# Patient Record
Sex: FEMALE | Race: WHITE | Employment: OTHER | URBAN - METROPOLITAN AREA
[De-identification: names, ages, dates, MRNs, and addresses within clinical notes are randomized per-mention and may not be internally consistent; named-entity substitution may affect disease eponyms.]

---

## 2017-01-09 RX ORDER — RISPERIDONE 1 MG/1
1 TABLET, FILM COATED ORAL 2 TIMES DAILY
Qty: 60 TABLET | Refills: 3 | Status: CANCELLED | OUTPATIENT
Start: 2017-01-09

## 2017-01-10 RX ORDER — ARIPIPRAZOLE 10 MG/1
TABLET ORAL
Qty: 90 TABLET | Refills: 1 | Status: SHIPPED | OUTPATIENT
Start: 2017-01-10 | End: 2017-05-01 | Stop reason: SDUPTHER

## 2017-01-10 RX ORDER — ARIPIPRAZOLE 10 MG/1
15 TABLET ORAL 2 TIMES DAILY
Qty: 90 TABLET | Refills: 1 | Status: SHIPPED | OUTPATIENT
Start: 2017-01-10 | End: 2017-01-31 | Stop reason: SDUPTHER

## 2017-01-10 RX ORDER — RISPERIDONE 1 MG/1
1 TABLET, FILM COATED ORAL 2 TIMES DAILY
Qty: 60 TABLET | Refills: 3 | Status: SHIPPED | OUTPATIENT
Start: 2017-01-10 | End: 2017-07-02 | Stop reason: SDUPTHER

## 2017-01-20 ENCOUNTER — TELEPHONE (OUTPATIENT)
Dept: FAMILY MEDICINE CLINIC | Age: 57
End: 2017-01-20

## 2017-01-20 DIAGNOSIS — L08.9 SKIN INFECTION: ICD-10-CM

## 2017-01-20 RX ORDER — CLINDAMYCIN HYDROCHLORIDE 300 MG/1
300 CAPSULE ORAL 3 TIMES DAILY
Qty: 30 CAPSULE | Refills: 0 | Status: SHIPPED | OUTPATIENT
Start: 2017-01-20 | End: 2017-09-07 | Stop reason: ALTCHOICE

## 2017-01-31 ENCOUNTER — OFFICE VISIT (OUTPATIENT)
Dept: FAMILY MEDICINE CLINIC | Age: 57
End: 2017-01-31

## 2017-01-31 VITALS
SYSTOLIC BLOOD PRESSURE: 122 MMHG | WEIGHT: 217 LBS | RESPIRATION RATE: 16 BRPM | HEART RATE: 78 BPM | TEMPERATURE: 98.3 F | BODY MASS INDEX: 36.15 KG/M2 | OXYGEN SATURATION: 96 % | DIASTOLIC BLOOD PRESSURE: 82 MMHG | HEIGHT: 65 IN

## 2017-01-31 DIAGNOSIS — F31.9 BIPOLAR DEPRESSION (HCC): ICD-10-CM

## 2017-01-31 DIAGNOSIS — Z00.00 ANNUAL PHYSICAL EXAM: Primary | ICD-10-CM

## 2017-01-31 PROCEDURE — 99396 PREV VISIT EST AGE 40-64: CPT | Performed by: FAMILY MEDICINE

## 2017-03-01 ENCOUNTER — TELEPHONE (OUTPATIENT)
Dept: FAMILY MEDICINE CLINIC | Age: 57
End: 2017-03-01

## 2017-03-02 ENCOUNTER — TELEPHONE (OUTPATIENT)
Dept: OBGYN CLINIC | Age: 57
End: 2017-03-02

## 2017-03-02 ENCOUNTER — HOSPITAL ENCOUNTER (OUTPATIENT)
Dept: MAMMOGRAPHY | Age: 57
Discharge: HOME | End: 2017-03-02
Attending: OBSTETRICS & GYNECOLOGY | Admitting: OBSTETRICS & GYNECOLOGY

## 2017-03-02 DIAGNOSIS — Z12.39 BREAST CANCER SCREENING: Primary | ICD-10-CM

## 2017-03-02 DIAGNOSIS — R92.8 ABNORMAL MAMMOGRAM OF LEFT BREAST: ICD-10-CM

## 2017-05-01 RX ORDER — ARIPIPRAZOLE 10 MG/1
TABLET ORAL
Qty: 90 TABLET | Refills: 0 | Status: SHIPPED | OUTPATIENT
Start: 2017-05-01 | End: 2017-06-01 | Stop reason: SDUPTHER

## 2017-06-01 RX ORDER — ARIPIPRAZOLE 10 MG/1
TABLET ORAL
Qty: 90 TABLET | Refills: 0 | Status: SHIPPED | OUTPATIENT
Start: 2017-06-01 | End: 2017-07-02 | Stop reason: SDUPTHER

## 2017-07-03 ENCOUNTER — TELEPHONE (OUTPATIENT)
Dept: FAMILY MEDICINE CLINIC | Age: 57
End: 2017-07-03

## 2017-07-03 DIAGNOSIS — K58.9 IRRITABLE BOWEL SYNDROME, UNSPECIFIED TYPE: Primary | ICD-10-CM

## 2017-07-03 RX ORDER — PROMETHAZINE HYDROCHLORIDE 25 MG/1
25 TABLET ORAL EVERY 6 HOURS PRN
Qty: 30 TABLET | Refills: 1 | Status: SHIPPED | OUTPATIENT
Start: 2017-07-03 | End: 2017-09-07 | Stop reason: ALTCHOICE

## 2017-07-03 RX ORDER — ARIPIPRAZOLE 10 MG/1
TABLET ORAL
Qty: 90 TABLET | Refills: 5 | Status: SHIPPED | OUTPATIENT
Start: 2017-07-03 | End: 2021-08-13 | Stop reason: DRUGHIGH

## 2017-07-03 RX ORDER — RISPERIDONE 1 MG/1
TABLET, FILM COATED ORAL
Qty: 60 TABLET | Refills: 5 | Status: SHIPPED | OUTPATIENT
Start: 2017-07-03 | End: 2018-05-24 | Stop reason: ALTCHOICE

## 2017-07-12 ENCOUNTER — TELEPHONE (OUTPATIENT)
Dept: FAMILY MEDICINE CLINIC | Age: 57
End: 2017-07-12

## 2017-07-12 RX ORDER — CEPHALEXIN 500 MG/1
500 CAPSULE ORAL 4 TIMES DAILY
Qty: 40 CAPSULE | Refills: 0 | Status: SHIPPED | OUTPATIENT
Start: 2017-07-12 | End: 2017-09-07 | Stop reason: ALTCHOICE

## 2017-09-07 ENCOUNTER — OFFICE VISIT (OUTPATIENT)
Dept: OBGYN CLINIC | Age: 57
End: 2017-09-07

## 2017-09-07 VITALS
HEIGHT: 65 IN | DIASTOLIC BLOOD PRESSURE: 78 MMHG | HEART RATE: 92 BPM | TEMPERATURE: 97.6 F | BODY MASS INDEX: 36.12 KG/M2 | WEIGHT: 216.8 LBS | SYSTOLIC BLOOD PRESSURE: 114 MMHG

## 2017-09-07 DIAGNOSIS — Z12.4 PAP SMEAR FOR CERVICAL CANCER SCREENING: ICD-10-CM

## 2017-09-07 DIAGNOSIS — E66.9 OBESITY (BMI 30-39.9): ICD-10-CM

## 2017-09-07 DIAGNOSIS — Z01.419 WOMEN'S ANNUAL ROUTINE GYNECOLOGICAL EXAMINATION: Primary | ICD-10-CM

## 2017-09-07 DIAGNOSIS — N76.0 BV (BACTERIAL VAGINOSIS): ICD-10-CM

## 2017-09-07 DIAGNOSIS — D25.9 UTERINE LEIOMYOMA, UNSPECIFIED LOCATION: ICD-10-CM

## 2017-09-07 DIAGNOSIS — Z78.0 MENOPAUSE: ICD-10-CM

## 2017-09-07 DIAGNOSIS — Z12.11 COLON CANCER SCREENING: ICD-10-CM

## 2017-09-07 DIAGNOSIS — B96.89 BV (BACTERIAL VAGINOSIS): ICD-10-CM

## 2017-09-07 DIAGNOSIS — Z98.51 HISTORY OF TUBAL LIGATION: ICD-10-CM

## 2017-09-07 PROCEDURE — G0101 CA SCREEN;PELVIC/BREAST EXAM: HCPCS | Performed by: OBSTETRICS & GYNECOLOGY

## 2017-09-07 RX ORDER — CLINDAMYCIN HYDROCHLORIDE 300 MG/1
300 CAPSULE ORAL 2 TIMES DAILY
Qty: 14 CAPSULE | Refills: 0 | Status: SHIPPED | OUTPATIENT
Start: 2017-09-07 | End: 2017-09-14

## 2017-09-07 ASSESSMENT — ENCOUNTER SYMPTOMS
VOMITING: 0
ABDOMINAL DISTENTION: 0
SHORTNESS OF BREATH: 0
DIARRHEA: 0
NAUSEA: 0
ABDOMINAL PAIN: 0
CONSTIPATION: 0

## 2017-09-11 LAB
HPV COMMENT: NORMAL
HPV TYPE 16: NOT DETECTED
HPV TYPE 18: NOT DETECTED
HPVOH (OTHER TYPES): NOT DETECTED

## 2017-09-26 ENCOUNTER — TELEPHONE (OUTPATIENT)
Dept: FAMILY MEDICINE CLINIC | Age: 57
End: 2017-09-26

## 2017-09-26 RX ORDER — AZITHROMYCIN 250 MG/1
TABLET, FILM COATED ORAL
Qty: 1 PACKET | Refills: 0 | Status: SHIPPED | OUTPATIENT
Start: 2017-09-26 | End: 2017-10-06

## 2017-10-23 ENCOUNTER — TELEPHONE (OUTPATIENT)
Dept: FAMILY MEDICINE CLINIC | Age: 57
End: 2017-10-23

## 2017-10-23 ENCOUNTER — OFFICE VISIT (OUTPATIENT)
Dept: FAMILY MEDICINE CLINIC | Age: 57
End: 2017-10-23

## 2017-10-23 VITALS
DIASTOLIC BLOOD PRESSURE: 78 MMHG | SYSTOLIC BLOOD PRESSURE: 116 MMHG | HEART RATE: 88 BPM | TEMPERATURE: 98.5 F | HEIGHT: 64 IN | WEIGHT: 216 LBS | BODY MASS INDEX: 36.88 KG/M2

## 2017-10-23 DIAGNOSIS — Z13.220 SCREENING FOR HYPERLIPIDEMIA: ICD-10-CM

## 2017-10-23 DIAGNOSIS — F31.9 BIPOLAR DEPRESSION (HCC): Primary | ICD-10-CM

## 2017-10-23 DIAGNOSIS — Z12.11 COLON CANCER SCREENING: ICD-10-CM

## 2017-10-23 DIAGNOSIS — Z11.59 NEED FOR HEPATITIS C SCREENING TEST: ICD-10-CM

## 2017-10-23 DIAGNOSIS — Z23 NEED FOR IMMUNIZATION AGAINST INFLUENZA: ICD-10-CM

## 2017-10-23 DIAGNOSIS — Z23 NEED FOR DIPHTHERIA-TETANUS-PERTUSSIS (TDAP) VACCINE, ADULT/ADOLESCENT: ICD-10-CM

## 2017-10-23 DIAGNOSIS — Z11.4 SCREENING FOR HIV (HUMAN IMMUNODEFICIENCY VIRUS): ICD-10-CM

## 2017-10-23 DIAGNOSIS — Z13.1 SCREENING FOR DIABETES MELLITUS: ICD-10-CM

## 2017-10-23 PROCEDURE — 99214 OFFICE O/P EST MOD 30 MIN: CPT | Performed by: FAMILY MEDICINE

## 2017-10-23 PROCEDURE — 90471 IMMUNIZATION ADMIN: CPT | Performed by: FAMILY MEDICINE

## 2017-10-23 PROCEDURE — 90688 IIV4 VACCINE SPLT 0.5 ML IM: CPT | Performed by: FAMILY MEDICINE

## 2017-10-23 PROCEDURE — 90715 TDAP VACCINE 7 YRS/> IM: CPT | Performed by: FAMILY MEDICINE

## 2017-10-23 PROCEDURE — G0008 ADMIN INFLUENZA VIRUS VAC: HCPCS | Performed by: FAMILY MEDICINE

## 2017-10-23 ASSESSMENT — ENCOUNTER SYMPTOMS
SHORTNESS OF BREATH: 0
NAUSEA: 0
CONSTIPATION: 0
VOMITING: 0
DIARRHEA: 0
ABDOMINAL PAIN: 0

## 2017-10-23 NOTE — TELEPHONE ENCOUNTER
I think Dr. Gaurang Villanueva may be working at [employer] [facility] now, but not sure. I think Dr. Julian Pastor is still in [facility], but don't know about insurance coverage.

## 2017-10-23 NOTE — TELEPHONE ENCOUNTER
Patient saw Dr. Radames Sevilla recently. Said she wants her to see a psychiatris. Referred her to Dr. Tangela Bridges. He is not on their insurance. Wants to know if there is someone in the [facility] that Dr. Devonte Suggs could recommend. Was seeing Dr. Subhash Arnold(?) at one point but he was closed down.

## 2017-10-23 NOTE — TELEPHONE ENCOUNTER
Pt called stating that she seen Dr. Claudette Ambriz this morning. She states that Dr. Claudette Ambriz referred her to a psychiatrist. She states that she tried to call and schedule an appointment with them but was told they do not take their insurance. Pt wants to know if there is anyone else you could refer her too. I informed pt that she could call her insurance and see who is in her network. Please advise.

## 2017-10-23 NOTE — PROGRESS NOTES
Chief Complaint   Patient presents with    New Patient         HPI      56 y.o. female presents today to establish care. Patient is feeling well and has no acute complaints. History of bipolar disorder stable on Abilify andRisperdal. Tolerating without SE. Denies SI/HI  She is up to date on her mammogram and pap smear. Works out regularly at [employer] with her son. Working on eliminating sodas from diet. Patient Active Problem List   Diagnosis    Hemorrhoids    Bipolar depression ([address]    Urinary, incontinence, stress female    Obesity (BMI 30-39. 9)    Dyspareunia    History of tubal ligation    Uterine fibroid    Menopause     Past Medical History:   Diagnosis Date    Anxiety     Bipolar 1 disorder ([address]     Depression     Irritable bowel syndrome     Obesity     Urinary incontinence        Past Surgical History:   Procedure Laterality Date    COLONOSCOPY  2010    polyp removed    EYE SURGERY Left     Lazy eye    TONSILLECTOMY      TUBAL LIGATION  1994     Most Recent Immunizations   Administered Date(s) Administered    Influenza Virus Vaccine 10/14/2016    Influenza, Suellyn Morning, 3 Years and older, IM 10/23/2017    Tdap (Boostrix, Adacel) 10/23/2017        Current Outpatient Prescriptions   Medication Sig Dispense Refill    ARIPiprazole (ABILIFY) 10 MG tablet TAKE ONE AND ONE-HALF TABLETS BY MOUTH TWICE DAILY 90 tablet 5    risperiDONE (RISPERDAL) 1 MG tablet TAKE ONE TABLET BY MOUTH TWICE DAILY 60 tablet 5    ranitidine (ZANTAC) 150 MG tablet Take 1 tablet by mouth 2 times daily 60 tablet 0     No current facility-administered medications for this visit.       No Known Allergies    Social History     Social History    Marital status: Single     Spouse name: N/A    Number of children: 2    Years of education: 15     Occupational History    disabilty      Social History Main Topics    Smoking status: Never Smoker    Smokeless tobacco: Never Used    Alcohol use No    Drug use: No    Sexual activity: Yes     Partners: Male     Birth control/ protection: Surgical      Comment: tubal ligation     Other Topics Concern    None     Social History Narrative    None     Family History   Problem Relation Age of Onset    Heart Disease Mother     Diabetes Mother     Arthritis Father     Arthritis Brother     Diabetes Maternal Grandfather                               Review Of Systems    Review of Systems   Constitutional: Negative for chills and fever. Eyes: Negative for visual disturbance. Respiratory: Negative for shortness of breath. Cardiovascular: Negative for chest pain. Gastrointestinal: Negative for abdominal pain, constipation, diarrhea, nausea and vomiting. Genitourinary: Negative for dysuria. PHYSICAL EXAMINATION:    /78 (Site: Right Arm, Position: Sitting, Cuff Size: Large Adult)   Pulse 88   Temp 98.5 °F (36.9 °C) (Oral)   Ht 5' 4\" (1.626 m)   Wt 216 lb (98 kg)   LMP 01/10/2015 (Exact Date)   BMI 37.08 kg/m²     Physical Exam   Constitutional: She is oriented to person, place, and time. She appears well-developed and well-nourished. No distress. HENT:   Head: Normocephalic and atraumatic. Right Ear: External ear normal.   Left Ear: External ear normal.   Mouth/Throat: Oropharynx is clear and moist. No oropharyngeal exudate. Eyes: Conjunctivae and EOM are normal. Right eye exhibits no discharge. Left eye exhibits no discharge. Cardiovascular: Normal rate, regular rhythm and normal heart sounds. Pulmonary/Chest: Effort normal and breath sounds normal. No respiratory distress. She has no wheezes. Abdominal: Soft. Bowel sounds are normal. She exhibits no distension. There is no tenderness. Neurological: She is alert and oriented to person, place, and time. Skin: Skin is warm and dry. She is not diaphoretic. Psychiatric: She has a normal mood and affect. Vitals reviewed.         ASSESSMENT:   Well Adult, See encounter diagnoses  Padmini Sanjuanita was seen today for new patient. Diagnoses and all orders for this visit:    Bipolar depression ([facility] [address] [facility]  Will continue current regimen  -     External Referral to Psychiatry    Screening for HIV (human immunodeficiency virus)  -     HIV Screen; Future    Need for hepatitis C screening test  -     HEPATITIS C ANTIBODY; Future    BMI 37.0-37.9, adult  -     [facility]    Screening for diabetes mellitus  -     Comprehensive Metabolic Panel; Future    Screening for hyperlipidemia  -     LIPID PANEL; Future    Colon cancer screening  -     Melvin Metz MD (RD)    Need for immunization against influenza  -     INFLUENZA, QUADV, 3 YRS AND OLDER, IM, MDV, 0.5ML (FLUZONE QUADV)    Need for diphtheria-tetanus-pertussis (Tdap) vaccine, adult/adolescent  -     Tdap (age 10y-63y) IM (Adacel)    Billing based on time. 30 minutes spent with the patient, and greater than 50% was spent in counseling           Plan:   See orders and medications filed with this encounter. Labs checked per orders. Vaccines ordered today per orders. Patient will return for fasting blood work    Return in about 6 months (around 4/23/2018) for or sooner if needed.

## 2017-10-24 ENCOUNTER — TELEPHONE (OUTPATIENT)
Dept: FAMILY MEDICINE CLINIC | Age: 57
End: 2017-10-24

## 2017-10-25 ENCOUNTER — TELEPHONE (OUTPATIENT)
Dept: FAMILY MEDICINE CLINIC | Age: 57
End: 2017-10-25

## 2017-10-25 NOTE — TELEPHONE ENCOUNTER
Pt states [facility] ([facility] ([phone]) needs a Prior Authorization for [employer].

## 2017-10-25 NOTE — TELEPHONE ENCOUNTER
Patient called and stated that she wanted to let Dr. Jorden Payan know that she will be going to see  Dr Donavan Schuler, psychiatrist on Monday 10.30.17. She said that her son was going to be going with her.

## 2017-11-09 ENCOUNTER — TELEPHONE (OUTPATIENT)
Dept: FAMILY MEDICINE CLINIC | Age: 57
End: 2017-11-09

## 2017-11-09 ENCOUNTER — OFFICE VISIT (OUTPATIENT)
Dept: FAMILY MEDICINE CLINIC | Age: 57
End: 2017-11-09

## 2017-11-09 VITALS
HEART RATE: 78 BPM | OXYGEN SATURATION: 96 % | BODY MASS INDEX: 37.25 KG/M2 | WEIGHT: 217 LBS | SYSTOLIC BLOOD PRESSURE: 128 MMHG | DIASTOLIC BLOOD PRESSURE: 86 MMHG

## 2017-11-09 DIAGNOSIS — H92.03 EAR PAIN, BILATERAL: Primary | ICD-10-CM

## 2017-11-09 DIAGNOSIS — Z13.1 SCREENING FOR DIABETES MELLITUS: ICD-10-CM

## 2017-11-09 DIAGNOSIS — Z11.4 SCREENING FOR HIV (HUMAN IMMUNODEFICIENCY VIRUS): ICD-10-CM

## 2017-11-09 DIAGNOSIS — Z13.220 SCREENING FOR HYPERLIPIDEMIA: ICD-10-CM

## 2017-11-09 DIAGNOSIS — Z11.59 NEED FOR HEPATITIS C SCREENING TEST: ICD-10-CM

## 2017-11-09 DIAGNOSIS — Z11.59 NEED FOR HEPATITIS C SCREENING TEST: Primary | ICD-10-CM

## 2017-11-09 LAB
A/G RATIO: 1.2 (ref 1.1–2.2)
ALBUMIN SERPL-MCNC: 4.2 G/DL (ref 3.4–5)
ALP BLD-CCNC: 77 U/L (ref 40–129)
ALT SERPL-CCNC: 52 U/L (ref 10–40)
ANION GAP SERPL CALCULATED.3IONS-SCNC: 18 MMOL/L (ref 3–16)
AST SERPL-CCNC: 43 U/L (ref 15–37)
BILIRUB SERPL-MCNC: 0.4 MG/DL (ref 0–1)
BUN BLDV-MCNC: 14 MG/DL (ref 7–20)
CALCIUM SERPL-MCNC: 9.4 MG/DL (ref 8.3–10.6)
CHLORIDE BLD-SCNC: 102 MMOL/L (ref 99–110)
CHOLESTEROL, TOTAL: 231 MG/DL (ref 0–199)
CO2: 19 MMOL/L (ref 21–32)
CREAT SERPL-MCNC: 0.8 MG/DL (ref 0.6–1.1)
GFR AFRICAN AMERICAN: >60
GFR NON-AFRICAN AMERICAN: >60
GLOBULIN: 3.4 G/DL
GLUCOSE BLD-MCNC: 96 MG/DL (ref 70–99)
HDLC SERPL-MCNC: 42 MG/DL (ref 40–60)
HEPATITIS C ANTIBODY INTERPRETATION: NORMAL
LDL CHOLESTEROL CALCULATED: 143 MG/DL
POTASSIUM SERPL-SCNC: 4.6 MMOL/L (ref 3.5–5.1)
SODIUM BLD-SCNC: 139 MMOL/L (ref 136–145)
TOTAL PROTEIN: 7.6 G/DL (ref 6.4–8.2)
TRIGL SERPL-MCNC: 230 MG/DL (ref 0–150)
VLDLC SERPL CALC-MCNC: 46 MG/DL

## 2017-11-09 PROCEDURE — 99213 OFFICE O/P EST LOW 20 MIN: CPT | Performed by: FAMILY MEDICINE

## 2017-11-09 PROCEDURE — 36415 COLL VENOUS BLD VENIPUNCTURE: CPT | Performed by: FAMILY MEDICINE

## 2017-11-09 RX ORDER — NEOMYCIN SULFATE, POLYMYXIN B SULFATE AND HYDROCORTISONE 10; 3.5; 1 MG/ML; MG/ML; [USP'U]/ML
4 SUSPENSION/ DROPS AURICULAR (OTIC) 3 TIMES DAILY
Qty: 1 BOTTLE | Refills: 0 | Status: SHIPPED | OUTPATIENT
Start: 2017-11-09 | End: 2017-11-19

## 2017-11-09 ASSESSMENT — ENCOUNTER SYMPTOMS
SHORTNESS OF BREATH: 0
SINUS PRESSURE: 0
RHINORRHEA: 1
SORE THROAT: 0
CHEST TIGHTNESS: 0
WHEEZING: 0
SINUS PAIN: 0

## 2017-11-09 NOTE — TELEPHONE ENCOUNTER
Patient called back to check on her phone message. Patient was given Dr. Maria E Hernandez message answer. Patient was scheduled with Dr. Meseret Laura for the ear pain.

## 2017-11-09 NOTE — TELEPHONE ENCOUNTER
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 Patient states that she is in the water a lot. Patient states that she had pain in her ears this morning. Patient states that the pain is intermittent. Patient wants to know if there is any drops that she should be using for her ears. Also patient wants to know if she should be using ear plugs and if so where would she get them. Patient states that she was just seen and does not want to come in.

## 2017-11-09 NOTE — TELEPHONE ENCOUNTER
Patient called and states that she was seen by Dr. Jada Wallace for ear pain. Patient states that she has intermittent ear pain and that she needs a prescription for ear drops. Patient states that she did not agree with Dr. Sandy Dolan opinion. Patient states that she agreed with the medical student's evaluation of her ears. 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 Patient is requesting a prescription for the ear drops.

## 2017-11-09 NOTE — PROGRESS NOTES
motion. Neck supple. No tracheal deviation present. No thyromegaly present. Cardiovascular: Normal rate, regular rhythm and normal heart sounds. Exam reveals no gallop and no friction rub. No murmur heard. Pulmonary/Chest: Effort normal and breath sounds normal. No respiratory distress. She has no wheezes. She has no rales. She exhibits no tenderness. Lymphadenopathy:     She has no cervical adenopathy. Skin: No rash noted. She is not diaphoretic. Nursing note and vitals reviewed. Assessment:      See Below      Plan:      Beth Hobbs was seen today for otalgia. Given that physical exam was clear we recommended getting OTC Advil Sinus. Pt was advised not to chew gum or eat overly chewy foods while symptoms are ongoing. She was advised to call back if symptoms persist despite OTC therapy. Diagnoses and all the patient TMJ is also possible so the Advil should open.  The patient stated that she did have some pain with chewing, but it wasn't that bad     orders for this visit:    Ear pain, bilateral

## 2017-11-09 NOTE — TELEPHONE ENCOUNTER
She would need an evaluation to determine if ear drops are necessary.  She can but ear plugs from [facility] etc

## 2017-11-10 PROBLEM — R74.01 ELEVATED TRANSAMINASE LEVEL: Status: ACTIVE | Noted: 2017-11-10

## 2017-11-10 LAB — HIV-1 AND HIV-2 ANTIBODIES: NORMAL

## 2017-11-14 ENCOUNTER — TELEPHONE (OUTPATIENT)
Dept: FAMILY MEDICINE CLINIC | Age: 57
End: 2017-11-14

## 2017-11-14 NOTE — TELEPHONE ENCOUNTER
Unable to leave message with the person that answered the phone due to HIPAA. He will have pt call back.

## 2017-11-14 NOTE — TELEPHONE ENCOUNTER
Patient called back to let Dr. Adolm Rinne to let her know that she used to take Lithium for a long time. Patient states that she is not longer taking it. She said that long term use of Lithium can affect the liver. She said that this could possibly be the reason for the elevated liver enzymes. Patient states that her twin also took Lithium and she has to have a liver test every year because of the damage that it did to her liver. Please advise.

## 2017-11-20 ENCOUNTER — TELEPHONE (OUTPATIENT)
Dept: FAMILY MEDICINE CLINIC | Age: 57
End: 2017-11-20

## 2017-11-22 ENCOUNTER — TELEPHONE (OUTPATIENT)
Dept: FAMILY MEDICINE CLINIC | Age: 57
End: 2017-11-22

## 2017-11-27 ENCOUNTER — TELEPHONE (OUTPATIENT)
Dept: FAMILY MEDICINE CLINIC | Age: 57
End: 2017-11-27

## 2017-11-27 NOTE — TELEPHONE ENCOUNTER
Patient called and states that Dr. Ade Abrams office would like a copy of the recent lab work that she had done.

## 2017-12-08 ENCOUNTER — TELEPHONE (OUTPATIENT)
Dept: FAMILY MEDICINE CLINIC | Age: 57
End: 2017-12-08

## 2017-12-08 NOTE — TELEPHONE ENCOUNTER
Patient called and states that she has a yeast infection under left arm in the arm pit. Patient states that it started today. Patient states that Dr. Valerie Howell had treated her for it in the past with the Zpak. Patient states that there is no way that she can come into the office today. Patient uses [id number] [facility] [employer] on [address] [employer] Please advise.

## 2017-12-08 NOTE — TELEPHONE ENCOUNTER
She will need to make an appointment to be evaluated. Z pack is not an appropriate medication for yeast infection.

## 2018-05-07 ENCOUNTER — TELEPHONE (OUTPATIENT)
Dept: FAMILY MEDICINE CLINIC | Age: 58
End: 2018-05-07

## 2018-05-07 DIAGNOSIS — K58.9 IRRITABLE BOWEL SYNDROME, UNSPECIFIED TYPE: Primary | ICD-10-CM

## 2018-05-07 RX ORDER — PROMETHAZINE HYDROCHLORIDE 25 MG/1
25 TABLET ORAL EVERY 6 HOURS PRN
Qty: 120 TABLET | Refills: 0 | Status: SHIPPED | OUTPATIENT
Start: 2018-05-07 | End: 2018-06-06

## 2018-05-24 ENCOUNTER — HOSPITAL ENCOUNTER (OUTPATIENT)
Dept: SURGERY | Age: 58
Discharge: HOME | End: 2018-05-24
Attending: INTERNAL MEDICINE | Admitting: INTERNAL MEDICINE

## 2018-05-24 VITALS
RESPIRATION RATE: 16 BRPM | BODY MASS INDEX: 24.99 KG/M2 | OXYGEN SATURATION: 96 % | HEART RATE: 66 BPM | SYSTOLIC BLOOD PRESSURE: 120 MMHG | WEIGHT: 150 LBS | HEIGHT: 65 IN | TEMPERATURE: 97.8 F | DIASTOLIC BLOOD PRESSURE: 68 MMHG

## 2018-05-24 DIAGNOSIS — K62.5 HEMORRHAGE OF ANUS AND RECTUM: ICD-10-CM

## 2018-05-24 RX ORDER — LIDOCAINE HYDROCHLORIDE 10 MG/ML
0.1 INJECTION, SOLUTION EPIDURAL; INFILTRATION; INTRACAUDAL; PERINEURAL
Status: ACTIVE | OUTPATIENT
Start: 2018-05-24 | End: 2018-05-24

## 2018-05-24 RX ORDER — SODIUM CHLORIDE, SODIUM LACTATE, POTASSIUM CHLORIDE, CALCIUM CHLORIDE 600; 310; 30; 20 MG/100ML; MG/100ML; MG/100ML; MG/100ML
INJECTION, SOLUTION INTRAVENOUS ONCE
Status: COMPLETED | OUTPATIENT
Start: 2018-05-24 | End: 2018-05-24

## 2018-05-24 RX ADMIN — SODIUM CHLORIDE, SODIUM LACTATE, POTASSIUM CHLORIDE, CALCIUM CHLORIDE: 600; 310; 30; 20 INJECTION, SOLUTION INTRAVENOUS at 07:00

## 2018-05-24 ASSESSMENT — PAIN SCALES - GENERAL
PAINLEVEL_OUTOF10: 0

## 2018-05-24 ASSESSMENT — PAIN - FUNCTIONAL ASSESSMENT: PAIN_FUNCTIONAL_ASSESSMENT: 0-10

## 2018-09-10 ENCOUNTER — OFFICE VISIT (OUTPATIENT)
Dept: OBGYN CLINIC | Age: 58
End: 2018-09-10

## 2018-09-10 VITALS
WEIGHT: 216.4 LBS | DIASTOLIC BLOOD PRESSURE: 80 MMHG | BODY MASS INDEX: 36.01 KG/M2 | HEART RATE: 88 BPM | TEMPERATURE: 97.8 F | SYSTOLIC BLOOD PRESSURE: 122 MMHG

## 2018-09-10 DIAGNOSIS — Z78.0 MENOPAUSE: ICD-10-CM

## 2018-09-10 DIAGNOSIS — Z12.39 BREAST CANCER SCREENING: ICD-10-CM

## 2018-09-10 DIAGNOSIS — N94.10 DYSPAREUNIA IN FEMALE: ICD-10-CM

## 2018-09-10 DIAGNOSIS — Z01.419 WOMEN'S ANNUAL ROUTINE GYNECOLOGICAL EXAMINATION: Primary | ICD-10-CM

## 2018-09-10 DIAGNOSIS — K64.9 HEMORRHOIDS, UNSPECIFIED HEMORRHOID TYPE: ICD-10-CM

## 2018-09-10 DIAGNOSIS — Z12.4 PAP SMEAR FOR CERVICAL CANCER SCREENING: ICD-10-CM

## 2018-09-10 DIAGNOSIS — E66.9 OBESITY (BMI 30-39.9): ICD-10-CM

## 2018-09-10 DIAGNOSIS — D25.9 UTERINE LEIOMYOMA, UNSPECIFIED LOCATION: ICD-10-CM

## 2018-09-10 PROCEDURE — 99999 PR OFFICE/OUTPT VISIT,PROCEDURE ONLY: CPT | Performed by: OBSTETRICS & GYNECOLOGY

## 2018-09-10 PROCEDURE — G0101 CA SCREEN;PELVIC/BREAST EXAM: HCPCS | Performed by: OBSTETRICS & GYNECOLOGY

## 2018-09-10 ASSESSMENT — ENCOUNTER SYMPTOMS
CONSTIPATION: 0
ABDOMINAL DISTENTION: 0
VOMITING: 0
DIARRHEA: 0
SHORTNESS OF BREATH: 0
NAUSEA: 0
ABDOMINAL PAIN: 0

## 2018-09-10 NOTE — PROGRESS NOTES
Last PAP was normal; September/2017. Abnormal pap history? no  Last HPV screen: 2017 negative  Mammogram was normal, March/2017.   Last Dexa Scan: N/A    Contraceptive method: Post menopausal status  Last colonoscopy was 2018

## 2018-09-10 NOTE — PATIENT INSTRUCTIONS
You may call [phone]) to schedule your screening mammogram at any [address] [place] [address] at your convenience.

## 2018-09-10 NOTE — PROGRESS NOTES
Subjective:      Patient ID: Saadia Lopez is a 66 y.o. female. HPI  67 y/o  presents for annual examination. Last pap smear was 17--normal with negative testing for high risk HPV. Tested negative for high risk HPV in  as well. No history of abnormal pap smear. Patient achieved menopause at age 48. No vaginal bleeding, no vaginal discharge. Sexually active, dyspareunia continues to improved with conservative measures, monogamous >30 years. Moods have significantly improved with changes in medication. Only current medication is Abilify. Previously on Lithium, etc.  Diagnosis has changed from bipolar disorder. Feels new diagnosis is more accurate for the symptoms she experiences. ('depression with psychotic features'). Review of Systems   Constitutional: Negative for activity change, appetite change, chills, fatigue, fever and unexpected weight change. Respiratory: Negative for shortness of breath. Cardiovascular: Negative for chest pain. Gastrointestinal: Negative for abdominal distention, abdominal pain, constipation, diarrhea, nausea and vomiting. Endocrine: Negative for cold intolerance and heat intolerance. Genitourinary: Negative for difficulty urinating, dyspareunia, dysuria, hematuria, menstrual problem, pelvic pain, vaginal bleeding, vaginal discharge and vaginal pain. Skin: Negative for rash. Neurological: Negative for headaches. Hematological: Does not bruise/bleed easily. Objective:   Physical Exam   Constitutional: She is oriented to person, place, and time. Vital signs are normal. She appears well-developed and well-nourished. She is active and cooperative. Non-toxic appearance. She does not have a sickly appearance. She does not appear ill. No distress. HENT:   Head: Normocephalic and atraumatic. Eyes: EOM are normal.   Neck: Trachea normal. Neck supple. No thyromegaly present.    Cardiovascular: Normal rate, regular rhythm, S1 normal,

## 2018-09-21 ENCOUNTER — HOSPITAL ENCOUNTER (OUTPATIENT)
Dept: WOMENS IMAGING | Age: 58
Discharge: HOME | End: 2018-09-21
Payer: COMMERCIAL

## 2018-09-21 ENCOUNTER — OFFICE VISIT (OUTPATIENT)
Dept: FAMILY MEDICINE CLINIC | Age: 58
End: 2018-09-21

## 2018-09-21 VITALS
DIASTOLIC BLOOD PRESSURE: 86 MMHG | SYSTOLIC BLOOD PRESSURE: 138 MMHG | OXYGEN SATURATION: 95 % | HEART RATE: 91 BPM | WEIGHT: 217 LBS | BODY MASS INDEX: 36.11 KG/M2

## 2018-09-21 DIAGNOSIS — Z12.31 ENCOUNTER FOR SCREENING MAMMOGRAM FOR BREAST CANCER: ICD-10-CM

## 2018-09-21 DIAGNOSIS — T14.8XXA WOUND, OPEN: ICD-10-CM

## 2018-09-21 DIAGNOSIS — M54.2 NECK PAIN, ACUTE: Primary | ICD-10-CM

## 2018-09-21 PROCEDURE — 77067 SCR MAMMO BI INCL CAD: CPT

## 2018-09-21 PROCEDURE — 96372 THER/PROPH/DIAG INJ SC/IM: CPT | Performed by: NURSE PRACTITIONER

## 2018-09-21 PROCEDURE — 99214 OFFICE O/P EST MOD 30 MIN: CPT | Performed by: NURSE PRACTITIONER

## 2018-09-21 RX ORDER — METHYLPREDNISOLONE ACETATE 40 MG/ML
40 INJECTION, SUSPENSION INTRA-ARTICULAR; INTRALESIONAL; INTRAMUSCULAR; SOFT TISSUE ONCE
Status: COMPLETED | OUTPATIENT
Start: 2018-09-21 | End: 2018-09-21

## 2018-09-21 RX ADMIN — METHYLPREDNISOLONE ACETATE 40 MG: 40 INJECTION, SUSPENSION INTRA-ARTICULAR; INTRALESIONAL; INTRAMUSCULAR; SOFT TISSUE at 13:35

## 2018-09-21 ASSESSMENT — PATIENT HEALTH QUESTIONNAIRE - PHQ9
2. FEELING DOWN, DEPRESSED OR HOPELESS: 0
1. LITTLE INTEREST OR PLEASURE IN DOING THINGS: 0
SUM OF ALL RESPONSES TO PHQ QUESTIONS 1-9: 0
SUM OF ALL RESPONSES TO PHQ QUESTIONS 1-9: 0
SUM OF ALL RESPONSES TO PHQ9 QUESTIONS 1 & 2: 0

## 2018-09-21 ASSESSMENT — ENCOUNTER SYMPTOMS: TROUBLE SWALLOWING: 0

## 2018-09-21 NOTE — PROGRESS NOTES
Namrata Carranza (:  1960) is a 66 y.o. female, here for evaluation of the following medical concerns: She presents today with neck pain and irritation. She took aspirin at home, which helped. She has not taken ibuprofen. She has 2 bites on her abdomen and buttocks that itch that are red and irritated. Neck Pain    This is a new problem. The current episode started in the past 7 days. The problem occurs constantly. The problem has been waxing and waning. Associated with: choking on rice. The pain is present in the left side. The quality of the pain is described as aching and stabbing. The pain is at a severity of 9/10. The pain is severe. The symptoms are aggravated by stress and coughing. The pain is same all the time. Associated symptoms include pain with swallowing (slight irritation). Pertinent negatives include no chest pain, fever, headaches, syncope, trouble swallowing or weakness. Treatments tried: aspirin. The treatment provided no relief. Wound Check   She was originally treated 3 to 5 days ago. Her temperature was unmeasured prior to arrival. There has been bloody and colored (with pus) discharge from the wound. The redness has worsened. The swelling has worsened. The pain has improved. She has no difficulty moving the affected extremity or digit. Review of Systems   Constitutional: Negative for fever. HENT: Negative for trouble swallowing. Cardiovascular: Negative for chest pain and syncope. Musculoskeletal: Positive for neck pain. Skin: Positive for wound. Neurological: Negative for weakness and headaches. Prior to Visit Medications    Medication Sig Taking? Authorizing Provider   mupirocin (BACTROBAN) 2 % ointment Apply 3 times daily.  Yes Penny Howell APRN - CNP   ARIPiprazole (ABILIFY) 10 MG tablet TAKE ONE AND ONE-HALF TABLETS BY MOUTH TWICE DAILY Yes Tala Mobley MD   cephALEXin (KEFLEX) 500 MG capsule Take 1 capsule by mouth 3 times

## 2018-09-28 ENCOUNTER — TELEPHONE (OUTPATIENT)
Dept: FAMILY MEDICINE CLINIC | Age: 58
End: 2018-09-28

## 2018-09-28 DIAGNOSIS — T14.8XXA OPEN WOUND OF SKIN: Primary | ICD-10-CM

## 2018-09-28 DIAGNOSIS — T14.8XXA WOUND OF SKIN: Primary | ICD-10-CM

## 2018-09-28 RX ORDER — CEPHALEXIN 500 MG/1
500 CAPSULE ORAL 3 TIMES DAILY
Qty: 30 CAPSULE | Refills: 0 | Status: SHIPPED | OUTPATIENT
Start: 2018-09-28 | End: 2018-10-08

## 2018-09-28 RX ORDER — NAPROXEN SODIUM 550 MG/1
550 TABLET ORAL 2 TIMES DAILY WITH MEALS
Qty: 14 TABLET | Refills: 0 | Status: SHIPPED | OUTPATIENT
Start: 2018-09-28 | End: 2019-06-09

## 2018-09-28 NOTE — TELEPHONE ENCOUNTER
I sent over an antibiotic. She should continue to use the ointment and begin the antibiotic today. Is the pain related to her neck or the wounds?

## 2018-09-28 NOTE — TELEPHONE ENCOUNTER
I had sent in an antibiotic. I will send over a non-narcotic pain reliever. She can also try ibuprofen and/or tylenol.

## 2018-09-28 NOTE — TELEPHONE ENCOUNTER
Pt saw Miladis Barkley on 9/21 for bites on her stomach and bottom. Stated the ointment is not really helping and she is in pain. Asking if Miladis Barkley can call in an oral antibiotic and something for the pain.

## 2018-09-28 NOTE — TELEPHONE ENCOUNTER
Do not take extra ibuprofen with the prescription I just ordered. Please follow-up next week if not relief.

## 2018-10-11 ENCOUNTER — OFFICE VISIT (OUTPATIENT)
Dept: FAMILY MEDICINE CLINIC | Age: 58
End: 2018-10-11
Payer: COMMERCIAL

## 2018-10-11 VITALS
WEIGHT: 216 LBS | SYSTOLIC BLOOD PRESSURE: 126 MMHG | DIASTOLIC BLOOD PRESSURE: 86 MMHG | BODY MASS INDEX: 35.94 KG/M2 | OXYGEN SATURATION: 97 % | HEART RATE: 80 BPM

## 2018-10-11 DIAGNOSIS — R74.01 ELEVATED TRANSAMINASE LEVEL: ICD-10-CM

## 2018-10-11 DIAGNOSIS — T14.8XXA WOUND OF SKIN: ICD-10-CM

## 2018-10-11 DIAGNOSIS — F32.3 MAJOR DEPRESSION WITH PSYCHOTIC FEATURES (HCC): ICD-10-CM

## 2018-10-11 DIAGNOSIS — E78.5 DYSLIPIDEMIA: Primary | ICD-10-CM

## 2018-10-11 DIAGNOSIS — E66.9 OBESITY (BMI 30-39.9): ICD-10-CM

## 2018-10-11 LAB
A/G RATIO: 1.2 (ref 1.1–2.2)
ALBUMIN SERPL-MCNC: 4.2 G/DL (ref 3.4–5)
ALP BLD-CCNC: 89 U/L (ref 40–129)
ALT SERPL-CCNC: 54 U/L (ref 10–40)
ANION GAP SERPL CALCULATED.3IONS-SCNC: 17 MMOL/L (ref 3–16)
AST SERPL-CCNC: 33 U/L (ref 15–37)
BILIRUB SERPL-MCNC: 0.3 MG/DL (ref 0–1)
BUN BLDV-MCNC: 17 MG/DL (ref 7–20)
CALCIUM SERPL-MCNC: 9.7 MG/DL (ref 8.3–10.6)
CHLORIDE BLD-SCNC: 105 MMOL/L (ref 99–110)
CHOLESTEROL, TOTAL: 222 MG/DL (ref 0–199)
CO2: 23 MMOL/L (ref 21–32)
CREAT SERPL-MCNC: 1.1 MG/DL (ref 0.6–1.1)
GFR AFRICAN AMERICAN: >60
GFR NON-AFRICAN AMERICAN: 51
GLOBULIN: 3.4 G/DL
GLUCOSE BLD-MCNC: 84 MG/DL (ref 70–99)
HDLC SERPL-MCNC: 51 MG/DL (ref 40–60)
LDL CHOLESTEROL CALCULATED: 133 MG/DL
POTASSIUM SERPL-SCNC: 4.4 MMOL/L (ref 3.5–5.1)
SODIUM BLD-SCNC: 145 MMOL/L (ref 136–145)
TOTAL PROTEIN: 7.6 G/DL (ref 6.4–8.2)
TRIGL SERPL-MCNC: 191 MG/DL (ref 0–150)
VLDLC SERPL CALC-MCNC: 38 MG/DL

## 2018-10-11 PROCEDURE — 36415 COLL VENOUS BLD VENIPUNCTURE: CPT | Performed by: FAMILY MEDICINE

## 2018-10-11 PROCEDURE — 99213 OFFICE O/P EST LOW 20 MIN: CPT | Performed by: FAMILY MEDICINE

## 2018-10-11 ASSESSMENT — ENCOUNTER SYMPTOMS: SHORTNESS OF BREATH: 0

## 2018-10-15 DIAGNOSIS — R74.8 ELEVATED LIVER ENZYMES: Primary | ICD-10-CM

## 2018-10-15 DIAGNOSIS — R79.89 ELEVATED LIVER FUNCTION TESTS: ICD-10-CM

## 2018-10-15 LAB
HAV IGM SER IA-ACNC: NORMAL
HEPATITIS B CORE IGM ANTIBODY: NORMAL
HEPATITIS B SURFACE ANTIGEN INTERPRETATION: NORMAL
HEPATITIS C ANTIBODY INTERPRETATION: NORMAL

## 2018-12-07 ENCOUNTER — TELEPHONE (OUTPATIENT)
Dept: FAMILY MEDICINE CLINIC | Age: 58
End: 2018-12-07

## 2018-12-17 ENCOUNTER — TELEPHONE (OUTPATIENT)
Dept: FAMILY MEDICINE CLINIC | Age: 58
End: 2018-12-17

## 2019-01-14 ENCOUNTER — TELEPHONE (OUTPATIENT)
Dept: FAMILY MEDICINE CLINIC | Age: 59
End: 2019-01-14

## 2019-05-16 ENCOUNTER — TELEPHONE (OUTPATIENT)
Dept: FAMILY MEDICINE CLINIC | Age: 59
End: 2019-05-16

## 2019-05-16 RX ORDER — CEPHALEXIN 500 MG/1
500 CAPSULE ORAL 3 TIMES DAILY
Qty: 30 CAPSULE | Refills: 0 | Status: SHIPPED | OUTPATIENT
Start: 2019-05-16 | End: 2019-05-26

## 2019-05-16 NOTE — TELEPHONE ENCOUNTER
Prescription for antibiotics sent to her pharmacy on file. If symptoms worsen or fail to improve patient is to make an appointment.

## 2019-05-16 NOTE — TELEPHONE ENCOUNTER
Pt states she thinks her insect bites are infected due to bites being red and sore. Pt requesting an antibiotic. Please advise.

## 2019-05-30 ENCOUNTER — OFFICE VISIT (OUTPATIENT)
Dept: FAMILY MEDICINE CLINIC | Age: 59
End: 2019-05-30
Payer: COMMERCIAL

## 2019-05-30 VITALS
OXYGEN SATURATION: 98 % | WEIGHT: 223.2 LBS | DIASTOLIC BLOOD PRESSURE: 80 MMHG | SYSTOLIC BLOOD PRESSURE: 130 MMHG | HEART RATE: 86 BPM | TEMPERATURE: 98.6 F | BODY MASS INDEX: 37.14 KG/M2

## 2019-05-30 DIAGNOSIS — T14.8XXA WOUND, OPEN: ICD-10-CM

## 2019-05-30 DIAGNOSIS — R23.4 FISSURE IN SKIN: Primary | ICD-10-CM

## 2019-05-30 PROCEDURE — G8417 CALC BMI ABV UP PARAM F/U: HCPCS | Performed by: FAMILY MEDICINE

## 2019-05-30 PROCEDURE — 1036F TOBACCO NON-USER: CPT | Performed by: FAMILY MEDICINE

## 2019-05-30 PROCEDURE — 3017F COLORECTAL CA SCREEN DOC REV: CPT | Performed by: FAMILY MEDICINE

## 2019-05-30 PROCEDURE — 99213 OFFICE O/P EST LOW 20 MIN: CPT | Performed by: FAMILY MEDICINE

## 2019-05-30 PROCEDURE — G8427 DOCREV CUR MEDS BY ELIG CLIN: HCPCS | Performed by: FAMILY MEDICINE

## 2019-05-30 RX ORDER — EMOLLIENT COMBINATION NO.97
OINTMENT (GRAM) TOPICAL
Qty: 454 G | Refills: 0 | Status: SHIPPED | OUTPATIENT
Start: 2019-05-30 | End: 2019-10-30 | Stop reason: ALTCHOICE

## 2019-05-30 RX ORDER — CEPHALEXIN 500 MG/1
500 CAPSULE ORAL 3 TIMES DAILY
Qty: 21 CAPSULE | Refills: 0 | Status: SHIPPED | OUTPATIENT
Start: 2019-05-30 | End: 2019-06-09

## 2019-05-30 NOTE — PROGRESS NOTES
Patient: Jacqueline Lanza is a 66 y. o.female who presents today with the following Chief Complaint(s):  Chief Complaint   Patient presents with    Skin Tag     left armpit     Lesion(s)     inside right nostril         HPI: For 2 mo's, has sore inside R nostril, gets bigger and smaller at times, bleeds if pt picks at lesion. No recent colds, sinusitis. No fever. For 1 wk, has pain L lat breast where she has had skin tag, suspects she may have scratched off. No bleeding. Under L breast has lesion that is ttp, feels it is insect bite. Has occasional such bites as does . They live in old home and sleep in basement. Current Outpatient Medications   Medication Sig Dispense Refill    cephALEXin (KEFLEX) 500 MG capsule Take 1 capsule by mouth 3 times daily 21 capsule 0    Emollient (AQUAPHILIC) OINT Use to moisturize the skin daily and use to R nostril sore bid x 7 days. 454 g 0    ARIPiprazole (ABILIFY) 10 MG tablet TAKE ONE AND ONE-HALF TABLETS BY MOUTH TWICE DAILY 90 tablet 5    naproxen sodium (ANAPROX) 550 MG tablet Take 1 tablet by mouth 2 times daily (with meals) 14 tablet 0     No current facility-administered medications for this visit. Patient's past medical history,surgical history, family history, medications,  and allergies  were all reviewed and updated as appropriate today. Review of Systems      Physical Exam   Constitutional: She appears well-developed and well-nourished. HENT:   Head: Normocephalic and atraumatic. Right Ear: External ear normal.   Left Ear: External ear normal.   Mouth/Throat: Oropharynx is clear and moist.   R nostril floor with distal 2 mm fissure   Neck: Normal range of motion. Neck supple. Cardiovascular: Normal rate, regular rhythm and normal heart sounds. Pulmonary/Chest: Effort normal. No respiratory distress. She has no wheezes. Lymphadenopathy:     She has no cervical adenopathy. Skin: Skin is warm and dry.    L lateral breast with

## 2019-06-09 ENCOUNTER — HOSPITAL ENCOUNTER (EMERGENCY)
Age: 59
Discharge: HOME | End: 2019-06-09
Payer: COMMERCIAL

## 2019-06-09 ENCOUNTER — APPOINTMENT (OUTPATIENT)
Dept: GENERAL RADIOLOGY | Age: 59
End: 2019-06-09
Payer: COMMERCIAL

## 2019-06-09 VITALS
HEIGHT: 65 IN | SYSTOLIC BLOOD PRESSURE: 153 MMHG | TEMPERATURE: 97.8 F | RESPIRATION RATE: 18 BRPM | OXYGEN SATURATION: 95 % | HEART RATE: 78 BPM | BODY MASS INDEX: 33.32 KG/M2 | WEIGHT: 200 LBS | DIASTOLIC BLOOD PRESSURE: 98 MMHG

## 2019-06-09 DIAGNOSIS — Z77.098 EXPOSURE TO CHEMICAL INHALATION: Primary | ICD-10-CM

## 2019-06-09 PROCEDURE — 71046 X-RAY EXAM CHEST 2 VIEWS: CPT

## 2019-06-09 PROCEDURE — 94640 AIRWAY INHALATION TREATMENT: CPT

## 2019-06-09 PROCEDURE — 99284 EMERGENCY DEPT VISIT MOD MDM: CPT

## 2019-06-09 PROCEDURE — 6360000002 HC RX W HCPCS: Performed by: PHYSICIAN ASSISTANT

## 2019-06-09 PROCEDURE — 6370000000 HC RX 637 (ALT 250 FOR IP): Performed by: PHYSICIAN ASSISTANT

## 2019-06-09 RX ORDER — ALBUTEROL SULFATE 2.5 MG/3ML
2.5 SOLUTION RESPIRATORY (INHALATION) ONCE
Status: COMPLETED | OUTPATIENT
Start: 2019-06-09 | End: 2019-06-09

## 2019-06-09 RX ORDER — PREDNISONE 10 MG/1
20 TABLET ORAL DAILY
Qty: 10 TABLET | Refills: 0 | Status: SHIPPED | OUTPATIENT
Start: 2019-06-09 | End: 2019-06-14

## 2019-06-09 RX ORDER — ALBUTEROL SULFATE 90 UG/1
1-2 AEROSOL, METERED RESPIRATORY (INHALATION) EVERY 6 HOURS PRN
Qty: 1 INHALER | Refills: 0 | Status: SHIPPED | OUTPATIENT
Start: 2019-06-09 | End: 2019-11-04 | Stop reason: ALTCHOICE

## 2019-06-09 RX ORDER — PREDNISONE 10 MG/1
20 TABLET ORAL ONCE
Status: COMPLETED | OUTPATIENT
Start: 2019-06-09 | End: 2019-06-09

## 2019-06-09 RX ADMIN — PREDNISONE 20 MG: 10 TABLET ORAL at 21:05

## 2019-06-09 RX ADMIN — ALBUTEROL SULFATE 2.5 MG: 2.5 SOLUTION RESPIRATORY (INHALATION) at 20:52

## 2019-06-10 NOTE — ED PROVIDER NOTES
**[facility]  [facility]  EMERGENCY DEPARTMENT ENCOUNTER      Pt Name: Himanshu Serrano  [facility]:[id number]  [place] 1960  Date of evaluation: 6/9/2019  Provider: Marylee Friedlander, PA-C      Chief Complaint:    Chief Complaint   Patient presents with    Shortness of Breath     on thursday she inhaled bug spray and she has been feeling short of breath. Nursing Notes, Past Medical Hx, Past Surgical Hx, Social Hx, Allergies, and Family Hx were all reviewed and agreed with or any disagreements were addressed in the HPI.    HPI:  (Location, Duration, Timing, Severity,Quality, Assoc Sx, Context, Modifying factors)  This is a  66 y.o. female patient was in with concern of inhalation of and/insecticide spray Thursday 9 PM or 72 hours ago. Patient reports initial shortness of breath and cough. This has improved. She continues with a bitter taste in mouth and a sensation of shortness of breath. She indicates no concerning change in voice or throat 7. History of asthma. She does not smoke. She presents for evaluation of mild shortness of breath, not worse with exertion secondary to inhalation of and insecticide spray occurring 72 hours ago. PastMedical/Surgical History:      Diagnosis Date    Anxiety     Bipolar 1 disorder ([address]     Depression     Irritable bowel syndrome     Obesity     Urinary incontinence          Procedure Laterality Date    COLONOSCOPY  2010    polyp removed    COLONOSCOPY  2018    EYE SURGERY Left     Lazy eye    TONSILLECTOMY      TUBAL LIGATION  1994       Medications:  Discharge Medication List as of 6/9/2019  9:06 PM      CONTINUE these medications which have NOT CHANGED    Details   Emollient (AQUAPHILIC) OINT Use to moisturize the skin daily and use to R nostril sore bid x 7 days. , Disp-454 g, R-0Normal      ARIPiprazole (ABILIFY) 10 MG tablet TAKE ONE AND ONE-HALF TABLETS BY MOUTH TWICE DAILY, Disp-90 tablet, R-5Normal               Review of Systems:  Review of Systems  Positives and Pertinent negatives as per HPI. Except as noted above in the ROS, problem specific ROS was completed and is negative. Physical Exam:  Physical Exam   Constitutional: She is oriented to person, place, and time. She appears well-developed and well-nourished. HENT:   Head: Normocephalic and atraumatic. Right Ear: External ear normal.   Left Ear: External ear normal.   Mouth/Throat: Oropharynx is clear and moist.   Eyes: Conjunctivae are normal. Right eye exhibits no discharge. Left eye exhibits no discharge. No scleral icterus. Neck: Normal range of motion. Neck supple. Cardiovascular: Normal rate, regular rhythm and normal heart sounds. Pulmonary/Chest: Effort normal and breath sounds normal.   Musculoskeletal: Normal range of motion. Lymphadenopathy:     She has no cervical adenopathy. Neurological: She is alert and oriented to person, place, and time. Skin: Skin is warm and dry. Psychiatric: She has a normal mood and affect. Her behavior is normal. Judgment and thought content normal.   Nursing note and vitals reviewed. MEDICAL DECISION MAKING    Vitals:    Vitals:    06/09/19 1833 06/09/19 2107   BP: (!) 122/94 (!) 153/98   Pulse: 64 78   Resp: 18 18   Temp: 97.8 °F (36.6 °C)    TempSrc: Oral    SpO2: 97% 95%   Weight: 200 lb (90.7 kg)    Height: 5' 5\" (1.651 m)        LABS:Labs Reviewed - No data to display     Remainder of labs reviewed and werenegative at this time or not returned at the time of this note. RADIOLOGY:   Non-plain film images such as CT, Ultrasound and MRI are read by the radiologist. Author [employer] have directly visualized the radiologic plain film image(s) with the below findings:    Chest x-ray shows no acute cardiopulmonary process. Interpretation per the Radiologist below, if available at the time of thisnote:    XR CHEST STANDARD (2 VW)   Final Result   Clear lungs. doses, Disp-10 tablet, R-0Print      albuterol sulfate  (90 Base) MCG/ACT inhaler Inhale 1-2 puffs into the lungs every 6 hours as needed for Wheezing, Disp-1 Inhaler, R-0Print             DISCONTINUED MEDICATIONS:  Discharge Medication List as of 6/9/2019  9:06 PM      STOP taking these medications       cephALEXin (KEFLEX) 500 MG capsule Comments:   Reason for Stopping:         naproxen sodium (ANAPROX) 550 MG tablet Comments:   Reason for Stopping:                      (Please note the MDM and HPI sections of this note were completed with a voice recognition program.  Efforts weremade to edit the dictations but occasionally words are mis-transcribed.)    Electronically signed, Diego Montgomery PA-C,          Diego Montgomery PA-C  06/11/19 0002

## 2019-06-10 NOTE — ED NOTES
Provider Golden Garcia in room speaking with patient at this time.      Janiya Turner RN  06/09/19 3540

## 2019-06-11 ENCOUNTER — OFFICE VISIT (OUTPATIENT)
Dept: FAMILY MEDICINE CLINIC | Age: 59
End: 2019-06-11
Payer: COMMERCIAL

## 2019-06-11 ENCOUNTER — TELEPHONE (OUTPATIENT)
Dept: FAMILY MEDICINE CLINIC | Age: 59
End: 2019-06-11

## 2019-06-11 VITALS
TEMPERATURE: 97.9 F | DIASTOLIC BLOOD PRESSURE: 64 MMHG | WEIGHT: 225 LBS | OXYGEN SATURATION: 98 % | HEART RATE: 67 BPM | BODY MASS INDEX: 37.44 KG/M2 | SYSTOLIC BLOOD PRESSURE: 118 MMHG

## 2019-06-11 DIAGNOSIS — L29.9 ITCHING: Primary | ICD-10-CM
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
## 2019-06-11 PROCEDURE — G8417 CALC BMI ABV UP PARAM F/U: HCPCS | Performed by: NURSE PRACTITIONER

## 2019-06-11 PROCEDURE — 1036F TOBACCO NON-USER: CPT | Performed by: NURSE PRACTITIONER

## 2019-06-11 PROCEDURE — G8427 DOCREV CUR MEDS BY ELIG CLIN: HCPCS | Performed by: NURSE PRACTITIONER

## 2019-06-11 PROCEDURE — 3017F COLORECTAL CA SCREEN DOC REV: CPT | Performed by: NURSE PRACTITIONER

## 2019-06-11 PROCEDURE — 99214 OFFICE O/P EST MOD 30 MIN: CPT | Performed by: NURSE PRACTITIONER

## 2019-06-11 RX ORDER — SULFAMETHOXAZOLE AND TRIMETHOPRIM 800; 160 MG/1; MG/1
1 TABLET ORAL 2 TIMES DAILY
Qty: 40 TABLET | Refills: 0 | Status: SHIPPED | OUTPATIENT
Start: 2019-06-11 | End: 2019-06-21

## 2019-06-11 RX ORDER — HYDROXYZINE HYDROCHLORIDE 10 MG/1
10 TABLET, FILM COATED ORAL 3 TIMES DAILY PRN
Qty: 30 TABLET | Refills: 0 | Status: SHIPPED | OUTPATIENT
Start: 2019-06-11 | End: 2019-07-08

## 2019-06-11 ASSESSMENT — ENCOUNTER SYMPTOMS
NAUSEA: 0
TROUBLE SWALLOWING: 0
CHEST TIGHTNESS: 0
EYE DISCHARGE: 0
BACK PAIN: 0
COUGH: 0
SORE THROAT: 0
COLOR CHANGE: 0
SINUS PAIN: 0
SHORTNESS OF BREATH: 0
SINUS PRESSURE: 0
ABDOMINAL PAIN: 0

## 2019-06-11 ASSESSMENT — PATIENT HEALTH QUESTIONNAIRE - PHQ9
SUM OF ALL RESPONSES TO PHQ QUESTIONS 1-9: 0
SUM OF ALL RESPONSES TO PHQ QUESTIONS 1-9: 0
2. FEELING DOWN, DEPRESSED OR HOPELESS: 0
1. LITTLE INTEREST OR PLEASURE IN DOING THINGS: 0
SUM OF ALL RESPONSES TO PHQ9 QUESTIONS 1 & 2: 0

## 2019-06-11 NOTE — PROGRESS NOTES
[id number]     Spencer Escobar (:  1960) is a 66 y.o. female, here for evaluation of the following medical concerns: ER follow-up from 19. She is getting bit by something in her basement. Another provider prescribed keflex, not helpful. ER told her she needed bactrim. She also sprayed bug spray and inhaled it. Lungs are feeling much better. Sores under left breast and left leg. She has had multiple wounds for a year of more. Rash   This is a recurrent problem. The current episode started more than 1 year ago. The problem has been gradually worsening since onset. The affected locations include the left upper leg and chest. The rash is characterized by burning, itchiness, pain and redness. Associated with: basement. Pertinent negatives include no congestion, cough, fatigue, fever, shortness of breath or sore throat. Treatments tried: keflex. The treatment provided no relief. Review of Systems   Constitutional: Negative for appetite change, chills, fatigue and fever. HENT: Negative for congestion, ear pain, postnasal drip, sinus pressure, sinus pain, sore throat and trouble swallowing. Eyes: Negative for discharge. Respiratory: Negative for cough, chest tightness and shortness of breath. Cardiovascular: Negative for chest pain and palpitations. Gastrointestinal: Negative for abdominal pain and nausea. Endocrine: Negative for cold intolerance, heat intolerance and polyuria. Musculoskeletal: Negative for back pain and gait problem. Skin: Positive for rash and wound. Negative for color change. Allergic/Immunologic: Negative for environmental allergies, food allergies and immunocompromised state. Neurological: Negative for dizziness, weakness and headaches. Hematological: Does not bruise/bleed easily. Psychiatric/Behavioral: Negative for confusion and sleep disturbance. The patient is not nervous/anxious. Prior to Visit Medications    Medication Sig Taking? Authorizing Provider   sulfamethoxazole-trimethoprim (BACTRIM DS) 800-160 MG per tablet Take 1 tablet by mouth 2 times daily for 10 days Yes MELANIE Kat CNP   mupirocin (BACTROBAN) 2 % ointment Apply 3 times daily. Yes MELANIE Kat CNP   hydrOXYzine (ATARAX) 10 MG tablet Take 1 tablet by mouth 3 times daily as needed for Itching Yes MELANIE Kat CNP   predniSONE (DELTASONE) 10 MG tablet Take 2 tablets by mouth daily for 5 doses Yes Yossi Melendrez PA-C   albuterol sulfate  (90 Base) MCG/ACT inhaler Inhale 1-2 puffs into the lungs every 6 hours as needed for Wheezing Yes Yossi Melendrez PA-C   Emollient (AQUAPHILIC) OINT Use to moisturize the skin daily and use to R nostril sore bid x 7 days. Yes Kamlesh Shukla MD   ARIPiprazole (ABILIFY) 10 MG tablet TAKE ONE AND ONE-HALF TABLETS BY MOUTH TWICE DAILY Yes Gayathri Angel MD        Social History     Tobacco Use    Smoking status: Never Smoker    Smokeless tobacco: Never Used   Substance Use Topics    Alcohol use: No     Alcohol/week: 0.0 oz        Vitals:    06/11/19 1440   BP: 118/64   Pulse: 67   Temp: 97.9 °F (36.6 °C)   TempSrc: Oral   SpO2: 98%   Weight: 225 lb (102.1 kg)     Estimated body mass index is 37.44 kg/m² as calculated from the following:    Height as of 6/9/19: 5' 5\" (1.651 m). Weight as of this encounter: 225 lb (102.1 kg). Physical Exam   Constitutional: She is oriented to person, place, and time. Vital signs are normal. She appears well-developed. HENT:   Head: Normocephalic. Eyes: Pupils are equal, round, and reactive to light. Neck: Normal range of motion. Cardiovascular: Normal rate and regular rhythm. Pulmonary/Chest: Effort normal and breath sounds normal.   Abdominal: Soft. Bowel sounds are normal.   Musculoskeletal: Normal range of motion. Neurological: She is alert and oriented to person, place, and time. Skin: Skin is warm and dry.    Psychiatric: She has a normal mood and affect. Her behavior is normal. Judgment and thought content normal.   Vitals reviewed. ASSESSMENT/PLAN:  Jr Suarez was seen today for other. Diagnoses and all orders for this visit:    Itching  -     sulfamethoxazole-trimethoprim (BACTRIM DS) 800-160 MG per tablet; Take 1 tablet by mouth 2 times daily for 10 days  -     mupirocin (BACTROBAN) 2 % ointment; Apply 3 times daily. -     hydrOXYzine (ATARAX) 10 MG tablet; Take 1 tablet by mouth 3 times daily as needed for Itching    Bug bite, initial encounter  -     sulfamethoxazole-trimethoprim (BACTRIM DS) 800-160 MG per tablet; Take 1 tablet by mouth 2 times daily for 10 days  -     mupirocin (BACTROBAN) 2 % ointment; Apply 3 times daily. -     hydrOXYzine (ATARAX) 10 MG tablet; Take 1 tablet by mouth 3 times daily as needed for Itching      Return if symptoms worsen or fail to improve. An electronic signature was used to authenticate this note. --[facility] KIERAN Shepherd on 6/11/2019 at3:02 PM

## 2019-06-11 NOTE — TELEPHONE ENCOUNTER
[facility] called for clarification on Bactrim. Emelyn Gals does not match directions. Per Dolly Martin advised it should be for Qty of 21.

## 2019-06-24 ENCOUNTER — TELEPHONE (OUTPATIENT)
Dept: FAMILY MEDICINE CLINIC | Age: 59
End: 2019-06-24

## 2019-07-08 ENCOUNTER — TELEPHONE (OUTPATIENT)
Dept: FAMILY MEDICINE CLINIC | Age: 59
End: 2019-07-08

## 2019-07-08 ENCOUNTER — HOSPITAL ENCOUNTER (EMERGENCY)
Age: 59
Discharge: HOME | End: 2019-07-08
Payer: COMMERCIAL

## 2019-07-08 VITALS
DIASTOLIC BLOOD PRESSURE: 86 MMHG | HEART RATE: 87 BPM | WEIGHT: 200 LBS | RESPIRATION RATE: 16 BRPM | OXYGEN SATURATION: 97 % | SYSTOLIC BLOOD PRESSURE: 133 MMHG | TEMPERATURE: 97.7 F | BODY MASS INDEX: 33.28 KG/M2

## 2019-07-08 DIAGNOSIS — Z87.2 HISTORY OF INFECTION OF SKIN OR SUBCUTANEOUS TISSUE: ICD-10-CM

## 2019-07-08 DIAGNOSIS — T14.8XXA OPEN BITE WOUND OF SKIN: Primary | ICD-10-CM

## 2019-07-08 PROCEDURE — 99283 EMERGENCY DEPT VISIT LOW MDM: CPT

## 2019-07-08 RX ORDER — SULFAMETHOXAZOLE AND TRIMETHOPRIM 200; 40 MG/5ML; MG/5ML
160 SUSPENSION ORAL 2 TIMES DAILY
Qty: 400 ML | Refills: 0 | Status: SHIPPED | OUTPATIENT
Start: 2019-07-08 | End: 2019-07-08 | Stop reason: SDUPTHER

## 2019-07-08 RX ORDER — SULFAMETHOXAZOLE AND TRIMETHOPRIM 200; 40 MG/5ML; MG/5ML
160 SUSPENSION ORAL 2 TIMES DAILY
Qty: 400 ML | Refills: 0 | Status: SHIPPED | OUTPATIENT
Start: 2019-07-08 | End: 2019-07-18

## 2019-07-08 ASSESSMENT — PAIN SCALES - GENERAL: PAINLEVEL_OUTOF10: 6

## 2019-07-08 ASSESSMENT — ENCOUNTER SYMPTOMS
NAUSEA: 0
SORE THROAT: 0
SHORTNESS OF BREATH: 0
WHEEZING: 0
DIARRHEA: 0
VOMITING: 0
BACK PAIN: 0
COLOR CHANGE: 1
ABDOMINAL PAIN: 0
COUGH: 0

## 2019-07-08 NOTE — ED NOTES
Pt scripts x2 instructed to follow up with PCP.  Assessed per [facility] NP,     Musa Dsouza LPN  [id number] [phone]

## 2019-07-08 NOTE — ED PROVIDER NOTES
**[facility]  ED  EMERGENCY DEPARTMENT ENCOUNTER      Pt Name: Yefri Varela  CXA:[id number]  [place] 1960  Date of evaluation: 7/8/2019  Provider: MELANIE Powers CNP      Chief Complaint:    Chief Complaint   Patient presents with    Allergic Reaction     started at 0800 , pt states i am having an allergic reaction to spiders that bite me everyday       Nursing Notes, Past Medical Hx, Past Surgical Hx, Social Hx, Allergies, and Family Hx were all reviewed and agreed with or any disagreements were addressed in the HPI.    HPI:  (Location, Duration, Timing, Severity,Quality, Assoc Sx, Context, Modifying factors)  This is a  66 y.o. female who presents today with what she believes is an allergic reaction to spider bites and bug bites. She states that she lives in an old house and gets frequent bites in the summer. She she has lesions on the lateral left thigh, in the middle of her uper back, one on her head. She states that they're very itchy. She denies taking any medicine for symptoms. No cough, congestion, fever or chills. No nausea vomiting or diarrhea. No chest pain pleuritic chest pain or shortness of breath. Denies any pain on exam, only complains of itching. Has not taken any medicine for her symptoms. No additional complaints, no additional aggravating or relieving factors. The patient presents awake, alert and in no acute respiratory distress or toxic appearance.     PastMedical/Surgical History:      Diagnosis Date    Anxiety     Bipolar 1 disorder ([facility] [address]     Depression     Irritable bowel syndrome     Obesity     Urinary incontinence          Procedure Laterality Date    COLONOSCOPY  2010    polyp removed    COLONOSCOPY  2018    EYE SURGERY Left     Lazy eye    TONSILLECTOMY      TUBAL LIGATION  1994       Medications:  Discharge Medication List as of 7/8/2019  3:22 PM      CONTINUE these medications which have states she is having exacerbation on the left lateral thigh and middle of her back, she believes that she is being bit by some type of insects or spiders in her own home however her grandfather that lives with her has no lesions neither does her son. After evaluation and examination the patient I educated her that I would start her on Bactrim as this is what she is taken in the past to help heal the lesions. There is no visible signs of significant cellulitis, abscess, vesicles, pustules. Due to the itching I started her on Benadryl. However, Patient is afebrile and nontoxic in appearance. The rash is currently without the appearance or clinical features to suggest a more emergent diagnosis such as SJS, HSP, TEN, meningococcemia, endocarditis, syphillis, cellulitis, scabies, herpes simplex or erythema multiform, etc.In regards to her blood pressure I do believe this is some underlying anxiety. As her repeat blood pressure and vital signs were stable. Therefore, shared medical decision was made between the patient myself and we agreed she could be discharged home with outpatient follow-up. She was discharged home with liquid prescription for Bactrim and Benadryl as she states she cannot swallow pills. She was given appropriate discharge instructions. Patient advised to follow-up with their family doctor within 24-48 hours and return to the emergency department for worsening symptoms. She was educated about skin hygiene, showering and using soap and water. She was educated to stop scratching. The patient tolerated their visit well. I evaluated the patient. The physician was available for consultation as needed. The patient and / or the family were informed of the results of anytests, a time was given to answer questions, a plan was proposed and they agreed with plan. Patient verbalized understanding of discharge instructions and was discharged from the department in stable condition.     CLINICAL

## 2019-07-25 ENCOUNTER — OFFICE VISIT (OUTPATIENT)
Dept: FAMILY MEDICINE CLINIC | Age: 59
End: 2019-07-25
Payer: COMMERCIAL

## 2019-07-25 VITALS
HEART RATE: 94 BPM | DIASTOLIC BLOOD PRESSURE: 86 MMHG | OXYGEN SATURATION: 97 % | SYSTOLIC BLOOD PRESSURE: 124 MMHG | WEIGHT: 225.6 LBS | TEMPERATURE: 97.9 F | BODY MASS INDEX: 37.54 KG/M2

## 2019-07-25 DIAGNOSIS — B86 SCABIES: Primary | ICD-10-CM

## 2019-07-25 PROCEDURE — 3017F COLORECTAL CA SCREEN DOC REV: CPT | Performed by: FAMILY MEDICINE

## 2019-07-25 PROCEDURE — G8417 CALC BMI ABV UP PARAM F/U: HCPCS | Performed by: FAMILY MEDICINE

## 2019-07-25 PROCEDURE — G8427 DOCREV CUR MEDS BY ELIG CLIN: HCPCS | Performed by: FAMILY MEDICINE

## 2019-07-25 PROCEDURE — 1036F TOBACCO NON-USER: CPT | Performed by: FAMILY MEDICINE

## 2019-07-25 PROCEDURE — 99214 OFFICE O/P EST MOD 30 MIN: CPT | Performed by: FAMILY MEDICINE

## 2019-07-25 RX ORDER — HYDROXYZINE HCL 10 MG/5 ML
10 SOLUTION, ORAL ORAL 4 TIMES DAILY PRN
Qty: 240 ML | Refills: 0 | Status: SHIPPED | OUTPATIENT
Start: 2019-07-25 | End: 2019-09-24

## 2019-07-25 RX ORDER — PERMETHRIN 50 MG/G
CREAM TOPICAL
Qty: 60 G | Refills: 0 | Status: SHIPPED | OUTPATIENT
Start: 2019-07-25 | End: 2019-07-26 | Stop reason: SDUPTHER

## 2019-07-25 ASSESSMENT — ENCOUNTER SYMPTOMS: SHORTNESS OF BREATH: 0

## 2019-07-25 NOTE — PATIENT INSTRUCTIONS
Patient Education        Scabies: Care Instructions  Your Care Instructions  Scabies is a skin problem that can cause intense itching. It is caused by very tiny bugs called mites that dig just under the skin and lay eggs. An allergic reaction to the mites causes the itching. Scabies is usually spread by person-to-person contact. It is also possible, but not common, for scabies to spread through towels, clothes, and bedding. Everyone in your household should be treated. Scabies is treated with medicine. Itching may last for several weeks after treatment. Follow-up care is a key part of your treatment and safety. Be sure to make and go to all appointments, and call your doctor if you are having problems. It's also a good idea to know your test results and keep a list of the medicines you take. How can you care for yourself at home? · Use the lotion or cream your doctor recommends or prescribes. One treatment usually cures scabies. Do not use the cream again unless your doctor tells you to. · Wash all clothes, bedding, and towels that you used in the 3 days before you started treatment. Use hot water, and use the hot cycle in the dryer. Another option is to dry-clean these items. Or seal them in a plastic bag for 3 to 7 days. · Take an oral antihistamine, such as loratadine (Claritin) or diphenhydramine (Benadryl), to help stop itching. You also can use a nonprescription anti-itch cream. Read and follow all instructions on the label. · Do not have physical contact with other people or let anyone use your personal items until you have finished treatment. Do not use other people's personal items until your treatment is done. Tell people with whom you have close contact that they will need treatment if they have symptoms. · Take an oatmeal bath to help relieve itching. Add a handful of oatmeal (ground to a powder) to your bath. Or you can try an oatmeal bath product, such as Aveeno.   When should you call for

## 2019-07-26 DIAGNOSIS — B86 SCABIES: ICD-10-CM

## 2019-07-26 RX ORDER — PERMETHRIN 50 MG/G
CREAM TOPICAL
Qty: 60 G | Refills: 0 | Status: SHIPPED | OUTPATIENT
Start: 2019-07-26 | End: 2019-10-30 | Stop reason: ALTCHOICE

## 2019-07-26 NOTE — TELEPHONE ENCOUNTER
Patient stated she used the Permethrin cream last night and washed it off this morning. Stated it helped but she will need more to repeat in one week.

## 2019-07-30 ENCOUNTER — TELEPHONE (OUTPATIENT)
Dept: FAMILY MEDICINE CLINIC | Age: 59
End: 2019-07-30

## 2019-07-30 NOTE — TELEPHONE ENCOUNTER
Patient called and states that the bug bites are no healing. Patient states that she has a new bug bite under her breast.    Patient states that she is not sleeping in the basement anymore. Patient is requesting a prescription for Bactrim and a steroid. Please send rx to [address] [place] [address]. Please advise.

## 2019-07-31 NOTE — TELEPHONE ENCOUNTER
I sent in the 28 g with one refill. I do not want her using it for an extended period of time. She should repeat the permethrin treatment after 1 week. Inform her that the itching can last 1-2 weeks after treatment. She can take claritin or allegra for the itching.

## 2019-07-31 NOTE — TELEPHONE ENCOUNTER
Patient called to check on her message regarding an antibiotic and steroids. Patient was informed that Dr. Donnie Johnson had tried to call her and that she left a voicemail for her to call the office back. Patient states that she is in a lot of pain from the bug bites and is insisting that Dr. Donnie Johnson send in a prescription for an antibiotic and steroids to help heal the bug bites. Please advise.

## 2019-08-05 ENCOUNTER — TELEPHONE (OUTPATIENT)
Dept: FAMILY MEDICINE CLINIC | Age: 59
End: 2019-08-05

## 2019-08-05 RX ORDER — CEPHALEXIN 500 MG/1
500 CAPSULE ORAL 3 TIMES DAILY
Qty: 30 CAPSULE | Refills: 0 | Status: SHIPPED | OUTPATIENT
Start: 2019-08-05 | End: 2019-08-15

## 2019-08-05 RX ORDER — SULFAMETHOXAZOLE AND TRIMETHOPRIM 800; 160 MG/1; MG/1
1 TABLET ORAL 2 TIMES DAILY
Qty: 20 TABLET | Refills: 0 | Status: SHIPPED | OUTPATIENT
Start: 2019-08-05 | End: 2019-08-12

## 2019-08-06 ENCOUNTER — TELEPHONE (OUTPATIENT)
Dept: FAMILY MEDICINE CLINIC | Age: 59
End: 2019-08-06

## 2019-08-08 DIAGNOSIS — B86 SCABIES: ICD-10-CM

## 2019-08-08 NOTE — TELEPHONE ENCOUNTER
Patient is requesting a refill on hydrocortisone cream. Advised patient she had a refill from 7/31 at the pharmacy and she stated she already filled that one too, she has a lot of sores.

## 2019-08-12 ENCOUNTER — TELEPHONE (OUTPATIENT)
Dept: FAMILY MEDICINE CLINIC | Age: 59
End: 2019-08-12

## 2019-08-12 NOTE — TELEPHONE ENCOUNTER
Patient called and states that she had an allergic reaction to the Bactrim on Saturday. Patient states that she broke out in a rash and had trouble breathing. Patient states that she talked to her pharmacist and was told that she was having an allergic reaction to the Bactrim and to stop taking it. Patient states that her symptoms started going away when she discontinued the antibiotic. Patient wants to know if there is some kind of soap that she could use to help with the pain from the bug bites. She said that they are still painful. Please advise.

## 2019-08-19 ENCOUNTER — TELEPHONE (OUTPATIENT)
Dept: FAMILY MEDICINE CLINIC | Age: 59
End: 2019-08-19

## 2019-08-19 NOTE — TELEPHONE ENCOUNTER
Pt calling and she is requesting her Rx that was called in for the hydrocortisone 2.5%  be called in for a different size? She said the pharmacist told her she can get it in a bigger bottle. She said the tubes are just sot enough for her.

## 2019-08-26 ENCOUNTER — OFFICE VISIT (OUTPATIENT)
Dept: FAMILY MEDICINE CLINIC | Age: 59
End: 2019-08-26
Payer: COMMERCIAL

## 2019-08-26 VITALS
TEMPERATURE: 98.1 F | BODY MASS INDEX: 37.61 KG/M2 | HEART RATE: 76 BPM | WEIGHT: 226 LBS | SYSTOLIC BLOOD PRESSURE: 124 MMHG | DIASTOLIC BLOOD PRESSURE: 82 MMHG | OXYGEN SATURATION: 96 %
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
## 2019-08-26 PROCEDURE — 99213 OFFICE O/P EST LOW 20 MIN: CPT | Performed by: FAMILY MEDICINE

## 2019-08-26 PROCEDURE — 3017F COLORECTAL CA SCREEN DOC REV: CPT | Performed by: FAMILY MEDICINE

## 2019-08-26 PROCEDURE — 1036F TOBACCO NON-USER: CPT | Performed by: FAMILY MEDICINE

## 2019-08-26 PROCEDURE — G8427 DOCREV CUR MEDS BY ELIG CLIN: HCPCS | Performed by: FAMILY MEDICINE

## 2019-08-26 PROCEDURE — G8417 CALC BMI ABV UP PARAM F/U: HCPCS | Performed by: FAMILY MEDICINE

## 2019-08-26 RX ORDER — MUPIROCIN CALCIUM 20 MG/G
CREAM TOPICAL
Qty: 30 G | Refills: 0 | Status: SHIPPED | OUTPATIENT
Start: 2019-08-26 | End: 2019-10-30 | Stop reason: ALTCHOICE

## 2019-08-26 NOTE — PROGRESS NOTES
AFFECTED AREA TWICE DAILY (Patient not taking: Reported on 8/26/2019) 453 g 0    permethrin (ELIMITE) 5 % cream Apply topically as directed. Put cream on at night and wash off in the morning and 1 week out (Patient not taking: Reported on 8/26/2019) 60 g 0    hydrOXYzine (ATARAX) 10 MG/5ML syrup Take 5 mLs by mouth 4 times daily as needed for Itching (Patient not taking: Reported on 8/26/2019) 240 mL 0    Emollient (AQUAPHILIC) OINT Use to moisturize the skin daily and use to R nostril sore bid x 7 days. (Patient not taking: Reported on 8/26/2019) 454 g 0     No current facility-administered medications for this visit.       Allergies   Allergen Reactions    Bactrim [Sulfamethoxazole-Trimethoprim]      Rash and trouble breathing       Social History     Socioeconomic History    Marital status: Single     Spouse name: Not on file    Number of children: 2    Years of education: 15    Highest education level: Not on file   Occupational History    Occupation: disabilty   Social Needs    Financial resource strain: Not on file    Food insecurity:     Worry: Not on file     Inability: Not on file   [employer] needs:     Medical: Not on file     Non-medical: Not on file   Tobacco Use    Smoking status: Never Smoker    Smokeless tobacco: Never Used   Substance and Sexual Activity    Alcohol use: No     Alcohol/week: 0.0 standard drinks    Drug use: No    Sexual activity: Yes     Partners: Male     Birth control/protection: Surgical     Comment: tubal ligation   Lifestyle    Physical activity:     Days per week: Not on file     Minutes per session: Not on file    Stress: Not on file   Relationships    Social connections:     Talks on phone: Not on file     Gets together: Not on file     Attends Gnosticism service: Not on file     Active member of club or organization: Not on file     Attends meetings of clubs or organizations: Not on file     Relationship status: Not on file    Intimate partner violence:     Fear of current or ex partner: Not on file     Emotionally abused: Not on file     Physically abused: Not on file     Forced sexual activity: Not on file   Other Topics Concern    Not on file   Social History Narrative    Not on file     Family History   Problem Relation Age of Onset    Heart Disease Mother     Diabetes Mother     Arthritis Father     Arthritis Brother     Diabetes Maternal Grandfather                               Review Of Systems    Review of Systems    PHYSICAL EXAMINATION:    /82   Pulse 76   Temp 98.1 °F (36.7 °C) (Oral)   Wt 226 lb (102.5 kg)   LMP 01/10/2015 (Exact Date)   SpO2 96%   BMI 37.61 kg/m²      Physical Exam    Media Information      Document Information     Wound      08/26/2019 11:18   Attached To: Office Visit on 8/26/19 with Adonay Noel [id number] Santos Hopkins MD [facility] Information     Wound      08/26/2019 11:19   Attached To: Office Visit on 8/26/19 with Adonay Noel [id number] Santos Hopkins MD [facility]       ASSESSMENT:   Well Adult, See encounter diagnoses  Jasen García was seen today for other. Diagnoses and all orders for this visit:    Bug bite, subsequent encounter  Wounds actually look better than when I first saw her. Long discussion with patient and advised that she must stop itching them because her itching is causing them to not heal and putting her at risk for infection. Continue with the topical Benadryl because this helps. Also advised to wear gloves or mittens to prevent her from scratching and allow the wounds time to heal.  -     mupirocin (BACTROBAN) 2 % cream; Apply topically 3 times daily for 10 days        Billing based on time. 15 minutes spent with the patient, and greater than 50% was spent in counseling       Plan:   See orders and medications filed with this encounter.   Return if symptoms

## 2019-08-27 ENCOUNTER — TELEPHONE (OUTPATIENT)
Dept: FAMILY MEDICINE CLINIC | Age: 59
End: 2019-08-27

## 2019-08-27 NOTE — TELEPHONE ENCOUNTER
Call from Pharmacy stating RX needs to be changed to an ointment for the Bactroban. Cream is too expensive for patient.       RX sent on 8/26/19

## 2019-09-03 NOTE — TELEPHONE ENCOUNTER
Pt states the Bactroban medication is not working at all for her bug bites & would like to request a script for Benadryl or something similar to OTC Benadryl that her insurance would cover. Pt states if there is no such medication then she will just continue to use OTC Benadryl since that is currently working for her.

## 2019-09-04 NOTE — TELEPHONE ENCOUNTER
Patient called back to check on her message from yesterday. Patient was informed that Dr. Mary Gray had sent in a prescription to her pharmacy on file.
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
## 2019-09-09 RX ORDER — DIPHENHYDRAMINE HYDROCHLORIDE, ZINC ACETATE 2; .1 G/100G; G/100G
CREAM TOPICAL
Qty: 56 G | Refills: 1 | Status: SHIPPED | OUTPATIENT
Start: 2019-09-09 | End: 2019-10-30 | Stop reason: ALTCHOICE

## 2019-09-18 ENCOUNTER — TELEPHONE (OUTPATIENT)
Dept: FAMILY MEDICINE CLINIC | Age: 59
End: 2019-09-18

## 2019-09-18 NOTE — TELEPHONE ENCOUNTER
Pt called back saying that she was not able to schedule with the dermatologist that she was referred to because they don't accept her insurance [employer] [place]). She's requesting another dermatology referral.     I told her to continue using the Bactroban (she believes she still has some left) and to keep the area clean and avoid scratching it.

## 2019-09-24 ENCOUNTER — OFFICE VISIT (OUTPATIENT)
Dept: FAMILY MEDICINE CLINIC | Age: 59
End: 2019-09-24
Payer: COMMERCIAL

## 2019-09-24 ENCOUNTER — TELEPHONE (OUTPATIENT)
Dept: FAMILY MEDICINE CLINIC | Age: 59
End: 2019-09-24

## 2019-09-24 VITALS
OXYGEN SATURATION: 95 % | DIASTOLIC BLOOD PRESSURE: 80 MMHG | SYSTOLIC BLOOD PRESSURE: 126 MMHG | BODY MASS INDEX: 37.54 KG/M2 | HEART RATE: 92 BPM | TEMPERATURE: 97.6 F | WEIGHT: 225.6 LBS
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
## 2019-09-24 PROCEDURE — 1036F TOBACCO NON-USER: CPT | Performed by: NURSE PRACTITIONER

## 2019-09-24 PROCEDURE — G8417 CALC BMI ABV UP PARAM F/U: HCPCS | Performed by: NURSE PRACTITIONER

## 2019-09-24 PROCEDURE — G8427 DOCREV CUR MEDS BY ELIG CLIN: HCPCS | Performed by: NURSE PRACTITIONER

## 2019-09-24 PROCEDURE — 3017F COLORECTAL CA SCREEN DOC REV: CPT | Performed by: NURSE PRACTITIONER

## 2019-09-24 PROCEDURE — 99214 OFFICE O/P EST MOD 30 MIN: CPT | Performed by: NURSE PRACTITIONER

## 2019-09-24 RX ORDER — HYDROXYZINE HYDROCHLORIDE 10 MG/1
10 TABLET, FILM COATED ORAL 3 TIMES DAILY PRN
Qty: 90 TABLET | Refills: 0 | Status: SHIPPED | OUTPATIENT
Start: 2019-09-24 | End: 2019-12-31 | Stop reason: SDUPTHER

## 2019-09-24 RX ORDER — CLINDAMYCIN HYDROCHLORIDE 300 MG/1
300 CAPSULE ORAL 3 TIMES DAILY
Qty: 30 CAPSULE | Refills: 0 | Status: SHIPPED | OUTPATIENT
Start: 2019-09-24 | End: 2019-10-04

## 2019-09-24 ASSESSMENT — ENCOUNTER SYMPTOMS
CHEST TIGHTNESS: 0
ABDOMINAL PAIN: 0
COUGH: 0
EYE DISCHARGE: 0

## 2019-09-25 ENCOUNTER — TELEPHONE (OUTPATIENT)
Dept: FAMILY MEDICINE CLINIC | Age: 59
End: 2019-09-25

## 2019-09-26 ENCOUNTER — TELEPHONE (OUTPATIENT)
Dept: FAMILY MEDICINE CLINIC | Age: 59
End: 2019-09-26
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
## 2019-09-30 ENCOUNTER — TELEPHONE (OUTPATIENT)
Dept: FAMILY MEDICINE CLINIC | Age: 59
End: 2019-09-30

## 2019-10-16 ENCOUNTER — TELEPHONE (OUTPATIENT)
Dept: FAMILY MEDICINE CLINIC | Age: 59
End: 2019-10-16

## 2019-10-17 NOTE — TELEPHONE ENCOUNTER
Spoke with patient and she no on the Bactroban and she is going to do OTC antibacterial Dial soap until Monday when she can get the med refilled. She also would like a refill on the  Silvadene so she has more when she needs it. She said to say thank you!

## 2019-10-29 ENCOUNTER — TELEPHONE (OUTPATIENT)
Dept: FAMILY MEDICINE CLINIC | Age: 59
End: 2019-10-29

## 2019-10-30 ENCOUNTER — OFFICE VISIT (OUTPATIENT)
Dept: OBGYN CLINIC | Age: 59
End: 2019-10-30
Payer: COMMERCIAL

## 2019-10-30 VITALS
WEIGHT: 221.8 LBS | DIASTOLIC BLOOD PRESSURE: 64 MMHG | HEART RATE: 88 BPM | BODY MASS INDEX: 39.3 KG/M2 | HEIGHT: 63 IN | SYSTOLIC BLOOD PRESSURE: 128 MMHG | TEMPERATURE: 98 F

## 2019-10-30 DIAGNOSIS — N94.10 DYSPAREUNIA IN FEMALE: ICD-10-CM

## 2019-10-30 DIAGNOSIS — F32.A ANXIETY AND DEPRESSION: ICD-10-CM

## 2019-10-30 DIAGNOSIS — Z12.39 BREAST CANCER SCREENING: ICD-10-CM

## 2019-10-30 DIAGNOSIS — N39.3 URINARY, INCONTINENCE, STRESS FEMALE: ICD-10-CM

## 2019-10-30 DIAGNOSIS — K64.9 HEMORRHOIDS, UNSPECIFIED HEMORRHOID TYPE: ICD-10-CM

## 2019-10-30 DIAGNOSIS — E66.9 OBESITY (BMI 30-39.9): ICD-10-CM

## 2019-10-30 DIAGNOSIS — Z53.8 PAP SMEAR OF CERVIX NOT NEEDED: ICD-10-CM

## 2019-10-30 DIAGNOSIS — Z78.0 MENOPAUSE: ICD-10-CM

## 2019-10-30 DIAGNOSIS — Z01.419 WOMEN'S ANNUAL ROUTINE GYNECOLOGICAL EXAMINATION: Primary | ICD-10-CM

## 2019-10-30 DIAGNOSIS — D25.9 UTERINE LEIOMYOMA, UNSPECIFIED LOCATION: ICD-10-CM

## 2019-10-30 DIAGNOSIS — F41.9 ANXIETY AND DEPRESSION: ICD-10-CM

## 2019-10-30 PROCEDURE — 99999 PR OFFICE/OUTPT VISIT,PROCEDURE ONLY: CPT | Performed by: OBSTETRICS & GYNECOLOGY

## 2019-10-30 PROCEDURE — 99396 PREV VISIT EST AGE 40-64: CPT | Performed by: OBSTETRICS & GYNECOLOGY

## 2019-11-02 ASSESSMENT — ENCOUNTER SYMPTOMS
ABDOMINAL PAIN: 0
NAUSEA: 0
VOMITING: 0
DIARRHEA: 0
ABDOMINAL DISTENTION: 0
CONSTIPATION: 0
SHORTNESS OF BREATH: 0

## 2019-11-04 ENCOUNTER — OFFICE VISIT (OUTPATIENT)
Dept: FAMILY MEDICINE CLINIC | Age: 59
End: 2019-11-04
Payer: COMMERCIAL

## 2019-11-04 VITALS
TEMPERATURE: 97.8 F | WEIGHT: 223 LBS | DIASTOLIC BLOOD PRESSURE: 88 MMHG | BODY MASS INDEX: 39.5 KG/M2 | SYSTOLIC BLOOD PRESSURE: 138 MMHG | OXYGEN SATURATION: 97 % | HEART RATE: 82 BPM
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
## 2019-11-04 PROCEDURE — G8482 FLU IMMUNIZE ORDER/ADMIN: HCPCS | Performed by: FAMILY MEDICINE

## 2019-11-04 PROCEDURE — 99213 OFFICE O/P EST LOW 20 MIN: CPT | Performed by: FAMILY MEDICINE

## 2019-11-04 PROCEDURE — 1036F TOBACCO NON-USER: CPT | Performed by: FAMILY MEDICINE

## 2019-11-04 PROCEDURE — G8427 DOCREV CUR MEDS BY ELIG CLIN: HCPCS | Performed by: FAMILY MEDICINE

## 2019-11-04 PROCEDURE — 3017F COLORECTAL CA SCREEN DOC REV: CPT | Performed by: FAMILY MEDICINE

## 2019-11-04 PROCEDURE — G8417 CALC BMI ABV UP PARAM F/U: HCPCS | Performed by: FAMILY MEDICINE

## 2019-11-04 ASSESSMENT — ENCOUNTER SYMPTOMS: SHORTNESS OF BREATH: 0

## 2019-11-07 ENCOUNTER — TELEPHONE (OUTPATIENT)
Dept: FAMILY MEDICINE CLINIC | Age: 59
End: 2019-11-07

## 2019-12-16 ENCOUNTER — TELEPHONE (OUTPATIENT)
Dept: FAMILY MEDICINE CLINIC | Age: 59
End: 2019-12-16

## 2019-12-30 ENCOUNTER — TELEPHONE (OUTPATIENT)
Dept: FAMILY MEDICINE CLINIC | Age: 59
End: 2019-12-30

## 2019-12-31 ENCOUNTER — OFFICE VISIT (OUTPATIENT)
Dept: FAMILY MEDICINE CLINIC | Age: 59
End: 2019-12-31
Payer: COMMERCIAL

## 2019-12-31 VITALS
DIASTOLIC BLOOD PRESSURE: 84 MMHG | SYSTOLIC BLOOD PRESSURE: 136 MMHG | TEMPERATURE: 97.7 F | OXYGEN SATURATION: 98 % | WEIGHT: 225.4 LBS | HEART RATE: 84 BPM | BODY MASS INDEX: 39.93 KG/M2
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
## 2019-12-31 DIAGNOSIS — R20.0 NUMBNESS AND TINGLING IN BOTH HANDS: ICD-10-CM

## 2019-12-31 DIAGNOSIS — R20.2 NUMBNESS AND TINGLING IN BOTH HANDS: ICD-10-CM

## 2019-12-31 DIAGNOSIS — R20.0 NUMBNESS AND TINGLING OF BOTH FEET: ICD-10-CM

## 2019-12-31 DIAGNOSIS — L98.428: ICD-10-CM

## 2019-12-31 DIAGNOSIS — H00.012 HORDEOLUM EXTERNUM OF RIGHT LOWER EYELID: ICD-10-CM

## 2019-12-31 DIAGNOSIS — R20.2 NUMBNESS AND TINGLING OF BOTH FEET: ICD-10-CM

## 2019-12-31 DIAGNOSIS — N39.0 URINARY TRACT INFECTION WITHOUT HEMATURIA, SITE UNSPECIFIED: Primary | ICD-10-CM

## 2019-12-31 LAB
A/G RATIO: 1.3 (ref 1.1–2.2)
ALBUMIN SERPL-MCNC: 4.3 G/DL (ref 3.4–5)
ALP BLD-CCNC: 81 U/L (ref 40–129)
ALT SERPL-CCNC: 51 U/L (ref 10–40)
ANION GAP SERPL CALCULATED.3IONS-SCNC: 16 MMOL/L (ref 3–16)
AST SERPL-CCNC: 38 U/L (ref 15–37)
BASOPHILS ABSOLUTE: 0.1 K/UL (ref 0–0.2)
BASOPHILS RELATIVE PERCENT: 1 %
BILIRUB SERPL-MCNC: 0.3 MG/DL (ref 0–1)
BILIRUBIN, POC: NEGATIVE
BLOOD URINE, POC: NEGATIVE
BUN BLDV-MCNC: 17 MG/DL (ref 7–20)
CALCIUM SERPL-MCNC: 9.6 MG/DL (ref 8.3–10.6)
CHLORIDE BLD-SCNC: 104 MMOL/L (ref 99–110)
CLARITY, POC: NORMAL
CO2: 22 MMOL/L (ref 21–32)
COLOR, POC: CLEAR
CREAT SERPL-MCNC: 0.9 MG/DL (ref 0.6–1.1)
EOSINOPHILS ABSOLUTE: 0.4 K/UL (ref 0–0.6)
EOSINOPHILS RELATIVE PERCENT: 4.6 %
GFR AFRICAN AMERICAN: >60
GFR NON-AFRICAN AMERICAN: >60
GLOBULIN: 3.3 G/DL
GLUCOSE BLD-MCNC: 91 MG/DL (ref 70–99)
GLUCOSE URINE, POC: NEGATIVE
HCT VFR BLD CALC: 42.7 % (ref 36–48)
HEMOGLOBIN: 14 G/DL (ref 12–16)
KETONES, POC: NORMAL
LEUKOCYTE EST, POC: NORMAL
LYMPHOCYTES ABSOLUTE: 2 K/UL (ref 1–5.1)
LYMPHOCYTES RELATIVE PERCENT: 24.6 %
MCH RBC QN AUTO: 28.5 PG (ref 26–34)
MCHC RBC AUTO-ENTMCNC: 32.8 G/DL (ref 31–36)
MCV RBC AUTO: 87 FL (ref 80–100)
MONOCYTES ABSOLUTE: 0.5 K/UL (ref 0–1.3)
MONOCYTES RELATIVE PERCENT: 6.3 %
NEUTROPHILS ABSOLUTE: 5.1 K/UL (ref 1.7–7.7)
NEUTROPHILS RELATIVE PERCENT: 63.5 %
NITRITE, POC: NEGATIVE
PDW BLD-RTO: 14.1 % (ref 12.4–15.4)
PH, POC: 7.5
PLATELET # BLD: 225 K/UL (ref 135–450)
PLATELET SLIDE REVIEW: ADEQUATE
PMV BLD AUTO: 9.3 FL (ref 5–10.5)
POTASSIUM SERPL-SCNC: 4.8 MMOL/L (ref 3.5–5.1)
PROTEIN, POC: NEGATIVE
RBC # BLD: 4.91 M/UL (ref 4–5.2)
SODIUM BLD-SCNC: 142 MMOL/L (ref 136–145)
SPECIFIC GRAVITY, POC: 1.01
T4 FREE: 1.1 NG/DL (ref 0.9–1.8)
TOTAL PROTEIN: 7.6 G/DL (ref 6.4–8.2)
TSH REFLEX FT4: 5.4 UIU/ML (ref 0.27–4.2)
UROBILINOGEN, POC: NORMAL
WBC # BLD: 8 K/UL (ref 4–11)

## 2019-12-31 PROCEDURE — 1036F TOBACCO NON-USER: CPT | Performed by: NURSE PRACTITIONER

## 2019-12-31 PROCEDURE — 36415 COLL VENOUS BLD VENIPUNCTURE: CPT | Performed by: NURSE PRACTITIONER

## 2019-12-31 PROCEDURE — 81002 URINALYSIS NONAUTO W/O SCOPE: CPT | Performed by: NURSE PRACTITIONER

## 2019-12-31 PROCEDURE — G8482 FLU IMMUNIZE ORDER/ADMIN: HCPCS | Performed by: NURSE PRACTITIONER

## 2019-12-31 PROCEDURE — G8427 DOCREV CUR MEDS BY ELIG CLIN: HCPCS | Performed by: NURSE PRACTITIONER

## 2019-12-31 PROCEDURE — 99215 OFFICE O/P EST HI 40 MIN: CPT | Performed by: NURSE PRACTITIONER

## 2019-12-31 PROCEDURE — 3017F COLORECTAL CA SCREEN DOC REV: CPT | Performed by: NURSE PRACTITIONER

## 2019-12-31 PROCEDURE — G8417 CALC BMI ABV UP PARAM F/U: HCPCS | Performed by: NURSE PRACTITIONER

## 2019-12-31 RX ORDER — PHENAZOPYRIDINE HYDROCHLORIDE 100 MG/1
100 TABLET, FILM COATED ORAL 3 TIMES DAILY PRN
Qty: 6 TABLET | Refills: 0 | Status: SHIPPED | OUTPATIENT
Start: 2019-12-31 | End: 2021-02-05 | Stop reason: ALTCHOICE

## 2019-12-31 RX ORDER — CLINDAMYCIN HYDROCHLORIDE 300 MG/1
300 CAPSULE ORAL 3 TIMES DAILY
Qty: 42 CAPSULE | Refills: 0 | Status: SHIPPED | OUTPATIENT
Start: 2019-12-31 | End: 2020-01-14

## 2019-12-31 RX ORDER — MELOXICAM 15 MG/1
7.5 TABLET ORAL 2 TIMES DAILY PRN
Qty: 60 TABLET | Refills: 0 | Status: SHIPPED | OUTPATIENT
Start: 2019-12-31 | End: 2021-02-05 | Stop reason: ALTCHOICE

## 2019-12-31 RX ORDER — NITROFURANTOIN 25; 75 MG/1; MG/1
100 CAPSULE ORAL 2 TIMES DAILY
Qty: 10 CAPSULE | Refills: 0 | Status: SHIPPED | OUTPATIENT
Start: 2019-12-31 | End: 2020-01-05

## 2019-12-31 RX ORDER — HYDROXYZINE HYDROCHLORIDE 25 MG/1
25 TABLET, FILM COATED ORAL 3 TIMES DAILY PRN
Qty: 30 TABLET | Refills: 0 | Status: SHIPPED | OUTPATIENT
Start: 2019-12-31 | End: 2020-01-06

## 2019-12-31 RX ORDER — ERYTHROMYCIN 5 MG/G
OINTMENT OPHTHALMIC
Qty: 1 G | Refills: 0 | Status: SHIPPED | OUTPATIENT
Start: 2019-12-31 | End: 2021-02-05 | Stop reason: ALTCHOICE

## 2020-01-01 LAB
ESTIMATED AVERAGE GLUCOSE: 105.4 MG/DL
HBA1C MFR BLD: 5.3 %

## 2020-01-02 ENCOUNTER — TELEPHONE (OUTPATIENT)
Dept: FAMILY MEDICINE CLINIC | Age: 60
End: 2020-01-02

## 2020-01-02 LAB — URINE CULTURE, ROUTINE: NORMAL

## 2020-01-02 NOTE — TELEPHONE ENCOUNTER
Pt feels like having side effects from antibiotics urine yellow diarrrhea and saw a little blood . Is this normal please cb pt to discuss.

## 2020-01-02 NOTE — TELEPHONE ENCOUNTER
Patient was given Pyridium which can change the color of her urine. Advise to increase her water intake. Antibiotics can cause diarrhea she can try taking a probiotic and notify me if symptoms worsen or fail to improve. Where did she see a little blood in her urine or her stool?

## 2020-01-03 ENCOUNTER — NURSE TRIAGE (OUTPATIENT)
Dept: OTHER | Facility: CLINIC | Age: 60
End: 2020-01-03

## 2020-01-03 NOTE — TELEPHONE ENCOUNTER
Reason for Disposition   NON-URGENT call redirected to PCP's office because it is open    Protocols used: NO CONTACT OR DUPLICATE CONTACT CALL-ADULT-AH    Patient called [facility]) to schedule appointment, with red flag complaint, transferred to RN access for triage. Patient calling to report having tingling/numbness to bilateral hands. Reports symptoms have been present for one week. Patient stated saw PCP on 12/31 and informed of symptoms. Patient requesting to speak with PCP for additional questions/recommendations. Soft transfer to PCP office to speak with PCP for additional questions/recommendations as request.      Please do not respond to the triage nurse through this encounter. Any subsequent communication should be directly with the patient.

## 2020-01-03 NOTE — TELEPHONE ENCOUNTER
Stated that the blood in her stool was bright red and has since resoled. Offered patient appointment on Monday. She declineded an appointment due to having a dentist appointment that day. Does have an appointment on the 16th, wants to wait to see Dr. Marcella Gomez until then. Explained to patient that if the blood does start up again to let us know and if the symptoms get worse to go to the ER, patient verbalized understanding.

## 2020-01-08 ENCOUNTER — TELEPHONE (OUTPATIENT)
Dept: FAMILY MEDICINE CLINIC | Age: 60
End: 2020-01-08

## 2020-01-16 ENCOUNTER — TELEPHONE (OUTPATIENT)
Dept: FAMILY MEDICINE CLINIC | Age: 60
End: 2020-01-16

## 2020-01-16 ENCOUNTER — OFFICE VISIT (OUTPATIENT)
Dept: FAMILY MEDICINE CLINIC | Age: 60
End: 2020-01-16
Payer: COMMERCIAL

## 2020-01-16 VITALS
SYSTOLIC BLOOD PRESSURE: 130 MMHG | HEART RATE: 88 BPM | WEIGHT: 224 LBS | DIASTOLIC BLOOD PRESSURE: 86 MMHG | BODY MASS INDEX: 39.68 KG/M2 | OXYGEN SATURATION: 95 % | RESPIRATION RATE: 16 BRPM

## 2020-01-16 PROCEDURE — 3017F COLORECTAL CA SCREEN DOC REV: CPT | Performed by: FAMILY MEDICINE

## 2020-01-16 PROCEDURE — 1036F TOBACCO NON-USER: CPT | Performed by: FAMILY MEDICINE

## 2020-01-16 PROCEDURE — G8427 DOCREV CUR MEDS BY ELIG CLIN: HCPCS | Performed by: FAMILY MEDICINE

## 2020-01-16 PROCEDURE — G8417 CALC BMI ABV UP PARAM F/U: HCPCS | Performed by: FAMILY MEDICINE

## 2020-01-16 PROCEDURE — G8482 FLU IMMUNIZE ORDER/ADMIN: HCPCS | Performed by: FAMILY MEDICINE

## 2020-01-16 PROCEDURE — 99213 OFFICE O/P EST LOW 20 MIN: CPT | Performed by: FAMILY MEDICINE

## 2020-01-16 ASSESSMENT — PATIENT HEALTH QUESTIONNAIRE - PHQ9
2. FEELING DOWN, DEPRESSED OR HOPELESS: 0
SUM OF ALL RESPONSES TO PHQ QUESTIONS 1-9: 0
SUM OF ALL RESPONSES TO PHQ QUESTIONS 1-9: 0
SUM OF ALL RESPONSES TO PHQ9 QUESTIONS 1 & 2: 0
1. LITTLE INTEREST OR PLEASURE IN DOING THINGS: 0

## 2020-01-16 ASSESSMENT — ENCOUNTER SYMPTOMS: SHORTNESS OF BREATH: 0

## 2020-01-16 NOTE — PROGRESS NOTES
TIMES DAILY AS NEEDED FOR ITCHING 30 tablet 2    phenazopyridine (PYRIDIUM) 100 MG tablet Take 1 tablet by mouth 3 times daily as needed for Pain Take after meals 6 tablet 0    erythromycin (ROMYCIN) 5 MG/GM ophthalmic ointment Apply one ribbon in eye(s), 4 times a day for 10 days 1 g 0    diphenhydrAMINE-zinc acetate (BENADRYL ITCH STOPPING) 1-0.1 % cream Apply topically 3 times daily as needed. 28 g 5    meloxicam (MOBIC) 15 MG tablet Take 0.5 tablets by mouth 2 times daily as needed for Pain 60 tablet 0    silver sulfADIAZINE (SILVADENE) 1 % cream Apply topically daily. 400 g 0    ARIPiprazole (ABILIFY) 10 MG tablet TAKE ONE AND ONE-HALF TABLETS BY MOUTH TWICE DAILY 90 tablet 5     No current facility-administered medications for this visit.       Allergies   Allergen Reactions    Bactrim [Sulfamethoxazole-Trimethoprim]      Rash and trouble breathing       Social History     Socioeconomic History    Marital status: Single     Spouse name: None    Number of children: 2    Years of education: 15    Highest education level: None   Occupational History    Occupation: disabilty   Social Needs    Financial resource strain: None    Food insecurity:     Worry: None     Inability: None    Transportation needs:     Medical: None     Non-medical: None   Tobacco Use    Smoking status: Never Smoker    Smokeless tobacco: Never Used   Substance and Sexual Activity    Alcohol use: No     Alcohol/week: 0.0 standard drinks    Drug use: No    Sexual activity: Yes     Partners: Male     Birth control/protection: Surgical     Comment: tubal ligation   Lifestyle    Physical activity:     Days per week: None     Minutes per session: None    Stress: None   Relationships    Social connections:     Talks on phone: None     Gets together: None     Attends [employer] service: None     Active member of club or organization: None     Attends meetings of clubs or organizations: None     Relationship status: None    mupirocin (BACTROBAN) 2 % ointment; Apply topically 3 times daily for 14 days          Plan:   See orders and medications filed with this encounter. Return in about 3 months (around 4/16/2020).

## 2020-01-16 NOTE — PATIENT INSTRUCTIONS
Bank  [employer] [place]  [phone]    eliminate soda, white bread, pasta, cakes and pies and continue with regular exercise.     Eat more fruits and vegetables

## 2020-01-17 ENCOUNTER — TELEPHONE (OUTPATIENT)
Dept: ADMINISTRATIVE | Age: 60
End: 2020-01-17

## 2020-01-17 ENCOUNTER — TELEPHONE (OUTPATIENT)
Dept: OTHER | Age: 60
End: 2020-01-17

## 2020-01-17 RX ORDER — BACITRACIN, NEOMYCIN, POLYMYXIN B 400; 3.5; 5 [USP'U]/G; MG/G; [USP'U]/G
OINTMENT TOPICAL
Qty: 28.35 G | Refills: 0 | Status: SHIPPED | OUTPATIENT
Start: 2020-01-17 | End: 2020-01-27

## 2020-01-17 NOTE — TELEPHONE ENCOUNTER
I have sent a prescription for Neosporin to her pharmacy. If her insurance does not cover it she can just get it over-the-counter.

## 2020-01-23 ENCOUNTER — TELEPHONE (OUTPATIENT)
Dept: OBGYN CLINIC | Age: 60
End: 2020-01-23

## 2020-01-23 NOTE — TELEPHONE ENCOUNTER
Pt calling regarding letter received to follow up for mammogram testing. She wanted you to know she was not able to schedule screening because she currently has a rash to her breast. ( She believes to be from bed bug bites) She is currently treating this but will have to wait for this to clear to have imaging done. She is aware of need for care and wanted you to know she will schedule as soon as condition has cleared.

## 2020-01-27 ENCOUNTER — TELEPHONE (OUTPATIENT)
Dept: FAMILY MEDICINE CLINIC | Age: 60
End: 2020-01-27

## 2020-02-06 ENCOUNTER — TELEPHONE (OUTPATIENT)
Dept: FAMILY MEDICINE CLINIC | Age: 60
End: 2020-02-06

## 2020-03-03 ENCOUNTER — TELEPHONE (OUTPATIENT)
Dept: OBGYN CLINIC | Age: 60
End: 2020-03-03

## 2020-03-03 NOTE — TELEPHONE ENCOUNTER
Pt calling because she is unable to complete scheduled Mammogram. She states they will not schedule her because she has sores to her breast and all over her body. She has seen a Dermatologist for this condition and was diagnosed with Pruigo Nodulais. She was given antibiotics and cream to treat but was told the sores may not go away. They are not open or draining. She is asking how to proceed with getting the mammogram completed as the sores may always be present. Please advise.

## 2020-03-05 NOTE — TELEPHONE ENCOUNTER
Spoke with pt. She is scheduled with [facility] on Wednesday March 25th at 11:20 am to arrive at 11:05am for screening [address] is aware of diagnosis of Pruigo Nodulais and that this is not contagious. Pt also has a letter from her Dermatologist she will bring with her to appointment.  DONE

## 2020-05-15 ENCOUNTER — TELEPHONE (OUTPATIENT)
Dept: FAMILY MEDICINE CLINIC | Age: 60
End: 2020-05-15

## 2020-05-18 ENCOUNTER — TELEPHONE (OUTPATIENT)
Dept: FAMILY MEDICINE CLINIC | Age: 60
End: 2020-05-18

## 2020-08-24 ENCOUNTER — TELEPHONE (OUTPATIENT)
Dept: OBGYN CLINIC | Age: 60
End: 2020-08-24

## 2020-08-24 RX ORDER — NYSTATIN 100000 [USP'U]/G
POWDER TOPICAL
Qty: 1 BOTTLE | Refills: 1 | Status: CANCELLED | OUTPATIENT
Start: 2020-08-24

## 2020-08-24 NOTE — TELEPHONE ENCOUNTER
Left message to call back to see how long ago she was given the nystatin prescription. If it has been some time, patient will need to be evaluated in office, if not please get date given and let Dr. Kinjal Leon know.

## 2020-08-24 NOTE — TELEPHONE ENCOUNTER
Pt calling because she went to Urgent care and was diagnosed with yeast under the breast. She was given Nystatin powder. She says she now has irritation to the outter vaginal area and folds between her legs. It has been present for 2-3 days now. She has been using \" Monkey Butt powder\" but it did not help. Pt states she does not want to come in office. She has appointment scheduled for 11/2020 for annual. Please advise.  Pended Nystatin

## 2020-08-26 ENCOUNTER — OFFICE VISIT (OUTPATIENT)
Dept: OBGYN CLINIC | Age: 60
End: 2020-08-26
Payer: COMMERCIAL

## 2020-08-26 VITALS
DIASTOLIC BLOOD PRESSURE: 94 MMHG | WEIGHT: 219 LBS | BODY MASS INDEX: 38.79 KG/M2 | TEMPERATURE: 96 F | HEART RATE: 79 BPM | SYSTOLIC BLOOD PRESSURE: 144 MMHG

## 2020-08-26 PROCEDURE — G8417 CALC BMI ABV UP PARAM F/U: HCPCS | Performed by: OBSTETRICS & GYNECOLOGY

## 2020-08-26 PROCEDURE — G8427 DOCREV CUR MEDS BY ELIG CLIN: HCPCS | Performed by: OBSTETRICS & GYNECOLOGY

## 2020-08-26 PROCEDURE — 1036F TOBACCO NON-USER: CPT | Performed by: OBSTETRICS & GYNECOLOGY

## 2020-08-26 PROCEDURE — 3017F COLORECTAL CA SCREEN DOC REV: CPT | Performed by: OBSTETRICS & GYNECOLOGY

## 2020-08-26 PROCEDURE — 99213 OFFICE O/P EST LOW 20 MIN: CPT | Performed by: OBSTETRICS & GYNECOLOGY

## 2020-08-26 RX ORDER — FLUCONAZOLE 100 MG/1
TABLET ORAL
Qty: 14 TABLET | Refills: 0 | Status: SHIPPED | OUTPATIENT
Start: 2020-08-26 | End: 2021-02-05 | Stop reason: ALTCHOICE

## 2020-08-26 RX ORDER — NYSTATIN 100000 [USP'U]/G
POWDER TOPICAL
Qty: 60 BOTTLE | Refills: 0 | Status: SHIPPED | OUTPATIENT
Start: 2020-08-26 | End: 2021-02-05 | Stop reason: ALTCHOICE

## 2020-08-26 RX ORDER — LORATADINE 5 MG/5 ML
1 SOLUTION, ORAL ORAL 2 TIMES DAILY PRN
Qty: 102 BOTTLE | Refills: 1 | Status: SHIPPED | OUTPATIENT
Start: 2020-08-26 | End: 2021-02-05 | Stop reason: ALTCHOICE

## 2020-08-26 RX ORDER — NITROFURANTOIN 25; 75 MG/1; MG/1
100 CAPSULE ORAL 2 TIMES DAILY
Qty: 14 CAPSULE | Refills: 0 | Status: SHIPPED | OUTPATIENT
Start: 2020-08-26 | End: 2020-09-02

## 2020-08-28 LAB
CANDIDA SPECIES, DNA PROBE: NORMAL
GARDNERELLA VAGINALIS, DNA PROBE: NORMAL
TRICHOMONAS VAGINALIS DNA: NORMAL

## 2020-08-30 PROBLEM — B37.31 YEAST VAGINITIS: Status: ACTIVE | Noted: 2020-08-30

## 2020-08-30 ASSESSMENT — ENCOUNTER SYMPTOMS: SHORTNESS OF BREATH: 0

## 2020-08-30 NOTE — PROGRESS NOTES
Subjective:      Patient ID: Hilda Jackson is a 64 y.o. female. HPI  65 y/o  presents for evaluation secondary to vaginal/vulvar rash. Recently seen at Urgent Care for yeast infection under the breast--treated with Nystatin powder and Monistat. First noted rash last week. Rash under breasts resolved but now having issues with vaginal rash--some improvement over time. Admits to itching and burning in the area. Movement worsens symptoms. Denies palliative stimuli. Denies vaginal bleeding and vaginal discharge. Has noted urinary leakage. Denies recent change in soaps, detergent, and activity. Last pap smear was 9/10/18--normal.  Tested negative for high risk HPV. Tested negative for high risk HPV in  as well. No history of abnormal pap smear. Patient achieved menopause at age 48. No sexual activity--due to 's diabetes. History is positive for dyspareunia--improved with conservative measures, monogamous >30 years. Moods have significantly improved with current medication--Abilify. Previously on Lithium, etc.  Sees Dermatology. Review of Systems   Constitutional: Negative. Negative for activity change, appetite change, chills, fatigue, fever and unexpected weight change. Respiratory: Negative for shortness of breath. Cardiovascular: Negative for chest pain. Genitourinary: Negative for difficulty urinating (intermittent leaking with cough and sneeze), dysuria, frequency, hematuria and pelvic pain. Psychiatric/Behavioral: Negative. Objective:   Physical Exam  Vitals signs and nursing note reviewed. Exam conducted with a chaperone present. Constitutional:       General: She is not in acute distress. Appearance: Normal appearance. She is well-developed. She is not ill-appearing, toxic-appearing or diaphoretic. HENT:      Head: Normocephalic and atraumatic.    Pulmonary:      Effort: Pulmonary effort is normal.   Abdominal:      Palpations:

## 2020-09-01 ENCOUNTER — TELEPHONE (OUTPATIENT)
Dept: OBGYN CLINIC | Age: 60
End: 2020-09-01

## 2020-09-01 NOTE — TELEPHONE ENCOUNTER
Patient called and stated that she picked up Nystatin powder from the pharmacy however she does not currently have a rash. Patient is wondering if she still needs to be using the powder if there is not rash present.      Please call this patient back at [phone]

## 2020-09-03 NOTE — TELEPHONE ENCOUNTER
LM for pt that if rash is not present she does not need to use the powder under breast. Pt to call office with any questions.  DONE

## 2020-10-01 ENCOUNTER — NURSE TRIAGE (OUTPATIENT)
Dept: OTHER | Facility: CLINIC | Age: 60
End: 2020-10-01

## 2020-10-01 NOTE — TELEPHONE ENCOUNTER
Reason for Disposition   Patient wants to be seen    Answer Assessment - Initial Assessment Questions  1. TYPE of INSECT: \"What type of insect was it? \"      Unsure     2. ONSET: \"When did you get bitten? \"       Couple of years     3. LOCATION: \"Where is the insect bite located? \"       All over body     4. REDNESS: \"Is the area red or pink? \" If so, ask \"What size is area of redness? \" (inches or cm). \"When did the redness start? \"      All over the body     5. PAIN: \"Is there any pain? \" If so, ask: \"How bad is it? \"  (Scale 1-10; or mild, moderate, severe)      9/10    6. ITCHING: \"Does it itch? \" If so, ask: \"How bad is the itch? \"     - MILD: doesn't interfere with normal activities    - MODERATE-SEVERE: interferes with work, school, sleep, or other activities       10/10 itching     7. SWELLING: \"How big is the swelling? \" (inches, cm, or compare to coins)      Denies     8. OTHER SYMPTOMS: \"Do you have any other symptoms? \"  (e.g., difficulty breathing, hives)      Denies     9. PREGNANCY: \"Is there any chance you are pregnant? \" \"When was your last menstrual period? \"      NA    Protocols used: INSECT BITE-ADULT-[place]

## 2020-10-02 ENCOUNTER — VIRTUAL VISIT (OUTPATIENT)
Dept: FAMILY MEDICINE CLINIC | Age: 60
End: 2020-10-02
Payer: COMMERCIAL

## 2020-10-02 PROCEDURE — 99442 PR PHYS/QHP TELEPHONE EVALUATION 11-20 MIN: CPT | Performed by: CLINICAL NURSE SPECIALIST

## 2020-10-02 RX ORDER — DOXYCYCLINE HYCLATE 100 MG
100 TABLET ORAL 2 TIMES DAILY
Qty: 14 TABLET | Refills: 0 | Status: CANCELLED | OUTPATIENT
Start: 2020-10-02 | End: 2020-10-09

## 2020-10-02 RX ORDER — CETIRIZINE HYDROCHLORIDE 10 MG/1
10 TABLET ORAL DAILY
Qty: 30 TABLET | Refills: 0 | Status: CANCELLED | OUTPATIENT
Start: 2020-10-02

## 2020-10-02 RX ORDER — HYDROXYZINE HYDROCHLORIDE 25 MG/1
25 TABLET, FILM COATED ORAL NIGHTLY
Qty: 30 TABLET | Refills: 0 | Status: CANCELLED | OUTPATIENT
Start: 2020-10-02 | End: 2020-11-01

## 2020-10-02 RX ORDER — TRIAMCINOLONE ACETONIDE 1 MG/G
CREAM TOPICAL
Qty: 80 G | Refills: 0 | Status: CANCELLED | OUTPATIENT
Start: 2020-10-02

## 2020-10-02 RX ORDER — METHYLPREDNISOLONE 4 MG/1
TABLET ORAL
Qty: 1 KIT | Refills: 0 | Status: CANCELLED | OUTPATIENT
Start: 2020-10-02 | End: 2020-10-08

## 2020-10-02 ASSESSMENT — ENCOUNTER SYMPTOMS
VOMITING: 0
WHEEZING: 0
COUGH: 0
CHEST TIGHTNESS: 0
SHORTNESS OF BREATH: 0
DIARRHEA: 0
RHINORRHEA: 0
NAIL CHANGES: 0
CONSTIPATION: 0
EYE PAIN: 0
NAUSEA: 0
ABDOMINAL PAIN: 0

## 2020-10-02 NOTE — PROGRESS NOTES
SUBJECTIVE:    Patient ID:  Allison Anderson is a 64 y.o. female      This visit was conducted via telephone due to equipment issues for complaints of \"bug bites\" all over. States her house is infected with bedbugs and is unable to exterminate due to 's health conditions. She was recently seen and evaluated by dermatologist on 9/29/2020. States she would like to continue at [facility] office in [place]. Upon further chart review patient has a chronic dermatological condition, prurigo nodularis. She is requesting pain medication. She has tried Tylenol once with some relief. Discussed appropriated doses of Tylenol and Ibuprofen. Advised to follow up with PCP and or dermatologist if symptoms worsen or persist.  Patient verbalizes understanding. Rash   This is a chronic problem. Episode onset: years. The rash is diffuse. She was exposed to an insect bite/sting (bed bugs). Pertinent negatives include no anorexia, congestion, cough, diarrhea, eye pain, facial edema, fever, nail changes, rhinorrhea, shortness of breath or vomiting. Past treatments include oral steroids, antihistamine, antibiotic cream and topical steroids. The treatment provided moderate relief. Current Outpatient Medications on File Prior to Visit   Medication Sig Dispense Refill    fluconazole (DIFLUCAN) 100 MG tablet Take one tablet 1st day,  Repeat after 24-48 hours if symptoms persist 14 tablet 0    nystatin (MYCOSTATIN) 670760 UNIT/GM powder Apply 3 times daily.  60 Bottle 0    Diaper Rash Products (A+D DIAPER RASH) CREA Apply 1 drop topically 2 times daily as needed (rash) 102 Bottle 1    silver sulfADIAZINE (SSD) 1 % cream APPLY  CREAM TOPICALLY ONCE DAILY 400 g 0    mupirocin (BACTROBAN) 2 % ointment Apply topically 3 times daily for 14 days 30 g 0    hydrOXYzine (ATARAX) 25 MG tablet TAKE 1 TABLET BY MOUTH THREE TIMES DAILY AS NEEDED FOR ITCHING 30 tablet 2    phenazopyridine (PYRIDIUM) 100 MG tablet Take 1 tablet by mouth 3 times daily as needed for Pain Take after meals 6 tablet 0    erythromycin (ROMYCIN) 5 MG/GM ophthalmic ointment Apply one ribbon in eye(s), 4 times a day for 10 days 1 g 0    diphenhydrAMINE-zinc acetate (BENADRYL ITCH STOPPING) 1-0.1 % cream Apply topically 3 times daily as needed. 28 g 5    meloxicam (MOBIC) 15 MG tablet Take 0.5 tablets by mouth 2 times daily as needed for Pain 60 tablet 0    ARIPiprazole (ABILIFY) 10 MG tablet TAKE ONE AND ONE-HALF TABLETS BY MOUTH TWICE DAILY 90 tablet 5     No current facility-administered medications on file prior to visit.        Past Medical History:   Diagnosis Date    Anxiety     Bipolar 1 disorder ([facility] [address]     Depression     Irritable bowel syndrome     Obesity     Urinary incontinence      Past Surgical History:   Procedure Laterality Date    COLONOSCOPY  2010    polyp removed    COLONOSCOPY  2018    EYE SURGERY Left     Lazy eye    TONSILLECTOMY      TUBAL LIGATION  1994     Family History   Problem Relation Age of Onset    Heart Disease Mother     Diabetes Mother     Arthritis Father     Arthritis Brother     Diabetes Maternal Grandfather      Social History     Socioeconomic History    Marital status: Single     Spouse name: Not on file    Number of children: 2    Years of education: 15    Highest education level: Not on file   Occupational History    Occupation: disabilty   Social Needs    Financial resource strain: Not on file    Food insecurity     Worry: Not on file     Inability: Not on file   [employer] needs     Medical: Not on file     Non-medical: Not on file   Tobacco Use    Smoking status: Never Smoker    Smokeless tobacco: Never Used   Substance and Sexual Activity    Alcohol use: No     Alcohol/week: 0.0 standard drinks    Drug use: No    Sexual activity: Yes     Partners: Male     Birth control/protection: Surgical     Comment: tubal ligation   Lifestyle    Physical activity     Days per week: Not on file Minutes per session: Not on file    Stress: Not on file   Relationships    Social connections     Talks on phone: Not on file     Gets together: Not on file     Attends Shinto service: Not on file     Active member of club or organization: Not on file     Attends meetings of clubs or organizations: Not on file     Relationship status: Not on file    Intimate partner violence     Fear of current or ex partner: Not on file     Emotionally abused: Not on file     Physically abused: Not on file     Forced sexual activity: Not on file   Other Topics Concern    Not on file   Social History Narrative    Not on file       Review of Systems   Constitutional: Negative for chills and fever. HENT: Negative for congestion and rhinorrhea. Eyes: Negative for pain and visual disturbance. Respiratory: Negative for cough, chest tightness, shortness of breath and wheezing. Cardiovascular: Negative for chest pain and palpitations. Gastrointestinal: Negative for abdominal pain, anorexia, constipation, diarrhea, nausea and vomiting. Musculoskeletal: Negative for arthralgias and myalgias. Skin: Negative for nail changes and rash. Neurological: Negative for headaches. OBJECTIVE:    Physical Exam  Vitals signs and nursing note reviewed. Constitutional:       Appearance: Normal appearance. Pulmonary:      Effort: No respiratory distress. Neurological:      Mental Status: She is alert and oriented to person, place, and time. LMP 01/29/2015    BP Readings from Last 3 Encounters:   08/26/20 (!) 144/94   01/16/20 130/86   12/31/19 136/84      Wt Readings from Last 3 Encounters:   08/26/20 219 lb (99.3 kg)   01/16/20 224 lb (101.6 kg)   12/31/19 225 lb 6.4 oz (102.2 kg)       ASSESSMENT & PLAN:    1.  Prurigo nodularis  - Continue with dermatologist recommendations  - Tylenol 500-1000 every 6-8 hours (not exceed 0346-3090 in a 24 hour period)   - And or Ibuprofen 600 mg every 6-8 hours as needed/for pain (not to exceed great then 3200 mg in a 24 period)   - Follow up with PCP in 2-4 weeks, sooner if symptoms worsen or persist    2. Itching  - Continue topical steroids as directed per dermatologist      Continue current treatment plan. Current Outpatient Medications   Medication Sig Dispense Refill    fluconazole (DIFLUCAN) 100 MG tablet Take one tablet 1st day,  Repeat after 24-48 hours if symptoms persist 14 tablet 0    nystatin (MYCOSTATIN) 677439 UNIT/GM powder Apply 3 times daily. 60 Bottle 0    Diaper Rash Products (A+D DIAPER RASH) CREA Apply 1 drop topically 2 times daily as needed (rash) 102 Bottle 1    silver sulfADIAZINE (SSD) 1 % cream APPLY  CREAM TOPICALLY ONCE DAILY 400 g 0    mupirocin (BACTROBAN) 2 % ointment Apply topically 3 times daily for 14 days 30 g 0    hydrOXYzine (ATARAX) 25 MG tablet TAKE 1 TABLET BY MOUTH THREE TIMES DAILY AS NEEDED FOR ITCHING 30 tablet 2    phenazopyridine (PYRIDIUM) 100 MG tablet Take 1 tablet by mouth 3 times daily as needed for Pain Take after meals 6 tablet 0    erythromycin (ROMYCIN) 5 MG/GM ophthalmic ointment Apply one ribbon in eye(s), 4 times a day for 10 days 1 g 0    diphenhydrAMINE-zinc acetate (BENADRYL ITCH STOPPING) 1-0.1 % cream Apply topically 3 times daily as needed. 28 g 5    meloxicam (MOBIC) 15 MG tablet Take 0.5 tablets by mouth 2 times daily as needed for Pain 60 tablet 0    ARIPiprazole (ABILIFY) 10 MG tablet TAKE ONE AND ONE-HALF TABLETS BY MOUTH TWICE DAILY 90 tablet 5     No current facility-administered medications for this visit. Return in about 4 weeks (around 10/30/2020), or if symptoms worsen or fail to improve, for prurigo nodularis, itching. Heber Carrel received counseling on the following healthy behaviors: nutrition, exercise and medication adherence    Discussed use, benefit, and side effects of prescribed medications. Barriers to medication compliance addressed. All patient questions answered.   Pt voiced understanding. Call office if experience side effects from medications. Please note that some or all of this record was generated using voice recognition software. If there are any questions about the content of this document, please contact the author as some errors in transcription may have occurred. Lou Hair is a 64 y.o. female being evaluated by a Virtual Visit (video visit) encounter to address concerns as mentioned above. A caregiver was present when appropriate. Due to this being a TeleHealth encounter (During [facility] public health emergency), evaluation of the following organ systems was limited: Vitals/Constitutional/EENT/Resp/CV/GI//MS/Neuro/Skin/Heme-Lymph-Imm. Pursuant to the emergency declaration under the [address] [place] [address], [address] [place] [address] authority and the [employer] Act, this Virtual Visit was conducted with patient's (and/or legal guardian's) consent, to reduce the patient's risk of exposure to COVID-19 and provide necessary medical care. The patient (and/or legal guardian) has also been advised to contact this office for worsening conditions or problems, and seek emergency medical treatment and/or call 911 if deemed necessary. Patient identification was verified at the start of the visit: Yes    Total time spent for this encounter: 13 minutes    Services were provided through a video synchronous discussion virtually to substitute for in-person clinic visit. Patient and provider were located at their individual homes. --MELANIE Jay - CNP on 10/3/2020 at 6:17 PM    An electronic signature was used to authenticate this note.

## 2020-10-02 NOTE — PATIENT INSTRUCTIONS
Continue with dermatologist recommendations    Tylenol 500-1000 every 6-8 hours (not exceed 9561-0290 in a 24 hour period)     And or Ibuprofen 600 mg every 6-8 hours as needed/for pain (not to exceed great then 3200 mg in a 24 period)     Follow up with PCP in 2-4 weeks, sooner if symptoms worsen or persist

## 2020-10-05 ENCOUNTER — TELEPHONE (OUTPATIENT)
Dept: OBGYN CLINIC | Age: 60
End: 2020-10-05

## 2020-10-05 RX ORDER — FLUCONAZOLE 100 MG/1
TABLET ORAL
Qty: 14 TABLET | Refills: 0 | Status: CANCELLED | OUTPATIENT
Start: 2020-10-05

## 2020-10-05 RX ORDER — NYSTATIN 100000 [USP'U]/G
POWDER TOPICAL
Qty: 60 BOTTLE | Refills: 0 | Status: CANCELLED | OUTPATIENT
Start: 2020-10-05

## 2020-10-05 NOTE — TELEPHONE ENCOUNTER
Patient has started again with vaginal itching over the weekend. She is requesting a refill from the pharmacy, however it is only vaginal itching. She did use some left over nystatin cream over the weekend. She feels it may be more of a yeast infection than a rash. She is having no thick white discharge. Patient would like Dr. Chacho Rachel to send in what she feels is appropriate. Nystatin and diflucan are pending. Routing to Dr. Chacho Rachel.

## 2020-10-06 NOTE — TELEPHONE ENCOUNTER
Patient's description of symptoms is not consistent with a specific type of vaginal infection or dermatologic entity. Patient will need to come in to be evaluated. Thanks.

## 2020-10-07 NOTE — TELEPHONE ENCOUNTER
Pt was called and is aware of Physician response. She states the itch has stopped and she does not want to set ab appointment at this time. She has annual scheduled for November.  DONE

## 2020-12-16 ENCOUNTER — OFFICE VISIT (OUTPATIENT)
Dept: OBGYN CLINIC | Age: 60
End: 2020-12-16
Payer: COMMERCIAL

## 2020-12-16 VITALS
HEART RATE: 81 BPM | TEMPERATURE: 97.9 F | BODY MASS INDEX: 37.8 KG/M2 | WEIGHT: 221.4 LBS | DIASTOLIC BLOOD PRESSURE: 80 MMHG | HEIGHT: 64 IN | SYSTOLIC BLOOD PRESSURE: 121 MMHG

## 2020-12-16 PROCEDURE — G8427 DOCREV CUR MEDS BY ELIG CLIN: HCPCS | Performed by: OBSTETRICS & GYNECOLOGY

## 2020-12-16 PROCEDURE — G0101 CA SCREEN;PELVIC/BREAST EXAM: HCPCS | Performed by: OBSTETRICS & GYNECOLOGY

## 2020-12-16 PROCEDURE — 99999 PR OFFICE/OUTPT VISIT,PROCEDURE ONLY: CPT | Performed by: OBSTETRICS & GYNECOLOGY

## 2020-12-16 PROCEDURE — G8417 CALC BMI ABV UP PARAM F/U: HCPCS | Performed by: OBSTETRICS & GYNECOLOGY

## 2020-12-16 RX ORDER — HYDROCORTISONE ACETATE 25 MG/1
25 SUPPOSITORY RECTAL 2 TIMES DAILY PRN
Qty: 24 SUPPOSITORY | Refills: 0 | Status: SHIPPED | OUTPATIENT
Start: 2020-12-16 | End: 2021-02-05 | Stop reason: ALTCHOICE

## 2020-12-17 ENCOUNTER — TELEPHONE (OUTPATIENT)
Dept: OBGYN CLINIC | Age: 60
End: 2020-12-17

## 2020-12-17 NOTE — PROGRESS NOTES
Subjective:      Patient ID: Socrates Nguyen is a 64 y.o. female. HPI  65 y/o  presents for well woman examination. Last pap smear was 9/10/18--normal.  Tested negative for high risk HPV. Tested negative for high risk HPV in  as well. No history of abnormal pap smear. History is positive for vaginal/vulvar rash and yeast infections. Denies vaginal bleeding and vaginal discharge. Urinary leakage has improved. Patient achieved menopause at age 48. No sexual activity--due to 's diabetes. History is positive for dyspareunia--improved with conservative measures, monogamous >30 years. Has had issues with anal itching, burning and hemorrhoids. Uses Tucks pads with minimal improvement. History positive for fibrocystic breasts--mammograms have been limited in the past.  Limits caffeine due to breast discomfort. Moods have significantly improved with current medication--Abilify. Previously on Lithium, etc.  Sees Dermatology. Recent biopsy was positive for mites. Had colonoscopy 6 years ago--normal.  Has colonoscopy every 10 years. Review of Systems   Constitutional: Negative for activity change, appetite change, chills, fatigue, fever and unexpected weight change. Respiratory: Negative for shortness of breath. Cardiovascular: Negative for chest pain. Gastrointestinal: Negative for abdominal distention, abdominal pain, constipation, diarrhea, nausea and vomiting. Rectal pain: itching, burning. Endocrine: Negative for cold intolerance and heat intolerance. Genitourinary: Negative for difficulty urinating, dysuria, frequency, genital sores, hematuria, pelvic pain, vaginal bleeding, vaginal discharge and vaginal pain. Skin: Negative for rash. Neurological: Negative for headaches. Hematological: Does not bruise/bleed easily. Objective:   Physical Exam  Vitals signs and nursing note reviewed. Exam conducted with a chaperone present.    Constitutional: General: She is not in acute distress. Appearance: Normal appearance. She is well-developed. She is not ill-appearing or toxic-appearing. HENT:      Head: Normocephalic and atraumatic. Neck:      Musculoskeletal: Neck supple. Thyroid: No thyromegaly. Trachea: Trachea normal.   Cardiovascular:      Rate and Rhythm: Normal rate and regular rhythm. Heart sounds: Normal heart sounds, S1 normal and S2 normal.   Pulmonary:      Effort: Pulmonary effort is normal. No respiratory distress. Breath sounds: Normal breath sounds. Chest:      Breasts: Breasts are symmetrical.         Right: Inverted nipple present. No mass, nipple discharge, skin change or tenderness. Left: Inverted nipple present. No mass, nipple discharge, skin change or tenderness. Abdominal:      General: Bowel sounds are normal. There is no distension. Palpations: Abdomen is soft. Abdomen is not rigid. There is no mass. Tenderness: There is no abdominal tenderness. There is no guarding or rebound. Negative signs include Pope's sign and McBurney's sign. Hernia: No hernia is present. There is no hernia in the left inguinal area. Genitourinary:     General: Normal vulva. Exam position: Lithotomy position. Labia:         Right: No rash, tenderness, lesion or injury. Left: No rash, tenderness, lesion or injury. Vagina: No signs of injury and foreign body. No vaginal discharge, erythema, tenderness, bleeding or lesions. Cervix: No cervical motion tenderness, discharge or friability. Uterus: Not tender. Adnexa:         Right: No mass, tenderness or fullness. Left: No mass, tenderness or fullness. Rectum: Guaiac result negative. External hemorrhoid (small) present. No mass, tenderness or anal fissure. Normal anal tone. Comments: Mild perianal edema and erythema noted. Musculoskeletal: Normal range of motion.    Skin:     General: Skin is warm and

## 2020-12-17 NOTE — TELEPHONE ENCOUNTER
Has patient priced the medication over the counter? Alternatives are limited since patient has used some of the other conservative treatments.

## 2020-12-18 ASSESSMENT — ENCOUNTER SYMPTOMS
ABDOMINAL DISTENTION: 0
VOMITING: 0
SHORTNESS OF BREATH: 0
ABDOMINAL PAIN: 0
DIARRHEA: 0
NAUSEA: 0
CONSTIPATION: 0

## 2020-12-18 NOTE — TELEPHONE ENCOUNTER
Patient called office and is stating she needs to have a PA done for her to have medication. Placed call to insurance company regarding PA. Medicaid will have to review and approve or deny.

## 2020-12-21 RX ORDER — PRAMOXINE HYDROCHLORIDE 10 MG/G
AEROSOL, FOAM TOPICAL
Qty: 1 CAN | Refills: 1 | Status: SHIPPED | OUTPATIENT
Start: 2020-12-21 | End: 2021-02-05 | Stop reason: ALTCHOICE

## 2020-12-21 NOTE — TELEPHONE ENCOUNTER
Patient stated she talked with her insurance company and there is a hydrocortisone foam that is covered that she stated she would be ok with trying. Routing to Dr. Francisco Santana.

## 2021-02-05 ENCOUNTER — OFFICE VISIT (OUTPATIENT)
Dept: FAMILY MEDICINE CLINIC | Age: 61
End: 2021-02-05
Payer: COMMERCIAL

## 2021-02-05 ENCOUNTER — NURSE TRIAGE (OUTPATIENT)
Dept: OTHER | Facility: CLINIC | Age: 61
End: 2021-02-05

## 2021-02-05 VITALS
DIASTOLIC BLOOD PRESSURE: 88 MMHG | SYSTOLIC BLOOD PRESSURE: 138 MMHG | HEART RATE: 57 BPM | TEMPERATURE: 97.5 F | OXYGEN SATURATION: 96 % | BODY MASS INDEX: 38.38 KG/M2 | WEIGHT: 223.6 LBS

## 2021-02-05 DIAGNOSIS — M79.643 INTERMITTENT PAIN AND SWELLING OF HAND: ICD-10-CM

## 2021-02-05 DIAGNOSIS — M79.642 PAIN IN BOTH HANDS: Primary | ICD-10-CM

## 2021-02-05 DIAGNOSIS — M79.89 INTERMITTENT PAIN AND SWELLING OF HAND: ICD-10-CM

## 2021-02-05 DIAGNOSIS — M79.641 PAIN IN BOTH HANDS: Primary | ICD-10-CM

## 2021-02-05 DIAGNOSIS — R21 RASH: ICD-10-CM

## 2021-02-05 PROCEDURE — 99213 OFFICE O/P EST LOW 20 MIN: CPT | Performed by: NURSE PRACTITIONER

## 2021-02-05 PROCEDURE — G8427 DOCREV CUR MEDS BY ELIG CLIN: HCPCS | Performed by: NURSE PRACTITIONER

## 2021-02-05 PROCEDURE — 1036F TOBACCO NON-USER: CPT | Performed by: NURSE PRACTITIONER

## 2021-02-05 PROCEDURE — 3017F COLORECTAL CA SCREEN DOC REV: CPT | Performed by: NURSE PRACTITIONER

## 2021-02-05 PROCEDURE — G8484 FLU IMMUNIZE NO ADMIN: HCPCS | Performed by: NURSE PRACTITIONER

## 2021-02-05 PROCEDURE — G8417 CALC BMI ABV UP PARAM F/U: HCPCS | Performed by: NURSE PRACTITIONER

## 2021-02-05 PROCEDURE — 36415 COLL VENOUS BLD VENIPUNCTURE: CPT | Performed by: NURSE PRACTITIONER

## 2021-02-05 PROCEDURE — 96372 THER/PROPH/DIAG INJ SC/IM: CPT | Performed by: NURSE PRACTITIONER

## 2021-02-05 RX ORDER — METHYLPREDNISOLONE ACETATE 80 MG/ML
80 INJECTION, SUSPENSION INTRA-ARTICULAR; INTRALESIONAL; INTRAMUSCULAR; SOFT TISSUE ONCE
Status: COMPLETED | OUTPATIENT
Start: 2021-02-05 | End: 2021-02-05

## 2021-02-05 RX ORDER — TRIAMCINOLONE ACETONIDE 1 MG/G
CREAM TOPICAL
COMMUNITY
Start: 2020-09-02 | End: 2021-06-23

## 2021-02-05 RX ADMIN — METHYLPREDNISOLONE ACETATE 80 MG: 80 INJECTION, SUSPENSION INTRA-ARTICULAR; INTRALESIONAL; INTRAMUSCULAR; SOFT TISSUE at 15:13

## 2021-02-05 ASSESSMENT — PATIENT HEALTH QUESTIONNAIRE - PHQ9
SUM OF ALL RESPONSES TO PHQ QUESTIONS 1-9: 0
SUM OF ALL RESPONSES TO PHQ QUESTIONS 1-9: 0
2. FEELING DOWN, DEPRESSED OR HOPELESS: 0
SUM OF ALL RESPONSES TO PHQ QUESTIONS 1-9: 0

## 2021-02-05 ASSESSMENT — ENCOUNTER SYMPTOMS
NAUSEA: 1
RESPIRATORY NEGATIVE: 1

## 2021-02-05 NOTE — TELEPHONE ENCOUNTER
Patient called [facility] at [facility] pre-service center [facility])  with red flag complaint. Brief description of triage: bilateral hand swelling for the past few weeks, reports appearing bruised, denies injury, insect bite, drainage, fever. Pt reports painful to touch and bending of finger. Triage indicates for patient to see PCP within 4 hours. Care advice provided, patient verbalizes understanding; denies any other questions or concerns; instructed to call back for any new or worsening symptoms. Writer provided warm transfer to [facility] for appointment scheduling. Attention Provider: Thank you for allowing me to participate in the care of your patient. The patient was connected to triage in response to information provided to the [facility]. Please do not respond through this encounter as the response is not directed to a shared pool. Reason for Disposition   SEVERE hand swelling (e.g., swelling of entire hand and up into forearm)    Answer Assessment - Initial Assessment Questions  1. ONSET: \"When did the swelling start? \" (e.g., minutes, hours, days)      A few weeks     2. LOCATION: \"What part of the hand is swollen? \"  \"Are both hands swollen or just one hand? \"      Bilateral hands     3. SEVERITY: \"How bad is the swelling? \" (e.g., localized; mild, moderate, severe)    - BALL OR LUMP: small ball or lump    - LOCALIZED: puffy or swollen area or patch of skin    - JOINT SWELLING: swelling of a joint    - MILD: puffiness or mild swelling of fingers or hand    - MODERATE: fingers and hand are swollen    - SEVERE: swelling of entire hand and up into forearm      Moderate     4. REDNESS: \"Does the swelling look red or infected? \"      No    5. PAIN: \"Is the swelling painful to touch? \" If so, ask: \"How painful is it? \"   (Scale 1-10; mild, moderate or severe)      10/10    6. FEVER: \"Do you have a fever? \" If so, ask: \"What is it, how was it measured, and when did it start? \"       No    7. CAUSE: \"What do you think is causing the hand swelling? \" (e.g., heat, insect bite, pregnancy, recent injury)      Unsure     8. MEDICAL HISTORY: \"Do you have a history of heart failure, kidney disease, liver failure, or cancer? \"      No    9. RECURRENT SYMPTOM: \"Have you had hand swelling before? \" If so, ask: \"When was the last time? \" \"What happened that time? \"      No    10. OTHER SYMPTOMS: \"Do you have any other symptoms? \" (e.g., blurred vision, difficulty breathing, headache)        No    11. PREGNANCY: \"Is there any chance you are pregnant? \" \"When was your last menstrual period? \"        No, Menopause    Protocols used: HAND [facility]

## 2021-02-05 NOTE — PROGRESS NOTES
Patient: Cheree Dakin is a 64 y.o. female who presents today with the following Chief Complaint(s):  Chief Complaint   Patient presents with    Hand Pain     pressure like feeling in hands primarily on left hand. veins appear pushed out    Skin Discoloration     skin on  under skin and skin itself hurts         HPI  Patient presents today with complaints of bilateral hand pain and swelling. She also reports her upper back feeling tender as well. She does have a skin condition called pruigo nodularus that she sees  dermatology for. She states this is all new and does not feel like that. She did use some new dryer sheets lately and thinks that might be related. She called dermatology and they stated she should be seen here. Patient also feels nauseated. She feels like something is wrong with her circulation. She has not been taking anything right now. She used the kenalog cream on her back only so far. She has not put anything on her hands. Current Outpatient Medications   Medication Sig Dispense Refill    triamcinolone (KENALOG) 0.1 % cream Apply topically to affected areas twice a day for no longer than two weeks at a time.  ARIPiprazole (ABILIFY) 10 MG tablet TAKE ONE AND ONE-HALF TABLETS BY MOUTH TWICE DAILY 90 tablet 5    diphenhydrAMINE-zinc acetate (BENADRYL ITCH STOPPING) 1-0.1 % cream Apply topically 3 times daily as needed. (Patient not taking: Reported on 2/5/2021) 28 g 5     No current facility-administered medications for this visit. Patient's past medical history, surgical history, family history, medications,and allergies  were all reviewed and updated as appropriate today. Review of Systems   Constitutional: Negative. HENT: Negative. Respiratory: Negative. Cardiovascular: Negative. Gastrointestinal: Positive for nausea. Musculoskeletal: Negative. Skin: Positive for rash. Neurological: Negative. Negative for dizziness and headaches. Psychiatric/Behavioral: The patient is nervous/anxious. Physical Exam  Vitals signs reviewed. Skin:     General: Skin is warm and dry. Comments: Bilateral hands, dry cracked in a few places, red, trace swelling in finger tips. Neurological:      Mental Status: She is alert. Vitals:    02/05/21 1444   BP: 138/88   Pulse:    Temp:    SpO2:        Assessment:  Encounter Diagnoses   Name Primary?  Pain in both hands Yes    Intermittent pain and swelling of hand     Rash        Plan:  1. Pain in both hands  Continue with tylenol as needed for pain, will check blood work. - BOY  - Uric Acid  - Sedimentation Rate  - Rheumatoid Factor  - Comprehensive Metabolic Panel    2. Intermittent pain and swelling of hand  Will check blood work. - BOY  - Uric Acid  - Sedimentation Rate  - Rheumatoid Factor  - Comprehensive Metabolic Panel    3. Rash  Given steroid injection today. Will check blood work. Patient is to use the kenalog cream that was given by derm on her hands twice a day and apply Vaseline with gloves at night. Follow up with derm if no improvement. - methylPREDNISolone acetate (DEPO-MEDROL) injection 80 mg  - BOY  - Uric Acid  - Sedimentation Rate  - Rheumatoid Factor  - Comprehensive Metabolic Panel    Patient advised if she feels worse she needs to be seen at the ER.

## 2021-02-06 LAB
A/G RATIO: 1.3 (ref 1.1–2.2)
ALBUMIN SERPL-MCNC: 4.5 G/DL (ref 3.4–5)
ALP BLD-CCNC: 92 U/L (ref 40–129)
ALT SERPL-CCNC: 48 U/L (ref 10–40)
ANION GAP SERPL CALCULATED.3IONS-SCNC: 15 MMOL/L (ref 3–16)
ANTI-NUCLEAR ANTIBODY (ANA): POSITIVE
AST SERPL-CCNC: 33 U/L (ref 15–37)
BILIRUB SERPL-MCNC: 0.3 MG/DL (ref 0–1)
BUN BLDV-MCNC: 17 MG/DL (ref 7–20)
CALCIUM SERPL-MCNC: 9.7 MG/DL (ref 8.3–10.6)
CHLORIDE BLD-SCNC: 102 MMOL/L (ref 99–110)
CO2: 24 MMOL/L (ref 21–32)
CREAT SERPL-MCNC: 0.8 MG/DL (ref 0.6–1.1)
GFR AFRICAN AMERICAN: >60
GFR NON-AFRICAN AMERICAN: >60
GLOBULIN: 3.4 G/DL
GLUCOSE BLD-MCNC: 126 MG/DL (ref 70–99)
POTASSIUM SERPL-SCNC: 3.9 MMOL/L (ref 3.5–5.1)
RHEUMATOID FACTOR: <10 IU/ML
SEDIMENTATION RATE, ERYTHROCYTE: 22 MM/HR (ref 0–30)
SODIUM BLD-SCNC: 141 MMOL/L (ref 136–145)
TOTAL PROTEIN: 7.9 G/DL (ref 6.4–8.2)
URIC ACID, SERUM: 5.6 MG/DL (ref 2.6–6)

## 2021-02-09 LAB
ANTINUCLEAR AB INTERPRETIVE COMMENT: NORMAL
ANTINUCLEAR ANTIBODY, HEP-2, IGG: NORMAL

## 2021-02-10 ENCOUNTER — TELEPHONE (OUTPATIENT)
Dept: FAMILY MEDICINE CLINIC | Age: 61
End: 2021-02-10

## 2021-02-10 DIAGNOSIS — R76.8 POSITIVE ANA (ANTINUCLEAR ANTIBODY): ICD-10-CM

## 2021-02-10 DIAGNOSIS — M79.642 PAIN IN BOTH HANDS: Primary | ICD-10-CM

## 2021-02-10 DIAGNOSIS — M79.641 PAIN IN BOTH HANDS: Primary | ICD-10-CM

## 2021-02-10 NOTE — TELEPHONE ENCOUNTER
The patient would like to know the results of her blood work taken on 2.5.2021.       Please advise     [phone]*

## 2021-02-10 NOTE — TELEPHONE ENCOUNTER
----- Message from Michael Dugan sent at 2/10/2021  7:37 AM EST -----  Subject: Message to Provider    QUESTIONS  Information for Provider? pt is wondering if wellness appt scheduled for   March 19th can be a phone call instead? she canceled sooner appt due to   weather concerns but would like phone call to see if she actually needs to   come in for her weight concerns/wellness check. ---------------------------------------------------------------------------  --------------  Kathia PAYTON  What is the best way for the office to contact you? OK to leave message on   voicemail  Preferred Call Back Phone Number? [phone]  ---------------------------------------------------------------------------  --------------  SCRIPT ANSWERS  Relationship to Patient?  Self

## 2021-02-10 NOTE — TELEPHONE ENCOUNTER
We can do a virtual to discuss weight concerns but I can not bill a wellness without doing a physical exam.

## 2021-02-11 ENCOUNTER — TELEPHONE (OUTPATIENT)
Dept: FAMILY MEDICINE CLINIC | Age: 61
End: 2021-02-11

## 2021-02-11 NOTE — TELEPHONE ENCOUNTER
lmom for pt to call office for results. Pt also needs to be told We can do a virtual to discuss weight concerns but Camille Cohen can not bill a wellness without doing a physical exam she will need to come in to complete her physical exam as scheduled.

## 2021-02-11 NOTE — TELEPHONE ENCOUNTER
Overall her labs are fine. She had an elevated glucose level but she was not fasting for the blood work. Her BOY was positive which indicates inflammation in the body. Since she was having pain in her hands I will place a referral to rheumatology for further work up.

## 2021-02-11 NOTE — TELEPHONE ENCOUNTER
MD Alvina  [address] [facility] [address], [address]  Phone: [phone]    Please see other note

## 2021-02-15 NOTE — TELEPHONE ENCOUNTER
Patient is calling again about this. She said she is very concerned and would like a call back.    Please advise  [phone]

## 2021-02-22 ENCOUNTER — TELEPHONE (OUTPATIENT)
Dept: FAMILY MEDICINE CLINIC | Age: 61
End: 2021-02-22

## 2021-02-22 DIAGNOSIS — M79.641 PAIN IN BOTH HANDS: Primary | ICD-10-CM

## 2021-02-22 DIAGNOSIS — M79.642 PAIN IN BOTH HANDS: Primary | ICD-10-CM

## 2021-02-22 DIAGNOSIS — L98.9 PAINFUL SKIN LESION: ICD-10-CM

## 2021-02-22 RX ORDER — ACETAMINOPHEN AND CODEINE PHOSPHATE 300; 30 MG/1; MG/1
1 TABLET ORAL EVERY 4 HOURS PRN
Qty: 30 TABLET | Refills: 0 | Status: SHIPPED | OUTPATIENT
Start: 2021-02-22 | End: 2021-02-27

## 2021-02-22 NOTE — TELEPHONE ENCOUNTER
I sent her in tylenol with cindy. This will not be a long-term prescription. She should not mix any additional tylenol with this.

## 2021-03-04 ENCOUNTER — OFFICE VISIT (OUTPATIENT)
Dept: RHEUMATOLOGY | Age: 61
End: 2021-03-04
Payer: COMMERCIAL

## 2021-03-04 VITALS — WEIGHT: 223 LBS | TEMPERATURE: 97.1 F | BODY MASS INDEX: 38.07 KG/M2 | HEIGHT: 64 IN

## 2021-03-04 DIAGNOSIS — M79.641 PAIN IN BOTH HANDS: ICD-10-CM

## 2021-03-04 DIAGNOSIS — M79.642 PAIN IN BOTH HANDS: ICD-10-CM

## 2021-03-04 DIAGNOSIS — L28.1 PRURIGO NODULARIS: ICD-10-CM

## 2021-03-04 DIAGNOSIS — R76.8 POSITIVE ANA (ANTINUCLEAR ANTIBODY): Primary | ICD-10-CM

## 2021-03-04 DIAGNOSIS — R20.2 PARESTHESIA OF BOTH HANDS: ICD-10-CM

## 2021-03-04 PROCEDURE — G8417 CALC BMI ABV UP PARAM F/U: HCPCS | Performed by: INTERNAL MEDICINE

## 2021-03-04 PROCEDURE — 99204 OFFICE O/P NEW MOD 45 MIN: CPT | Performed by: INTERNAL MEDICINE

## 2021-03-04 PROCEDURE — G8427 DOCREV CUR MEDS BY ELIG CLIN: HCPCS | Performed by: INTERNAL MEDICINE

## 2021-03-04 PROCEDURE — G8484 FLU IMMUNIZE NO ADMIN: HCPCS | Performed by: INTERNAL MEDICINE

## 2021-03-04 RX ORDER — ARM BRACE
EACH MISCELLANEOUS
Qty: 2 EACH | Refills: 0 | Status: SHIPPED | OUTPATIENT
Start: 2021-03-04 | End: 2021-03-12

## 2021-03-04 NOTE — PATIENT INSTRUCTIONS
Patient Education        Carpal Tunnel Syndrome: Care Instructions  Overview     Carpal tunnel syndrome is numbness, tingling, weakness, and pain in your hand, wrist, and sometimes forearm. It is caused by pressure on the median nerve. This nerve and several tough tissues called tendons run through a space in the wrist. This space is called the carpal tunnel. The repeated hand motions used in work and some hobbies and sports can put pressure on the median nerve. Pregnancy can cause carpal tunnel syndrome. Several conditions, such as diabetes, arthritis, and an underactive thyroid, can also cause it. You may be able to limit an activity or change the way you do it to reduce your symptoms. You also can take other steps to feel better. If your symptoms are mild, 1 to 2 weeks of home treatment are likely to ease your pain. Surgery is needed only if other treatments do not work. Follow-up care is a key part of your treatment and safety. Be sure to make and go to all appointments, and call your doctor if you are having problems. It's also a good idea to know your test results and keep a list of the medicines you take. How can you care for yourself at home? · If possible, stop or reduce the activity that causes your symptoms. If you cannot stop the activity, take frequent breaks to rest and stretch or change hand positions to do a task. Try switching hands, such as when using a computer mouse. · Try to avoid bending or twisting your wrists. · Ask your doctor if you can take an over-the-counter pain medicine, such as acetaminophen (Tylenol), ibuprofen (Advil, Motrin), or naproxen (Aleve). Be safe with medicines. Read and follow all instructions on the label. · If your doctor prescribes corticosteroid medicine to help reduce pain and swelling, take it exactly as prescribed. Call your doctor if you think you are having a problem with your medicine.   · Put ice or a cold pack on your wrist for 10 to 20 minutes at a time to ease pain. Put a thin cloth between the ice and your skin. · If your doctor or your physical or occupational therapist tells you to wear a wrist splint, wear it as directed to keep your wrist in a neutral position. This also eases pressure on your median nerve. · Ask your doctor whether you should have physical or occupational therapy to learn how to do tasks differently. · Try a yoga class to stretch your muscles and build strength in your hands and wrists. Yoga has been shown to ease carpal tunnel symptoms. To prevent carpal tunnel  · When working at a [employer], keep your hands and wrists in line with your forearms. Hold your elbows close to your sides. Take a break every 10 to 15 minutes. · Try these exercises:  ? Warm up: Rotate your wrist up, down, and from side to side. Repeat this 4 times. Stretch your fingers far apart, relax them, then stretch them again. Repeat 4 times. Stretch your thumb by pulling it back gently, holding it, and then releasing it. Repeat 4 times. ? Prayer stretch: Start with your palms together in front of your chest just below your chin. Slowly lower your hands toward your waistline while keeping your hands close to your stomach and your palms together until you feel a mild to moderate stretch under your forearms. Hold for 10 to 20 seconds. Repeat 4 times. ? Wrist flexor stretch: Hold your arm in front of you with your palm up. Bend your wrist, pointing your hand toward the floor. With your other hand, gently bend your wrist further until you feel a mild to moderate stretch in your forearm. Hold for 10 to 20 seconds. Repeat 4 times. ? Wrist extensor stretch: Repeat the steps for the wrist flexor stretch, but begin with your extended hand palm down. · Squeeze a rubber exercise ball several times a day to keep your hands and fingers strong. · Avoid holding objects (such as a book) in one position for a long time. When possible, use your whole hand to grasp an object. Using just the thumb and index finger can put stress on the wrist.  · Do not smoke. It can make this condition worse by reducing blood flow to the median nerve. If you need help quitting, talk to your doctor about stop-smoking programs and medicines. These can increase your chances of quitting for good. When should you call for help? Watch closely for changes in your health, and be sure to contact your doctor if:    · Your pain or other problems do not get better with home care.     · You want more information about physical or occupational therapy.     · You have side effects of your corticosteroid medicine, such as:  ? Weight gain. ? Mood changes. ? Trouble sleeping. ? Bruising easily.     · You have any other problems with your medicine. Where can you learn more? Go to [email or web address] [email or web address] org and sign in to your [employer] account. Enter R432 in the [employer] box to learn more about \"Carpal Tunnel Syndrome: Care Instructions. \"     If you do not have an account, please click on the \"Sign Up Now\" link. Current as of: March 2, 2020               Content Version: 12.6  © 9471-6744 [employer]. Care instructions adapted under license by [facility] ([facility]). If you have questions about a medical condition or this instruction, always ask your healthcare professional. [address] any warranty or liability for your use of this information. Patient Education        Carpal Tunnel Syndrome: Exercises  Introduction  Here are some examples of exercises for you to try. The exercises may be suggested for a condition or for rehabilitation. Start each exercise slowly. Ease off the exercises if you start to have pain. You will be told when to start these exercises and which ones will work best for you. Warm-up stretches  When you no longer have pain or numbness, you can do exercises to help prevent carpal tunnel syndrome from coming back.  Do not do any stretch or movement that is uncomfortable or painful. 1. Rotate your wrist up, down, and from side to side. Repeat 4 times. 2. Stretch your fingers far apart. Relax them, and then stretch them again. Repeat 4 times. 3. Stretch your thumb by pulling it back gently, holding it, and then releasing it. Repeat 4 times. How to do the exercises  Prayer stretch   1. Start with your palms together in front of your chest just below your chin. 2. Slowly lower your hands toward your waistline, keeping your hands close to your stomach and your palms together until you feel a mild to moderate stretch under your forearms. 3. Hold for at least 15 to 30 seconds. Repeat 2 to 4 times. Wrist flexor stretch   1. Extend your arm in front of you with your palm up. 2. Bend your wrist, pointing your hand toward the floor. 3. With your other hand, gently bend your wrist farther until you feel a mild to moderate stretch in your forearm. 4. Hold for at least 15 to 30 seconds. Repeat 2 to 4 times. Wrist extensor stretch   1. Repeat steps 1 through 4 of the stretch above, but begin with your extended hand palm down. Follow-up care is a key part of your treatment and safety. Be sure to make and go to all appointments, and call your doctor if you are having problems. It's also a good idea to know your test results and keep a list of the medicines you take. Where can you learn more? Go to https://[employer]. org and sign in to your [employer] account. Enter W611 in the [facility] box to learn more about \"Carpal Tunnel Syndrome: Exercises. \"     If you do not have an account, please click on the \"Sign Up Now\" link. Current as of: March 2, 2020               Content Version: 12.6  © 8399-9407 [employer]. Care instructions adapted under license by [facility] ([facility]).  If you have questions about a medical condition or this instruction, always ask your healthcare professional. Richar Delacruz

## 2021-03-04 NOTE — PROGRESS NOTES
[address], [place]                                                           [address] [place] [address]                                                             [phone] (F) [phone] (F)      Dear Dr. Al Hernandez, APRN - CNP:  Please find Rheumatology assessment. Thank you for giving me the opportunity to be involved in Faustino Shankar's care and I look forward following Faustino along with you. If you have any questions or concerns please feel free to reach me. Note is transcribed using voice recognition software. Inadvertent computerized transcription errors may be present. Patient identification: Rajendra Dudley: 6/5/2038,41 y.o. Sex: female     Louann Mclaughlin was seen today for establish care. Diagnoses and all orders for this visit:    Positive BOY (antinuclear antibody)    Prurigo nodularis    Pain in both hands    Paresthesia of both hands    Other orders  -     Elastic Bandages & Supports (B & B CARPAL TUNNEL BRACE) MISC; Use in each wrist.        Low titer + BOY <1:80 without any symptoms of autoimmune rheumatic disorders. Reassured patient and implications of low titer BOY was discussed. DIP joint discomfort and bony swelling-from osteoarthritis-able to manage without any medications. Intermittent paresthesias in median nerve distribution-recommend using wrist brace at night. If symptoms are persistent, follow-up with primary care physician for EMG and nerve conduction testing. I also provided her exercises. Skin condition prurigo epmfxguun-uzudno-of with dermatology. Follow-up with me as needed. Patient indicates understanding and agrees with the management plan.   I reviewed patient's history, referral documents and electronic medical records. #######################################################################    REASON FOR CONSULTATION:  Patient is being seen at the request of  MELANIE Bullard - VANESA / MELANIE Pepper *for evaluation and management of hand pain and positive BOY. HISTORY OF PRESENT ILLNESS:  64 y.o. female has been experiencing intermittent pain, burning sensation in her hands as well as noticing swelling in her fingers-onset 3 to 6 months duration. Burning sensation is worse in her left hand-whole hand tingles and burns at times but not every day, symptoms are mild, manageable without any medications. What bothers her most is pruritic skin lesions which are painful at times, for which she is followed by dermatology. Work-up revealed positive BOY therefore she is sent here for further evaluation. No peripheral swollen or inflamed joints. No psoriasis, inflammatory bowel diseases or hidradenitis suppurativa. Pertinent ROS: Denies weight loss, objective fever, photosensitivity Raynauds, focal alopecia, recurrent ocular congestion, dry eyes or mouth, or mucosal ulcers, tinnitus or recent hearing loss. Denies chest pain, palpitations, cough, pleurisy, dysphagia, or features of inflammatory bowel diseases. No h/o blood clots or bleeding disorders. No renal or genitourinary problems. No focal weakness or persistent paresthesia. All other ROS are negative. PMH, PSH,Social history , Meds reviewed. FH- No Autoimmune Rheumatic problems     PHYSICAL EXAM:    Vitals:    Temp 97.1 °F (36.2 °C) (Skin)   Ht 5' 4\" (1.626 m)   Wt 223 lb (101.2 kg)   LMP 01/29/2015   BMI 38.28 kg/m²   AA)x3 well nourished, and well groomed, normal judgement. MKS: Other than bony swelling in the scattered DIP joints, and knees, has normal musculoskeletal examination in upper and lower extremity joints bilaterally, full range of motion all peripheral joints.   Normal gait and muscle strength in upper and lower extremities bilaterally. Tinel+ left hand. Phalens negative at wrist.      Skin: Multiple superficially ulcerated skin lesions in her right forearm. No other rashes, no induration or skin thickening or nodules. HEENT: Normal lids, lacrimal glands and pupils. No oral or nasal ulcers. Salivary glands reveal no evidence of abnormality. External inspection of the ears and nose within normal limits. Neck:  Chest: Normal effort  Heart:    Abdomen:   Lymph nodes:   Neurologic:         DATA:   Lab Results   Component Value Date    WBC 8.0 12/31/2019    HGB 14.0 12/31/2019    HCT 42.7 12/31/2019    MCV 87.0 12/31/2019     12/31/2019         Chemistry        Component Value Date/Time     02/05/2021 1512    K 3.9 02/05/2021 1512     02/05/2021 1512    CO2 24 02/05/2021 1512    BUN 17 02/05/2021 1512    CREATININE 0.8 02/05/2021 1512        Component Value Date/Time    CALCIUM 9.7 02/05/2021 1512    ALKPHOS 92 02/05/2021 1512    AST 33 02/05/2021 1512    ALT 48 (H) 02/05/2021 1512    BILITOT 0.3 02/05/2021 1512          Lab Results   Component Value Date    LABURIC 5.6 02/05/2021     No results found for: CRP  Lab Results   Component Value Date    BOY POSITIVE (A) 02/05/2021    SEDRATE 22 02/05/2021           Radiology Review:     A/P- See above.

## 2021-03-11 ENCOUNTER — TELEPHONE (OUTPATIENT)
Dept: FAMILY MEDICINE CLINIC | Age: 61
End: 2021-03-11

## 2021-03-11 NOTE — TELEPHONE ENCOUNTER
FYI: Pt added concern that bite came from poisonous spider, I instructed pt to go to hospital if she experienced nausea, cramping, sweating or chills along with swelling and pain around the area of the bite.  I told her otherwise to wait for appt tomorrow

## 2021-03-11 NOTE — TELEPHONE ENCOUNTER
Take Benadryl and apply hydrocortisone cream for tonight and have it look at at the appointment tomorrow.

## 2021-03-11 NOTE — TELEPHONE ENCOUNTER
-Pt requesting Advise/Call Back about possible Insect Bite.  -Symptoms are: Nausea, Red Welt, Painful under bra line for 1 day. Future PE appt- 3/12/21 with Nurse Ten Cramer.

## 2021-03-12 ENCOUNTER — TELEPHONE (OUTPATIENT)
Dept: FAMILY MEDICINE CLINIC | Age: 61
End: 2021-03-12

## 2021-03-12 ENCOUNTER — OFFICE VISIT (OUTPATIENT)
Dept: FAMILY MEDICINE CLINIC | Age: 61
End: 2021-03-12
Payer: COMMERCIAL

## 2021-03-12 VITALS
OXYGEN SATURATION: 97 % | DIASTOLIC BLOOD PRESSURE: 74 MMHG | BODY MASS INDEX: 36.65 KG/M2 | WEIGHT: 220 LBS | HEIGHT: 65 IN | HEART RATE: 72 BPM | TEMPERATURE: 97.5 F | SYSTOLIC BLOOD PRESSURE: 132 MMHG

## 2021-03-12 DIAGNOSIS — F41.9 ANXIETY: ICD-10-CM

## 2021-03-12 DIAGNOSIS — R73.9 HYPERGLYCEMIA: ICD-10-CM

## 2021-03-12 DIAGNOSIS — B37.2 SKIN YEAST INFECTION: ICD-10-CM

## 2021-03-12 DIAGNOSIS — Z13.220 SCREENING FOR HYPERLIPIDEMIA: ICD-10-CM

## 2021-03-12 DIAGNOSIS — E66.9 OBESITY (BMI 30-39.9): ICD-10-CM

## 2021-03-12 DIAGNOSIS — L28.1 PRURIGO NODULARIS: Primary | ICD-10-CM

## 2021-03-12 DIAGNOSIS — L23.89 ALLERGIC CONTACT DERMATITIS DUE TO OTHER AGENTS: ICD-10-CM

## 2021-03-12 DIAGNOSIS — F32.A DEPRESSION, UNSPECIFIED DEPRESSION TYPE: ICD-10-CM

## 2021-03-12 DIAGNOSIS — R53.83 FATIGUE, UNSPECIFIED TYPE: ICD-10-CM
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
## 2021-03-12 LAB
BASOPHILS ABSOLUTE: 0.1 K/UL (ref 0–0.2)
BASOPHILS RELATIVE PERCENT: 1 %
CHOLESTEROL, TOTAL: 252 MG/DL (ref 0–199)
EOSINOPHILS ABSOLUTE: 0.1 K/UL (ref 0–0.6)
EOSINOPHILS RELATIVE PERCENT: 2 %
FOLATE: 16.93 NG/ML (ref 4.78–24.2)
HCT VFR BLD CALC: 43.1 % (ref 36–48)
HDLC SERPL-MCNC: 54 MG/DL (ref 40–60)
HEMOGLOBIN: 14.4 G/DL (ref 12–16)
LDL CHOLESTEROL CALCULATED: 175 MG/DL
LYMPHOCYTES ABSOLUTE: 1.6 K/UL (ref 1–5.1)
LYMPHOCYTES RELATIVE PERCENT: 28 %
MCH RBC QN AUTO: 28.8 PG (ref 26–34)
MCHC RBC AUTO-ENTMCNC: 33.4 G/DL (ref 31–36)
MCV RBC AUTO: 86.2 FL (ref 80–100)
MONOCYTES ABSOLUTE: 0.4 K/UL (ref 0–1.3)
MONOCYTES RELATIVE PERCENT: 6.8 %
NEUTROPHILS ABSOLUTE: 3.5 K/UL (ref 1.7–7.7)
NEUTROPHILS RELATIVE PERCENT: 62.2 %
PDW BLD-RTO: 14.7 % (ref 12.4–15.4)
PLATELET # BLD: 222 K/UL (ref 135–450)
PMV BLD AUTO: 8.1 FL (ref 5–10.5)
RBC # BLD: 5 M/UL (ref 4–5.2)
TRIGL SERPL-MCNC: 113 MG/DL (ref 0–150)
VITAMIN B-12: 662 PG/ML (ref 211–911)
VLDLC SERPL CALC-MCNC: 23 MG/DL
WBC # BLD: 5.7 K/UL (ref 4–11)

## 2021-03-12 PROCEDURE — 1036F TOBACCO NON-USER: CPT | Performed by: REGISTERED NURSE

## 2021-03-12 PROCEDURE — 36415 COLL VENOUS BLD VENIPUNCTURE: CPT | Performed by: REGISTERED NURSE

## 2021-03-12 PROCEDURE — 96372 THER/PROPH/DIAG INJ SC/IM: CPT | Performed by: REGISTERED NURSE

## 2021-03-12 PROCEDURE — 3017F COLORECTAL CA SCREEN DOC REV: CPT | Performed by: REGISTERED NURSE

## 2021-03-12 PROCEDURE — G8417 CALC BMI ABV UP PARAM F/U: HCPCS | Performed by: REGISTERED NURSE

## 2021-03-12 PROCEDURE — G8482 FLU IMMUNIZE ORDER/ADMIN: HCPCS | Performed by: REGISTERED NURSE

## 2021-03-12 PROCEDURE — 99214 OFFICE O/P EST MOD 30 MIN: CPT | Performed by: REGISTERED NURSE

## 2021-03-12 PROCEDURE — G8427 DOCREV CUR MEDS BY ELIG CLIN: HCPCS | Performed by: REGISTERED NURSE

## 2021-03-12 RX ORDER — DULOXETIN HYDROCHLORIDE 30 MG/1
30 CAPSULE, DELAYED RELEASE ORAL DAILY
Qty: 30 CAPSULE | Refills: 3 | Status: SHIPPED | OUTPATIENT
Start: 2021-03-12 | End: 2021-06-23

## 2021-03-12 RX ORDER — CYANOCOBALAMIN 1000 UG/ML
1000 INJECTION INTRAMUSCULAR; SUBCUTANEOUS ONCE
Status: COMPLETED | OUTPATIENT
Start: 2021-03-12 | End: 2021-03-12

## 2021-03-12 RX ORDER — DIPHENHYDRAMINE HCL 12.5 MG
12.5 TABLET,CHEWABLE ORAL 4 TIMES DAILY PRN
Qty: 30 TABLET | Refills: 1 | Status: SHIPPED | OUTPATIENT
Start: 2021-03-12 | End: 2021-06-23

## 2021-03-12 RX ORDER — CAMPHOR 0.45 %
GEL (GRAM) TOPICAL
Qty: 28 G | Refills: 5 | Status: SHIPPED | OUTPATIENT
Start: 2021-03-12 | End: 2021-06-23 | Stop reason: SDUPTHER

## 2021-03-12 RX ORDER — CHOLECALCIFEROL (VITAMIN D3) 50 MCG
2000 TABLET ORAL DAILY
Qty: 30 TABLET | Refills: 3 | Status: SHIPPED | OUTPATIENT
Start: 2021-03-12 | End: 2021-06-17 | Stop reason: SDUPTHER

## 2021-03-12 RX ADMIN — CYANOCOBALAMIN 1000 MCG: 1000 INJECTION INTRAMUSCULAR; SUBCUTANEOUS at 11:04

## 2021-03-12 ASSESSMENT — ENCOUNTER SYMPTOMS
CONSTIPATION: 0
ALLERGIC/IMMUNOLOGIC NEGATIVE: 1
TROUBLE SWALLOWING: 0
NAUSEA: 1
COLOR CHANGE: 0
RHINORRHEA: 0
DIARRHEA: 0
COUGH: 0
SHORTNESS OF BREATH: 0
PHOTOPHOBIA: 0
ABDOMINAL PAIN: 0
SORE THROAT: 0

## 2021-03-12 NOTE — TELEPHONE ENCOUNTER
Patient called with two questions regarding her visit today. Can she take the vitamin D in addition to a multivitamin? And she said at one time she was diagnosed bipolar, would it be ok for her to still take DULoxetine. ?     Please advise  [phone]

## 2021-03-12 NOTE — PATIENT INSTRUCTIONS
- an  would need to evaluate your home if you are concerned with insects/spiders in your home. Https://gettheshot. coronavirus. [place].gov/    Patient Education        Learning About Eating More Fruits and Vegetables  What are some quick tips for eating more fruits and vegetables? We're all encouraged to eat more fruits and vegetables. Yet it can seem like one more chore on the daily to-do list. But you can add color and crunch to your meals--and lots of nutrition--with these quick tips. · Brighten up your breakfast.  ? Add sliced fruit or frozen berries to your yogurt, pancakes, or cereal.  ? Blend fresh or frozen fruit, veggies, and yogurt with a little fruit juice, and you've got a tasty smoothie. ? Make your scrambled eggs a gourmet treat by adding onions, celery, and bell peppers. ? Bake up some bran muffins with grated carrots added into the mix. · Make a livelier lunch. ? Jazz up tuna or chicken salad with apple chunks, celery, or grapes--or all of them! ? Add cucumbers, avocado slices, tomatoes, and lettuce to your sandwiches. ? Kick up the flavor of grilled cheese sandwiches with spinach and tomatoes. ? Puree some potatoes or squash to add to tomato soup. · Add delicious veggies to dinner. ? Give more color and taste to salads. Stir in red cabbage, carrots, and bell peppers. Top salads with dried cranberries or raisins. \"Frost\" your salad with orange sections or strawberries. ? Keep a bag or two of frozen vegetables ready to pull out of the freezer for a side dish. ? Spice up spaghetti and meatballs with mushrooms and bell peppers. ? Roast vegetables like cauliflower or squash in the oven with olive oil to bring out their flavor. ? Season your veggie dish with herbs like basil and jeff and a splash of lemon juice and olive oil. ? If you've got a main dish in the oven, stick in a potato to round out your meal.  · Grab some healthy snacks on the go.   ? Scoop up an apple, banana, or plum for a quick snack. ? Cut up raw fruits and veggies to keep in your fridge. Grapes, oranges, carrots, and celery are great choices. They'll be ready for a quick snack or an after-school treat. ? Dip raw vegetables in hummus or peanut butter. ? Keep dried fruit on hand for an easy \"take with you\" snack. · Make something sweet--and healthy. ? Try baked apples or pears topped with cinnamon and honey for a delicious dessert. ? Make chocolate chip cookies even better with grated carrots added to the mix. Where can you learn more? Go to https://[employer] GrouppeLookwider.Mobifusion. org and sign in to your CarJump account. Enter F050 in the [employer] box to learn more about \"Learning About Eating More Fruits and Vegetables. \"     If you do not have an account, please click on the \"Sign Up Now\" link. Current as of: December 17, 2020               Content Version: 12.8  © 2006-2021 [employer]. Care instructions adapted under license by [employer]. If you have questions about a medical condition or this instruction, always ask your healthcare professional. [place] [address] any warranty or liability for your use of this information. Patient Education        Eating Healthy Foods: Care Instructions  Your Care Instructions     Eating healthy foods can help lower your risk for disease. Healthy food gives you energy and keeps your heart strong, your brain active, your muscles working, and your bones strong. A healthy diet includes a variety of foods from the basic food groups: grains, vegetables, fruits, milk and milk products, and meat and beans. Some people may eat more of their favorite foods from only one food group and, as a result, miss getting the nutrients they need. So, it is important to pay attention not only to what you eat but also to what you are missing from your diet.  You can eat a healthy, balanced diet by making a few small changes. Follow-up care is a key part of your treatment and safety. Be sure to make and go to all appointments, and call your doctor if you are having problems. It's also a good idea to know your test results and keep a list of the medicines you take. How can you care for yourself at home? Look at what you eat  · Keep a food diary for a week or two and record everything you eat or drink. Track the number of servings you eat from each food group. · For a balanced diet every day, eat a variety of:  ? 6 or more ounce-equivalents of grains, such as cereals, breads, crackers, rice, or pasta, every day. An ounce-equivalent is 1 slice of bread, 1 cup of ready-to-eat cereal, or ½ cup of cooked rice, cooked pasta, or cooked cereal.  ? 2½ cups of vegetables, especially:  § Dark-green vegetables such as broccoli and spinach. § Orange vegetables such as carrots and sweet potatoes. § Dry beans (such as hurtado and kidney beans) and peas (such as lentils). ? 2 cups of fresh, frozen, or canned fruit. A small apple or 1 banana or orange equals 1 cup. ? 3 cups of nonfat or low-fat milk, yogurt, or other milk products. ? 5½ ounces of meat and beans, such as chicken, fish, lean meat, beans, nuts, and seeds. One egg, 1 tablespoon of peanut butter, ½ ounce nuts or seeds, or ¼ cup of cooked beans equals 1 ounce of meat. · Learn how to read food labels for serving sizes and ingredients. Fast-food and convenience-food meals often contain few or no fruits or vegetables. Make sure you eat some fruits and vegetables to make the meal more nutritious. · Look at your food diary. For each food group, add up what you have eaten and then divide the total by the number of days. This will give you an idea of how much you are eating from each food group. See if you can find some ways to change your diet to make it more healthy. Start small  · Do not try to make dramatic changes to your diet all at once.  You might feel that you are missing out on your favorite foods and then be more likely to fail. · Start slowly, and gradually change your habits. Try some of the following:  ? Use whole wheat bread instead of white bread. ? Use nonfat or low-fat milk instead of whole milk. ? Eat brown rice instead of white rice, and eat whole wheat pasta instead of white-flour pasta. ? Try low-fat cheeses and low-fat yogurt. ? Add more fruits and vegetables to meals and have them for snacks. ? Add lettuce, tomato, cucumber, and onion to sandwiches. ? Add fruit to yogurt and cereal.  Enjoy food  · You can still eat your favorite foods. You just may need to eat less of them. If your favorite foods are high in fat, salt, and sugar, limit how often you eat them, but do not cut them out entirely. · Eat a wide variety of foods. Make healthy choices when eating out  · The type of restaurant you choose can help you make healthy choices. Even fast-food chains are now offering more low-fat or healthier choices on the menu. · Choose smaller portions, or take half of your meal home. · When eating out, try:  ? A veggie pizza with a whole wheat crust or grilled chicken (instead of sausage or pepperoni). ? Pasta with roasted vegetables, grilled chicken, or marinara sauce instead of cream sauce. ? A vegetable wrap or grilled chicken wrap. ? Broiled or poached food instead of fried or breaded items. Make healthy choices easy  · Buy packaged, prewashed, ready-to-eat fresh vegetables and fruits, such as baby carrots, salad mixes, and chopped or shredded broccoli and cauliflower. · Buy packaged, presliced fruits, such as melon or pineapple. · Choose 100% fruit or vegetable juice instead of soda. Limit juice intake to 4 to 6 oz (½ to ¾ cup) a day. · Blend low-fat yogurt, fruit juice, and canned or frozen fruit to make a smoothie for breakfast or a snack. Where can you learn more? Go to https://astrid.[employer]inalytics [employer].. org and sign in to your SMATOOS account.  Enter T756 in the [facility] box to learn more about \"Eating Healthy Foods: Care Instructions. \"     If you do not have an account, please click on the \"Sign Up Now\" link. Current as of: December 17, 2020               Content Version: 12.8  © 2006-2021 Healthwise, Incorporated. Care instructions adapted under license by [facility] ([facility]). If you have questions about a medical condition or this instruction, always ask your healthcare professional. [place] [address] any warranty or liability for your use of this information. Patient Education        7 Tips for Eating Healthy When [facility] All the Time    Make quick meals on busy nights. Try recipes that are simple to make and easy to clean up, like pasta, soups, or casseroles. These dishes can often be made ahead of time and are easy to reheat when you're short on time. Buy foods that last.  Choose fresh foods, such as onions, garlic, and potatoes. You can also reach for frozen, canned, and dried foods. Foods like these last and can help you pull a healthy meal together quickly. [place] something new. Try checking out cookbooks from [employer]. Or search for new recipes online. You can also change the ingredients in an old favorite recipe. Or switch the seasonings to create something new. Try theme nights. Try \"Taco Tuesday. \" Do \"Meatless Monday\" for a vegetarian meal each week. And save \"Leftovers Night\" for days when you don't want to cook. Let your favorite dishes guide you. Then make them again every few weeks. [place] with a friend. Maybe you know someone who's feeling the same way about healthy eating. Pick a recipe and schedule a night to make it \"together. \" You can also video call while you cook or eat. Share food with other people. If you like cooking and baking, you can share what you've made. Split up what you've made and keep only what you want to eat.  You can wrap up the rest to share with a friend, neighbor, or family member. Aim for balance, variety, and moderation. On most days, eat from each food group--grains, protein foods, vegetables, fruits, and dairy. And choose different foods from each group. For example, if you often eat apples, try reaching for a banana instead. All foods, if you eat them in moderation, can be part of healthy eating. Current as of: December 17, 2020               Content Version: 12.8  © 2006-2021 Healthwise, Incorporated. Care instructions adapted under license by [facility] ([facility]). If you have questions about a medical condition or this instruction, always ask your healthcare professional. [place] [address] any warranty or liability for your use of this information.

## 2021-03-12 NOTE — PROGRESS NOTES
Patient: Russ Libman is a 64 y.o. female who presents today with the following Chief Complaint(s):  Chief Complaint   Patient presents with    Annual Exam    Weight Management    Insect Bite     under left breast, red with swelling,pt stated having nasuea and sweating with dizziness       HPI:   She woke up Thursday morning with burning under her left breast which she thought was due to an insect bite. She says she has been nauseated and has been sweating. She has not tried benadryl or hydrocortisone. She is very concerned about spider bites or bed bugs. Patient says she does not know if the lesions and nodules on skin are due to her prurigo nodularis or insect bites. She says that she has been told that she should have an  come out to her home to evaluate. She has has a skin condition that she sees dermatology for. Prurigo nodularis-patient was referred back to dermatology but has not followed up with them. Encourage patient to make this appointment for follow-up and treatment plan. Patient would like to discuss weight loss options. Patient appears anxious and thoughts are tangential throughout visit. Current Outpatient Medications   Medication Sig Dispense Refill    diphenhydrAMINE-zinc acetate (BENADRYL ITCH STOPPING) 1-0.1 % cream Apply topically 3 times daily as needed. 28 g 5    DULoxetine (CYMBALTA) 30 MG extended release capsule Take 1 capsule by mouth daily 30 capsule 3    diphenhydrAMINE (BENADRYL ALLERGY CHILDRENS) 12.5 MG chewable tablet Take 1 tablet by mouth 4 times daily as needed for Allergies 30 tablet 1    vitamin D (CHOLECALCIFEROL) 50 MCG (2000 UT) TABS tablet Take 1 tablet by mouth daily 30 tablet 3    miconazole (MICOTIN) 2 % cream Apply topically 2 times daily. For 2 weeks 1 Tube 0    triamcinolone (KENALOG) 0.1 % cream Apply topically to affected areas twice a day for no longer than two weeks at a time.       ARIPiprazole (ABILIFY) 10 MG tablet TAKE ONE AND ONE-HALF TABLETS BY MOUTH TWICE DAILY 90 tablet 5    Elastic Bandages & Supports (B & B CARPAL TUNNEL BRACE) MISC Use in each wrist. 2 each 0     No current facility-administered medications for this visit. Patient's past medical history, surgical history, family history, medications,  and allergies  were all reviewed and updated as appropriate today. Patient Active Problem List   Diagnosis    Hemorrhoids    Urinary, incontinence, stress female    Obesity (BMI 30-39. 9)    Dyspareunia in female    History of tubal ligation    Uterine fibroid    Menopause    Elevated transaminase level    Yeast vaginitis     Past Medical History:   Diagnosis Date    Anxiety     Bipolar 1 disorder (HCC)     Depression     Irritable bowel syndrome     Obesity     Overactive bladder     Stress incontinence     Urinary incontinence       Past Surgical History:   Procedure Laterality Date    COLONOSCOPY  2010    polyp removed    COLONOSCOPY  2018    EYE SURGERY Left     Lazy eye    TONSILLECTOMY      TUBAL LIGATION  1994       Family History   Problem Relation Age of Onset    Heart Disease Mother     Diabetes Mother     Arthritis Father     Arthritis Brother     Diabetes Maternal Grandfather       Allergies   Allergen Reactions    Bactrim [Sulfamethoxazole-Trimethoprim]      Rash and trouble breathing         Review of Systems   Constitutional: Positive for fatigue. Negative for appetite change and fever. HENT: Negative for congestion, postnasal drip, rhinorrhea, sore throat and trouble swallowing. Eyes: Negative for photophobia and visual disturbance. Respiratory: Negative for cough and shortness of breath. Cardiovascular: Negative for chest pain and palpitations. Gastrointestinal: Positive for nausea. Negative for abdominal pain, constipation and diarrhea. Endocrine: Negative for polydipsia, polyphagia and polyuria. Genitourinary: Negative for difficulty urinating. Skin: Positive for rash ( Red area under left breast). Negative for color change, pallor and wound. Callus on right great toe. Encouraged patient to follow-up with podiatry. Allergic/Immunologic: Negative. Neurological: Negative for dizziness and light-headedness. Hematological: Negative for adenopathy. Does not bruise/bleed easily. Psychiatric/Behavioral: Negative for dysphoric mood, self-injury, sleep disturbance and suicidal ideas. The patient is nervous/anxious. Vitals:    03/12/21 0815   BP: 132/74   Pulse: 72   Temp: 97.5 °F (36.4 °C)   TempSrc: Temporal   SpO2: 97%   Weight: 220 lb (99.8 kg)   Height: 5' 4.5\" (1.638 m)     Physical Exam  Vitals signs and nursing note reviewed. Constitutional:       General: She is not in acute distress. Appearance: Normal appearance. She is obese. HENT:      Head: Normocephalic and atraumatic. Right Ear: Tympanic membrane, ear canal and external ear normal. There is no impacted cerumen. Left Ear: Tympanic membrane, ear canal and external ear normal. There is no impacted cerumen. Nose: Nose normal. No congestion or rhinorrhea. Mouth/Throat:      Mouth: Mucous membranes are moist.      Pharynx: Oropharynx is clear. No oropharyngeal exudate or posterior oropharyngeal erythema. Eyes:      General:         Right eye: No discharge. Left eye: No discharge. Extraocular Movements: Extraocular movements intact. Conjunctiva/sclera: Conjunctivae normal.      Pupils: Pupils are equal, round, and reactive to light. Neck:      Musculoskeletal: Normal range of motion and neck supple. Cardiovascular:      Rate and Rhythm: Normal rate and regular rhythm. Pulses: Normal pulses. Heart sounds: Normal heart sounds. No murmur. No friction rub. No gallop. Pulmonary:      Effort: Pulmonary effort is normal.      Breath sounds: Normal breath sounds. No stridor. No wheezing, rhonchi or rales.    Abdominal: General: There is no distension. Palpations: Abdomen is soft. There is no mass. Tenderness: There is no abdominal tenderness. There is no guarding or rebound. Hernia: No hernia is present. Musculoskeletal: Normal range of motion. General: No swelling. Right lower leg: No edema. Left lower leg: No edema. Lymphadenopathy:      Cervical: No cervical adenopathy. Skin:     General: Skin is warm and dry. Capillary Refill: Capillary refill takes less than 2 seconds. Coloration: Skin is not jaundiced or pale. Findings: Rash ( Erythematous rash under left breast-patient reports this burns) present. No erythema. Neurological:      General: No focal deficit present. Mental Status: She is alert and oriented to person, place, and time. Mental status is at baseline. Motor: No weakness. Psychiatric:         Mood and Affect: Mood normal.         Behavior: Behavior normal.         Thought Content: Thought content normal.         Judgment: Judgment normal.         Assessment/Plan:  1. Bug bite, subsequent encounter  Strongly encourage patient to have her home evaluated for insect/biters due to her concerns. - diphenhydrAMINE-zinc acetate (BENADRYL ITCH STOPPING) 1-0.1 % cream; Apply topically 3 times daily as needed. Dispense: 28 g; Refill: 5    2. Prurigo nodularis  Instructed patient to follow-up with Derm. Discussed use of duloxetine for help with pain due to patient complaining of pain related to her skin condition.  - DULoxetine (CYMBALTA) 30 MG extended release capsule; Take 1 capsule by mouth daily  Dispense: 30 capsule; Refill: 3    3. Allergic contact dermatitis due to other agents    - diphenhydrAMINE-zinc acetate (BENADRYL ITCH STOPPING) 1-0.1 % cream; Apply topically 3 times daily as needed. Dispense: 28 g; Refill: 5  - diphenhydrAMINE (BENADRYL ALLERGY CHILDRENS) 12.5 MG chewable tablet;  Take 1 tablet by mouth 4 times daily as needed for Allergies

## 2021-03-13 LAB
ESTIMATED AVERAGE GLUCOSE: 108.3 MG/DL
HBA1C MFR BLD: 5.4 %

## 2021-03-15 ENCOUNTER — TELEPHONE (OUTPATIENT)
Dept: FAMILY MEDICINE CLINIC | Age: 61
End: 2021-03-15

## 2021-03-15 NOTE — TELEPHONE ENCOUNTER
Not all anti-depressants are the same. I would like for her to try the cymbalta and let me know if she doesn't tolerate it.

## 2021-03-15 NOTE — TELEPHONE ENCOUNTER
I called pt regarding son Avi Shrestha in a separate encounter, Pt asked what Gaetano Westonshorn had said about her medication. I checked her encounters and saw this had been discussed with Martha Aguirre. Pt states she cannot take anti-depressents because they make her sick and she requests an alternative. Pt also asked for her lab results regarding VD and B12, I was able to inform pt they were normal, however her cholesterol and LDL were elevated and it was recommended that she adjust diet and exercise. Eating healthy fats, limiting carbs and avoiding fried food.

## 2021-03-22 ENCOUNTER — TELEPHONE (OUTPATIENT)
Dept: FAMILY MEDICINE CLINIC | Age: 61
End: 2021-03-22

## 2021-03-22 NOTE — TELEPHONE ENCOUNTER
Patient called to inform the office that  diphenhydrAMINE-zinc acetate (BENADRYL ITCH STOPPING) 1-0.1 % cream is working well. She was informed from the pharmacist that it comes in a jar (454 grams). She would like to know if rx for that can be sent in to the pharmacy.       Next office visit   Visit date not found  PE on 3/12/21 with Flori Cornell

## 2021-03-22 NOTE — TELEPHONE ENCOUNTER
3/22/21- Patient Called back today stating Please Disregard Refill. Pt states that pharmacy does not carry large jar of medication so Pt states she will continue with current script.

## 2021-03-25 ENCOUNTER — TELEPHONE (OUTPATIENT)
Dept: FAMILY MEDICINE CLINIC | Age: 61
End: 2021-03-25

## 2021-03-25 NOTE — TELEPHONE ENCOUNTER
-Pt wants VANESA Dulce to know that the OTC Equate Cream topical extra strength is working very well for her skin condition.  -Last Seen with Dulce 3/12/21.

## 2021-03-30 DIAGNOSIS — B37.2 SKIN YEAST INFECTION: ICD-10-CM

## 2021-04-07 ENCOUNTER — TELEPHONE (OUTPATIENT)
Dept: OBGYN CLINIC | Age: 61
End: 2021-04-07

## 2021-04-07 RX ORDER — NYSTATIN 100000 [USP'U]/G
POWDER TOPICAL
Qty: 60 BOTTLE | Refills: 1 | Status: SHIPPED | OUTPATIENT
Start: 2021-04-07 | End: 2021-05-24

## 2021-04-07 NOTE — TELEPHONE ENCOUNTER
Patient is calling because she would like to request a refill of a cream and powder Dr. Zachariah Case has prescribed in the past for a rash underneath her breast.  The patient said that she's had this issue before and believes it was mostly likely caused by sweat from working in the yard over the weekend. She would like the prescriptions sent to [facility] in [place] once approved.

## 2021-04-08 ENCOUNTER — TELEPHONE (OUTPATIENT)
Dept: OBGYN CLINIC | Age: 61
End: 2021-04-08

## 2021-04-08 NOTE — TELEPHONE ENCOUNTER
Patient returned call and stated she is ok with the powder, however she thought she was going to get a cream as well. Wanted me to ask if you would send one in. Stated \" I dont remember the name of it, but I could really use it too with the powder. \"    Please advise.      Routing to Dr. Angelia Garcia

## 2021-04-12 ENCOUNTER — TELEPHONE (OUTPATIENT)
Dept: FAMILY MEDICINE CLINIC | Age: 61
End: 2021-04-12

## 2021-04-12 RX ORDER — NYSTATIN 100000 [USP'U]/G
POWDER TOPICAL
Qty: 60 G | Refills: 1 | Status: SHIPPED | OUTPATIENT
Start: 2021-04-12 | End: 2021-06-23

## 2021-04-12 NOTE — TELEPHONE ENCOUNTER
Patient is req a RX for Nystatin, she usually gets this from her obgyn which she picked up  refill  last week. She is needing more and her obgyn cannot prescribe another refill this soon, pharmacy told her if she had her PCP call it in at 2x a day that Ins would cover it. If she cannot get a refill she is looking for something similar over the counter. The medication is working great for her rash.        Please advise [phone]

## 2021-04-13 NOTE — TELEPHONE ENCOUNTER
Patient returned call to office stated there was no cream ever prescribed by you and she got it from her PCP.      Routing to Dr. Maria Elena Gar as Patience Crocker

## 2021-05-21 ENCOUNTER — TELEPHONE (OUTPATIENT)
Dept: FAMILY MEDICINE CLINIC | Age: 61
End: 2021-05-21

## 2021-05-21 RX ORDER — NYSTATIN 100000 [USP'U]/G
POWDER TOPICAL
Qty: 30 G | Refills: 0 | Status: SHIPPED | OUTPATIENT
Start: 2021-05-21 | End: 2021-05-24

## 2021-05-21 NOTE — TELEPHONE ENCOUNTER
I spoke to Ciro Ward at the provided number, no PA required if ordered as 60g 30 day supply, apply 2 times daily

## 2021-05-24 RX ORDER — NYSTATIN 100000 [USP'U]/G
POWDER TOPICAL
Qty: 60 BOTTLE | Refills: 1 | Status: SHIPPED | OUTPATIENT
Start: 2021-05-24 | End: 2021-06-23 | Stop reason: SDUPTHER

## 2021-05-24 NOTE — TELEPHONE ENCOUNTER
[facility] therapeutic change. Please send a 60 gram size.  30 grams was sent in on 5/21/21    Note from 5/21/21 states no PA needed if applied 2 times daily

## 2021-06-04 ENCOUNTER — TELEPHONE (OUTPATIENT)
Dept: FAMILY MEDICINE CLINIC | Age: 61
End: 2021-06-04

## 2021-06-04 ENCOUNTER — VIRTUAL VISIT (OUTPATIENT)
Dept: FAMILY MEDICINE CLINIC | Age: 61
End: 2021-06-04
Payer: COMMERCIAL

## 2021-06-04 DIAGNOSIS — M19.90 ARTHRITIS: Primary | ICD-10-CM

## 2021-06-04 PROCEDURE — 99213 OFFICE O/P EST LOW 20 MIN: CPT | Performed by: NURSE PRACTITIONER

## 2021-06-04 PROCEDURE — 3017F COLORECTAL CA SCREEN DOC REV: CPT | Performed by: NURSE PRACTITIONER

## 2021-06-04 PROCEDURE — G8427 DOCREV CUR MEDS BY ELIG CLIN: HCPCS | Performed by: NURSE PRACTITIONER

## 2021-06-04 PROCEDURE — 1036F TOBACCO NON-USER: CPT | Performed by: NURSE PRACTITIONER

## 2021-06-04 PROCEDURE — G8417 CALC BMI ABV UP PARAM F/U: HCPCS | Performed by: NURSE PRACTITIONER

## 2021-06-04 RX ORDER — IBUPROFEN 600 MG/1
600 TABLET ORAL 3 TIMES DAILY PRN
Qty: 90 TABLET | Refills: 0 | Status: SHIPPED | OUTPATIENT
Start: 2021-06-04 | End: 2021-11-08

## 2021-06-04 NOTE — PROGRESS NOTES
Patient: Guerline Katz is a 64 y.o. female who presents today with the following Chief Complaint(s):  Chief Complaint   Patient presents with    Swelling     hands, fingers, painful, tightness, blue colored   Consented to a virtual visit today      HPI-this is a 57-year-old female patient of Jessica Gonzalez with complaints of bilateral hand swelling and pain. She feels a tightness in her hands. She did have an appointment with rheumatology Dr. Jennifer Langley and she thought it was carpal tunnel. She does have a family history of arthritis and I am thinking it is more arthritic in nature. I am prescribing prescription strength ibuprofen for her to take and to follow-up with Dr. Brett Chandra. The only thing she has been using is Tylenol and this is not helping much she states  Current Outpatient Medications   Medication Sig Dispense Refill    ibuprofen (ADVIL;MOTRIN) 600 MG tablet Take 1 tablet by mouth 3 times daily as needed for Pain 90 tablet 0    nystatin (MYCOSTATIN) 491314 UNIT/GM powder Apply 2 times daily. 60 Bottle 1    nystatin (MYCOSTATIN) 240252 UNIT/GM powder Apply 3 times daily. 60 g 1    miconazole (MICATIN) 2 % cream APPLY  CREAM TWICE DAILY FOR 2 WEEKS 28 g 0    diphenhydrAMINE-zinc acetate (BENADRYL ITCH STOPPING) 1-0.1 % cream Apply topically 3 times daily as needed. 28 g 5    DULoxetine (CYMBALTA) 30 MG extended release capsule Take 1 capsule by mouth daily 30 capsule 3    diphenhydrAMINE (BENADRYL ALLERGY CHILDRENS) 12.5 MG chewable tablet Take 1 tablet by mouth 4 times daily as needed for Allergies 30 tablet 1    vitamin D (CHOLECALCIFEROL) 50 MCG (2000 UT) TABS tablet Take 1 tablet by mouth daily 30 tablet 3    triamcinolone (KENALOG) 0.1 % cream Apply topically to affected areas twice a day for no longer than two weeks at a time.       ARIPiprazole (ABILIFY) 10 MG tablet TAKE ONE AND ONE-HALF TABLETS BY MOUTH TWICE DAILY 90 tablet 5     No current facility-administered medications for this visit. Patient's past medical history, surgical history, family history, medications,  and allergies  were all reviewed and updated as appropriate today. Review of Systems   Musculoskeletal: Positive for arthralgias (Bilateral hands). All other systems reviewed and are negative. Physical Exam  Nursing note reviewed. HENT:      Head: Normocephalic. Nose: Nose normal.      Mouth/Throat:      Mouth: Mucous membranes are moist.   Eyes:      Conjunctiva/sclera: Conjunctivae normal.   Pulmonary:      Effort: Pulmonary effort is normal.   Musculoskeletal:         General: Normal range of motion. Skin:     General: Skin is dry. Neurological:      Mental Status: She is alert and oriented to person, place, and time. Psychiatric:         Mood and Affect: Mood normal.         Behavior: Behavior normal.         Thought Content: Thought content normal.         Judgment: Judgment normal.       There were no vitals filed for this visit. Assessment:  Encounter Diagnosis   Name Primary?  Arthritis Yes       Controlled SubstancesMonitoring:  N/A    Plan:  1. Arthritis  Problem  - ibuprofen (ADVIL;MOTRIN) 600 MG tablet; Take 1 tablet by mouth 3 times daily as needed for Pain  Dispense: 90 tablet; Refill: 0  Call Dr. Magda Helm for follow-up   Use ice to the hands as needed elevate for the swelling      Onur Wolf, was evaluated through a synchronous (real-time) audio-video encounter. The patient (or guardian if applicable) is aware that this is a billable service. Verbal consent to proceed has been obtained within the past 12 months. The visit was conducted pursuant to the emergency declaration under the [facility] [address] authority and the [employer] and [employer] [place] [employer] General Act. Patient identification was verified, and a caregiver was present when appropriate.  The patient was located in a state where the provider was credentialed to provide care. Total time spent for this encounter: 20 min    --MELANIE Agrawal CNP on 6/4/2021 at 4:13 PM    An electronic signature was used to authenticate this note. MELANIE Agrawal CNP, MIROSLAVA    Reviewed treatment plan with patient. Patient verbalized understanding to treatment plan and questions were answered. [address]  [place], [address]

## 2021-06-11 ENCOUNTER — TELEPHONE (OUTPATIENT)
Dept: FAMILY MEDICINE CLINIC | Age: 61
End: 2021-06-11

## 2021-06-11 NOTE — TELEPHONE ENCOUNTER
Call from patient stating that her current psychiatrist is moving their office further away. Patient stated it is to far to travel. Patient is requesting a referral to Dr. Sara Sanchez or another Physiatrist in this area of town for her and he son, Mago Baron. Please advise.

## 2021-06-17 ENCOUNTER — TELEPHONE (OUTPATIENT)
Dept: FAMILY MEDICINE CLINIC | Age: 61
End: 2021-06-17

## 2021-06-17 DIAGNOSIS — R53.83 FATIGUE, UNSPECIFIED TYPE: ICD-10-CM

## 2021-06-17 RX ORDER — CHOLECALCIFEROL (VITAMIN D3) 50 MCG
2000 TABLET ORAL DAILY
Qty: 30 TABLET | Refills: 3 | Status: SHIPPED | OUTPATIENT
Start: 2021-06-17 | End: 2021-06-23

## 2021-06-17 NOTE — TELEPHONE ENCOUNTER
Patient is calling for a refill request on her vitamin D (CHOLECALCIFEROL) 50 MCG (2000 UT) TABS tablet.        LAST OV:6/4/21  NEXT OV:6/23/21    Pharmacy is confirmed in chart     Please call Michael Parra back if there are any questions [phone]

## 2021-06-21 ENCOUNTER — HOSPITAL ENCOUNTER (EMERGENCY)
Age: 61
Discharge: LEFT BEFORE DISCHARGE | End: 2021-06-21
Payer: MEDICARE

## 2021-06-21 VITALS
OXYGEN SATURATION: 97 % | DIASTOLIC BLOOD PRESSURE: 93 MMHG | HEART RATE: 77 BPM | TEMPERATURE: 98.2 F | WEIGHT: 230 LBS | RESPIRATION RATE: 18 BRPM | BODY MASS INDEX: 38.87 KG/M2 | SYSTOLIC BLOOD PRESSURE: 167 MMHG

## 2021-06-21 ASSESSMENT — PAIN SCALES - GENERAL: PAINLEVEL_OUTOF10: 9

## 2021-06-21 ASSESSMENT — PAIN DESCRIPTION - PAIN TYPE: TYPE: ACUTE PAIN

## 2021-06-21 ASSESSMENT — PAIN DESCRIPTION - LOCATION: LOCATION: LEG

## 2021-06-21 ASSESSMENT — PAIN DESCRIPTION - ORIENTATION: ORIENTATION: LEFT

## 2021-06-22 RX ORDER — HYDROXYZINE HYDROCHLORIDE 25 MG/1
TABLET, FILM COATED ORAL
COMMUNITY
Start: 2021-06-21 | End: 2021-06-23

## 2021-06-22 RX ORDER — ACETAMINOPHEN 160 MG
TABLET,DISINTEGRATING ORAL
COMMUNITY
Start: 2021-06-12 | End: 2021-06-23 | Stop reason: SDUPTHER

## 2021-06-22 NOTE — ED NOTES
Pt states she does not need to be seen. Pt states rash is getting better with ice pack and that she wants to f/u with her own MD since she's in a research study. Pt LWBS after RN triage.      Blas Ryan RN  06/21/21 [id number]

## 2021-06-23 ENCOUNTER — TELEPHONE (OUTPATIENT)
Dept: FAMILY MEDICINE CLINIC | Age: 61
End: 2021-06-23

## 2021-06-23 ENCOUNTER — OFFICE VISIT (OUTPATIENT)
Dept: FAMILY MEDICINE CLINIC | Age: 61
End: 2021-06-23
Payer: MEDICARE

## 2021-06-23 VITALS
SYSTOLIC BLOOD PRESSURE: 120 MMHG | HEIGHT: 65 IN | OXYGEN SATURATION: 98 % | TEMPERATURE: 98.3 F | WEIGHT: 227 LBS | DIASTOLIC BLOOD PRESSURE: 78 MMHG | HEART RATE: 67 BPM | BODY MASS INDEX: 37.82 KG/M2

## 2021-06-23 DIAGNOSIS — Z00.00 ROUTINE GENERAL MEDICAL EXAMINATION AT A HEALTH CARE FACILITY: Primary | ICD-10-CM

## 2021-06-23 DIAGNOSIS — H60.501 ACUTE OTITIS EXTERNA OF RIGHT EAR, UNSPECIFIED TYPE: Primary | ICD-10-CM

## 2021-06-23 DIAGNOSIS — L23.89 ALLERGIC CONTACT DERMATITIS DUE TO OTHER AGENTS: ICD-10-CM

## 2021-06-23 DIAGNOSIS — B37.2 SKIN YEAST INFECTION: ICD-10-CM

## 2021-06-23 DIAGNOSIS — E78.00 HYPERCHOLESTEREMIA: ICD-10-CM

## 2021-06-23 DIAGNOSIS — Z12.31 ENCOUNTER FOR SCREENING MAMMOGRAM FOR MALIGNANT NEOPLASM OF BREAST: ICD-10-CM

## 2021-06-23 PROBLEM — B37.31 YEAST VAGINITIS: Status: RESOLVED | Noted: 2020-08-30 | Resolved: 2021-06-23

## 2021-06-23 PROBLEM — R74.01 ELEVATED TRANSAMINASE LEVEL: Status: RESOLVED | Noted: 2017-11-10 | Resolved: 2021-06-23

## 2021-06-23 PROCEDURE — 99396 PREV VISIT EST AGE 40-64: CPT | Performed by: REGISTERED NURSE

## 2021-06-23 RX ORDER — CAMPHOR 0.45 %
GEL (GRAM) TOPICAL
Qty: 28 G | Refills: 5 | Status: SHIPPED | OUTPATIENT
Start: 2021-06-23 | End: 2021-12-30 | Stop reason: SDUPTHER

## 2021-06-23 RX ORDER — ATORVASTATIN CALCIUM 10 MG/1
10 TABLET, FILM COATED ORAL DAILY
Qty: 30 TABLET | Refills: 5 | Status: SHIPPED | OUTPATIENT
Start: 2021-06-23 | End: 2021-12-27

## 2021-06-23 RX ORDER — CIPROFLOXACIN AND DEXAMETHASONE 3; 1 MG/ML; MG/ML
4 SUSPENSION/ DROPS AURICULAR (OTIC) 2 TIMES DAILY
Qty: 1 BOTTLE | Refills: 0 | Status: SHIPPED | OUTPATIENT
Start: 2021-06-23 | End: 2021-08-11 | Stop reason: SDUPTHER

## 2021-06-23 RX ORDER — NYSTATIN 100000 [USP'U]/G
POWDER TOPICAL
Qty: 60 BOTTLE | Refills: 1 | Status: SHIPPED | OUTPATIENT
Start: 2021-06-23 | End: 2021-12-30

## 2021-06-23 RX ORDER — ACETAMINOPHEN 160 MG
TABLET,DISINTEGRATING ORAL
Qty: 30 CAPSULE | Refills: 5 | Status: SHIPPED | OUTPATIENT
Start: 2021-06-23 | End: 2022-11-03 | Stop reason: ALTCHOICE

## 2021-06-23 SDOH — ECONOMIC STABILITY: FOOD INSECURITY: WITHIN THE PAST 12 MONTHS, YOU WORRIED THAT YOUR FOOD WOULD RUN OUT BEFORE YOU GOT MONEY TO BUY MORE.: NEVER TRUE

## 2021-06-23 SDOH — ECONOMIC STABILITY: FOOD INSECURITY: WITHIN THE PAST 12 MONTHS, THE FOOD YOU BOUGHT JUST DIDN'T LAST AND YOU DIDN'T HAVE MONEY TO GET MORE.: NEVER TRUE

## 2021-06-23 ASSESSMENT — PATIENT HEALTH QUESTIONNAIRE - PHQ9
SUM OF ALL RESPONSES TO PHQ9 QUESTIONS 1 & 2: 0
SUM OF ALL RESPONSES TO PHQ QUESTIONS 1-9: 0
1. LITTLE INTEREST OR PLEASURE IN DOING THINGS: 0
2. FEELING DOWN, DEPRESSED OR HOPELESS: 0
SUM OF ALL RESPONSES TO PHQ QUESTIONS 1-9: 0
SUM OF ALL RESPONSES TO PHQ QUESTIONS 1-9: 0

## 2021-06-23 ASSESSMENT — LIFESTYLE VARIABLES: HOW OFTEN DO YOU HAVE A DRINK CONTAINING ALCOHOL: 0

## 2021-06-23 ASSESSMENT — ENCOUNTER SYMPTOMS: SHORTNESS OF BREATH: 0

## 2021-06-23 ASSESSMENT — SOCIAL DETERMINANTS OF HEALTH (SDOH): HOW HARD IS IT FOR YOU TO PAY FOR THE VERY BASICS LIKE FOOD, HOUSING, MEDICAL CARE, AND HEATING?: NOT HARD AT ALL

## 2021-06-23 NOTE — PROGRESS NOTES
Arthritis Father     Arthritis Brother     Diabetes Maternal Grandfather        CareTeam (Including outside providers/suppliers regularly involved in providing care):   Patient Care Team:  MELANIE Laughlin - CNP as PCP - General (Family Medicine)  Vega Hewitt DO as PCP - MELANIE Valverde CNP as PCP - [facility] Provider    Wt Readings from Last 3 Encounters:   06/23/21 227 lb (103 kg)   06/21/21 230 lb (104.3 kg)   03/12/21 220 lb (99.8 kg)     Vitals:    06/23/21 0750   BP: 120/78   Site: Left Upper Arm   Position: Sitting   Cuff Size: Large Adult   Pulse: 67   Temp: 98.3 °F (36.8 °C)   TempSrc: Infrared   SpO2: 98%   Weight: 227 lb (103 kg)   Height: 5' 4.5\" (1.638 m)     Body mass index is 38.36 kg/m². Based upon direct observation of the patient, evaluation of cognition reveals recent and remote memory intact. General Appearance: alert and oriented to person, place and time, well developed and well- nourished, in no acute distress  Skin: warm and dry.  Maculopapular rash on bilateral arms and chest.   Head: normocephalic and atraumatic  Eyes: pupils equal, round, and reactive to light, extraocular eye movements intact, conjunctivae normal  ENT: tympanic membrane, external ear and ear canal normal bilaterally, nose without deformity, nasal mucosa and turbinates normal without polyps  Neck: supple and non-tender without mass, no thyromegaly or thyroid nodules, no cervical lymphadenopathy  Pulmonary/Chest: clear to auscultation bilaterally- no wheezes, rales or rhonchi, normal air movement, no respiratory distress  Cardiovascular: normal rate, regular rhythm, normal S1 and S2, no murmurs, rubs, clicks, or gallops, distal pulses intact  Abdomen: soft, non-tender, non-distended, normal bowel sounds, no masses or organomegaly  Extremities: no cyanosis, clubbing or edema  Musculoskeletal: normal range of motion, no joint swelling, deformity or tenderness  Neurologic: reflexes normal and symmetric, no cranial nerve deficit, gait, coordination and speech normal    Patient's complete Health Risk Assessment and screening values have been reviewed and are found in Flowsheets. The following problems were reviewed today and where indicated follow up appointments were made and/or referrals ordered. Positive Risk Factor Screenings with Interventions:            General Health and ACP:  General  In general, how would you say your health is?: Good  In the past 7 days, have you experienced any of the following?  New or Increased Pain, New or Increased Fatigue, Loneliness, Social Isolation, Stress or Anger?: (!) New or Increased Pain  Do you get the social and emotional support that you need?: Yes  Do you have a Living Will?: Yes  Advance Directives     Power of  Living Will ACP-Advance Directive ACP-Power of     Not on File Filed on 08/10/13 Filed Not on File      General Health Risk Interventions:  · Nutrition and exercise    Health Habits/Nutrition:  Health Habits/Nutrition  Do you exercise for at least 20 minutes 2-3 times per week?: Yes  Have you lost any weight without trying in the past 3 months?: No  Do you eat only one meal per day?: No  Have you seen the dentist within the past year?: Yes  Body mass index: (!) 38.36  Health Habits/Nutrition Interventions:  · Inadequate physical activity:  patient agrees to exercise for at least 150 minutes/week  · Nutritional issues:  educational materials for healthy, well-balanced diet provided       Personalized Preventive Plan   Current Health Maintenance Status  Immunization History   Administered Date(s) Administered    COVID-19, Moderna, PF, 100mcg/0.5mL 03/23/2021, 04/20/2021    Influenza Virus Vaccine 10/17/2014, 09/24/2015, 10/14/2016, 09/22/2018, 10/18/2019    Influenza, Quadv, IM, (6 mo and older Fluzone, Flulaval, Fluarix and 3 yrs and older Afluria) 10/23/2017    Influenza, Quadv, IM, PF (6 mo and older Fluzone, Flulaval, Fluarix, and 3 yrs and older Afluria) 10/18/2019    Influenza, [place], Recombinant, IM PF (Flublok 18 yrs and older) 10/31/2020    Tdap (Boostrix, Adacel) 10/23/2017        Health Maintenance   Topic Date Due    Shingles Vaccine (1 of 2) Never done    Breast cancer screen  09/21/2019    Annual Wellness Visit (AWV)  Never done    Cervical cancer screen  12/18/2023    Diabetes screen  03/12/2024    Lipid screen  03/12/2026    DTaP/Tdap/Td vaccine (2 - Td or Tdap) 10/23/2027    Colon cancer screen colonoscopy  05/24/2028    Flu vaccine  Completed    COVID-19 Vaccine  Completed    Hepatitis C screen  Completed    HIV screen  Completed    Hepatitis A vaccine  Aged Out    Hepatitis B vaccine  Aged Out    Hib vaccine  Aged Out    Meningococcal (ACWY) vaccine  Aged Out    Pneumococcal 0-64 years Vaccine  Aged Out     Recommendations for [employer] Due: see orders and patient instructions/AVS.  . Recommended screening schedule for the next 5-10 years is provided to the patient in written form: see Patient David Adam was seen today for medicare awv. Diagnoses and all orders for this visit:    Routine general medical examination at a health care facility    Bug bite, subsequent encounter    Allergic contact dermatitis due to other agents  -     diphenhydrAMINE-zinc acetate (BENADRYL ITCH STOPPING) 1-0.1 % cream; Apply topically 3 times daily as needed. Skin yeast infection  -     nystatin (MYCOSTATIN) 290001 UNIT/GM powder; Apply 2 times daily. Hypercholesteremia  -     atorvastatin (LIPITOR) 10 MG tablet;  Take 1 tablet by mouth daily    Other orders  -     Cholecalciferol (VITAMIN D3) 50 MCG (2000 UT) CAPS; TAKE 1 CAPSULE BY MOUTH ONCE DAILY

## 2021-06-23 NOTE — PROGRESS NOTES
(ADVIL;MOTRIN) 600 MG tablet Take 1 tablet by mouth 3 times daily as needed for Pain 90 tablet 0    ARIPiprazole (ABILIFY) 10 MG tablet TAKE ONE AND ONE-HALF TABLETS BY MOUTH TWICE DAILY 90 tablet 5     No current facility-administered medications for this visit. Review of Systems   Respiratory: Negative for shortness of breath. Cardiovascular: Negative for chest pain. Skin: Positive for rash ( itchy rash on bilateral arms and chest). Vitals:    06/23/21 0750   BP: 120/78   Site: Left Upper Arm   Position: Sitting   Cuff Size: Large Adult   Pulse: 67   Temp: 98.3 °F (36.8 °C)   TempSrc: Infrared   SpO2: 98%   Weight: 227 lb (103 kg)   Height: 5' 4.5\" (1.638 m)     Physical Exam  Constitutional:       Appearance: Normal appearance. Cardiovascular:      Rate and Rhythm: Normal rate. Pulmonary:      Effort: Pulmonary effort is normal.   Skin:     General: Skin is warm and dry. Coloration: Skin is not jaundiced or pale. Findings: Rash (erythematous, maculopapular rash on bilateral arms and chest ) present. No erythema. Neurological:      Mental Status: She is alert and oriented to person, place, and time. Psychiatric:         Mood and Affect: Mood normal.         Behavior: Behavior normal.         Thought Content: Thought content normal.         Judgment: Judgment normal.          Patient's past medical history, surgical history, family history, medications,  and allergies  were all reviewed and updated as appropriate today. Patient Active Problem List   Diagnosis    Urinary, incontinence, stress female    Obesity (BMI 30-39. 9)     Past Medical History:   Diagnosis Date    Anxiety     Bipolar 1 disorder ([facility] [address]     Depression     Dyspareunia in female 5/16/2015    Elevated transaminase level 11/10/2017    Hemorrhoids 1/15/2015    History of tubal ligation 5/16/2015    Irritable bowel syndrome     Menopause 9/5/2016    Obesity     Obesity (BMI 30-39.9) 3/11/2015    Overactive bladder     Prurigo nodularis     Stress incontinence     Urinary incontinence     Uterine fibroid 5/16/2015    Yeast vaginitis 8/30/2020      Past Surgical History:   Procedure Laterality Date    COLONOSCOPY  2010    polyp removed    COLONOSCOPY  2018    EYE SURGERY Left     Lazy eye    TONSILLECTOMY      TUBAL LIGATION  1994       Family History   Problem Relation Age of Onset    Heart Disease Mother     Diabetes Mother     Arthritis Father     Arthritis Brother     Diabetes Maternal Grandfather       Allergies   Allergen Reactions    Bactrim [Sulfamethoxazole-Trimethoprim]      Rash and trouble breathing       This chart was generated using the [address] [place] [address] [employer] system. I created this record but it may contain dictation errors due to the limitation of the software.

## 2021-06-23 NOTE — PATIENT INSTRUCTIONS
Use benadryl cream for rash on arms and chest. Use a free and clear laundry detergent and fabric softener. Personalized Preventive Plan for Guerline Katz - 8/[birth year]/9935  Medicare offers a range of preventive health benefits. Some of the tests and screenings are paid in full while other may be subject to a deductible, co-insurance, and/or copay. Some of these benefits include a comprehensive review of your medical history including lifestyle, illnesses that may run in your family, and various assessments and screenings as appropriate. After reviewing your medical record and screening and assessments performed today your provider may have ordered immunizations, labs, imaging, and/or referrals for you. A list of these orders (if applicable) as well as your Preventive Care list are included within your After Visit Summary for your review. Other Preventive Recommendations:    · A preventive eye exam performed by an eye specialist is recommended every 1-2 years to screen for glaucoma; cataracts, macular degeneration, and other eye disorders. · A preventive dental visit is recommended every 6 months. · Try to get at least 150 minutes of exercise per week or 10,000 steps per day on a pedometer . · Order or download the FREE \"Exercise & Physical Activity: Your Everyday Guide\" from [employer]. Call 7-753.447.9061 or search [employer] online. · You need 4822-4236 mg of calcium and 2328-5912 IU of vitamin D per day. It is possible to meet your calcium requirement with diet alone, but a vitamin D supplement is usually necessary to meet this goal.  · When exposed to the sun, use a sunscreen that protects against both UVA and UVB radiation with an SPF of 30 or greater. Reapply every 2 to 3 hours or after sweating, drying off with a towel, or swimming. · Always wear a seat belt when traveling in a car. Always wear a helmet when riding a bicycle or motorcycle.

## 2021-06-28 ENCOUNTER — TELEPHONE (OUTPATIENT)
Dept: FAMILY MEDICINE CLINIC | Age: 61
End: 2021-06-28

## 2021-06-28 NOTE — TELEPHONE ENCOUNTER
Spoke with patient from her sons appointment. Will send in silvadine cream. Patient is unable to take any steroids or steroid cream due to a research study she is in.

## 2021-06-28 NOTE — TELEPHONE ENCOUNTER
Last Office Visit  -  6/23/21  Next Office Visit  -  12/14/21    Last Filled  -  Showing as dc'd in med list.   Last UDS -    Contract -

## 2021-06-28 NOTE — TELEPHONE ENCOUNTER
----- Message from Jimmy Su sent at 6/28/2021  7:22 AM EDT -----  Subject: Refill Request    QUESTIONS  Name of Medication? Other - Silverline cream (for burns etc.)  Patient-reported dosage and instructions? apply to skin as needed   How many days do you have left? 0  [facility]  Pharmacy phone number (if available)? [phone]  ---------------------------------------------------------------------------  --------------  Jg PAYTON  What is the best way for the office to contact you? OK to leave message on   voicemail  Preferred Call Back Phone Number?  [phone]

## 2021-06-28 NOTE — TELEPHONE ENCOUNTER
Patient stated she would like refills of cream on file. Silver sulfADIAZINE 1%. Pharmacy did not have jar, they gave her 7 small tubes.      Please advise

## 2021-06-30 DIAGNOSIS — H60.501 ACUTE OTITIS EXTERNA OF RIGHT EAR, UNSPECIFIED TYPE: ICD-10-CM

## 2021-06-30 RX ORDER — CIPROFLOXACIN AND DEXAMETHASONE 3; 1 MG/ML; MG/ML
4 SUSPENSION/ DROPS AURICULAR (OTIC) 2 TIMES DAILY
Qty: 1 BOTTLE | Refills: 2 | OUTPATIENT
Start: 2021-06-30 | End: 2021-07-07

## 2021-06-30 NOTE — TELEPHONE ENCOUNTER
----- Message from Manuel Lozano sent at 6/30/2021  7:19 AM EDT -----  Subject: Refill Request    QUESTIONS  Name of Medication? ciprofloxacin-dexamethasone (CIPRODEX) 0.3-0.1 % otic   suspension  Patient-reported dosage and instructions? Place 4 drops into the right ear   2 times daily for 7 days  How many days do you have left? 1  Preferred [facility]? [facility]  Pharmacy phone number (if available)? [phone]  Additional Information for Provider? Pt is requesting at least 2 or 3   refills  ---------------------------------------------------------------------------  --------------  CALL BACK INFO  What is the best way for the office to contact you? OK to leave message on   voicemail  Preferred Call Back Phone Number?  [phone]

## 2021-06-30 NOTE — TELEPHONE ENCOUNTER
The medication I gave her is for inflammation/infection, it is not for prevention. She should use a preventative method if she swims regularly. Specific measures for those who engage in water sports include use of ear plugs, swim caps, shaking the ear dry after swimming, and blow drying the ear after water exposure (placing the blow dryer on a low setting 12 inches away from the ears). Hearing aids should be removed nightly and regularly cleaned. She can purchase swimmer's ear drops at any pharmacy and use those after she swims to help dry up any water that remains in the ear.

## 2021-06-30 NOTE — TELEPHONE ENCOUNTER
Last Office Visit  -  06/23/21  Next Office Visit  -  12/13/21    Last Filled  -  06/23/21  Last UDS -  n/a  Contract -  n/a

## 2021-07-13 ENCOUNTER — HOSPITAL ENCOUNTER (OUTPATIENT)
Dept: WOMENS IMAGING | Age: 61
Discharge: HOME | End: 2021-07-13
Payer: MEDICARE

## 2021-07-13 VITALS — BODY MASS INDEX: 36.88 KG/M2 | WEIGHT: 216 LBS | HEIGHT: 64 IN

## 2021-07-13 DIAGNOSIS — Z12.31 ENCOUNTER FOR SCREENING MAMMOGRAM FOR MALIGNANT NEOPLASM OF BREAST: ICD-10-CM

## 2021-07-13 PROCEDURE — 77063 BREAST TOMOSYNTHESIS BI: CPT

## 2021-07-26 ENCOUNTER — TELEPHONE (OUTPATIENT)
Dept: FAMILY MEDICINE CLINIC | Age: 61
End: 2021-07-26

## 2021-07-26 RX ORDER — SCOLOPAMINE TRANSDERMAL SYSTEM 1 MG/1
PATCH, EXTENDED RELEASE TRANSDERMAL
COMMUNITY
Start: 2021-07-15

## 2021-07-26 RX ORDER — ARIPIPRAZOLE 20 MG/1
TABLET ORAL
COMMUNITY
Start: 2021-07-12 | End: 2021-08-13 | Stop reason: SDUPTHER

## 2021-07-26 NOTE — TELEPHONE ENCOUNTER
Pt states she has been taking this continuously but sometimes she had it prescribed by another NP in office so there might have been confusion.

## 2021-07-26 NOTE — TELEPHONE ENCOUNTER
Our records show she has not been on the 10mg Abilify since 2017.  She needs an appointment, it can be virtual.

## 2021-07-26 NOTE — TELEPHONE ENCOUNTER
TJI pt is wanting to restart abilify, calling pt to schedule an appt after appt with [facility]

## 2021-07-26 NOTE — TELEPHONE ENCOUNTER
Confirmed with pharmacy, she is still taking Abilify, however she did just receive a refill 07/12/21

## 2021-07-26 NOTE — TELEPHONE ENCOUNTER
Refill request     ARIPiprazole (ABILIFY) 10 MG tablet    She said she used to get this from her old phychiatric physician. She is scheduled to see Dr. Wilner Bañuelos next month. She was dismissed from the old office. She is not sure why. She said something to do with her son.     Last seen 6/23/21 TL  Future 8/13/21 LO

## 2021-07-29 ENCOUNTER — TELEPHONE (OUTPATIENT)
Dept: OBGYN CLINIC | Age: 61
End: 2021-07-29

## 2021-07-29 NOTE — TELEPHONE ENCOUNTER
Pt calling to request a VIRTUAL visit. She says she does not wish to come in office. Last in office 12/16/20. She reports she was treated for a rash under the breast and vaginal area. She says you prescribed Nystatin. She has been using this but it is not helping. She says it is painful, burning and itching. She says she has been in a research study with [facility] for a skin rash but this area looks different and she does not think it is the same. She would like to know if you are willing to do either a virtual visit or phone call. Please advise .

## 2021-07-30 ENCOUNTER — HOSPITAL ENCOUNTER (EMERGENCY)
Age: 61
Discharge: HOME | End: 2021-07-30
Attending: EMERGENCY MEDICINE
Payer: MEDICARE

## 2021-07-30 VITALS
BODY MASS INDEX: 37.08 KG/M2 | WEIGHT: 216 LBS | DIASTOLIC BLOOD PRESSURE: 85 MMHG | SYSTOLIC BLOOD PRESSURE: 150 MMHG | HEART RATE: 88 BPM | RESPIRATION RATE: 16 BRPM | TEMPERATURE: 97.7 F | OXYGEN SATURATION: 98 %

## 2021-07-30 DIAGNOSIS — N39.0 URINARY TRACT INFECTION WITHOUT HEMATURIA, SITE UNSPECIFIED: Primary | ICD-10-CM

## 2021-07-30 DIAGNOSIS — B37.31 CANDIDAL VULVOVAGINITIS: ICD-10-CM

## 2021-07-30 LAB
BACTERIA: ABNORMAL /HPF
BILIRUBIN URINE: NEGATIVE
BLOOD, URINE: ABNORMAL
CLARITY: ABNORMAL
COLOR: YELLOW
EPITHELIAL CELLS, UA: ABNORMAL /HPF (ref 0–5)
GLUCOSE URINE: NEGATIVE MG/DL
KETONES, URINE: NEGATIVE MG/DL
LEUKOCYTE ESTERASE, URINE: ABNORMAL
MICROSCOPIC EXAMINATION: YES
NITRITE, URINE: NEGATIVE
PH UA: 6 (ref 5–8)
PROTEIN UA: NEGATIVE MG/DL
RBC UA: ABNORMAL /HPF (ref 0–4)
SPECIFIC GRAVITY UA: 1.02 (ref 1–1.03)
URINE TYPE: ABNORMAL
UROBILINOGEN, URINE: 0.2 E.U./DL
WBC UA: ABNORMAL /HPF (ref 0–5)

## 2021-07-30 PROCEDURE — 99283 EMERGENCY DEPT VISIT LOW MDM: CPT

## 2021-07-30 PROCEDURE — 6370000000 HC RX 637 (ALT 250 FOR IP): Performed by: EMERGENCY MEDICINE

## 2021-07-30 PROCEDURE — 81001 URINALYSIS AUTO W/SCOPE: CPT

## 2021-07-30 RX ORDER — FLUCONAZOLE 150 MG/1
150 TABLET ORAL ONCE
Qty: 1 TABLET | Refills: 0 | Status: SHIPPED | OUTPATIENT
Start: 2021-07-30 | End: 2021-07-30

## 2021-07-30 RX ORDER — CEPHALEXIN 500 MG/1
500 CAPSULE ORAL 2 TIMES DAILY
Qty: 20 CAPSULE | Refills: 0 | Status: SHIPPED | OUTPATIENT
Start: 2021-07-30 | End: 2021-08-09

## 2021-07-30 RX ORDER — FLUCONAZOLE 100 MG/1
150 TABLET ORAL ONCE
Status: COMPLETED | OUTPATIENT
Start: 2021-07-30 | End: 2021-07-30

## 2021-07-30 RX ADMIN — FLUCONAZOLE 150 MG: 100 TABLET ORAL at 02:39

## 2021-07-30 ASSESSMENT — PAIN DESCRIPTION - LOCATION: LOCATION: VAGINA

## 2021-07-30 ASSESSMENT — PAIN SCALES - GENERAL: PAINLEVEL_OUTOF10: 10

## 2021-07-30 ASSESSMENT — PAIN DESCRIPTION - PAIN TYPE: TYPE: ACUTE PAIN

## 2021-07-30 NOTE — ED PROVIDER NOTES
[address] [facility] Emergency Department      CHIEF COMPLAINT  Vaginal Itching (states reddness itching, and painful urination. )      HISTORY OF PRESENT ILLNESS  Mila Solis is a 64 y.o. female with a history of bipolar disorder and obesity who presents with vaginal itching. She is not having discharge but states that she is just very itchy in her vagina burns. She has been dealing with rashes all over her body recently and states she was prescribed nystatin powder to put in her skin folds and creases where she is having some itching. She denies any fever. She is having burning with urination. No abdominal pain, fever, nausea or vomiting. No other complaints, modifying factors or associated symptoms. I have reviewed the following from the nursing documentation.     Past Medical History:   Diagnosis Date    Anxiety     Bipolar 1 disorder ([address]     Depression     Dyspareunia in female 5/16/2015    Elevated transaminase level 11/10/2017    Hemorrhoids 1/15/2015    History of tubal ligation 5/16/2015    Irritable bowel syndrome     Menopause 9/5/2016    Obesity     Obesity (BMI 30-39.9) 3/11/2015    Overactive bladder     Prurigo nodularis     Stress incontinence     Urinary incontinence     Uterine fibroid 5/16/2015    Yeast vaginitis 8/30/2020     Past Surgical History:   Procedure Laterality Date    COLONOSCOPY  2010    polyp removed    COLONOSCOPY  2018    EYE SURGERY Left     Lazy eye    TONSILLECTOMY      TUBAL LIGATION  1994     Family History   Problem Relation Age of Onset    Heart Disease Mother     Diabetes Mother     Arthritis Father     Arthritis Brother     Diabetes Maternal Grandfather     Breast Cancer Paternal Grandmother      Social History     Socioeconomic History    Marital status: Single     Spouse name: Not on file    Number of children: 2    Years of education: 15    Highest education level: Not on file   Occupational History    Occupation: disabilty   Tobacco Use    Smoking status: Never Smoker    Smokeless tobacco: Never Used   Vaping Use    Vaping Use: Never used   Substance and Sexual Activity    Alcohol use: No     Alcohol/week: 0.0 standard drinks    Drug use: No    Sexual activity: Yes     Partners: Male     Birth control/protection: Surgical     Comment: tubal ligation   Other Topics Concern    Not on file   Social History Narrative    Not on file     Social Determinants of Health     Financial Resource Strain: Low Risk     Difficulty of Paying Living Expenses: Not hard at all   Food Insecurity: No Food Insecurity    Worried About Running Out of Food in the Last Year: Never true    Yani of Food in the Last Year: Never true   Transportation Needs:     Lack of Transportation (Medical):  Lack of Transportation (Non-Medical):    Physical Activity:     Days of Exercise per Week:     Minutes of Exercise per Session:    Stress:     Feeling of Stress :    Social Connections:     Frequency of Communication with Friends and Family:     Frequency of Social Gatherings with Friends and Family:     Attends Anabaptism Services:     Active Member of Clubs or Organizations:     Attends Club or Organization Meetings:     Marital Status:    Intimate Partner Violence:     Fear of Current or Ex-Partner:     Emotionally Abused:     Physically Abused:     Sexually Abused:      No current facility-administered medications for this encounter.      Current Outpatient Medications   Medication Sig Dispense Refill    fluconazole (DIFLUCAN) 150 MG tablet Take 1 tablet by mouth once for 1 dose take in 3 days if your symptoms persist 1 tablet 0    cephALEXin (KEFLEX) 500 MG capsule Take 1 capsule by mouth 2 times daily for 10 days 20 capsule 0    miconazole (MONISTAT 1 COMBINATION PACK) 200 & 2 MG-% (9GM) KIT kit Place vaginally nightly for 7 days 1 kit 0    ARIPiprazole (ABILIFY) 20 MG tablet TAKE 1 TABLET BY MOUTH ONCE DAILY IN THE EVENING FOR 30 DAYS      scopolamine (TRANSDERM-SCOP) transdermal patch APPLY 1 PATCH TOPICALLY EVERY 72 HOURS      silver sulfADIAZINE (SSD) 1 % cream APPLY  CREAM TOPICALLY ONCE DAILY 400 g 2    diphenhydrAMINE-zinc acetate (BENADRYL ITCH STOPPING) 1-0.1 % cream Apply topically 3 times daily as needed. 28 g 5    nystatin (MYCOSTATIN) 946992 UNIT/GM powder Apply 2 times daily. 60 Bottle 1    Cholecalciferol (VITAMIN D3) 50 MCG (2000 UT) CAPS TAKE 1 CAPSULE BY MOUTH ONCE DAILY 30 capsule 5    atorvastatin (LIPITOR) 10 MG tablet Take 1 tablet by mouth daily 30 tablet 5    ibuprofen (ADVIL;MOTRIN) 600 MG tablet Take 1 tablet by mouth 3 times daily as needed for Pain 90 tablet 0    ARIPiprazole (ABILIFY) 10 MG tablet TAKE ONE AND ONE-HALF TABLETS BY MOUTH TWICE DAILY 90 tablet 5     Allergies   Allergen Reactions    Bactrim [Sulfamethoxazole-Trimethoprim]      Rash and trouble breathing       REVIEW OF SYSTEMS  10 systems reviewed, pertinent positives per HPI otherwise noted to be negative. PHYSICAL EXAM  BP (!) 159/85   Pulse 96   Temp 97.7 °F (36.5 °C) (Oral)   Resp 16   Wt 216 lb (98 kg)   LMP 01/29/2015   SpO2 96%   BMI 37.08 kg/m²   GENERAL APPEARANCE: Awake and alert. Cooperative. No acute distress. Morbidly obese. HEAD: Normocephalic. Atraumatic. EYES: PERRL. EOM's grossly intact. ENT: Mucous membranes are moist.   NECK: Supple, trachea midline. HEART: RRR. LUNGS: Respirations unlabored. ABDOMEN: Soft. Obese abdomen, nontender.  exam done with chaperone in the room. Vulva is erythematous. Skin folds in bilateral groin are erythematous. There is erythema and dryness noted of the labia majora. There is a thin white discharge noted from the vagina. EXTREMITIES: No peripheral edema. MAEE. No acute deformities. SKIN: Excoriations on the extremities with scattered scabs noted.   Her skin folds, in the antecubital fossa bilaterally and in the groin and abdominal pannus are erythematous consistent with a candidal infection. NEUROLOGICAL: Alert and oriented X 3. CN II-XII grossly intact. Strength 5/5, sensation intact. PSYCHIATRIC: Normal mood and affect. LABS  I have reviewed all labs for this visit. Results for orders placed or performed during the hospital encounter of 07/30/21   Urinalysis, reflex to microscopic   Result Value Ref Range    Color, UA Yellow Straw/Yellow    Clarity, UA SL CLOUDY (A) Clear    Glucose, Ur Negative Negative mg/dL    Bilirubin Urine Negative Negative    Ketones, Urine Negative Negative mg/dL    Specific Gravity, UA 1.020 1.005 - 1.030    Blood, Urine TRACE-INTACT (A) Negative    pH, UA 6.0 5.0 - 8.0    Protein, UA Negative Negative mg/dL    Urobilinogen, Urine 0.2 <2.0 E.U./dL    Nitrite, Urine Negative Negative    Leukocyte Esterase, Urine MODERATE (A) Negative    Microscopic Examination YES     Urine Type NotGiven    Microscopic Urinalysis   Result Value Ref Range    WBC, UA 10-20 (A) 0 - 5 /HPF    RBC, UA 5-10 (A) 0 - 4 /HPF    Epithelial Cells, UA 2-5 0 - 5 /HPF    Bacteria, UA 2+ (A) None Seen /HPF             RADIOLOGY  X-RAYS:  I have reviewed radiologic plain film image(s). ALL OTHER NON-PLAIN FILM IMAGES SUCH AS CT, ULTRASOUND AND MRI HAVE BEEN READ BY THE RADIOLOGIST. No orders to display              Rechecks: Physical assessment performed. Patient was updated on the plan and given Diflucan here. She will be discharged with prescriptions. ED COURSE/MDM  Patient seen and evaluated. Here the patient is afebrile with normal vitals signs. Old records reviewed. She is nontoxic-appearing here. Labs and imaging reviewed and results discussed with patient. Urinalysis does show a very mild urinary tract infection. On vaginal exam she does appear to have a vulvovaginitis, likely candidal.  I will prescribe Monistat cream for her to use at home.   She is already using nystatin powder in her pannus and groin area where she has some skin candidal infection. I have given her Diflucan here and will give her a prescription for a repeat dose to take in 72 hours if she is still symptomatic. I will place her on Keflex at home as well. I will recommend follow-up with her OB/GYN. Patient was reassessed as noted above . Plan of care discussed with patient. Patient in agreement with plan. Strict return precautions have been given. Patient was given scripts for the following medications. I counseled patient how to take these medications. New Prescriptions    CEPHALEXIN (KEFLEX) 500 MG CAPSULE    Take 1 capsule by mouth 2 times daily for 10 days    FLUCONAZOLE (DIFLUCAN) 150 MG TABLET    Take 1 tablet by mouth once for 1 dose take in 3 days if your symptoms persist    MICONAZOLE (MONISTAT 1 COMBINATION PACK) 200 & 2 MG-% (9GM) KIT KIT    Place vaginally nightly for 7 days       CLINICAL IMPRESSION  1. Urinary tract infection without hematuria, site unspecified    2. Candidal vulvovaginitis        Blood pressure (!) 159/85, pulse 96, temperature 97.7 °F (36.5 °C), temperature source Oral, resp. rate 16, weight 216 lb (98 kg), last menstrual period 01/29/2015, SpO2 96 %, not currently breastfeeding. Nelly Gaytan was discharged to home in stable condition.     (Please note this note was completed with a voice recognition program.  Efforts were made to edit the dictations but occasionally words are mis-transcribed.)       Sarah Bess MD  07/31/21 3650

## 2021-07-30 NOTE — ED NOTES
Vaginal exam completed by Dr. Barbara Busby and witnessed per EVGENY Pal.        Lucia Boyce RN  07/30/21 [id number]

## 2021-07-30 NOTE — ED NOTES
Pt ok to d/c to home. Pt given d/c instructions. Pt verbalized understating including Rx and follow up care. Pt ambulated to lobby for ride home.  0 s/s of distress at time of d/c.          Mimi Sommers RN  07/30/21 [id number]

## 2021-07-31 ENCOUNTER — TELEPHONE (OUTPATIENT)
Dept: OBGYN CLINIC | Age: 61
End: 2021-07-31

## 2021-07-31 RX ORDER — FLUCONAZOLE 150 MG/1
150 TABLET ORAL ONCE
Qty: 1 TABLET | Refills: 0 | Status: SHIPPED | OUTPATIENT
Start: 2021-07-31 | End: 2021-07-31

## 2021-07-31 NOTE — TELEPHONE ENCOUNTER
Patient called and states that she was in the ED for a urinary tract infection and a yeast infection. Was given a prescription for Diflucan, however has lost it. Reviewed will send one dose to pharmacy. Continue taking Keflex and Monistat as prescribed by the pharmacy. Requesting Nystatin powder for under her breasts, patient has an active script with a refill from Carie Buck CNP, recommended to call pharmacy and have refilled. Advised if insurance does not pay for Diflucan because she just picked it up today to wait 3 days and try to pick it up at that time as they may cover it then.

## 2021-08-03 NOTE — TELEPHONE ENCOUNTER
Please follow up with patient. Patient called on the evening of 7/29 with severe symptoms and was seen emergently in the ER. See ER notes. Please check on patient's symptoms. Will need to come in for follow up in the next 1-2 weeks especially if symptoms do not clear up.    Thanks

## 2021-08-11 DIAGNOSIS — H60.501 ACUTE OTITIS EXTERNA OF RIGHT EAR, UNSPECIFIED TYPE: ICD-10-CM

## 2021-08-11 RX ORDER — CIPROFLOXACIN AND DEXAMETHASONE 3; 1 MG/ML; MG/ML
4 SUSPENSION/ DROPS AURICULAR (OTIC) 2 TIMES DAILY
Qty: 1 BOTTLE | Refills: 0 | Status: SHIPPED | OUTPATIENT
Start: 2021-08-11 | End: 2021-11-01

## 2021-08-11 NOTE — TELEPHONE ENCOUNTER
Last Office Visit  -  06/23/21  Next Office Visit  -  12/14/21    Last Filled  -  06/23/21  Last UDS -  n/a  Contract -  n/a

## 2021-08-11 NOTE — TELEPHONE ENCOUNTER
Patient is requesting a refill on her   ciprofloxacin-dexamethasone (CIPRODEX) 0.3-0.1 % otic suspension  She states she swims a lot and her ear is hurting.       Last seen 6-23-21 AW  Future 12/14/21

## 2021-08-13 ENCOUNTER — OFFICE VISIT (OUTPATIENT)
Dept: PSYCHOLOGY | Age: 61
End: 2021-08-13

## 2021-08-13 ENCOUNTER — OFFICE VISIT (OUTPATIENT)
Dept: FAMILY MEDICINE CLINIC | Age: 61
End: 2021-08-13
Payer: MEDICARE

## 2021-08-13 VITALS
SYSTOLIC BLOOD PRESSURE: 118 MMHG | OXYGEN SATURATION: 93 % | HEART RATE: 94 BPM | TEMPERATURE: 97.2 F | DIASTOLIC BLOOD PRESSURE: 80 MMHG

## 2021-08-13 DIAGNOSIS — F32.A DEPRESSION, UNSPECIFIED DEPRESSION TYPE: ICD-10-CM

## 2021-08-13 DIAGNOSIS — L98.9 SKIN LESIONS: Primary | ICD-10-CM

## 2021-08-13 DIAGNOSIS — F41.9 ANXIETY: Primary | ICD-10-CM

## 2021-08-13 PROCEDURE — 3017F COLORECTAL CA SCREEN DOC REV: CPT | Performed by: NURSE PRACTITIONER

## 2021-08-13 PROCEDURE — G8428 CUR MEDS NOT DOCUMENT: HCPCS | Performed by: NURSE PRACTITIONER

## 2021-08-13 PROCEDURE — G8417 CALC BMI ABV UP PARAM F/U: HCPCS | Performed by: NURSE PRACTITIONER

## 2021-08-13 PROCEDURE — 99999 PR OFFICE/OUTPT VISIT,PROCEDURE ONLY: CPT | Performed by: PSYCHOLOGIST

## 2021-08-13 PROCEDURE — 1036F TOBACCO NON-USER: CPT | Performed by: NURSE PRACTITIONER

## 2021-08-13 PROCEDURE — 99213 OFFICE O/P EST LOW 20 MIN: CPT | Performed by: NURSE PRACTITIONER

## 2021-08-13 RX ORDER — ARIPIPRAZOLE 20 MG/1
TABLET ORAL
Qty: 30 TABLET | Refills: 3 | Status: SHIPPED | OUTPATIENT
Start: 2021-08-13 | End: 2022-01-27

## 2021-08-13 ASSESSMENT — PATIENT HEALTH QUESTIONNAIRE - PHQ9
2. FEELING DOWN, DEPRESSED OR HOPELESS: 0
SUM OF ALL RESPONSES TO PHQ QUESTIONS 1-9: 1
10. IF YOU CHECKED OFF ANY PROBLEMS, HOW DIFFICULT HAVE THESE PROBLEMS MADE IT FOR YOU TO DO YOUR WORK, TAKE CARE OF THINGS AT HOME, OR GET ALONG WITH OTHER PEOPLE: 1
SUM OF ALL RESPONSES TO PHQ9 QUESTIONS 1 & 2: 1
1. LITTLE INTEREST OR PLEASURE IN DOING THINGS: 1
SUM OF ALL RESPONSES TO PHQ QUESTIONS 1-9: 1
SUM OF ALL RESPONSES TO PHQ QUESTIONS 1-9: 1

## 2021-08-13 ASSESSMENT — ANXIETY QUESTIONNAIRES
6. BECOMING EASILY ANNOYED OR IRRITABLE: 1
3. WORRYING TOO MUCH ABOUT DIFFERENT THINGS: 0
5. BEING SO RESTLESS THAT IT IS HARD TO SIT STILL: 1
GAD7 TOTAL SCORE: 6
2. NOT BEING ABLE TO STOP OR CONTROL WORRYING: 0
1. FEELING NERVOUS, ANXIOUS, OR ON EDGE: 3
4. TROUBLE RELAXING: 1
7. FEELING AFRAID AS IF SOMETHING AWFUL MIGHT HAPPEN: 0

## 2021-08-13 ASSESSMENT — ENCOUNTER SYMPTOMS
COUGH: 0
RESPIRATORY NEGATIVE: 1
WHEEZING: 0
SHORTNESS OF BREATH: 0
GASTROINTESTINAL NEGATIVE: 1

## 2021-08-13 NOTE — PROGRESS NOTES
Patient: Justa Mccarty is a 64 y.o. female who presents today with the following Chief Complaint(s):  Chief Complaint   Patient presents with    Skin Lesion       Assessment:  Encounter Diagnoses   Name Primary?  Skin lesions Yes    Depression, unspecified depression type        Plan:  1. Skin lesions  We will try treating with Bactroban ointment and follow-up with dermatology if no improvement.  - mupirocin (BACTROBAN) 2 % ointment; Apply 3 times daily. Dispense: 15 g; Refill: 1    2. Depression, unspecified depression type  Refill sent in of Abilify patient will contact the [facility] for psychiatry. - ARIPiprazole (ABILIFY) 20 MG tablet; TAKE 1 TABLET BY MOUTH ONCE DAILY IN THE EVENING FOR 30 DAYS  Dispense: 30 tablet; Refill: 3      HPI  Patient presents today with concerns of some skin lesions. She does have a known skin condition that she sees dermatology for but she states the spots are different they are open and itchy and seem to be healing. She states her son has something similar as well she also has a history of depression bipolar and she has been on Abilify 20 mg daily. She is currently without a psychiatrist right now and is needing a refill of her Abilify today. Current Outpatient Medications   Medication Sig Dispense Refill    ARIPiprazole (ABILIFY) 20 MG tablet TAKE 1 TABLET BY MOUTH ONCE DAILY IN THE EVENING FOR 30 DAYS 30 tablet 3    mupirocin (BACTROBAN) 2 % ointment Apply 3 times daily. 15 g 1    ciprofloxacin-dexamethasone (CIPRODEX) 0.3-0.1 % otic suspension Place 4 drops into the right ear 2 times daily for 7 days 1 Bottle 0    scopolamine (TRANSDERM-SCOP) transdermal patch APPLY 1 PATCH TOPICALLY EVERY 72 HOURS      silver sulfADIAZINE (SSD) 1 % cream APPLY  CREAM TOPICALLY ONCE DAILY 400 g 2    diphenhydrAMINE-zinc acetate (BENADRYL ITCH STOPPING) 1-0.1 % cream Apply topically 3 times daily as needed.  28 g 5    nystatin (MYCOSTATIN) 040598 UNIT/GM powder Apply 2 times daily. 60 Bottle 1    Cholecalciferol (VITAMIN D3) 50 MCG (2000 UT) CAPS TAKE 1 CAPSULE BY MOUTH ONCE DAILY 30 capsule 5    atorvastatin (LIPITOR) 10 MG tablet Take 1 tablet by mouth daily 30 tablet 5    ibuprofen (ADVIL;MOTRIN) 600 MG tablet Take 1 tablet by mouth 3 times daily as needed for Pain 90 tablet 0     No current facility-administered medications for this visit. Patient's past medical history, surgical history, family history, medications,and allergies  were all reviewed and updated as appropriate today. Review of Systems   Constitutional: Negative. Negative for fatigue. HENT: Negative. Respiratory: Negative. Negative for cough, shortness of breath and wheezing. Cardiovascular: Negative. Gastrointestinal: Negative. Musculoskeletal: Negative. Skin: Positive for wound. Neurological: Negative. Psychiatric/Behavioral: Negative. Physical Exam  Vitals reviewed. Skin:     General: Skin is warm and dry. Comments: Multiple open skin lesions on her upper back on her right ankle and on her right buttock. The skin surrounding does appear a little red no swelling noted no drainage noted sores are open pink-red color in the center. Neurological:      Mental Status: She is alert. Vitals:    08/13/21 1116   BP: 118/80   Pulse: 94   Temp: 97.2 °F (36.2 °C)   SpO2: 93%       This chart was generated using the Dragon dictation system. I created this record but it may contain dictation errors due to the limitation of the software.

## 2021-08-13 NOTE — PROGRESS NOTES
Behavioral Health Consultation  DARÍO Mao. Psychology Assistant  Blanca Johnson, Ph.D. Supervising Psychologist  8/13/2021  10:23 AM EDT      Time spent with Patient: 10 minutes  This is patient's first [facility] appointment. Reason for Consult:    Chief Complaint   Patient presents with    Anxiety     Referring Provider: No referring provider defined for this encounter. Pt provided informed consent for the behavioral health program. Discussed with patient model of service to include the limits of confidentiality (i.e. abuse reporting, suicide intervention, etc.) and short-term intervention focused approach. Reviewed provision of services under supervision of licensed psychologist and obtained written consent. Pt indicated understanding. Feedback given to PCP. S:  Pt reported with primary concerns about skin condition. Pt was previously diagnosed with bipolar disorder, and re-diagnosed with MDD two years ago. Pts denies current issues with depression and reports some issues with anxiety. Patient denied current need for behavioral health care but may need help with managing son's behavioral health concerns. [facility] and PCP provided referrals for external mental health providers.     O:  MSE:    Appearance: good hygiene   Attitude: cooperative and friendly  Consciousness: alert  Orientation: oriented to person, place, time, general circumstance  Memory: recent and remote memory intact  Attention/Concentration: intact during session  Psychomotor Activity:normal  Eye Contact: normal  Speech: normal rate and volume, well-articulated  Mood: normal  Affect: euthymic  Perception: within normal limits  Thought Content: intrusive thoughts  Thought Process: tangential  Insight: good  Judgment: intact  Ability to understand instructions: Yes  Ability to respond meaningfully: Yes  Morbid Ideation: no   Suicide Assessment: no suicidal ideation, plan, or intent  Homicidal Ideation: no    History:    Medications: Current Outpatient Medications   Medication Sig Dispense Refill    ARIPiprazole (ABILIFY) 20 MG tablet TAKE 1 TABLET BY MOUTH ONCE DAILY IN THE EVENING FOR 30 DAYS 30 tablet 3    mupirocin (BACTROBAN) 2 % ointment Apply 3 times daily. 15 g 1    ciprofloxacin-dexamethasone (CIPRODEX) 0.3-0.1 % otic suspension Place 4 drops into the right ear 2 times daily for 7 days 1 Bottle 0    scopolamine (TRANSDERM-SCOP) transdermal patch APPLY 1 PATCH TOPICALLY EVERY 72 HOURS      silver sulfADIAZINE (SSD) 1 % cream APPLY  CREAM TOPICALLY ONCE DAILY 400 g 2    diphenhydrAMINE-zinc acetate (BENADRYL ITCH STOPPING) 1-0.1 % cream Apply topically 3 times daily as needed. 28 g 5    nystatin (MYCOSTATIN) 609895 UNIT/GM powder Apply 2 times daily. 60 Bottle 1    Cholecalciferol (VITAMIN D3) 50 MCG (2000 UT) CAPS TAKE 1 CAPSULE BY MOUTH ONCE DAILY 30 capsule 5    atorvastatin (LIPITOR) 10 MG tablet Take 1 tablet by mouth daily 30 tablet 5    ibuprofen (ADVIL;MOTRIN) 600 MG tablet Take 1 tablet by mouth 3 times daily as needed for Pain 90 tablet 0     No current facility-administered medications for this visit.      Social History:   Social History     Socioeconomic History    Marital status: Single     Spouse name: Not on file    Number of children: 2    Years of education: 15    Highest education level: Not on file   Occupational History    Occupation: disabilty   Tobacco Use    Smoking status: Never Smoker    Smokeless tobacco: Never Used   Vaping Use    Vaping Use: Never used   Substance and Sexual Activity    Alcohol use: No     Alcohol/week: 0.0 standard drinks    Drug use: No    Sexual activity: Yes     Partners: Male     Birth control/protection: Surgical     Comment: tubal ligation   Other Topics Concern    Not on file   Social History Narrative    Not on file     Social Determinants of Health     Financial Resource Strain: Low Risk     Difficulty of Paying Living Expenses: Not hard at all   Food Insecurity: No Food Insecurity    Worried About Running Out of Food in the Last Year: Never true    Ran Out of Food in the Last Year: Never true   Transportation Needs:     Lack of Transportation (Medical):  Lack of Transportation (Non-Medical):    Physical Activity:     Days of Exercise per Week:     Minutes of Exercise per Session:    Stress:     Feeling of Stress :    Social Connections:     Frequency of Communication with Friends and Family:     Frequency of Social Gatherings with Friends and Family:     Attends Pentecostal Services:     Active Member of Clubs or Organizations:     Attends Club or Organization Meetings:     Marital Status:    Intimate Partner Violence:     Fear of Current or Ex-Partner:     Emotionally Abused:     Physically Abused:     Sexually Abused:      TOBACCO:   reports that she has never smoked. She has never used smokeless tobacco.  ETOH:   reports no history of alcohol use. Family History:   Family History   Problem Relation Age of Onset    Heart Disease Mother     Diabetes Mother     Arthritis Father     Arthritis Brother     Diabetes Maternal Grandfather     Breast Cancer Paternal Grandmother        A:  Administered PHQ-9 and SHAKILA-7 (see below). Patient endorses minimal symptoms of depression and mild symptoms of anxiety. Denies SI.  SHAKILA 7 SCORE 8/13/2021   SHAKILA-7 Total Score 6     Interpretation of SHAKILA-7 score: 5-9 = mild anxiety, 10-14 = moderate anxiety, 15+ = severe anxiety. Recommend referral to behavioral health for scores 10 or greater. PHQ Scores 8/13/2021 6/23/2021 2/5/2021 1/16/2020 6/11/2019 9/21/2018   PHQ2 Score 1 0 0 0 0 0   PHQ9 Score 1 0 0 0 0 0     Interpretation of Total Score Depression Severity: 1-4 = Minimal depression, 5-9 = Mild depression, 10-14 = Moderate depression, 15-19 = Moderately severe depression, 20-27 = Severe depression         Diagnosis:    1.  Anxiety          Diagnosis Date    Anxiety     [facility]     Depression     Dyspareunia in female 5/16/2015    Elevated transaminase level 11/10/2017    Hemorrhoids 1/15/2015    History of tubal ligation 5/16/2015    Irritable bowel syndrome     Menopause 9/5/2016    Obesity     Obesity (BMI 30-39.9) 3/11/2015    Overactive bladder     Prurigo nodularis     Stress incontinence     Urinary incontinence     Uterine fibroid 5/16/2015    Yeast vaginitis 8/30/2020       Plan:  Pt interventions:  Aldrich-setting to identify pt's primary goals for [facility] visit / overall health    Pt Behavioral Change Plan:   See Pt Instructions

## 2021-08-20 ENCOUNTER — TELEPHONE (OUTPATIENT)
Dept: OBGYN CLINIC | Age: 61
End: 2021-08-20

## 2021-08-20 NOTE — TELEPHONE ENCOUNTER
[phone] ([place])     Placed call to patient, verbalized understanding. No questions or concerns voiced. Called in macrobid 100mg to verified pharmacy.

## 2021-08-20 NOTE — TELEPHONE ENCOUNTER
Patient called [facility] stated she is having issues with symotoms of a UTI, some pressure, and odor to urine with dark color. Stated she has a rash on each side of her vagina. Stated it is itchy and she is not wearing clothes for this as well. Please advise, first office visit is 9/22/21.     Routing to Dr. Maggie Gill

## 2021-08-20 NOTE — TELEPHONE ENCOUNTER
Ok to offer testing for UTI. Patient will need to bring urine to lab for testing. Being the weekend, ok to offer macrobid to start treatment.   Patient will need an appointment if symptoms persist.  Thanks

## 2021-09-09 ENCOUNTER — TELEPHONE (OUTPATIENT)
Dept: OBGYN CLINIC | Age: 61
End: 2021-09-09

## 2021-09-09 RX ORDER — NYSTATIN 100000 [USP'U]/G
POWDER TOPICAL
Qty: 1 EACH | Refills: 0 | Status: SHIPPED | OUTPATIENT
Start: 2021-09-09 | End: 2021-09-17 | Stop reason: SDUPTHER

## 2021-09-09 NOTE — TELEPHONE ENCOUNTER
Patient is calling with complaints of irritation along the sides of her inner thighs near her vagina. The patient said that shes had the nystation powder in the past to help with the irritation and it helped. I advised patient to try to keep the area clean and dry if possible to help. Patient prefers the [facility] on [address] if physician is agreeable. Forwarding to physician and on call doc. fed

## 2021-09-17 RX ORDER — NYSTATIN 100000 [USP'U]/G
POWDER TOPICAL
Qty: 1 EACH | Refills: 2 | Status: SHIPPED | OUTPATIENT
Start: 2021-09-17 | End: 2022-02-11 | Stop reason: ALTCHOICE

## 2021-10-19 ENCOUNTER — TELEMEDICINE (OUTPATIENT)
Dept: FAMILY MEDICINE CLINIC | Age: 61
End: 2021-10-19
Payer: COMMERCIAL

## 2021-10-19 DIAGNOSIS — R22.9 LUMP OF SKIN: Primary | ICD-10-CM

## 2021-10-19 PROCEDURE — 99213 OFFICE O/P EST LOW 20 MIN: CPT | Performed by: NURSE PRACTITIONER

## 2021-10-19 PROCEDURE — G8427 DOCREV CUR MEDS BY ELIG CLIN: HCPCS | Performed by: NURSE PRACTITIONER

## 2021-10-19 PROCEDURE — G8417 CALC BMI ABV UP PARAM F/U: HCPCS | Performed by: NURSE PRACTITIONER

## 2021-10-19 PROCEDURE — 1036F TOBACCO NON-USER: CPT | Performed by: NURSE PRACTITIONER

## 2021-10-19 PROCEDURE — 3017F COLORECTAL CA SCREEN DOC REV: CPT | Performed by: NURSE PRACTITIONER

## 2021-10-19 PROCEDURE — G8484 FLU IMMUNIZE NO ADMIN: HCPCS | Performed by: NURSE PRACTITIONER

## 2021-10-19 ASSESSMENT — ENCOUNTER SYMPTOMS
GASTROINTESTINAL NEGATIVE: 1
RESPIRATORY NEGATIVE: 1

## 2021-10-19 NOTE — PROGRESS NOTES
10/19/2021    TELEHEALTH EVALUATION -- Audio/Visual (During MKNGO-00 public health emergency)    HPI:    Kimberly Jiang (:  1960) has requested an audio/video evaluation for the following concern(s):    Patient presents today with concerns of a lump on the back of her neck. Patient states that it feels hard to touch. She noticed it couple days ago and it was larger and more painful, she states there was some sticky drainage that drained out of it. She has been trying some Tylenol for the pain but she has not put anything topical on it. Review of Systems   Constitutional: Negative. HENT: Negative. Respiratory: Negative. Cardiovascular: Negative. Gastrointestinal: Negative. Musculoskeletal: Negative. Skin:        Small lump noted on back of neck just at the base of the hairline. Neurological: Negative. Psychiatric/Behavioral: Negative. Prior to Visit Medications    Medication Sig Taking? Authorizing Provider   mupirocin (BACTROBAN) 2 % ointment Apply topically 3 times daily for 7 days Yes MELANIE Hidalgo CNP   nystatin (MYCOSTATIN) 807331 UNIT/GM powder Apply 2 times daily as needed. Yes Bette Ross DO   ARIPiprazole (ABILIFY) 20 MG tablet TAKE 1 TABLET BY MOUTH ONCE DAILY IN THE EVENING FOR 30 DAYS Yes MELANIE Parish CNP   scopolamine (TRANSDERM-SCOP) transdermal patch APPLY 1 PATCH TOPICALLY EVERY 72 HOURS Yes Historical Provider, MD   silver sulfADIAZINE (SSD) 1 % cream APPLY  CREAM TOPICALLY ONCE DAILY Yes MELANIE Parish CNP   diphenhydrAMINE-zinc acetate (BENADRYL ITCH STOPPING) 1-0.1 % cream Apply topically 3 times daily as needed. Yes MELANIE Cochran CNP   nystatin (MYCOSTATIN) 499779 UNIT/GM powder Apply 2 times daily.  Yes XiomaraMELANIE Ortiz CNP   Cholecalciferol (VITAMIN D3) 50 MCG ( UT) CAPS TAKE 1 CAPSULE BY MOUTH ONCE DAILY Yes MELANIE Leong CNP   atorvastatin (LIPITOR) 10 MG tablet Take 1 tablet by mouth daily Yes DulceMELANIE Fitzgerald CNP   ibuprofen (ADVIL;MOTRIN) 600 MG tablet Take 1 tablet by mouth 3 times daily as needed for Pain Yes Marco Antonio MELANIE Rodriguez CNP       Social History     Tobacco Use    Smoking status: Never Smoker    Smokeless tobacco: Never Used   Vaping Use    Vaping Use: Never used   Substance Use Topics    Alcohol use: No     Alcohol/week: 0.0 standard drinks    Drug use: No            PHYSICAL EXAMINATION:  [ INSTRUCTIONS:  \"[x]\" Indicates a positive item  \"[]\" Indicates a negative item  -- DELETE ALL ITEMS NOT EXAMINED]  Vital Signs: (As obtained by patient/caregiver or practitioner observation)    Blood pressure-  Heart rate-    Respiratory rate-    Temperature-  Pulse oximetry-     Constitutional: [x] Appears well-developed and well-nourished [x] No apparent distress      [] Abnormal-   Mental status  [x] Alert and awake  [] Oriented to person/place/time [x]Able to follow commands      Eyes:  EOM    []  Normal  [] Abnormal-  Sclera  []  Normal  [] Abnormal -         Discharge []  None visible  [] Abnormal -    HENT:   [x] Normocephalic, atraumatic. [] Abnormal   [x] Mouth/Throat: Mucous membranes are moist.     External Ears [] Normal  [] Abnormal-     Neck: [] No visualized mass     Pulmonary/Chest: [] Respiratory effort normal.  [] No visualized signs of difficulty breathing or respiratory distress        [] Abnormal-      Musculoskeletal:   [] Normal gait with no signs of ataxia         [] Normal range of motion of neck        [] Abnormal-       Neurological:        [] No Facial Asymmetry (Cranial nerve 7 motor function) (limited exam to video visit)          [] No gaze palsy        [] Abnormal-         Skin:        [] No significant exanthematous lesions or discoloration noted on facial skin         [x] Abnormal-  Has a lump on the back of her neck. Difficult to fully view through video.             Psychiatric:       [] Normal Affect [] No Hallucinations [] Abnormal-     Other pertinent observable physical exam findings-     ASSESSMENT/PLAN:  1. Lump of skin  Since it has been draining and gone down in size, will treat with topical Bactroban ointment and follow up if no improvement. Jeramy Camargo is a 63 y.o. female being evaluated by a Virtual Visit (video visit) encounter to address concerns as mentioned above. A caregiver was present when appropriate. Due to this being a TeleHealth encounter ([facility]), evaluation of the following organ systems was limited: Vitals/Constitutional/EENT/Resp/CV/GI//MS/Neuro/Skin/Heme-Lymph-Imm. Pursuant to the emergency declaration under the [address] [place] [address] authority and the [employer] Act, this Virtual Visit was conducted with patient's (and/or legal guardian's) consent, to reduce the patient's risk of exposure to COVID-19 and provide necessary medical care. The patient (and/or legal guardian) has also been advised to contact this office for worsening conditions or problems, and seek emergency medical treatment and/or call 911 if deemed necessary. Services were provided through a video synchronous discussion virtually to substitute for in-person clinic visit. Patient and provider were located at their individual homes. --MELANIE Weems - CNP on 10/19/2021 at 5:07 PM    An electronic signature was used to authenticate this note.

## 2021-10-31 DIAGNOSIS — H60.501 ACUTE OTITIS EXTERNA OF RIGHT EAR, UNSPECIFIED TYPE: ICD-10-CM

## 2021-11-01 DIAGNOSIS — H60.501 ACUTE OTITIS EXTERNA OF RIGHT EAR, UNSPECIFIED TYPE: ICD-10-CM

## 2021-11-01 RX ORDER — CIPROFLOXACIN AND DEXAMETHASONE 3; 1 MG/ML; MG/ML
SUSPENSION/ DROPS AURICULAR (OTIC)
Qty: 8 ML | Refills: 0 | Status: SHIPPED | OUTPATIENT
Start: 2021-11-01 | End: 2021-12-30

## 2021-11-01 RX ORDER — CIPROFLOXACIN AND DEXAMETHASONE 3; 1 MG/ML; MG/ML
SUSPENSION/ DROPS AURICULAR (OTIC)
Qty: 8 ML | Refills: 0 | Status: SHIPPED | OUTPATIENT
Start: 2021-11-01 | End: 2021-11-01 | Stop reason: SDUPTHER

## 2021-11-01 NOTE — TELEPHONE ENCOUNTER
----- Message from Dominique Zoe sent at 11/1/2021  1:45 PM EDT -----  Subject: Refill Request    QUESTIONS  Name of Medication? silver sulfADIAZINE (SSD) 1 % cream  Patient-reported dosage and instructions? PT APPLIES AS NEEDED  How many days do you have left? 0  [facility]  Pharmacy phone number (if available)? [phone]  Additional Information for Provider? PT NEEDS REFILL ,IS ALL OUT ,REQUEST   A BIGGER JAR  ---------------------------------------------------------------------------  --------------,  Name of Medication? ciprofloxacin-dexamethasone (CIPRODEX) 0.3-0.1 % otic   suspension  Patient-reported dosage and instructions? 4 DROPS IN EAR 4 X ADAY FOR 7   DAYS  How many days do you have left? 0  [facility]? [facility]   Pharmacy phone number (if available)? [phone]  Additional Information for Provider? PT NEEDS EAR DROPS  ---------------------------------------------------------------------------  --------------  CALL BACK INFO  What is the best way for the office to contact you? OK to leave message on   voicemail  Preferred Call Back Phone Number?  [phone]

## 2021-11-02 ENCOUNTER — TELEPHONE (OUTPATIENT)
Dept: FAMILY MEDICINE CLINIC | Age: 61
End: 2021-11-02

## 2021-11-02 DIAGNOSIS — Z20.822 SUSPECTED COVID-19 VIRUS INFECTION: Primary | ICD-10-CM

## 2021-11-02 NOTE — TELEPHONE ENCOUNTER
----- Message from [address] [place] [address] 1330 sent at 11/2/2021  9:00 AM EDT -----  Subject: Message to Provider    QUESTIONS  Information for Provider? Pt. Bettyann Bon calling is asking if Swimmers   Ear could cause Sore Throat, fever, what can she do for it? Provider   Jaci AMARO 11/1;  ---------------------------------------------------------------------------  --------------  CALL BACK INFO  What is the best way for the office to contact you? OK to leave message on   voicemail  Preferred Call Back Phone Number? [phone]  ---------------------------------------------------------------------------  --------------  SCRIPT ANSWERS  Relationship to Patient?  Self

## 2021-11-02 NOTE — TELEPHONE ENCOUNTER
No, it does not cause sore throat and fever. Please schedule her for a covid test tonight at the covid clinic.

## 2021-11-08 ENCOUNTER — HOSPITAL ENCOUNTER (EMERGENCY)
Age: 61
Discharge: HOME | End: 2021-11-08
Payer: MEDICARE

## 2021-11-08 VITALS
OXYGEN SATURATION: 99 % | TEMPERATURE: 97.7 F | RESPIRATION RATE: 18 BRPM | HEART RATE: 85 BPM | WEIGHT: 216 LBS | DIASTOLIC BLOOD PRESSURE: 83 MMHG | SYSTOLIC BLOOD PRESSURE: 147 MMHG | BODY MASS INDEX: 37.08 KG/M2

## 2021-11-08 DIAGNOSIS — R21 RASH AND OTHER NONSPECIFIC SKIN ERUPTION: Primary | ICD-10-CM

## 2021-11-08 PROCEDURE — 99283 EMERGENCY DEPT VISIT LOW MDM: CPT

## 2021-11-08 PROCEDURE — 6370000000 HC RX 637 (ALT 250 FOR IP): Performed by: PHYSICIAN ASSISTANT

## 2021-11-08 RX ORDER — KETOROLAC TROMETHAMINE 30 MG/ML
15 INJECTION, SOLUTION INTRAMUSCULAR; INTRAVENOUS ONCE
Status: DISCONTINUED | OUTPATIENT
Start: 2021-11-08 | End: 2021-11-08

## 2021-11-08 RX ORDER — CEPHALEXIN 500 MG/1
500 CAPSULE ORAL 4 TIMES DAILY
Qty: 28 CAPSULE | Refills: 0 | Status: SHIPPED | OUTPATIENT
Start: 2021-11-08 | End: 2021-11-15

## 2021-11-08 RX ORDER — ONDANSETRON 4 MG/1
4 TABLET, ORALLY DISINTEGRATING ORAL EVERY 8 HOURS PRN
Qty: 20 TABLET | Refills: 0 | Status: SHIPPED | OUTPATIENT
Start: 2021-11-08 | End: 2021-11-22 | Stop reason: SDUPTHER

## 2021-11-08 RX ORDER — ONDANSETRON 4 MG/1
4 TABLET, ORALLY DISINTEGRATING ORAL ONCE
Status: COMPLETED | OUTPATIENT
Start: 2021-11-08 | End: 2021-11-08

## 2021-11-08 RX ORDER — KETOROLAC TROMETHAMINE 10 MG/1
10 TABLET, FILM COATED ORAL EVERY 6 HOURS PRN
Qty: 20 TABLET | Refills: 0 | Status: SHIPPED | OUTPATIENT
Start: 2021-11-08 | End: 2021-12-30

## 2021-11-08 RX ORDER — KETOROLAC TROMETHAMINE 30 MG/ML
15 INJECTION, SOLUTION INTRAMUSCULAR; INTRAVENOUS ONCE
Status: DISCONTINUED | OUTPATIENT
Start: 2021-11-08 | End: 2021-11-08 | Stop reason: HOSPADM

## 2021-11-08 RX ADMIN — ONDANSETRON 4 MG: 4 TABLET, ORALLY DISINTEGRATING ORAL at 19:43

## 2021-11-08 ASSESSMENT — PAIN DESCRIPTION - ORIENTATION: ORIENTATION: RIGHT

## 2021-11-08 ASSESSMENT — PAIN DESCRIPTION - PAIN TYPE: TYPE: ACUTE PAIN

## 2021-11-08 ASSESSMENT — PAIN DESCRIPTION - LOCATION: LOCATION: ARM

## 2021-11-08 ASSESSMENT — PAIN SCALES - GENERAL: PAINLEVEL_OUTOF10: 10

## 2021-11-08 NOTE — ED PROVIDER NOTES
[facility] Emergency Department    CHIEF COMPLAINT  Rash (CONCERN FOR ALLERGIC REACTION ON R ARM, DENIES SOB/TROUBLE SWALLOWING )      SHARED SERVICE VISIT:  Evaluated by JESSICA. My supervising physician was available for consultation. HISTORY OF PRESENT ILLNESS  Brennen Roman is a 63 y.o. female who presents to the ED complaining of rash on right arm. The patient states she has a history of multiple skin lesions and is in a clinical trial at [facility]. She states that over the past several months she has had increasing difficulty controlling the skin eruptions and thinks it is something in the house. She states [place] old farm house approximately 2 years ago and feels like the air is filled with mold or irritants that are causing her issues. She states recently she has had increasing sores on her right upper extremity that feel like a burning sensation worse when exposed to air. She is putting burn cream on it and wrapping it in paper tissues and states that does help with the pain. She has an appointment with her dermatologist at [facility] on Friday for further evaluation. She states she is unable to take oral steroids due to the clinical trial.  No other complaints, modifying factors or associated symptoms. Nursing notes reviewed.    Past Medical History:   Diagnosis Date    Anxiety     Bipolar 1 disorder ([address]     Depression     Dyspareunia in female 5/16/2015    Elevated transaminase level 11/10/2017    Hemorrhoids 1/15/2015    History of tubal ligation 5/16/2015    Irritable bowel syndrome     Menopause 9/5/2016    Obesity     Obesity (BMI 30-39.9) 3/11/2015    Overactive bladder     Prurigo nodularis     Stress incontinence     Urinary incontinence     Uterine fibroid 5/16/2015    Yeast vaginitis 8/30/2020     Past Surgical History:   Procedure Laterality Date    COLONOSCOPY  2010    polyp removed    COLONOSCOPY  2018    EYE SURGERY Left     Lazy eye  TONSILLECTOMY      TUBAL LIGATION  1994     Family History   Problem Relation Age of Onset    Heart Disease Mother     Diabetes Mother     Arthritis Father     Arthritis Brother     Diabetes Maternal Grandfather     Breast Cancer Paternal Grandmother      Social History     Socioeconomic History    Marital status: Single     Spouse name: Not on file    Number of children: 2    Years of education: 15    Highest education level: Not on file   Occupational History    Occupation: disabilty   Tobacco Use    Smoking status: Never Smoker    Smokeless tobacco: Never Used   Vaping Use    Vaping Use: Never used   Substance and Sexual Activity    Alcohol use: No     Alcohol/week: 0.0 standard drinks    Drug use: No    Sexual activity: Yes     Partners: Male     Birth control/protection: Surgical     Comment: tubal ligation   Other Topics Concern    Not on file   Social History Narrative    Not on file     Social Determinants of Health     Financial Resource Strain: Low Risk     Difficulty of Paying Living Expenses: Not hard at all   Food Insecurity: No Food Insecurity    Worried About Running Out of Food in the Last Year: Never true    Yani of Food in the Last Year: Never true   Transportation Needs:     Lack of Transportation (Medical): Not on file    Lack of Transportation (Non-Medical):  Not on file   Physical Activity:     Days of Exercise per Week: Not on file    Minutes of Exercise per Session: Not on file   Stress:     Feeling of Stress : Not on file   Social Connections:     Frequency of Communication with Friends and Family: Not on file    Frequency of Social Gatherings with Friends and Family: Not on file    Attends Orthodoxy Services: Not on file    Active Member of Clubs or Organizations: Not on file    Attends Club or Organization Meetings: Not on file    Marital Status: Not on file   Intimate Partner Violence:     Fear of Current or Ex-Partner: Not on file   [employer] Abused: Not on file    Physically Abused: Not on file    Sexually Abused: Not on file   Housing Stability:     Unable to Pay for Housing in the Last Year: Not on file    Number of Places Lived in the Last Year: Not on file    Unstable Housing in the Last Year: Not on file     No current facility-administered medications for this encounter. Current Outpatient Medications   Medication Sig Dispense Refill    ciprofloxacin-dexamethasone (CIPRODEX) 0.3-0.1 % otic suspension INSTILL 4  INTO RIGHT EAR TWICE DAILY FOR 7 DAYS 8 mL 0    silver sulfADIAZINE (SSD) 1 % cream APPLY  CREAM TOPICALLY ONCE DAILY 400 g 0    Misc. Devices (SILICONE EAR PLUGS) MISC Use daily as needed for swimming 2 each 0    mupirocin (BACTROBAN) 2 % ointment Apply topically 3 times daily for 7 days 3 g 0    nystatin (MYCOSTATIN) 015966 UNIT/GM powder Apply 2 times daily as needed. 1 each 2    ARIPiprazole (ABILIFY) 20 MG tablet TAKE 1 TABLET BY MOUTH ONCE DAILY IN THE EVENING FOR 30 DAYS 30 tablet 3    scopolamine (TRANSDERM-SCOP) transdermal patch APPLY 1 PATCH TOPICALLY EVERY 72 HOURS      diphenhydrAMINE-zinc acetate (BENADRYL ITCH STOPPING) 1-0.1 % cream Apply topically 3 times daily as needed. 28 g 5    nystatin (MYCOSTATIN) 720738 UNIT/GM powder Apply 2 times daily.  60 Bottle 1    Cholecalciferol (VITAMIN D3) 50 MCG (2000 UT) CAPS TAKE 1 CAPSULE BY MOUTH ONCE DAILY 30 capsule 5    atorvastatin (LIPITOR) 10 MG tablet Take 1 tablet by mouth daily 30 tablet 5    ibuprofen (ADVIL;MOTRIN) 600 MG tablet Take 1 tablet by mouth 3 times daily as needed for Pain 90 tablet 0     Allergies   Allergen Reactions    Bactrim [Sulfamethoxazole-Trimethoprim]      Rash and trouble breathing       REVIEW OF SYSTEMS  6 systems reviewed, pertinent positives per HPI otherwise noted to be negative    PHYSICAL EXAM  BP (!) 147/83   Pulse 85   Temp 97.7 °F (36.5 °C) (Oral)   Resp 18   Wt 216 lb (98 kg)   LMP 01/29/2015   SpO2 99%   BMI 37.08 kg/m²   GENERAL APPEARANCE: Awake and alert. Cooperative. No acute distress. HEAD: Normocephalic. Atraumatic. EYES: PERRL. EOM's grossly intact. ENT: Mucous membranes are moist.   NECK: Supple. Normal ROM. CHEST: Equal symmetric chest rise. LUNGS: Breathing is unlabored. Speaking comfortably in full sentences. Abdomen: Nondistended  EXTREMITIES: MAEE. No acute deformities. SKIN: Warm and dry. Multiple nonspecific open skin eruptions on right forearm, upper arm, back, left upper arm and left forearm. Radial pulses are intact and equal. Sensation is intact. The lesions are in various stages of healing with surrounding swelling or drainage. Palms are spared. NEUROLOGICAL: Alert and oriented. Strength is 5/5 in all extremities and sensation is intact. RADIOLOGY  No results found. ED COURSE  Patient was seen and evaluated in the emergency department for a rash. A discussion was held with the patient regarding possible treatment with oral steroids however she declines at this time as she is unable to participate in the clinical trial if she takes oral steroids or antibiotics. Rash is nonspecific and no obvious signs of surrounding cellulitis although I did agree to trial a short course of Keflex. I discussed using topical hydrocortisone cream and close follow up with her dermatologist.  She did receive Toradol IM while in the emergency department for pain and requested an oral prescription at discharge. Risk management discussed and shared decision making had with patient and/or surrogate. All questions were answered. Patient will follow up with  dermatology for further evaluation/treatment. Patient will return to ED for new/worsening symptoms.     Patient was sent home with a prescription for Keflex and hydrocortisone cream.    MDM    I estimate there is LOW risk for CELLULITIS, COMPARTMENT SYNDROME, NECROTIZING FASCIITIS, TENDON OR NEUROVASCULAR INJURY, or FOREIGN BODY, thus I consider the discharge disposition reasonable. Also, there is no evidence or peritonitis, sepsis, or toxicity. Mahesh Funez and I have discussed the diagnosis and risks, and we agree with discharging home to follow-up with their primary doctor. We also discussed returning to the Emergency Department immediately if new or worsening symptoms occur. We have discussed the symptoms which are most concerning (e.g., changing or worsening pain, fever, numbness, weakness, cool or painful digits) that necessitate immediate return. Final Impression    1. Rash and other nonspecific skin eruption        Discharge Vital Signs:  Blood pressure (!) 147/83, pulse 85, temperature 97.7 °F (36.5 °C), temperature source Oral, resp. rate 18, weight 216 lb (98 kg), last menstrual period 01/29/2015, SpO2 99 %, not currently breastfeeding. DISPOSITION  Patient was discharged to home in good condition.            Femi Coffey  11/11/21 [id number]

## 2021-11-09 ENCOUNTER — CARE COORDINATION (OUTPATIENT)
Dept: CARE COORDINATION | Age: 61
End: 2021-11-09

## 2021-11-10 ENCOUNTER — CARE COORDINATION (OUTPATIENT)
Dept: CARE COORDINATION | Age: 61
End: 2021-11-10

## 2021-11-16 ENCOUNTER — TELEPHONE (OUTPATIENT)
Dept: FAMILY MEDICINE CLINIC | Age: 61
End: 2021-11-16

## 2021-11-16 RX ORDER — POLYETHYLENE GLYCOL 3350 17 G/17G
17 POWDER, FOR SOLUTION ORAL DAILY
Qty: 1530 G | Refills: 1 | Status: SHIPPED | OUTPATIENT
Start: 2021-11-16 | End: 2021-12-16

## 2021-11-16 RX ORDER — BISACODYL 10 MG
10 SUPPOSITORY, RECTAL RECTAL PRN
Qty: 4 SUPPOSITORY | Refills: 0 | Status: SHIPPED | OUTPATIENT
Start: 2021-11-16 | End: 2021-11-22 | Stop reason: SDUPTHER

## 2021-11-16 NOTE — TELEPHONE ENCOUNTER
I sent in miralax- this is a stool softener that she should take daily. I also sent in a dulcolax supp to use if she is unable to have a bowel movement at all. This is not to be used every day.

## 2021-11-16 NOTE — TELEPHONE ENCOUNTER
----- Message from Analy Gerard sent at 11/16/2021  8:12 AM EST -----  Subject: Message to Provider    QUESTIONS  Information for Provider? PT is having having constipation issues and is   wondering what she should take or if something can be prescribed. She said   it is painful and she having to strain a lot. Her  is also   suffering the same discomfort and it actually in the hospital today.  ---------------------------------------------------------------------------  --------------  [employer]  What is the best way for the office to contact you? OK to leave message on   voicemail  Preferred Call Back Phone Number? [phone]  ---------------------------------------------------------------------------  --------------  SCRIPT ANSWERS  Relationship to Patient?  Self

## 2021-11-22 RX ORDER — BISACODYL 10 MG
10 SUPPOSITORY, RECTAL RECTAL PRN
Qty: 4 SUPPOSITORY | Refills: 0 | Status: SHIPPED | OUTPATIENT
Start: 2021-11-22 | End: 2021-12-22

## 2021-11-22 RX ORDER — ONDANSETRON 4 MG/1
4 TABLET, ORALLY DISINTEGRATING ORAL EVERY 8 HOURS PRN
Qty: 20 TABLET | Refills: 0 | Status: SHIPPED | OUTPATIENT
Start: 2021-11-22 | End: 2021-11-29

## 2021-11-22 NOTE — TELEPHONE ENCOUNTER
----- Message from [facility] [place] sent at 11/22/2021  7:56 AM EST -----  Subject: Refill Request    QUESTIONS  Name of Medication? bisacodyl (BISAC-EVAC) 10 MG suppository  Patient-reported dosage and instructions? as needed   How many days do you have left? 2  [facility]  Pharmacy phone number (if available)? [phone]  ---------------------------------------------------------------------------  --------------,  Name of Medication? ondansetron (ZOFRAN ODT) 4 MG disintegrating tablet  Patient-reported dosage and instructions? every 8 hours as needed   How many days do you have left? 8  [facility]erred [facility]? [facility]  Pharmacy phone number (if available)? [phone]  ---------------------------------------------------------------------------  --------------  Jaci PAYTON  What is the best way for the office to contact you? OK to leave message on   voicemail  Preferred Call Back Phone Number?  [phone]

## 2021-11-22 NOTE — TELEPHONE ENCOUNTER
Last Office Visit  -  10/19/21  Next Office Visit  -  12/14/21    Last Filled  -    Last UDS -    Contract -

## 2021-11-30 ENCOUNTER — HOSPITAL ENCOUNTER (EMERGENCY)
Age: 61
Discharge: HOME | End: 2021-11-30
Attending: EMERGENCY MEDICINE
Payer: MEDICARE

## 2021-11-30 VITALS
HEIGHT: 64 IN | TEMPERATURE: 98.6 F | BODY MASS INDEX: 36.88 KG/M2 | DIASTOLIC BLOOD PRESSURE: 92 MMHG | RESPIRATION RATE: 16 BRPM | SYSTOLIC BLOOD PRESSURE: 151 MMHG | HEART RATE: 79 BPM | OXYGEN SATURATION: 93 % | WEIGHT: 216 LBS

## 2021-11-30 DIAGNOSIS — L30.9 DERMATITIS: Primary | ICD-10-CM

## 2021-11-30 PROCEDURE — 99284 EMERGENCY DEPT VISIT MOD MDM: CPT

## 2021-11-30 RX ORDER — PREDNISONE 10 MG/1
TABLET ORAL
Qty: 30 TABLET | Refills: 0 | Status: SHIPPED | OUTPATIENT
Start: 2021-11-30 | End: 2021-11-30 | Stop reason: SDUPTHER

## 2021-11-30 RX ORDER — PREDNISONE 10 MG/1
TABLET ORAL
Qty: 30 TABLET | Refills: 0 | Status: SHIPPED | OUTPATIENT
Start: 2021-11-30 | End: 2021-12-30

## 2021-11-30 RX ORDER — DIPHENHYDRAMINE HCL 25 MG
25 CAPSULE ORAL EVERY 4 HOURS PRN
Qty: 1 CAPSULE | Refills: 0 | Status: SHIPPED | OUTPATIENT
Start: 2021-11-30 | End: 2021-11-30 | Stop reason: SDUPTHER

## 2021-11-30 RX ORDER — DIPHENHYDRAMINE HCL 25 MG
25 TABLET ORAL ONCE
Status: DISCONTINUED | OUTPATIENT
Start: 2021-11-30 | End: 2021-11-30

## 2021-11-30 RX ORDER — DIPHENHYDRAMINE HCL 25 MG
25 CAPSULE ORAL EVERY 6 HOURS PRN
Qty: 40 CAPSULE | Refills: 0 | Status: SHIPPED | OUTPATIENT
Start: 2021-11-30 | End: 2021-12-10

## 2021-11-30 ASSESSMENT — ENCOUNTER SYMPTOMS
COUGH: 0
WHEEZING: 0
DIARRHEA: 0
SHORTNESS OF BREATH: 0
ABDOMINAL PAIN: 0
NAUSEA: 0
EYE PAIN: 0
VOMITING: 0

## 2021-11-30 ASSESSMENT — PAIN SCALES - GENERAL: PAINLEVEL_OUTOF10: 10

## 2021-11-30 ASSESSMENT — PAIN DESCRIPTION - LOCATION: LOCATION: ARM;SHOULDER

## 2021-11-30 ASSESSMENT — PAIN DESCRIPTION - ORIENTATION: ORIENTATION: RIGHT

## 2021-11-30 NOTE — ED PROVIDER NOTES
[address]          ATTENDING PHYSICIAN NOTE       Date of evaluation: 11/30/2021    Chief Complaint     Rash (Mold Exposure.)      History of Present Illness     Malgorzata Michelle is a 63 y.o. female who presents with rash and mold exposure. Patient states that she had had an itchy rash on her body for months to years. Had a mold specialist come to her home in the past couple of days and they told her that she had a large amount of mold in the bathroom. Have encouraged her to be evaluated by a physician for her rash. She has an itching and burning rash that is on her back and arms mostly. No cough or guilty breathing. No fevers. Has been treated with steroids for this previously with some improvement. 10 days ago was treated with an IM Decadron which she states did not really cause any improvement at that time. Is in a research study with the dermatology department at the [facility] for her rash. Is not currently taking anything for itching. Review of Systems     Review of Systems   Constitutional: Negative for chills and fever. HENT: Negative for congestion. Eyes: Negative for pain. Respiratory: Negative for cough, shortness of breath and wheezing. Cardiovascular: Negative for chest pain and leg swelling. Gastrointestinal: Negative for abdominal pain, diarrhea, nausea and vomiting. Genitourinary: Negative for dysuria. Musculoskeletal: Negative for arthralgias. Skin: Positive for rash. Neurological: Negative for weakness and headaches. All other systems reviewed and are negative.       Past Medical, Surgical, Family, and Social History         Diagnosis Date    Anxiety     Bipolar 1 disorder ([facility] [address]     Depression     Dyspareunia in female 5/16/2015    Elevated transaminase level 11/10/2017    Hemorrhoids 1/15/2015    History of tubal ligation 5/16/2015    Irritable bowel syndrome     Menopause 9/5/2016    Obesity     Obesity (BMI 30-39.9) 3/11/2015    Overactive bladder     Prurigo nodularis     Stress incontinence     Urinary incontinence     Uterine fibroid 5/16/2015    Yeast vaginitis 8/30/2020         Procedure Laterality Date    COLONOSCOPY  2010    polyp removed    COLONOSCOPY  2018    EYE SURGERY Left     Lazy eye    TONSILLECTOMY      TUBAL LIGATION  1994     Her family history includes Arthritis in her brother and father; Breast Cancer in her paternal grandmother; Diabetes in her maternal grandfather and mother; Heart Disease in her mother. She reports that she has never smoked. She has never used smokeless tobacco. She reports that she does not drink alcohol and does not use drugs. Medications     Previous Medications    ARIPIPRAZOLE (ABILIFY) 20 MG TABLET    TAKE 1 TABLET BY MOUTH ONCE DAILY IN THE EVENING FOR 30 DAYS    ATORVASTATIN (LIPITOR) 10 MG TABLET    Take 1 tablet by mouth daily    BISACODYL (BISAC-EVAC) 10 MG SUPPOSITORY    Place 1 suppository rectally as needed for Constipation    CHOLECALCIFEROL (VITAMIN D3) 50 MCG (2000 UT) CAPS    TAKE 1 CAPSULE BY MOUTH ONCE DAILY    CIPROFLOXACIN-DEXAMETHASONE (CIPRODEX) 0.3-0.1 % OTIC SUSPENSION    INSTILL 4  INTO RIGHT EAR TWICE DAILY FOR 7 DAYS    DIPHENHYDRAMINE-ZINC ACETATE (BENADRYL ITCH STOPPING) 1-0.1 % CREAM    Apply topically 3 times daily as needed. HYDROCORTISONE 2.5 % CREAM    Apply topically 2 times daily. KETOROLAC (TORADOL) 10 MG TABLET    Take 1 tablet by mouth every 6 hours as needed for Pain    MISC. DEVICES (SILICONE EAR PLUGS) MISC    Use daily as needed for swimming    MUPIROCIN (BACTROBAN) 2 % OINTMENT    Apply topically 3 times daily for 7 days    NYSTATIN (MYCOSTATIN) 695172 UNIT/GM POWDER    Apply 2 times daily. NYSTATIN (MYCOSTATIN) 590943 UNIT/GM POWDER    Apply 2 times daily as needed.     POLYETHYLENE GLYCOL (GLYCOLAX) 17 GM/SCOOP POWDER    Take 17 g by mouth daily    SCOPOLAMINE (TRANSDERM-SCOP) TRANSDERMAL PATCH APPLY 1 PATCH TOPICALLY EVERY 72 HOURS    SILVER SULFADIAZINE (SSD) 1 % CREAM    APPLY  CREAM TOPICALLY ONCE DAILY       Allergies     She is allergic to bactrim [sulfamethoxazole-trimethoprim]. Physical Exam     ED Triage Vitals [21 0250]   Enc Vitals Group      BP (!) 151/92      Pulse 79      Resp 16      Temp 98.6 °F (37 °C)      Temp Source Oral      SpO2 93 %      Weight 216 lb (98 kg)      Height 5' 4\" (1.626 m)      Head Circumference       Peak Flow       Pain Score       Pain Loc       Pain Edu? Excl. in [address]? General:  Non-toxic, no acute distress, fully cooperative with my exam    HEENT:  NCAT    Neck:  Supple    Pulmonary:   No increased work of breathing    Abdomen:  Soft, nontender, nondistended; no focal rebound or guarding     Musculoskeletal:  Grossly intact without obvious injury or deformity    Skin: Diffuse areas of erythema with some punched out lesions of the arms and back as pictures below, no warmth or induration              Diagnostic Results     RADIOLOGY:  No orders to display       LABS:   No results found for this visit on 21. RECENT VITALS:  BP: (!) 151/92, Temp: 98.6 °F (37 °C), Pulse: 79, Resp: 16, SpO2: 93 %     Procedures         ED Course     Nursing Notes, Past Medical Hx, Past Surgical Hx, Social Hx, Allergies, and Family Hx were reviewed. The patient was given the following medications:  Orders Placed This Encounter   Medications    DISCONTD: diphenhydrAMINE (BENADRYL) tablet 25 mg    predniSONE (DELTASONE) 10 MG tablet     Si tablets once daily for 3 days then 3 tablets once daily for 3 days then 2 tablets once daily for 3 days then 1 tablet once daily for 3 days.      Dispense:  30 tablet     Refill:  0    diphenhydrAMINE (BENADRYL) 25 MG capsule     Sig: Take 1 capsule by mouth every 4 hours as needed for Itching     Dispense:  1 capsule     Refill:  0       CONSULTS:  None    MEDICAL DECISION Carmen Bailey / Daryl Moralez / Zan Arciniega Angel Rothman is a 63 y.o. female who presents with dermatitis potentially due to mold exposure. Encouraged to remove herself from the exposure until it can be eradicated from her home. We will do a steroid taper. Add Benadryl for itching. I do not see any particular areas that appear to have bacterial superinfection at this point. Afebrile and appears well. No pulmonary symptoms. Patient encouraged to follow-up with her PCP as well as her dermatologist.    Clinical Impression     1. Dermatitis        Disposition     PATIENT REFERRED TO:  MELANIE Aaron - [place] [address]  [phone]            DISCHARGE MEDICATIONS:  New Prescriptions    DIPHENHYDRAMINE (BENADRYL) 25 MG CAPSULE    Take 1 capsule by mouth every 4 hours as needed for Itching    PREDNISONE (DELTASONE) 10 MG TABLET    4 tablets once daily for 3 days then 3 tablets once daily for 3 days then 2 tablets once daily for 3 days then 1 tablet once daily for 3 days.        DISPOSITION Decision To Discharge 11/30/2021 03:00:50 AM       Unique Natarajan MD  11/30/21 9533

## 2021-11-30 NOTE — ED NOTES
Pt was explained discharge instructions and questions where answered.       Balaji Jerry RN  11/30/21 8101

## 2021-12-01 NOTE — ED NOTES
Date 11/30/2021 at 8:38 PM    I was asked by charge nurse to rewrite her prescription for Benadryl since only 1 tablet have been prescribed. She was represcribed 25 mg Benadryl every 6 hours as needed number 40 tablets.   Was E scribed to Marlen Pantoja MD  11/30/21 2039

## 2021-12-11 ENCOUNTER — NURSE TRIAGE (OUTPATIENT)
Dept: OTHER | Facility: CLINIC | Age: 61
End: 2021-12-11

## 2021-12-11 NOTE — TELEPHONE ENCOUNTER
Received call from MEDICAL [facility] with [employer] Complaint. Brief description of triage: Dizziness for 2 days, no spinning, but feels faint. Triage indicates for patient to see PCP today or tomorrow    Care advice provided, patient verbalizes understanding; denies any other questions or concerns; instructed to call back for any new or worsening symptoms. Writer provided warm transfer to [facility] for appointment scheduling. Attention Provider: Thank you for allowing me to participate in the care of your patient. The patient was connected to triage in response to information provided to the ECC/PSC. Please do not respond through this encounter as the response is not directed to a shared pool. Reason for Disposition   [1] MODERATE dizziness (e.g., interferes with normal activities) AND [2] has NOT been evaluated by physician for this  (Exception: dizziness caused by heat exposure, sudden standing, or poor fluid intake)    Answer Assessment - Initial Assessment Questions  1. DESCRIPTION: \"Describe your dizziness. \"      Feeling faint like she will fall backwards    2. LIGHTHEADED: \"Do you feel lightheaded? \" (e.g., somewhat faint, woozy, weak upon standing)      Somewhat faint, and nasea    3. VERTIGO: \"Do you feel like either you or the room is spinning or tilting? \" (i.e. vertigo)      No    4. SEVERITY: \"How bad is it? \"  \"Do you feel like you are going to faint? \" \"Can you stand and walk? \"    - MILD - walking normally    - MODERATE - interferes with normal activities (e.g., work, school)     - SEVERE - unable to stand, requires support to walk, feels like passing out now. Moderate dizziness    5. ONSET:  \"When did the dizziness begin? \"      2 days ago    6. AGGRAVATING FACTORS: \"Does anything make it worse? \" (e.g., standing, change in head position)      Standing, and turning her head    7. HEART RATE: \"Can you tell me your heart rate? \" \"How many beats in 15 seconds? \"  (Note: not all patients can do this)        N/a    8. CAUSE: \"What do you think is causing the dizziness? \"      Mold in home    9. RECURRENT SYMPTOM: \"Have you had dizziness before? \" If so, ask: \"When was the last time? \" \"What happened that time? \"      No    10. OTHER SYMPTOMS: \"Do you have any other symptoms? \" (e.g., fever, chest pain, vomiting, diarrhea, bleeding)        Constipation and difficulty speaking    11. PREGNANCY: \"Is there any chance you are pregnant? \" \"When was your last menstrual period? \"        N/a    Protocols used: DIZZINESS - LIGHTHEADEDNESS-ADULT-AH

## 2021-12-13 ENCOUNTER — TELEPHONE (OUTPATIENT)
Dept: FAMILY MEDICINE CLINIC | Age: 61
End: 2021-12-13

## 2021-12-13 NOTE — TELEPHONE ENCOUNTER
----- Message from [address] sent at 12/11/2021  9:05 AM EST -----  Subject: Appointment Request    Reason for Call: Semi-Urgent Return from RN Triage    QUESTIONS  Type of Appointment? Established Patient  Reason for appointment request? No appointments available during search  Additional Information for Provider? Namrata Llamas called in and was   triaged by nurse Chiki Baltazar for some dizziness when she stand up feels like   that she is going to faint, she was recommended to be seen in the office   on Monday there was no appt at the time of search please f/u with pt for   appt i did recommend urgent care if symptoms get worse. [place] would like a   VV   ---------------------------------------------------------------------------  --------------  CALL BACK INFO  What is the best way for the office to contact you? OK to leave message on   voicemail  Preferred Call Back Phone Number? [phone]  ---------------------------------------------------------------------------  --------------  SCRIPT ANSWERS  Patient needs to be seen within 3 days? Yes   Nurse Name? Milka  Have you been diagnosed with, awaiting test results for, or told that you   are suspected of having COVID-19 (Coronavirus)? (If patient has tested   negative or was tested as a requirement for work, school, or travel and   not based on symptoms, answer no)? No  Within the past two weeks have you developed any of the following symptoms   (answer no if symptoms have been present longer than 2 weeks or began   more than 2 weeks ago)? Fever or Chills, Cough, Shortness of breath or   difficulty breathing, Loss of taste or smell, Sore throat, Nasal   congestion, Sneezing or runny nose, Fatigue or generalized body aches   (answer no if pain is specific to a body part e.g. back pain), Diarrhea,   Headache? No  Have you had close contact with someone with COVID-19 in the last 14 days?    No  (Service Expert  click yes below to proceed with [employer] As Usual Scheduling)?  Yes

## 2021-12-15 ENCOUNTER — APPOINTMENT (OUTPATIENT)
Dept: GENERAL RADIOLOGY | Age: 61
End: 2021-12-15
Payer: MEDICARE

## 2021-12-15 ENCOUNTER — HOSPITAL ENCOUNTER (EMERGENCY)
Age: 61
Discharge: HOME | End: 2021-12-15
Attending: EMERGENCY MEDICINE
Payer: MEDICARE

## 2021-12-15 VITALS
DIASTOLIC BLOOD PRESSURE: 86 MMHG | WEIGHT: 218 LBS | HEIGHT: 64 IN | BODY MASS INDEX: 37.22 KG/M2 | OXYGEN SATURATION: 97 % | HEART RATE: 79 BPM | SYSTOLIC BLOOD PRESSURE: 150 MMHG | RESPIRATION RATE: 16 BRPM | TEMPERATURE: 98.9 F

## 2021-12-15 DIAGNOSIS — R07.9 CHEST PAIN, UNSPECIFIED TYPE: Primary | ICD-10-CM

## 2021-12-15 LAB
ANION GAP SERPL CALCULATED.3IONS-SCNC: 10 MMOL/L (ref 3–16)
BASOPHILS ABSOLUTE: 0.1 K/UL (ref 0–0.2)
BASOPHILS RELATIVE PERCENT: 0.9 %
BUN BLDV-MCNC: 19 MG/DL (ref 7–20)
CALCIUM SERPL-MCNC: 8.9 MG/DL (ref 8.3–10.6)
CHLORIDE BLD-SCNC: 108 MMOL/L (ref 99–110)
CO2: 23 MMOL/L (ref 21–32)
CREAT SERPL-MCNC: 0.8 MG/DL (ref 0.6–1.1)
EKG ATRIAL RATE: 75 BPM
EKG DIAGNOSIS: NORMAL
EKG P AXIS: 18 DEGREES
EKG P-R INTERVAL: 158 MS
EKG Q-T INTERVAL: 398 MS
EKG QRS DURATION: 86 MS
EKG QTC CALCULATION (BAZETT): 444 MS
EKG R AXIS: 32 DEGREES
EKG T AXIS: 31 DEGREES
EKG VENTRICULAR RATE: 75 BPM
EOSINOPHILS ABSOLUTE: 0.2 K/UL (ref 0–0.6)
EOSINOPHILS RELATIVE PERCENT: 4 %
GFR AFRICAN AMERICAN: >60
GFR NON-AFRICAN AMERICAN: >60
GLUCOSE BLD-MCNC: 103 MG/DL (ref 70–99)
HCT VFR BLD CALC: 40.5 % (ref 36–48)
HEMOGLOBIN: 13.5 G/DL (ref 12–16)
LYMPHOCYTES ABSOLUTE: 1.9 K/UL (ref 1–5.1)
LYMPHOCYTES RELATIVE PERCENT: 30.6 %
MAGNESIUM: 2.2 MG/DL (ref 1.8–2.4)
MCH RBC QN AUTO: 28.1 PG (ref 26–34)
MCHC RBC AUTO-ENTMCNC: 33.3 G/DL (ref 31–36)
MCV RBC AUTO: 84.4 FL (ref 80–100)
MONOCYTES ABSOLUTE: 0.5 K/UL (ref 0–1.3)
MONOCYTES RELATIVE PERCENT: 8 %
NEUTROPHILS ABSOLUTE: 3.5 K/UL (ref 1.7–7.7)
NEUTROPHILS RELATIVE PERCENT: 56.5 %
PDW BLD-RTO: 14.3 % (ref 12.4–15.4)
PHOSPHORUS: 3.6 MG/DL (ref 2.5–4.9)
PLATELET # BLD: 255 K/UL (ref 135–450)
PMV BLD AUTO: 7.4 FL (ref 5–10.5)
POTASSIUM SERPL-SCNC: 4.3 MMOL/L (ref 3.5–5.1)
RBC # BLD: 4.79 M/UL (ref 4–5.2)
SODIUM BLD-SCNC: 141 MMOL/L (ref 136–145)
TROPONIN: <0.01 NG/ML
WBC # BLD: 6.2 K/UL (ref 4–11)

## 2021-12-15 PROCEDURE — 93005 ELECTROCARDIOGRAM TRACING: CPT | Performed by: EMERGENCY MEDICINE

## 2021-12-15 PROCEDURE — 80048 BASIC METABOLIC PNL TOTAL CA: CPT

## 2021-12-15 PROCEDURE — 99283 EMERGENCY DEPT VISIT LOW MDM: CPT

## 2021-12-15 PROCEDURE — 85025 COMPLETE CBC W/AUTO DIFF WBC: CPT

## 2021-12-15 PROCEDURE — 6360000002 HC RX W HCPCS: Performed by: EMERGENCY MEDICINE

## 2021-12-15 PROCEDURE — 2580000003 HC RX 258: Performed by: EMERGENCY MEDICINE

## 2021-12-15 PROCEDURE — 96374 THER/PROPH/DIAG INJ IV PUSH: CPT

## 2021-12-15 PROCEDURE — 84484 ASSAY OF TROPONIN QUANT: CPT

## 2021-12-15 PROCEDURE — 84100 ASSAY OF PHOSPHORUS: CPT

## 2021-12-15 PROCEDURE — 83735 ASSAY OF MAGNESIUM: CPT

## 2021-12-15 PROCEDURE — 71045 X-RAY EXAM CHEST 1 VIEW: CPT

## 2021-12-15 RX ORDER — KETOROLAC TROMETHAMINE 30 MG/ML
15 INJECTION, SOLUTION INTRAMUSCULAR; INTRAVENOUS ONCE
Status: COMPLETED | OUTPATIENT
Start: 2021-12-15 | End: 2021-12-15

## 2021-12-15 RX ORDER — 0.9 % SODIUM CHLORIDE 0.9 %
1000 INTRAVENOUS SOLUTION INTRAVENOUS ONCE
Status: COMPLETED | OUTPATIENT
Start: 2021-12-15 | End: 2021-12-15

## 2021-12-15 RX ORDER — NAPROXEN 500 MG/1
500 TABLET ORAL 2 TIMES DAILY PRN
Qty: 20 TABLET | Refills: 0 | Status: SHIPPED | OUTPATIENT
Start: 2021-12-15 | End: 2021-12-30

## 2021-12-15 RX ORDER — ACETAMINOPHEN 325 MG/1
650 TABLET ORAL EVERY 8 HOURS PRN
Qty: 18 TABLET | Refills: 0 | Status: SHIPPED | OUTPATIENT
Start: 2021-12-15 | End: 2022-01-27 | Stop reason: SDUPTHER

## 2021-12-15 RX ADMIN — SODIUM CHLORIDE 1000 ML: 9 INJECTION, SOLUTION INTRAVENOUS at 05:37

## 2021-12-15 RX ADMIN — KETOROLAC TROMETHAMINE 15 MG: 30 INJECTION, SOLUTION INTRAMUSCULAR at 05:38

## 2021-12-15 ASSESSMENT — ENCOUNTER SYMPTOMS
GASTROINTESTINAL NEGATIVE: 1
RESPIRATORY NEGATIVE: 1
EYES NEGATIVE: 1

## 2021-12-15 ASSESSMENT — PAIN DESCRIPTION - LOCATION: LOCATION: CHEST

## 2021-12-15 ASSESSMENT — PAIN SCALES - GENERAL
PAINLEVEL_OUTOF10: 10
PAINLEVEL_OUTOF10: 10

## 2021-12-15 ASSESSMENT — PAIN DESCRIPTION - PAIN TYPE: TYPE: ACUTE PAIN

## 2021-12-15 NOTE — ED PROVIDER NOTES
[address] [facility] ENCOUNTER          ATTENDING PHYSICIAN NOTE       Date of evaluation: 12/15/2021    Chief Complaint     Chest Pain      History of Present Illness     Judi Keita is a 63 y.o. female who presents to the emergency department complaining of chest pain. Patient states that she has had having pain in her chest over the last several hours. She describes the pain is a sharp pain that will last for 1 to 2 minutes at a time. She denies any radiation of the pain. She denies any fevers or chills. She denies any cough or shortness of breath. She denies any nausea, vomiting, or diarrhea. She denies ever having pain like this in the past.  She has not tried taking anything for her pain. Review of Systems     Review of Systems   Constitutional: Negative. HENT: Negative. Eyes: Negative. Respiratory: Negative. Cardiovascular: Positive for chest pain. Gastrointestinal: Negative. Genitourinary: Negative. Musculoskeletal: Negative. Neurological: Negative. All other systems reviewed and are negative. Past Medical, Surgical, Family, and Social History     She has a past medical history of Anxiety, Bipolar 1 disorder ([address] Depression, Dyspareunia in female, Elevated transaminase level, Hemorrhoids, History of tubal ligation, Hypertension, Irritable bowel syndrome, Menopause, Obesity, Obesity (BMI 30-39.9), Overactive bladder, Prurigo nodularis, Stress incontinence, Urinary incontinence, Uterine fibroid, and Yeast vaginitis. She has a past surgical history that includes Eye surgery (Left); Tonsillectomy; Tubal ligation (1994); Colonoscopy (2010); and Colonoscopy (2018). Her family history includes Arthritis in her brother and father; Breast Cancer in her paternal grandmother; Diabetes in her maternal grandfather and mother; Heart Disease in her mother. She reports that she has never smoked.  She has never used smokeless tobacco. She reports that she does not drink alcohol and does not use drugs. Medications     Previous Medications    ARIPIPRAZOLE (ABILIFY) 20 MG TABLET    TAKE 1 TABLET BY MOUTH ONCE DAILY IN THE EVENING FOR 30 DAYS    ATORVASTATIN (LIPITOR) 10 MG TABLET    Take 1 tablet by mouth daily    BISACODYL (BISAC-EVAC) 10 MG SUPPOSITORY    Place 1 suppository rectally as needed for Constipation    CHOLECALCIFEROL (VITAMIN D3) 50 MCG (2000 UT) CAPS    TAKE 1 CAPSULE BY MOUTH ONCE DAILY    CIPROFLOXACIN-DEXAMETHASONE (CIPRODEX) 0.3-0.1 % OTIC SUSPENSION    INSTILL 4  INTO RIGHT EAR TWICE DAILY FOR 7 DAYS    DIPHENHYDRAMINE-ZINC ACETATE (BENADRYL ITCH STOPPING) 1-0.1 % CREAM    Apply topically 3 times daily as needed. HYDROCORTISONE 2.5 % CREAM    Apply topically 2 times daily. KETOROLAC (TORADOL) 10 MG TABLET    Take 1 tablet by mouth every 6 hours as needed for Pain    MISC. DEVICES (SILICONE EAR PLUGS) MISC    Use daily as needed for swimming    MUPIROCIN (BACTROBAN) 2 % OINTMENT    Apply topically 3 times daily for 7 days    NYSTATIN (MYCOSTATIN) 808275 UNIT/GM POWDER    Apply 2 times daily. NYSTATIN (MYCOSTATIN) 434171 UNIT/GM POWDER    Apply 2 times daily as needed. POLYETHYLENE GLYCOL (GLYCOLAX) 17 GM/SCOOP POWDER    Take 17 g by mouth daily    PREDNISONE (DELTASONE) 10 MG TABLET    4 tablets once daily for 3 days then 3 tablets once daily for 3 days then 2 tablets once daily for 3 days then 1 tablet once daily for 3 days. SCOPOLAMINE (TRANSDERM-SCOP) TRANSDERMAL PATCH    APPLY 1 PATCH TOPICALLY EVERY 72 HOURS    SILVER SULFADIAZINE (SSD) 1 % CREAM    APPLY  CREAM TOPICALLY ONCE DAILY       Allergies     She is allergic to bactrim [sulfamethoxazole-trimethoprim] and antihistamines, chlorpheniramine-type. Physical Exam     INITIAL VITALS: BP: (!) 153/91, Temp: 98.9 °F (37.2 °C), Pulse: 70, Resp: 20, SpO2: 93 %   Physical Exam  Vitals and nursing note reviewed. Constitutional:       General: She is not in acute distress. Appearance: She is obese. HENT:      Head: Normocephalic and atraumatic. Mouth/Throat:      Mouth: Mucous membranes are moist.      Pharynx: No oropharyngeal exudate. Eyes:      General: No scleral icterus. Extraocular Movements: Extraocular movements intact. Conjunctiva/sclera: Conjunctivae normal.      Pupils: Pupils are equal, round, and reactive to light. Cardiovascular:      Rate and Rhythm: Normal rate and regular rhythm. Heart sounds: Normal heart sounds. Pulmonary:      Effort: Pulmonary effort is normal.      Breath sounds: Normal breath sounds. No wheezing, rhonchi or rales. Comments: Reproducible tenderness along the left costal margin with no crepitus or step-offs noted. Chest:      Chest wall: Tenderness present. Abdominal:      General: Bowel sounds are normal.      Palpations: Abdomen is soft. Tenderness: There is no abdominal tenderness. There is no guarding or rebound. Musculoskeletal:         General: No swelling. Normal range of motion. Cervical back: Normal range of motion and neck supple. Skin:     General: Skin is warm and dry. Neurological:      General: No focal deficit present. Mental Status: She is alert and oriented to person, place, and time. Cranial Nerves: No cranial nerve deficit. Motor: No weakness. Coordination: Coordination normal.         Diagnostic Results     EKG   EKG as interpreted by me shows patient to be in a normal sinus rhythm with a normal axis, normal PA and QT intervals, normal QRS duration, nonspecific ST changes in lead III only. No EKGs available for comparison. RADIOLOGY:  XR CHEST PORTABLE   Final Result   1. No active airspace disease   2.  Cardiac silhouette is stable, accentuated in the portable projection          LABS:   Results for orders placed or performed during the hospital encounter of 12/15/21   CBC auto differential   Result Value Ref Range    WBC 6.2 4.0 - 11.0 K/uL    RBC 4.79 4.00 - 5.20 M/uL    Hemoglobin 13.5 12.0 - 16.0 g/dL    Hematocrit 40.5 36.0 - 48.0 %    MCV 84.4 80.0 - 100.0 fL    MCH 28.1 26.0 - 34.0 pg    MCHC 33.3 31.0 - 36.0 g/dL    RDW 14.3 12.4 - 15.4 %    Platelets 427 166 - 062 K/uL    MPV 7.4 5.0 - 10.5 fL    Neutrophils % 56.5 %    Lymphocytes % 30.6 %    Monocytes % 8.0 %    Eosinophils % 4.0 %    Basophils % 0.9 %    Neutrophils Absolute 3.5 1.7 - 7.7 K/uL    Lymphocytes Absolute 1.9 1.0 - 5.1 K/uL    Monocytes Absolute 0.5 0.0 - 1.3 K/uL    Eosinophils Absolute 0.2 0.0 - 0.6 K/uL    Basophils Absolute 0.1 0.0 - 0.2 K/uL   Basic Metabolic Panel (EP - 1)   Result Value Ref Range    Sodium 141 136 - 145 mmol/L    Potassium 4.3 3.5 - 5.1 mmol/L    Chloride 108 99 - 110 mmol/L    CO2 23 21 - 32 mmol/L    Anion Gap 10 3 - 16    Glucose 103 (H) 70 - 99 mg/dL    BUN 19 7 - 20 mg/dL    CREATININE 0.8 0.6 - 1.1 mg/dL    GFR Non-African American >60 >60    GFR African American >60 >60    Calcium 8.9 8.3 - 10.6 mg/dL   Magnesium   Result Value Ref Range    Magnesium 2.20 1.80 - 2.40 mg/dL   Phosphorus   Result Value Ref Range    Phosphorus 3.6 2.5 - 4.9 mg/dL   Troponin   Result Value Ref Range    Troponin <0.01 <0.01 ng/mL   EKG 12 Lead   Result Value Ref Range    Ventricular Rate 75 BPM    Atrial Rate 75 BPM    P-R Interval 158 ms    QRS Duration 86 ms    Q-T Interval 398 ms    QTc Calculation (Bazett) 444 ms    P Axis 18 degrees    R Axis 32 degrees    T Axis 31 degrees    Diagnosis       EKG performed in ER and to be interpreted by ER physician. Confirmed by MD, ER ([id number]),  Samra Cotto ([id number]) on 12/15/2021 5:48:58 AM       RECENT VITALS:  BP: (!) 150/86,Temp: 98.9 °F (37.2 °C), Pulse: 79, Resp: 16, SpO2: 97 %     Procedures     N/A    ED Course     Nursing Notes, Past Medical Hx, Past Surgical Hx, Social Hx,Allergies, and Family Hx were reviewed.     patient was given the following medications:  Orders Placed This Encounter   Medications    ketorolac (TORADOL) injection 15 mg    0.9 % sodium chloride bolus    naproxen (NAPROSYN) 500 MG tablet     Sig: Take 1 tablet by mouth 2 times daily as needed for Pain     Dispense:  20 tablet     Refill:  0       CONSULTS:  None    MEDICAL DECISIONMAKING / ASSESSMENT / Petra Clayton is a 63 y.o. female who presents complaining of chest pain. Patient describes atypical chest pain with it being sharp and not related to exertion. She does have reproducible tenderness present on exam.  She has clear breath sounds and normal heart sounds. EKG shows no acute ischemic abnormalities. Chest x-ray is unremarkable. Laboratory studies are unremarkable including negative troponin. I feel most likely patient symptoms are secondary to costochondritis with her reproducibility of the pain. She did recently receive her Covid booster so it could be inflammation secondary to this. Patient will be placed on anti-inflammatories and will be instructed to follow-up with her primary care provider. Clinical Impression     1.  Chest pain, unspecified type        Disposition     PATIENT REFERRED TO:  Jin Turner, APRN - [facility]  [address] [place] [address]  [phone]            DISCHARGE MEDICATIONS:  New Prescriptions    NAPROXEN (NAPROSYN) 500 MG TABLET    Take 1 tablet by mouth 2 times daily as needed for Pain       DISPOSITION Decision To Discharge 12/15/2021 07:10:24 AM        Janelle Caba MD  12/15/21 3986

## 2021-12-15 NOTE — ED NOTES
Bed: A06-06  Expected date:   Expected time:   Means of arrival:   Comments:  Shawnee Jose RN  12/15/21 8957

## 2021-12-23 ENCOUNTER — TELEPHONE (OUTPATIENT)
Dept: FAMILY MEDICINE CLINIC | Age: 61
End: 2021-12-23

## 2021-12-23 RX ORDER — BUSPIRONE HYDROCHLORIDE 10 MG/1
10 TABLET ORAL 3 TIMES DAILY
Qty: 90 TABLET | Refills: 0 | Status: SHIPPED | OUTPATIENT
Start: 2021-12-23 | End: 2022-01-22
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
## 2021-12-23 NOTE — TELEPHONE ENCOUNTER
Appointment on 12/30/21 is with Keith Nvees. Patient's son has an appointment for a PE with Marcia on 12/30/21. Can something be called in or advise given before 12/30/21 appointment?

## 2021-12-23 NOTE — TELEPHONE ENCOUNTER
I sent in Buspar to take as needed three times a day for anxiety.  It can cause drowsniess so do not take while driving

## 2021-12-26 DIAGNOSIS — E78.00 HYPERCHOLESTEREMIA: ICD-10-CM

## 2021-12-27 RX ORDER — ATORVASTATIN CALCIUM 10 MG/1
TABLET, FILM COATED ORAL
Qty: 90 TABLET | Refills: 0 | Status: SHIPPED | OUTPATIENT
Start: 2021-12-27 | End: 2021-12-30

## 2021-12-30 ENCOUNTER — OFFICE VISIT (OUTPATIENT)
Dept: FAMILY MEDICINE CLINIC | Age: 61
End: 2021-12-30
Payer: COMMERCIAL

## 2021-12-30 VITALS
SYSTOLIC BLOOD PRESSURE: 118 MMHG | OXYGEN SATURATION: 97 % | BODY MASS INDEX: 36.54 KG/M2 | WEIGHT: 214 LBS | DIASTOLIC BLOOD PRESSURE: 80 MMHG | HEIGHT: 64 IN | HEART RATE: 88 BPM

## 2021-12-30 DIAGNOSIS — E66.01 SEVERE OBESITY (BMI 35.0-35.9 WITH COMORBIDITY) (HCC): ICD-10-CM

## 2021-12-30 DIAGNOSIS — L23.89 ALLERGIC CONTACT DERMATITIS DUE TO OTHER AGENTS: ICD-10-CM

## 2021-12-30 DIAGNOSIS — R06.02 SHORTNESS OF BREATH: ICD-10-CM

## 2021-12-30 DIAGNOSIS — E78.00 HYPERCHOLESTEREMIA: ICD-10-CM

## 2021-12-30 DIAGNOSIS — Z00.00 ENCOUNTER FOR WELL ADULT EXAM WITHOUT ABNORMAL FINDINGS: Primary | ICD-10-CM

## 2021-12-30 DIAGNOSIS — Z00.01 ENCOUNTER FOR WELL ADULT EXAM WITH ABNORMAL FINDINGS: ICD-10-CM

## 2021-12-30 PROCEDURE — 3017F COLORECTAL CA SCREEN DOC REV: CPT | Performed by: REGISTERED NURSE

## 2021-12-30 PROCEDURE — 99212 OFFICE O/P EST SF 10 MIN: CPT | Performed by: REGISTERED NURSE

## 2021-12-30 PROCEDURE — G8484 FLU IMMUNIZE NO ADMIN: HCPCS | Performed by: REGISTERED NURSE

## 2021-12-30 PROCEDURE — G8427 DOCREV CUR MEDS BY ELIG CLIN: HCPCS | Performed by: REGISTERED NURSE

## 2021-12-30 PROCEDURE — 99396 PREV VISIT EST AGE 40-64: CPT | Performed by: REGISTERED NURSE

## 2021-12-30 PROCEDURE — 1036F TOBACCO NON-USER: CPT | Performed by: REGISTERED NURSE

## 2021-12-30 PROCEDURE — G8417 CALC BMI ABV UP PARAM F/U: HCPCS | Performed by: REGISTERED NURSE

## 2021-12-30 RX ORDER — ATORVASTATIN CALCIUM 10 MG/1
10 TABLET, FILM COATED ORAL DAILY
Qty: 90 TABLET | Refills: 1 | Status: SHIPPED | OUTPATIENT
Start: 2021-12-30 | End: 2022-05-06

## 2021-12-30 RX ORDER — ALBUTEROL SULFATE 90 UG/1
2 AEROSOL, METERED RESPIRATORY (INHALATION) 4 TIMES DAILY PRN
Qty: 18 G | Refills: 1 | Status: CANCELLED | OUTPATIENT
Start: 2021-12-30

## 2021-12-30 RX ORDER — CAMPHOR 0.45 %
GEL (GRAM) TOPICAL
Qty: 28 G | Refills: 5 | Status: SHIPPED | OUTPATIENT
Start: 2021-12-30

## 2021-12-30 RX ORDER — TRIAMCINOLONE ACETONIDE 1 MG/G
CREAM TOPICAL
COMMUNITY
Start: 2021-12-23 | End: 2021-12-30

## 2021-12-30 RX ORDER — ALBUTEROL SULFATE 90 UG/1
2 AEROSOL, METERED RESPIRATORY (INHALATION) 4 TIMES DAILY PRN
Qty: 18 G | Refills: 1 | Status: SHIPPED | OUTPATIENT
Start: 2021-12-30 | End: 2022-11-03 | Stop reason: SDUPTHER

## 2021-12-30 ASSESSMENT — ENCOUNTER SYMPTOMS
BACK PAIN: 0
SHORTNESS OF BREATH: 1
GASTROINTESTINAL NEGATIVE: 1
COUGH: 0
WHEEZING: 0
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
## 2021-12-30 NOTE — PATIENT INSTRUCTIONS
Well Visit, Women 48 to 72: Care Instructions  Overview     Well visits can help you stay healthy. Your doctor has checked your overall health and may have suggested ways to take good care of yourself. Your doctor also may have recommended tests. At home, you can help prevent illness with healthy eating, regular exercise, and other steps. Follow-up care is a key part of your treatment and safety. Be sure to make and go to all appointments, and call your doctor if you are having problems. It's also a good idea to know your test results and keep a list of the medicines you take. How can you care for yourself at home? · Get screening tests that you and your doctor decide on. Screening helps find diseases before any symptoms appear. · Eat healthy foods. Choose fruits, vegetables, whole grains, protein, and low-fat dairy foods. Limit fat, especially saturated fat. Reduce salt in your diet. · Limit alcohol. Have no more than 1 drink a day or 7 drinks a week. · Get at least 30 minutes of exercise on most days of the week. Walking is a good choice. You also may want to do other activities, such as running, swimming, cycling, or playing tennis or team sports. · Reach and stay at a healthy weight. This will lower your risk for many problems, such as obesity, diabetes, heart disease, and high blood pressure. · Do not smoke. Smoking can make health problems worse. If you need help quitting, talk to your doctor about stop-smoking programs and medicines. These can increase your chances of quitting for good. · Care for your mental health. It is easy to get weighed down by worry and stress. Learn strategies to manage stress, like deep breathing and mindfulness, and stay connected with your family and community. If you find you often feel sad or hopeless, talk with your doctor. Treatment can help. · Talk to your doctor about whether you have any risk factors for sexually transmitted infections (STIs).  You can help prevent STIs if you wait to have sex with a new partner (or partners) until you've each been tested for STIs. It also helps if you use condoms (male or female condoms) and if you limit your sex partners to one person who only has sex with you. Vaccines are available for some STIs. · If you think you may have a problem with alcohol or drug use, talk to your doctor. This includes prescription medicines (such as amphetamines and opioids) and illegal drugs (such as cocaine and methamphetamine). Your doctor can help you figure out what type of treatment is best for you. · Protect your skin from too much sun. When you're outdoors from 10 a.m. to 4 p.m., stay in the shade or cover up with clothing and a hat with a wide brim. Wear sunglasses that block UV rays. Even when it's cloudy, put broad-spectrum sunscreen (SPF 30 or higher) on any exposed skin. · See a dentist one or two times a year for checkups and to have your teeth cleaned. · Wear a seat belt in the car. When should you call for help? Watch closely for changes in your health, and be sure to contact your doctor if you have any problems or symptoms that concern you. Where can you learn more? Go to [email or web address].[email or web address] org and sign in to your [employer] account. Enter F086 in the [facility] box to learn more about \"Well Visit, Women 50 to 72: Care Instructions. \"     If you do not have an account, please click on the \"Sign Up Now\" link. Current as of: October 6, 2021               Content Version: 13.1  © 2463-3056 Healthwise, Incorporated. Care instructions adapted under license by [facility] ([facility]). If you have questions about a medical condition or this instruction, always ask your healthcare professional. [place] [address] any warranty or liability for your use of this information.     Patient Education         Sleep Problems: Make a Plan to Power Down (02:34)  Your health professional recommends that you watch this short online health video. Take steps to reduce your technology use before bed. Purpose:  Provides suggestions on how to reduce technology use before bed. Goal:  The user will make a plan for reducing technology use before bed. How to watch the video    Scan the QR code   OR Visit the website    https://hwi. se/r/S3wiypzmgr823   Current as of: June 21, 2021               Content Version: 13.1  © 2006-2021 Healthwise, Incorporated. Care instructions adapted under license by [facility] ([facility]). If you have questions about a medical condition or this instruction, always ask your healthcare professional. [place] [address] any warranty or liability for your use of this information. Make sure your room is dark, no screens in your bedroom, turn off phone 1 hour prior to bedtime. Try melatonin 30-60 minutes before bedtime. Avoid mold. Use benadryl itch cream for itchy skin.

## 2022-01-27 RX ORDER — ACETAMINOPHEN 325 MG/1
650 TABLET ORAL EVERY 8 HOURS PRN
Qty: 60 TABLET | Refills: 0 | Status: SHIPPED | OUTPATIENT
Start: 2022-01-27

## 2022-01-27 NOTE — TELEPHONE ENCOUNTER
----- Message from Shay Gallo sent at 1/27/2022 10:03 AM EST -----  Subject: Refill Request    QUESTIONS  Name of Medication? acetaminophen (AMINOFEN) 325 MG tablet  Patient-reported dosage and instructions? every 8 hours  How many days do you have left? 0  Preferred Pharmacy? [facility]  Pharmacy phone number (if available)? [phone]  Additional Information for Provider? please reach out to pt when it is   sent over. ---------------------------------------------------------------------------  --------------  Alondra PAYTON  What is the best way for the office to contact you? OK to leave message on   voicemail  Preferred Call Back Phone Number?  [phone]

## 2022-01-31 ENCOUNTER — NURSE TRIAGE (OUTPATIENT)
Dept: OTHER | Facility: CLINIC | Age: 62
End: 2022-01-31

## 2022-01-31 NOTE — TELEPHONE ENCOUNTER
Alternate tylenol and Ibuprofen for pain and keep icing it off and on. Let me know if pain worsens. She likely needs to give it some time since she fell. If the pain worsens the I would recommend an Xray.

## 2022-01-31 NOTE — TELEPHONE ENCOUNTER
Received call from [place] at [facility], caller not on line. Complaint:  tripped up steps pain in right hip,  buttocks and stubbed toe    Market:  [place] Name: [facility] telephone number verified as [phone]    Connected with caller via phone, please see below triage       Subjective: Caller states \"steps are steep to basement. Coming up the steps luna was hanging down tripped. Fell on R  hip, stubbed toe. Was throbbing - now stopped hurting. \"     Current Symptoms: right hip pain pt reports redness at site no bruising or swelling. Tender to touch. Pt is able to ambulate. Pt reports stubbing right greater toe was throbbing has stopped. Pt is not on blood thinners. Pt reports does not have osteoporosis. Pt reports light red spot site , tender to touch. Pain now radiating down right leg    Onset: this morning    Associated Symptoms: NA    Pain Severity: 6/10; tender; intermittent pt reports standing makes pain worse    Temperature:  Denies     What has been tried: applied ice after fall     LMP: NA Pregnant: No    Recommended disposition: SEE IN OFFICE TODAY OR TOMORROW:   * IF NO AVAILABLE OFFICE APPOINTMENTS: You need to be seen in an Urgent [facility]. . Go there today. A nearby Urgent Care Center is often a good source of care. Care advice provided, patient verbalizes understanding; denies any other questions or concerns; instructed to call back for any new or worsening symptoms. Writer provided warm transfer to [facility] for appointment scheduling     Attention Provider: Thank you for allowing me to participate in the care of your patient. The patient was connected to triage in response to information provided to the ECC/PSC. Please do not respond through this encounter as the response is not directed to a shared pool.       Reason for Disposition   Large swelling or bruise and size > palm of person's hand     Patient reports prior use of steroids . Pt does reports redness and tenderness at site like a lump ? Hematoma?     Protocols used: HIP INJURY-ADULT-[place]

## 2022-01-31 NOTE — TELEPHONE ENCOUNTER
Patient is scheduled for 2/11/22 with Marcia to come in with son. She has pain traveling from her left hip down her leg. She said that walking is only slightly difficult. She did ice her hip after the fall and took some Tylenol. Please advise.

## 2022-02-07 ENCOUNTER — TELEPHONE (OUTPATIENT)
Dept: FAMILY MEDICINE CLINIC | Age: 62
End: 2022-02-07

## 2022-02-07 NOTE — TELEPHONE ENCOUNTER
Patient would like to let Marcia know that she is no longer seeing [facility]. They said they are no longer taking her insurance.  Mukesh Colinelizabethjennifer also wanted to know if her ad her son were allowed to see the same psychiatrist?  Please advise  [phone]

## 2022-02-08 NOTE — TELEPHONE ENCOUNTER
Ok both patients were scheduled for next month. At the moment, Mom states that she is clearing up issues with her previous Psychiatrist regarding billing.

## 2022-02-11 ENCOUNTER — NURSE TRIAGE (OUTPATIENT)
Dept: OTHER | Facility: CLINIC | Age: 62
End: 2022-02-11

## 2022-02-11 ENCOUNTER — OFFICE VISIT (OUTPATIENT)
Dept: FAMILY MEDICINE CLINIC | Age: 62
End: 2022-02-11
Payer: MEDICARE

## 2022-02-11 VITALS
SYSTOLIC BLOOD PRESSURE: 134 MMHG | WEIGHT: 210 LBS | OXYGEN SATURATION: 97 % | HEIGHT: 64 IN | BODY MASS INDEX: 35.85 KG/M2 | HEART RATE: 85 BPM | DIASTOLIC BLOOD PRESSURE: 70 MMHG

## 2022-02-11 DIAGNOSIS — B37.9 YEAST INFECTION: ICD-10-CM

## 2022-02-11 DIAGNOSIS — M25.551 RIGHT HIP PAIN: ICD-10-CM

## 2022-02-11 DIAGNOSIS — R30.0 DYSURIA: Primary | ICD-10-CM

## 2022-02-11 LAB
BILIRUBIN, POC: NORMAL
BLOOD URINE, POC: NORMAL
CLARITY, POC: NORMAL
COLOR, POC: YELLOW
GLUCOSE URINE, POC: NORMAL
KETONES, POC: NORMAL
LEUKOCYTE EST, POC: NORMAL
NITRITE, POC: NORMAL
PH, POC: 5.5
PROTEIN, POC: NORMAL
SPECIFIC GRAVITY, POC: >1.03
UROBILINOGEN, POC: 0.2

## 2022-02-11 PROCEDURE — 99214 OFFICE O/P EST MOD 30 MIN: CPT | Performed by: NURSE PRACTITIONER

## 2022-02-11 PROCEDURE — 81002 URINALYSIS NONAUTO W/O SCOPE: CPT | Performed by: NURSE PRACTITIONER

## 2022-02-11 RX ORDER — NITROFURANTOIN 25; 75 MG/1; MG/1
100 CAPSULE ORAL 2 TIMES DAILY
Qty: 14 CAPSULE | Refills: 0 | Status: SHIPPED | OUTPATIENT
Start: 2022-02-11 | End: 2022-02-18

## 2022-02-11 RX ORDER — NYSTATIN 100000 [USP'U]/G
POWDER TOPICAL
Qty: 60 G | Refills: 1 | Status: SHIPPED | OUTPATIENT
Start: 2022-02-11 | End: 2022-11-03 | Stop reason: SDUPTHER

## 2022-02-11 ASSESSMENT — ENCOUNTER SYMPTOMS
SHORTNESS OF BREATH: 0
COUGH: 0
WHEEZING: 0
GASTROINTESTINAL NEGATIVE: 1
RESPIRATORY NEGATIVE: 1

## 2022-02-11 NOTE — PROGRESS NOTES
Patient: Margarita Block is a 63 y.o. female who presents today with the following Chief Complaint(s):  Chief Complaint   Patient presents with    Hip Pain     Had a fall. Right Hip     Urinary Tract Infection     Rash        Assessment:  Encounter Diagnoses   Name Primary?  Dysuria Yes    Yeast infection     Right hip pain        Plan:  1. Dysuria  Urine dip positive for blood and leuks, will treat with macrobid for UTI and send a culture. - POCT Urinalysis no Micro  - nitrofurantoin, macrocrystal-monohydrate, (MACROBID) 100 MG capsule; Take 1 capsule by mouth 2 times daily for 7 days  Dispense: 14 capsule; Refill: 0  - Culture, Urine    2. Yeast infection  Will treat with nystatin TID.   - nystatin (MYCOSTATIN) 729686 UNIT/GM powder; Apply 3 times daily. Dispense: 60 g; Refill: 1    3. Right hip pain  Stable, should continue to heal on its own. Monitor for now. HPI  Patient has concerns today of a possible UTI and a rash in the left groin. She states it is red and very painful to walk. She states the rash has been there for a couple of days. She has pressure and burning with urination as well. She also fell recently on the steps and has a bruise to the right hip. She has not taken anything OTC. Current Outpatient Medications   Medication Sig Dispense Refill    nystatin (MYCOSTATIN) 319754 UNIT/GM powder Apply 3 times daily. 60 g 1    nitrofurantoin, macrocrystal-monohydrate, (MACROBID) 100 MG capsule Take 1 capsule by mouth 2 times daily for 7 days 14 capsule 0    ARIPiprazole (ABILIFY) 20 MG tablet TAKE 1 TABLET BY MOUTH ONCE DAILY IN THE EVENING FOR 30 DAYS 30 tablet 5    acetaminophen (AMINOFEN) 325 MG tablet Take 2 tablets by mouth every 8 hours as needed for Pain 60 tablet 0    diphenhydrAMINE-zinc acetate (BENADRYL ITCH STOPPING) 1-0.1 % cream Apply topically 3 times daily as needed.  28 g 5    atorvastatin (LIPITOR) 10 MG tablet Take 1 tablet by mouth daily 90 tablet 1    albuterol sulfate HFA (VENTOLIN HFA) 108 (90 Base) MCG/ACT inhaler Inhale 2 puffs into the lungs 4 times daily as needed for Wheezing or Shortness of Breath 18 g 1    Misc. Devices (SILICONE EAR PLUGS) MISC Use daily as needed for swimming 2 each 0    scopolamine (TRANSDERM-SCOP) transdermal patch APPLY 1 PATCH TOPICALLY EVERY 72 HOURS      Cholecalciferol (VITAMIN D3) 50 MCG (2000 UT) CAPS TAKE 1 CAPSULE BY MOUTH ONCE DAILY 30 capsule 5     No current facility-administered medications for this visit. Patient's past medical history, surgical history, family history, medications,and allergies  were all reviewed and updated as appropriate today. Review of Systems   Constitutional: Negative. HENT: Negative. Respiratory: Negative. Negative for cough, shortness of breath and wheezing. Cardiovascular: Negative. Negative for chest pain and palpitations. Gastrointestinal: Negative. Physical Exam  Vitals reviewed. Abdominal:      Tenderness: There is no right CVA tenderness or left CVA tenderness. Genitourinary:     Labia:         Left: Rash and tenderness present. Comments: Red, moist, tender in groin. Skin:     General: Skin is warm and dry. Comments: Healing bruise to left hip. Neurological:      Mental Status: She is alert. Vitals:    02/11/22 1430   BP: 134/70   Pulse: 85   SpO2: 97%       This chart was generated using the Dragon dictation system. I created this record but it may contain dictation errors due to the limitation of the software.

## 2022-02-11 NOTE — TELEPHONE ENCOUNTER
Received call from [place] at [facility] with [employer]. Subjective: Caller states \"I can't take the Macrobid. They usually give me Amoxicillin. \"     Patient seen by PCP today for UTI but reports in the past she has had a bad reaction to the antibiotic Macrobid that she was prescribed. She reports it makes her very nauseated and \"really sick\". Advised patient that since the office is now closed to do an E-visit via [address] or go to [facility] in order to obtain new antibiotic RX; v/u.      Denies any other questions or concerns; instructed to call back for any new or worsening symptoms. Attention Provider: Thank you for allowing me to participate in the care of your patient. The patient was connected to triage in response to information provided to the ECC/PSC. Please do not respond through this encounter as the response is not directed to a shared pool.         Reason for Disposition   Health Information question, no triage required and triager able to answer question    Protocols used: INFORMATION ONLY CALL - NO [facility]

## 2022-02-13 LAB — URINE CULTURE, ROUTINE: NORMAL

## 2022-02-14 ENCOUNTER — TELEPHONE (OUTPATIENT)
Dept: FAMILY MEDICINE CLINIC | Age: 62
End: 2022-02-14

## 2022-02-14 NOTE — TELEPHONE ENCOUNTER
This was already taken care of previously.  Patient was sent to urgent care and seen there and will speak with research study from now on per patient

## 2022-02-28 ENCOUNTER — TELEPHONE (OUTPATIENT)
Dept: FAMILY MEDICINE CLINIC | Age: 62
End: 2022-02-28

## 2022-02-28 NOTE — TELEPHONE ENCOUNTER
----- Message from Janis Garcia sent at 2/28/2022  8:59 AM EST -----  Subject: Message to Provider    QUESTIONS  Information for Provider? Having episode of constipation. Took otc miralax   and wants to know if that is ok or is there something else she should   take. Also noted a little blood on the paper when she wiped.   ---------------------------------------------------------------------------  --------------  CALL BACK INFO  What is the best way for the office to contact you? OK to leave message on   voicemail  Preferred Call Back Phone Number? [phone]  ---------------------------------------------------------------------------  --------------  SCRIPT ANSWERS  Relationship to Patient?  Self

## 2022-03-11 ENCOUNTER — OFFICE VISIT (OUTPATIENT)
Dept: PSYCHIATRY | Age: 62
End: 2022-03-11
Payer: COMMERCIAL

## 2022-03-11 VITALS
WEIGHT: 212.2 LBS | HEART RATE: 77 BPM | DIASTOLIC BLOOD PRESSURE: 76 MMHG | SYSTOLIC BLOOD PRESSURE: 120 MMHG | BODY MASS INDEX: 36.42 KG/M2

## 2022-03-11 DIAGNOSIS — F41.1 GAD (GENERALIZED ANXIETY DISORDER): ICD-10-CM

## 2022-03-11 DIAGNOSIS — F33.41 MDD (MAJOR DEPRESSIVE DISORDER), RECURRENT, IN PARTIAL REMISSION (HCC): Primary | ICD-10-CM

## 2022-03-11 PROCEDURE — 99205 OFFICE O/P NEW HI 60 MIN: CPT | Performed by: NURSE PRACTITIONER

## 2022-03-26 ENCOUNTER — HOSPITAL ENCOUNTER (EMERGENCY)
Age: 62
Discharge: HOME | End: 2022-03-26
Attending: EMERGENCY MEDICINE
Payer: COMMERCIAL

## 2022-03-26 ENCOUNTER — NURSE TRIAGE (OUTPATIENT)
Dept: OTHER | Facility: CLINIC | Age: 62
End: 2022-03-26

## 2022-03-26 ENCOUNTER — APPOINTMENT (OUTPATIENT)
Dept: GENERAL RADIOLOGY | Age: 62
End: 2022-03-26
Payer: COMMERCIAL

## 2022-03-26 VITALS
RESPIRATION RATE: 22 BRPM | BODY MASS INDEX: 36.05 KG/M2 | OXYGEN SATURATION: 98 % | DIASTOLIC BLOOD PRESSURE: 93 MMHG | HEART RATE: 57 BPM | TEMPERATURE: 97.4 F | SYSTOLIC BLOOD PRESSURE: 154 MMHG | WEIGHT: 210 LBS

## 2022-03-26 DIAGNOSIS — R07.9 CHEST PAIN, UNSPECIFIED TYPE: ICD-10-CM

## 2022-03-26 DIAGNOSIS — R10.84 GENERALIZED ABDOMINAL PAIN: Primary | ICD-10-CM

## 2022-03-26 DIAGNOSIS — R11.0 NAUSEA: ICD-10-CM

## 2022-03-26 LAB
A/G RATIO: 1 (ref 1.1–2.2)
ALBUMIN SERPL-MCNC: 4.1 G/DL (ref 3.4–5)
ALP BLD-CCNC: 91 U/L (ref 40–129)
ALT SERPL-CCNC: 35 U/L (ref 10–40)
ANION GAP SERPL CALCULATED.3IONS-SCNC: 12 MMOL/L (ref 3–16)
AST SERPL-CCNC: 34 U/L (ref 15–37)
BACTERIA: ABNORMAL /HPF
BASOPHILS ABSOLUTE: 0 K/UL (ref 0–0.2)
BASOPHILS RELATIVE PERCENT: 0.4 %
BILIRUB SERPL-MCNC: 0.4 MG/DL (ref 0–1)
BILIRUBIN URINE: NEGATIVE
BLOOD, URINE: NEGATIVE
BUN BLDV-MCNC: 16 MG/DL (ref 7–20)
CALCIUM SERPL-MCNC: 9.6 MG/DL (ref 8.3–10.6)
CHLORIDE BLD-SCNC: 105 MMOL/L (ref 99–110)
CLARITY: CLEAR
CO2: 19 MMOL/L (ref 21–32)
COLOR: YELLOW
CREAT SERPL-MCNC: 0.8 MG/DL (ref 0.6–1.1)
EOSINOPHILS ABSOLUTE: 0.1 K/UL (ref 0–0.6)
EOSINOPHILS RELATIVE PERCENT: 1.5 %
EPITHELIAL CELLS, UA: ABNORMAL /HPF (ref 0–5)
GFR AFRICAN AMERICAN: >60
GFR NON-AFRICAN AMERICAN: >60
GLUCOSE BLD-MCNC: 85 MG/DL (ref 70–99)
GLUCOSE URINE: NEGATIVE MG/DL
HCT VFR BLD CALC: 44 % (ref 36–48)
HEMOGLOBIN: 14.9 G/DL (ref 12–16)
KETONES, URINE: NEGATIVE MG/DL
LACTIC ACID: 1.5 MMOL/L (ref 0.4–2)
LEUKOCYTE ESTERASE, URINE: ABNORMAL
LIPASE: 20 U/L (ref 13–60)
LYMPHOCYTES ABSOLUTE: 1.7 K/UL (ref 1–5.1)
LYMPHOCYTES RELATIVE PERCENT: 25.2 %
MCH RBC QN AUTO: 28.4 PG (ref 26–34)
MCHC RBC AUTO-ENTMCNC: 33.8 G/DL (ref 31–36)
MCV RBC AUTO: 84.1 FL (ref 80–100)
MICROSCOPIC EXAMINATION: YES
MONOCYTES ABSOLUTE: 0.4 K/UL (ref 0–1.3)
MONOCYTES RELATIVE PERCENT: 6.1 %
NEUTROPHILS ABSOLUTE: 4.5 K/UL (ref 1.7–7.7)
NEUTROPHILS RELATIVE PERCENT: 66.8 %
NITRITE, URINE: NEGATIVE
PDW BLD-RTO: 14.4 % (ref 12.4–15.4)
PH UA: 7.5 (ref 5–8)
PLATELET # BLD: 201 K/UL (ref 135–450)
PMV BLD AUTO: 8 FL (ref 5–10.5)
POTASSIUM REFLEX MAGNESIUM: 4.3 MMOL/L (ref 3.5–5.1)
PROTEIN UA: NEGATIVE MG/DL
RBC # BLD: 5.23 M/UL (ref 4–5.2)
RBC UA: ABNORMAL /HPF (ref 0–4)
SODIUM BLD-SCNC: 136 MMOL/L (ref 136–145)
SPECIFIC GRAVITY UA: 1.01 (ref 1–1.03)
TOTAL PROTEIN: 8.2 G/DL (ref 6.4–8.2)
TROPONIN: <0.01 NG/ML
URINE TYPE: ABNORMAL
UROBILINOGEN, URINE: 0.2 E.U./DL
WBC # BLD: 6.8 K/UL (ref 4–11)
WBC UA: ABNORMAL /HPF (ref 0–5)

## 2022-03-26 PROCEDURE — 6360000002 HC RX W HCPCS: Performed by: STUDENT IN AN ORGANIZED HEALTH CARE EDUCATION/TRAINING PROGRAM

## 2022-03-26 PROCEDURE — 81001 URINALYSIS AUTO W/SCOPE: CPT

## 2022-03-26 PROCEDURE — 84484 ASSAY OF TROPONIN QUANT: CPT

## 2022-03-26 PROCEDURE — 83605 ASSAY OF LACTIC ACID: CPT

## 2022-03-26 PROCEDURE — 99283 EMERGENCY DEPT VISIT LOW MDM: CPT

## 2022-03-26 PROCEDURE — 2580000003 HC RX 258: Performed by: STUDENT IN AN ORGANIZED HEALTH CARE EDUCATION/TRAINING PROGRAM

## 2022-03-26 PROCEDURE — 96375 TX/PRO/DX INJ NEW DRUG ADDON: CPT

## 2022-03-26 PROCEDURE — 80053 COMPREHEN METABOLIC PANEL: CPT

## 2022-03-26 PROCEDURE — 85025 COMPLETE CBC W/AUTO DIFF WBC: CPT

## 2022-03-26 PROCEDURE — 83690 ASSAY OF LIPASE: CPT

## 2022-03-26 PROCEDURE — 93005 ELECTROCARDIOGRAM TRACING: CPT | Performed by: EMERGENCY MEDICINE

## 2022-03-26 PROCEDURE — 96374 THER/PROPH/DIAG INJ IV PUSH: CPT

## 2022-03-26 PROCEDURE — 71046 X-RAY EXAM CHEST 2 VIEWS: CPT

## 2022-03-26 PROCEDURE — 96361 HYDRATE IV INFUSION ADD-ON: CPT

## 2022-03-26 RX ORDER — SODIUM CHLORIDE, SODIUM LACTATE, POTASSIUM CHLORIDE, AND CALCIUM CHLORIDE .6; .31; .03; .02 G/100ML; G/100ML; G/100ML; G/100ML
1000 INJECTION, SOLUTION INTRAVENOUS ONCE
Status: COMPLETED | OUTPATIENT
Start: 2022-03-26 | End: 2022-03-26

## 2022-03-26 RX ORDER — ONDANSETRON 2 MG/ML
4 INJECTION INTRAMUSCULAR; INTRAVENOUS ONCE
Status: COMPLETED | OUTPATIENT
Start: 2022-03-26 | End: 2022-03-26

## 2022-03-26 RX ORDER — SENNA AND DOCUSATE SODIUM 50; 8.6 MG/1; MG/1
1 TABLET, FILM COATED ORAL DAILY
Qty: 7 TABLET | Refills: 0 | Status: SHIPPED | OUTPATIENT
Start: 2022-03-26 | End: 2022-04-02

## 2022-03-26 RX ORDER — KETOROLAC TROMETHAMINE 30 MG/ML
15 INJECTION, SOLUTION INTRAMUSCULAR; INTRAVENOUS ONCE
Status: COMPLETED | OUTPATIENT
Start: 2022-03-26 | End: 2022-03-26

## 2022-03-26 RX ADMIN — KETOROLAC TROMETHAMINE 15 MG: 30 INJECTION, SOLUTION INTRAMUSCULAR; INTRAVENOUS at 16:04

## 2022-03-26 RX ADMIN — SODIUM CHLORIDE, POTASSIUM CHLORIDE, SODIUM LACTATE AND CALCIUM CHLORIDE 1000 ML: 600; 310; 30; 20 INJECTION, SOLUTION INTRAVENOUS at 15:32

## 2022-03-26 RX ADMIN — ONDANSETRON 4 MG: 2 INJECTION INTRAMUSCULAR; INTRAVENOUS at 15:32

## 2022-03-26 ASSESSMENT — ENCOUNTER SYMPTOMS
DIARRHEA: 0
EYES NEGATIVE: 1
CONSTIPATION: 1
SHORTNESS OF BREATH: 0
NAUSEA: 1
WHEEZING: 0
VOMITING: 0
RECTAL PAIN: 0
COUGH: 0
ABDOMINAL PAIN: 1
BLOOD IN STOOL: 0
ABDOMINAL DISTENTION: 0

## 2022-03-26 ASSESSMENT — PAIN DESCRIPTION - PAIN TYPE: TYPE: ACUTE PAIN

## 2022-03-26 ASSESSMENT — PAIN - FUNCTIONAL ASSESSMENT
PAIN_FUNCTIONAL_ASSESSMENT: 0-10
PAIN_FUNCTIONAL_ASSESSMENT: PREVENTS OR INTERFERES SOME ACTIVE ACTIVITIES AND ADLS

## 2022-03-26 ASSESSMENT — PAIN DESCRIPTION - FREQUENCY: FREQUENCY: CONTINUOUS

## 2022-03-26 ASSESSMENT — PAIN SCALES - WONG BAKER: WONGBAKER_NUMERICALRESPONSE: 10

## 2022-03-26 ASSESSMENT — PAIN SCALES - GENERAL
PAINLEVEL_OUTOF10: 8
PAINLEVEL_OUTOF10: 10

## 2022-03-26 ASSESSMENT — PAIN DESCRIPTION - PROGRESSION: CLINICAL_PROGRESSION: GRADUALLY WORSENING

## 2022-03-26 ASSESSMENT — PAIN DESCRIPTION - LOCATION: LOCATION: CHEST

## 2022-03-26 ASSESSMENT — PAIN DESCRIPTION - DESCRIPTORS: DESCRIPTORS: CONSTANT

## 2022-03-26 ASSESSMENT — PAIN DESCRIPTION - ONSET: ONSET: PROGRESSIVE

## 2022-03-26 NOTE — ED PROVIDER NOTES
[address]          EM RESIDENT NOTE       Date of evaluation: 3/26/2022    Chief Complaint     Chest Pain, Nausea (thinks she has a stomach bug), and Constipation (last BM two days  ago; \"I eat a lot of cheese\")      History of Present Illness     Corrinne Gab is a 63 y.o. female with history of hypertension, hyperlipidemia, IBS who presents to the emergency department with complaints of chest pain as well as constipation with associated nausea. Patient predominantly comes to the emergency department due to constipation. She states that she has IBS and struggles with intermittent constipation. She states that she takes MiraLAX daily for this but now has not had a bowel movement in 3 days. She is still passing small amounts of gas. She has never had abdominal surgery before. She describes generalized abdominal pain that does not radiate, cramping in nature, with associated nausea but no emesis. She has not taken anything other than MiraLAX for this. She does not take any opioid medications. No fevers, shortness of breath, back pain, hematuria, melena, hematochezia but does endorse mild dysuria and states she has had UTIs in the past.    Patient also states that earlier this morning when she was laying in bed, she developed a sensation of chest pressure that then went away. She states that since coming to the hospital, her pain has come back and has since been constant. She describes it as substernal sensation of pressure and is making her anxious as one of her parents had a history of CAD. She states that she has not had pain like this in a number of years and does not have any known cardiac history that she is aware of. Review of Systems     Review of Systems   Constitutional: Negative. Negative for fatigue and fever. HENT: Negative. Eyes: Negative. Respiratory: Negative for cough, shortness of breath and wheezing.     Cardiovascular: Positive for chest pain. Negative for palpitations and leg swelling. Gastrointestinal: Positive for abdominal pain, constipation and nausea. Negative for abdominal distention, blood in stool, diarrhea, rectal pain and vomiting. Genitourinary: Positive for dysuria. Negative for decreased urine volume, difficulty urinating, flank pain, frequency, hematuria and urgency. Musculoskeletal: Negative. Skin: Negative. Neurological: Negative. Negative for syncope and light-headedness. Hematological: Negative. Psychiatric/Behavioral: Negative. Past Medical, Surgical, Family, and Social History     She has a past medical history of Anxiety, Bipolar 1 disorder ([address] Depression, Dyspareunia in female, Elevated transaminase level, Hemorrhoids, History of tubal ligation, Hypertension, Irritable bowel syndrome, Menopause, Obesity, Obesity (BMI 30-39.9), Overactive bladder, Prurigo nodularis, Stress incontinence, Urinary incontinence, Uterine fibroid, and Yeast vaginitis. She has a past surgical history that includes Eye surgery (Left); Tonsillectomy; Tubal ligation (1994); Colonoscopy (2010); and Colonoscopy (2018). Her family history includes Arthritis in her brother and father; Breast Cancer in her paternal grandmother; Diabetes in her maternal grandfather and mother; Heart Disease in her mother. She reports that she has never smoked. She has never used smokeless tobacco. She reports that she does not drink alcohol and does not use drugs.     Medications     Previous Medications    ACETAMINOPHEN (AMINOFEN) 325 MG TABLET    Take 2 tablets by mouth every 8 hours as needed for Pain    ALBUTEROL SULFATE HFA (VENTOLIN HFA) 108 (90 BASE) MCG/ACT INHALER    Inhale 2 puffs into the lungs 4 times daily as needed for Wheezing or Shortness of Breath    ARIPIPRAZOLE (ABILIFY) 20 MG TABLET    TAKE 1 TABLET BY MOUTH ONCE DAILY IN THE EVENING FOR 30 DAYS    ATORVASTATIN (LIPITOR) 10 MG TABLET    Take 1 tablet by mouth daily CHOLECALCIFEROL (VITAMIN D3) 50 MCG (2000 UT) CAPS    TAKE 1 CAPSULE BY MOUTH ONCE DAILY    DIPHENHYDRAMINE-ZINC ACETATE (BENADRYL ITCH STOPPING) 1-0.1 % CREAM    Apply topically 3 times daily as needed. MISC. DEVICES (SILICONE EAR PLUGS) MISC    Use daily as needed for swimming    NYSTATIN (MYCOSTATIN) 265849 UNIT/GM POWDER    Apply 3 times daily. SCOPOLAMINE (TRANSDERM-SCOP) TRANSDERMAL PATCH    APPLY 1 PATCH TOPICALLY EVERY 72 HOURS       Allergies     She is allergic to bactrim [sulfamethoxazole-trimethoprim]; antihistamines, chlorpheniramine-type; and macrobid [nitrofurantoin]. Physical Exam     INITIAL VITALS: BP: (!) 160/98, Temp: 97.4 °F (36.3 °C), Pulse: 76, Resp: 16, SpO2: 98 %   Physical Exam  Constitutional:       General: She is not in acute distress. Appearance: She is not ill-appearing or diaphoretic. HENT:      Head: Normocephalic and atraumatic. Nose: Congestion present. Mouth/Throat:      Mouth: Mucous membranes are moist.      Pharynx: Oropharynx is clear. Eyes:      Extraocular Movements: Extraocular movements intact. Cardiovascular:      Rate and Rhythm: Normal rate and regular rhythm. Pulses: Normal pulses. Heart sounds: Normal heart sounds. Pulmonary:      Effort: Pulmonary effort is normal.      Breath sounds: Normal breath sounds. Abdominal:      General: Abdomen is flat. There is no distension. Tenderness: There is abdominal tenderness (generalized to deep palpation, worst periumbilical). There is no left CVA tenderness, guarding or rebound. Musculoskeletal:         General: No swelling or deformity. Normal range of motion. Cervical back: Normal range of motion and neck supple. Skin:     General: Skin is warm and dry. Capillary Refill: Capillary refill takes less than 2 seconds. Findings: No rash. Neurological:      General: No focal deficit present. Mental Status: She is alert and oriented to person, place, and time. Psychiatric:      Comments: Pleasant. Mild psychomotor agitation. DiagnosticResults     EKG   Interpreted in conjunction with emergencydepartment physician No att. providers found  Rhythm: normal sinus   Rate: normal  Axis: normal  Ectopy: none  Conduction: normal  ST Segments: normal  T Waves:no acute change  Q Waves: none  Clinical Impression: no acute changes  Comparison: No significant change from prior on 12/15/2021    RADIOLOGY:  XR CHEST (2 VW)   Final Result      No acute disease.              LABS:   Results for orders placed or performed during the hospital encounter of 03/26/22   CBC with Auto Differential   Result Value Ref Range    WBC 6.8 4.0 - 11.0 K/uL    RBC 5.23 (H) 4.00 - 5.20 M/uL    Hemoglobin 14.9 12.0 - 16.0 g/dL    Hematocrit 44.0 36.0 - 48.0 %    MCV 84.1 80.0 - 100.0 fL    MCH 28.4 26.0 - 34.0 pg    MCHC 33.8 31.0 - 36.0 g/dL    RDW 14.4 12.4 - 15.4 %    Platelets 878 910 - 234 K/uL    MPV 8.0 5.0 - 10.5 fL    Neutrophils % 66.8 %    Lymphocytes % 25.2 %    Monocytes % 6.1 %    Eosinophils % 1.5 %    Basophils % 0.4 %    Neutrophils Absolute 4.5 1.7 - 7.7 K/uL    Lymphocytes Absolute 1.7 1.0 - 5.1 K/uL    Monocytes Absolute 0.4 0.0 - 1.3 K/uL    Eosinophils Absolute 0.1 0.0 - 0.6 K/uL    Basophils Absolute 0.0 0.0 - 0.2 K/uL   Comprehensive Metabolic Panel w/ Reflex to MG   Result Value Ref Range    Sodium 136 136 - 145 mmol/L    Potassium reflex Magnesium 4.3 3.5 - 5.1 mmol/L    Chloride 105 99 - 110 mmol/L    CO2 19 (L) 21 - 32 mmol/L    Anion Gap 12 3 - 16    Glucose 85 70 - 99 mg/dL    BUN 16 7 - 20 mg/dL    CREATININE 0.8 0.6 - 1.1 mg/dL    GFR Non-African American >60 >60    GFR African American >60 >60    Calcium 9.6 8.3 - 10.6 mg/dL    Total Protein 8.2 6.4 - 8.2 g/dL    Albumin 4.1 3.4 - 5.0 g/dL    Albumin/Globulin Ratio 1.0 (L) 1.1 - 2.2    Total Bilirubin 0.4 0.0 - 1.0 mg/dL    Alkaline Phosphatase 91 40 - 129 U/L    ALT 35 10 - 40 U/L    AST 34 15 - 37 U/L   Lipase Result Value Ref Range    Lipase 20.0 13.0 - 60.0 U/L   Troponin   Result Value Ref Range    Troponin <0.01 <0.01 ng/mL   Urinalysis with Microscopic   Result Value Ref Range    Color, UA Yellow Straw/Yellow    Clarity, UA Clear Clear    Glucose, Ur Negative Negative mg/dL    Bilirubin Urine Negative Negative    Ketones, Urine Negative Negative mg/dL    Specific Gravity, UA 1.015 1.005 - 1.030    Blood, Urine Negative Negative    pH, UA 7.5 5.0 - 8.0    Protein, UA Negative Negative mg/dL    Urobilinogen, Urine 0.2 <2.0 E.U./dL    Nitrite, Urine Negative Negative    Leukocyte Esterase, Urine TRACE (A) Negative    Microscopic Examination YES     Urine Type Voided     WBC, UA 0-2 0 - 5 /HPF    RBC, UA None seen 0 - 4 /HPF    Epithelial Cells, UA 0-1 0 - 5 /HPF    Bacteria, UA Rare (A) None Seen /HPF   Lactic Acid   Result Value Ref Range    Lactic Acid 1.5 0.4 - 2.0 mmol/L       ED BEDSIDE ULTRASOUND:    RECENT VITALS:  BP: (!) 160/98, Temp: 97.4 °F (36.3 °C), Pulse: 76,Resp: 16, SpO2: 98 %     Procedures         ED Course     Nursing Notes, Past Medical Hx, Past Surgical Hx, Social Hx, Allergies, and Family Hx were reviewed. The patient was given the followingmedications:  Orders Placed This Encounter   Medications    lactated ringers bolus    ondansetron (ZOFRAN) injection 4 mg    ketorolac (TORADOL) injection 15 mg    sennosides-docusate sodium (SENOKOT-S) 8.6-50 MG tablet     Sig: Take 1 tablet by mouth daily for 7 days     Dispense:  7 tablet     Refill:  0       CONSULTS:  None    MEDICAL DECISION MAKING / ASSESSMENT / Erica Nirav is a 63 y.o. female history of hypertension, hyperlipidemia, IBS who presents to the emergency department with complaints of chest pain as well as constipation with associated nausea. Her chest pain is atypical, and she is in no acute distress in the emergency department. Her EKG is normal sinus rhythm without any T wave or ST segment changes.   Her BMP is without significant electrolyte abnormality or ADY. Her CBC is without leukocytosis or anemia. Her troponin is less than 0.01, and her UA is without signs of infection. Patient was treated with LR, Zofran, and Toradol with improvement and desired discharge, and I feel this is appropriate. Patient will be discharged with a short course of senna docusate and instructions to follow-up with her primary care physician as soon as possible. Patient expresses understanding. This patient was also evaluated by the attending physician. All care plans were discussed and agreed upon. Clinical Impression     1. Generalized abdominal pain    2. Nausea    3.  Chest pain, unspecified type        Disposition     PATIENT REFERRED TO:  Miguel Romero, MELANIE - CNP  [address] [facility]  [phone]    Schedule an appointment as soon as possible for a visit         DISCHARGE MEDICATIONS:  New Prescriptions    SENNOSIDES-DOCUSATE SODIUM (SENOKOT-S) 8.6-50 MG TABLET    Take 1 tablet by mouth daily for 7 days       DISPOSITION  - Discharge       Shilpa Maravilla MD  Resident  03/26/22 4968

## 2022-03-26 NOTE — ED NOTES
Patient discharged to home via family. Written discharge instructions reviewed with understanding. Copy of AVS sent home with patient. Patient able to walk from ED without assistance.        Moody Smith RN  03/26/22 4917

## 2022-03-26 NOTE — ED PROVIDER NOTES
ED Attending Attestation Note     Date of evaluation: 3/26/2022    This patient was seen by the resident. I have seen and examined the patient, agree with the workup, evaluation, management and diagnosis. The care plan has been discussed. I have reviewed the ECG and concur with the resident's interpretation. My assessment reveals a woman who is awake, alert, no acute distress. Tacky mucous membranes.      Edie Graf MD  03/26/22 [id number]

## 2022-03-26 NOTE — TELEPHONE ENCOUNTER
Received call from Yetta Schilder at [facility] with [employer] Complaint. Subjective: Caller states \"Abd pain\"     Current Symptoms: Abd pain since yesterday. Body aches, chills. Nausea. Last BM yesterday. Onset: 1 day ago; gradual    Associated Symptoms: reduced appetite, constipation    Pain Severity: 10/10; sharp, aching; intermittent    Temperature: denies fever    What has been tried: Zofran    LMP: NA Pregnant: NA    Recommended disposition: See HCP within 4 Hours (or PCP triage). Advised ED given that office is closed. Care advice provided, patient verbalizes understanding; denies any other questions or concerns; instructed to call back for any new or worsening symptoms. Patient/caller agrees to proceed to nearest Emergency Department     Attention Provider: Thank you for allowing me to participate in the care of your patient. The patient was connected to triage in response to information provided to the ECC/PSC. Please do not respond through this encounter as the response is not directed to a shared pool.     Reason for Disposition   [1] MILD-MODERATE pain AND [2] constant AND [3] present > 2 hours    Protocols used: ABDOMINAL PAIN - [facility]

## 2022-03-27 DIAGNOSIS — K59.00 CONSTIPATION, UNSPECIFIED CONSTIPATION TYPE: Primary | ICD-10-CM

## 2022-03-27 LAB
EKG ATRIAL RATE: 78 BPM
EKG DIAGNOSIS: NORMAL
EKG P AXIS: 37 DEGREES
EKG P-R INTERVAL: 160 MS
EKG Q-T INTERVAL: 404 MS
EKG QRS DURATION: 84 MS
EKG QTC CALCULATION (BAZETT): 460 MS
EKG R AXIS: 17 DEGREES
EKG T AXIS: 16 DEGREES
EKG VENTRICULAR RATE: 78 BPM

## 2022-03-27 RX ORDER — SODIUM PHOSPHATE, DIBASIC AND SODIUM PHOSPHATE, MONOBASIC 7; 19 G/133ML; G/133ML
1 ENEMA RECTAL
Refills: 0 | COMMUNITY
Start: 2022-03-27 | End: 2022-03-27

## 2022-03-27 NOTE — PROGRESS NOTES
Patient called at 6:00 a.m on Zack 3/27/2022 requesting enema. States Toyin Armenta would pay for it\" and \"will get it when [facility]\". She has been constipated for 4 days. She was evaluated and treated at the ED already. Diagnoses and all orders for this visit:    Constipation, unspecified constipation type  -     Sodium Phosphates (FLEET) 7-19 GM/118ML;  Place 1 enema rectally once as needed (constipation)

## 2022-03-28 ENCOUNTER — NURSE TRIAGE (OUTPATIENT)
Dept: OTHER | Facility: CLINIC | Age: 62
End: 2022-03-28

## 2022-03-28 NOTE — TELEPHONE ENCOUNTER
Tried calling home phone had a busy tone, tried calling cell phone it just kept ringing.  Will try again later

## 2022-03-28 NOTE — TELEPHONE ENCOUNTER
Received call from [facility] at [facility] with Red [employer] Complaint. Subjective: Caller states \"Seen at [facility] Saturday having abd pain, nausea, constipation. States no new or worsening sx and is feeling better. Is wanting to know what to eat. \"     Current Symptoms: Reports nausea, constipation last week - last BM 3/27, abd pain improving  Denies any new or worsening sx. Pt is wanting to know what foods to eat with recent abd pain, constipation, nausea. Educated to increase water intake, drink Gatorade to help with electrolyte imbalances, and stick to the [employer] (Bananas, Rice, Apples, Toast) or bland foods and to avoid fatty, greasy, spicy foods. Onset: 1 week but improving     Associated Symptoms: NA    Pain Severity: denies    Temperature: denies    What has been tried: Enema, Miralax,     LMP: NA Pregnant: NA    Recommended disposition: [facility] 8691 advice provided, patient verbalizes understanding; denies any other questions or concerns; instructed to call back for any new or worsening symptoms. Pt will call back if she has new or worsening sx     Attention Provider: Thank you for allowing me to participate in the care of your patient. The patient was connected to triage in response to information provided to the [facility]/PSC. Please do not respond through this encounter as the response is not directed to a shared pool.       Reason for Disposition   Caller has already spoken with another triager and has no further questions    Protocols used: NO CONTACT OR DUPLICATE CONTACT CALL-ADULT-[place]

## 2022-03-28 NOTE — TELEPHONE ENCOUNTER
If she is feeling better she just needs to follow a healthy diet with plenty of water, fruits and vegetables and I recommend taking miralax daily to help keep the stools soft.

## 2022-04-04 ENCOUNTER — TELEPHONE (OUTPATIENT)
Dept: FAMILY MEDICINE CLINIC | Age: 62
End: 2022-04-04

## 2022-04-04 RX ORDER — COCOA BUTTER, PHENYLEPHRINE HYDROCHLORIDE 2211; 6.25 MG/1; MG/1
1 SUPPOSITORY RECTAL PRN
Qty: 12 SUPPOSITORY | Refills: 1 | Status: SHIPPED | OUTPATIENT
Start: 2022-04-04 | End: 2022-04-14 | Stop reason: SDUPTHER

## 2022-04-04 NOTE — TELEPHONE ENCOUNTER
Message was left for patient with information from [facility] on medication sent in.

## 2022-04-11 ENCOUNTER — TELEPHONE (OUTPATIENT)
Dept: FAMILY MEDICINE CLINIC | Age: 62
End: 2022-04-11

## 2022-04-11 RX ORDER — TRIAMCINOLONE ACETONIDE 1 MG/G
CREAM TOPICAL
Qty: 80 G | Refills: 2 | Status: SHIPPED | OUTPATIENT
Start: 2022-04-11 | End: 2022-06-02 | Stop reason: SDUPTHER

## 2022-04-11 NOTE — TELEPHONE ENCOUNTER
----- Message from Zeeshan Cooper sent at 4/11/2022 11:06 AM EDT -----  Subject: Message to Provider    QUESTIONS  Information for Provider? Patient is requesting Triamcinolone Cream USP -   0.01% - 454 gram which is not on medication profile. Patient did state   that research project said she can continue this medication. . Medication   is taken twice a day. She would like the pharmacy to be [facility] on [address]  ---------------------------------------------------------------------------  --------------  [address]  What is the best way for the office to contact you? OK to leave message on   voicemail  Preferred Call Back Phone Number? [phone]  ---------------------------------------------------------------------------  --------------  SCRIPT ANSWERS  Relationship to Patient?  Self

## 2022-04-13 NOTE — TELEPHONE ENCOUNTER
----- Message from Dae Bonds sent at 4/13/2022  7:43 AM EDT -----  Subject: Refill Request    QUESTIONS  Name of Medication? bisacodyl (BISAC-EVAC) 10 MG suppository  Patient-reported dosage and instructions? 10 mg suppository  How many days do you have left? 0  Preferred Pharmacy? [facility]  Pharmacy phone number (if available)? [phone]  Additional Information for Provider? Patient states on the box it say   preparation h suppository with cocoa butter and she needs a script for   this so her insurance can pay for it because she is on a tight budget. Please advise. Thanks   ---------------------------------------------------------------------------  --------------  Grover Guallpa INFO  What is the best way for the office to contact you? OK to leave message on   voicemail  Preferred Call Back Phone Number? [phone]  ---------------------------------------------------------------------------  --------------  SCRIPT ANSWERS  Relationship to Patient?  Self

## 2022-04-14 RX ORDER — COCOA BUTTER, PHENYLEPHRINE HYDROCHLORIDE 2211; 6.25 MG/1; MG/1
1 SUPPOSITORY RECTAL PRN
Qty: 12 SUPPOSITORY | Refills: 1 | Status: SHIPPED | OUTPATIENT
Start: 2022-04-14

## 2022-04-14 NOTE — TELEPHONE ENCOUNTER
Received call from Pt and she states that constipation comes and goes. Pt would like to have suppository on hand. Pt states that she does have a little constipation now.      Pt is also requesting refill of triamcinolone (KENALOG) 0.1 % cream    Last Office Visit  -  2/11/22  Next Office Visit  -  None

## 2022-04-14 NOTE — TELEPHONE ENCOUNTER
I already sent in the cream. I did refill the suppository but this is only to be used for severe constipation. It is not a daily or even weekly use.

## 2022-04-19 ENCOUNTER — TELEPHONE (OUTPATIENT)
Dept: FAMILY MEDICINE CLINIC | Age: 62
End: 2022-04-19

## 2022-04-21 NOTE — TELEPHONE ENCOUNTER
Pt notified she said she has been using the suppository daily she will stop that today. She asked what would be recommend for every day use to keep her bowels regular. I told her she could use Miralax daily. Is there anything else you recommend or do you agree with the Mirilax?

## 2022-05-02 ENCOUNTER — TELEPHONE (OUTPATIENT)
Dept: FAMILY MEDICINE CLINIC | Age: 62
End: 2022-05-02

## 2022-05-02 NOTE — TELEPHONE ENCOUNTER
----- Message from [facility] sent at 5/2/2022  9:27 AM EDT -----  Subject: Appointment Request    Reason for Call: Routine Medicare AWV    QUESTIONS  Type of Appointment? Established Patient  Reason for appointment request? No appointments available during search  Additional Information for Provider? Patient needs a call to reschedule   both her & son's 5/23 appt to another date together preferable an AM appt   in May  ---------------------------------------------------------------------------  --------------  [address]  What is the best way for the office to contact you? OK to leave message on   voicemail  Preferred Call Back Phone Number? [phone]  ---------------------------------------------------------------------------  --------------  SCRIPT ANSWERS  Relationship to Patient? Self  Have your symptoms changed? No  Have you been diagnosed with, awaiting test results for, or told that you   are suspected of having COVID-19 (Coronavirus)? (If patient has tested   negative or was tested as a requirement for work, school, or travel and   not based on symptoms, answer no)? No  Within the past 10 days have you developed any of the following symptoms   (answer no if symptoms have been present longer than 10 days or began   more than 10 days ago)? Fever or Chills, Cough, Shortness of breath or   difficulty breathing, Loss of taste or smell, Sore throat, Nasal   congestion, Sneezing or runny nose, Fatigue or generalized body aches   (answer no if pain is specific to a body part e.g. back pain), Diarrhea,   Headache? No  Have you had close contact with someone with COVID-19 in the last 7 days? No  (Service Expert  click yes below to proceed with [employer] As Usual   Scheduling)?  Yes

## 2022-05-23 ENCOUNTER — TELEPHONE (OUTPATIENT)
Dept: FAMILY MEDICINE CLINIC | Age: 62
End: 2022-05-23

## 2022-05-23 NOTE — TELEPHONE ENCOUNTER
Received call from Pt stating that she has a sore inside her nose on the right side. Pt states that it itches. Pt has been experiencing this for a couple of weeks. Pt has appt June 2nd but would like something called in or a suggestion to help with this.      Please advise

## 2022-05-27 ENCOUNTER — TELEPHONE (OUTPATIENT)
Dept: FAMILY MEDICINE CLINIC | Age: 62
End: 2022-05-27

## 2022-05-27 NOTE — TELEPHONE ENCOUNTER
If she would like seen sooner I could see her on Tuesday but that is the soonest opening that I have. She could also be seen in urgent care if she wants to be seen sooner.

## 2022-05-27 NOTE — TELEPHONE ENCOUNTER
GIOVANNI with information, told to call us back if she would like to be put on the schedule for next week.
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
## 2022-05-27 NOTE — TELEPHONE ENCOUNTER
----- Message from Trumanru Cammysherripatrick sent at 5/27/2022  8:08 AM EDT -----  Subject: Message to Provider    QUESTIONS  Information for Provider? Would like to know if she should be seen before   her well appt on 6/2 because everytime she goes to swim (at the [facility]) she   gets ill, throwing up what she assumes to be water) and she also has   aching pain on her right side of her body. ---------------------------------------------------------------------------  --------------  Kristal PAYTON  What is the best way for the office to contact you? OK to leave message on   voicemail, OK to respond with electronic message via [employer] portal (only   for patients who have registered [employer] account)  Preferred Call Back Phone Number? [phone]  ---------------------------------------------------------------------------  --------------  SCRIPT ANSWERS  Relationship to Patient?  Self

## 2022-06-02 ENCOUNTER — OFFICE VISIT (OUTPATIENT)
Dept: FAMILY MEDICINE CLINIC | Age: 62
End: 2022-06-02
Payer: MEDICARE

## 2022-06-02 VITALS
OXYGEN SATURATION: 94 % | DIASTOLIC BLOOD PRESSURE: 68 MMHG | BODY MASS INDEX: 36.02 KG/M2 | WEIGHT: 211 LBS | HEART RATE: 81 BPM | HEIGHT: 64 IN | SYSTOLIC BLOOD PRESSURE: 112 MMHG

## 2022-06-02 DIAGNOSIS — E66.09 CLASS 2 OBESITY DUE TO EXCESS CALORIES WITH BODY MASS INDEX (BMI) OF 36.0 TO 36.9 IN ADULT, UNSPECIFIED WHETHER SERIOUS COMORBIDITY PRESENT: ICD-10-CM

## 2022-06-02 DIAGNOSIS — Z00.00 INITIAL MEDICARE ANNUAL WELLNESS VISIT: Primary | ICD-10-CM

## 2022-06-02 DIAGNOSIS — S10.96XD INSECT BITE OF NECK, SUBSEQUENT ENCOUNTER: ICD-10-CM

## 2022-06-02 DIAGNOSIS — E78.5 HYPERLIPIDEMIA, UNSPECIFIED HYPERLIPIDEMIA TYPE: ICD-10-CM

## 2022-06-02 DIAGNOSIS — F32.A DEPRESSION, UNSPECIFIED DEPRESSION TYPE: ICD-10-CM

## 2022-06-02 DIAGNOSIS — Z12.31 ENCOUNTER FOR SCREENING MAMMOGRAM FOR MALIGNANT NEOPLASM OF BREAST: ICD-10-CM
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
## 2022-06-02 LAB
CHOLESTEROL, TOTAL: 184 MG/DL (ref 0–199)
HDLC SERPL-MCNC: 39 MG/DL (ref 40–60)
LDL CHOLESTEROL CALCULATED: ABNORMAL MG/DL
LDL CHOLESTEROL DIRECT: 93 MG/DL
TRIGL SERPL-MCNC: 311 MG/DL (ref 0–150)
VLDLC SERPL CALC-MCNC: ABNORMAL MG/DL

## 2022-06-02 PROCEDURE — 99213 OFFICE O/P EST LOW 20 MIN: CPT | Performed by: NURSE PRACTITIONER

## 2022-06-02 PROCEDURE — G0438 PPPS, INITIAL VISIT: HCPCS | Performed by: NURSE PRACTITIONER

## 2022-06-02 RX ORDER — LIDOCAINE 50 MG/G
1 PATCH TOPICAL DAILY
Qty: 10 PATCH | Refills: 0 | Status: SHIPPED | OUTPATIENT
Start: 2022-06-02 | End: 2022-06-12

## 2022-06-02 RX ORDER — ARIPIPRAZOLE 20 MG/1
TABLET ORAL
Qty: 30 TABLET | Refills: 5 | Status: SHIPPED | OUTPATIENT
Start: 2022-06-02 | End: 2022-07-08 | Stop reason: SDDI

## 2022-06-02 RX ORDER — TRIAMCINOLONE ACETONIDE 1 MG/G
CREAM TOPICAL
Qty: 80 G | Refills: 2 | Status: SHIPPED | OUTPATIENT
Start: 2022-06-02 | End: 2022-09-26 | Stop reason: SDUPTHER

## 2022-06-02 ASSESSMENT — PATIENT HEALTH QUESTIONNAIRE - PHQ9
6. FEELING BAD ABOUT YOURSELF - OR THAT YOU ARE A FAILURE OR HAVE LET YOURSELF OR YOUR FAMILY DOWN: 0
SUM OF ALL RESPONSES TO PHQ QUESTIONS 1-9: 4
7. TROUBLE CONCENTRATING ON THINGS, SUCH AS READING THE NEWSPAPER OR WATCHING TELEVISION: 1
SUM OF ALL RESPONSES TO PHQ9 QUESTIONS 1 & 2: 0
10. IF YOU CHECKED OFF ANY PROBLEMS, HOW DIFFICULT HAVE THESE PROBLEMS MADE IT FOR YOU TO DO YOUR WORK, TAKE CARE OF THINGS AT HOME, OR GET ALONG WITH OTHER PEOPLE: 0
9. THOUGHTS THAT YOU WOULD BE BETTER OFF DEAD, OR OF HURTING YOURSELF: 0
SUM OF ALL RESPONSES TO PHQ QUESTIONS 1-9: 4
8. MOVING OR SPEAKING SO SLOWLY THAT OTHER PEOPLE COULD HAVE NOTICED. OR THE OPPOSITE, BEING SO FIGETY OR RESTLESS THAT YOU HAVE BEEN MOVING AROUND A LOT MORE THAN USUAL: 0
SUM OF ALL RESPONSES TO PHQ QUESTIONS 1-9: 4
3. TROUBLE FALLING OR STAYING ASLEEP: 1
SUM OF ALL RESPONSES TO PHQ QUESTIONS 1-9: 4
2. FEELING DOWN, DEPRESSED OR HOPELESS: 0
5. POOR APPETITE OR OVEREATING: 2
4. FEELING TIRED OR HAVING LITTLE ENERGY: 0
1. LITTLE INTEREST OR PLEASURE IN DOING THINGS: 0

## 2022-06-02 ASSESSMENT — LIFESTYLE VARIABLES: HOW OFTEN DO YOU HAVE A DRINK CONTAINING ALCOHOL: NEVER

## 2022-06-02 NOTE — PROGRESS NOTES
Medicare Annual Wellness Visit    Damian He is here for Medicare AWV    Assessment & Plan   Initial Medicare annual wellness visit  See below  Class 2 obesity due to excess calories with body mass index (BMI) of 36.0 to 36.9 in adult, unspecified whether serious comorbidity present  Referral given to UC per patient request  -     External Referral To Bariatrics  Insect bite of neck, subsequent encounter  Will try lidocaine patch to help with pain. Patient can continue with steroid cream and follow up with dermatology. -     lidocaine (LIDODERM) 5 %; Place 1 patch onto the skin daily for 10 days 12 hours on, 12 hours off., Disp-10 patch, R-0Normal  Depression, unspecified depression type  Stable, will continue to follow up with psych.   -     ARIPiprazole (ABILIFY) 20 MG tablet; TAKE 1 TABLET BY MOUTH ONCE DAILY IN THE EVENING FOR 30 DAYS, Disp-30 tablet, R-5Normal  Encounter for screening mammogram for malignant neoplasm of breast  -     [facility] AVI DIGITAL SCREEN BILATERAL; Future  Hyperlipidemia, unspecified hyperlipidemia type  -     Lipid Panel; Future      Recommendations for Preventive Services Due: see orders and patient instructions/AVS.  Recommended screening schedule for the next 5-10 years is provided to the patient in written form: see Patient Instructions/AVS.     Return for Medicare Annual Wellness Visit in 1 year. Subjective   The following acute and/or chronic problems were also addressed today:  Dust mite rash on back of neck. It is painful and itchy. She states she has tried a steroid cream on it but it does not help with the pain. She states she has seen dermatology research department and they think it is from dust mites. She is wanting a referral to weight loss at [facility]. Patient's complete Health Risk Assessment and screening values have been reviewed and are found in Flowsheets.  The following problems were reviewed today and where indicated follow up appointments were made and/or referrals ordered. Positive Risk Factor Screenings with Interventions:              Health Habits/Nutrition:     Physical Activity: Sufficiently Active    Days of Exercise per Week: 5 days    Minutes of Exercise per Session: 60 min     Have you lost any weight without trying in the past 3 months?: No  Body mass index: (!) 36.21  Have you seen the dentist within the past year?: (!) No    Health Habits/Nutrition Interventions:  · Nutritional issues:  wants a referral to weight management. Hearing/Vision:  Do you or your family notice any trouble with your hearing that hasn't been managed with hearing aids?: No  Do you have difficulty driving, watching TV, or doing any of your daily activities because of your eyesight?: No  Have you had an eye exam within the past year?: (!) No  No exam data present    Hearing/Vision Interventions:  · Vision concerns:  patient encouraged to make appointment with his/her eye specialist            Objective   Vitals:    06/02/22 0918   BP: 112/68   Site: Left Upper Arm   Position: Sitting   Pulse: 81   SpO2: 94%   Weight: 211 lb (95.7 kg)   Height: 5' 4\" (1.626 m)      Body mass index is 36.22 kg/m². General Appearance: alert and oriented to person, place and time, well developed and well- nourished, in no acute distress  Skin: warm and dry, red bite marks scattered on arm and back of neck.    Head: normocephalic and atraumatic  Eyes: pupils equal, round, and reactive to light, extraocular eye movements intact, conjunctivae normal  ENT: tympanic membrane, external ear and ear canal normal bilaterally, nose without deformity, nasal mucosa and turbinates normal without polyps  Neck: supple and non-tender without mass, no thyromegaly or thyroid nodules, no cervical lymphadenopathy  Pulmonary/Chest: clear to auscultation bilaterally- no wheezes, rales or rhonchi, normal air movement, no respiratory distress  Cardiovascular: normal rate, regular rhythm, normal S1 and S2, no murmurs, rubs, clicks, or gallops, distal pulses intact, no carotid bruits  Abdomen: soft, non-tender, non-distended, normal bowel sounds, no masses or organomegaly        Allergies   Allergen Reactions    Bactrim [Sulfamethoxazole-Trimethoprim]      Rash and trouble breathing    Antihistamines, Chlorpheniramine-Type     Macrobid [Nitrofurantoin]      PER PATIENT       Prior to Visit Medications    Medication Sig Taking? Authorizing Provider   lidocaine (LIDODERM) 5 % Place 1 patch onto the skin daily for 10 days 12 hours on, 12 hours off. Yes MELANIE Le CNP   triamcinolone (KENALOG) 0.1 % cream APPLY CREAM EXTERNALLY TO AFFECTED AREA ONCE DAILY Yes MELANIE Parish CNP   ARIPiprazole (ABILIFY) 20 MG tablet TAKE 1 TABLET BY MOUTH ONCE DAILY IN THE EVENING FOR 30 DAYS Yes MELANIE Parish CNP   phenylephrine-cocoa butter (PREPARATION H) 0.25-88.44 % Place 1 suppository rectally as needed for Hemorrhoids Yes MELANIE Le CNP   nystatin (MYCOSTATIN) 429905 UNIT/GM powder Apply 3 times daily. Yes MELANIE Le CNP   acetaminophen (AMINOFEN) 325 MG tablet Take 2 tablets by mouth every 8 hours as needed for Pain Yes MELANIE Le CNP   diphenhydrAMINE-zinc acetate (BENADRYL ITCH STOPPING) 1-0.1 % cream Apply topically 3 times daily as needed. Yes MELANIE Dixon CNP   albuterol sulfate HFA (VENTOLIN HFA) 108 (90 Base) MCG/ACT inhaler Inhale 2 puffs into the lungs 4 times daily as needed for Wheezing or Shortness of Breath Yes MELANIE Cullen CNP   Misc.  Devices (SILICONE EAR PLUGS) MISC Use daily as needed for swimming Yes MELANIE Le CNP   scopolamine (TRANSDERM-SCOP) transdermal patch APPLY 1 PATCH TOPICALLY EVERY 72 HOURS Yes Historical Provider, MD   Cholecalciferol (VITAMIN D3) 50 MCG (2000 UT) CAPS TAKE 1 CAPSULE BY MOUTH ONCE DAILY Yes MELANIE Dixon CNP       CareTeam (Including outside providers/suppliers

## 2022-06-03 ENCOUNTER — TELEPHONE (OUTPATIENT)
Dept: ADMINISTRATIVE | Age: 62
End: 2022-06-03

## 2022-06-03 NOTE — TELEPHONE ENCOUNTER
Patient called and states that the sore in her nose is still there. She forgot to mention it to you at her appointment yesterday. She has been using the neosporin on it and she states it has gotten a little smaller but is still there. Should she keep using the cream or try something else?

## 2022-06-03 NOTE — TELEPHONE ENCOUNTER
Submitted PA for Lidocaine 5% patches, Key: [id number]. Medication has been DENIED. Denial letter attached. Please notify patient. Thank you.

## 2022-06-09 ENCOUNTER — TELEPHONE (OUTPATIENT)
Dept: FAMILY MEDICINE CLINIC | Age: 62
End: 2022-06-09

## 2022-06-09 NOTE — TELEPHONE ENCOUNTER
Received call from [facility] stating that they don't have the mupirocin (BACTROBAN NASAL) 2 % nasal ointment but do have the generic ointment. Would like to know if it is ok to fill this.

## 2022-06-27 ENCOUNTER — SCHEDULED TELEPHONE ENCOUNTER (OUTPATIENT)
Dept: FAMILY MEDICINE CLINIC | Age: 62
End: 2022-06-27
Payer: MEDICARE

## 2022-06-27 DIAGNOSIS — K58.2 IRRITABLE BOWEL SYNDROME WITH BOTH CONSTIPATION AND DIARRHEA: Primary | ICD-10-CM

## 2022-06-27 PROCEDURE — 99213 OFFICE O/P EST LOW 20 MIN: CPT | Performed by: NURSE PRACTITIONER

## 2022-06-27 NOTE — PROGRESS NOTES
Erlin Trujillo is a 63 y.o. female evaluated via telephone on 6/27/2022 for Diarrhea (x2 days, exposed to c-diff )  . Documentation:  I communicated with the patient and/or health care decision maker about diarrhea. Details of this discussion including any medical advice provided:     Diarrhea-  She had some diarrhea over the weekend. She states she reports she had an eipisode of stool incontinence over the weekend. She reports a history of IBS. She now states she was unable to have a BM and feels constipated. She denies any abdominal pain. She states her  has been sick and she has been under more stress taking care of him and her son. She states she was diagnosed with IBS in the past and was told to take extra fiber but she admits she has not been doing that lately.  currently has C-diff. She has a few episodes of diarrhea this weekend but is not feeling constipated. Cierra Gonzales was seen today for diarrhea. Diagnoses and all orders for this visit:    Irritable bowel syndrome with both constipation and diarrhea  I advised patient to try increasing fiber and water intake and to follow up with GI if no improvement. She is less likely to have C-diff if she is now constipated with formed stools. Patient verbalized her understanding.   -     Tg Chairez MD, Gastroenterology, Tali Coffey was evaluated through a synchronous (real-time) audio encounter. Patient identification was verified at the start of the visit. She (or guardian if applicable) is aware that this is a billable service, which includes applicable co-pays. This visit was conducted with the patient's (and/or legal guardian's) verbal consent. She has not had a related appointment within my department in the past 7 days or scheduled within the next 24 hours. The patient was located at Home: [address] [place] [address].   The provider was located at [facility] ([address] [place] [address]): [address] [place] [address] [address] [facility] [address].     Note: not billable if this call serves to triage the patient into an appointment for the relevant concern    MELANIE Lino - CNP

## 2022-07-06 ENCOUNTER — TELEPHONE (OUTPATIENT)
Dept: FAMILY MEDICINE CLINIC | Age: 62
End: 2022-07-06

## 2022-07-06 NOTE — TELEPHONE ENCOUNTER
Received call from Pt stating that her anxiety is heightened and is having difficulty eating and sleeping. Pt wanted to let Vicenta know.      Please advise

## 2022-07-06 NOTE — TELEPHONE ENCOUNTER
Pt called and reported that she was having more anxiety, difficulty eating and sleeping. Will have MA contact her to see what is going on and if needed to make a follow up appointment with me to further discuss. Have only seen her once in March 2022.

## 2022-07-07 ENCOUNTER — TELEMEDICINE (OUTPATIENT)
Dept: PSYCHIATRY | Age: 62
End: 2022-07-07
Payer: COMMERCIAL

## 2022-07-07 DIAGNOSIS — F51.02 INSOMNIA DUE TO PSYCHOLOGICAL STRESS: ICD-10-CM

## 2022-07-07 DIAGNOSIS — F33.1 MDD (MAJOR DEPRESSIVE DISORDER), RECURRENT EPISODE, MODERATE (HCC): Primary | ICD-10-CM

## 2022-07-07 DIAGNOSIS — F41.1 GAD (GENERALIZED ANXIETY DISORDER): ICD-10-CM

## 2022-07-07 PROCEDURE — 99215 OFFICE O/P EST HI 40 MIN: CPT | Performed by: NURSE PRACTITIONER

## 2022-07-07 ASSESSMENT — ANXIETY QUESTIONNAIRES
6. BECOMING EASILY ANNOYED OR IRRITABLE: 3
GAD7 TOTAL SCORE: 9
2. NOT BEING ABLE TO STOP OR CONTROL WORRYING: 1
3. WORRYING TOO MUCH ABOUT DIFFERENT THINGS: 1
5. BEING SO RESTLESS THAT IT IS HARD TO SIT STILL: 1
7. FEELING AFRAID AS IF SOMETHING AWFUL MIGHT HAPPEN: 1
4. TROUBLE RELAXING: 1
1. FEELING NERVOUS, ANXIOUS, OR ON EDGE: 1

## 2022-07-07 ASSESSMENT — PATIENT HEALTH QUESTIONNAIRE - PHQ9
SUM OF ALL RESPONSES TO PHQ QUESTIONS 1-9: 9
SUM OF ALL RESPONSES TO PHQ QUESTIONS 1-9: 9
3. TROUBLE FALLING OR STAYING ASLEEP: 3
9. THOUGHTS THAT YOU WOULD BE BETTER OFF DEAD, OR OF HURTING YOURSELF: 0
6. FEELING BAD ABOUT YOURSELF - OR THAT YOU ARE A FAILURE OR HAVE LET YOURSELF OR YOUR FAMILY DOWN: 0
SUM OF ALL RESPONSES TO PHQ9 QUESTIONS 1 & 2: 1
4. FEELING TIRED OR HAVING LITTLE ENERGY: 1
7. TROUBLE CONCENTRATING ON THINGS, SUCH AS READING THE NEWSPAPER OR WATCHING TELEVISION: 1
SUM OF ALL RESPONSES TO PHQ QUESTIONS 1-9: 9
5. POOR APPETITE OR OVEREATING: 3
1. LITTLE INTEREST OR PLEASURE IN DOING THINGS: 0
8. MOVING OR SPEAKING SO SLOWLY THAT OTHER PEOPLE COULD HAVE NOTICED. OR THE OPPOSITE, BEING SO FIGETY OR RESTLESS THAT YOU HAVE BEEN MOVING AROUND A LOT MORE THAN USUAL: 0
2. FEELING DOWN, DEPRESSED OR HOPELESS: 1
SUM OF ALL RESPONSES TO PHQ QUESTIONS 1-9: 9
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
## 2022-07-07 NOTE — TELEPHONE ENCOUNTER
I spoke to patient yesterday for approx 25 minutes as she called the office hyperventilating. She was seen virtually by Beck Samson today.

## 2022-07-07 NOTE — LETTER
[facility]  Primary Care - Psychiatry  [address] [facility] [address]  Phone:   Fax:     July 8, [phone]     Patient: Orlando Posada       YOB: 1960   Date of Visit: 7/7/2022     To Whom It May Concern:    I saw and evaluated Kajal Hannah in my office today. She is currently experiencing an exacerbation of her mental health issues. For that reason, it is medically necessary for her to take time off from providing total physical healthcare to her current  in order for her to take care of herself and her own healthcare. It is imperative that her  be moved into a rehabilitative care facility as soon as possible so that he will be able to receive the care that he needs. If you have questions, please do not hesitate to call me.      Sincerely,        YOSVANY Patton Heading, APRN - CNP  Advanced Practice Registered Nurse  Certified Psychiatric Mental Health Nurse Practitioner

## 2022-07-08 ENCOUNTER — OFFICE VISIT (OUTPATIENT)
Dept: PSYCHIATRY | Age: 62
End: 2022-07-08
Payer: COMMERCIAL

## 2022-07-08 ENCOUNTER — HOSPITAL ENCOUNTER (EMERGENCY)
Age: 62
Discharge: HOME | End: 2022-07-08
Payer: MEDICARE

## 2022-07-08 ENCOUNTER — APPOINTMENT (OUTPATIENT)
Dept: GENERAL RADIOLOGY | Age: 62
End: 2022-07-08
Payer: MEDICARE

## 2022-07-08 VITALS
SYSTOLIC BLOOD PRESSURE: 150 MMHG | BODY MASS INDEX: 35.36 KG/M2 | TEMPERATURE: 97.9 F | RESPIRATION RATE: 19 BRPM | HEART RATE: 69 BPM | WEIGHT: 206 LBS | OXYGEN SATURATION: 99 % | DIASTOLIC BLOOD PRESSURE: 105 MMHG

## 2022-07-08 VITALS
SYSTOLIC BLOOD PRESSURE: 146 MMHG | BODY MASS INDEX: 35.17 KG/M2 | WEIGHT: 206 LBS | HEIGHT: 64 IN | HEART RATE: 84 BPM | OXYGEN SATURATION: 93 % | DIASTOLIC BLOOD PRESSURE: 100 MMHG | RESPIRATION RATE: 16 BRPM

## 2022-07-08 DIAGNOSIS — R53.83 OTHER FATIGUE: Primary | ICD-10-CM

## 2022-07-08 DIAGNOSIS — F33.1 MDD (MAJOR DEPRESSIVE DISORDER), RECURRENT EPISODE, MODERATE (HCC): Primary | ICD-10-CM

## 2022-07-08 DIAGNOSIS — R11.0 NAUSEA: ICD-10-CM

## 2022-07-08 DIAGNOSIS — Z87.820 HX OF TRAUMATIC BRAIN INJURY: ICD-10-CM

## 2022-07-08 DIAGNOSIS — F43.10 COMPLEX POSTTRAUMATIC STRESS DISORDER: ICD-10-CM

## 2022-07-08 LAB
A/G RATIO: 1.1 (ref 1.1–2.2)
ALBUMIN SERPL-MCNC: 4.3 G/DL (ref 3.4–5)
ALP BLD-CCNC: 77 U/L (ref 40–129)
ALT SERPL-CCNC: 22 U/L (ref 10–40)
ANION GAP SERPL CALCULATED.3IONS-SCNC: 9 MMOL/L (ref 3–16)
AST SERPL-CCNC: 47 U/L (ref 15–37)
BASOPHILS ABSOLUTE: 0.1 K/UL (ref 0–0.2)
BASOPHILS RELATIVE PERCENT: 1 %
BILIRUB SERPL-MCNC: 0.3 MG/DL (ref 0–1)
BILIRUBIN URINE: NEGATIVE
BLOOD, URINE: NEGATIVE
BUN BLDV-MCNC: 15 MG/DL (ref 7–20)
CALCIUM SERPL-MCNC: 9.7 MG/DL (ref 8.3–10.6)
CHLORIDE BLD-SCNC: 102 MMOL/L (ref 99–110)
CLARITY: CLEAR
CO2: 25 MMOL/L (ref 21–32)
COLOR: YELLOW
CREAT SERPL-MCNC: 0.7 MG/DL (ref 0.6–1.1)
EOSINOPHILS ABSOLUTE: 0.2 K/UL (ref 0–0.6)
EOSINOPHILS RELATIVE PERCENT: 2.6 %
EPITHELIAL CELLS, UA: NORMAL /HPF (ref 0–5)
GFR AFRICAN AMERICAN: >60
GFR NON-AFRICAN AMERICAN: >60
GLUCOSE BLD-MCNC: 96 MG/DL (ref 70–99)
GLUCOSE URINE: NEGATIVE MG/DL
HCT VFR BLD CALC: 44.2 % (ref 36–48)
HEMOGLOBIN: 14.5 G/DL (ref 12–16)
KETONES, URINE: NEGATIVE MG/DL
LEUKOCYTE ESTERASE, URINE: ABNORMAL
LIPASE: 39 U/L (ref 13–60)
LYMPHOCYTES ABSOLUTE: 1.9 K/UL (ref 1–5.1)
LYMPHOCYTES RELATIVE PERCENT: 33.1 %
MCH RBC QN AUTO: 27.6 PG (ref 26–34)
MCHC RBC AUTO-ENTMCNC: 32.7 G/DL (ref 31–36)
MCV RBC AUTO: 84.3 FL (ref 80–100)
MICROSCOPIC EXAMINATION: YES
MONOCYTES ABSOLUTE: 0.4 K/UL (ref 0–1.3)
MONOCYTES RELATIVE PERCENT: 7.3 %
NEUTROPHILS ABSOLUTE: 3.2 K/UL (ref 1.7–7.7)
NEUTROPHILS RELATIVE PERCENT: 56 %
NITRITE, URINE: NEGATIVE
PDW BLD-RTO: 14.5 % (ref 12.4–15.4)
PH UA: 7 (ref 5–8)
PLATELET # BLD: 216 K/UL (ref 135–450)
PMV BLD AUTO: 7.9 FL (ref 5–10.5)
POTASSIUM SERPL-SCNC: 5.5 MMOL/L (ref 3.5–5.1)
PROTEIN UA: NEGATIVE MG/DL
RBC # BLD: 5.24 M/UL (ref 4–5.2)
RBC UA: NORMAL /HPF (ref 0–4)
SODIUM BLD-SCNC: 136 MMOL/L (ref 136–145)
SPECIFIC GRAVITY UA: <=1.005 (ref 1–1.03)
TOTAL PROTEIN: 8.3 G/DL (ref 6.4–8.2)
TROPONIN: <0.01 NG/ML
URINE TYPE: ABNORMAL
UROBILINOGEN, URINE: 0.2 E.U./DL
WBC # BLD: 5.8 K/UL (ref 4–11)
WBC UA: NORMAL /HPF (ref 0–5)

## 2022-07-08 PROCEDURE — 2580000003 HC RX 258: Performed by: PHYSICIAN ASSISTANT

## 2022-07-08 PROCEDURE — 99215 OFFICE O/P EST HI 40 MIN: CPT | Performed by: NURSE PRACTITIONER

## 2022-07-08 PROCEDURE — 6370000000 HC RX 637 (ALT 250 FOR IP): Performed by: PHYSICIAN ASSISTANT

## 2022-07-08 PROCEDURE — 80053 COMPREHEN METABOLIC PANEL: CPT

## 2022-07-08 PROCEDURE — 85025 COMPLETE CBC W/AUTO DIFF WBC: CPT

## 2022-07-08 PROCEDURE — 81001 URINALYSIS AUTO W/SCOPE: CPT

## 2022-07-08 PROCEDURE — 83690 ASSAY OF LIPASE: CPT

## 2022-07-08 PROCEDURE — 84484 ASSAY OF TROPONIN QUANT: CPT

## 2022-07-08 PROCEDURE — 93005 ELECTROCARDIOGRAM TRACING: CPT | Performed by: PHYSICIAN ASSISTANT

## 2022-07-08 PROCEDURE — 71045 X-RAY EXAM CHEST 1 VIEW: CPT

## 2022-07-08 PROCEDURE — 99285 EMERGENCY DEPT VISIT HI MDM: CPT

## 2022-07-08 RX ORDER — 0.9 % SODIUM CHLORIDE 0.9 %
500 INTRAVENOUS SOLUTION INTRAVENOUS ONCE
Status: COMPLETED | OUTPATIENT
Start: 2022-07-08 | End: 2022-07-08

## 2022-07-08 RX ORDER — ONDANSETRON 4 MG/1
4 TABLET, ORALLY DISINTEGRATING ORAL EVERY 8 HOURS PRN
Qty: 20 TABLET | Refills: 0 | Status: SHIPPED | OUTPATIENT
Start: 2022-07-08

## 2022-07-08 RX ORDER — HYDROXYZINE PAMOATE 25 MG/1
100 CAPSULE ORAL ONCE
Status: COMPLETED | OUTPATIENT
Start: 2022-07-08 | End: 2022-07-08

## 2022-07-08 RX ADMIN — HYDROXYZINE PAMOATE 100 MG: 25 CAPSULE ORAL at 15:08

## 2022-07-08 RX ADMIN — SODIUM CHLORIDE 500 ML: 9 INJECTION, SOLUTION INTRAVENOUS at 12:00

## 2022-07-08 ASSESSMENT — ENCOUNTER SYMPTOMS: CHEST TIGHTNESS: 1

## 2022-07-08 NOTE — ED PROVIDER NOTES
Reviewed ECG completed for Orlando Posada. I did not see this patient and was not involved in their care. ECG  The Ekg interpreted by me shows  normal sinus rhythm with a rate of 67  Axis is   Normal  QTc is  within an acceptable range  Intervals and Durations are unremarkable.       ST Segments: no acute change  No significant change from prior EKG dated 3/26/22        Faye Barth MD  07/08/22 5267
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
## 2022-07-08 NOTE — ED PROVIDER NOTES
[facility] Emergency Department    CHIEF COMPLAINT  Other (pt is seeing outpatient psyc. pt state that she is having a lot more stress at home nephew found dead possible over dose. pt state that this has increase her depressing crying. pt follow up with her psyc outpatient who feels that pt increase depression is d/t a TBI that was in 2003. pt is also in a research study and taking PO research medication for skin disorder however pt does not have a name for medication )      SHARED SERVICE VISIT  Evaluated by JESSICA. My supervising physician was available for consultation. EKG interpretation provided by attending physician. HISTORY OF PRESENT ILLNESS  Cherri Jimenez is a 63 y.o. female who presents to the ED complaining of several day history of increased fatigue, and somnolence with occasional nausea. Patient drove in today for evaluation. Had left psychiatrist office who may have sent her for evaluations of questionable absence seizure's. States that she has history of TBI and was having those with initial injury. Not currently taking any antiepileptic medications. Patient denies any headaches. No lightheadedness, dizziness or confusion. She denies chest pain or shortness of breath. No cough or congestion. No fevers or chills. Has experienced some nausea. No vomiting. She denies any diarrhea or constipation. Denies black or bloody stools. She denies leg pain or swelling. No recent travel, trauma or surgery. Patient reports that she has been under increased stress as she recently learned of nephew's death. No other complaints, modifying factors or associated symptoms. Nursing notes reviewed.    Past Medical History:   Diagnosis Date    Anxiety     Bipolar 1 disorder ([facility] [address]     Depression     Dyspareunia in female 5/16/2015    Elevated transaminase level 11/10/2017    Hemorrhoids 1/15/2015    History of tubal ligation 5/16/2015    Hypertension     Irritable bowel syndrome     Menopause 9/5/2016    Obesity     Obesity (BMI 30-39.9) 3/11/2015    Overactive bladder     Prurigo nodularis     Stress incontinence     Urinary incontinence     Uterine fibroid 5/16/2015    Yeast vaginitis 8/30/2020     Past Surgical History:   Procedure Laterality Date    COLONOSCOPY  2010    polyp removed    COLONOSCOPY  2018    EYE SURGERY Left     Lazy eye    TONSILLECTOMY      TUBAL LIGATION  1994     Family History   Problem Relation Age of Onset    Heart Disease Mother     Diabetes Mother     Arthritis Father     Arthritis Brother     Diabetes Maternal Grandfather     Breast Cancer Paternal Grandmother      Social History     Socioeconomic History    Marital status: Single     Spouse name: Not on file    Number of children: 2    Years of education: 15    Highest education level: Not on file   Occupational History    Occupation: disabilty   Tobacco Use    Smoking status: Never Smoker    Smokeless tobacco: Never Used   Vaping Use    Vaping Use: Never used   Substance and Sexual Activity    Alcohol use: Yes     Alcohol/week: 0.0 standard drinks     Comment: occ    Drug use: No    Sexual activity: Yes     Partners: Male     Birth control/protection: Surgical     Comment: tubal ligation   Other Topics Concern    Not on file   Social History Narrative    Not on file     Social Determinants of Health     Financial Resource Strain:     Difficulty of Paying Living Expenses: Not on file   Food Insecurity:     Worried About Running Out of Food in the Last Year: Not on file    Yani of Food in the Last Year: Not on file   Transportation Needs:     Lack of Transportation (Medical): Not on file    Lack of Transportation (Non-Medical):  Not on file   Physical Activity: Sufficiently Active    Days of Exercise per Week: 5 days    Minutes of Exercise per Session: 60 min   Stress:     Feeling of Stress : Not on file   Social Connections:     Frequency of Communication with Friends and Family: Not on file    Frequency of Social Gatherings with Friends and Family: Not on file    Attends Cheondoism Services: Not on file    Active Member of Clubs or Organizations: Not on file    Attends Club or Organization Meetings: Not on file    Marital Status: Not on file   Intimate Partner Violence:     Fear of Current or Ex-Partner: Not on file    Emotionally Abused: Not on file    Physically Abused: Not on file    Sexually Abused: Not on file   Housing Stability:     Unable to Pay for Housing in the Last Year: Not on file    Number of [place] in the Last Year: Not on file    Unstable Housing in the Last Year: Not on file     Current Facility-Administered Medications   Medication Dose Route Frequency Provider Last Rate Last Admin    0.9 % sodium chloride bolus  500 mL IntraVENous Once [place], [place]         Current Outpatient Medications   Medication Sig Dispense Refill    brexpiprazole (REXULTI) 1 MG TABS tablet Take 1 tablet by mouth every evening 30 tablet 0    mupirocin (BACTROBAN) 2 % ointment APPLY OINTMENT NASALLY TWICE DAILY AS DIRECTED      mupirocin (BACTROBAN NASAL) 2 % nasal ointment Take by Nasal route 2 times daily. 1 g 3    triamcinolone (KENALOG) 0.1 % cream APPLY CREAM EXTERNALLY TO AFFECTED AREA ONCE DAILY 80 g 2    ARIPiprazole (ABILIFY) 20 MG tablet TAKE 1 TABLET BY MOUTH ONCE DAILY IN THE EVENING FOR 30 DAYS (Patient not taking: Reported on 7/8/2022) 30 tablet 5    phenylephrine-cocoa butter (PREPARATION H) 0.25-88.44 % Place 1 suppository rectally as needed for Hemorrhoids 12 suppository 1    nystatin (MYCOSTATIN) 097477 UNIT/GM powder Apply 3 times daily. 60 g 1    acetaminophen (AMINOFEN) 325 MG tablet Take 2 tablets by mouth every 8 hours as needed for Pain 60 tablet 0    diphenhydrAMINE-zinc acetate (BENADRYL ITCH STOPPING) 1-0.1 % cream Apply topically 3 times daily as needed.  28 g 5    albuterol sulfate HFA (VENTOLIN HFA) 108 (90 Base) MCG/ACT inhaler Inhale 2 puffs into the lungs 4 times daily as needed for Wheezing or Shortness of Breath 18 g 1    Misc. Devices (SILICONE EAR PLUGS) MISC Use daily as needed for swimming 2 each 0    scopolamine (TRANSDERM-SCOP) transdermal patch APPLY 1 PATCH TOPICALLY EVERY 72 HOURS      Cholecalciferol (VITAMIN D3) 50 MCG (2000 UT) CAPS TAKE 1 CAPSULE BY MOUTH ONCE DAILY 30 capsule 5     Allergies   Allergen Reactions    Bactrim [Sulfamethoxazole-Trimethoprim]      Rash and trouble breathing    Antihistamines, Chlorpheniramine-Type     Macrobid [Nitrofurantoin]      PER PATIENT         REVIEW OF SYSTEMS  10 systems reviewed, pertinent positives per HPI otherwise noted to be negative    PHYSICAL EXAM  BP (!) 140/80   Pulse 82   Temp 98 °F (36.7 °C) (Oral)   Resp 16   Wt 206 lb (93.4 kg)   LMP 01/29/2015   SpO2 99%   BMI 35.36 kg/m²   GENERAL APPEARANCE: Awake and alert. Cooperative. No acute distress. HEAD: Normocephalic. Atraumatic. EYES: PERRL. EOM's grossly intact. ENT: Mucous membranes are moist.   NECK: Supple. No JVD. No tracheal tenderness or deviation. No crepitus. HEART: RRR. No murmurs. LUNGS: Respirations unlabored. CTAB. Good air exchange. Speaking comfortably in full sentences. ABDOMEN: Soft. Non-distended. Non-tender. No guarding or rebound. Negative Pope's, McBurney's and Rovsing's. No fluids or ascites. No hernias or masses. Bowel sounds are mental quadrants. No CVA tenderness. EXTREMITIES: No peripheral edema. No unilateral calf pain, redness or swelling. Moves all extremities equally. All extremities neurovascularly intact. SKIN: Warm and dry. No acute rashes. NEUROLOGICAL: Alert and oriented. CN's 2-12 intact. No gross facial drooping. Strength 5/5, sensation intact. PSYCHIATRIC: Normal mood and affect.     RADIOLOGY  XR CHEST PORTABLE    Result Date: 7/8/2022  EXAMINATION: ONE XRAY VIEW OF THE CHEST 7/8/2022 11:58 am COMPARISON: 03/26/2022 HISTORY: ORDERING SYSTEM PROVIDED HISTORY: nausea TECHNOLOGIST PROVIDED HISTORY: Reason for exam:->nausea Reason for Exam: nausea FINDINGS: The lungs are without acute focal process. There is no effusion or pneumothorax. The cardiomediastinal silhouette is stable. The osseous structures are stable. No acute process. ED COURSE  Pain control was not required while here in the emergency department. Triage vitals stable. CBC without leukocytosis or anemia. CMP with potassium of 5.5 although hemolysis had occurred. Lipase was normal.  Troponin less than 0.01. Urinalysis without evidence for infectious process. Stable portable chest x-ray. EKG was a normal sinus rhythm without evidence for acute ischemic change. Patient feeling better on reevaluation. Tolerated p.o. challenge. Requesting neurology referral as she does not currently have 1. We will at this time be discharged home with antiemetics and recommendations for close and continued outpatient follow-up. We have also discussed return precautions and patient is in agreement and comfortable discharge. A discussion was had with Ms. Jennifer Shone regarding nausea, fatigue and increased sleepiness, ED findings and recommendations for follow-up. Risk management discussed and shared decision making had with patient and/or surrogate. All questions were answered. Patient will follow up with PCP and neurology within 2 to 3 days for further evaluation/treatment. All questions answered. Patient will return to ED for new/worsening symptoms. Patient was sent home with a prescription for Zofran.     [facility]  Results for orders placed or performed during the hospital encounter of 07/08/22   CBC with Auto Differential   Result Value Ref Range    WBC 5.8 4.0 - 11.0 K/uL    RBC 5.24 (H) 4.00 - 5.20 M/uL    Hemoglobin 14.5 12.0 - 16.0 g/dL    Hematocrit 44.2 36.0 - 48.0 %    MCV 84.3 80.0 - 100.0 fL    MCH 27.6 26.0 - 34.0 pg    MCHC 32.7 31.0 - 36.0 g/dL    RDW 14.5 12.4 - 15.4 %    Platelets 143 362 - 765 K/uL    MPV 7.9 5.0 - 10.5 fL    Neutrophils % 56.0 %    Lymphocytes % 33.1 %    Monocytes % 7.3 %    Eosinophils % 2.6 %    Basophils % 1.0 %    Neutrophils Absolute 3.2 1.7 - 7.7 K/uL    Lymphocytes Absolute 1.9 1.0 - 5.1 K/uL    Monocytes Absolute 0.4 0.0 - 1.3 K/uL    Eosinophils Absolute 0.2 0.0 - 0.6 K/uL    Basophils Absolute 0.1 0.0 - 0.2 K/uL   Comprehensive Metabolic Panel   Result Value Ref Range    Sodium 136 136 - 145 mmol/L    Potassium 5.5 (H) 3.5 - 5.1 mmol/L    Chloride 102 99 - 110 mmol/L    CO2 25 21 - 32 mmol/L    Anion Gap 9 3 - 16    Glucose 96 70 - 99 mg/dL    BUN 15 7 - 20 mg/dL    CREATININE 0.7 0.6 - 1.1 mg/dL    GFR Non-African American >60 >60    GFR African American >60 >60    Calcium 9.7 8.3 - 10.6 mg/dL    Total Protein 8.3 (H) 6.4 - 8.2 g/dL    Albumin 4.3 3.4 - 5.0 g/dL    Albumin/Globulin Ratio 1.1 1.1 - 2.2    Total Bilirubin 0.3 0.0 - 1.0 mg/dL    Alkaline Phosphatase 77 40 - 129 U/L    ALT 22 10 - 40 U/L    AST 47 (H) 15 - 37 U/L   Lipase   Result Value Ref Range    Lipase 39.0 13.0 - 60.0 U/L   Troponin   Result Value Ref Range    Troponin <0.01 <0.01 ng/mL   Urinalysis   Result Value Ref Range    Color, UA Yellow Straw/Yellow    Clarity, UA Clear Clear    Glucose, Ur Negative Negative mg/dL    Bilirubin Urine Negative Negative    Ketones, Urine Negative Negative mg/dL    Specific Gravity, UA <=1.005 1.005 - 1.030    Blood, Urine Negative Negative    pH, UA 7.0 5.0 - 8.0    Protein, UA Negative Negative mg/dL    Urobilinogen, Urine 0.2 <2.0 E.U./dL    Nitrite, Urine Negative Negative    Leukocyte Esterase, Urine TRACE (A) Negative    Microscopic Examination YES     Urine Type NotGiven    Microscopic Urinalysis   Result Value Ref Range    WBC, UA 0-2 0 - 5 /HPF    RBC, UA None seen 0 - 4 /HPF    Epithelial Cells, UA 0-1 0 - 5 /HPF   EKG 12 Lead   Result Value Ref Range    Ventricular Rate 67 BPM    Atrial Rate 67 BPM    P-R Interval 172 ms QRS Duration 86 ms    Q-T Interval 416 ms    QTc Calculation (Bazett) 439 ms    P Axis 12 degrees    R Axis 8 degrees    T Axis 8 degrees    Diagnosis       Normal sinus rhythmNormal ECGWhen compared with ECG of 26-MAR-2022 14:00,Previous ECG has undetermined rhythm, needs review     I estimate there is LOW risk for ACUTE CORONARY SYNDROME, INTRACRANIAL HEMORRHAGE, MALIGNANT DYSRHYTHMIA, MENINGITIS, PNEUMONIA, PULMONARY EMBOLISM, SEPSIS, SUBARACHNOID HEMORRHAGE, SUBDURAL HEMATOMA, STROKE, or URINARY TRACT INFECTION, thus I consider the discharge disposition reasonable. Colin Marks and I have discussed the diagnosis and risks, and we agree with discharging home to follow-up with their primary doctor. We also discussed returning to the Emergency Department immediately if new or worsening symptoms occur. We have discussed the symptoms which are most concerning (e.g., changing or worsening pain, weakness, vomiting, fever) that necessitate immediate return. Final Impression  1. Other fatigue    2. Nausea      Blood pressure (!) 150/105, pulse 69, temperature 97.9 °F (36.6 °C), temperature source Oral, resp. rate 19, weight 206 lb (93.4 kg), last menstrual period 01/29/2015, SpO2 99 %, not currently breastfeeding. DISPOSITION  Patient was discharged to home in good condition.             Morris Begum [place]  07/08/22 [id number]

## 2022-07-08 NOTE — PROGRESS NOTES
PSYCHIATRY PROGRESS NOTE    Judi Keita   4/3[birth year]1022  DATE OF VISIT 22  Total time spent via telephone call only due to patient not having the capability to participate in a virtual video encounter (does not have smartphone and/or tablet with internet access), reviewing records, today is 40 minutes. PCP: MELANIE Young CNP    CC:   Chief Complaint   Patient presents with    Follow-up       Judi Keita was evaluated through telephone only encounter. The patient (or guardian if applicable) is aware that this is a billable service. Verbal consent to proceed has been obtained within the past 12 months. The visit was conducted pursuant to the emergency declaration under the [facility], [address] [place] [address] authority and the [employer] Act. Patient identification and address where patient was located during this telephone call was verified, and a caregiver was present when appropriate. The patient was located in a state where the provider was credentialed to provide care. Assessment/Plan  1. MDD (major depressive disorder), recurrent episode, moderate (HCC)  -Safety: Risk factors include hx of relational trauma, chronic medical issues. Pt is low risk for future dangerousness to self or others. Pt is not currently an imminent safety risk, denies SI/HI, is future oriented and expresses a desire to get better and healthier.   -officially d/c abilify as has not taken anyway  -Begin trial of the following  rexulti 0.5 mg tablets one po q evening. Instructed her to come to my office at [facility] tomorrow- to  the medication and she verbalized understanding.   -Will see next week to re-evaluate and possibly increase medication dose. 2. SHAKILA (generalized anxiety disorder)  -same as above    3.  Insomnia due to psychological stress  -same as above    HPI: Judi Keita is being evaluated by phone today for her anxiety and depression. Was last seen in March as a new pt and reported at that time she was doing well on her abilify and wanted to cont to be followed by her PCP for medication management. Out of the blue, pt then called the office yesterday and asked to speak with me directly. Spoke to my assistant for over 30 minutes and pt was put on my schedule today for a virtual visit but requested a phone call instead. Today, pt is noted to be hyperverbal and mood is labile. Is crying at times during this call. Reports that she was contacted by a family member last weekend and told that her nephew (only brother's only son) was found dead in his apartment in a fetal position covered in blood. States that they do not know the actual cause of death and are waiting on the 's report. States that she had not spoken to this nephew in many years (8) and had found him within the last month on facebook. Reports that she was messaging him through Facebook but he had not directly responded to her. States (while crying) that she hopes that he got her messages. Then asks me if the message has a check rebeca beside it-did that mean he had read her message? States that he had a drug problem and had been in correction in the past for drugs but had done his time and gone to rehab and was doing better she thought. States that this nephew lived with her for a while when he was younger and she feels guilty for not doing more for him but also comments that her father who is 80 recently told her that her older brother had not spoken to his son for 30 years. Pt states that this is very upsetting to her. Reports that she has not been able to sleep much for the past week since hearing this news. States that she thought she \"saw something\" that evening but then looked away and did not see it again. Could not describe what \"it\" was. Has been sleeping on the couch since her  came home from the hospital in February. States he told her this morning that he heard her snoring from around 10:30 pm until 1 am and that is the longest she had slept this week. States she is just so very anxious that her mind is going so very fast that she cannot sleep. Denies any thoughts of wanting to hurt herself at present. States that she and her daughter were talking about her depression and want to know if the current medication Abilify is the best for her depression? Reports that she often forgets to take it and has not remembered to take it all week. States she is not sure it is helping and wants to know if there is something better? Reports that she has really been a mess this week and has decided that she is really struggling to care for her . States he is not wanting to go to rehab facility for physical therapy and nursing care and that he was in a diabetic coma earlier this year and almost . Lost two twos and a part of his leg. Pt then starts to talk about the research study that she is in and that she gets $80 every time she goes. States she is supposed to go tomorrow am but they have called her to come this afternoon. I inform her of switching to rexulti and that I am at a different office today but will be at [facility] tomorrow and she can get samples then. Reports that she will come tomorrow afternoon to [facility] for samples. associated symptoms:  Appetite abnormal: decreased  Sleep disturbance Yes  Fatigue Yes  Loss of pleasure No  Impulsive behavior Yes    modifying factors: include recent death of her nephew, caring for her   Timing: worse depending on situation  duration: ongoing   severity: moderate    Review of Systems   Constitutional: Positive for appetite change and fatigue. Psychiatric/Behavioral: Positive for decreased concentration, dysphoric mood and sleep disturbance. The patient is nervous/anxious.       Detailed Psych ROS:  Depression: Completed the PHQ-9 today with a score of 9 indicating mildly moderate depression. Today patient reports depressive symptoms, including depressed mood, feelings of hopelessness, change in appetite, sleep disturbance, fatigue, psychomotor agitation/retardation and poor concentration/focus. Anxiety: Completed the SHAKILA-7 again today scoring a 9 indicating mildly moderate anxiety. Today, patient reports symptoms of anxiety including feeling nervous, anxious, on edge, racing thoughts, irritability, poor concentration/focus, restlessness, insomnia and fatigue. Pt also reports muscle tension, tachycardia and decreased appetite. Carrie: Symptoms reported include: insomnia BUT without ever having increased energy, rapid speech, easily distracted or decreased attention, irritability, racing thoughts, mood swings but all can be s/s of unipolar depression    Past Medical History:   Diagnosis Date    Anxiety     [facility]ipolar [facility] disorder ([facility] [address]     Depression     Dyspareunia in female 5/16/2015    Elevated transaminase level 11/10/2017    Hemorrhoids 1/15/2015    History of tubal ligation 5/16/2015    Hypertension     Irritable bowel syndrome     Menopause 9/5/2016    Obesity     Obesity (BMI 30-39.9) 3/11/2015    Overactive bladder     Prurigo nodularis     Stress incontinence     Urinary incontinence     Uterine fibroid 5/16/2015    Yeast vaginitis 8/30/2020     Brief Psych Hx: Has a long history of depression and anxiety. Was misdiagnosed once as bipolar but fraternal twin sister is bipolar. Pt recently diagnosed as MDD with anxiety several years ago and had been doing well on abilify until recently. Does not remember when she last took it for sure. Current Outpatient Medications   Medication Sig Dispense Refill    mupirocin (BACTROBAN) 2 % ointment APPLY OINTMENT NASALLY TWICE DAILY AS DIRECTED      mupirocin (BACTROBAN NASAL) 2 % nasal ointment Take by Nasal route 2 times daily.  1 g 3    triamcinolone (KENALOG) 0.1 % cream APPLY CREAM EXTERNALLY TO AFFECTED AREA ONCE DAILY 80 g 2    phenylephrine-cocoa butter (PREPARATION H) 0.25-88.44 % Place 1 suppository rectally as needed for Hemorrhoids 12 suppository 1    nystatin (MYCOSTATIN) 313237 UNIT/GM powder Apply 3 times daily. 60 g 1    acetaminophen (AMINOFEN) 325 MG tablet Take 2 tablets by mouth every 8 hours as needed for Pain 60 tablet 0    diphenhydrAMINE-zinc acetate (BENADRYL ITCH STOPPING) 1-0.1 % cream Apply topically 3 times daily as needed. 28 g 5    albuterol sulfate HFA (VENTOLIN HFA) 108 (90 Base) MCG/ACT inhaler Inhale 2 puffs into the lungs 4 times daily as needed for Wheezing or Shortness of Breath 18 g 1    Misc. Devices (SILICONE EAR PLUGS) MISC Use daily as needed for swimming 2 each 0    scopolamine (TRANSDERM-SCOP) transdermal patch APPLY 1 PATCH TOPICALLY EVERY 72 HOURS      Cholecalciferol (VITAMIN D3) 50 MCG (2000 UT) CAPS TAKE 1 CAPSULE BY MOUTH ONCE DAILY 30 capsule 5    brexpiprazole (REXULTI) 1 MG TABS tablet Take 1 tablet by mouth every evening 30 tablet 0     No current facility-administered medications for this visit.      Wt Reading           06/02/22 211 lb (95.7 kg)     BP Reading           06/02/22 112/68     Pulse Reading           06/02/22 81     Mental Status Exam:   Appearance    alert, cooperative, crying, severe distress  Motor: unable to fully evaluate as is a phone call only  Speech    spontaneous, rapid, hyperverbal, interrupting and high productivity  Mood/Affect    Anxious  Depressed / unable to fully evaluate as is a phone call only  Thought Process    rapid, tangential and perservative  Thought Content    excessive preoccupations, paranoid thoughts and cognitive distortions , no suicidal ideation  Associations    tangential connections  Attention/Concentration    impaired  Memory    recent and remote memory intact  Insight/Judgement    [facility] [employer] [facility], [address] [place] [address] Psychiatry

## 2022-07-09 LAB
EKG ATRIAL RATE: 67 BPM
EKG DIAGNOSIS: NORMAL
EKG P AXIS: 12 DEGREES
EKG P-R INTERVAL: 172 MS
EKG Q-T INTERVAL: 416 MS
EKG QRS DURATION: 86 MS
EKG QTC CALCULATION (BAZETT): 439 MS
EKG R AXIS: 8 DEGREES
EKG T AXIS: 8 DEGREES
EKG VENTRICULAR RATE: 67 BPM

## 2022-07-09 PROCEDURE — 93010 ELECTROCARDIOGRAM REPORT: CPT | Performed by: INTERNAL MEDICINE

## 2022-07-11 ENCOUNTER — TELEPHONE (OUTPATIENT)
Dept: FAMILY MEDICINE CLINIC | Age: 62
End: 2022-07-11

## 2022-07-11 RX ORDER — HYDROXYZINE 50 MG/1
50-100 TABLET, FILM COATED ORAL NIGHTLY
Qty: 30 TABLET | Refills: 0 | Status: SHIPPED | OUTPATIENT
Start: 2022-07-11 | End: 2022-07-21

## 2022-07-11 NOTE — TELEPHONE ENCOUNTER
Patient is having terrible time and needs advice because she is having severe anxiety and panic attacks. She is very dizzy. We called the life squad on Friday for the patient because she was having chest pains and was super dizzy and the patient said the hospital gave her 6 anxiety tablets. She is wondering if she should go to the hospital again?   Please advise  [phone]

## 2022-07-11 NOTE — TELEPHONE ENCOUNTER
Pt states she needs an MRI due to old TBI. Thinks she has a possible brain bleed or sleeping seizures. I told pt to go back to ER and demand an MRI. Pt would like Vicenta's advise on then first thing in the morning. Pt also needs phone number for neurology again.

## 2022-07-11 NOTE — TELEPHONE ENCOUNTER
Looks like they gave her hydroxyzine at the ER- I can send that in but if her anxiety is that bad she may need to go to [facility] ER
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
## 2022-07-14 ENCOUNTER — TELEPHONE (OUTPATIENT)
Dept: FAMILY MEDICINE CLINIC | Age: 62
End: 2022-07-14

## 2022-07-14 NOTE — TELEPHONE ENCOUNTER
Patient returned call. She states she is doing a little better, she is doing her breathing exercises and taking her medications. 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

## 2022-07-14 NOTE — TELEPHONE ENCOUNTER
----- Message from Caroletommiealysia Domingoalthea sent at 7/14/2022  8:51 AM EDT -----  Subject: Message to Provider    QUESTIONS  Information for Provider? Patient called for [facility], please have her give   patient a call back [phone] . Thank you  ---------------------------------------------------------------------------  --------------  Erika Walsh INFO  [phone]; OK to leave message on voicemail  ---------------------------------------------------------------------------  --------------  SCRIPT ANSWERS  Relationship to Patient?  Self
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
## 2022-07-15 DIAGNOSIS — Z87.820 HX OF TRAUMATIC BRAIN INJURY: ICD-10-CM
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
## 2022-07-15 NOTE — PROGRESS NOTES
(RUBIA) I had a lengthy conversation with patient and relayed, that I went ahead and scheduled her appointment for an MRI (STAT) next available which is on 7.29.22 at 10:30 AM. In the meantime, I advised her to call the on call doc this weekend if she had any inquiries or concerning symptoms. I will keep this message in my basket to follow up on her.

## 2022-07-15 NOTE — PROGRESS NOTES
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 ER diagnosed with nausea and fatigue and sent her home. Called office Tuesday and reported that she fell over the weekend when trying to mow the grass and hit her head, leaving a large bruise. Had c/o headaches, confusion, light headedness and nausea on Friday. Message sent to me and PCP. Called again this morning and symptoms persist. Will order MRI of brain at present. MA to call hospital to get scheduled or see if may have done today. Then will relay to patient this information. Also will notify PCP.

## 2022-07-18 NOTE — TELEPHONE ENCOUNTER
Farzaneh from [address] [place] [address] Drive called and said they have her scheduled for 2/77 but Terri needs to re-schedule. She wants to know if the ordering physician needs this sooner? I told her \"Yes I'm  almost positive they do but will check with the clinical staff \"  Please call Farzaneh after 11:30  [phone].

## 2022-07-18 NOTE — TELEPHONE ENCOUNTER
Happy to announce that they are part of the [employer] therefore, this Friday they get to go to the [employer]. Also, the MRI was cancelled by Radiology however, they moved her up for this coming Thursday. It worked out even better. She called for medical transportation as you requested she should not be driving. She is thankful that she was able to get this assistance that she never uses, and will keep us posted.

## 2022-07-21 ENCOUNTER — HOSPITAL ENCOUNTER (OUTPATIENT)
Dept: MRI IMAGING | Age: 62
Discharge: HOME | End: 2022-07-21

## 2022-07-21 ENCOUNTER — TELEPHONE (OUTPATIENT)
Dept: FAMILY MEDICINE CLINIC | Age: 62
End: 2022-07-21

## 2022-07-21 DIAGNOSIS — Z87.820 HX OF TRAUMATIC BRAIN INJURY: ICD-10-CM

## 2022-07-21 DIAGNOSIS — F41.1 GAD (GENERALIZED ANXIETY DISORDER): Primary | ICD-10-CM
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
## 2022-07-21 RX ORDER — LORAZEPAM 1 MG/1
TABLET ORAL
Qty: 2 TABLET | Refills: 0 | Status: SHIPPED | OUTPATIENT
Start: 2022-07-21 | End: 2022-08-04

## 2022-07-21 RX ORDER — HYDROXYZINE 50 MG/1
TABLET, FILM COATED ORAL
Qty: 60 TABLET | Refills: 0 | Status: SHIPPED | OUTPATIENT
Start: 2022-07-21 | End: 2022-09-20

## 2022-07-21 NOTE — TELEPHONE ENCOUNTER
----- Message from Mike Self sent at 7/21/2022 12:47 PM EDT -----  Subject: Referral Request    Reason for referral request? Patient is needing something for sedation   before she can get her MRI, she was unable to do it today, and they told   her to put this request in to her doctor. Provider patient wants to be referred to(if known):     Provider Phone Number(if known):     Additional Information for Provider?   ---------------------------------------------------------------------------  --------------  [address]    [phone]; OK to leave message on voicemail  ---------------------------------------------------------------------------  --------------

## 2022-07-25 NOTE — TELEPHONE ENCOUNTER
(FYI) I spoke to [place] about the sedation process and needing an anesthesiologist. She does not want any additional bills therefore, will try to make the open MRI. If she is not able to proceed with the MRI she will let us know. I did encourage her to keep and call to schedule the Neurology appointment. Patient voiced understanding and was thankful for the call.

## 2022-07-26 DIAGNOSIS — F33.1 MDD (MAJOR DEPRESSIVE DISORDER), RECURRENT EPISODE, MODERATE (HCC): ICD-10-CM

## 2022-07-26 RX ORDER — BREXPIPRAZOLE 1 MG/1
TABLET ORAL
Qty: 30 TABLET | Refills: 0 | Status: SHIPPED | OUTPATIENT
Start: 2022-07-26 | End: 2022-10-07

## 2022-07-28 ENCOUNTER — HOSPITAL ENCOUNTER (OUTPATIENT)
Dept: WOMENS IMAGING | Age: 62
Discharge: HOME | End: 2022-07-28
Payer: MEDICARE

## 2022-07-28 VITALS — BODY MASS INDEX: 35.34 KG/M2 | WEIGHT: 207 LBS | HEIGHT: 64 IN

## 2022-07-28 DIAGNOSIS — Z12.31 ENCOUNTER FOR SCREENING MAMMOGRAM FOR MALIGNANT NEOPLASM OF BREAST: ICD-10-CM

## 2022-07-28 PROCEDURE — 77067 SCR MAMMO BI INCL CAD: CPT

## 2022-08-01 ENCOUNTER — TELEPHONE (OUTPATIENT)
Dept: FAMILY MEDICINE CLINIC | Age: 62
End: 2022-08-01

## 2022-08-01 NOTE — TELEPHONE ENCOUNTER
----- Message from Lambertjames Grider sent at 8/1/2022  7:07 AM EDT -----  Subject: Message to Provider    QUESTIONS  Information for Provider? Pt phnd was placed on an anti-depressant - pt   cannot take this medication - wants to know if any other medication can be   taken for major depression other than an anti-depressant-she did not pick   up last RX  ---------------------------------------------------------------------------  --------------  Samara WALKER  [phone]; OK to leave message on voicemail  ---------------------------------------------------------------------------  --------------  SCRIPT ANSWERS  Relationship to Patient?  Self

## 2022-08-01 NOTE — TELEPHONE ENCOUNTER
Pt requesting Advise for Different or Change Medication per E[facility] message.      -Pt states she cannot take this medication wants to know if any other medication can be taken for major depression other than an anti-depressant?   -Pt states she did not  last RX .

## 2022-08-10 ENCOUNTER — TELEMEDICINE (OUTPATIENT)
Dept: FAMILY MEDICINE CLINIC | Age: 62
End: 2022-08-10
Payer: MEDICARE

## 2022-08-10 DIAGNOSIS — R21 RASH: Primary | ICD-10-CM

## 2022-08-10 PROCEDURE — 99213 OFFICE O/P EST LOW 20 MIN: CPT | Performed by: REGISTERED NURSE

## 2022-08-10 RX ORDER — METHYLPREDNISOLONE 4 MG/1
TABLET ORAL
Qty: 21 TABLET | Refills: 0 | Status: SHIPPED | OUTPATIENT
Start: 2022-08-10 | End: 2022-09-15

## 2022-08-10 RX ORDER — CLOTRIMAZOLE 1 %
CREAM (GRAM) TOPICAL
Qty: 85 G | Refills: 0 | Status: SHIPPED | OUTPATIENT
Start: 2022-08-10

## 2022-08-10 ASSESSMENT — ENCOUNTER SYMPTOMS: RESPIRATORY NEGATIVE: 1

## 2022-08-10 NOTE — PROGRESS NOTES
8/10/2022    TELEHEALTH EVALUATION -- Audio/Visual (During HIW-36 public health emergency)    HPI:    Niko Gilliam (:  1960) has requested an audio/video evaluation for the following concern(s):    She is here a rash under her breast and neck and in her groin for three days. It burns and itches. She has not taken anything for this. Review of Systems   Constitutional: Negative. Respiratory: Negative. Skin:  Positive for rash (rash under breasts, between breasts, on neck and in groin). Neurological: Negative. Prior to Visit Medications    Medication Sig Taking? Authorizing Provider   methylPREDNISolone (MEDROL, HUSSEIN,) 4 MG tablet Take by mouth. Take 6 tabs on day 1, 5 tabs on day 2, 4 tabs on day 3, 3 tabs on day 4, 2 tabs on day 5, 1 tab on day 6 Yes MELANIE Duarte CNP   clotrimazole (LOTRIMIN) 1 % cream Apply topically 2 times daily for 2 weeks Yes Dulce Covington, MELANIE - CNP   REXULTI 1 MG TABS tablet TAKE 1 TABLET BY MOUTH ONCE DAILY IN THE EVENING Yes Vicenta Cuadra, APRN - CNP   hydrOXYzine HCl (ATARAX) 50 MG tablet TAKE 1 TO 2 TABLETS BY MOUTH NIGHTLY Yes MELANIE Parish CNP   ondansetron (ZOFRAN ODT) 4 MG disintegrating tablet Take 1 tablet by mouth every 8 hours as needed for Nausea or Vomiting Yes ELLIOT Ortega   mupirocin (BACTROBAN) 2 % ointment APPLY OINTMENT NASALLY TWICE DAILY AS DIRECTED Yes Historical Provider, MD   mupirocin (BACTROBAN NASAL) 2 % nasal ointment Take by Nasal route 2 times daily. Yes Colan Butts, APRN - CNP   triamcinolone (KENALOG) 0.1 % cream APPLY CREAM EXTERNALLY TO AFFECTED AREA ONCE DAILY Yes Colan Butts, APRN - CNP   phenylephrine-cocoa butter (PREPARATION H) 0.25-88.44 % Place 1 suppository rectally as needed for Hemorrhoids Yes Colan Butts, APRN - CNP   nystatin (MYCOSTATIN) 942114 UNIT/GM powder Apply 3 times daily.  Yes Colan Butts, APRN - CNP   acetaminophen (AMINOFEN) 325 MG tablet Take 2 tablets by mouth every 8 hours as needed for Pain Yes MELANIE Hidalgo CNP   diphenhydrAMINE-zinc acetate (BENADRYL ITCH STOPPING) 1-0.1 % cream Apply topically 3 times daily as needed. Yes MELANIE Cochran CNP   albuterol sulfate HFA (VENTOLIN HFA) 108 (90 Base) MCG/ACT inhaler Inhale 2 puffs into the lungs 4 times daily as needed for Wheezing or Shortness of Breath Yes MELANIE Leong CNP   Misc. Devices (SILICONE EAR PLUGS) MISC Use daily as needed for swimming Yes MELANIE Hidalgo CNP   scopolamine (TRANSDERM-SCOP) transdermal patch APPLY 1 PATCH TOPICALLY EVERY 72 HOURS Yes Historical Provider, MD   Cholecalciferol (VITAMIN D3) 50 MCG (2000 UT) CAPS TAKE 1 CAPSULE BY MOUTH ONCE DAILY Yes MELANIE Leong CNP       Social History     Tobacco Use    Smoking status: Never    Smokeless tobacco: Never   Vaping Use    Vaping Use: Never used   Substance Use Topics    Alcohol use:  Yes     Alcohol/week: 0.0 standard drinks     Comment: occ    Drug use: No        Allergies   Allergen Reactions    Bactrim [Sulfamethoxazole-Trimethoprim]      Rash and trouble breathing    Antihistamines, Chlorpheniramine-Type     Macrobid [Nitrofurantoin]      PER PATIENT     ,   Past Medical History:   Diagnosis Date    Anxiety     B[facility] 1 disorder ([facility]     Depression     Dyspareunia in female 5/16/2015    Elevated transaminase level 11/10/2017    Hemorrhoids 1/15/2015    History of tubal ligation 5/16/2015    Hypertension     Irritable bowel syndrome     Menopause 9/5/2016    Obesity     Obesity (BMI 30-39.9) 3/11/2015    Overactive bladder     Prurigo nodularis     Stress incontinence     Urinary incontinence     Uterine fibroid 5/16/2015    Yeast vaginitis 8/30/2020       PHYSICAL EXAMINATION:  [ INSTRUCTIONS:  \"[x]\" Indicates a positive item  \"[]\" Indicates a negative item  -- DELETE ALL ITEMS NOT EXAMINED]    Constitutional: [x] Appears well-developed and well-nourished [x] No apparent distress [] Abnormal-   Mental status  [x] Alert and awake  [x] Oriented to person/place/time [x]Able to follow commands      Eyes:  EOM    [x]  Normal  [] Abnormal-  Sclera  [x]  Normal  [] Abnormal -         Discharge [x]  None visible  [] Abnormal -    HENT:   [x] Normocephalic, atraumatic. [] Abnormal   [x] Mouth/Throat: Mucous membranes are moist.     External Ears [x] Normal  [] Abnormal-     Neck: [x] No visualized mass     Pulmonary/Chest: [x] Respiratory effort normal.  [x] No visualized signs of difficulty breathing or respiratory distress        [] Abnormal-      Musculoskeletal:   [] Normal gait with no signs of ataxia         [x] Normal range of motion of neck        [] Abnormal-       Neurological:        [x] No Facial Asymmetry (Cranial nerve 7 motor function) (limited exam to video visit)          [x] No gaze palsy        [] Abnormal-         Skin:        [x] No significant exanthematous lesions or discoloration noted on facial skin         [x] Abnormal- erythematous areas noted under breast and on neck, groin not examine. Unable to accurately assess rash due to poor video quality           Psychiatric:       [x] Normal Affect [x] No Hallucinations        [] Abnormal-     Other pertinent observable physical exam findings-     ASSESSMENT/PLAN:  1. Rash  Suspect this is a candidiasis infection in groin and under breast. Possible dermatitis between breast and on neck. Will treat with medrol dose pa and instructed to her to take her hydroxyzine as directed for pruritis. Clotrimazole cream- apply between breasts and in groin. Return for in office appointment if no improvement. - methylPREDNISolone (MEDROL, HUSSEIN,) 4 MG tablet; Take by mouth. Take 6 tabs on day 1, 5 tabs on day 2, 4 tabs on day 3, 3 tabs on day 4, 2 tabs on day 5, 1 tab on day 6  Dispense: 21 tablet;  Refill: 0  - clotrimazole (LOTRIMIN) 1 % cream; Apply topically 2 times daily for 2 weeks  Dispense: 85 g; Refill: 0    Return if symptoms the Coronavirus Preparedness and Response Supplemental Appropriations Act, this Virtual Visit was conducted, with patient's consent, to reduce the patient's risk of exposure to COVID-19 and provide continuity of care for an established/new patient. Services were provided through a video synchronous discussion virtually to substitute for in-person clinic visit. I discussed with the patient the nature of our telehealth visits via interactive/real-time audio/video that:   - I would evaluate the patient and recommend diagnostics and treatments based on my assessment   - Our sessions are not being recorded and that personal health information is protected   - Our team would provide follow up care in person if/when the patient needs it.

## 2022-08-23 NOTE — TELEPHONE ENCOUNTER
Student's Name:   Nemo De Los Santos Birth Date  2017  Sex  female  Race/Ethnicity   School/Grade Level/ID#     Address:   [address] [place] [id number]  Parent/Guardian             Telephone#                                            Home:                               Work:   IMMUNIZATIONS: To be completed by health care provider. The mo/da/yr for every dose administered is required. If a specific vaccine is medically contraindicated, a separate written statement must be attached by the health care responsible for completing the health examination explaining the medical reason for the contraindication.      Immunization History   Administered Date(s) Administered   • COVID-19 6M-4Y Pfizer-BioNtech 08/23/2022   • DTaP(INFANRIX) 11/18/2020   • DTaP/HIB/IPV 2019, 2019, 02/17/2020   • Hep A, ped/adol, 2 dose 08/19/2020, 02/23/2021   • Hep B, Unspecified Formulation 2019   • Hep B, adolescent or pediatric 2019, 02/17/2020   • Hib (PRP-OMP) 11/18/2020   • Influenza, injectable, quadrivalent, preservative-free 11/18/2020, 12/22/2020, 10/06/2021   • MMR 08/19/2020   • Pneumococcal Conjugate 13 Valent Vacc (Prevnar 13) 2019, 2019, 02/17/2020, 12/22/2020   • Rotavirus - monovalent 2019, 2019   • Varicella 08/19/2020      Immunizations given 8/23/2022: Pfizer COVID-19 Vaccine dose 1      Health care provider (MD, , APN, PA, school health professional, health official) verifying above immunization history must sign below. If adding dates to the above immunization history section, put your initials by date(s) and sign here.)  Signature                                                                 Title                                                Date  __________.     MD 8/23/2022_________________________________________________________________________  Signature                                                                 Title                          Will order ativan 1 mg tablets to take 30 minutes prior to having MRI. Will have MA contact pt to let her know to  medication.                        Date  _____________________________________________________________________________________   ALTERNATIVE PROOF OF IMMUNITY   1. Clinical diagnosis (measles, mumps, hepatitis B) is allowed when verified by physician and supported with lab confirmation.  Attach copy of lab result.    * MEASLES ([place])  MO   DA  YR          **MUMPS  MO   DA  YR             HEPATITIS B  MO   DA   YR           VARICELLA  MO   DA  YR      2. History of varicella (chickenpox) disease is acceptable if verified by health care provider, school health professional or health official.  Person signing below verifies that the parent/guardian’s description of varicella disease history is indicative of past infection and is accepting such history as documentation of disease.  Date of Disease                                  Signature                                                           Title   3. Laboratory Evidence of Immunity (check one)      __ Measles*         __ Mumps**        __ Rubella        __Varicella    (Attach copy of lab result)         *All measles cases diagnosed on or after July 1, 2002, must be confirmed by laboratory evidence.      **All mumps cases diagnosed on or after July 1, 2013, must be confirmed by laboratory evidence.     Completion of Alternative 1 or 3 MUST be accompanied by Labs & Physician Signature: ___________________________  Physician Statements of Immunity MUST be submitted to [facility] for review.     Certificates of [employer] Exemption to Immunizations or Physician Medical Statements of Medical Contraindication Are Reviewed   and Maintained by the School Authority     (11/2015)                                         (COMPLETE BOTH SIDES)                                  Approved [facility] 3/2016    HEALTH HISTORY      TO BE COMPLETED AND SIGNED BY PARENT/GUARDIAN AND VERIFIED BY HEALTH CARE PROVIDER  ALLERGIES: (Food, drug, insect, other) has No Known Allergies.   MEDICATION: (List all  prescribed or taken on a regular basis.) has a current medication list which includes the following prescription(s): amoxicillin (AMOXIL) 400 MG/5ML suspension, hydroCORTisone (CORTIZONE) 2.5 % ointment, and hydroCORTisone (CORTIZONE) 1 % ointment.   Diagnosis of asthma?  Child wakes during night coughing? No  No  Loss of function of one of paired  organs?(eye/ear/kidney/testicle) No    Birth defects? No  Hospitalizations? No    Developmental delay? No   When? What for? No    Blood disorders? Hemophilia,  Sickle Cell, Other? Explain. No  Surgery? (List all.)  When? What for? No    Diabetes? No  Serious injury or illness? No    Head injury/Concussion/Passed out? No  TB skin test positive (past/present)? * No *If yes, refer to local [facility] dept   Seizures? What are they like?  No  TB disease (past or present)? * No *If yes, [facility]   Heart problem/Shortness of breath?  No  Tobacco use (type, frequency)?  No    Heart murmur/High blood pressure? No  Alcohol/Drug use?  No    Dizziness or chest pain with exercise? No  Family history of sudden death  before age 50? (Cause?) No    Eye/Vision problems? ______           __Glasses          __Contacts  Last exam by eye doctor ______  Other concerns? (crossed eye, drooping lids, squinting, difficulty reading) Dental?   __ Braces     __ Bridge     __ Plate   __Other   Ear/Hearing problems? No  Information may be shared with appropriate personnel for health and educational purposes.   Bone/Joint problem/injury/scoliosis? No  Parent/Guardian  Signature                                               Date   PHYSICAL EXAMINATION REQUIREMENTS   Entire section below to be completed by MD/DO/APN/PA Head Circumference if <2-3 years old - BP 98/63   Pulse 115   Temp 97.3 °F (36.3 °C) (Temporal)   Ht 3' 1\" (0.94 m)   Wt 12.8 kg (28 lb 3.5 oz)   BMI 14.49 kg/m²   BSA 0.57 m²    13 %ile (Z= -1.12) based on CDC (Girls, 2-20 Years) BMI-for-age based on BMI available as  of 8/23/2022.   DIABETES SCREENING (NOT REQUIRED FOR ) BMI>85% age/sex: No     And any two of the following: Family History: Yes  Ethnic Minority: Yes  Signs of Insulin Resistance (hypertension, dyslipidemia, polycystic ovarian syndrome, acanthosis nigricans): No  At Risk: No   LEAD RISK QUESTIONNAIRE Required for children age 6 months through 6 years enrolled in licensed or public school operated day care, , nursery school and/or . (Blood test required if resides in [place] or high risk zip code.)  Questionnaire Administered? No  Blood Test Indicated? Yes   Blood Test Date/Result: 3.3 (8/23/2022).   TB SKIN OR BLOOD TEST Recommended only for children in high-risk groups including children immunosuppressed due to HIV infection or other conditions, frequent travel to or born in high prevalence countries or those exposed to adults in high-risk categories. See CDC guidelines. http://www.cdc.gov/tb/publications/factsheets/testing/TB_testing.htm.  Test Needed: No     Test performed: No                          Skin Test:    Date Read              /      /             Result:   Positive__      Negative__        mm________                          Blood Test: Date Reported       /      /               Result:  Positive__      Negative__      Value________  LAB TESTS (recommended) Date/Result  Date/Results   Hemoglobin or Hematocrit 11.8 (8/23/2022) Sickle Cell (when indicated)    Urinalysis NA Developmental Screening Tool Normal - ASQ        SYSTEM REVIEW Normal  Comments/Follow-up/Needs  Normal Comments/Follow-up/Needs   Skin  Yes  Endocrine Yes    Ears Yes                  Screening Result Gastrointestinal Yes    Eyes Yes                  Screening Result: Genito-Urinary Yes                                      LMP:    Nose Yes  Neurologic Yes    Throat Yes  Musculoskeletal Yes    Mouth/Dental Yes  Spinal Exam Yes    Cardiovascular/HTN Yes  Nutritional status Yes    Respiratory Yes Diagnosis  of Asthma: No   Mental Health Yes    Currently Prescribed Asthma Medication:        No  Quick-relief medication (e.g. Short Acting Beta Antagonist)        No  Controller medication (e.g. inhaled corticosteroid) Other     NEEDS/MODIFICATIONS required in the school setting: No restrictions DIETARY Needs/Restrictions: No restrictions   SPECIAL INSTRUCTIONS/DEVICES e.g. safety glasses, glass eye, chest protector for arrhythmia, pacemaker, prosthetic device, dental bridge, false teeth, athletic support/cup: No restrictions   MENTAL HEALTH/OTHER Is there anything else the school should know about this student?  If you would like to discuss this student’s health with school or school health personnel:  Not needed   EMERGENCY ACTION needed while at school due to child’s health condition (e.g. ,seizures, asthma, insect sting, food, peanut allergy, bleeding problem, diabetes, heart problem)?   No   On the basis of the examination on this day, I approve this child’s participation in                                (If No or Modified please attach explanation.)  PHYSICAL EDUCATION:  Yes                               INTERSCHOLASTIC SPORTS   Yes   Print Name  Claudia Chopra MD         Signature                                                                            Date: 8/23/2022   Address:  [facility] [address]  [facility] [place] [id number] [address]  [phone]         (Complete Both Sides)     Approved [facility] 3/2016

## 2022-09-12 ENCOUNTER — TELEPHONE (OUTPATIENT)
Dept: FAMILY MEDICINE CLINIC | Age: 62
End: 2022-09-12

## 2022-09-12 DIAGNOSIS — J30.9 ALLERGIC RHINITIS, UNSPECIFIED SEASONALITY, UNSPECIFIED TRIGGER: Primary | ICD-10-CM

## 2022-09-12 NOTE — TELEPHONE ENCOUNTER
----- Message from Carlos Rodriguez sent at 9/12/2022  8:48 AM EDT -----  Subject: Referral Request    Reason for referral request? Pt would like referral to allergy specialist.   She has been struggling with allergies for a while and states the office   is well aware of the situation. Pt brother recommended to go see a   specialist. Pt prefers [facility]   Provider patient wants to be referred to(if known):     Provider Phone Number(if known):     Additional Information for Provider?   ---------------------------------------------------------------------------  --------------  [address]    [phone]; OK to leave message on voicemail  ---------------------------------------------------------------------------  --------------

## 2022-09-12 NOTE — TELEPHONE ENCOUNTER
Referral sent  See Bartlett MD  [address], [address], [address] [place] [address] [address]  Phone: [phone]  Fax: [phone]

## 2022-09-13 ENCOUNTER — TELEPHONE (OUTPATIENT)
Dept: FAMILY MEDICINE CLINIC | Age: 62
End: 2022-09-13

## 2022-09-13 NOTE — TELEPHONE ENCOUNTER
----- Message from [facility] sent at 9/13/2022  4:09 PM EDT -----  Subject: Referral Request    Reason for referral request? patient needs a new referral for Allergy &   Immunology Dr. Devin Low does not take her insurance and she would like a   new referral  Provider patient wants to be referred to(if known):     Provider Phone Number(if known):[phone]    Additional Information for Provider? talked to the office 9/13/22 please   assist her in finding a new doctor  ---------------------------------------------------------------------------  --------------  [address]    [phone]; OK to leave message on voicemail  ---------------------------------------------------------------------------  --------------

## 2022-09-14 ENCOUNTER — TELEPHONE (OUTPATIENT)
Dept: FAMILY MEDICINE CLINIC | Age: 62
End: 2022-09-14

## 2022-09-14 NOTE — TELEPHONE ENCOUNTER
----- Message from Carisa Cuadra sent at 9/14/2022  9:18 AM EDT -----  Subject: Referral Request    Reason for referral request? Pt requesting a referral to an allergists   that is covered by her insurance. Provider patient wants to be referred to(if known):     Provider Phone Number(if known): Additional Information for Provider? Needs someone who accepts her   insurance.    ---------------------------------------------------------------------------  --------------  Lala KRISHNAMURTHY    [phone]; OK to leave message on voicemail  ---------------------------------------------------------------------------  --------------

## 2022-09-14 NOTE — TELEPHONE ENCOUNTER
----- Message from Iban Daviesyd sent at 9/13/2022  4:49 PM EDT -----  Subject: Appointment Request    Reason for Call: Established Patient Appointment needed: Routine Existing   Condition Follow Up    QUESTIONS    Reason for appointment request? No appointments available during search     Additional Information for Provider?  PT IS HAVING A FLAIR UP AND NEED AN   APPT ASAP ,HAS RASH ITCHY AND BURNING , NO AVAILABLE APPT PLEASE CALL IF   PT CAN GET IN ,PT HAD JUST SHINGLE SHOT AND DOSENT FEEL GOOD ,PT STATES   SHE THINKS THIS A FLAIR UP ,IF PT CAN GET CAN SOMETHING BE CALLED IN FOR   HER   ---------------------------------------------------------------------------  --------------  Wendy RICHARD  [phone]; OK to leave message on voicemail  ---------------------------------------------------------------------------  --------------  SCRIPT ANSWERS  COVID Screen: [employer]

## 2022-09-15 ENCOUNTER — OFFICE VISIT (OUTPATIENT)
Dept: FAMILY MEDICINE CLINIC | Age: 62
End: 2022-09-15
Payer: MEDICARE

## 2022-09-15 ENCOUNTER — TELEPHONE (OUTPATIENT)
Dept: FAMILY MEDICINE CLINIC | Age: 62
End: 2022-09-15

## 2022-09-15 VITALS
TEMPERATURE: 97.1 F | DIASTOLIC BLOOD PRESSURE: 98 MMHG | OXYGEN SATURATION: 98 % | BODY MASS INDEX: 37.56 KG/M2 | HEIGHT: 64 IN | HEART RATE: 82 BPM | WEIGHT: 220 LBS | SYSTOLIC BLOOD PRESSURE: 148 MMHG

## 2022-09-15 DIAGNOSIS — L25.9 CONTACT DERMATITIS, UNSPECIFIED CONTACT DERMATITIS TYPE, UNSPECIFIED TRIGGER: Primary | ICD-10-CM

## 2022-09-15 PROCEDURE — 99213 OFFICE O/P EST LOW 20 MIN: CPT | Performed by: REGISTERED NURSE

## 2022-09-15 RX ORDER — PREDNISONE 20 MG/1
TABLET ORAL
Qty: 18 TABLET | Refills: 0 | Status: SHIPPED | OUTPATIENT
Start: 2022-09-15 | End: 2022-09-25

## 2022-09-15 RX ORDER — PRAMOXINE HYDROCHLORIDE AND ZINC ACETATE LOTION 10; 1 MG/ML; MG/ML
1 LOTION TOPICAL 2 TIMES DAILY
Qty: 177 ML | Refills: 0 | Status: SHIPPED | OUTPATIENT
Start: 2022-09-15

## 2022-09-15 RX ORDER — METHYLPREDNISOLONE ACETATE 80 MG/ML
80 INJECTION, SUSPENSION INTRA-ARTICULAR; INTRALESIONAL; INTRAMUSCULAR; SOFT TISSUE ONCE
Status: DISCONTINUED | OUTPATIENT
Start: 2022-09-15 | End: 2022-09-15

## 2022-09-15 RX ORDER — PREDNISONE 20 MG/1
40 TABLET ORAL DAILY
Qty: 10 TABLET | Refills: 0 | Status: SHIPPED | OUTPATIENT
Start: 2022-09-15 | End: 2022-09-15

## 2022-09-15 SDOH — ECONOMIC STABILITY: FOOD INSECURITY: WITHIN THE PAST 12 MONTHS, YOU WORRIED THAT YOUR FOOD WOULD RUN OUT BEFORE YOU GOT MONEY TO BUY MORE.: NEVER TRUE

## 2022-09-15 SDOH — ECONOMIC STABILITY: FOOD INSECURITY: WITHIN THE PAST 12 MONTHS, THE FOOD YOU BOUGHT JUST DIDN'T LAST AND YOU DIDN'T HAVE MONEY TO GET MORE.: NEVER TRUE

## 2022-09-15 ASSESSMENT — ENCOUNTER SYMPTOMS: SHORTNESS OF BREATH: 0

## 2022-09-15 ASSESSMENT — SOCIAL DETERMINANTS OF HEALTH (SDOH): HOW HARD IS IT FOR YOU TO PAY FOR THE VERY BASICS LIKE FOOD, HOUSING, MEDICAL CARE, AND HEATING?: NOT HARD AT ALL

## 2022-09-15 NOTE — TELEPHONE ENCOUNTER
Yes it is the correct dosage, Dulce increased the oral dosage because patient did not want the injection in the office. She can take it with food to help decrease upset stomach.

## 2022-09-15 NOTE — PROGRESS NOTES
% cream Apply topically 2 times daily for 2 weeks 85 g 0    REXULTI 1 MG TABS tablet TAKE 1 TABLET BY MOUTH ONCE DAILY IN THE EVENING 30 tablet 0    hydrOXYzine HCl (ATARAX) 50 MG tablet TAKE 1 TO 2 TABLETS BY MOUTH NIGHTLY 60 tablet 0    ondansetron (ZOFRAN ODT) 4 MG disintegrating tablet Take 1 tablet by mouth every 8 hours as needed for Nausea or Vomiting 20 tablet 0    mupirocin (BACTROBAN) 2 % ointment APPLY OINTMENT NASALLY TWICE DAILY AS DIRECTED      mupirocin (BACTROBAN NASAL) 2 % nasal ointment Take by Nasal route 2 times daily. 1 g 3    triamcinolone (KENALOG) 0.1 % cream APPLY CREAM EXTERNALLY TO AFFECTED AREA ONCE DAILY 80 g 2    phenylephrine-cocoa butter (PREPARATION H) 0.25-88.44 % Place 1 suppository rectally as needed for Hemorrhoids 12 suppository 1    nystatin (MYCOSTATIN) 444179 UNIT/GM powder Apply 3 times daily. 60 g 1    acetaminophen (AMINOFEN) 325 MG tablet Take 2 tablets by mouth every 8 hours as needed for Pain 60 tablet 0    diphenhydrAMINE-zinc acetate (BENADRYL ITCH STOPPING) 1-0.1 % cream Apply topically 3 times daily as needed. 28 g 5    albuterol sulfate HFA (VENTOLIN HFA) 108 (90 Base) MCG/ACT inhaler Inhale 2 puffs into the lungs 4 times daily as needed for Wheezing or Shortness of Breath 18 g 1    Misc. Devices (SILICONE EAR PLUGS) MISC Use daily as needed for swimming 2 each 0    scopolamine (TRANSDERM-SCOP) transdermal patch APPLY 1 PATCH TOPICALLY EVERY 72 HOURS      Cholecalciferol (VITAMIN D3) 50 MCG (2000 UT) CAPS TAKE 1 CAPSULE BY MOUTH ONCE DAILY 30 capsule 5     No current facility-administered medications for this visit. Review of Systems   Constitutional:  Negative for fatigue and fever. Respiratory:  Negative for shortness of breath. Musculoskeletal:  Negative for joint pain. Skin:  Positive for rash.      Vitals:    09/15/22 1036   BP: (!) 148/98   Pulse: 82   Temp: 97.1 °F (36.2 °C)   TempSrc: Temporal   SpO2: 98%   Weight: 220 lb (99.8 kg)   Height: 5' 4\" (1.626 m)     Physical Exam  Constitutional:       General: She is not in acute distress. Appearance: Normal appearance. She is not ill-appearing, toxic-appearing or diaphoretic. HENT:      Head: Normocephalic and atraumatic. Eyes:      Extraocular Movements: Extraocular movements intact. Conjunctiva/sclera: Conjunctivae normal.      Pupils: Pupils are equal, round, and reactive to light. Cardiovascular:      Rate and Rhythm: Normal rate. Heart sounds: No friction rub. Pulmonary:      Effort: Pulmonary effort is normal. No respiratory distress. Musculoskeletal:         General: Normal range of motion. Cervical back: Normal range of motion. Right lower leg: No edema. Left lower leg: No edema. Skin:     General: Skin is warm and dry. Coloration: Skin is not jaundiced or pale. Findings: Rash (maculorpapular rash on abdomen- no underlying erythema or edema. Areas of open skin from scratching- possibly underlying is her prurigo nodularis- difficult to discern due to her scartching and creating abrasions) present. No erythema. Neurological:      General: No focal deficit present. Mental Status: She is alert and oriented to person, place, and time. Psychiatric:         Mood and Affect: Mood normal.         Behavior: Behavior normal.         Thought Content: Thought content normal.         Judgment: Judgment normal.          Patient's past medical history, surgical history, family history, medications,  and allergies  were all reviewed and updated as appropriate today. Patient Active Problem List   Diagnosis    Urinary, incontinence, stress female    Obesity (BMI 30-39. 9)     Past Medical History:   Diagnosis Date    Anxiety     Bipolar 1 disorder ([facility] [address]     Depression     Dyspareunia in female 5/16/2015    Elevated transaminase level 11/10/2017    Hemorrhoids 1/15/2015    History of tubal ligation 5/16/2015    Hypertension     Irritable bowel syndrome Menopause 9/5/2016    Obesity     Obesity (BMI 30-39.9) 3/11/2015    Overactive bladder     Prurigo nodularis     Stress incontinence     Urinary incontinence     Uterine fibroid 5/16/2015    Yeast vaginitis 8/30/2020      Past Surgical History:   Procedure Laterality Date    COLONOSCOPY  2010    polyp removed    COLONOSCOPY  2018    EYE SURGERY Left     Lazy eye    TONSILLECTOMY      TUBAL LIGATION  1994       Family History   Problem Relation Age of Onset    Heart Disease Mother     Diabetes Mother     Arthritis Father     Arthritis Brother     Breast Cancer Maternal Grandfather     Diabetes Maternal Grandfather       Allergies   Allergen Reactions    Bactrim [Sulfamethoxazole-Trimethoprim]      Rash and trouble breathing    Antihistamines, Chlorpheniramine-Type     Macrobid [Nitrofurantoin]      PER PATIENT         This chart was generated using the Zeer dictation system. I created this record but it may contain dictation errors due to the limitation of the software.

## 2022-09-15 NOTE — TELEPHONE ENCOUNTER
Patient says she thought she was getting prednisone 4 mg and when she picked it up it is 20 mg.     Please clarify   [phone]

## 2022-09-15 NOTE — TELEPHONE ENCOUNTER
9/15/22 @ 1:55pm-- Pt calling again asking if pharmacy gave her the correct dosage & if she has to eat when taking this medication?  predniSONE (DELTASONE) 20 MG tablet

## 2022-09-19 ENCOUNTER — TELEPHONE (OUTPATIENT)
Dept: FAMILY MEDICINE CLINIC | Age: 62
End: 2022-09-19

## 2022-09-19 DIAGNOSIS — R21 RASH AND NONSPECIFIC SKIN ERUPTION: Primary | ICD-10-CM

## 2022-09-19 NOTE — TELEPHONE ENCOUNTER
Pt called in requesting referral for an allergist within [address] [facility], called her insurance and they were unable to give her any that were accepting new pts.

## 2022-09-19 NOTE — TELEPHONE ENCOUNTER
We do not have an allergist in [facility], the first one I gave her is who if affiliated with us. Below is the information for [facility], she can try there.    Address: [facility] [place] [address]  [place], [address]  Phone: [phone] (option 2)

## 2022-09-20 RX ORDER — HYDROXYZINE 50 MG/1
TABLET, FILM COATED ORAL
Qty: 60 TABLET | Refills: 0 | Status: SHIPPED | OUTPATIENT
Start: 2022-09-20

## 2022-09-20 NOTE — TELEPHONE ENCOUNTER
Last Office Visit  -  9/15/22  Next Office Visit  -  none    Last Filled  -  7/21/22  Last UDS -    Contract -

## 2022-09-20 NOTE — TELEPHONE ENCOUNTER
Recived call form [facility] 9/20/22 3:32 pm      Rocio CARABALLO [facility] Support  Subject: Referral Request     Reason for referral request? The pt has found the following provider and   she would like a referral placed for her asap, [address] MD [address] [facility], [address] [place] [address] (p) [phone] (I) [phone] Tuesday 8 AM - 5 PM Thursday 8 AM   - 5 PM   Provider patient wants to be referred to(if known):     Provider Phone Number(if known): Additional Information for Provider?

## 2022-09-26 RX ORDER — TRIAMCINOLONE ACETONIDE 1 MG/G
CREAM TOPICAL
Qty: 80 G | Refills: 2 | Status: SHIPPED | OUTPATIENT
Start: 2022-09-26

## 2022-09-26 NOTE — TELEPHONE ENCOUNTER
Call from [facility] 9/26/22 at 9:07 am        [facility] Support  Subject: Message to Provider     QUESTIONS   Information for Provider? Most of the rash has cleared up with the pills,   but still has some painful sports, should she take more pills, or should   she get the cream, or should she get a shot. She's not sure what she   should do and should she keep using the calamine lotion as well.   ---------------------------------------------------------------------------   --------------   Kyle Marie INFO   [phone];  Do not leave any message, patient will call back for answer

## 2022-09-26 NOTE — TELEPHONE ENCOUNTER
Patient phoned back and said she does need a refill on the   triamcinolone (KENALOG) 0.1 % cream  Please send to [facility]

## 2022-09-27 ENCOUNTER — TELEPHONE (OUTPATIENT)
Dept: FAMILY MEDICINE CLINIC | Age: 62
End: 2022-09-27

## 2022-09-27 NOTE — TELEPHONE ENCOUNTER
----- Message from Stormy Fortune sent at 9/27/2022  4:18 PM EDT -----  Subject: Message to Provider    QUESTIONS  Information for Provider? Patient stated she has a skin condition and she   would like to know if she should take hot baths or cold baths with the   condition. Patient would like a call back as soon as possible.  ---------------------------------------------------------------------------  --------------  [address]  [phone]; OK to leave message on voicemail  ---------------------------------------------------------------------------  --------------  SCRIPT ANSWERS  Relationship to Patient?  Self

## 2022-09-28 DIAGNOSIS — E78.00 HYPERCHOLESTEREMIA: ICD-10-CM

## 2022-09-29 RX ORDER — ATORVASTATIN CALCIUM 10 MG/1
TABLET, FILM COATED ORAL
Qty: 90 TABLET | Refills: 0 | Status: SHIPPED | OUTPATIENT
Start: 2022-09-29

## 2022-10-04 ENCOUNTER — TELEPHONE (OUTPATIENT)
Dept: FAMILY MEDICINE CLINIC | Age: 62
End: 2022-10-04

## 2022-10-04 NOTE — TELEPHONE ENCOUNTER
Call from patient stating she is on  Abilify. It is going well. Patient also wanted provider that she is taking hydrozyzine.     Patient also made an appointment for 10/7

## 2022-10-07 ENCOUNTER — SCHEDULED TELEPHONE ENCOUNTER (OUTPATIENT)
Dept: PSYCHIATRY | Age: 62
End: 2022-10-07
Payer: MEDICARE

## 2022-10-07 DIAGNOSIS — F33.1 MDD (MAJOR DEPRESSIVE DISORDER), RECURRENT EPISODE, MODERATE (HCC): Primary | ICD-10-CM

## 2022-10-07 DIAGNOSIS — F41.1 GAD (GENERALIZED ANXIETY DISORDER): ICD-10-CM

## 2022-10-07 PROCEDURE — 99214 OFFICE O/P EST MOD 30 MIN: CPT | Performed by: NURSE PRACTITIONER

## 2022-10-07 ASSESSMENT — PATIENT HEALTH QUESTIONNAIRE - PHQ9
SUM OF ALL RESPONSES TO PHQ QUESTIONS 1-9: 2
SUM OF ALL RESPONSES TO PHQ QUESTIONS 1-9: 2
2. FEELING DOWN, DEPRESSED OR HOPELESS: 0
5. POOR APPETITE OR OVEREATING: 1
3. TROUBLE FALLING OR STAYING ASLEEP: 1
9. THOUGHTS THAT YOU WOULD BE BETTER OFF DEAD, OR OF HURTING YOURSELF: 0
8. MOVING OR SPEAKING SO SLOWLY THAT OTHER PEOPLE COULD HAVE NOTICED. OR THE OPPOSITE, BEING SO FIGETY OR RESTLESS THAT YOU HAVE BEEN MOVING AROUND A LOT MORE THAN USUAL: 0
7. TROUBLE CONCENTRATING ON THINGS, SUCH AS READING THE NEWSPAPER OR WATCHING TELEVISION: 0
1. LITTLE INTEREST OR PLEASURE IN DOING THINGS: 0
SUM OF ALL RESPONSES TO PHQ9 QUESTIONS 1 & 2: 0
4. FEELING TIRED OR HAVING LITTLE ENERGY: 0
SUM OF ALL RESPONSES TO PHQ QUESTIONS 1-9: 2
SUM OF ALL RESPONSES TO PHQ QUESTIONS 1-9: 2
6. FEELING BAD ABOUT YOURSELF - OR THAT YOU ARE A FAILURE OR HAVE LET YOURSELF OR YOUR FAMILY DOWN: 0

## 2022-10-07 ASSESSMENT — ANXIETY QUESTIONNAIRES
7. FEELING AFRAID AS IF SOMETHING AWFUL MIGHT HAPPEN: 0
4. TROUBLE RELAXING: 0
1. FEELING NERVOUS, ANXIOUS, OR ON EDGE: 1
3. WORRYING TOO MUCH ABOUT DIFFERENT THINGS: 0
6. BECOMING EASILY ANNOYED OR IRRITABLE: 0
5. BEING SO RESTLESS THAT IT IS HARD TO SIT STILL: 0
2. NOT BEING ABLE TO STOP OR CONTROL WORRYING: 0
GAD7 TOTAL SCORE: 1

## 2022-10-07 NOTE — PROGRESS NOTES
Patient is following up virtually today, and would like to address medication refills.      PHQ:  SHAKILA:

## 2022-10-07 NOTE — PROGRESS NOTES
PSYCHIATRY PROGRESS NOTE    Kalyani Quesada   [birth year]  DATE OF VISIT 10/07/22  Total time spent via telephone call only with patient (due to patient requesting it because was easier for her due to all that she had going on), reviewing records, and documenting today is 30 minutes. PCP: MELANIE Valdovinos CNP    CC:   Chief Complaint   Patient presents with    Follow-up     Kalyani Quesada was evaluated through telephone only encounter. The patient (or guardian if applicable) is aware that this is a billable service. Verbal consent to proceed has been obtained within the past 12 months. The visit was conducted pursuant to the emergency declaration under the [facility] authority and the [employer] General Act. Patient identification and address where patient was located during this telephone call was verified, and a caregiver was present when appropriate. The patient was located in a state where the provider was credentialed to provide care. Assessment/Plan  1. MDD (major depressive disorder), recurrent episode, moderate (HCC)  -Cont the abilify 20 mg daily. Has tried numerous antidepressants in the past that were not helpful but abilify has worked the best.   -Highly recommend she meet with Dr. Kwaku Argueta, [facility] [place].  -PCP will recheck lipid panel in Dec last checked in 2022, can repeat other metabolic labs then  -Follow up with me within 2 months or sooner if needed. 2. SHAKILA (generalized anxiety disorder)  -same plan as noted above    HPI: Kalyani Quesada is being evaluated via telephone today for follow up of her depression and anxiety. Reports that she is doing better than she was the last time I saw her. Has been able to get nursing help to care for her , is also going to be getting someone to help with light housekeeping.  States  is getting better and is taking all of his medications as prescribed. Reports he is also being nicer to her. Son has been getting involved with special Olympics again and he is also doing better. Will be working with the [employer] and they also have more supports that can be utilized to help with son. Pt states that all of this will be helpful in cont to lower the stress level or caregiving for pt. Reports that she has also been taking care of herself and is swimming at least 2-3 times a week. Reports that tries to get all of the house work completed so can go swimming several times on the weekend. Is hoping hat son will be able to get a job at the facility that pt attends to go swimming. Reports that if are able to car pool would really help. States that the Abilify continues to work well but she would like a list sent to her of the updated psychiatric medications. Reports that she likes to stay on top of things so that she is always educated. Ask if my assistant Joseph Beltran is to her through the mail? Also was wondering what the shot was that treats her skin condition Prurigo nodularis? Reports that she learned from the MD running the study that she was a part of at [facility] informed participants that there was a new medication coming out for the condition that was some form of a shot she thinks. Overall reports that she is feeling much better now that she has been back on the Abilify for a while. Does not have negative side effects from it at present. Would like to cont it for now.      associated symptoms:  Appetite abnormal: increased  Sleep disturbance Yes  Fatigue No  Loss of pleasure No  Impulsive behavior No    modifying factors: include taking her medication regularly as prescribed  Timing: worse sometimes in the morning  duration: ongoing  severity: mild to moderate    ROS:   GEN: no fevers or chills HEENT: no vision or hearing prob CV: no cp, no palpitations RESP: no dyspnea : no dysuria MSK: no muscle or joint pain GI: no n/v, no abd pain Skin: no rashes  NEURO: no tremors, no numbness/weakness ENDO: no weight changes    Past Medical History:   Diagnosis Date    Anxiety     Bipolar 1 disorder ([address]     Depression     Dyspareunia in female 5/16/2015    Elevated transaminase level 11/10/2017    Hemorrhoids 1/15/2015    History of tubal ligation 5/16/2015    Hypertension     Irritable bowel syndrome     Menopause 9/5/2016    Obesity     Obesity (BMI 30-39.9) 3/11/2015    Overactive bladder     Prurigo nodularis     Stress incontinence     Urinary incontinence     Uterine fibroid 5/16/2015    Yeast vaginitis 8/30/2020     Brief Psych Hx:  Long hx of mental health concerns, came to me on abilify and was not happy with her weight gain so tried rexulti for several days-could not tolerate. Current Outpatient Medications   Medication Sig Dispense Refill    atorvastatin (LIPITOR) 10 MG tablet Take 1 tablet by mouth once daily 90 tablet 0    triamcinolone (KENALOG) 0.1 % cream APPLY CREAM EXTERNALLY TO AFFECTED AREA ONCE DAILY 80 g 2    hydrOXYzine HCl (ATARAX) 50 MG tablet TAKE 1 TO 2 TABLETS BY MOUTH NIGHTLY 60 tablet 0    pramoxine-zinc acetate (CALAMINE CLEAR) 1-0.1 % LOTN Apply 1 Dose topically in the morning and at bedtime 177 mL 0    clotrimazole (LOTRIMIN) 1 % cream Apply topically 2 times daily for 2 weeks 85 g 0    ondansetron (ZOFRAN ODT) 4 MG disintegrating tablet Take 1 tablet by mouth every 8 hours as needed for Nausea or Vomiting 20 tablet 0    mupirocin (BACTROBAN) 2 % ointment APPLY OINTMENT NASALLY TWICE DAILY AS DIRECTED      mupirocin (BACTROBAN NASAL) 2 % nasal ointment Take by Nasal route 2 times daily. 1 g 3    phenylephrine-cocoa butter (PREPARATION H) 0.25-88.44 % Place 1 suppository rectally as needed for Hemorrhoids 12 suppository 1    nystatin (MYCOSTATIN) [id number] UNIT/GM powder Apply 3 times daily.  60 g 1    acetaminophen (AMINOFEN) 325 MG tablet Take 2 tablets by mouth every 8 hours as needed for Pain 60 tablet 0 diphenhydrAMINE-zinc acetate (BENADRYL ITCH STOPPING) 1-0.1 % cream Apply topically 3 times daily as needed. 28 g 5    albuterol sulfate HFA (VENTOLIN HFA) 108 (90 Base) MCG/ACT inhaler Inhale 2 puffs into the lungs 4 times daily as needed for Wheezing or Shortness of Breath 18 g 1    Misc. Devices (SILICONE EAR PLUGS) MISC Use daily as needed for swimming 2 each 0    scopolamine (TRANSDERM-SCOP) transdermal patch APPLY 1 PATCH TOPICALLY EVERY 72 HOURS      Cholecalciferol (VITAMIN D3) 50 MCG (2000 UT) CAPS TAKE 1 CAPSULE BY MOUTH ONCE DAILY 30 capsule 5    REXULTI 1 MG TABS tablet TAKE 1 TABLET BY MOUTH ONCE DAILY IN THE EVENING (Patient not taking: Reported on 10/7/2022) 30 tablet 0     No current facility-administered medications for this visit.      Wt Readings from Last 3 Encounters:   09/15/22 220 lb (99.8 kg)   07/28/22 207 lb (93.9 kg)   07/08/22 206 lb (93.4 kg)     BP Readings from Last 3 Encounters:   09/15/22 (!) 148/98   07/08/22 (!) 150/105   07/08/22 (!) 146/100     Pulse Readings from Last 3 Encounters:   09/15/22 82   07/08/22 69   07/08/22 84     Mental Status Exam:   Appearance    difficult to fully assess as was a telephone call only  Motor: difficult to fully assess at present due to actual smoking of cigarettes  Speech    spontaneous, normal volume, and rapid  Mood/Affect    reported to be \"good\" / difficult to fully assess as was a telephone call only  Thought Process    goal directed, coherent, rapid, and circumstantial  Thought Content    intact, excessive preoccupations, and cognitive distortions , no suicidal ideation  Associations    logical connections  Attention/Concentration    intact  Memory    recent and remote memory intact  Insight/Judgement    Good / Intact    YOSVANY Garcia, [facility]

## 2022-11-02 ENCOUNTER — TELEPHONE (OUTPATIENT)
Dept: OBGYN CLINIC | Age: 62
End: 2022-11-02

## 2022-11-02 DIAGNOSIS — B37.9 YEAST INFECTION: ICD-10-CM

## 2022-11-02 NOTE — TELEPHONE ENCOUNTER
Patient calling to see if she can get more of the powder for her breast.   nystatin (MYCOSTATIN) 237114 UNIT/GM powder [[id number]]    Patient would like this sent to the [address] [place] [address] on [facility].        Routing to physician

## 2022-11-03 ENCOUNTER — TELEMEDICINE (OUTPATIENT)
Dept: FAMILY MEDICINE CLINIC | Age: 62
End: 2022-11-03
Payer: MEDICARE

## 2022-11-03 DIAGNOSIS — L28.1 PRURIGO NODULARIS: ICD-10-CM

## 2022-11-03 DIAGNOSIS — J02.9 SORE THROAT: ICD-10-CM

## 2022-11-03 DIAGNOSIS — B37.9 YEAST INFECTION: Primary | ICD-10-CM

## 2022-11-03 PROCEDURE — 99213 OFFICE O/P EST LOW 20 MIN: CPT | Performed by: NURSE PRACTITIONER

## 2022-11-03 RX ORDER — ALBUTEROL SULFATE 90 UG/1
2 AEROSOL, METERED RESPIRATORY (INHALATION) 4 TIMES DAILY PRN
Qty: 18 G | Refills: 1 | Status: SHIPPED | OUTPATIENT
Start: 2022-11-03

## 2022-11-03 RX ORDER — NYSTATIN 100000 [USP'U]/G
POWDER TOPICAL
Qty: 60 G | Refills: 4 | Status: SHIPPED | OUTPATIENT
Start: 2022-11-03 | End: 2022-11-03 | Stop reason: SDUPTHER

## 2022-11-03 RX ORDER — NYSTATIN 100000 [USP'U]/G
POWDER TOPICAL
Qty: 60 G | Refills: 1 | Status: SHIPPED | OUTPATIENT
Start: 2022-11-03

## 2022-11-03 NOTE — PROGRESS NOTES
Gonzalez James is a 63 y.o. female evaluated via telephone on 11/3/2022 for Rash (Under left breast - x 2 days, very painful and itchy ) and Pharyngitis (X 2 days )  . Documentation:  I communicated with the patient and/or health care decision maker about rash. Details of this discussion including any medical advice provided:     Patient presents today with a rash under her left breast  She has a history of a yeast infection. She states it is very red and a little irritated. She is out of her nystatin powder and states she would like a refill unless something else would work better. She has another rash that she is seeing dermatology for. She states it is a little painful. She is starting injections for it and states it will take some time to see results. She also has a little sore throat. She denies any congestion or sinus pressure or cough. Jaydon Cunningham was seen today for rash and pharyngitis. Diagnoses and all orders for this visit:    Yeast infection  Treat with nystatin powder. -     nystatin (MYCOSTATIN) 617694 UNIT/GM powder; Apply 3 times daily. Sore throat  Likely viral, treat symptoms with OTC medications and cough drops. Warm water salt water gargles are helpful as well. Prurigo nodularis  Follow up with dermatology. Can try tylenol as needed for pain. Gonzalez James was evaluated through a synchronous (real-time) audio encounter. Patient identification was verified at the start of the visit. She (or guardian if applicable) is aware that this is a billable service, which includes applicable co-pays. This visit was conducted with the patient's (and/or legal guardian's) verbal consent. She has not had a related appointment within my department in the past 7 days or scheduled within the next 24 hours. The patient was located at Home: [address] [place] [address].   The provider was located at [facility] ([address] [place] [address]): [address] [place] [id number].    Note: not billable if this call serves to triage the patient into an appointment for the relevant concern    Kenneth Valle, APRN - CNP

## 2022-11-05 ENCOUNTER — HOSPITAL ENCOUNTER (EMERGENCY)
Age: 62
Discharge: HOME | End: 2022-11-05
Attending: STUDENT IN AN ORGANIZED HEALTH CARE EDUCATION/TRAINING PROGRAM
Payer: MEDICARE

## 2022-11-05 VITALS
HEART RATE: 79 BPM | BODY MASS INDEX: 38.86 KG/M2 | TEMPERATURE: 97.6 F | OXYGEN SATURATION: 98 % | HEIGHT: 64 IN | DIASTOLIC BLOOD PRESSURE: 80 MMHG | WEIGHT: 227.6 LBS | SYSTOLIC BLOOD PRESSURE: 157 MMHG | RESPIRATION RATE: 16 BRPM

## 2022-11-05 DIAGNOSIS — L30.4 INTERTRIGO: Primary | ICD-10-CM

## 2022-11-05 PROCEDURE — 99283 EMERGENCY DEPT VISIT LOW MDM: CPT

## 2022-11-05 ASSESSMENT — PAIN DESCRIPTION - ONSET: ONSET: ON-GOING

## 2022-11-05 ASSESSMENT — ENCOUNTER SYMPTOMS
NAUSEA: 0
COUGH: 0
RHINORRHEA: 0
VOMITING: 0
SHORTNESS OF BREATH: 0
ABDOMINAL PAIN: 0

## 2022-11-05 ASSESSMENT — PAIN DESCRIPTION - FREQUENCY: FREQUENCY: CONTINUOUS

## 2022-11-05 ASSESSMENT — PAIN SCALES - GENERAL: PAINLEVEL_OUTOF10: 10

## 2022-11-05 ASSESSMENT — PAIN DESCRIPTION - DESCRIPTORS: DESCRIPTORS: BURNING;ITCHING

## 2022-11-05 ASSESSMENT — PAIN DESCRIPTION - PAIN TYPE: TYPE: ACUTE PAIN

## 2022-11-05 ASSESSMENT — PAIN DESCRIPTION - LOCATION: LOCATION: OTHER (COMMENT)

## 2022-11-05 NOTE — ED PROVIDER NOTES
[address]          ATTENDING PHYSICIAN NOTE       Date of evaluation: 11/5/2022    Chief Complaint     Rash (Started approx 1 week ago. Pt is on trial medication at [facility].)    History of Present Illness     Bryce Chung is a 63 y.o. female who presents with a painful rash under her breasts and in her groin. This has been present for several days. She started using nystatin powder that was prescribed by her PCP, however has not had any improvement. She has a history of prurigo nodularis for which she follows with  dermatology. She was recently started on an injectable immune modulator for management of her symptoms. She has recently gained weight, which she attributes to drinking a large amount of soda. Review of Systems     Review of Systems   Constitutional:  Positive for chills and unexpected weight change. Negative for fatigue and fever. HENT:  Negative for congestion and rhinorrhea. Eyes:  Negative for visual disturbance. Respiratory:  Negative for cough and shortness of breath. Cardiovascular:  Negative for chest pain and palpitations. Gastrointestinal:  Negative for abdominal pain, nausea and vomiting. Genitourinary:  Positive for decreased urine volume. Negative for dysuria. Musculoskeletal:  Negative for arthralgias and myalgias. Skin:  Positive for rash. Neurological:  Negative for dizziness, syncope, numbness and headaches. Psychiatric/Behavioral:  Negative for confusion. Past Medical, Surgical, Family, and Social History     She has a past medical history of Anxiety, Bipolar 1 disorder ([address] Depression, Dyspareunia in female, Elevated transaminase level, Hemorrhoids, History of tubal ligation, Hypertension, Irritable bowel syndrome, Menopause, Obesity, Obesity (BMI 30-39.9), Overactive bladder, Prurigo nodularis, Stress incontinence, Urinary incontinence, Uterine fibroid, and Yeast vaginitis.   She has a past surgical history that includes Eye surgery (Left); Tonsillectomy; Tubal ligation (1994); Colonoscopy (2010); and Colonoscopy (2018). Her family history includes Arthritis in her brother and father; Breast Cancer in her maternal grandfather; Diabetes in her maternal grandfather and mother; Heart Disease in her mother. She reports that she has never smoked. She has never used smokeless tobacco. She reports current alcohol use. She reports that she does not use drugs. Medications     Previous Medications    ACETAMINOPHEN (AMINOFEN) 325 MG TABLET    Take 2 tablets by mouth every 8 hours as needed for Pain    ALBUTEROL SULFATE HFA (VENTOLIN HFA) 108 (90 BASE) MCG/ACT INHALER    Inhale 2 puffs into the lungs 4 times daily as needed for Wheezing or Shortness of Breath    ATORVASTATIN (LIPITOR) 10 MG TABLET    Take 1 tablet by mouth once daily    CLOTRIMAZOLE (LOTRIMIN) 1 % CREAM    Apply topically 2 times daily for 2 weeks    DIPHENHYDRAMINE-ZINC ACETATE (BENADRYL ITCH STOPPING) 1-0.1 % CREAM    Apply topically 3 times daily as needed. HYDROXYZINE HCL (ATARAX) 50 MG TABLET    TAKE 1 TO 2 TABLETS BY MOUTH NIGHTLY    MISC. DEVICES (SILICONE EAR PLUGS) MISC    Use daily as needed for swimming    MUPIROCIN (BACTROBAN NASAL) 2 % NASAL OINTMENT    Take by Nasal route 2 times daily. MUPIROCIN (BACTROBAN) 2 % OINTMENT    APPLY OINTMENT NASALLY TWICE DAILY AS DIRECTED    NYSTATIN (MYCOSTATIN) 674318 UNIT/GM POWDER    Apply 3 times daily.     ONDANSETRON (ZOFRAN ODT) 4 MG DISINTEGRATING TABLET    Take 1 tablet by mouth every 8 hours as needed for Nausea or Vomiting    PHENYLEPHRINE-COCOA BUTTER (PREPARATION H) 0.25-88.44 %    Place 1 suppository rectally as needed for Hemorrhoids    PRAMOXINE-ZINC ACETATE (CALAMINE CLEAR) 1-0.1 % LOTN    Apply 1 Dose topically in the morning and at bedtime    SCOPOLAMINE (TRANSDERM-SCOP) TRANSDERMAL PATCH    APPLY 1 PATCH TOPICALLY EVERY 72 HOURS    TRIAMCINOLONE (KENALOG) 0.1 % CREAM    APPLY CREAM EXTERNALLY TO AFFECTED AREA ONCE DAILY       Allergies     She is allergic to bactrim [sulfamethoxazole-trimethoprim]; antihistamines, chlorpheniramine-type; and macrobid [nitrofurantoin]. Physical Exam     INITIAL VITALS: BP: (!) 157/80, Temp: 97.6 °F (36.4 °C), Heart Rate: 79, Resp: 16, SpO2: 98 %   Physical Exam  Constitutional:       General: She is not in acute distress. Appearance: She is not toxic-appearing. HENT:      Head: Normocephalic and atraumatic. Nose: No congestion or rhinorrhea. Mouth/Throat:      Mouth: Mucous membranes are moist.   Eyes:      Extraocular Movements: Extraocular movements intact. Conjunctiva/sclera: Conjunctivae normal.   Cardiovascular:      Rate and Rhythm: Normal rate and regular rhythm. Pulses: Normal pulses. Pulmonary:      Effort: Pulmonary effort is normal.      Breath sounds: Normal breath sounds. Abdominal:      Palpations: Abdomen is soft. Tenderness: There is no abdominal tenderness. There is no guarding or rebound. Musculoskeletal:      Cervical back: Neck supple. Right lower leg: No edema. Left lower leg: No edema. Skin:     Findings: Rash present. Comments: Nodular lesions on bilateral lower legs, consistent with known history of PN. Erythematous regions of skin maceration under bilateral breasts and in right groin with apparent satellite lesions. Neurological:      Mental Status: She is alert and oriented to person, place, and time. Mental status is at baseline. Psychiatric:         Mood and Affect: Mood normal.       Diagnostic Results     EKG   None    RADIOLOGY:  No orders to display       LABS:   No results found for this visit on 11/05/22. ED BEDSIDE ULTRASOUND:  No results found.     RECENT VITALS:  BP: (!) 157/80,Temp: 97.6 °F (36.4 °C), Heart Rate: 79, Resp: 16, SpO2: 98 %     Procedures     None    ED Course     Nursing Notes, Past Medical Hx, Past Surgical Hx, Social Hx,Allergies, and Family Hx were reviewed. Patient was given the following medications:  Orders Placed This Encounter   Medications    miconazole (MICONAZOLE ANTIFUNGAL) 2 % cream     Sig: Apply topically 2 times daily to affected areas in groin and under breasts. Dispense:  118 mL     Refill:  0       CONSULTS:  None    MEDICAL DECISIONMAKING / ASSESSMENT / Kamidesiree Webber is a 63 y.o. female who presents with a painful rash in her groin and under her breasts. On exam, she was well-appearing and in no acute distress. She had macerated, erythematous skin in her right groin and under her bilateral breasts with satellite lesions, consistent with a candidal intertrigo. The precipitant for this may be beginning a new immune modulating medication. She has been using nystatin powder with some improvement on the rash under her left breast, but no change in her groin rash. We discussed avoiding tight clothing and keeping the affected areas clean and dry. I will also prescribe her a topical antifungal cream.  She follows closely with  dermatology for her PN, and has a follow-up appoint with with them next week. I encouraged her to discuss her new symptoms with them at that appointment and she was amenable to that plan. At this time, patient has been deemed safe for discharge. Discharge instructions, including strict return precautions for new or worsening symptoms, have been communicated. Clinical Impression     1.  Intertrigo        Disposition     PATIENT REFERRED TO:  MELANIE Arriaga  VANESA  [address]  [phone]    Schedule an appointment as soon as possible for a visit   For follow up    [facility]  [address]  Go to   If symptoms worsen    DISCHARGE MEDICATIONS:  New Prescriptions    MICONAZOLE (MICONAZOLE ANTIFUNGAL) 2 % CREAM    Apply topically 2 times daily to affected areas in groin and under breasts.        DISPOSITION Decision To Discharge 11/05/2022 08:45:28 AM         Jarocho Ott MD  11/05/22 2261

## 2022-11-05 NOTE — DISCHARGE INSTRUCTIONS
You were seen in the emergency department for a rash in your groin and under your breasts. This is likely a fungal infection, which you may be more susceptible to while on an immune modulator. You should keep the affected areas clean and dry, avoid tight fitting clothing, and you are being prescribed an antifungal cream, miconazole, that you can apply it twice a day for the next 2 weeks. You should attend your follow-up dermatology appointment on Wednesday and should discuss your new symptoms with them at that time.     You should return to the emergency department if:  -You have new or worsening pain  -You have a fever >100.4 F  -You develop a rash that blisters or causes her skin to slough  -You have nausea or vomiting that prevents you from eating or drinking  -You have any other symptoms you find concerning

## 2022-11-05 NOTE — ED TRIAGE NOTES
Pt presents to ED with chronic, painful rash in groin, under breasts and on right leg. Pt is in a clinical trail at .

## 2022-11-07 ENCOUNTER — TELEPHONE (OUTPATIENT)
Dept: FAMILY MEDICINE CLINIC | Age: 62
End: 2022-11-07

## 2022-11-07 ENCOUNTER — NURSE TRIAGE (OUTPATIENT)
Dept: OTHER | Facility: CLINIC | Age: 62
End: 2022-11-07

## 2022-11-07 ENCOUNTER — CARE COORDINATION (OUTPATIENT)
Dept: CARE COORDINATION | Age: 62
End: 2022-11-07

## 2022-11-07 ENCOUNTER — APPOINTMENT (OUTPATIENT)
Dept: GENERAL RADIOLOGY | Age: 62
End: 2022-11-07
Payer: MEDICARE

## 2022-11-07 ENCOUNTER — HOSPITAL ENCOUNTER (EMERGENCY)
Age: 62
Discharge: HOME | End: 2022-11-07
Attending: STUDENT IN AN ORGANIZED HEALTH CARE EDUCATION/TRAINING PROGRAM
Payer: MEDICARE

## 2022-11-07 VITALS
TEMPERATURE: 98.2 F | DIASTOLIC BLOOD PRESSURE: 133 MMHG | SYSTOLIC BLOOD PRESSURE: 166 MMHG | HEART RATE: 83 BPM | OXYGEN SATURATION: 95 % | RESPIRATION RATE: 18 BRPM

## 2022-11-07 DIAGNOSIS — T78.40XA ALLERGIC REACTION, INITIAL ENCOUNTER: Primary | ICD-10-CM

## 2022-11-07 LAB
EKG ATRIAL RATE: 69 BPM
EKG DIAGNOSIS: NORMAL
EKG P AXIS: 21 DEGREES
EKG P-R INTERVAL: 182 MS
EKG Q-T INTERVAL: 396 MS
EKG QRS DURATION: 90 MS
EKG QTC CALCULATION (BAZETT): 424 MS
EKG R AXIS: 8 DEGREES
EKG T AXIS: 12 DEGREES
EKG VENTRICULAR RATE: 69 BPM

## 2022-11-07 PROCEDURE — 6370000000 HC RX 637 (ALT 250 FOR IP): Performed by: STUDENT IN AN ORGANIZED HEALTH CARE EDUCATION/TRAINING PROGRAM

## 2022-11-07 PROCEDURE — 99284 EMERGENCY DEPT VISIT MOD MDM: CPT

## 2022-11-07 PROCEDURE — 71046 X-RAY EXAM CHEST 2 VIEWS: CPT

## 2022-11-07 PROCEDURE — 93005 ELECTROCARDIOGRAM TRACING: CPT | Performed by: STUDENT IN AN ORGANIZED HEALTH CARE EDUCATION/TRAINING PROGRAM

## 2022-11-07 RX ORDER — PREDNISONE 20 MG/1
20 TABLET ORAL ONCE
Status: COMPLETED | OUTPATIENT
Start: 2022-11-07 | End: 2022-11-07

## 2022-11-07 RX ORDER — DIPHENHYDRAMINE HCL 25 MG
50 TABLET ORAL EVERY 6 HOURS PRN
Status: DISCONTINUED | OUTPATIENT
Start: 2022-11-07 | End: 2022-11-07 | Stop reason: HOSPADM

## 2022-11-07 RX ORDER — DIPHENHYDRAMINE HCL 25 MG
25 CAPSULE ORAL EVERY 6 HOURS PRN
Qty: 5 CAPSULE | Refills: 0 | Status: SHIPPED | OUTPATIENT
Start: 2022-11-07 | End: 2022-11-09

## 2022-11-07 RX ORDER — PREDNISONE 20 MG/1
20 TABLET ORAL DAILY
Qty: 3 TABLET | Refills: 0 | Status: SHIPPED | OUTPATIENT
Start: 2022-11-07 | End: 2022-11-10

## 2022-11-07 RX ADMIN — PREDNISONE 20 MG: 20 TABLET ORAL at 18:37

## 2022-11-07 RX ADMIN — DIPHENHYDRAMINE HYDROCHLORIDE 50 MG: 25 TABLET, FILM COATED ORAL at 18:05

## 2022-11-07 ASSESSMENT — PAIN SCALES - GENERAL: PAINLEVEL_OUTOF10: 9

## 2022-11-07 ASSESSMENT — PAIN - FUNCTIONAL ASSESSMENT: PAIN_FUNCTIONAL_ASSESSMENT: 0-10

## 2022-11-07 ASSESSMENT — PAIN DESCRIPTION - PAIN TYPE: TYPE: CHRONIC PAIN

## 2022-11-07 ASSESSMENT — PAIN DESCRIPTION - LOCATION: LOCATION: ARM

## 2022-11-07 ASSESSMENT — PAIN DESCRIPTION - ORIENTATION: ORIENTATION: LEFT

## 2022-11-07 NOTE — TELEPHONE ENCOUNTER
Spoke with patient, was unclear about message. She said she just talked to [facility] again because her lips were all swollen and she could feel that her throat was starting to swell. Lianet Son told her she should go to ED.   I informed her that if she had swollen lips and was feeling like her throat was swelling she should go squad to take her to hospital

## 2022-11-07 NOTE — TELEPHONE ENCOUNTER
----- Message from George Loera sent at 11/7/2022  3:11 PM EST -----  Subject: Message to Provider    QUESTIONS  Information for Provider? PT requesting to speak to [facility]. PT is having an   allergic reaction. Wants to f/u on the phone call she had with [facility]. ---------------------------------------------------------------------------  --------------  Pierre Moreno [facility]  [phone]; OK to leave message on voicemail  ---------------------------------------------------------------------------  --------------  SCRIPT ANSWERS  Relationship to Patient?  Self

## 2022-11-07 NOTE — ED PROVIDER NOTES
ED Attending Attestation Note     Date of evaluation: 11/7/2022    This patient was seen by the resident. I have seen and examined the patient, agree with the workup, evaluation, management and diagnosis. The care plan has been discussed. I have reviewed the ECG and concur with the resident's interpretation. My assessment reveals 82-year-old female who is followed by dermatology for prurigo nodularis and had undergone a trial and lastly got her first shot which caused her to have some reaction to it and her dermatologist was no longer prescribing it. She came in today because she is having significant itching affecting her sleep. She denies any fevers or chills. She had some concern for swelling to her lips but feels like that has resolved per my assessment. She denies any trouble breathing right now. She got a dose of Benadryl here and states that she was supposed to be on prednisone given that she cannot get that shot anymore. She is seeing her dermatologist in a day and a half. She was given a dose of prednisone here and will be given 2 days worth to cover her until her dermatologist appointment. On exam she has areas of erythema underneath her breasts and in the inguinal region consistent with a fungal infection. The nystatin she was prescribed 2 days ago seem to help with the rash underneath her breast but not in her groin. She does not have any desquamation and denies any oral lesions in the mouth.      Sun Meng MD  11/07/22 [id number]

## 2022-11-07 NOTE — ED NOTES
1 attempt done to get IV access for blood work, pt screamed out for RN to take the catheter out. IV catheter removed, pt states she does not want to have blood work done because her \"skin is sensitive. \" MD aware      Nickie Lowe RN  11/07/22 [id number]

## 2022-11-07 NOTE — TELEPHONE ENCOUNTER
Location of patient: [place]    Received call from Catrachita Nice at [employer] with [employer]. Subjective: Caller states \"Allergic rxn\"     Current Symptoms: Seen in the ED 11/5, allergic rxn due to a clinic trial med. States feel like flu-like sx. Now has joint pain. Has a dermatologist appt on Wednesday. States she spoke to provider and recommended to wait till she was seen by dermatologist    Onset: few days ago; gradual    Associated Symptoms: NA    Pain Severity: 8/10; aching; constant    Temperature: feels hot    What has been tried: Tylenol    LMP: NA Pregnant: NA    Recommended disposition: See PCP within 3 Days    Care advice provided, patient verbalizes understanding; denies any other questions or concerns; instructed to call back for any new or worsening symptoms. Pt will call after dermatologist appt to make an appt if needed. Attention Provider: Thank you for allowing me to participate in the care of your patient. The patient was connected to triage in response to information provided to the ECC/PSC. Please do not respond through this encounter as the response is not directed to a shared pool.       Reason for Disposition   Age > 50 and bilateral shoulder pains present > 2 weeks    Protocols used: Muscle Aches and Body Pain-ADULT-[place]

## 2022-11-07 NOTE — ED PROVIDER NOTES
[address] [facility] RESIDENT NOTE       Date of evaluation: 11/7/2022    Chief Complaint     Allergic Reaction (Pt came in for possible allergic reaction. Throat is swelling up,SOB, lightheaded / dizzy. /)      of Present Illness     Bertha Murillo is a 63 y.o. female who presents with concern for allergic reaction. She states that her symptoms began on Saturday and progressively worsened. She was started on Dupixent for her prurigo nodularis and states that she took it 3 weeks ago. She states she was doing well on it until this Saturday. She is due for next dose on Wednesday but they told her she should not take it due to her symptoms. She endorses difficulty breathing, lip swelling and feeling like her throat is closing. She also notices a rash on her neck and her chest that started around the same time. Of note, she was seen in the [facility] ED on Saturday for rash under her breasts and in her groin and was diagnosed with intertrigo for which she was prescribed miconazole cream.  She denies any fevers, abdominal pain or vomiting, but does states she feels mildly nauseous. Review of Systems     A comprehensive review of systems was performed and was negative except as stated in the HPI. Past Medical, Surgical, Family, and Social History     She has a past medical history of Anxiety, Bipolar 1 disorder ([address] Depression, Dyspareunia in female, Elevated transaminase level, Hemorrhoids, History of tubal ligation, Hypertension, Irritable bowel syndrome, Menopause, Obesity, Obesity (BMI 30-39.9), Overactive bladder, Prurigo nodularis, Stress incontinence, Urinary incontinence, Uterine fibroid, and Yeast vaginitis. She has a past surgical history that includes Eye surgery (Left); Tonsillectomy; Tubal ligation (1994); Colonoscopy (2010); and Colonoscopy (2018).   Her family history includes Arthritis in her brother and father; Breast Cancer in her maternal grandfather; Diabetes in her maternal grandfather and mother; Heart Disease in her mother. She reports that she has never smoked. She has never used smokeless tobacco. She reports current alcohol use. She reports that she does not use drugs. Medications     Previous Medications    ACETAMINOPHEN (AMINOFEN) 325 MG TABLET    Take 2 tablets by mouth every 8 hours as needed for Pain    ALBUTEROL SULFATE HFA (VENTOLIN HFA) 108 (90 BASE) MCG/ACT INHALER    Inhale 2 puffs into the lungs 4 times daily as needed for Wheezing or Shortness of Breath    ATORVASTATIN (LIPITOR) 10 MG TABLET    Take 1 tablet by mouth once daily    CLOTRIMAZOLE (LOTRIMIN) 1 % CREAM    Apply topically 2 times daily for 2 weeks    DIPHENHYDRAMINE-ZINC ACETATE (BENADRYL ITCH STOPPING) 1-0.1 % CREAM    Apply topically 3 times daily as needed. HYDROXYZINE HCL (ATARAX) 50 MG TABLET    TAKE 1 TO 2 TABLETS BY MOUTH NIGHTLY    MICONAZOLE (MICONAZOLE ANTIFUNGAL) 2 % CREAM    Apply topically 2 times daily to affected areas in groin and under breasts. MISC. DEVICES (SILICONE EAR PLUGS) MISC    Use daily as needed for swimming    MUPIROCIN (BACTROBAN NASAL) 2 % NASAL OINTMENT    Take by Nasal route 2 times daily. MUPIROCIN (BACTROBAN) 2 % OINTMENT    APPLY OINTMENT NASALLY TWICE DAILY AS DIRECTED    NYSTATIN (MYCOSTATIN) 100053 UNIT/GM POWDER    Apply 3 times daily.     ONDANSETRON (ZOFRAN ODT) 4 MG DISINTEGRATING TABLET    Take 1 tablet by mouth every 8 hours as needed for Nausea or Vomiting    PHENYLEPHRINE-COCOA BUTTER (PREPARATION H) 0.25-88.44 %    Place 1 suppository rectally as needed for Hemorrhoids    PRAMOXINE-ZINC ACETATE (CALAMINE CLEAR) 1-0.1 % LOTN    Apply 1 Dose topically in the morning and at bedtime    SCOPOLAMINE (TRANSDERM-SCOP) TRANSDERMAL PATCH    APPLY 1 PATCH TOPICALLY EVERY 72 HOURS    TRIAMCINOLONE (KENALOG) 0.1 % CREAM    APPLY CREAM EXTERNALLY TO AFFECTED AREA ONCE DAILY       Allergies     She is allergic to bactrim [sulfamethoxazole-trimethoprim]; antihistamines, chlorpheniramine-type; and macrobid [nitrofurantoin]. Physical Exam     INITIAL VITALS: BP: (!) 166/133, Temp: 98.2 °F (36.8 °C), Heart Rate: 83, Resp: 18, SpO2: 95 %   Physical Exam  Vitals and nursing note reviewed. Constitutional:       General: She is not in acute distress. Appearance: Normal appearance. She is normal weight. HENT:      Head: Normocephalic and atraumatic. Mouth/Throat:      Mouth: Mucous membranes are dry. Pharynx: Oropharynx is clear. Uvula midline. No pharyngeal swelling, posterior oropharyngeal erythema or uvula swelling. Comments: No posterior oropharyngeal swelling with no lip swelling or erythema. Cardiovascular:      Rate and Rhythm: Normal rate and regular rhythm. Pulmonary:      Effort: Pulmonary effort is normal.      Breath sounds: Normal breath sounds. No stridor. Comments: Clear breath sounds bilaterally without stridor. No hoarseness  Abdominal:      General: Abdomen is flat. There is no distension. Palpations: Abdomen is soft. Tenderness: There is no abdominal tenderness. Musculoskeletal:      Cervical back: Normal range of motion. Right lower leg: No edema. Left lower leg: No edema. Skin:     General: Skin is warm and dry. Capillary Refill: Capillary refill takes less than 2 seconds. Findings: Rash (maculopapular rash over neck and chest as well as R forearm) present. Neurological:      General: No focal deficit present. Mental Status: She is alert and oriented to person, place, and time.    Psychiatric:         Mood and Affect: Mood normal.         Behavior: Behavior normal.       DiagnosticResults     EKG   Interpreted in conjunction with emergencydepartment physician Rere Zuniga MD  Rhythm: normal sinus   Rate: normal  Axis: normal  Ectopy: none  Conduction: normal  ST Segments: normal  T Waves:normal  Q Waves: none  Clinical Impression: Normal sinus rhythm  Comparison:  Unchanged compared to prior on 7/8/2022    RADIOLOGY:  XR CHEST (2 VW)   Final Result      No acute process. Ирина Wagner LABS:   Results for orders placed or performed during the hospital encounter of 11/07/22   EKG 12 Lead   Result Value Ref Range    Ventricular Rate 69 BPM    Atrial Rate 69 BPM    P-R Interval 182 ms    QRS Duration 90 ms    Q-T Interval 396 ms    QTc Calculation (Bazett) 424 ms    P Axis 21 degrees    R Axis 8 degrees    T Axis 12 degrees    Diagnosis       EKG performed in ER and to be interpreted by ER physician. Confirmed by MD, ER ([id number]),  Apolonia Phillips ([phone]) on 11/7/2022 5:54:38 PM       ED BEDSIDE ULTRASOUND:  No results found. RECENT VITALS:  BP: (!) 166/133, Temp: 98.2 °F (36.8 °C), Heart Rate: 83,Resp: 18, SpO2: 95 %     Procedures     N/a    ED Course     Nursing Notes, Past Medical Hx, Past Surgical Hx, Social Hx, Allergies, and Family Hx were reviewed. The patient was given the followingmedications:  Orders Placed This Encounter   Medications    diphenhydrAMINE (BENADRYL) tablet 50 mg    predniSONE (DELTASONE) tablet 20 mg    diphenhydrAMINE (BENADRYL) 25 MG capsule     Sig: Take 1 capsule by mouth every 6 hours as needed for Itching     Dispense:  5 capsule     Refill:  0    predniSONE (DELTASONE) 20 MG tablet     Sig: Take 1 tablet by mouth daily for 3 days     Dispense:  3 tablet     Refill:  0       CONSULTS:  None    MEDICAL DECISION MAKING / ASSESSMENT / Daily Gooden is a 63 y.o. female who presents concerning for an allergic reaction. On initial evaluation, she is hypertensive but afebrile and otherwise hemodynamically stable, in no acute distress. Patient's airway is intact with no oropharyngeal or lip swelling, reassuring against anaphylaxis. She does have a rash over her chest and extremities that appears more like contact dermatitis and is less consistent with urticarial rash. EKG showed normal sinus rhythm. Was going to obtain lab work but patient declined. We will give her oral Benadryl as well as oral prednisone and I will discharge her w 3 days of these. She has follow-up with dermatology on Wednesday and I encouraged her to keep her appointment. Patient otherwise remained hemodynamically stable and is appropriate for discharge and outpatient follow-up. This patient was also evaluated by the attending physician. All care plans werediscussed and agreed upon. Clinical Impression     1.  Allergic reaction, initial encounter        Disposition     PATIENT REFERRED TO:  [facility] Emergency Department  [address] [place] [address] [address]  Go to   If symptoms worsen    DISCHARGE MEDICATIONS:  New Prescriptions    DIPHENHYDRAMINE (BENADRYL) 25 MG CAPSULE    Take 1 capsule by mouth every 6 hours as needed for Itching    PREDNISONE (DELTASONE) 20 MG TABLET    Take 1 tablet by mouth daily for 3 days       DISPOSITION Discharge - Pending Orders Complete 11/07/2022 06:20:33 PM       Nancy Whitten MD  Resident  11/07/22 [id number]

## 2022-11-07 NOTE — CARE COORDINATION
Ambulatory Care Coordination  ED Follow up Call    Reason for ED visit:  Rash  Status:     improved    Did you call your PCP prior to going to the ED? No      Did you receive a discharge instructions from the Emergency Room? Yes  Review of Instructions:     Understands what to report/when to return?:  Yes   Understands discharge instructions?:  Yes   Following discharge instructions?:  Yes   If not why? N/A    Are there any new complaints of pain? No  New Pain Meds? No    Constipation prophylaxis needed? No    If you have a wound is the dressing clean, dry, and intact? Yes  Understands wound care regimen? Yes    Are there any other complaints/concerns that you wish to tell your provider? ACM outreach to patient to [facility] from ED follow up. Patient notes she had a rash between her breast and groin area. She went to the ED due to no improvement with the power she was given from PCP. She was given ointment in the ED and this has helped. Patient is in a research study for Dupix[employer] through [facility]. Patient notes she was told this was an allergic reaction to the medication. ACM asked if patient was still taking this and she noted they had her stop. Patient was educated on allergic reactions and to keep benadryl on hand at home. Patient notes she will get some. ACM explained that now she is off the medication it could take a day or so for it to clear her system and to watch for any signes of it worsening. Patient verbalized understanding. Patient has a follow up with  dermatology on Wednesday. ACM noted she will follow up with the patient after her appointment. Patient notes steroids have helped in the past with her Prurigo nodularis. She notes PCP and Dermatology have both prescribed this in the past.   AC will follow up with patient in three days to touch base and assess for further needs and get an update from appointment with . Patient thanked AC for the call and follow up.      Plan  Follow up three days  Rash update? UC update? Complete CC enrollment  Assess for needs     FU appts/Provider:    No future appointments. New Medications?:   Yes      Medication Reconciliation by phone - Yes  Understands Medications? Yes  Taking Medications? Yes  Can you swallow your pills? Yes    Any further needs in the home i.e. Equipment?   Not Applicable    Link to services in community?:  N/A   Which services:  N/A

## 2022-11-07 NOTE — CARE COORDINATION
ACM received note from office MA non urgent in [facility]. Note asked for ACM to call patient about an allergic reaction. ACM called as soon as message was seen. Patient answered the phone winded, noting swelling of her lips and SOB. ACM directed patient to go to the nearest emergency room. Patient noted that she has a disabled son at home and . She was worried about leaving them alone. ACM explained the urgent need need for patient to be seen and suggested squad, patient noted she can get to [facility] herself. ACM noted she wanted patient to leave as soon as the call ended. Patient in agreement to go to the emergency room for treatment ASAP. ACM will route this note to PCP at the office so she is aware of the situation. ACM will follow up with patient once back home from emergency room.

## 2022-11-08 ENCOUNTER — CARE COORDINATION (OUTPATIENT)
Dept: CARE COORDINATION | Age: 62
End: 2022-11-08

## 2022-11-08 SDOH — ECONOMIC STABILITY: HOUSING INSECURITY
IN THE LAST 12 MONTHS, WAS THERE A TIME WHEN YOU DID NOT HAVE A STEADY PLACE TO SLEEP OR SLEPT IN A SHELTER (INCLUDING NOW)?: NO

## 2022-11-08 SDOH — ECONOMIC STABILITY: TRANSPORTATION INSECURITY
IN THE PAST 12 MONTHS, HAS LACK OF TRANSPORTATION KEPT YOU FROM MEETINGS, WORK, OR FROM GETTING THINGS NEEDED FOR DAILY LIVING?: NO

## 2022-11-08 SDOH — ECONOMIC STABILITY: TRANSPORTATION INSECURITY
IN THE PAST 12 MONTHS, HAS THE LACK OF TRANSPORTATION KEPT YOU FROM MEDICAL APPOINTMENTS OR FROM GETTING MEDICATIONS?: NO

## 2022-11-08 SDOH — ECONOMIC STABILITY: HOUSING INSECURITY: IN THE LAST 12 MONTHS, HOW MANY PLACES HAVE YOU LIVED?: 1

## 2022-11-08 SDOH — ECONOMIC STABILITY: INCOME INSECURITY: IN THE LAST 12 MONTHS, WAS THERE A TIME WHEN YOU WERE NOT ABLE TO PAY THE MORTGAGE OR RENT ON TIME?: NO

## 2022-11-08 NOTE — CARE COORDINATION
Ambulatory Care Coordination Note  11/8/2022    ACC: Marcus Hampton RN    ACM follow up on patient ED visit. Patient notes she is doing better and taking steroid and benadryl as directed. She has a call into the physician at [facility] to discuss further treatemtn and allergic reaction. Patient denies any SOB, CP, lip swelling at this time. ACM reviewed medications and patient is taking as directed. Patient denies any need for ED follow up at this time awaiting call from [address] cris the trial she is in. ACM will set follow up for one week to touch base and assess for further needs. Plan   Follow up on week  Update from [facility] dermatology  Assess for needs  Update AD      Offered patient enrollment in the Remote Patient Monitoring (RPM) program for in-home monitoring: NA. Lab Results       None            Care Coordination Interventions    Referral from Primary Care Provider: No  Suggested Interventions and Community Resources          Goals Addressed                   This Visit's Progress     Medication Management        I will take my medication as directed. I will notify my provider of any problems with medications, like adverse effects or side effects. I will notify my provider/Care Coordinator if I am unable to afford my medications. I will notify my provider for advice before I stop taking any of my medication. Barriers: medication side effects  Plan for overcoming my barriers: ACM  Confidence: 9/10  Anticipated Goal Completion Date: 1/23              Prior to Admission medications    Medication Sig Start Date End Date Taking?  Authorizing Provider   diphenhydrAMINE (BENADRYL) 25 MG capsule Take 1 capsule by mouth every 6 hours as needed for Itching 11/7/22 11/9/22  Maria Antonia Zee MD   predniSONE (DELTASONE) 20 MG tablet Take 1 tablet by mouth daily for 3 days 11/7/22 11/10/22  Maria Antonia Zee MD   miconazole (MICONAZOLE ANTIFUNGAL) 2 % cream Apply topically 2 times daily to affected areas in groin and under breasts. 11/5/22 11/18/22  Madeleine Santoyo MD   albuterol sulfate HFA (VENTOLIN HFA) 108 (90 Base) MCG/ACT inhaler Inhale 2 puffs into the lungs 4 times daily as needed for Wheezing or Shortness of Breath 11/3/22   MELANIE Yang CNP   nystatin (MYCOSTATIN) 268805 UNIT/GM powder Apply 3 times daily. 11/3/22   Osmel Ross DO   atorvastatin (LIPITOR) 10 MG tablet Take 1 tablet by mouth once daily 9/29/22   MELANIE Warren CNP   triamcinolone (KENALOG) 0.1 % cream APPLY CREAM EXTERNALLY TO AFFECTED AREA ONCE DAILY 9/26/22   MELANIE Yang CNP   hydrOXYzine HCl (ATARAX) 50 MG tablet TAKE 1 TO 2 TABLETS BY MOUTH NIGHTLY 9/20/22   MELANIE Yagn CNP   pramoxine-zinc acetate (CALAMINE CLEAR) 1-0.1 % LOTN Apply 1 Dose topically in the morning and at bedtime 9/15/22   MELANIE Warren CNP   clotrimazole (LOTRIMIN) 1 % cream Apply topically 2 times daily for 2 weeks 8/10/22   MELANIE Warren CNP   ondansetron (ZOFRAN ODT) 4 MG disintegrating tablet Take 1 tablet by mouth every 8 hours as needed for Nausea or Vomiting 7/8/22   ELLIOT Liu   mupirocin (BACTROBAN) 2 % ointment APPLY OINTMENT NASALLY TWICE DAILY AS DIRECTED  Patient not taking: Reported on 11/3/2022 6/9/22   Historical Provider, MD   mupirocin (BACTROBAN NASAL) 2 % nasal ointment Take by Nasal route 2 times daily. Patient not taking: Reported on 11/3/2022 6/3/22   Elizabeth MELANIE Boyce CNP   phenylephrine-cocoa butter (PREPARATION H) 0.25-88.44 % Place 1 suppository rectally as needed for Hemorrhoids 4/14/22   MELANIE Yang CNP   acetaminophen (AMINOFEN) 325 MG tablet Take 2 tablets by mouth every 8 hours as needed for Pain 1/27/22   MELANIE Yang CNP   diphenhydrAMINE-zinc acetate (BENADRYL ITCH STOPPING) 1-0.1 % cream Apply topically 3 times daily as needed. 12/30/21   MELANIE Ruffin - CNP   Misc.  Devices (SILICONE EAR PLUGS) MISC Use daily as needed for swimming 11/1/21   MELANIE Benoit - CNP   scopolamine (TRANSDERM-SCOP) transdermal patch APPLY 1 PATCH TOPICALLY EVERY 72 HOURS  Patient not taking: Reported on 11/3/2022 7/15/21   Historical Provider, MD       No future appointments.

## 2022-11-15 ENCOUNTER — SCHEDULED TELEPHONE ENCOUNTER (OUTPATIENT)
Dept: FAMILY MEDICINE CLINIC | Age: 62
End: 2022-11-15
Payer: MEDICARE

## 2022-11-15 ENCOUNTER — CARE COORDINATION (OUTPATIENT)
Dept: CARE COORDINATION | Age: 62
End: 2022-11-15

## 2022-11-15 DIAGNOSIS — R50.9 FEVER, UNSPECIFIED FEVER CAUSE: Primary | ICD-10-CM

## 2022-11-15 PROCEDURE — 87804 INFLUENZA ASSAY W/OPTIC: CPT | Performed by: NURSE PRACTITIONER

## 2022-11-15 PROCEDURE — 99213 OFFICE O/P EST LOW 20 MIN: CPT | Performed by: NURSE PRACTITIONER

## 2022-11-15 NOTE — PROGRESS NOTES
Darrian Hsu is a 63 y.o. female evaluated via telephone on 11/15/2022 for No chief complaint on file. .        Documentation:  I communicated with the patient and/or health care decision maker about Cough and fever. Details of this discussion including any medical advice provided:     Patient presented with concerns of cough, sore throat and congestion. Symptoms started few days ago. She has been taking over-the-counter Tylenol as needed with no improvement. She does have a low-grade fever. She states her son is also sick with the same symptoms. Diagnoses and all orders for this visit:    Fever, unspecified fever cause  Continue with Tylenol as needed. Patient will come by the office tomorrow morning to test for flu a and B and COVID. Continue with symptomatic treatment for now. -     POCT Influenza A/B; Future  -     COVID-19; Future             Darrian Hsu was evaluated through a synchronous (real-time) audio encounter. Patient identification was verified at the start of the visit. She (or guardian if applicable) is aware that this is a billable service, which includes applicable co-pays. This visit was conducted with the patient's (and/or legal guardian's) verbal consent. She has not had a related appointment within my department in the past 7 days or scheduled within the next 24 hours. The patient was located at Home: [address] [place] [address]. The provider was located at [facility] (Appt Dept): [address].     Note: not billable if this call serves to triage the patient into an appointment for the relevant concern    Kenneth Valle, MELANIE - CNP

## 2022-11-16 ENCOUNTER — HOSPITAL ENCOUNTER (EMERGENCY)
Age: 62
Discharge: HOME | End: 2022-11-16
Attending: EMERGENCY MEDICINE
Payer: MEDICARE

## 2022-11-16 ENCOUNTER — NURSE TRIAGE (OUTPATIENT)
Dept: OTHER | Facility: CLINIC | Age: 62
End: 2022-11-16

## 2022-11-16 ENCOUNTER — APPOINTMENT (OUTPATIENT)
Dept: GENERAL RADIOLOGY | Age: 62
End: 2022-11-16
Payer: MEDICARE

## 2022-11-16 VITALS
HEART RATE: 77 BPM | RESPIRATION RATE: 18 BRPM | OXYGEN SATURATION: 97 % | DIASTOLIC BLOOD PRESSURE: 80 MMHG | BODY MASS INDEX: 36.88 KG/M2 | TEMPERATURE: 97.6 F | HEIGHT: 64 IN | WEIGHT: 216 LBS | SYSTOLIC BLOOD PRESSURE: 146 MMHG

## 2022-11-16 DIAGNOSIS — B34.9 VIRAL ILLNESS: Primary | ICD-10-CM

## 2022-11-16 LAB
INFLUENZA A ANTIBODY: NORMAL
INFLUENZA A: NOT DETECTED
INFLUENZA B ANTIBODY: NORMAL
INFLUENZA B: NOT DETECTED
SARS-COV-2 RNA, RT PCR: NOT DETECTED

## 2022-11-16 PROCEDURE — 87636 SARSCOV2 & INF A&B AMP PRB: CPT

## 2022-11-16 PROCEDURE — 94761 N-INVAS EAR/PLS OXIMETRY MLT: CPT

## 2022-11-16 PROCEDURE — 94640 AIRWAY INHALATION TREATMENT: CPT

## 2022-11-16 PROCEDURE — 71046 X-RAY EXAM CHEST 2 VIEWS: CPT

## 2022-11-16 PROCEDURE — 99284 EMERGENCY DEPT VISIT MOD MDM: CPT

## 2022-11-16 PROCEDURE — 6370000000 HC RX 637 (ALT 250 FOR IP): Performed by: PHYSICIAN ASSISTANT

## 2022-11-16 PROCEDURE — 94664 DEMO&/EVAL PT USE INHALER: CPT

## 2022-11-16 RX ORDER — IPRATROPIUM BROMIDE AND ALBUTEROL SULFATE 2.5; .5 MG/3ML; MG/3ML
1 SOLUTION RESPIRATORY (INHALATION) ONCE
Status: COMPLETED | OUTPATIENT
Start: 2022-11-16 | End: 2022-11-16

## 2022-11-16 RX ADMIN — IPRATROPIUM BROMIDE AND ALBUTEROL SULFATE 1 AMPULE: .5; 3 SOLUTION RESPIRATORY (INHALATION) at 18:52

## 2022-11-16 ASSESSMENT — PAIN - FUNCTIONAL ASSESSMENT: PAIN_FUNCTIONAL_ASSESSMENT: NONE - DENIES PAIN

## 2022-11-16 NOTE — ED PROVIDER NOTES
[address] [facility] RESIDENT NOTE     Date of evaluation: 11/16/2022    ADDENDUM:      Care of this patient was assumed from Oscar, [address] Baker. The patient was seen for Rash (Patient has a rash under her breasts that she is concerned about) and Illness (Patient went a saw her doctor today to be tested for the flu and covid. She reports she felt dizzy, SOB with a cough. )  . The patient's initial evaluation and plan have been discussed with the prior provider who initially evaluated the patient. Nursing Notes, Past Medical Hx, Past Surgical Hx, Social Hx, Allergies, and Family Hx were all reviewed. Diagnostic Results     EKG   None performed    RADIOLOGY:  XR CHEST (2 VW)   Final Result      No acute cardiopulmonary disease        LABS:   Results for orders placed or performed during the hospital encounter of 11/16/22   COVID-19 & Influenza Combo    Specimen: Nasopharyngeal Swab   Result Value Ref Range    SARS-CoV-2 RNA, RT PCR NOT DETECTED NOT DETECTED    INFLUENZA A NOT DETECTED NOT DETECTED    INFLUENZA B NOT DETECTED NOT DETECTED       RECENT VITALS:  BP: (!) 146/80, Temp: 97.6 °F (36.4 °C), Heart Rate: 77, Resp: 18, SpO2: 97 %     Procedures     None performed    ED Course     The patient was given the following medications:  Orders Placed This Encounter   Medications    ipratropium-albuterol (DUONEB) nebulizer solution 1 ampule     Order Specific Question:   Initiate RT Bronchodilator Protocol     Answer:   No     CONSULTS:  None    MEDICAL DECISION MAKING / ASSESSMENT / Andryangela Dent is a 63 y.o. female w/ PMHx significant for bipolar 1 d/o and anxiety, currently being evaluated for prurigo nodularis, who presented to the emergency department w/ c/o dyspnea and productive cough. She has been hypertensive but HDS, afebrile, normoxic on RA and NAD throughout her ED course thus far.   Workup to this point has included CXR w/o acute abnormality and COVID/flu swab pending. The patient received a single DuoNeb w/ subsequent improvement in her respiratory exam.  At this time the following tasks remain in this patient's care:     --Follow up COVID/flu swab  --Reassessment and disposition, anticipate discharge home     COVID-19 and influenza A/B rapid PCR negative. Discussed results w/ patient and recommended discharge home w/ PCP follow-up and acetaminophen/ibuprofen for discomfort, patient is in agreement w/ this plan. Reviewed strict return precautions for worsening dyspnea, chest pain, persistent fever, PO intolerance, or any other new or concerning symptoms prior to discharge. This patient was also evaluated by the attending physician. All care plans were discussed and agreed upon. Clinical Impression     1.  Viral illness      Disposition     PATIENT REFERRED TO:  MELANIE Kohler [place] [address]  [phone]    Call in 2 days    DISCHARGE MEDICATIONS:  Discharge Medication List as of 11/16/2022  8:12 PM        DISPOSITION Decision To Discharge 11/16/2022 07:37:39 PM      Leann Ruiz MD, MPH   Emergency Medicine, PGY-2  11/17/22 3:18 Kay Godwin MD  Resident  11/17/22 [id number]

## 2022-11-16 NOTE — ED PROVIDER NOTES
[address] [facility] [address] ENCOUNTER          PHYSICIAN ASSISTANT NOTE       Date of evaluation: 11/16/2022    Chief Complaint     Rash (Patient has a rash under her breasts that she is concerned about) and Illness (Patient went a saw her doctor today to be tested for the flu and covid. She reports she felt dizzy, SOB with a cough. )      History of Present Illness     Chris Gonsalez is a 63 y.o. female who presents with rash. Patient reports she follows with dermatology/research at [facility] for a diagnosis of prurigo nodularis. Patient reports she was given a medication for it a couple weeks ago and it made her symptoms worse. The patient reports she has been seen in ER couple times. Patient reports she was given an antifungal cream and then a round of steroids. Patient reports that steroids helped a little but her symptoms are returned. Patient was supposed to see her dermatologist today but had developed a cough over the weekend and was told to come here to assess for COVID and flu. Patient reports a nonproductive cough and chills. No known fever. No chest pain or difficulty breathing. Patient reports she was prescribed an inhaler but has not used it yet. No vomiting or diarrhea. No abdominal pain      Review of Systems     Review of Systems   Constitutional: Negative. Negative for fever. HENT: Negative. Negative for ear pain and sore throat. Respiratory:  Positive for cough. Negative for shortness of breath. Cardiovascular: Negative. Negative for chest pain. Gastrointestinal: Negative. Negative for abdominal pain, diarrhea and vomiting. Musculoskeletal: Negative. Skin:  Positive for rash. Neurological: Negative. Psychiatric/Behavioral: Negative. All other systems reviewed and are negative.     Past Medical, Surgical, Family, and Social History     She has a past medical history of Anxiety, Bipolar 1 disorder ([address] Depression, Dyspareunia in female, Elevated transaminase level, Hemorrhoids, History of tubal ligation, Hypertension, Irritable bowel syndrome, Menopause, Obesity, Obesity (BMI 30-39.9), Overactive bladder, Prurigo nodularis, Stress incontinence, Urinary incontinence, Uterine fibroid, and Yeast vaginitis. She has a past surgical history that includes Eye surgery (Left); Tonsillectomy; Tubal ligation (1994); Colonoscopy (2010); and Colonoscopy (2018). Her family history includes Arthritis in her brother and father; Breast Cancer in her maternal grandfather; Diabetes in her maternal grandfather and mother; Heart Disease in her mother. She reports that she has never smoked. She has never used smokeless tobacco. She reports current alcohol use. She reports that she does not use drugs. Medications     Discharge Medication List as of 11/16/2022  8:12 PM        CONTINUE these medications which have NOT CHANGED    Details   miconazole (MICONAZOLE ANTIFUNGAL) 2 % cream Apply topically 2 times daily to affected areas in groin and under breasts. , Disp-118 mL, R-0, Print      albuterol sulfate HFA (VENTOLIN HFA) 108 (90 Base) MCG/ACT inhaler Inhale 2 puffs into the lungs 4 times daily as needed for Wheezing or Shortness of Breath, Disp-18 g, R-1Normal      nystatin (MYCOSTATIN) 718422 UNIT/GM powder Apply 3 times daily. , Disp-60 g, R-1, Normal      triamcinolone (KENALOG) 0.1 % cream APPLY CREAM EXTERNALLY TO AFFECTED AREA ONCE DAILY, Disp-80 g, R-2, Normal      hydrOXYzine HCl (ATARAX) 50 MG tablet TAKE 1 TO 2 TABLETS BY MOUTH NIGHTLY, Disp-60 tablet, R-0Normal      pramoxine-zinc acetate (CALAMINE CLEAR) 1-0.1 % LOTN Apply 1 Dose topically in the morning and at bedtime, Disp-177 mL, R-0Normal      mupirocin (BACTROBAN) 2 % ointment APPLY OINTMENT NASALLY TWICE DAILY AS DIRECTED, Historical Med      phenylephrine-cocoa butter (PREPARATION H) 0.25-88.44 % Place 1 suppository rectally as needed for Hemorrhoids, Disp-12 suppository, R-1Normal      acetaminophen (AMINOFEN) 325 MG tablet Take 2 tablets by mouth every 8 hours as needed for Pain, Disp-60 tablet, R-0Normal      Misc. Devices (SILICONE EAR PLUGS) MISC Disp-2 each, R-0, NormalUse daily as needed for swimming             Allergies     She is allergic to bactrim [sulfamethoxazole-trimethoprim]; antihistamines, chlorpheniramine-type; and macrobid [nitrofurantoin]. Physical Exam     INITIAL VITALS: BP: (!) 146/80, Temp: 97.6 °F (36.4 °C), Heart Rate: 83, Resp: 16, SpO2: 98 %  Physical Exam  Vitals and nursing note reviewed. Constitutional:       General: She is not in acute distress. Appearance: She is not ill-appearing. HENT:      Head: Normocephalic and atraumatic. Cardiovascular:      Rate and Rhythm: Normal rate and regular rhythm. Pulmonary:      Effort: Pulmonary effort is normal.      Breath sounds: Normal breath sounds. Abdominal:      General: There is no distension. Palpations: Abdomen is soft. Tenderness: There is no abdominal tenderness. Musculoskeletal:         General: Normal range of motion. Cervical back: Neck supple. Skin:     General: Skin is warm and dry. Comments: Erythematous rash beneath each breast, skin somewhat macerated. No purulent drainage or crusting. No satellite lesions. Neurological:      General: No focal deficit present. Mental Status: She is alert and oriented to person, place, and time. Mental status is at baseline.    Psychiatric:         Mood and Affect: Mood normal.       Diagnostic Results       RADIOLOGY:  XR CHEST (2 VW)   Final Result      No acute cardiopulmonary disease          LABS:   Results for orders placed or performed during the hospital encounter of 11/16/22   COVID-19 & Influenza Combo    Specimen: Nasopharyngeal Swab   Result Value Ref Range    SARS-CoV-2 RNA, RT PCR NOT DETECTED NOT DETECTED    INFLUENZA A NOT DETECTED NOT DETECTED    INFLUENZA B NOT DETECTED NOT DETECTED       ED BEDSIDE ULTRASOUND:  No results found.    RECENT VITALS:  BP: (!) 146/80, Temp: 97.6 °F (36.4 °C), Heart Rate: 77, Resp: 18, SpO2: 97 %     Procedures         ED Course     Nursing Notes, Past Medical Hx,Past Surgical Hx, Social Hx, Allergies, and Family Hx were reviewed. The patient was given the following medications:  Orders Placed This Encounter   Medications    ipratropium-albuterol (DUONEB) nebulizer solution 1 ampule     Order Specific Question:   Initiate RT Bronchodilator Protocol     Answer:   No       CONSULTS:  None    MEDICAL DECISION MAKING / ASSESSMENT / Jm Gould is a 63 y.o. female with rash and cough. Patient is well-appearing in no acute distress. Vitals are with exception of hypertension. Lungs clear, oxygenating well on room air. Chest x-ray clear. COVID and flu pending. Patient given a nebulizer with improvement. Oncoming provider will follow up on COVID and flu swab and disposition. Patient to follow-up with her dermatologist for ongoing rash. No signs of secondary infection on today's exam.  Return precautions given. This patient was also evaluated by the attending physician. All care plans were discussed and agreed upon. Clinical Impression     1.  Viral illness        Disposition     PATIENT REFERRED TO:  MELANIE Valdovinos - VANESA  [place] [address]  [phone]    Call in 2 days      DISCHARGE MEDICATIONS:  Discharge Medication List as of 11/16/2022  8:12 PM          DISPOSITION Decision To Discharge 11/16/2022 07:37:39 PM        Waldo Rios PA-C  11/18/22 1031

## 2022-11-16 NOTE — TELEPHONE ENCOUNTER
Location of patient: [address] call from Rose Montalvo  at [facility] with [employer]. Subjective: Caller states \"  I was in the hospital last week. I had an allergic reaction and rash to a medication. I was in a drug study. They gave me prednisone and benadryl. Today the rash is back, there is clear liquid in the blisters, it is seeping in between my legs and breasts. \"       Current Symptoms:   +\"can't wear clothes due to the rash   +sore throat   +new onset urine incontinence   +hoarse voice   -\"hard for me to get a little bit of air\" -\" I have an inhaler\"   -denies facial swelling   +tongue thickness +lips are tingling   +chest pain this morning \"felt like an elephant sitting on my chest \" -has since subsided   +feel dizzy- feel flu like symptoms   +able to swallow   +rash is really burning  \"really bad\"       Onset: 2 days ago; worsening    Associated Symptoms: NA    Pain Severity: denies     Temperature: Denies      What has been tried: prescribed     LMP: NA Pregnant: NA    Recommended disposition: Go to ED Now    Care advice provided, patient verbalizes understanding; denies any other questions or concerns; instructed to call back for any new or worsening symptoms. Patient/caller agrees to proceed to local  Emergency Department    Attention Provider: Thank you for allowing me to participate in the care of your patient. The patient was connected to triage in response to information provided to the ECC/PSC. Please do not respond through this encounter as the response is not directed to a shared pool.       Reason for Disposition   Patient sounds very sick or weak to the triager    Protocols used: Rash or Redness - Widespread-ADULT-

## 2022-11-16 NOTE — ED PROVIDER NOTES
ED Attending Attestation Note     Date of evaluation: 11/16/2022    This patient was seen by the advance practice provider. I have seen and examined the patient, agree with the workup, evaluation, management and diagnosis. The care plan has been discussed. The patient's clinical history is as follows:    Chief Complaint     Rash (Patient has a rash under her breasts that she is concerned about) and Illness (Patient went a saw her doctor today to be tested for the flu and covid. She reports she felt dizzy, SOB with a cough. )      History of Present Illness     John Paul Corbin is a 63 y.o. female who presents to the Emergency Department with complaints of shortness of breath as well as cough productive cough. The patient has a prior history of anxiety, bipolar 1 disorder, is being evaluated for prurigo nodularis by her dermatologist.  She reports that she felt as if she was having a worsening of this and called her dermatologist for follow-up but because she had been having a cough over the weekend they were concerned she could have [place] or another viral illness so referred her to the emergency department for evaluation. The patient reports 3 to 4-day history of cough that is productive of green-yellow sputum. Has shortness of breath which is largely constant denies any orthopnea paroxysmal nocturnal dyspnea. Has some mild chills headache sore throat nasal congestion. Otherwise is without complaints at this time. Past Medical, Surgical, Family, and Social History     She has a past medical history of Anxiety, Bipolar 1 disorder ([address] Depression, Dyspareunia in female, Elevated transaminase level, Hemorrhoids, History of tubal ligation, Hypertension, Irritable bowel syndrome, Menopause, Obesity, Obesity (BMI 30-39.9), Overactive bladder, Prurigo nodularis, Stress incontinence, Urinary incontinence, Uterine fibroid, and Yeast vaginitis.   She has a past surgical history that includes Eye surgery (Left); Tonsillectomy; Tubal ligation (1994); Colonoscopy (2010); and Colonoscopy (2018). Her family history includes Arthritis in her brother and father; Breast Cancer in her maternal grandfather; Diabetes in her maternal grandfather and mother; Heart Disease in her mother. She reports that she has never smoked. She has never used smokeless tobacco. She reports current alcohol use. She reports that she does not use drugs.      Dominga Kayser, MD  11/16/22 1624

## 2022-11-17 ENCOUNTER — CARE COORDINATION (OUTPATIENT)
Dept: FAMILY MEDICINE CLINIC | Age: 62
End: 2022-11-17

## 2022-11-17 ENCOUNTER — TELEPHONE (OUTPATIENT)
Dept: FAMILY MEDICINE CLINIC | Age: 62
End: 2022-11-17

## 2022-11-17 LAB — SARS-COV-2: NOT DETECTED

## 2022-11-17 RX ORDER — GUAIFENESIN 100 MG/5ML
200 SYRUP ORAL 3 TIMES DAILY PRN
Qty: 180 ML | Refills: 0 | Status: SHIPPED | OUTPATIENT
Start: 2022-11-17

## 2022-11-17 RX ORDER — BENZONATATE 100 MG/1
100-200 CAPSULE ORAL 3 TIMES DAILY PRN
Qty: 60 CAPSULE | Refills: 3 | Status: SHIPPED | OUTPATIENT
Start: 2022-11-17 | End: 2022-11-24

## 2022-11-17 NOTE — CARE COORDINATION
Ambulatory Care Coordination  ED Follow up Call    Reason for ED visit:  SOB  Status:     improved    Did you call your PCP prior to going to the ED? Yes      Did you receive a discharge instructions from the Emergency Room? Yes  Review of Instructions:     Understands what to report/when to return?:  Yes   Understands discharge instructions?:  Yes   Following discharge instructions?:  Yes   If not why? N/A    Are there any new complaints of pain? No  New Pain Meds? No    Constipation prophylaxis needed? N/A    If you have a wound is the dressing clean, dry, and intact? N/A  Understands wound care regimen? N/A    Are there any other complaints/concerns that you wish to tell your provider? Patient was seen VV with PCP and had flu/covid swabs done due to symptoms of cough, fever, headache, sob. Patient swabs came back negative. Patient symptoms worsened and triage nurse sent patient to ED. Patient was evaluated and given a breathing treatment and D/C home. Patient notes feeling somewhat better with SOB, however cough has not improved. Patient was activaly coughing on the phone with ACM. Patient notes coughing so much that she urinates on herself. ACM notes cough is very active, does not sound very productive. Patient does not have any cough medicine. ACM will request tessalon pearls be sent to pharmacy. Patient scans are clear for any pneumonia. Covid/Flu negative. Patient to treat symptoms with tylenol for fever, fluids, rest and using her inhaler as needed. Patient in agreement to this plan. Patient is to reach out to the office should symptoms not begin to resolve. Patient was educated on wearing a mask if she must go out in public due to viral infections being contagious. Patient verbalized understanding.      Plan   Esequiel horn called to pharmacy for patient cough  Follow up one week on symptoms and update Cc notes  Patient to follow up with Dr. Aleksandr Giordano dermatology for rash due to research medicaton    FU appts/Provider:    Future Appointments   Date Time Provider Cecilia Celeste   3/17/2023 11:00 AM [facility], [facility]           New Medications?:   No      Medication Reconciliation by phone - Yes  Understands Medications? Yes  Taking Medications? Yes  Can you swallow your pills? Yes    Any further needs in the home i.e. Equipment?   No    Link to services in community?:  No   Which services:  N/A

## 2022-11-17 NOTE — TELEPHONE ENCOUNTER
Notified pt, stated that she went to the ER last night and they said that she had an upper respiratory infection.

## 2022-11-17 NOTE — TELEPHONE ENCOUNTER
----- Message from Lucio Sifuentes sent at 11/17/2022 11:05 AM EST -----  Subject: Message to Provider    QUESTIONS  Information for Provider? pt called in concern of asking if she may get   medication in liquid form instead . Pt was recently called in medication   that would cost her to much but the pharmacy say they have it in liquid   form of the medication as well that's cheaper . Please reach out to pt in   this concern of benzonatate (TESSALON) 100 MG capsule  ---------------------------------------------------------------------------  --------------  Cecilia Lopez [facility]  [phone]; OK to leave message on voicemail  ---------------------------------------------------------------------------  --------------  SCRIPT ANSWERS  Relationship to Patient?  Self

## 2022-11-17 NOTE — DISCHARGE INSTRUCTIONS
You were seen in the emergency department for a cough and body aches. --Your symptoms are likely due to a viral infection. --You tested negative for COVID and influenza. Your chest x-ray did not show any evidence of pneumonia. --You received a breathing treatment for your shortness of breath. --You may continue to manage your discomfort at home with acetaminophen 650 mg every 4-6 hours and/or ibuprofen 600 mg every 6-8 hours as needed. --Follow up with your primary care provider in 2-3 days if your symptoms have not improved. Return to the hospital immediately if you develop new or worsening shortness of breath, chest pain, fever that lasts longer than 3 days, or any other new or concerning symptoms.

## 2022-11-18 ASSESSMENT — ENCOUNTER SYMPTOMS
ABDOMINAL PAIN: 0
SORE THROAT: 0
VOMITING: 0
GASTROINTESTINAL NEGATIVE: 1
DIARRHEA: 0
SHORTNESS OF BREATH: 0
COUGH: 1

## 2022-11-21 RX ORDER — HYDROXYZINE 50 MG/1
TABLET, FILM COATED ORAL
Qty: 60 TABLET | Refills: 0 | Status: SHIPPED | OUTPATIENT
Start: 2022-11-21

## 2022-11-28 ENCOUNTER — NURSE TRIAGE (OUTPATIENT)
Dept: OTHER | Facility: CLINIC | Age: 62
End: 2022-11-28

## 2022-11-28 ENCOUNTER — CARE COORDINATION (OUTPATIENT)
Dept: CARE COORDINATION | Age: 62
End: 2022-11-28

## 2022-11-28 ENCOUNTER — TELEPHONE (OUTPATIENT)
Dept: FAMILY MEDICINE CLINIC | Age: 62
End: 2022-11-28

## 2022-11-28 NOTE — CARE COORDINATION
AC received call from  asking AC to attempt another outreach to patient. Patient had called in after ACM attempt earlier and was triaged and told to go to the ED. Patient has since been unable to reach and as of time of this note, no ED records are being shown. Due to patient not answering numerous attempts to contact this ACM called 911. Spoke to [employer] at [facility] who notes they did send EMS at 2:44 and left. ACM requested a second well check to be done. Lianne notes she will send a  to the residence and outreach ACM with a follow up. [facility] will update . Triage notes state:     Location of patient: [address] call from [employer] at [place] with [employer]. Subjective: Caller states \"I having sleeping seizures\"      Current Symptoms: When driving today she almost fell asleep at the wheel. She was dizzy at that time. Severe headache at time of call. Has not been able to take her bipolar/anxiety/depression recently, but has started it back up again the last 2-3 days. Drooling. Some slurred speech. Onset: several hours ago; worsening     Associated Symptoms: NA     Pain Severity: 9-10/10; sharp; constant, severe     LMP: NA Pregnant: NA     Recommended disposition: Call  Now     Care advice provided, patient verbalizes understanding; denies any other questions or concerns; instructed to call back for any new or worsening symptoms. Patient/caller agrees to calling 911     Tried to call patient back to make sure she got a hold of EMS. Left . Tried to call patient back a second time and went to  again. Attention Provider: Thank you for allowing me to participate in the care of your patient. The patient was connected to triage in response to information provided to the ECC/PSC. Please do not respond through this encounter as the response is not directed to a shared pool.      Reason for Disposition   Sounds like a life-threatening emergency to the triager    Protocols used: Headache-ADULT-[place]

## 2022-11-28 NOTE — CARE COORDINATION
[facility] received call from [employer] with [employer]. States patient is okay, sitting on her couch at home. Notes that she is having a lot of trouble with significant other and child. Just having a \"tough time\". He stated she is not in danger. [facility] will update , providers. [facility] will attempt an outreach to patient at a later date to touch base.

## 2022-11-28 NOTE — TELEPHONE ENCOUNTER
-Pt requesting Call Back from [facility] or MA Oran Shone.    -Pt states its confidential & would not explain.

## 2022-11-28 NOTE — TELEPHONE ENCOUNTER
Location of patient: [address] call from [employer] at [employer] with [employer]. Subjective: Caller states \"I having sleeping seizures\"     Current Symptoms: When driving today she almost fell asleep at the wheel. She was dizzy at that time. Severe headache at time of call. Has not been able to take her bipolar/anxiety/depression recently, but has started it back up again the last 2-3 days. Drooling. Some slurred speech. Onset: several hours ago; worsening    Associated Symptoms: NA    Pain Severity: 9-10/10; sharp; constant, severe    LMP: NA Pregnant: NA    Recommended disposition: Call  Now    Care advice provided, patient verbalizes understanding; denies any other questions or concerns; instructed to call back for any new or worsening symptoms. Patient/caller agrees to calling 911    Tried to call patient back to make sure she got a hold of EMS. Left . Tried to call patient back a second time and went to  again. Attention Provider: Thank you for allowing me to participate in the care of your patient. The patient was connected to triage in response to information provided to the ECC/PSC. Please do not respond through this encounter as the response is not directed to a shared pool.     Reason for Disposition   Sounds like a life-threatening emergency to the triager    Protocols used: Headache-ADULT-OH

## 2022-11-28 NOTE — TELEPHONE ENCOUNTER
Flora tried to call - no answer - Flora called 911 to conduct a well check - apparently EMS has already been out to see this pt earlier today - around 230pm. This time they are sending the police to check on pt.

## 2022-11-28 NOTE — TELEPHONE ENCOUNTER
Pt does not show an ER visit at least from [facility] today- Edu cMkay is going to try to reach pt again

## 2022-11-30 ENCOUNTER — CARE COORDINATION (OUTPATIENT)
Dept: CARE COORDINATION | Age: 62
End: 2022-11-30

## 2022-11-30 NOTE — CARE COORDINATION
Ambulatory Care Coordination Note  11/30/2022    ACC: Deepa Tao RN    ACM received note from office that patient was attempting to call ACM back. ACM returned patient call. Patient notes \"not doing well\" today. ACM asked patient to explain what is going on. Patient notes anxious, dizziness, light headed, drooling. Patient explained that her previous head injury combined with her depression and bipolar are the reason for the onset of symptoms. She explained to ACM that the same symptoms she has now, are what she had when she initially had the injury. Patient denies any LOC, notes sleeping well. Speech is pressured, patient sounds anxious. ACM asked if the patient had any supports friend or family that can help. Patient notes  has visits from [facility] 2x weekly to help with house cleaning, and visiting nurse for wound checks. This is all though the [facility]. Patient notes son who is disabled wants [employer] and mcdonalds and she is afraid to drive due to her dizziness and lightheadedness, but wants to provide those things to him. ACM encouraged the patient not to drive while feeling this way, and to reach out to friends or family. Patient noted she can reach out her daughter for some help with her son. ACM encouraged patient to reach out to psychologist as they wanted to her make an appointment. Patient states she will reach out to schedule an appointment. She notes she may want to make a medication adjustment. ACM encouraged patient to make appointment today if possible. Patient thanked AC for the return call. ACM asked if there were any other needs that the ACM can assist with and patient denied. ACM encouraged the patient to answer calls from the [facility] and office so we can better assist her in times of need. Patient in agreement.    Patient stated that she just not doing well and asked if she should go to the hospital. ACM advised patient that outpatient treatment would be of benefit for her and she will also reach out to get her scheduled. [facility] explained that the hospital is overwhelmed with patients who are sick with viral illness and I would not want to have her exposed to that and take it home to her family. Patient in agreement to work outpatient on her symptoms with psychologist.    [facility] asked if patient felt she was in danger or thinking of harming herself and patient denied. ACM will route note to psychologist for appointment to be scheduled to discuss her symptoms, anxiety state, and medication adjustments. Patient thanked [facility] again for returning the call. Plan   Follow up one week  Assess for ongoing symptoms  Assess for additional needs       Offered patient enrollment in the Remote Patient Monitoring (RPM) program for in-home monitoring: Patient is not eligible for RPM program.    Lab Results       None            Care Coordination Interventions    Referral from Primary Care Provider: No  Suggested Interventions and Community Resources          Goals Addressed    None         Prior to Admission medications    Medication Sig Start Date End Date Taking? Authorizing Provider   hydrOXYzine HCl (ATARAX) 50 MG tablet TAKE 1 TO 2 TABLETS BY MOUTH NIGHTLY 11/21/22   MELANIE Lam CNP   guaiFENesin (ROBITUSSIN) 100 MG/5ML liquid Take 10 mLs by mouth 3 times daily as needed for Cough 11/17/22   MELANIE Lam CNP   albuterol sulfate HFA (VENTOLIN HFA) 108 (90 Base) MCG/ACT inhaler Inhale 2 puffs into the lungs 4 times daily as needed for Wheezing or Shortness of Breath 11/3/22   MELANIE Lam CNP   nystatin (MYCOSTATIN) 048611 UNIT/GM powder Apply 3 times daily.  11/3/22   Dhiraj Ross,    triamcinolone (KENALOG) 0.1 % cream APPLY CREAM EXTERNALLY TO AFFECTED AREA ONCE DAILY 9/26/22   MELANIE Lam CNP   pramoxine-zinc acetate (CALAMINE CLEAR) 1-0.1 % LOTN Apply 1 Dose topically in the morning and at bedtime 9/15/22   MELANIE Sharma CNP mupirocin (BACTROBAN) 2 % ointment APPLY OINTMENT NASALLY TWICE DAILY AS DIRECTED  Patient not taking: Reported on 11/3/2022 6/9/22   Historical Provider, MD   phenylephrine-cocoa butter (PREPARATION H) 0.25-88.44 % Place 1 suppository rectally as needed for Hemorrhoids 4/14/22   MELANIE Kathleen CNP   acetaminophen (AMINOFEN) 325 MG tablet Take 2 tablets by mouth every 8 hours as needed for Pain 1/27/22   MELANIE Kathleen CNP   Misc.  Devices (SILICONE EAR PLUGS) MISC Use daily as needed for swimming 11/1/21   MELANIE Kathleen CNP       Future Appointments   Date Time Provider Cecilia Celeste   3/17/2023 11:00 AM DO [facility]

## 2022-11-30 NOTE — TELEPHONE ENCOUNTER
Pacheco Allen I spoke to Faustino and scheduled her on 12.7.22 for a follow up. She wanted to let you know that the medicine is finally kicking in therefore, she is feeling better.

## 2022-12-01 ENCOUNTER — TELEPHONE (OUTPATIENT)
Dept: PSYCHIATRY | Age: 62
End: 2022-12-01

## 2022-12-01 ENCOUNTER — TELEMEDICINE (OUTPATIENT)
Dept: FAMILY MEDICINE CLINIC | Age: 62
End: 2022-12-01
Payer: MEDICARE

## 2022-12-01 DIAGNOSIS — J01.90 ACUTE BACTERIAL SINUSITIS: Primary | ICD-10-CM

## 2022-12-01 DIAGNOSIS — B96.89 ACUTE BACTERIAL SINUSITIS: Primary | ICD-10-CM

## 2022-12-01 PROCEDURE — 99213 OFFICE O/P EST LOW 20 MIN: CPT | Performed by: NURSE PRACTITIONER

## 2022-12-01 RX ORDER — CEPHALEXIN 500 MG/1
500 CAPSULE ORAL 3 TIMES DAILY
Qty: 21 CAPSULE | Refills: 0 | Status: SHIPPED | OUTPATIENT
Start: 2022-12-01 | End: 2022-12-08

## 2022-12-01 RX ORDER — ARIPIPRAZOLE 20 MG/1
TABLET ORAL
COMMUNITY
Start: 2022-10-12

## 2022-12-01 ASSESSMENT — ENCOUNTER SYMPTOMS
SORE THROAT: 1
SINUS PAIN: 1
WHEEZING: 0
SHORTNESS OF BREATH: 0
GASTROINTESTINAL NEGATIVE: 1
COUGH: 1
SINUS PRESSURE: 1

## 2022-12-01 NOTE — PROGRESS NOTES
2022    TELEHEALTH EVALUATION -- Audio/Visual (During IOQHV-03 public health emergency)    HPI:    Teressa Ibarra (:  1960) has requested an audio/video evaluation for the following concern(s):    Patient presents today with concerns of cough, congestion and sore throat. She feels some dizziness and ear discomfort. She states she has had ongoing symptoms for the past couple of weeks with no improvement. Review of Systems   Constitutional:  Positive for fatigue. Negative for fever. HENT:  Positive for congestion, ear pain, sinus pressure, sinus pain and sore throat. Respiratory:  Positive for cough. Negative for shortness of breath and wheezing. Cardiovascular: Negative. Gastrointestinal: Negative. Musculoskeletal: Negative. Skin: Negative. Neurological:  Positive for dizziness. Psychiatric/Behavioral: Negative. Prior to Visit Medications    Medication Sig Taking? Authorizing Provider   ARIPiprazole (ABILIFY) 20 MG tablet TAKE 1 TABLET BY MOUTH ONCE DAILY IN THE EVENING Yes Historical Provider, MD   cephALEXin (KEFLEX) 500 MG capsule Take 1 capsule by mouth 3 times daily for 7 days Yes Abby Bernheim, APRN - CNP   hydrOXYzine HCl (ATARAX) 50 MG tablet TAKE 1 TO 2 TABLETS BY MOUTH NIGHTLY Yes MELANIE Parish CNP   guaiFENesin (ROBITUSSIN) 100 MG/5ML liquid Take 10 mLs by mouth 3 times daily as needed for Cough Yes Abby Bernheim, APRN - CNP   albuterol sulfate HFA (VENTOLIN HFA) 108 (90 Base) MCG/ACT inhaler Inhale 2 puffs into the lungs 4 times daily as needed for Wheezing or Shortness of Breath Yes MELANIE Parish CNP   nystatin (MYCOSTATIN) 606829 UNIT/GM powder Apply 3 times daily.  Yes Juan Ross DO   triamcinolone (KENALOG) 0.1 % cream APPLY CREAM EXTERNALLY TO AFFECTED AREA ONCE DAILY Yes Abby Bernheim, APRN - CNP   pramoxine-zinc acetate (CALAMINE CLEAR) 1-0.1 % LOTN Apply 1 Dose topically in the morning and at bedtime Yes Dulce MELANIE Escalante CNP   mupirocin (BACTROBAN) 2 % ointment  Yes Historical Provider, MD   phenylephrine-cocoa butter (PREPARATION H) 0.25-88.44 % Place 1 suppository rectally as needed for Hemorrhoids Yes MELANIE Arriaga CNP   acetaminophen (AMINOFEN) 325 MG tablet Take 2 tablets by mouth every 8 hours as needed for Pain Yes MELANIE Arriaga CNP   Misc. Devices (SILICONE EAR PLUGS) MISC Use daily as needed for swimming Yes MELANIE Arriaga CNP       Social History     Tobacco Use    Smoking status: Never    Smokeless tobacco: Never   Vaping Use    Vaping Use: Never used   Substance Use Topics    Alcohol use: Yes     Alcohol/week: 0.0 standard drinks     Comment: occ    Drug use: No            PHYSICAL EXAMINATION:  [ INSTRUCTIONS:  \"[x]\" Indicates a positive item  \"[]\" Indicates a negative item  -- DELETE ALL ITEMS NOT EXAMINED]  Vital Signs: (As obtained by patient/caregiver or practitioner observation)    Blood pressure-  Heart rate-    Respiratory rate-    Temperature-  Pulse oximetry-     Constitutional: [x] Appears well-developed and well-nourished [x] No apparent distress      [] Abnormal-   Mental status  [x] Alert and awake  [x] Oriented to person/place/time []Able to follow commands      Eyes:  EOM    []  Normal  [] Abnormal-  Sclera  []  Normal  [] Abnormal -         Discharge []  None visible  [] Abnormal -    HENT:   [x] Normocephalic, atraumatic.   [] Abnormal   [x] Mouth/Throat: Mucous membranes are moist.     External Ears [] Normal  [] Abnormal-     Neck: [] No visualized mass     Pulmonary/Chest: [x] Respiratory effort normal.  [] No visualized signs of difficulty breathing or respiratory distress        [] Abnormal-      Musculoskeletal:   [x] Normal gait with no signs of ataxia         [x] Normal range of motion of neck        [] Abnormal-       Neurological:        [] No Facial Asymmetry (Cranial nerve 7 motor function) (limited exam to video visit)          [] No gaze palsy        [] Abnormal-         Skin:        [x] No significant exanthematous lesions or discoloration noted on facial skin         [] Abnormal-            Psychiatric:       [] Normal Affect [] No Hallucinations        [] Abnormal-     Other pertinent observable physical exam findings-     ASSESSMENT/PLAN:  1. Acute bacterial sinusitis  Will treat with antibiotic and follow up if no improvement  - cephALEXin (KEFLEX) 500 MG capsule; Take 1 capsule by mouth 3 times daily for 7 days  Dispense: 21 capsule; Refill: 0        Darrian Hsu is a 63 y.o. female being evaluated by a Virtual Visit (video visit) encounter to address concerns as mentioned above. A caregiver was present when appropriate. Due to this being a TeleHealth encounter ([facility]), evaluation of the following organ systems was limited: Vitals/Constitutional/EENT/Resp/CV/GI//MS/Neuro/Skin/Heme-Lymph-Imm. Pursuant to the emergency declaration under the [address] [place] [address] and the [employer] Act, this Virtual Visit was conducted with patient's (and/or legal guardian's) consent, to reduce the patient's risk of exposure to COVID-19 and provide necessary medical care. The patient (and/or legal guardian) has also been advised to contact this office for worsening conditions or problems, and seek emergency medical treatment and/or call 911 if deemed necessary. Services were provided through a video synchronous discussion virtually to substitute for in-person clinic visit. Patient and provider were located at their individual homes. --MELANIE Hall - CNP on 12/1/2022 at 10:59 AM    This chart was generated using the [address] [place] [address] [address] [employer] system. I created this record but it may contain dictation errors due to the limitation of the software. An electronic signature was used to authenticate this note.

## 2022-12-01 NOTE — TELEPHONE ENCOUNTER
Have patient schedule at least a virtual visit if she feels she needs antiotibitcs.  I have an opening now or she can be put on tomorrow afternoon

## 2022-12-01 NOTE — TELEPHONE ENCOUNTER
I spoke to Faustino and she mentioned that she actually wanted to talk about herself. She is feeling overwhelmed, and feels like she needs antibiotics for a viral infection as well as something for her skin/rash. Faustino recently went to the hospital and was told to go back if not feeling better. [facility]. Our conversation was not to clear, then she had to hang up because both her  as well as Soham Ozuna were calling for her.

## 2022-12-07 ENCOUNTER — CARE COORDINATION (OUTPATIENT)
Dept: CARE COORDINATION | Age: 62
End: 2022-12-07

## 2022-12-07 ENCOUNTER — TELEPHONE (OUTPATIENT)
Dept: FAMILY MEDICINE CLINIC | Age: 62
End: 2022-12-07

## 2022-12-07 NOTE — TELEPHONE ENCOUNTER
Pt canceled her 0830 am appointment today. Will need to see pt prior to recommending any further changes. Will have MA reach out to reschedule pt.

## 2022-12-07 NOTE — CARE COORDINATION
ACM outreach to patient to touch [facility] and discussed cancelled appointment. Patient notes she was out walking with her son at the time of the call. She stated that she cancelled the appointment today due to an \"emergency with her son's father\". ACM inquired about the emergency and patient noted she didn't have a car because it was in the shop, also stated that the medicine is working but may be too strong and wanted to discuss this with [address] Also wanted to discuss Abilify and what it is treating. ACM explained that she would need to ask those questions to the provider and patient noted \"if she has time today\". ACM notes she will have MA from the office reach out to reschedule the patient to address any questions she has and discuss her current state. Patient thanked ACM. Patient was asked if she had any needs at home at this time the ACM could assist her with and she denied. ACM will follow up in one month to assess for needs and likely graduate from [address] [place] [address] if no need are present.

## 2022-12-12 NOTE — TELEPHONE ENCOUNTER
I called Devin Hayes, and she asked that I call her back later because, she was in the middle of something.

## 2022-12-14 ENCOUNTER — APPOINTMENT (OUTPATIENT)
Dept: CT IMAGING | Age: 62
DRG: 093 | End: 2022-12-14
Payer: MEDICARE

## 2022-12-14 ENCOUNTER — TELEPHONE (OUTPATIENT)
Dept: FAMILY MEDICINE CLINIC | Age: 62
End: 2022-12-14

## 2022-12-14 ENCOUNTER — APPOINTMENT (OUTPATIENT)
Dept: GENERAL RADIOLOGY | Age: 62
DRG: 093 | End: 2022-12-14
Payer: MEDICARE

## 2022-12-14 ENCOUNTER — HOSPITAL ENCOUNTER (INPATIENT)
Age: 62
LOS: 1 days | Discharge: LEFT BEFORE DISCHARGE | DRG: 093 | End: 2022-12-14
Attending: STUDENT IN AN ORGANIZED HEALTH CARE EDUCATION/TRAINING PROGRAM | Admitting: INTERNAL MEDICINE
Payer: MEDICARE

## 2022-12-14 VITALS
TEMPERATURE: 98.2 F | WEIGHT: 227.96 LBS | SYSTOLIC BLOOD PRESSURE: 175 MMHG | OXYGEN SATURATION: 98 % | HEART RATE: 75 BPM | DIASTOLIC BLOOD PRESSURE: 102 MMHG | HEIGHT: 64 IN | RESPIRATION RATE: 18 BRPM | BODY MASS INDEX: 38.92 KG/M2

## 2022-12-14 DIAGNOSIS — R42 DIZZINESS: ICD-10-CM

## 2022-12-14 DIAGNOSIS — R47.81 SLURRED SPEECH: Primary | ICD-10-CM

## 2022-12-14 PROBLEM — R03.0 ELEVATED BLOOD PRESSURE READING WITHOUT DIAGNOSIS OF HYPERTENSION: Status: ACTIVE | Noted: 2022-12-14

## 2022-12-14 PROBLEM — E78.5 HLD (HYPERLIPIDEMIA): Status: ACTIVE | Noted: 2022-12-14

## 2022-12-14 PROBLEM — I63.9 ACUTE CVA (CEREBROVASCULAR ACCIDENT) (HCC): Status: ACTIVE | Noted: 2022-12-14

## 2022-12-14 PROBLEM — I63.81 STROKE, LACUNAR (HCC): Status: ACTIVE | Noted: 2022-12-14

## 2022-12-14 LAB
ANION GAP SERPL CALCULATED.3IONS-SCNC: 10 MMOL/L (ref 3–16)
BASOPHILS ABSOLUTE: 0.1 K/UL (ref 0–0.2)
BASOPHILS RELATIVE PERCENT: 0.9 %
BUN BLDV-MCNC: 21 MG/DL (ref 7–20)
CALCIUM SERPL-MCNC: 9.8 MG/DL (ref 8.3–10.6)
CHLORIDE BLD-SCNC: 103 MMOL/L (ref 99–110)
CO2: 26 MMOL/L (ref 21–32)
CREAT SERPL-MCNC: 0.9 MG/DL (ref 0.6–1.2)
EKG ATRIAL RATE: 79 BPM
EKG DIAGNOSIS: NORMAL
EKG P AXIS: 58 DEGREES
EKG P-R INTERVAL: 168 MS
EKG Q-T INTERVAL: 406 MS
EKG QRS DURATION: 88 MS
EKG QTC CALCULATION (BAZETT): 465 MS
EKG R AXIS: 4 DEGREES
EKG T AXIS: 31 DEGREES
EKG VENTRICULAR RATE: 79 BPM
EOSINOPHILS ABSOLUTE: 0.2 K/UL (ref 0–0.6)
EOSINOPHILS RELATIVE PERCENT: 3.1 %
GFR SERPL CREATININE-BSD FRML MDRD: >60 ML/MIN/{1.73_M2}
GLUCOSE BLD-MCNC: 85 MG/DL (ref 70–99)
HCT VFR BLD CALC: 43.3 % (ref 36–48)
HEMOGLOBIN: 14.2 G/DL (ref 12–16)
LYMPHOCYTES ABSOLUTE: 2.2 K/UL (ref 1–5.1)
LYMPHOCYTES RELATIVE PERCENT: 28.7 %
MAGNESIUM: 2.3 MG/DL (ref 1.8–2.4)
MCH RBC QN AUTO: 28.3 PG (ref 26–34)
MCHC RBC AUTO-ENTMCNC: 32.8 G/DL (ref 31–36)
MCV RBC AUTO: 86.1 FL (ref 80–100)
MONOCYTES ABSOLUTE: 0.4 K/UL (ref 0–1.3)
MONOCYTES RELATIVE PERCENT: 5.6 %
NEUTROPHILS ABSOLUTE: 4.8 K/UL (ref 1.7–7.7)
NEUTROPHILS RELATIVE PERCENT: 61.7 %
PDW BLD-RTO: 14.5 % (ref 12.4–15.4)
PLATELET # BLD: 249 K/UL (ref 135–450)
PMV BLD AUTO: 8.4 FL (ref 5–10.5)
POTASSIUM REFLEX MAGNESIUM: 5.9 MMOL/L (ref 3.5–5.1)
POTASSIUM SERPL-SCNC: 5 MMOL/L (ref 3.5–5.1)
RBC # BLD: 5.03 M/UL (ref 4–5.2)
SODIUM BLD-SCNC: 139 MMOL/L (ref 136–145)
TROPONIN: <0.01 NG/ML
WBC # BLD: 7.8 K/UL (ref 4–11)

## 2022-12-14 PROCEDURE — 71046 X-RAY EXAM CHEST 2 VIEWS: CPT

## 2022-12-14 PROCEDURE — 6360000004 HC RX CONTRAST MEDICATION: Performed by: STUDENT IN AN ORGANIZED HEALTH CARE EDUCATION/TRAINING PROGRAM

## 2022-12-14 PROCEDURE — 93005 ELECTROCARDIOGRAM TRACING: CPT | Performed by: EMERGENCY MEDICINE

## 2022-12-14 PROCEDURE — 85025 COMPLETE CBC W/AUTO DIFF WBC: CPT

## 2022-12-14 PROCEDURE — 84132 ASSAY OF SERUM POTASSIUM: CPT

## 2022-12-14 PROCEDURE — 73590 X-RAY EXAM OF LOWER LEG: CPT

## 2022-12-14 PROCEDURE — 83735 ASSAY OF MAGNESIUM: CPT

## 2022-12-14 PROCEDURE — 73552 X-RAY EXAM OF FEMUR 2/>: CPT

## 2022-12-14 PROCEDURE — 70450 CT HEAD/BRAIN W/O DYE: CPT

## 2022-12-14 PROCEDURE — 70496 CT ANGIOGRAPHY HEAD: CPT

## 2022-12-14 PROCEDURE — 99285 EMERGENCY DEPT VISIT HI MDM: CPT

## 2022-12-14 PROCEDURE — 36415 COLL VENOUS BLD VENIPUNCTURE: CPT

## 2022-12-14 PROCEDURE — G0378 HOSPITAL OBSERVATION PER HR: HCPCS

## 2022-12-14 PROCEDURE — 84484 ASSAY OF TROPONIN QUANT: CPT

## 2022-12-14 PROCEDURE — 80048 BASIC METABOLIC PNL TOTAL CA: CPT

## 2022-12-14 PROCEDURE — 1200000000 HC SEMI PRIVATE

## 2022-12-14 RX ORDER — LABETALOL HYDROCHLORIDE 5 MG/ML
10 INJECTION, SOLUTION INTRAVENOUS EVERY 10 MIN PRN
Status: DISCONTINUED | OUTPATIENT
Start: 2022-12-14 | End: 2022-12-15 | Stop reason: HOSPADM

## 2022-12-14 RX ORDER — ENOXAPARIN SODIUM 100 MG/ML
30 INJECTION SUBCUTANEOUS 2 TIMES DAILY
Status: DISCONTINUED | OUTPATIENT
Start: 2022-12-15 | End: 2022-12-15 | Stop reason: HOSPADM

## 2022-12-14 RX ORDER — ASPIRIN 81 MG/1
81 TABLET ORAL DAILY
Status: DISCONTINUED | OUTPATIENT
Start: 2022-12-15 | End: 2022-12-15 | Stop reason: HOSPADM

## 2022-12-14 RX ORDER — ATORVASTATIN CALCIUM 40 MG/1
80 TABLET, FILM COATED ORAL NIGHTLY
Status: DISCONTINUED | OUTPATIENT
Start: 2022-12-14 | End: 2022-12-15 | Stop reason: HOSPADM

## 2022-12-14 RX ORDER — LORAZEPAM 2 MG/ML
1 CONCENTRATE ORAL EVERY 8 HOURS PRN
Status: DISCONTINUED | OUTPATIENT
Start: 2022-12-14 | End: 2022-12-14

## 2022-12-14 RX ORDER — POLYETHYLENE GLYCOL 3350 17 G/17G
17 POWDER, FOR SOLUTION ORAL DAILY PRN
Status: DISCONTINUED | OUTPATIENT
Start: 2022-12-14 | End: 2022-12-15 | Stop reason: HOSPADM

## 2022-12-14 RX ORDER — ONDANSETRON 2 MG/ML
4 INJECTION INTRAMUSCULAR; INTRAVENOUS EVERY 6 HOURS PRN
Status: DISCONTINUED | OUTPATIENT
Start: 2022-12-14 | End: 2022-12-15 | Stop reason: HOSPADM

## 2022-12-14 RX ORDER — ONDANSETRON 4 MG/1
4 TABLET, ORALLY DISINTEGRATING ORAL EVERY 8 HOURS PRN
Status: DISCONTINUED | OUTPATIENT
Start: 2022-12-14 | End: 2022-12-15 | Stop reason: HOSPADM

## 2022-12-14 RX ORDER — ASPIRIN 300 MG/1
300 SUPPOSITORY RECTAL DAILY
Status: DISCONTINUED | OUTPATIENT
Start: 2022-12-15 | End: 2022-12-15 | Stop reason: HOSPADM

## 2022-12-14 RX ADMIN — IOPAMIDOL 75 ML: 755 INJECTION, SOLUTION INTRAVENOUS at 19:04

## 2022-12-14 ASSESSMENT — PAIN SCALES - GENERAL: PAINLEVEL_OUTOF10: 10

## 2022-12-14 ASSESSMENT — PAIN DESCRIPTION - LOCATION: LOCATION: OTHER (COMMENT)

## 2022-12-14 ASSESSMENT — PAIN DESCRIPTION - DESCRIPTORS: DESCRIPTORS: SHARP;STABBING

## 2022-12-14 ASSESSMENT — PAIN - FUNCTIONAL ASSESSMENT: PAIN_FUNCTIONAL_ASSESSMENT: 0-10

## 2022-12-14 ASSESSMENT — PAIN DESCRIPTION - ORIENTATION: ORIENTATION: LEFT

## 2022-12-14 NOTE — TELEPHONE ENCOUNTER
-Pt Advised to go to ED Today 12/14/22 per ANA CRISTINA Greene.    -Pt was Nurse Triaged today with: Slurred Speech, Dizziness, Lightheadedness, Headache.

## 2022-12-14 NOTE — TELEPHONE ENCOUNTER
Patient called back about this and said she has Medicare and Medicaid. So she doesn't know what Neurologist accepts this.

## 2022-12-14 NOTE — TELEPHONE ENCOUNTER
RUBIA  Spoke with pt, she states she no longer is having these symptoms so she does not want to go to the hospital. Told her it would still be best for her to go but if she won't go now she needs to go if the symptoms start back. Explained to her what some of the possibilities of her symptoms could be and that's why we also need her to go to rule things out so she is going to talk to her  about possibly going tonight or tomorrow.

## 2022-12-14 NOTE — TELEPHONE ENCOUNTER
12/14 - 150pm - [facility] called with pt on the line- she is on her way to [facility] - she wanted to make sure Martha Paulino was aware. Also wanted us to call ahead and let them know she was coming. We don't normally call ahead. No call was made.

## 2022-12-14 NOTE — TELEPHONE ENCOUNTER
----- Message from Lindy Stack sent at 12/14/2022  8:25 AM EST -----  Subject: Referral Request    Reason for referral request? neurologist  Provider patient wants to be referred to(if known):     Provider Phone Number(if known): Additional Information for Provider? Patient needs a neurologist that   takes her insurance plan. States that when she called the list that her   insurance gave her they don't except her insurance.   ---------------------------------------------------------------------------  --------------  Lala De Jesus YPZG    [phone]; OK to leave message on voicemail  ---------------------------------------------------------------------------  --------------
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
## 2022-12-15 ENCOUNTER — TELEPHONE (OUTPATIENT)
Dept: FAMILY MEDICINE CLINIC | Age: 62
End: 2022-12-15

## 2022-12-15 ENCOUNTER — HOSPITAL ENCOUNTER (OUTPATIENT)
Age: 62
Setting detail: OBSERVATION
Discharge: HOME | End: 2022-12-16
Attending: STUDENT IN AN ORGANIZED HEALTH CARE EDUCATION/TRAINING PROGRAM | Admitting: INTERNAL MEDICINE
Payer: MEDICARE

## 2022-12-15 DIAGNOSIS — I63.9 CEREBROVASCULAR ACCIDENT (CVA), UNSPECIFIED MECHANISM (HCC): Primary | ICD-10-CM

## 2022-12-15 LAB
ANION GAP SERPL CALCULATED.3IONS-SCNC: 9 MMOL/L (ref 3–16)
BASOPHILS ABSOLUTE: 0.1 K/UL (ref 0–0.2)
BASOPHILS RELATIVE PERCENT: 1.1 %
BUN BLDV-MCNC: 22 MG/DL (ref 7–20)
CALCIUM SERPL-MCNC: 9.4 MG/DL (ref 8.3–10.6)
CHLORIDE BLD-SCNC: 105 MMOL/L (ref 99–110)
CO2: 27 MMOL/L (ref 21–32)
CREAT SERPL-MCNC: 1.1 MG/DL (ref 0.6–1.2)
EOSINOPHILS ABSOLUTE: 0.2 K/UL (ref 0–0.6)
EOSINOPHILS RELATIVE PERCENT: 3.3 %
GFR SERPL CREATININE-BSD FRML MDRD: 57 ML/MIN/{1.73_M2}
GLUCOSE BLD-MCNC: 92 MG/DL (ref 70–99)
HCT VFR BLD CALC: 40.2 % (ref 36–48)
HEMOGLOBIN: 13.5 G/DL (ref 12–16)
INR BLD: 1 (ref 0.87–1.14)
LYMPHOCYTES ABSOLUTE: 2.5 K/UL (ref 1–5.1)
LYMPHOCYTES RELATIVE PERCENT: 34.2 %
MCH RBC QN AUTO: 28.5 PG (ref 26–34)
MCHC RBC AUTO-ENTMCNC: 33.6 G/DL (ref 31–36)
MCV RBC AUTO: 84.8 FL (ref 80–100)
MONOCYTES ABSOLUTE: 0.5 K/UL (ref 0–1.3)
MONOCYTES RELATIVE PERCENT: 7.3 %
NEUTROPHILS ABSOLUTE: 3.9 K/UL (ref 1.7–7.7)
NEUTROPHILS RELATIVE PERCENT: 54.1 %
PDW BLD-RTO: 14.3 % (ref 12.4–15.4)
PLATELET # BLD: 241 K/UL (ref 135–450)
PMV BLD AUTO: 7.7 FL (ref 5–10.5)
POTASSIUM REFLEX MAGNESIUM: 3.7 MMOL/L (ref 3.5–5.1)
PROTHROMBIN TIME: 13.2 SEC (ref 11.7–14.5)
RBC # BLD: 4.74 M/UL (ref 4–5.2)
SODIUM BLD-SCNC: 141 MMOL/L (ref 136–145)
WBC # BLD: 7.3 K/UL (ref 4–11)

## 2022-12-15 PROCEDURE — 99285 EMERGENCY DEPT VISIT HI MDM: CPT

## 2022-12-15 PROCEDURE — 6370000000 HC RX 637 (ALT 250 FOR IP): Performed by: STUDENT IN AN ORGANIZED HEALTH CARE EDUCATION/TRAINING PROGRAM

## 2022-12-15 PROCEDURE — 36415 COLL VENOUS BLD VENIPUNCTURE: CPT

## 2022-12-15 PROCEDURE — 85025 COMPLETE CBC W/AUTO DIFF WBC: CPT

## 2022-12-15 PROCEDURE — APPNB45 APP NON BILLABLE 31-45 MINUTES

## 2022-12-15 PROCEDURE — G0378 HOSPITAL OBSERVATION PER HR: HCPCS

## 2022-12-15 PROCEDURE — 93005 ELECTROCARDIOGRAM TRACING: CPT | Performed by: STUDENT IN AN ORGANIZED HEALTH CARE EDUCATION/TRAINING PROGRAM

## 2022-12-15 PROCEDURE — 85610 PROTHROMBIN TIME: CPT

## 2022-12-15 PROCEDURE — 80048 BASIC METABOLIC PNL TOTAL CA: CPT

## 2022-12-15 RX ORDER — METHOCARBAMOL 500 MG/1
750 TABLET, FILM COATED ORAL ONCE
Status: COMPLETED | OUTPATIENT
Start: 2022-12-15 | End: 2022-12-15

## 2022-12-15 RX ORDER — ATORVASTATIN CALCIUM 80 MG/1
80 TABLET, FILM COATED ORAL NIGHTLY
Status: DISCONTINUED | OUTPATIENT
Start: 2022-12-15 | End: 2022-12-16 | Stop reason: HOSPADM

## 2022-12-15 RX ORDER — ALBUTEROL SULFATE 2.5 MG/3ML
2.5 SOLUTION RESPIRATORY (INHALATION) EVERY 6 HOURS PRN
Status: DISCONTINUED | OUTPATIENT
Start: 2022-12-15 | End: 2022-12-16 | Stop reason: HOSPADM

## 2022-12-15 RX ORDER — ENOXAPARIN SODIUM 100 MG/ML
30 INJECTION SUBCUTANEOUS 2 TIMES DAILY
Status: DISCONTINUED | OUTPATIENT
Start: 2022-12-16 | End: 2022-12-16 | Stop reason: HOSPADM

## 2022-12-15 RX ORDER — POLYETHYLENE GLYCOL 3350 17 G/17G
17 POWDER, FOR SOLUTION ORAL DAILY PRN
Status: DISCONTINUED | OUTPATIENT
Start: 2022-12-15 | End: 2022-12-16 | Stop reason: HOSPADM

## 2022-12-15 RX ORDER — ACETAMINOPHEN 325 MG/1
650 TABLET ORAL ONCE
Status: COMPLETED | OUTPATIENT
Start: 2022-12-15 | End: 2022-12-15

## 2022-12-15 RX ORDER — LIDOCAINE 4 G/G
1 PATCH TOPICAL DAILY
Status: DISCONTINUED | OUTPATIENT
Start: 2022-12-15 | End: 2022-12-16 | Stop reason: HOSPADM

## 2022-12-15 RX ORDER — HYDROXYZINE HYDROCHLORIDE 25 MG/1
50 TABLET, FILM COATED ORAL NIGHTLY PRN
Status: DISCONTINUED | OUTPATIENT
Start: 2022-12-16 | End: 2022-12-16 | Stop reason: HOSPADM

## 2022-12-15 RX ORDER — ASPIRIN 81 MG/1
81 TABLET ORAL DAILY
Status: DISCONTINUED | OUTPATIENT
Start: 2022-12-16 | End: 2022-12-16 | Stop reason: HOSPADM

## 2022-12-15 RX ORDER — ONDANSETRON 4 MG/1
4 TABLET, ORALLY DISINTEGRATING ORAL EVERY 8 HOURS PRN
Status: DISCONTINUED | OUTPATIENT
Start: 2022-12-15 | End: 2022-12-16 | Stop reason: HOSPADM

## 2022-12-15 RX ORDER — LABETALOL HYDROCHLORIDE 5 MG/ML
10 INJECTION, SOLUTION INTRAVENOUS EVERY 10 MIN PRN
Status: DISCONTINUED | OUTPATIENT
Start: 2022-12-15 | End: 2022-12-16 | Stop reason: HOSPADM

## 2022-12-15 RX ORDER — ASPIRIN 300 MG/1
300 SUPPOSITORY RECTAL DAILY
Status: DISCONTINUED | OUTPATIENT
Start: 2022-12-16 | End: 2022-12-16 | Stop reason: HOSPADM

## 2022-12-15 RX ORDER — ONDANSETRON 2 MG/ML
4 INJECTION INTRAMUSCULAR; INTRAVENOUS EVERY 6 HOURS PRN
Status: DISCONTINUED | OUTPATIENT
Start: 2022-12-15 | End: 2022-12-16 | Stop reason: HOSPADM

## 2022-12-15 RX ORDER — ARIPIPRAZOLE 5 MG/1
20 TABLET ORAL DAILY
Status: DISCONTINUED | OUTPATIENT
Start: 2022-12-16 | End: 2022-12-16 | Stop reason: HOSPADM

## 2022-12-15 RX ADMIN — ACETAMINOPHEN 650 MG: 325 TABLET ORAL at 21:45

## 2022-12-15 RX ADMIN — METHOCARBAMOL 750 MG: 500 TABLET ORAL at 21:46

## 2022-12-15 ASSESSMENT — ENCOUNTER SYMPTOMS
BLOOD IN STOOL: 0
SORE THROAT: 0
DIARRHEA: 0
RHINORRHEA: 0
WHEEZING: 0
COUGH: 0
CONSTIPATION: 0
NAUSEA: 0
ABDOMINAL PAIN: 0
SHORTNESS OF BREATH: 0
ABDOMINAL DISTENTION: 0
COLOR CHANGE: 0
BACK PAIN: 0
SINUS PRESSURE: 0
EYES NEGATIVE: 1
SINUS PAIN: 0
VOMITING: 0

## 2022-12-15 ASSESSMENT — PAIN - FUNCTIONAL ASSESSMENT: PAIN_FUNCTIONAL_ASSESSMENT: NONE - DENIES PAIN

## 2022-12-15 NOTE — ED NOTES
Followed up with Adrien Lyle on 56/10/3271 at 11:30 PM. Patient left the ED with a disposition of AMA on . Patient cited personal obligations as reason. Advised patient to follow up with a primary care physician or return to the Emergency Department if symptoms worsen.    EVGENY Sanchez RN  12/14/22 0602

## 2022-12-15 NOTE — TELEPHONE ENCOUNTER
Pt called in wanting to get a referral for a out patient open mri due to not being able to tolerate the closed in MRI. Pt was at the ED yesterday. Pt stated that the ED stated that they found a 9 day old stroke. Ed wanted Pt to stay through the weekend but Pt. Left and refused to stay the weekend. Pt is also stating the ED found arthritis in her legs and it is difficult to walk.  Pt contact number is [phone]

## 2022-12-15 NOTE — ED NOTES
Patient is ask to go home. Patient states that her  is demanding that she come home.       James Gerardo RN  12/14/22 1690

## 2022-12-15 NOTE — H&P
Hospital Medicine History & Physical      PCP: MELANIE Canchola CNP    Date of Admission: 12/14/2022    Date of Service: Pt seen/examined on 12/14/2022 and  Placed in Observation. Chief Complaint: Slurred speech, dizziness      History Of Present Illness:    63 y.o. obese female non-smoker poor historian with significant past medical history of hyperlipidemia, bipolar disorder who presented to [facility] ED with slurred speech, drooling, dizziness and left-sided burning pain which started the day prior to presentation. Patient does report history of low back pain with left-sided sciatica as well. Her symptoms improved with rest.  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    Emergency room her symptoms were resolved. Vitals were significant for a blood pressure of 187/117 rest of the vitals were normal.  Patient refused MRI and chose to leave A. Past Medical History:          Diagnosis Date    Anxiety     Bipolar 1 disorder ([facility] [address]     Depression     Dyspareunia in female 05/16/2015    Elevated transaminase level 11/10/2017    Hemorrhoids 01/15/2015    History of tubal ligation 05/16/2015    HLD (hyperlipidemia) 12/14/2022    Hypertension     Irritable bowel syndrome     Lacunar stroke ([facility] [address]     Menopause 09/05/2016    Obesity     Obesity (BMI 30-39.9) 03/11/2015    Overactive bladder     Prurigo nodularis     Stress incontinence     Urinary incontinence     Uterine fibroid 05/16/2015    Yeast vaginitis 08/30/2020       Past Surgical History:          Procedure Laterality Date    COLONOSCOPY  2010    polyp removed    COLONOSCOPY  2018    EYE SURGERY Left     Lazy eye    TONSILLECTOMY      TUBAL LIGATION  1994       Medications Prior to Admission:      Prior to Admission medications    Medication Sig Start Date End Date Taking?  Authorizing Provider   ARIPiprazole (ABILIFY) 20 MG tablet TAKE 1 TABLET BY MOUTH ONCE DAILY IN THE EVENING 10/12/22   Historical Provider, MD   hydrOXYzine HCl (ATARAX) 50 MG tablet TAKE 1 TO 2 TABLETS BY MOUTH NIGHTLY 11/21/22   MELANIE Barraza CNP   guaiFENesin (ROBITUSSIN) 100 MG/5ML liquid Take 10 mLs by mouth 3 times daily as needed for Cough 11/17/22   MELANIE Barraza CNP   albuterol sulfate HFA (VENTOLIN HFA) 108 (90 Base) MCG/ACT inhaler Inhale 2 puffs into the lungs 4 times daily as needed for Wheezing or Shortness of Breath 11/3/22   Tomy Purpura, APRN - CNP   nystatin (MYCOSTATIN) 302072 UNIT/GM powder Apply 3 times daily. 11/3/22   Joy Ross,    triamcinolone (KENALOG) 0.1 % cream APPLY CREAM EXTERNALLY TO AFFECTED AREA ONCE DAILY 9/26/22   Tomy PurpMELANIE esteban CNP   pramoxine-zinc acetate (CALAMINE CLEAR) 1-0.1 % LOTN Apply 1 Dose topically in the morning and at bedtime 9/15/22   [place], MELANIE - CNP   mupirocin Jayjay Christina) 2 % ointment  6/9/22   Historical Provider, MD   phenylephrine-cocoa butter (PREPARATION H) 0.25-88.44 % Place 1 suppository rectally as needed for Hemorrhoids 4/14/22   MELANIE Barraza CNP   acetaminophen (AMINOFEN) 325 MG tablet Take 2 tablets by mouth every 8 hours as needed for Pain 1/27/22   MELANIE Barraza - CNP   Misc. Devices (SILICONE EAR PLUGS) MISC Use daily as needed for swimming 11/1/21   MELANIE Barraza CNP       Allergies:  Bactrim [sulfamethoxazole-trimethoprim]; Antihistamines, chlorpheniramine-type; Augmentin [amoxicillin-pot clavulanate]; and Macrobid [nitrofurantoin]    Social History:      The patient currently lives at home with her family. TOBACCO:   reports that she has never smoked. She has never used smokeless tobacco.  ETOH:   reports current alcohol use.   E-cigarette/Vaping       Questions Responses    E-cigarette/Vaping Use Never User    Start Date     Passive Exposure     Quit Date     Counseling Given     Comments               Family History:          Problem Relation Age of Onset    Heart Disease Mother     Diabetes Mother     Arthritis Father     Arthritis Brother     Breast Cancer Maternal Grandfather     Diabetes Maternal Grandfather        REVIEW OF SYSTEMS COMPLETED:   Pertinent positives as noted in the HPI. All other systems reviewed and negative. PHYSICAL EXAM PERFORMED:    BP (!) 153/97   Pulse 79   Temp 98.2 °F (36.8 °C) (Oral)   Resp 18   Ht 5' 4\" (1.626 m)   Wt 227 lb 15.3 oz (103.4 kg)   LMP 01/29/2015   SpO2 97%   BMI 39.13 kg/m²     General appearance: Obese in no apparent distress, appears stated age and cooperative. HEENT:  Normal cephalic, atraumatic without obvious deformity. Pupils equal, round, and reactive to light. Extra ocular muscles intact. Conjunctivae/corneas clear. Neck: Short/thick, with full range of motion. No bruits. Trachea midline. Respiratory:  Normal respiratory effort. Clear to auscultation, bilaterally without Rales/Wheezes/Rhonchi. Cardiovascular:  Regular rate and rhythm with normal S1/S2 without murmurs, rubs or gallops. Abdomen: Soft, obese, non-tender, non-distended with normal bowel sounds. Musculoskeletal:  No clubbing, cyanosis or edema bilaterally. Full range of motion without deformity. Skin: Skin color, texture, turgor normal.  No rashes. Excoriation over anterior shins and circular superficial ulcers. Neurologic:  Neurovascularly intact without any focal sensory/motor deficits.  Cranial nerves: II-XII intact, grossly non-focal.  Psychiatric:  Alert and oriented, thought content appropriate, normal insight  Capillary Refill: Brisk,3 seconds, normal  Peripheral Pulses: +2 palpable, equal bilaterally       Labs:     Recent Labs     12/14/22 1821   WBC 7.8   HGB 14.2   HCT 43.3        Recent Labs     12/14/22  1821 12/14/22  1937     --    K 5.9* 5.0     --    CO2 26  --    BUN 21*  --    CREATININE 0.9  --    CALCIUM 9.8  --      No results for input(s): AST, ALT, BILIDIR, BILITOT, ALKPHOS in the last 72 hours. No results for input(s): INR in the last 72 hours. Recent Labs     12/14/22  1821   TROPONINI <0.01       Urinalysis:      Lab Results   Component Value Date/Time    NITRU Negative 07/08/2022 12:39 PM    WBCUA 0-2 07/08/2022 12:39 PM    BACTERIA Rare 03/26/2022 03:31 PM    RBCUA None seen 07/08/2022 12:39 PM    BLOODU Negative 07/08/2022 12:39 PM    SPECGRAV <=1.005 07/08/2022 12:39 PM    GLUCOSEU Negative 07/08/2022 12:39 PM       Radiology:     CXR: I have reviewed the CXR with the following interpretation: No acute cardiopulmonary process. EKG:  I have reviewed the EKG with the following interpretation: Normal sinus rhythm with no acute ischemic changes. CTA HEAD NECK W CONTRAST   Final Result      1. No evidence of flow-limiting stenosis. 2. Right ICA mildly diminutive in caliber in comparison to the left without focal flow-limiting stenosis. PROCEDURE: CT ANGIOGRAPHY HEAD WITH/WITHOUT CONTRAST      INDICATION: intermittent L sided drooling, slurred speech      COMPARISON: none      TECHNIQUE: Axial CT imaging obtained through the head prior to and following administration of IV contrast. Axial images, multiplanar reformatted images, and maximum intensity projection images were reviewed for CT angiographic technique. IV contrast: mL Omnipaque 350. FINDINGS:      ANTERIOR CIRCULATION: The intracranial internal carotid arteries, anterior cerebral arteries, and middle cerebral arteries are patent. Right ICA diminutive in caliber. Right A1 segment is hypoplastic. Vidhya Lesage No evidence for aneurysm or arteriovenous    malformation. POSTERIOR CIRCULATION: The bilateral vertebral arteries, basilar artery and posterior cerebral arteries demonstrate no occlusion or stenosis. Left vertebral artery dominant. No evidence for aneurysm or arteriovenous malformation. INTRACRANIAL VENOUS SYSTEM: No evidence for intracranial venous thrombosis. IMPRESSION:      1. No evidence of intracranial large vessel occlusion. 2. Right ICA mildly diminutive in caliber. CT Head W/O Contrast   Final Result      No acute intracranial hemorrhage. Age indeterminate lacunar infarct left basal ganglia. XR FEMUR LEFT (MIN 2 VIEWS)   Final Result      No acute radiographic abnormality. Left hip and left knee mild to moderate osteoarthritis. XR TIBIA FIBULA LEFT (2 VIEWS)   Final Result      No acute radiographic abnormality. Left hip and left knee mild to moderate osteoarthritis. XR CHEST (2 VW)   Final Result    Minimal basilar airspace opacities, atelectasis favored. Correlate with clinical symptoms. MRI brain without contrast    (Results Pending)       Consults:    IP CONSULT TO HOSPITALIST    ASSESSMENT:    C/Omari Holden [address] Problems    Diagnosis Date Noted    Slurred speech [R47.81] 12/14/2022     Priority: Medium    HLD (hyperlipidemia) [E78.5] 12/14/2022     Priority: Medium    Elevated blood pressure reading without diagnosis of hypertension [R03.0] 12/14/2022     Priority: Medium    Stroke, lacunar ([facility] [address] [I63.81] 12/14/2022     Priority: Low    Obesity (BMI 30-39. 9) [E66.9] 03/11/2015     Priority: Low    Bipolar disorder ([address] [F31.9] 01/15/2015     Priority: Low         PLAN:  -MRI of the brain, 2D echo  -Check fasting lipids and glycosylated hemoglobin  -Aspirin, statin      DVT Prophylaxis: Lovenox  Diet: Diet NPO  Code Status: Full Code    PT/OT Eval Status: Pending    Dispo -left AMA from the emergency room before any of the above studies could be completed. Bernard Inman MD    Thank you MELANIE James - VANESA for the opportunity to be involved in this patient's care. If you have any questions or concerns please feel free to contact me at [phone].

## 2022-12-16 ENCOUNTER — CARE COORDINATION (OUTPATIENT)
Dept: CARE COORDINATION | Age: 62
End: 2022-12-16

## 2022-12-16 ENCOUNTER — APPOINTMENT (OUTPATIENT)
Dept: MRI IMAGING | Age: 62
End: 2022-12-16
Payer: MEDICARE

## 2022-12-16 VITALS
HEART RATE: 85 BPM | SYSTOLIC BLOOD PRESSURE: 164 MMHG | RESPIRATION RATE: 18 BRPM | HEIGHT: 64 IN | OXYGEN SATURATION: 95 % | BODY MASS INDEX: 38.24 KG/M2 | TEMPERATURE: 97.5 F | WEIGHT: 223.99 LBS | DIASTOLIC BLOOD PRESSURE: 101 MMHG

## 2022-12-16 PROBLEM — R29.810 WEAKNESS ON LEFT SIDE OF FACE: Status: ACTIVE | Noted: 2022-12-16

## 2022-12-16 LAB
EKG ATRIAL RATE: 70 BPM
EKG DIAGNOSIS: NORMAL
EKG P AXIS: 37 DEGREES
EKG P-R INTERVAL: 186 MS
EKG Q-T INTERVAL: 394 MS
EKG QRS DURATION: 76 MS
EKG QTC CALCULATION (BAZETT): 425 MS
EKG R AXIS: 6 DEGREES
EKG T AXIS: -7 DEGREES
EKG VENTRICULAR RATE: 70 BPM
HCT VFR BLD CALC: 40 % (ref 36–48)
HEMOGLOBIN: 13.3 G/DL (ref 12–16)
MCH RBC QN AUTO: 28 PG (ref 26–34)
MCHC RBC AUTO-ENTMCNC: 33.3 G/DL (ref 31–36)
MCV RBC AUTO: 83.9 FL (ref 80–100)
PDW BLD-RTO: 14 % (ref 12.4–15.4)
PLATELET # BLD: 222 K/UL (ref 135–450)
PMV BLD AUTO: 8 FL (ref 5–10.5)
RBC # BLD: 4.76 M/UL (ref 4–5.2)
WBC # BLD: 6 K/UL (ref 4–11)

## 2022-12-16 PROCEDURE — 36415 COLL VENOUS BLD VENIPUNCTURE: CPT

## 2022-12-16 PROCEDURE — 85027 COMPLETE CBC AUTOMATED: CPT

## 2022-12-16 PROCEDURE — 6370000000 HC RX 637 (ALT 250 FOR IP): Performed by: INTERNAL MEDICINE

## 2022-12-16 PROCEDURE — 97162 PT EVAL MOD COMPLEX 30 MIN: CPT

## 2022-12-16 PROCEDURE — 92610 EVALUATE SWALLOWING FUNCTION: CPT

## 2022-12-16 PROCEDURE — 97530 THERAPEUTIC ACTIVITIES: CPT

## 2022-12-16 PROCEDURE — 92523 SPEECH SOUND LANG COMPREHEN: CPT

## 2022-12-16 PROCEDURE — 97166 OT EVAL MOD COMPLEX 45 MIN: CPT

## 2022-12-16 PROCEDURE — 80061 LIPID PANEL: CPT

## 2022-12-16 PROCEDURE — 97535 SELF CARE MNGMENT TRAINING: CPT

## 2022-12-16 PROCEDURE — 94664 DEMO&/EVAL PT USE INHALER: CPT

## 2022-12-16 PROCEDURE — 99205 OFFICE O/P NEW HI 60 MIN: CPT | Performed by: PSYCHIATRY & NEUROLOGY

## 2022-12-16 PROCEDURE — G0378 HOSPITAL OBSERVATION PER HR: HCPCS

## 2022-12-16 PROCEDURE — 97116 GAIT TRAINING THERAPY: CPT

## 2022-12-16 PROCEDURE — 70551 MRI BRAIN STEM W/O DYE: CPT

## 2022-12-16 PROCEDURE — 83036 HEMOGLOBIN GLYCOSYLATED A1C: CPT

## 2022-12-16 PROCEDURE — 96372 THER/PROPH/DIAG INJ SC/IM: CPT

## 2022-12-16 PROCEDURE — 6360000002 HC RX W HCPCS: Performed by: INTERNAL MEDICINE

## 2022-12-16 PROCEDURE — 6370000000 HC RX 637 (ALT 250 FOR IP): Performed by: STUDENT IN AN ORGANIZED HEALTH CARE EDUCATION/TRAINING PROGRAM

## 2022-12-16 RX ORDER — LORAZEPAM 1 MG/1
1 TABLET ORAL ONCE
Status: COMPLETED | OUTPATIENT
Start: 2022-12-16 | End: 2022-12-16

## 2022-12-16 RX ORDER — MECLIZINE HYDROCHLORIDE 25 MG/1
12.5 TABLET ORAL 3 TIMES DAILY PRN
Status: DISCONTINUED | OUTPATIENT
Start: 2022-12-16 | End: 2022-12-16 | Stop reason: HOSPADM

## 2022-12-16 RX ORDER — MECLIZINE HCL 12.5 MG/1
12.5 TABLET ORAL 3 TIMES DAILY PRN
Qty: 20 TABLET | Refills: 0 | Status: SHIPPED | OUTPATIENT
Start: 2022-12-16 | End: 2022-12-26

## 2022-12-16 RX ORDER — ASPIRIN 81 MG/1
81 TABLET ORAL DAILY
Qty: 30 TABLET | Refills: 3 | Status: SHIPPED | OUTPATIENT
Start: 2022-12-17 | End: 2022-12-21

## 2022-12-16 RX ORDER — CALCIUM POLYCARBOPHIL 625 MG 625 MG/1
625 TABLET ORAL DAILY
Status: DISCONTINUED | OUTPATIENT
Start: 2022-12-16 | End: 2022-12-16 | Stop reason: HOSPADM

## 2022-12-16 RX ADMIN — ASPIRIN 81 MG: 81 TABLET, COATED ORAL at 09:36

## 2022-12-16 RX ADMIN — ARIPIPRAZOLE 20 MG: 5 TABLET ORAL at 09:36

## 2022-12-16 RX ADMIN — CALCIUM POLYCARBOPHIL 625 MG: 625 TABLET, FILM COATED ORAL at 12:58

## 2022-12-16 RX ADMIN — LORAZEPAM 1 MG: 1 TABLET ORAL at 00:42

## 2022-12-16 RX ADMIN — ATORVASTATIN CALCIUM 80 MG: 40 TABLET, FILM COATED ORAL at 00:42

## 2022-12-16 RX ADMIN — MECLIZINE HYDROCHLORIDE 12.5 MG: 25 TABLET ORAL at 11:59

## 2022-12-16 RX ADMIN — ENOXAPARIN SODIUM 30 MG: 100 INJECTION SUBCUTANEOUS at 09:36

## 2022-12-16 ASSESSMENT — PAIN SCALES - GENERAL
PAINLEVEL_OUTOF10: 0

## 2022-12-16 NOTE — PLAN OF CARE
Problem: Neurosensory - Adult  Goal: Achieves stable or improved neurological status  Outcome: Progressing  Flowsheets (Taken 12/16/2022 0641)  Achieves stable or improved neurological status: Assess for and report changes in neurological status     Problem: Musculoskeletal - Adult  Goal: Return mobility to safest level of function  Outcome: Progressing  Flowsheets (Taken 12/16/2022 0641)  Return Mobility to Safest Level of Function:   Assess patient stability and activity tolerance for standing, transferring and ambulating with or without assistive devices   Assist with transfers and ambulation using safe patient handling equipment as needed   Obtain physical therapy/occupational therapy consults as needed   Instruct patient/family in ordered activity level     Problem: Pain  Goal: Verbalizes/displays adequate comfort level or baseline comfort level  Outcome: Progressing  Flowsheets (Taken 12/16/2022 0642)  Verbalizes/displays adequate comfort level or baseline comfort level:   Assess pain using appropriate pain scale   Encourage patient to monitor pain and request assistance   Administer analgesics based on type and severity of pain and evaluate response   Implement non-pharmacological measures as appropriate and evaluate response   Notify Licensed Independent Practitioner if interventions unsuccessful or patient reports new pain     Problem: Safety - Adult  Goal: Free from fall injury  Outcome: Progressing  Flowsheets (Taken 12/16/2022 0640)  Free From Fall Injury: Instruct family/caregiver on patient safety     Problem: Discharge Planning  Goal: Discharge to home or other facility with appropriate resources  Outcome: Progressing  Flowsheets (Taken 12/16/2022 0640)  Discharge to home or other facility with appropriate resources:   Identify barriers to discharge with patient and caregiver   Arrange for needed discharge resources and transportation as appropriate   Identify discharge learning needs (meds, wound care, etc)   Refer to discharge planning if patient needs post-hospital services based on physician order or complex needs related to functional status, cognitive ability or social support system
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
## 2022-12-16 NOTE — ED PROVIDER NOTES
[address] [facility] RESIDENT NOTE       Date of evaluation: 12/15/2022    Chief Complaint     Numbness (Left sided, started yesterday for stroke symptoms, left yesterday AMA to take care of  and son. She now has care arranged for them, and is here to seek treatment for the stroke she was diagnosed with yesterday)      of Present Illness     Darrian Hsu is a 63 y.o. female with a past medical history of bipolar 1 disorder who presents with continued stroke symptoms after being diagnosed with a stroke yesterday. Patient reports she was seen in the ED yesterday for 2 days of left-sided corner of her mouth droopiness, slurred speech, drooling from the left side of the mouth, fatigue, excessive sleepiness. She had a CT head and a CTA of the head/neck done at that time which showed age indeterminate lacunar infarct left basal ganglia. Patient was recommended to stay for admission for neurology evaluation and MRI however she left AMA as she has been caring for a parent at home. She is representing today as she is amenable to admission at this time. She reports ongoing symptoms as described above with no new neurologic deficits or new neuro symptoms. She denies any weakness or numbness in her arms or legs, vision changes, difficulty walking, difficulty with coordination, word finding difficulties. She further denies any recent illness, chest pain, shortness of breath, abdominal pain. She does report she has had some ongoing left-sided leg pain for which she had x-ray imaging yesterday that showed evidence of osteoarthritis. She denies any recent falls, injury. Review of Systems     Review of Systems   Constitutional:  Negative for activity change, appetite change, chills, diaphoresis, fatigue and fever. HENT:  Negative for congestion, ear pain, rhinorrhea, sinus pressure, sinus pain, sneezing and sore throat. Eyes: Negative.     Respiratory:  Negative for cough, shortness of breath and wheezing. Cardiovascular:  Negative for chest pain, palpitations and leg swelling. Gastrointestinal:  Negative for abdominal distention, abdominal pain, blood in stool, constipation, diarrhea, nausea and vomiting. Genitourinary:  Negative for decreased urine volume, difficulty urinating, dysuria, flank pain, frequency, hematuria and urgency. Musculoskeletal:  Positive for arthralgias (right hip, leg pain). Negative for back pain, gait problem, joint swelling, myalgias, neck pain and neck stiffness. Skin:  Negative for color change, pallor, rash and wound. Neurological:  Positive for facial asymmetry (left lip drooping), speech difficulty (slurring) and numbness (left forehead). Negative for dizziness, tremors, seizures, syncope, weakness, light-headedness and headaches. Past Medical, Surgical, Family, and Social History     She has a past medical history of Anxiety, Bipolar 1 disorder ([facility] [place] [address] Depression, Dyspareunia in female, Elevated transaminase level, Hemorrhoids, History of tubal ligation, HLD (hyperlipidemia), Hypertension, Irritable bowel syndrome, Lacunar stroke ([facility], [place] [address] Menopause, Obesity, Obesity (BMI 30-39.9), Overactive bladder, Prurigo nodularis, Stress incontinence, Urinary incontinence, Uterine fibroid, and Yeast vaginitis. She has a past surgical history that includes Eye surgery (Left); Tonsillectomy; Tubal ligation (1994); Colonoscopy (2010); and Colonoscopy (2018). Her family history includes Arthritis in her brother and father; Breast Cancer in her maternal grandfather; Diabetes in her maternal grandfather and mother; Heart Disease in her mother. She reports that she has never smoked. She has never used smokeless tobacco. She reports current alcohol use. She reports that she does not use drugs.     Medications     Previous Medications    ACETAMINOPHEN (AMINOFEN) 325 MG TABLET    Take 2 tablets by mouth every 8 hours as needed for Pain ALBUTEROL SULFATE HFA (VENTOLIN HFA) 108 (90 BASE) MCG/ACT INHALER    Inhale 2 puffs into the lungs 4 times daily as needed for Wheezing or Shortness of Breath    ARIPIPRAZOLE (ABILIFY) 20 MG TABLET    TAKE 1 TABLET BY MOUTH ONCE DAILY IN THE EVENING    GUAIFENESIN (ROBITUSSIN) 100 MG/5ML LIQUID    Take 10 mLs by mouth 3 times daily as needed for Cough    HYDROXYZINE HCL (ATARAX) 50 MG TABLET    TAKE 1 TO 2 TABLETS BY MOUTH NIGHTLY    MISC. DEVICES (SILICONE EAR PLUGS) MISC    Use daily as needed for swimming    MUPIROCIN (BACTROBAN) 2 % OINTMENT        NYSTATIN (MYCOSTATIN) 448841 UNIT/GM POWDER    Apply 3 times daily. PHENYLEPHRINE-COCOA BUTTER (PREPARATION H) 0.25-88.44 %    Place 1 suppository rectally as needed for Hemorrhoids    PRAMOXINE-ZINC ACETATE (CALAMINE CLEAR) 1-0.1 % LOTN    Apply 1 Dose topically in the morning and at bedtime    TRIAMCINOLONE (KENALOG) 0.1 % CREAM    APPLY CREAM EXTERNALLY TO AFFECTED AREA ONCE DAILY       Allergies     She is allergic to bactrim [sulfamethoxazole-trimethoprim]; antihistamines, chlorpheniramine-type; augmentin [amoxicillin-pot clavulanate]; and macrobid [nitrofurantoin]. Physical Exam     INITIAL VITALS: BP: (!) 142/89, Temp: 98 °F (36.7 °C), Heart Rate: 91, Resp: 16, SpO2: 94 %   Physical Exam  Vitals and nursing note reviewed. Constitutional:       General: She is not in acute distress. Appearance: Normal appearance. She is not ill-appearing. HENT:      Head: Normocephalic. Right Ear: External ear normal.      Left Ear: External ear normal.      Nose: Nose normal.      Mouth/Throat:      Mouth: Mucous membranes are moist.      Pharynx: No oropharyngeal exudate or posterior oropharyngeal erythema. Eyes:      General: No visual field deficit. Extraocular Movements: Extraocular movements intact. Conjunctiva/sclera: Conjunctivae normal.      Pupils: Pupils are equal, round, and reactive to light.    Cardiovascular:      Rate and Rhythm: Normal rate and regular rhythm. Pulses: Normal pulses. Heart sounds: No murmur heard. No friction rub. No gallop. Pulmonary:      Effort: Pulmonary effort is normal. No respiratory distress. Breath sounds: Normal breath sounds. No wheezing, rhonchi or rales. Abdominal:      General: There is no distension. Palpations: Abdomen is soft. Tenderness: There is no abdominal tenderness. Musculoskeletal:      Cervical back: Normal range of motion. Skin:     General: Skin is warm and dry. Capillary Refill: Capillary refill takes less than 2 seconds. Neurological:      General: No focal deficit present. Mental Status: She is alert and oriented to person, place, and time. Mental status is at baseline. GCS: GCS eye subscore is 4. GCS verbal subscore is 5. GCS motor subscore is 6. Cranial Nerves: Cranial nerve deficit (decreased sensation to left forehead, intact sensation bilaterally in maxillary and mandibular regions) and dysarthria (only noticed with \"no ifs, ands, or buts\") present. No facial asymmetry. Sensory: No sensory deficit (no extremity sensory deficit). Motor: Motor function is intact. Coordination: Coordination is intact. Romberg sign negative. Finger-Nose-Finger Test and Heel to [facility] serafin Test normal.      Gait: Gait is intact.        DiagnosticResults     RADIOLOGY:  No orders to display       LABS:   Results for orders placed or performed during the hospital encounter of 12/15/22   BMP w/ Reflex to MG   Result Value Ref Range    Sodium 141 136 - 145 mmol/L    Potassium reflex Magnesium 3.7 3.5 - 5.1 mmol/L    Chloride 105 99 - 110 mmol/L    CO2 27 21 - 32 mmol/L    Anion Gap 9 3 - 16    Glucose 92 70 - 99 mg/dL    BUN 22 (H) 7 - 20 mg/dL    Creatinine 1.1 0.6 - 1.2 mg/dL    Est, Glom Filt Rate 57 (A) >60    Calcium 9.4 8.3 - 10.6 mg/dL   CBC with Auto Differential   Result Value Ref Range    WBC 7.3 4.0 - 11.0 K/uL    RBC 4.74 4.00 - 5.20 M/uL Hemoglobin 13.5 12.0 - 16.0 g/dL    Hematocrit 40.2 36.0 - 48.0 %    MCV 84.8 80.0 - 100.0 fL    MCH 28.5 26.0 - 34.0 pg    MCHC 33.6 31.0 - 36.0 g/dL    RDW 14.3 12.4 - 15.4 %    Platelets 867 322 - 205 K/uL    MPV 7.7 5.0 - 10.5 fL    Neutrophils % 54.1 %    Lymphocytes % 34.2 %    Monocytes % 7.3 %    Eosinophils % 3.3 %    Basophils % 1.1 %    Neutrophils Absolute 3.9 1.7 - 7.7 K/uL    Lymphocytes Absolute 2.5 1.0 - 5.1 K/uL    Monocytes Absolute 0.5 0.0 - 1.3 K/uL    Eosinophils Absolute 0.2 0.0 - 0.6 K/uL    Basophils Absolute 0.1 0.0 - 0.2 K/uL   Protime-INR   Result Value Ref Range    Protime 13.2 11.7 - 14.5 sec    INR 1.00 0.87 - 1.14       ED BEDSIDE ULTRASOUND:  No results found. RECENT VITALS:  BP: 90/70, Temp: 98 °F (36.7 °C), Heart Rate: 84,Resp: 20, SpO2: 94 %       ED Course     Nursing Notes, Past Medical Hx, Past Surgical Hx, Social Hx, Allergies, and Family Hx were reviewed. The patient was given the followingmedications:  Orders Placed This Encounter   Medications    acetaminophen (TYLENOL) tablet 650 mg    lidocaine 4 % external patch 1 patch    methocarbamol (ROBAXIN) tablet 750 mg         CONSULTS:  Daniel Bazan / EVERARDO / Raquel Janel is a 63 y.o. female with a past medical history of bipolar 1 disorder who presents with persistent strokelike symptoms, now amenable to admission for neurology evaluation and MRI after having CT imaging done yesterday. Patient without any new neurologic deficits, no need for repeat imaging at this time. Given evidence of stroke on imaging yesterday he had persistent deficits today, patient would benefit from inpatient evaluation by neurology as well as MRI. This patient was also evaluated by the attending physician. All care plans werediscussed and agreed upon. Clinical Impression     1.  Cerebrovascular accident (CVA), unspecified mechanism ([address]        Disposition     PATIENT REFERRED TO:  No follow-up provider specified.     DISCHARGE MEDICATIONS:  New Prescriptions    No medications on file       DISPOSITION Decision To Admit 12/15/2022 09:31:50 PM       Niurka Velasquez MD  Resident  12/15/22 9160

## 2022-12-16 NOTE — CARE COORDINATION
[phone]. They have accepted this patient and will pull referral from [facility].  They will contact patient and make arrangements for [facility] by 12/18  Electronically signed by Ely Posey LPN on 50/80/1300 at 3:36 PM

## 2022-12-16 NOTE — PLAN OF CARE
AOX4 patient stable and restting comfortably  Problem: Discharge Planning  Goal: Discharge to home or other facility with appropriate resources  12/16/2022 1510 by Chelsea Mendieta RN  Outcome: Progressing  12/16/2022 0640 by Marisabel Galarza RN  Outcome: Progressing  Flowsheets (Taken 12/16/2022 1837)  Discharge to home or other facility with appropriate resources:   Identify barriers to discharge with patient and caregiver   Arrange for needed discharge resources and transportation as appropriate   Identify discharge learning needs (meds, wound care, etc)   Refer to discharge planning if patient needs post-hospital services based on physician order or complex needs related to functional status, cognitive ability or social support system     Problem: Safety - Adult  Goal: Free from fall injury  12/16/2022 1510 by Chelsea Mendieta RN  Outcome: Progressing  12/16/2022 0640 by Marisabel Galarza RN  Outcome: Progressing  Flowsheets (Taken 12/16/2022 0640)  Free From Fall Injury: Instruct family/caregiver on patient safety     Problem: Neurosensory - Adult  Goal: Achieves stable or improved neurological status  12/16/2022 1510 by Chelsea Mendieta RN  Outcome: Progressing  Flowsheets (Taken 12/16/2022 0800)  Achieves stable or improved neurological status: Assess for and report changes in neurological status  12/16/2022 0641 by Marisabel Galarza RN  Outcome: Progressing  Flowsheets (Taken 12/16/2022 0641)  Achieves stable or improved neurological status: Assess for and report changes in neurological status     Problem: Musculoskeletal - Adult  Goal: Return mobility to safest level of function  12/16/2022 1510 by Chelsea Mendieta RN  Outcome: Progressing  12/16/2022 0641 by Marisabel Galarza RN  Outcome: Progressing  Flowsheets (Taken 12/16/2022 0641)  Return Mobility to Safest Level of Function:   Assess patient stability and activity tolerance for standing, transferring and ambulating with or without assistive devices   Assist with transfers and ambulation using safe patient handling equipment as needed   Obtain physical therapy/occupational therapy consults as needed   Instruct patient/family in ordered activity level     Problem: Pain  Goal: Verbalizes/displays adequate comfort level or baseline comfort level  12/16/2022 1510 by Gely Gastelum RN  Outcome: Progressing  12/16/2022 0642 by Nicolas Shaw RN  Outcome: Progressing  Flowsheets (Taken 12/16/2022 9109)  Verbalizes/displays adequate comfort level or baseline comfort level:   Assess pain using appropriate pain scale   Encourage patient to monitor pain and request assistance   Administer analgesics based on type and severity of pain and evaluate response   Implement non-pharmacological measures as appropriate and evaluate response   Notify Licensed Independent Practitioner if interventions unsuccessful or patient reports new pain     Problem: SLP Adult - Impaired Swallowing  Goal: By Discharge: Advance to least restrictive diet without signs or symptoms of aspiration for planned discharge setting. See evaluation for individualized goals. 12/16/2022 1059 by LANI Damian  Outcome: Progressing     Problem: SLP Adult - Impaired Communication  Goal: By Discharge: Demonstrates communication skills at highest level of function for planned discharge setting. See evaluation for individualized goals. 12/16/2022 1059 by LANI Damian  Outcome: Progressing     Problem: SLP Adult - Disturbed Thought Process  Goal: By Discharge: Demonstrates cognitive skills at highest level of function for planned discharge setting. See evaluation for individualized goals.   12/16/2022 1059 by LANI Damian  Outcome: Progressing

## 2022-12-16 NOTE — PROGRESS NOTES
Speech Language Pathology  Facility/Department: [place] [address] [facility]  Initial Speech/Language/Cognitive Assessment    NAME: Bryce Chung  : [birth year]   MRN: [id number]  ADMISSION DATE: 12/15/2022  ADMITTING DIAGNOSIS: has Bipolar disorder ([facility] [address] Urinary, incontinence, stress female; Obesity (BMI 30-39.9); Slurred speech; HLD (hyperlipidemia); Elevated blood pressure reading without diagnosis of hypertension; and Stroke, lacunar ([facility] [address] on their problem list.  DATE ONSET: 12/15/22    Date of Eval: 2022   Evaluating Therapist: LANI Willis  RECENT RESULTS  CT OF HEAD/MRI:   MRI BRAIN WO CONTRAST 22   Impression       1. No acute intracranial abnormality. 2. Mild burden patchy T2 hyperintense white matter disease, nonspecific, may be attributed to chronic microvascular ischemia. CT HEAD WO CONTRAST 22   Impression       No acute intracranial hemorrhage. Age indeterminate lacunar infarct left basal ganglia. Primary Complaint: Thinking and remembering     Pain:  Pain Assessment  Pain Assessment: None - Denies Pain  Pain Level: 0  Patient's Stated Pain Goal: 0 - No pain    Vision/ Hearing  Vision  Vision Exceptions: Wears glasses at all times  Hearing  Hearing: Within functional limits    Assessment:  Cognitive Diagnosis: Pt with moderate cognitive deficits. Speech Diagnosis: Pt with mild dysarthria. Diagnosis: Pt with mild - moderate cognitive linguistic deficits impacting safety and independence in home environement. Pt with moderate cognitive linguistic deficits in areas of executive function, problem solving, safety awareness, reasoning, insight, including basic calculations for money and time management, medication management, visuospatial skills, short term and working memory. Pt currently completes all ADLs and IADLs independently for self and cares for spouse and special-needs son.  Pt with mildly dysarthric speech, however is 90% intelligible during conversations. Pt's deficits will impede pt safe return to home and ability to provide care for self, spouse, and son. Pt was administered SLUMS as part of initial assessment, scores are as follows:    Orientation 3/3    Working Memory 3/5 x2    Basic Calculations 1/3 Required repetition of the word problem x3    Divergent Naming 2/3 Pt named 13 animals in 1 minute   Short term memory 3/5 Improved to 5 with category/choice cues    Mental Manipulation 1/2    Executive function task (clock drawing) 0/4 Hour markers ok, incorrect placement of hands   Auditory comprehension of 1 step directions 2/2    Auditory comprehension of short detailed paragraph 0/8      The [address] [place] [address] (SLUMS) Examination administered to pt with pt scoring 12/30 falling within the Dementia rage (1-20). Recommendations:  Recommendations  Requires SLP Intervention: Yes  Recommendations: Dysphagia treatment  Patient Education: Pt edu to role of SLP, rationale for eval, diet recommendations and diet levels (to upgrade or downgrade as tolerated), s/s and risks of aspiration, safe swallowing precautions  Patient Education Response: Verbalizes understanding     D/C Recommendations: To be determined       Plan:   Speech Therapy Prognosis  Prognosis: Good  Prognosis Considerations: Age; Severity of Impairments; Motivation  Individuals consulted  Consulted and agree with results and recommendations: Patient;RN  RN Name: [address] [place] [address] in place: Yes  Type of devices: All fall risk precautions in place  Restraints Initially in Place: No    Goals:  Short Term Goals  Goal 1: Pt will utilize intelligibility strategies to enhance intelligibility during conversational speech to 100% acc. Goal 2: Pt will compelte delayed recall tasks with increasing length of delay with 90% acc.   Goal 3: Pt will complete graded problem solving tasks (simple calc for time/money management, functional problem solving, abstract reasoning, etc) with 90% acc. Goal 4: Pt will participate in ongoing assessment of cognitive-linguistic skills with additional goals to be established as appropriate. Patient/family involved in developing goals and treatment plan: yes    Subjective:   Previous level of function and limitations: independent      Social/Functional History  Lives With: Spouse; Son  Type of Home: House  Home Layout: One level; Laundry in basement  ADL Assistance: Independent  Homemaking Assistance: Needs assistance  Active : Yes  Additional Comments: pt is the caregiver for her dependent bedbound , she bathes and changes him, has home health services however stating she still assists. Son is special needs, autistic/schizophrenia/speech disorder, needs assist with bathing//dressing etc.  Vision  Vision Exceptions: Wears glasses at all times  Hearing  Hearing: Within functional limits    Oral Motor   Labial: No impairment  Dentition: Limited;Natural;Partials (comment)  Oral Hygiene: Moist  Lingual: No impairment  Velum: No Impairment  Mandible: No impairment  Gag: No Impairment    Motor Speech  Dysarthric Characteristics: Blended word boundaries; Imprecise; Increased rate  Intelligibility: Impaired  Word Intelligibility (%): 100 %  Phrase Intelligibility (%): 100 %  Sentence Intelligibility (%): 90 %  Conversation Intelligibility (%): 90 %  Overall Impairment Severity: Minimal  Compensatory Strategies for Motor Speech: overarticulate consonants, definitive word boundaries, slow rate of speech    Auditory Comprehension  Comprehension: Within Functional Limits    Expression  Primary Mode of Expression: Verbal    Verbal Expression  Verbal Expression: Within functional limits    Pragmatics/Social Functioning  Pragmatics: Within functional limits    Cognition:      Orientation  Overall Orientation Status: Within Normal Limits  Attention  Attention: Within Functional Limits  Memory  Memory: Exceptions to [facility]  Daily Routines: WFL  Long-term Memory: WFL  People Encountered: Mild  Prospective Memory: Mild  Short-term Memory: Mild  Working Memory: Moderate  Problem Solving  Problem Solving: Exceptions to [facility]  Simple Functional Tasks: [facility]  Verbal Reasoning Skills: Mild  Initiation: WFL  Sequencing: [facility]  Executive Function Skills: Moderate  Managing Finances: Moderate  Managing Medications: Moderate  Performing Discharge Planning: Moderate  Numeric Reasoning  Numeric Reasoning: Exceptions to [facility]   Calculations: Mild  Money Management: Mild  Time: Mild  Abstract Reasoning  Abstract Reasoning: Exceptions to [facility]  Convergent Thinking: Mild  Divergent Thinking: Mild  Safety/Judgment  Safety/Judgment: Exceptions to [facility]  Complex Functional Tasks: Mild  Novel Situations: Mild  Routine Tasks: WFL  Unable to Self-monitor and Self-correct Consistently: WFL  Insight: Mild    Impulsive: WFL  Flexibility of Thought: Mild    Prognosis:  Speech Therapy Prognosis  Prognosis: Good  Prognosis Considerations: Age; Severity of Impairments; Motivation  Individuals consulted  Consulted and agree with results and recommendations: Patient;RN  RN Name: Araceli    Education:  Patient Education: Pt edu to role of SLP, rationale for eval, diet recommendations and diet levels (to upgrade or downgrade as tolerated), s/s and risks of aspiration, safe swallowing precautions  Patient Education Response: Verbalizes understanding  Safety Devices in place: Yes  Type of devices:  All fall risk precautions in place    Therapy Time:   Individual Concurrent Group Co-treatment   Time In  1000         Time Out  1030         Minutes  30                 Electronically signed by:   Lazaro Romano.Rc. [id number]  Speech Language Pathologist

## 2022-12-16 NOTE — H&P
Brief readmit note. Please refer to my H&P from 12/14/2022 for details as patient presents with the same complaints. CC: Slurred speech    HPI:  After leaving AMA from the emergency room the night before due to family constraints, patient was able to secure a caregiver for her disabled son and  and came back to the emergency room with the same complaints which include slurred speech, headache, drooling, dizziness and left lateral thigh burning pain. PMH, PSH, FH, SH, medications, allergies and ROS same as in the 12/14/2022 H&P    PE:  General: Well developed/obese, nontoxic appearing, in no acute distress  HEENT: Normocephalic, atraumatic, anicteric sclerae, clear conjunctivae, moist mucous membranes, adequate dentition  Neck: Supple, no rigidity, no cervical supraclavicular lymphadenopathy, no thyromegaly or bruits  Chest: No gross deformity, no CVAT, symmetric diaphragmatic excursion  Lungs: Clear to auscultation bilaterally, normal respiratory effort  Heart: RRR, normal S1-S2, no murmurs  Abdomen: Soft, nontender, nondistended, normal bowel sounds, no organomegaly  : No suprapubic tenderness, no bladder distention, no Perales  Musculoskeletal: No gross joint deformities, normal muscle bulk and tone  Extremities: No clubbing, cyanosis, edema, palpable distal pulses bilaterally  Skin: normal color, temperature, turgor, no petechiae, rashes, ecchymoses  Neuro: AAO x 4, CN II-XII grossly intact, soft touch and muscle strength intact, no tremors or ataxia, speech fluent  Psych: normal mood, affect and thought content    No new labs or studies done.     A/P:  #Constellation of neurologic complaints which do not seem to focalize clinically  -Check MRI of the brain  -Neurology consult  -2D echo with bubble study  -Monitor on telemetry for arrhythmias  -Aspirin, statin  -As needed labetalol with permissive hypertension no less than 081 systolic  -Check fasting lipids, glycosylated hemoglobin, vitamin D level    #Elevated blood pressure without diagnosis of hypertension  -See above    #History of hyperlipidemia on no medications  -See above    #Bipolar disorder  -Continue Abilify    #Obesity  -Needs outpatient sleep study    DVT prophylaxis: Lovenox  Diet: Cardiac  CODE STATUS: Full  Disposition: Home

## 2022-12-16 NOTE — PROGRESS NOTES
4 Eyes Admission Assessment     I agree as the admission nurse that 2 RN's have performed a thorough Head to Toe Skin Assessment on the patient. ALL assessment sites listed below have been assessed on admission. Areas assessed by both nurses:   [x]   Head, Face, and Ears   [x]   Shoulders, Back, and Chest blanchable redness all over back  [x]   Arms, Elbows, and Hands   [x]   Coccyx, Sacrum, and Ischium blanchable redness around coccyx  [x]   Legs, Feet, and Heels blanchable redness on both heels, and scabs on right leg        Does the Patient have Skin Breakdown?   No         Damian Prevention initiated:  Yes   Wound Care Orders initiated:  NA      [facility] nurse consulted for Pressure Injury (Stage 3,4, Unstageable, DTI, NWPT, and Complex wounds) or Damian score 18 or lower:  NA      Nurse 1 eSignature: Electronically signed by Alea Zepeda RN on 12/16/22 at 6:39 AM EST    **SHARE this note so that the co-signing nurse is able to place an eSignature**    Nurse 2 eSignature: Electronically signed by Arnaud Hernandez RN on 12/16/22 at 6:42 AM EST

## 2022-12-16 NOTE — ED TRIAGE NOTES
Patient was seen here yesterday for left side facial droop, drooling, left side numbness, and slurred speech. Patient states that she was diagnosed with stroke, left AMA to care for  and son. Now back to take care of her stroke at this time.

## 2022-12-16 NOTE — PROGRESS NOTES
Pt d/c to Home via car. IV and telemetry removed. Discharge education and instructions reviewed. All questions asked and answered. Walker delivered to unit and sent home with patient at discharge.

## 2022-12-16 NOTE — DISCHARGE INSTR - COC
Continuity of Care Form    Patient Name: Teressa Ibarra   :  1960  MRN:  [id number]    Admit date:  12/15/2022  Discharge date:  ***    Code Status Order: Full Code   Advance Directives:     Admitting Physician:  Katy Hardy MD  PCP: Abby Bernheim, APRN - CNP    Discharging Nurse: [facility] Unit/Room#: 9740/3753-81  Discharging Unit Phone Number: ***    Emergency Contact:   Extended Emergency Contact Information  Primary Emergency Contact: Usama Chauhan  Home Phone: [phone]  Relation: Child  Secondary Emergency Contact: rebeca manzanares  Home Phone: [phone]  Relation: Spouse    Past Surgical History:  Past Surgical History:   Procedure Laterality Date    COLONOSCOPY      polyp removed    COLONOSCOPY      EYE SURGERY Left     Lazy eye    TONSILLECTOMY      TUBAL LIGATION         Immunization History:   Immunization History   Administered Date(s) Administered    COVID-19, MODERNA BLUE border, Primary or Immunocompromised, (age 12y+), IM, 100 mcg/0.5mL 2021, 2021    COVID-19, PFIZER PURPLE top, DILUTE for use, (age 15 y+), 30mcg/0.3mL 2021    Influenza Virus Vaccine 10/17/2014, 2015, 10/14/2016, 2018, 10/18/2019, 2021    Influenza, AFLURIA (age 1 yrs+), FLUZONE, (age 10 mo+), MDV, 0.5mL 10/23/2017    Influenza, FLUARIX, FLULAVAL, FLUZONE (age 10 mo+) AND AFLURIA, (age 1 y+), PF, 0.5mL 10/04/2016, 2018, 10/18/2019, 10/18/2019    Influenza, FLUBLOK, (age 25 y+), PF, 0.5mL 10/31/2020    Tdap (Boostrix, Adacel) 10/23/2017    Zoster Recombinant (Shingrix) 2022       Active Problems:  Patient Active Problem List   Diagnosis Code    Bipolar disorder ([facility] [address] F31.9    Urinary, incontinence, stress female N39.3    Obesity (BMI 30-39. 9) E66.9    Slurred speech R47.81    HLD (hyperlipidemia) E78.5    Elevated blood pressure reading without diagnosis of hypertension R03.0    Stroke, lacunar (HCC) I63.81    Weakness on left side of face R29.810       Isolation/Infection:   Isolation            No Isolation          Patient Infection Status       Infection Onset Added Last Indicated Last Indicated By Review Planned Expiration Resolved Resolved By    None active    Resolved    COVID-19 (Rule Out) 11/16/22 11/16/22 11/16/22 COVID-19 & Influenza Combo (Ordered)   11/16/22 Rule-Out Test Resulted    COVID-19 (Rule Out) 11/16/22 11/16/22 11/16/22 COVID-19 (Ordered)   11/16/22 Rule-Out Test Canceled            Nurse Assessment:  Last Vital Signs: BP (!) 164/101   Pulse 85   Temp 97.5 °F (36.4 °C) (Oral)   Resp 18   Ht 5' 4\" (1.626 m)   Wt 223 lb 15.8 oz (101.6 kg)   LMP 01/29/2015   SpO2 95%   BMI 38.45 kg/m²     Last documented pain score (0-10 scale): Pain Level: 0  Last Weight:   Wt Readings from Last 1 Encounters:   12/16/22 223 lb 15.8 oz (101.6 kg)     Mental Status:  {IP PT MENTAL STATUS:20030}    IV Access:  { ADELAIDA IV ACCESS:808220483}    Nursing Mobility/ADLs:  Walking   {P DME ISLY:711654628}  Transfer  {P DME TVVR:480231536}  Bathing  {P DME JMVL:603074460}  Dressing  {P DME SRDS:036554357}  Toileting  {P DME GQLW:237456682}  Feeding  {P DME IAWJ:914407600}  Med Admin  {P DME NMRJ:412932247}  Med Delivery   { ADELAIDA MED Delivery:639682262}    Wound Care Documentation and Therapy:        Elimination:  Continence: Bowel: {YES / SZ:41630}  Bladder: {YES / CH:94936}  Urinary Catheter: {Urinary Catheter:234453150}   Colostomy/Ileostomy/Ileal Conduit: {YES / :16697}       Date of Last BM: ***    Intake/Output Summary (Last 24 hours) at 12/16/2022 1559  Last data filed at 12/16/2022 1219  Gross per 24 hour   Intake 440 ml   Output 250 ml   Net 190 ml     No intake/output data recorded.     Safety Concerns:     [address] [employer] Safety Concerns:942055550}    Impairments/Disabilities:      [employer] Impairments/Disabilities:604470514}    Nutrition Therapy:  Current Nutrition Therapy:   [employer] Diet List:689700264}    Routes of Feeding: {P DME Other Feedings:153940038}  Liquids: {Slp liquid thickness:03470}  Daily Fluid Restriction: {CHP DME Yes amt example:789105697}  Last Modified Barium Swallow with Video (Video Swallowing Test): {Done Not Done KNAO:731146153}    Treatments at the Time of Hospital Discharge:   Respiratory Treatments: ***  Oxygen Therapy:  {Therapy; copd oxygen:96803}  Ventilator:    { CC Vent MJHO:340987742}    Rehab Therapies: {THERAPEUTIC INTERVENTION:5447762882}  Weight Bearing Status/Restrictions: { CC Weight Bearin}  Other Medical Equipment (for information only, NOT a DME order):  {EQUIPMENT:186083613}  Other Treatments: ***    Patient's personal belongings (please select all that are sent with patient):  {CHP DME Belongings:013410752}    RN SIGNATURE:  {Esignature:713870211}    CASE MANAGEMENT/SOCIAL WORK SECTION    Inpatient Status Date: ***    Readmission Risk Assessment Score:  Readmission Risk              Risk of Unplanned Readmission:  0           Discharging to Facility/ Agency   Name:   Address:  Phone:  Fax:    Dialysis Facility (if applicable)   Name:  Address:  Dialysis Schedule:  Phone:  Fax:    / signature: {Esignature:560590928}    PHYSICIAN SECTION    Prognosis: {Prognosis:1102971501}    Condition at Discharge: [facility] Patient Condition:967239859}    Rehab Potential (if transferring to Rehab): {Prognosis:4236854761}    Recommended Labs or Other Treatments After Discharge: ***    Physician Certification: I certify the above information and transfer of Jackie Anand  is necessary for the continuing treatment of the diagnosis listed and that she requires {Admit to Appropriate Level of Care:72984} for {GREATER/LESS:400881459} 30 days.      Update Admission H&P: {CHP DME Changes in AYYGD:741179760}    PHYSICIAN SIGNATURE:  {Esignature:768522943}

## 2022-12-16 NOTE — DISCHARGE INSTR - OTHER ORDERS
Home Health Care:  [facility] [phone]. [facility] will contact by 12/18.

## 2022-12-16 NOTE — ED PROVIDER NOTES
ED Attending Attestation Note     Date of evaluation: 12/15/2022    This patient was seen by the resident. I have seen and examined the patient, agree with the workup, evaluation, management and diagnosis. The care plan has been discussed. I have reviewed the ECG and concur with the resident's interpretation. My assessment reveals a woman with left-sided paresthesias and speech changes. She was seen yesterday in the emergency department. At that time she was hypertensive. She refused an a.m. MRI and left AMA. Imaging at the time was notable for an age-indeterminate lacunar infarct in the left basal ganglia. CTA head and neck did not show large vessel occlusion. On exam she is awake alert conversant. Her lungs with auscultation bilaterally. There is no murmur on cardiac auscultation. Her rhythm is regular and the rate is normal.  She is moving all extremity spontaneously. There is no aphasia. There is no dysarthria. She denies any new neurologic symptoms. Given the duration of symptoms she is not a candidate for endovascular therapy or thrombolytics. Plan for admission for restratification and definitive MRI.        Dianne Bay MD  12/15/22 2035
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
## 2022-12-16 NOTE — RT PROTOCOL NOTE
RT Inhaler-Nebulizer Bronchodilator Protocol Note    There is a bronchodilator order in the chart from a provider indicating to follow the RT Bronchodilator Protocol and there is an Initiate RT Inhaler-Nebulizer Bronchodilator Protocol order as well (see protocol at bottom of note). CXR Findings:  No results found. The findings from the last RT Protocol Assessment were as follows:   History Pulmonary Disease: None or smoker <15 pack years  Respiratory Pattern: Regular pattern and RR 12-20 bpm  Breath Sounds: Clear breath sounds  Cough: Strong, spontaneous, non-productive  Indication for Bronchodilator Therapy:    Bronchodilator Assessment Score: 0    Aerosolized bronchodilator medication orders have been revised according to the RT Inhaler-Nebulizer Bronchodilator Protocol below. Respiratory Therapist to perform RT Therapy Protocol Assessment initially then follow the protocol. Repeat RT Therapy Protocol Assessment PRN for score 0-3 or on second treatment, BID, and PRN for scores above 3. No Indications - adjust the frequency to every 6 hours PRN wheezing or bronchospasm, if no treatments needed after 48 hours then discontinue using Per Protocol order mode. If indication present, adjust the RT bronchodilator orders based on the Bronchodilator Assessment Score as indicated below. Use Inhaler orders unless patient has one or more of the following: on home nebulizer, not able to hold breath for 10 seconds, is not alert and oriented, cannot activate and use MDI correctly, or respiratory rate 25 breaths per minute or more, then use the equivalent nebulizer order(s) with same Frequency and PRN reasons based on the score. If a patient is on this medication at home then do not decrease Frequency below that used at home.     0-3 - enter or revise RT bronchodilator order(s) to equivalent RT Bronchodilator order with Frequency of every 4 hours PRN for wheezing or increased work of breathing using Per Protocol order mode. 4-6 - enter or revise RT Bronchodilator order(s) to two equivalent RT bronchodilator orders with one order with BID Frequency and one order with Frequency of every 4 hours PRN wheezing or increased work of breathing using Per Protocol order mode. 7-10 - enter or revise RT Bronchodilator order(s) to two equivalent RT bronchodilator orders with one order with TID Frequency and one order with Frequency of every 4 hours PRN wheezing or increased work of breathing using Per Protocol order mode. 11-13 - enter or revise RT Bronchodilator order(s) to one equivalent RT bronchodilator order with QID Frequency and an Albuterol order with Frequency of every 4 hours PRN wheezing or increased work of breathing using Per Protocol order mode. Greater than 13 - enter or revise RT Bronchodilator order(s) to one equivalent RT bronchodilator order with every 4 hours Frequency and an Albuterol order with Frequency of every 2 hours PRN wheezing or increased work of breathing using Per Protocol order mode. RT to enter RT Home Evaluation for COPD & MDI Assessment order using Per Protocol order mode.     Electronically signed by Blanka Salinas RCP on 12/16/2022 at 12:20 AM

## 2022-12-16 NOTE — CARE COORDINATION
Case Management Assessment            Discharge Note                    Date / Time of Note: 12/16/2022 3:22 PM                  Discharge Note Completed by: Lauren Garcia    Patient Name: Gonzalez James   YOB: 1960  Diagnosis: Slurred speech [R47.81]  Cerebrovascular accident (CVA), unspecified mechanism ([facility] [address] [I63.9]   Date / Time: 12/15/2022  8:15 PM    Current PCP: MELANIE Yang CNP  Clinic patient: No    Hospitalization in the last 30 days: Yes    Advance Directives:  Code Status: Full Code  [place] DNR form completed and on chart: Not Indicated    Financial:  Payor: Sunil [employer] / Plan: Kane Gonzalez / Product Type: *No Product type* /      Pharmacy:    [address], [address] [place]  Phone: [phone] Fax: [phone]      Assistance purchasing medications?:    Assistance provided by Case Management: None at this time    Does patient want to participate in local refill/ meds to beds program?:      Meds To Beds General Rules:  1. Can ONLY be done Monday- Friday between 8:30am-5pm  2. Prescription(s) must be in pharmacy by 3pm to be filled same day  3. Copy of patient's insurance/ prescription drug card and patient face sheet must be sent along with the prescription(s)  4. Cost of Rx cannot be added to hospital bill. If financial assistance is needed, please contact unit  or ;  or  CANNOT provide pharmacy voucher for patients co-pays  5.  Patients can then  the prescription on their way out of the hospital at discharge, or pharmacy can deliver to the bedside if staff is available. (payment due at time of pick-up or delivery - cash, check, or card accepted)     Able to afford home medications/ co-pay costs: Yes    ADLS:  Current PT AM-PAC Score: 18 /24  Current OT AM-PAC Score: 16 /24      DISCHARGE Disposition: Home with Home Health Care: [place]     LOC at discharge: Not Applicable  ADELAIDA Completed: Not Indicated    Notification completed in HENS/PAS?:  Not Applicable    IMM Completed:   No    Transportation:  Transportation PLAN for discharge: self   Mode of Transport:   Reason for medical transport:   Name of Transport Company:   Time of Transport:     Transport form completed: Not Indicated    Home Care:  [address] ordered at discharge: Yes  [address]: [place] [facility] [address]  Phone: [phone] Fax: [phone]  Orders faxed: Yes    Durable Medical Equipment:  DME Provider: [address] obtained during hospitalization: rolling walker    Home Oxygen and Respiratory Equipment:  Oxygen needed at discharge?: Not [address]: Not Applicable  Portable tank available for discharge?: Not Indicated    Dialysis:  Dialysis patient: No    Dialysis Center:  Not Applicable    Hospice Services:  Location: Not Applicable  Agency: Not Applicable    Consents signed: Not Indicated    Referrals made at [facility] for outpatient continued care: Additional CM Notes: Rolling walker delivered to bedside via [address] [place] [address], [facility] set up with [facility]. Pt to drive home via personal car. The Plan for Transition of Care is related to the following treatment goals of Slurred speech [R47.81]  Cerebrovascular accident (CVA), unspecified mechanism ([address] [I63.9]    The Patient and/or patient representative Dharmesh Rizvi and her family were provided with a choice of provider and agrees with the discharge plan Yes    Freedom of choice list was provided with basic dialogue that supports the patient's individualized plan of care/goals and shares the quality data associated with the providers.  Yes    Care Transitions patient: No    Madiha Pineda  [facility]  Case Management Department  Ph: [phone]  Fax:

## 2022-12-16 NOTE — PROGRESS NOTES
Physical Therapy  Facility/Department: [place] [address] [facility]  Physical Therapy Initial Assessment/Treatment    Name: Chris Gonsalez  : 1132  MRN: [id number]  Date of Service: 2022    Discharge Recommendations:Terri Godinez Bolds scored a 18/24 on the AM-PAC short mobility form. Current research shows that an AM-PAC score of 17 or less is typically not associated with a discharge to the patient's home setting. Based on the patient's AM-PAC score and their current functional mobility deficits, it is recommended that the patient have 5-7 sessions per week of Physical Therapy at d/c to increase the patient's independence. At this time, this patient demonstrates complex nursing, medical, and rehabilitative needs, and would benefit from intensive rehabilitation services upon discharge from the Inpatient setting. This patient demonstrates the ability to participate in and benefit from an intensive therapy program with a coordinated interdisciplinary team approach to foster frequent, structured, and documented communication among disciplines, who will work together to establish, prioritize, and achieve treatment goals. Please see assessment section for further patient specific details. If patient discharges prior to next session this note will serve as a discharge summary. Please see below for the latest assessment towards goals. PT Equipment Recommendations  Equipment Needed: Yes  Mobility Devices: Alanna Kim: Rolling       Patient Diagnosis(es): The encounter diagnosis was Cerebrovascular accident (CVA), unspecified mechanism ([address]   Past Medical History:  has a past medical history of Anxiety, Bipolar 1 disorder ([address] Depression, Dyspareunia in female, Elevated transaminase level, Hemorrhoids, History of tubal ligation, HLD (hyperlipidemia), Hypertension, Irritable bowel syndrome, Lacunar stroke ([address] Menopause, Obesity, Obesity (BMI 30-39.9), Overactive bladder, Prurigo nodularis, Stress incontinence, Urinary incontinence, Uterine fibroid, and Yeast vaginitis. Past Surgical History:  has a past surgical history that includes Eye surgery (Left); Tonsillectomy; Tubal ligation (1994); Colonoscopy (2010); and Colonoscopy (2018). Assessment   Body Structures, Functions, Activity Limitations Requiring Skilled Therapeutic Intervention: Decreased functional mobility ; Decreased strength  Assessment: 64 yo adm for stroke like symptoms & Head CT showed an age indeterminate lacunar infarct left basal ganglia. Pt presents with RLE weakness in quad/hip flexors. LEs gave out 2-3x while ambulating without a device. Pt is normally quite independent, drives, and cares for bedbound spouse & special needs son. Demo improved gait with use of rolling walker but would be very difficult for pt to care for bedbound spouse while using a walker herself. Despite higher AMPAC score, pt is at risk for falls and would benefit [facility] to [facility] independence prior to return home. Discussed with RN & SW.  Treatment Diagnosis: impaired mobility  Therapy Prognosis: Good  Decision Making: Medium Complexity  Requires PT Follow-Up: Yes  Activity Tolerance  Activity Tolerance: Patient tolerated evaluation without incident     Plan   Physcial Therapy Plan  General Plan: 5-7 times per week  Current Treatment Recommendations: Strengthening, Balance training, Functional mobility training, Transfer training, Gait training, Stair training, Patient/Caregiver education & training  Safety Devices  Type of Devices: Call light within reach, Chair alarm in place, Left in chair, Nurse notified  Restraints  Restraints Initially in Place: No     Restrictions  Position Activity Restriction  Other position/activity restrictions: up as tolerated     Subjective   General  Chart Reviewed:  Yes  Additional Pertinent Hx: Adm 12/15 with stroke like symptoms:2 days of left-sided corner of her mouth droopiness, slurred speech, drooling from the left side of the mouth, fatigue, excessive sleepiness. Head CT:Age indeterminate lacunar infarct left basal ganglia. MRI:1. No acute intracranial abnormality. 2. Mild burden patchy T2 hyperintense white matter disease, nonspecific, may be attributed to chronic microvascular ischemia. Family / Caregiver Present: No  Referring Practitioner: Deepthi Conrad MD  Diagnosis: CVA  Follows Commands: Within Functional Limits  Subjective  Subjective: Found in bed, agreeable to PT. Very talkative & occasionally needs re-directing. Reports arthritis pain in LLE that just started same time as the stroke. Social/Functional History  Social/Functional History  Lives With: Spouse, Son  Type of Home: House  Home Layout: One level, Laundry in basement  Home Access: Stairs to enter with rails, Ramped entrance  [address] [facility] - Number of Steps: 15 front, back house has a ramp but has to walk around the house to get to it through the grass  Bathroom Shower/Tub: Tub only  Bathroom Toilet: Handicap height  Bathroom Equipment: Grab bars around toilet, Shower chair, 3-in-1 commode  Home Equipment:  (none)  Has the patient had two or more falls in the past year or any fall with injury in the past year?: No  Receives Help From: Home health  ADL Assistance: [address]: Needs assistance  Ambulation Assistance: Independent  Transfer Assistance: Independent  Active : Yes  Additional Comments: pt is the caregiver for her dependent bedbound , she bathes and changes him, has home health services however stating she still assists.  Son is special needs, autistic/schizophrenia/speech disorder, needs assist with bathing//dressing etc.  Vision/Hearing       Cognition   Orientation  Overall Orientation Status: Within Normal Limits     Objective           AROM RLE (degrees)  RLE AROM: WFL  AROM LLE (degrees)  LLE AROM : WFL  Strength RLE  Comment: R quad 4-, R hip flex 4-  Strength LLE  Comment: quads/hams/df 5/5; L hip too painful (reports of arthritis) to assess           Bed mobility  Supine to Sit: Contact guard assistance  Transfers  Sit to Stand: Minimal Assistance;Contact guard assistance (min A initially from bed; CGA from chair)  Stand to Sit: Contact guard assistance (cues for reaching back with UEs for chair)  Ambulation  Device: Hand-Held Assist  Assistance: Minimal assistance  Quality of Gait: unsteady at times, very cautious & unsure of herself, LEs giving out 2-3x causing pt to lose balance  Gait Deviations: Decreased step length;Decreased step height  Distance: 60'  More Ambulation?: Yes  Ambulation 2  Device 2: Rolling Walker  Assistance 2: Contact guard assistance  Quality of Gait 2: no overt loss of balance noted; cues for maneuvering walker within room/turning  Distance: 90' x 2  Stairs/Curb  Stairs?: Yes (up/down 6 steps with B hands on 1 rail, movement very slow & effortful requiring CGA. (Reports normally she just flies up/down steps without thinking about them). )     Balance  Sitting - Static: Good (SBA sitting EOB)  Standing - Static:  (initial requiring min A due to post lean, improved to CGA)   Treatment included gait/transfer training, pt education. AM-PAC Score  AM-PAC Inpatient Mobility Raw Score : 18 (12/16/22 1159)  AM-PAC Inpatient T-Scale Score : 43.63 (12/16/22 1159)  Mobility Inpatient CMS 0-100% Score: 46.58 (12/16/22 1159)  Mobility Inpatient CMS G-Code Modifier : CK (12/16/22 1159)               Goals  Short Term Goals  Time Frame for Short Term Goals: dc  Short Term Goal 1: Bed Mobility indep  Short Term Goal 2: Sit<>stand indep  Short Term Goal 3: Ambulate >100' without device supervision without LOB  Short Term Goal 4: up/down 10 steps with 1 rail SBA  Short Term Goal 5: Demo indep with standing LE HEP x 10 reps.   Patient Goals   Patient Goals : wants to get fully better before going home to
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
## 2022-12-16 NOTE — DISCHARGE SUMMARY
HOSPITALISTS DISCHARGE SUMMARY    Patient Demographics    Patient. Lender Essex  Date of Birth. 1960  MRN. [id number]     Primary care provider. MELANIE Gonzalez - CNP  (Tel: [phone])    Admit date: 12/15/2022    Discharge date (blank if same as Note Date): Note Date: 12/16/2022     Reason for Hospitalization. Chief Complaint   Patient presents with    Numbness     Left sided, started yesterday for stroke symptoms, left yesterday AMA to take care of  and son. She now has care arranged for them, and is here to seek treatment for the stroke she was diagnosed with yesterday         Significant Findings. Principal Problem:    Slurred speech  Active Problems:    HLD (hyperlipidemia)    Elevated blood pressure reading without diagnosis of hypertension    Weakness on left side of face    Bipolar disorder (HCC)    Obesity (BMI 30-39. 9)  Resolved Problems:    * No resolved hospital problems. *       Problems and results from this hospitalization that need follow up. Significant test results and incidental findings. MRI brain without contrast   Final Result      1. No acute intracranial abnormality. 2. Mild burden patchy T2 hyperintense white matter disease, nonspecific, may be attributed to chronic microvascular ischemia. Invasive procedures and treatments. [facility] Course. Patient was admitted with slurred speech dizziness headache left-sided burning pain. Patient was in the ER couple of days ago. AMA to take care of family. Patient was admitted and neurology was consulted and ruled out for any stroke MRI was done which was negative. Neurology did evaluate the patient. Recommended aspirin. To me patient did mention symptoms of positional vertigo. Patient was given meclizine with good response. Consults. IP CONSULT TO HOSPITALIST  IP CONSULT TO NEUROLOGY    Physical examination on discharge day.    BP (!) 164/101   Pulse 85   Temp 97.5 °F (36.4 °C) (Oral)   Resp 18   Ht 5' 4\" (1.626 m)   Wt 223 lb 15.8 oz (101.6 kg)   LMP 01/29/2015   SpO2 95%   BMI 38.45 kg/m²   General appearance. Alert. Looks comfortable. HEENT. Sclera clear. Moist mucus membranes. Cardiovascular. Regular rate and rhythm, normal S1, S2. No murmur. Respiratory. Not using accessory muscles. Clear to auscultation bilaterally, no wheeze. Gastrointestinal. Abdomen soft, non-tender, not distended, normal bowel sounds  Neurology. Facial symmetry. No speech deficits. Moving all extremities equally. Extremities. No edema in lower extremities. Skin. Warm, dry, normal turgor        Condition at time of discharge stable     Medication instructions provided to patient at discharge. Medication List        START taking these medications      aspirin 81 MG EC tablet  Take 1 tablet by mouth daily  Start taking on: December 17, 2022     meclizine 12.5 MG tablet  Commonly known as: ANTIVERT  Take 1 tablet by mouth 3 times daily as needed for Dizziness            CONTINUE taking these medications      acetaminophen 325 MG tablet  Commonly known as: Aminofen  Take 2 tablets by mouth every 8 hours as needed for Pain     albuterol sulfate  (90 Base) MCG/ACT inhaler  Commonly known as: Ventolin HFA  Inhale 2 puffs into the lungs 4 times daily as needed for Wheezing or Shortness of Breath     ARIPiprazole 20 MG tablet  Commonly known as: ABILIFY     Calamine Clear 1-0.1 % Lotn  Generic drug: pramoxine-zinc acetate  Apply 1 Dose topically in the morning and at bedtime     guaiFENesin 100 MG/5ML liquid  Commonly known as: ROBITUSSIN  Take 10 mLs by mouth 3 times daily as needed for Cough     hydrOXYzine HCl 50 MG tablet  Commonly known as: ATARAX  TAKE 1 TO 2 TABLETS BY MOUTH NIGHTLY     mupirocin 2 % ointment  Commonly known as: BACTROBAN     nystatin 045059 UNIT/GM powder  Commonly known as: MYCOSTATIN  Apply 3 times daily.      Preparation H 0.25-88.44 %  Generic drug: phenylephrine-cocoa butter  Place 1 suppository rectally as needed for Hemorrhoids     Silicone Ear Plugs Misc  Use daily as needed for swimming     triamcinolone 0.1 % cream  Commonly known as: KENALOG  APPLY CREAM EXTERNALLY TO AFFECTED AREA ONCE DAILY               Where to Get Your Medications        These medications were sent to [address] [place] [address] Drive, [address] [place] [address] [address] [facility] [address] [place] [phone]  [address] [place] [address]      Phone: [phone]   aspirin 81 MG EC tablet  meclizine 12.5 MG tablet         Discharge recommendations given to patient. Follow Up. Primary care provider  Disposition. home  Activity. activity as tolerated  Diet: ADULT DIET; Easy to Chew; 3 carb choices (45 gm/meal); Low Fat/Low Chol/High Fiber/2 gm Na      Spent 25 minutes in discharge process.     Signed:  Usha Carter MD     12/16/2022 3:37 PM

## 2022-12-16 NOTE — CARE COORDINATION
No outreach done at this point as patient is currently admitted to the hospital. Will follow up at a later date.
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
## 2022-12-17 LAB
CHOLESTEROL, TOTAL: 207 MG/DL (ref 0–199)
ESTIMATED AVERAGE GLUCOSE: 108.3 MG/DL
HBA1C MFR BLD: 5.4 %
HDLC SERPL-MCNC: 41 MG/DL (ref 40–60)
LDL CHOLESTEROL CALCULATED: 143 MG/DL
TRIGL SERPL-MCNC: 114 MG/DL (ref 0–150)
VLDLC SERPL CALC-MCNC: 23 MG/DL

## 2022-12-19 ENCOUNTER — TELEPHONE (OUTPATIENT)
Dept: FAMILY MEDICINE CLINIC | Age: 62
End: 2022-12-19

## 2022-12-19 ENCOUNTER — CARE COORDINATION (OUTPATIENT)
Dept: CASE MANAGEMENT | Age: 62
End: 2022-12-19

## 2022-12-19 ENCOUNTER — CARE COORDINATION (OUTPATIENT)
Dept: CARE COORDINATION | Age: 62
End: 2022-12-19

## 2022-12-19 DIAGNOSIS — R47.81 SLURRED SPEECH: Primary | ICD-10-CM

## 2022-12-19 DIAGNOSIS — I10 HYPERTENSION, UNSPECIFIED TYPE: Primary | ICD-10-CM

## 2022-12-19 PROCEDURE — 1111F DSCHRG MED/CURRENT MED MERGE: CPT | Performed by: NURSE PRACTITIONER

## 2022-12-19 RX ORDER — HYDROXYZINE 50 MG/1
TABLET, FILM COATED ORAL
Qty: 60 TABLET | Refills: 0 | Status: SHIPPED | OUTPATIENT
Start: 2022-12-19

## 2022-12-19 NOTE — CARE COORDINATION
[address] Care Transitions Initial Follow Up Call    Call within 2 business days of discharge: Yes    LPN Care Coordinator contacted the patient by telephone to perform post hospital discharge assessment. Verified name and  with patient as identifiers. Provided introduction to self, and explanation of the LPN Care Coordinator role. Patient: John Paul Corbin Patient : 1960   MRN: [id number]  Reason for Admission: slurred speech  Discharge Date: 22 RARS: No data recorded    Last Discharge  Street       Date Complaint Diagnosis Description Type Department Provider    12/15/22 Numbness Cerebrovascular accident (CVA), unspecified mechanism [facility]) ED to Hosp-Admission (Discharged) (ADMITTED) [place] [address] [facility] Subha Aquino MD; Kaelyn Terrazas. .. Was this an external facility discharge? No Discharge Facility: NA    Challenges to be reviewed by the provider   Additional needs identified to be addressed with provider: No  none               Method of communication with provider: none. LPN CC spoke with patient. States she is not feeling well today. Still having fair amount of dizziness and headache. Has not been able to start meclizine at home as it has not been delivered by pharmacy. Reports she is having some brain fogginess and speech issues. Fatigued and tearful at times during call with LPN CC. States her balance isn't the best. Has son with special needs & a spouse with diabetes that has some LE amputations. States her daughter is a doctor, but lives in a different part of the [facility]. States she has a BP cuff somewhere, but is not sure where. Agreeable to RPM program. LPN CC to initiate enrollment. Denies N/V. Full medication reconciliation and 1111f completed. PCP VV . States she heard from [facility], but is not sure about scheduling. Had a little confusion about this. LPN CC spoke with Ginna Bal at [facility] @ [address] [place] [address].  States RX won't be picked up by [employer] until today and it can take another day or 2 for delivery. Can be picked up if that will be possible, instead. YENY ZEE spoke with patient who states she does not have any way to p/u the RX from [facility]. YENY ZEE spoke with [facility] @ [facility]. Was unable to hear on phone call. YENY ZEE spoke with [facility]. States medication delivery is the same as [facility]. YENY ZEE spoke with [facility] pharmacy for medication delivery inquiry. [facility]'s will deliver, but is unable to until next morning as deliveries head out in the AM. Able to accept patient's insurance. Delivery fee is $3. Can be paid by card or cash. Someone must be present to accept delivery. LPN CC spoke with patient who is agreeable to above. YENY ZEE spoke with [facility] who will transfer RX to [facility] the [facility]. LPN CC spoke to ensure RX did not get sent with [employer] as it will be transferred. [facility] put RX on hold at this time. YENY ZEE received inbound call from [facility] who states they need RXBIN number and VJEFT (if applicable) from patient's insurance card. LPN CC tried to reach patient, but was unable to. Left VM. LPN CC left VM for [facility]. LPN CC received inbound call from [facility] who states they are having some issues refunding patient's money back to her card as she was charged when meds were prepared yesterday. States they are working on it, just wanted to update. Inbound call from [facility], they were able to refund patient's $10 copay to a gift card. Patient may pick that up from pharmacy whenever she is able. LPN CC spoke with patient who is agreeable to p/u gift card from pharmacy when she is able. Also states she spoke with [facility] in regards to copay & delivery fee. They are able to deliver RX today to patient. Patient was grateful.    Shantell Snell LPN [address] [place] [address]  [phone]      N Care Coordinator reviewed discharge instructions, medical action plan, and red flags with patient who verbalized understanding. The patient was given an opportunity to ask questions and does not have any further questions or concerns at this time. Were discharge instructions available to patient? Yes. Reviewed appropriate site of care based on symptoms and resources available to patient including: PCP  Specialist  Home health  When to call 911  RPM . The patient agrees to contact the PCP office for questions related to their healthcare. Advance Care Planning:   Does patient have an Advance Directive: reviewed and current. Medication reconciliation was performed with patient, who verbalizes understanding of administration of home medications. Medications reviewed, 1111F entered: yes    Was patient discharged with a pulse oximeter? no    Non-face-to-face services provided:  Obtained and reviewed discharge summary and/or continuity of care documents  Education of patient/family/caregiver/guardian to support self-management-BP monitoring, f/u  Assessment and support for treatment adherence and medication management-full medication reconciliation completed    Offered patient enrollment in the Remote Patient Monitoring (RPM) program for in-home monitoring: Yes, patient enrolled. Remote Patient Monitoring Enrollment Note      Date/Time:  12/19/2022 10:50 AM    Offered patient enrollment in the [facility] Remote Patient Monitoring (RPM) program for in home monitoring for HTN. Patient accepted RPM services. Patient will be monitoring the following daily:  blood pressure reading  blood pressure heart rate    LPN reviewed the information below with patient:    Emergency Contact (name and contact number): Tiffany Ryan [phone]    [x] A member from the care coordination team will reach out to notify the patient once the RPM kit is ordered. [x] Once the kit is delivered, the [facility] team will contact the patient after UPS deliver to assist with set up.             [x] Determined BP cuff size: large (13.8\"-19.68\")      [] Determined weight scale:  NA                                                  [x] Hours of ACM monitoring - Monday-Friday 5621-6521                         All questions about RPM program answered at this time. Care Transitions 24 Hour Call    Do you have a copy of your discharge instructions?: Yes  Do you have all of your prescriptions and are they filled?: Yes  Have you been contacted by a [address]?: No  Have you scheduled your follow up appointment?: Yes  How are you going to get to your appointment?: Other (Comment: VV)  Do you feel like you have everything you need to keep you well at home?: Yes  Care Transitions Interventions         Follow Up  Future Appointments   Date Time Provider Cecilia Celeste   12/22/2022  1:20 PM MELANIE Mcknight - CNP [facility] [place] [facility] [place]   3/17/2023 11:00 AM DO CHRISTO Turner OB/GYN [facility]       LPN Care Coordinator provided contact information. Plan for follow-up call in 3-5 days based on severity of symptoms and risk factors. Plan for next call: symptom management-HA, slurred speech, memory  self management-ADLs  follow-up appointment-VV 12/22  medication management-meds delivered?  new or changed  community resources-HC  RPM    M-Audio, LPN

## 2022-12-19 NOTE — CARE COORDINATION
Remote Patient Kit Ordering Note      Date/Time:  12/19/2022 11:59 AM      [] CCSS confirmed patient shipping address  [x] Patient will receive package over the next 2-4 business days. Someone 21 years or older must be present to sign for UPS delivery. [x] Patient to contact virtual installation-specific phone number listed in the patient instructions. [x] If the patient does not contact HRS within 24 hours, an [employer] [facility] will call the patient directly: If the patient does not answer, HRS will follow up with the clinical team notifying them about the unsuccessful attempt to contact the patient. HRS will make three call attempts to the patient. [x] LPN will contact patient once equipment is active to welcome them to the program.                                                         [x] Hours of RPM monitoring - Monday-Friday [id number]                     All questions answered at this time. CTN made aware the RPM kit has been ordered. ACM notified patient of RPM equipment order.

## 2022-12-19 NOTE — TELEPHONE ENCOUNTER
[facility] is requesting verbal orders for skilled nursing, PT, OT & speech  [phone]

## 2022-12-19 NOTE — PROGRESS NOTES
12/19/22 12:02 PM       Remote Patient Monitoring Treatment Plan    Received request from ACM/JUAN Estrada LPN to order remote patient monitoring for in home monitoring of HTN and order completed. Patient will be monitoring blood pressure   pulse ox  daily. Patient will engage in Remote Patient Monitoring each day to develop the skills necessary for self management. RPM Care Team Responsibilities:   Alerts will be reviewed daily and addressed within 2-4 hours during operational hours (Monday -Friday 9 am-4 pm)  Alert response and intervention documented in patient medical record  Alert response escalated to PCP per protocol and documented in patient medical record  Patient monitored over approximately  days  Discharge from program based on self-management readiness    See care coordination encounters for additional details.       Rachel Wasserman DNP, FNP-C, Remote Patient Monitoring NP, () [phone]

## 2022-12-20 ENCOUNTER — TELEPHONE (OUTPATIENT)
Dept: FAMILY MEDICINE CLINIC | Age: 62
End: 2022-12-20

## 2022-12-21 ENCOUNTER — TELEPHONE (OUTPATIENT)
Dept: FAMILY MEDICINE CLINIC | Age: 62
End: 2022-12-21

## 2022-12-21 ENCOUNTER — TELEMEDICINE (OUTPATIENT)
Dept: FAMILY MEDICINE CLINIC | Age: 62
End: 2022-12-21

## 2022-12-21 ENCOUNTER — CARE COORDINATION (OUTPATIENT)
Dept: CASE MANAGEMENT | Age: 62
End: 2022-12-21

## 2022-12-21 DIAGNOSIS — I63.81 STROKE, LACUNAR (HCC): ICD-10-CM

## 2022-12-21 DIAGNOSIS — Z09 HOSPITAL DISCHARGE FOLLOW-UP: Primary | ICD-10-CM

## 2022-12-21 DIAGNOSIS — I10 HYPERTENSION, UNSPECIFIED TYPE: ICD-10-CM

## 2022-12-21 RX ORDER — ASPIRIN 325 MG
325 TABLET ORAL DAILY
COMMUNITY

## 2022-12-21 RX ORDER — ATORVASTATIN CALCIUM 20 MG/1
20 TABLET, FILM COATED ORAL NIGHTLY
Qty: 90 TABLET | Refills: 1 | Status: SHIPPED | OUTPATIENT
Start: 2022-12-21

## 2022-12-21 RX ORDER — AMLODIPINE BESYLATE 5 MG/1
5 TABLET ORAL DAILY
Qty: 30 TABLET | Refills: 3 | Status: SHIPPED | OUTPATIENT
Start: 2022-12-21

## 2022-12-21 ASSESSMENT — PATIENT HEALTH QUESTIONNAIRE - PHQ9
SUM OF ALL RESPONSES TO PHQ9 QUESTIONS 1 & 2: 0
SUM OF ALL RESPONSES TO PHQ QUESTIONS 1-9: 8
4. FEELING TIRED OR HAVING LITTLE ENERGY: 3
SUM OF ALL RESPONSES TO PHQ QUESTIONS 1-9: 8
1. LITTLE INTEREST OR PLEASURE IN DOING THINGS: 0
5. POOR APPETITE OR OVEREATING: 0
10. IF YOU CHECKED OFF ANY PROBLEMS, HOW DIFFICULT HAVE THESE PROBLEMS MADE IT FOR YOU TO DO YOUR WORK, TAKE CARE OF THINGS AT HOME, OR GET ALONG WITH OTHER PEOPLE: 1
SUM OF ALL RESPONSES TO PHQ QUESTIONS 1-9: 8
6. FEELING BAD ABOUT YOURSELF - OR THAT YOU ARE A FAILURE OR HAVE LET YOURSELF OR YOUR FAMILY DOWN: 0
7. TROUBLE CONCENTRATING ON THINGS, SUCH AS READING THE NEWSPAPER OR WATCHING TELEVISION: 0
2. FEELING DOWN, DEPRESSED OR HOPELESS: 0
3. TROUBLE FALLING OR STAYING ASLEEP: 3
SUM OF ALL RESPONSES TO PHQ QUESTIONS 1-9: 8
9. THOUGHTS THAT YOU WOULD BE BETTER OFF DEAD, OR OF HURTING YOURSELF: 0
8. MOVING OR SPEAKING SO SLOWLY THAT OTHER PEOPLE COULD HAVE NOTICED. OR THE OPPOSITE, BEING SO FIGETY OR RESTLESS THAT YOU HAVE BEEN MOVING AROUND A LOT MORE THAN USUAL: 2

## 2022-12-21 ASSESSMENT — ENCOUNTER SYMPTOMS
GASTROINTESTINAL NEGATIVE: 1
RESPIRATORY NEGATIVE: 1

## 2022-12-21 NOTE — TELEPHONE ENCOUNTER
Kaushik Thrasher, a medical social worker for the patient is calling for a verbal ok to add to plan of care.   Please call   [phone]

## 2022-12-21 NOTE — TELEPHONE ENCOUNTER
[facility] Nurse reporting to PCP that Pt's Blood Pressure ready today is 161/98  &  162/100.  -Seen Today Virtually 12/21/22  --Nurse Radha Riggs is asking to be called back with advise.

## 2022-12-21 NOTE — TELEPHONE ENCOUNTER
Pt called in to let Marcia know that the hospital wants Pt. Pcp to prescribe BP and cholesterol meds.  Pt. Wanted Rickey Dears to know this before her VV today

## 2022-12-21 NOTE — PROGRESS NOTES
Post-Discharge Transitional Care Follow Up      Oneil Baumann   YOB: 1960    Date of Office Visit:  12/21/2022  Date of Hospital Admission: 12/15/22  Date of Hospital Discharge: 12/16/22  Readmission Risk Score(high >=14%. Medium >=10%):No data recorded    Care management risk score Rising risk (score 2-5) and Complex Care (Scores >=6): No Risk Score On File     Non face to face  following discharge, date last encounter closed (first attempt may have been earlier): 12/19/2022     Call initiated 2 business days of discharge: Yes     Below is the assessment and plan developed based on review of pertinent history, physical exam, labs, studies, and medications. Hospital discharge follow-up  Currently stable  -     AL DISCHARGE MEDS RECONCILED W/ CURRENT OUTPATIENT MED LIST  Stroke, lacunar (HCC)  Currently stable, will be having home RN, PT and OT. Hypertension, unspecified type  Patient called back with BP reading elevated from home care RN. Will start on Norvasc once daily and follow up in 1 month. Medical Decision Making: moderate complexity         Subjective:   HPI  Patient presents today virtually for a hospital follow up. She was admitted to the hospital for stroke symptoms. She did have a confirmed stroke on the CT scan. She states the neurologist can not get her in to the office until February. She is concerned and would like to get in to see them sooner. She states she if feeling fatigued and is worried she is having seizures again. She states she had then in the past.   She was started on aspirin daily. She is concerned about her blood pressure being elevated. She is going to have an RN come out to the house and PT/OT and speech. Inpatient course: Discharge summary reviewed- see chart. Interval history/Current status: stable    Patient Active Problem List   Diagnosis    Bipolar disorder (HCC)    Urinary, incontinence, stress female    Obesity (BMI 30-39. 9)    Slurred speech    HLD (hyperlipidemia)    Elevated blood pressure reading without diagnosis of hypertension    Stroke, lacunar (HCC)    Weakness on left side of face       Medications listed as started at the time of discharge from hospital  Cannot display discharge medications since this is not an admission. Medications marked \"taking\" at this time  Outpatient Medications Marked as Taking for the 12/21/22 encounter (Telemedicine) with MELANIE Hall CNP   Medication Sig Dispense Refill    aspirin 325 MG tablet Take 325 mg by mouth daily      atorvastatin (LIPITOR) 20 MG tablet Take 1 tablet by mouth at bedtime 90 tablet 1    amLODIPine (NORVASC) 5 MG tablet Take 1 tablet by mouth daily 30 tablet 3    hydrOXYzine HCl (ATARAX) 50 MG tablet TAKE 1 TO 2 TABLETS BY MOUTH NIGHTLY 60 tablet 0    meclizine (ANTIVERT) 12.5 MG tablet Take 1 tablet by mouth 3 times daily as needed for Dizziness 20 tablet 0    ARIPiprazole (ABILIFY) 20 MG tablet TAKE 1 TABLET BY MOUTH ONCE DAILY IN THE EVENING      guaiFENesin (ROBITUSSIN) 100 MG/5ML liquid Take 10 mLs by mouth 3 times daily as needed for Cough 180 mL 0    albuterol sulfate HFA (VENTOLIN HFA) 108 (90 Base) MCG/ACT inhaler Inhale 2 puffs into the lungs 4 times daily as needed for Wheezing or Shortness of Breath 18 g 1    nystatin (MYCOSTATIN) 505902 UNIT/GM powder Apply 3 times daily. 60 g 1    triamcinolone (KENALOG) 0.1 % cream APPLY CREAM EXTERNALLY TO AFFECTED AREA ONCE DAILY 80 g 2    Misc. Devices (SILICONE EAR PLUGS) MISC Use daily as needed for swimming 2 each 0        Medications patient taking as of now reconciled against medications ordered at time of hospital discharge: Yes    Review of Systems   Constitutional:  Positive for fatigue. HENT: Negative. Respiratory: Negative. Cardiovascular: Negative. Gastrointestinal: Negative. Genitourinary: Negative. Musculoskeletal: Negative. Skin: Negative. Neurological:  Positive for weakness. Psychiatric/Behavioral: Negative. Objective:    Patient-Reported Vitals  Patient-Reported Weight: 227 lbs  Patient-Reported Height: 5'4      Physical Exam  [INSTRUCTIONS:  \"[x]\" Indicates a positive item  \"[]\" Indicates a negative item  -- DELETE ALL ITEMS NOT EXAMINED]    Constitutional: [x] Appears well-developed and well-nourished [x] No apparent distress      [] Abnormal -     Mental status: [x] Alert and awake  [x] Oriented to person/place/time [x] Able to follow commands    [] Abnormal -     Eyes:   EOM    [x]  Normal    [] Abnormal -   Sclera  [x]  Normal    [] Abnormal -          Discharge [x]  None visible   [] Abnormal -     HENT: [x] Normocephalic, atraumatic  [] Abnormal -   [x] Mouth/Throat: Mucous membranes are moist    External Ears [x] Normal  [] Abnormal -    Neck: [x] No visualized mass [] Abnormal -     Pulmonary/Chest: [x] Respiratory effort normal   [x] No visualized signs of difficulty breathing or respiratory distress        [] Abnormal -      Musculoskeletal:   [x] Normal gait with no signs of ataxia         [x] Normal range of motion of neck        [] Abnormal -     Neurological:        [x] No Facial Asymmetry (Cranial nerve 7 motor function) (limited exam due to video visit)          [x] No gaze palsy        [] Abnormal -          Skin:        [x] No significant exanthematous lesions or discoloration noted on facial skin         [] Abnormal -            Psychiatric:       [x] Normal Affect [] Abnormal -        [x] No Hallucinations    Other pertinent observable physical exam findings:-      Lucila Gunderson, was evaluated through a synchronous (real-time) audio-video encounter. The patient (or guardian if applicable) is aware that this is a billable service, which includes applicable co-pays. This Virtual Visit was conducted with patient's (and/or legal guardian's) consent.  The visit was conducted pursuant to the emergency declaration under the [address] Emergencies Act, [facility] waiver authority and the Coronavirus Preparedness and Response Supplemental Appropriations Act. Patient identification was verified, and a caregiver was present when appropriate. The patient was located at Home: [address] [place] [address]. Provider was [facility]. An electronic signature was used to authenticate this note.   --MELANIE Gomez - CNP

## 2022-12-21 NOTE — CARE COORDINATION
[facility] Transitions Initial Follow Up Call    Patient:  Gustavo Blanton  Patient :  1960  MRN:  [id number]   Reason for Admission:  slurred speech  Discharge Date:  22  RARS: 0      Transitions of Care Initial Call    Was this an external facility discharge? no    Discharge Facility: [facility]      Challenges to be reviewed by the provider   Additional needs identified to be addressed with provider:    leroy    AMB CC Provider Discharge Needs: none            CTC attempt to reach Pt regarding recent hospital discharge. CTC left voice recording with call back number requesting a call back. Follow up appointments:    Future Appointments   Date Time Provider Cecilia Celeste   3/17/2023 11:00 AM DO CHRISTO Barragan OB/GYN [facility]       Benjamín Mail V.  CATALINA Peraza, RN  Care Transition Coordinator  Contact Number:  [phone]

## 2022-12-22 ENCOUNTER — TELEPHONE (OUTPATIENT)
Dept: FAMILY MEDICINE CLINIC | Age: 62
End: 2022-12-22

## 2022-12-22 RX ORDER — ONDANSETRON 4 MG/1
4 TABLET, ORALLY DISINTEGRATING ORAL EVERY 8 HOURS PRN
Qty: 20 TABLET | Refills: 0 | Status: SHIPPED | OUTPATIENT
Start: 2022-12-22

## 2022-12-22 NOTE — TELEPHONE ENCOUNTER
----- Message from Ellen Soares sent at 12/22/2022  8:18 AM EST -----  Subject: Refill Request    QUESTIONS  Name of Medication? Other - Ondansetron   Patient-reported dosage and instructions? every 8 hrs as needed   How many days do you have left? 1  Preferred Pharmacy? [facility] phone number (if available)? [phone]  Additional Information for Provider? Pt gets medicine delivered and it   cost $3 every delivery. Pt says she has a delivery today and would like   this called in as soon as possible. Also would like to know if she should   increase the dosage. ---------------------------------------------------------------------------  --------------  Deangelo PAYTON  What is the best way for the office to contact you? OK to leave message on   voicemail  Preferred Call Back Phone Number? [phone]  ---------------------------------------------------------------------------  --------------  SCRIPT ANSWERS  Relationship to Patient?  Self

## 2022-12-22 NOTE — TELEPHONE ENCOUNTER
Please advise    Last Office Visit  -  12/21/22  Next Office Visit  -  n/a    Last Filled  -  7/8/22  Last UDS -    Contract -

## 2022-12-27 ENCOUNTER — CARE COORDINATION (OUTPATIENT)
Dept: CASE MANAGEMENT | Age: 62
End: 2022-12-27

## 2022-12-27 DIAGNOSIS — E78.00 HYPERCHOLESTEREMIA: ICD-10-CM

## 2022-12-27 DIAGNOSIS — I63.81 STROKE, LACUNAR (HCC): Primary | ICD-10-CM

## 2022-12-27 DIAGNOSIS — I10 HYPERTENSION, UNSPECIFIED TYPE: ICD-10-CM

## 2022-12-27 RX ORDER — ATORVASTATIN CALCIUM 10 MG/1
TABLET, FILM COATED ORAL
Qty: 90 TABLET | Refills: 0 | OUTPATIENT
Start: 2022-12-27

## 2022-12-27 RX ORDER — ATORVASTATIN CALCIUM 20 MG/1
20 TABLET, FILM COATED ORAL NIGHTLY
Qty: 90 TABLET | Refills: 1 | Status: SHIPPED | OUTPATIENT
Start: 2022-12-27

## 2022-12-27 NOTE — CARE COORDINATION
[facility] Transitions Follow Up Call        Patient: Kaylynn Hernandez  Patient : 1960   MRN: <[id number]>  Reason for Admission: slurred speech  Discharge Date: 22 RARS: No data recorded       Spoke with: Carolina Rodriguez  Patient stated she is okay. Eating and drinking good. No bladder or bowel problems. She denies nvd, fever, chills fatigue or sob. Patient has dizziness, headaches, drools a lot, short term memory deficit and speech interruption. Home care provides PT and ST. No needs or concerns at this time. Will continue to follow. Care Transitions Follow Up Call    Needs to be reviewed by the provider   Additional needs identified to be addressed with provider: No  none             Method of communication with provider : none      Care Transition Nurse (CTN) contacted the patient by telephone to follow up after admission on 12/15/2022. Verified name and  with patient as identifiers. Addressed changes since last contact: none  Discussed follow-up appointments. If no appointment was previously scheduled, appointment scheduling offered: na.   Is follow up appointment scheduled within 7 days of discharge? Yes. Advance Care Planning:   Does patient have an Advance Directive:    . CTN reviewed discharge instructions, medical action plan and red flags with patient and discussed any barriers to care and/or understanding of plan of care after discharge. Discussed appropriate site of care based on symptoms and resources available to patient including: PCP  Specialist  Home health  When to call 911. The patient agrees to contact the PCP office for questions related to their healthcare.      Patients top risk factors for readmission:   Interventions to address risk factors: Education of patient/family/caregiver/guardian to support self-management-  and Assessment and support for treatment adherence and medication management-       Non-[facility] follow up appointment(s):     CTN provided contact information for future needs. Plan for follow-up call in 5-7 days based on severity of symptoms and risk factors. Plan for next call: symptom management-dizziness, headache          Care Transitions Subsequent and Final Call    Subsequent and Final Calls  Do you have any ongoing symptoms?: No  Have your medications changed?: No  Do you have any questions related to your medications?: No  Do you currently have any active services?: Yes  Are you currently active with any services?: Home Health  Do you have any needs or concerns that I can assist you with?: No  Identified Barriers: None  Care Transitions Interventions  Other Interventions:              Follow Up  Future Appointments   Date Time Provider Cecilia Celeste   3/17/2023 11:00 AM DO CHRISTO Decker OB/GYN [facility] LPN

## 2022-12-27 NOTE — TELEPHONE ENCOUNTER
Last PCP follow-up for hospital discharge 12/21/2022 with history of lacunar infarct at the time and hypertension. Discharge summary briefly reviewed and it looks like patient presented with stroke symptoms and left AMA as she needed to take care of of her  and son then return to seek treatment for stroke. MRI reviewed which showed T2 hyperintense white matter that may be attributed to chronic microvascular ischemia. CT head and neck showing right ICA. On chart review PCP prescribed Lipitor 20 mg during hospital discharge follow-up sent to [address] [place] [address]. Refill for a lower dose of Lipitor is not be appropriate (request from Irasema Camara). In fact given history of stroke, patient may be a better candidate for higher dose of Lipitor.      Recommend PCP follow up in in 1 month for BP follow up as pt started on [place], [facility]
Med was denied - please advise thanks
1

## 2022-12-30 ENCOUNTER — CARE COORDINATION (OUTPATIENT)
Dept: CARE COORDINATION | Age: 62
End: 2022-12-30

## 2022-12-30 NOTE — CARE COORDINATION
No outreach made at this time and patient is in a current CTN episode. Will follow up once episode ends.

## 2023-01-04 DIAGNOSIS — I10 HYPERTENSION, UNSPECIFIED TYPE: Primary | ICD-10-CM

## 2023-01-04 NOTE — PROGRESS NOTES
1/4/23 4:42 PM     Remote Patient Order Discontinued    Received request from Vanessa Bolanos RN to discontinue order for remote patient monitoring of HTN and order completed.      Abbe Garcia DNP, FNP-C, Remote Patient Monitoring NP, () [phone]

## 2023-01-05 ENCOUNTER — CARE COORDINATION (OUTPATIENT)
Dept: CARE COORDINATION | Age: 63
End: 2023-01-05

## 2023-01-05 ENCOUNTER — CARE COORDINATION (OUTPATIENT)
Dept: CASE MANAGEMENT | Age: 63
End: 2023-01-05

## 2023-01-05 NOTE — CARE COORDINATION
[facility] Care Transitions Follow Up Call    Care Transition Nurse contacted the patient by telephone to perform post hospital discharge assessment. Verified name with patient as identifier. Provided introduction to self, and explanation of the Care Transition Nurse role. Patient: Brennen Roman Patient :  1960  MRN: [id number]  Reason for Admission:  slurred speech  Discharge Date:    RARS: 0     Challenges to be reviewed by the provider   Additional needs identified to be addressed with provider:     no    AMB CC Provider Discharge Needs: none         Method of communication with provider : none      SUMMARY  CTN spoke to the Pt who states she is with physical therapist at the moment and requested a call back at a later time. Plan for f/u call in 1-2 days based on severity of symptoms and risk factors. Plan for next call:   symptom management  self-management  follow up appointment  medication management       Future Appointments   Date Time Provider Cecilia Celeste   3/17/2023 11:00 AM DO CHRISTO Monk OB/GYN [facility]       CATALINA Craft, RN  Care Transition Coordinator  Contact Number:  [phone]

## 2023-01-05 NOTE — CARE COORDINATION
CCSS placed call to patient to arrange RPM kit  through [facility]. Reviewed with patient how to pack equipment in original packing. Verified patient's availability to schedule UPS  time. UPS  time requested.  Anticipated  date range 4-7 business days

## 2023-01-09 ENCOUNTER — CARE COORDINATION (OUTPATIENT)
Dept: CASE MANAGEMENT | Age: 63
End: 2023-01-09

## 2023-01-09 NOTE — CARE COORDINATION
[facility] Transitions Initial Follow Up Call    Patient:  Rosalina Cohen  Patient :  1960  MRN:  [id number]   Reason for Admission:  slurred speech  Discharge Date:  22  RARS: 0      Transitions of Care Initial Call    Was this an external facility discharge? no    Discharge Facility: [facility]      Challenges to be reviewed by the provider   Additional needs identified to be addressed with provider:    leroy    AMB CC Provider Discharge Needs: none            CTC attempt to reach Pt regarding recent hospital discharge. Pt states she has a migraine and not a good time to talk. Denies any other s/s. States she has appt with speech therapy today and plans to cancel. CTN provided pt with number for [facility] and pt requested a call back tomorrow. CTC left voice recording with call back number requesting a call back. Follow up appointments:    Future Appointments   Date Time Provider Cecilia Celeste   3/17/2023 11:00 AM DO CHRISTO Allen OB/GYN CATALINA Juarez, RN  Care Transition Coordinator  Contact Number:  [phone]

## 2023-01-10 ENCOUNTER — CARE COORDINATION (OUTPATIENT)
Dept: CASE MANAGEMENT | Age: 63
End: 2023-01-10

## 2023-01-10 NOTE — CARE COORDINATION
[facility] Transitions Initial Follow Up Call    Patient:  Sherrell Moss  Patient :  1960  MRN:  [id number]   Reason for Admission:  slurred speech  Discharge Date:  22  RARS: 0      Transitions of Care Initial Call    Was this an external facility discharge? no    Discharge Facility: [facility], [facility]      Challenges to be reviewed by the provider   Additional needs identified to be addressed with provider:    leroy    AMB CC Provider Discharge Needs: none            CTC attempt to reach Pt regarding recent hospital discharge. CTC left voice recording with call back number requesting a call back. Follow up appointments:    Future Appointments   Date Time Provider Cecilia Celeste   2023  3:00 PM MELANIE Clifford - VANESA VALDEZ [facility]   3/17/2023 11:00 AM DO CHRISTO Daniels OB/GYN [facility]       CATALINA Curiel, RN  Care Transition Coordinator  Contact Number:  [phone]

## 2023-01-11 ENCOUNTER — CARE COORDINATION (OUTPATIENT)
Dept: CARE COORDINATION | Age: 63
End: 2023-01-11

## 2023-01-11 NOTE — CARE COORDINATION
ACM called but patient did not answer.  ACM left message for patient to call back and left call back number.  Will follow up at a later date.

## 2023-01-12 ENCOUNTER — TELEMEDICINE (OUTPATIENT)
Dept: FAMILY MEDICINE CLINIC | Age: 63
End: 2023-01-12
Payer: MEDICARE

## 2023-01-12 DIAGNOSIS — E78.5 HYPERLIPIDEMIA, UNSPECIFIED HYPERLIPIDEMIA TYPE: Primary | ICD-10-CM

## 2023-01-12 DIAGNOSIS — F31.61 BIPOLAR DISORDER, CURRENT EPISODE MIXED, MILD (HCC): ICD-10-CM

## 2023-01-12 DIAGNOSIS — I63.81 STROKE, LACUNAR (HCC): ICD-10-CM

## 2023-01-12 DIAGNOSIS — I10 HYPERTENSION, UNSPECIFIED TYPE: ICD-10-CM

## 2023-01-12 PROCEDURE — 99214 OFFICE O/P EST MOD 30 MIN: CPT | Performed by: NURSE PRACTITIONER

## 2023-01-12 RX ORDER — AMLODIPINE BESYLATE 5 MG/1
5 TABLET ORAL DAILY
Qty: 30 TABLET | Refills: 3 | Status: SHIPPED | OUTPATIENT
Start: 2023-01-12

## 2023-01-12 RX ORDER — MECLIZINE HCL 12.5 MG/1
12.5 TABLET ORAL 3 TIMES DAILY PRN
COMMUNITY
End: 2023-01-12 | Stop reason: SDUPTHER

## 2023-01-12 RX ORDER — ASPIRIN 325 MG
325 TABLET ORAL DAILY
Qty: 30 TABLET | Refills: 3 | Status: SHIPPED | OUTPATIENT
Start: 2023-01-12

## 2023-01-12 RX ORDER — ONDANSETRON 4 MG/1
4 TABLET, ORALLY DISINTEGRATING ORAL EVERY 8 HOURS PRN
Qty: 20 TABLET | Refills: 0 | Status: SHIPPED | OUTPATIENT
Start: 2023-01-12

## 2023-01-12 RX ORDER — MECLIZINE HCL 12.5 MG/1
12.5 TABLET ORAL 3 TIMES DAILY PRN
Qty: 90 TABLET | Refills: 2 | Status: SHIPPED | OUTPATIENT
Start: 2023-01-12

## 2023-01-12 ASSESSMENT — ENCOUNTER SYMPTOMS
RESPIRATORY NEGATIVE: 1
GASTROINTESTINAL NEGATIVE: 1

## 2023-01-12 NOTE — PROGRESS NOTES
2023    TELEHEALTH EVALUATION -- Audio/Visual (During COVID-19 public health emergency)    HPI:    Terri Shankar (:  1960) has requested an audio/video evaluation for the following concern(s):    Patient presents today to follow-up on her lacunar stroke.  She is still working with PT OT and speech at home.  She states she has been improving.  Most of her symptoms she states affected her face and she does still have a facial droop.  She states her blood pressure when last checked was 120 systolic.  She states she has been doing well with the amlodipine daily.  She states she does need a refill of her blood pressure medication.  She is wanting to return back to her normal activities including going swimming at the .        Review of Systems   Constitutional: Negative.    HENT: Negative.     Respiratory: Negative.     Cardiovascular: Negative.    Gastrointestinal: Negative.    Musculoskeletal: Negative.    Skin: Negative.    Neurological:  Negative for dizziness and headaches.   Psychiatric/Behavioral: Negative.       Prior to Visit Medications    Medication Sig Taking? Authorizing Provider   amLODIPine (NORVASC) 5 MG tablet Take 1 tablet by mouth daily Yes MELANIE Parish CNP   aspirin 325 MG tablet Take 1 tablet by mouth daily Yes MELANIE Parish CNP   meclizine (ANTIVERT) 12.5 MG tablet Take 1 tablet by mouth 3 times daily as needed for Dizziness Yes MELANIE Parish CNP   ondansetron (ZOFRAN ODT) 4 MG disintegrating tablet Take 1 tablet by mouth every 8 hours as needed for Nausea or Vomiting Yes MELANIE Parish CNP   atorvastatin (LIPITOR) 20 MG tablet Take 1 tablet by mouth at bedtime Yes Ralph Gonzalez MD   hydrOXYzine HCl (ATARAX) 50 MG tablet TAKE 1 TO 2 TABLETS BY MOUTH NIGHTLY Yes MELANIE Parish CNP   ARIPiprazole (ABILIFY) 20 MG tablet TAKE 1 TABLET BY MOUTH ONCE DAILY IN THE EVENING Yes Historical Provider, MD renteriaaiFENesin (ROBITUSSIN) 100 MG/5ML liquid  Take 10 mLs by mouth 3 times daily as needed for Cough Yes MELANIE Kohler CNP   albuterol sulfate HFA (VENTOLIN HFA) 108 (90 Base) MCG/ACT inhaler Inhale 2 puffs into the lungs 4 times daily as needed for Wheezing or Shortness of Breath Yes MELANIE Parish CNP   nystatin (MYCOSTATIN) 346231 UNIT/GM powder Apply 3 times daily. Yes Laid Ross,    triamcinolone (KENALOG) 0.1 % cream APPLY CREAM EXTERNALLY TO AFFECTED AREA ONCE DAILY Yes MELANIE Kohler CNP   Misc. Devices (SILICONE EAR PLUGS) MISC Use daily as needed for swimming Yes MELANIE Kohler CNP       Social History     Tobacco Use    Smoking status: Never    Smokeless tobacco: Never   Vaping Use    Vaping Use: Never used   Substance Use Topics    Alcohol use: Yes     Alcohol/week: 0.0 standard drinks     Comment: occ    Drug use: No            PHYSICAL EXAMINATION:  [ INSTRUCTIONS:  \"[x]\" Indicates a positive item  \"[]\" Indicates a negative item  -- DELETE ALL ITEMS NOT EXAMINED]  Vital Signs: (As obtained by patient/caregiver or practitioner observation)    Blood pressure-  Heart rate-    Respiratory rate-    Temperature-  Pulse oximetry-     Constitutional: [x] Appears well-developed and well-nourished [x] No apparent distress      [] Abnormal-   Mental status  [x] Alert and awake  [x] Oriented to person/place/time []Able to follow commands      Eyes:  EOM    []  Normal  [] Abnormal-  Sclera  []  Normal  [] Abnormal -         Discharge []  None visible  [] Abnormal -    HENT:   [x] Normocephalic, atraumatic.   [] Abnormal   [x] Mouth/Throat: Mucous membranes are moist.     External Ears [] Normal  [] Abnormal-     Neck: [] No visualized mass     Pulmonary/Chest: [x] Respiratory effort normal.  [x] No visualized signs of difficulty breathing or respiratory distress        [] Abnormal-      Musculoskeletal:   [] Normal gait with no signs of ataxia         [] Normal range of motion of neck        [] Abnormal- Neurological:        [] No Facial Asymmetry (Cranial nerve 7 motor function) (limited exam to video visit)          [] No gaze palsy        [] Abnormal-         Skin:        [x] No significant exanthematous lesions or discoloration noted on facial skin         [] Abnormal-            Psychiatric:       [] Normal Affect [] No Hallucinations        [] Abnormal-     Other pertinent observable physical exam findings-     ASSESSMENT/PLAN:  1. Hyperlipidemia, unspecified hyperlipidemia type  Stable continue current medications. 2. Stroke, lacunar [facility])  Patient is still working with PT OT and speech. I advised patient to check with them on when she can return to her daily activities including swimming. 3. Bipolar disorder, current episode mixed, mild (HCC)  Currently mood is stable continue current medications. 4. Hypertension, unspecified type  Blood pressure stable continue current medications. Kirstin Singh is a 63 y.o. female being evaluated by a Virtual Visit (video visit) encounter to address concerns as mentioned above. A caregiver was present when appropriate. Due to this being a TeleHealth encounter (During PKTAL-51 public health emergency), evaluation of the following organ systems was limited: Vitals/Constitutional/EENT/Resp/CV/GI//MS/Neuro/Skin/Heme-Lymph-Imm. Pursuant to the emergency declaration under the [address] [place] [address] authority and the [employer] and [employer] Act, this Virtual Visit was conducted with patient's (and/or legal guardian's) consent, to reduce the patient's risk of exposure to COVID-19 and provide necessary medical care. The patient (and/or legal guardian) has also been advised to contact this office for worsening conditions or problems, and seek emergency medical treatment and/or call 911 if deemed necessary.      Services were provided through a video synchronous discussion virtually to substitute for in-person clinic visit. Patient and provider were located at their individual homes. --MELANIE Mancuso - CNP on 1/12/2023 at 5:04 PM    This chart was generated using the [address] [place] [address] [address] dictation system. I created this record but it may contain dictation errors due to the limitation of the software. An electronic signature was used to authenticate this note.

## 2023-01-18 ENCOUNTER — TELEMEDICINE (OUTPATIENT)
Dept: FAMILY MEDICINE CLINIC | Age: 63
End: 2023-01-18
Payer: MEDICARE

## 2023-01-18 ENCOUNTER — CARE COORDINATION (OUTPATIENT)
Dept: CARE COORDINATION | Age: 63
End: 2023-01-18

## 2023-01-18 DIAGNOSIS — B37.2 SKIN YEAST INFECTION: Primary | ICD-10-CM

## 2023-01-18 PROCEDURE — 99213 OFFICE O/P EST LOW 20 MIN: CPT | Performed by: NURSE PRACTITIONER

## 2023-01-18 RX ORDER — NYSTATIN 100000 [USP'U]/G
POWDER TOPICAL
Qty: 60 G | Refills: 3 | Status: SHIPPED | OUTPATIENT
Start: 2023-01-18

## 2023-01-18 RX ORDER — ASPIRIN 325 MG
TABLET, DELAYED RELEASE (ENTERIC COATED) ORAL
COMMUNITY
Start: 2023-01-17

## 2023-01-18 RX ORDER — NYSTATIN 100000 U/G
CREAM TOPICAL
Qty: 30 G | Refills: 2 | Status: SHIPPED | OUTPATIENT
Start: 2023-01-18

## 2023-01-18 ASSESSMENT — ENCOUNTER SYMPTOMS
WHEEZING: 0
RESPIRATORY NEGATIVE: 1
SHORTNESS OF BREATH: 0
GASTROINTESTINAL NEGATIVE: 1
COUGH: 0

## 2023-01-18 NOTE — PROGRESS NOTES
2023    TELEHEALTH EVALUATION -- Audio/Visual (During USKNM-39 public health emergency)    HPI:    Flynn Ortez (:  1960) has requested an audio/video evaluation for the following concern(s):    Patient presents today with concerns of a rash under her right breast. She has been using nystatin on it for the past 2 days with no improvement. She states it is red, irritated, moist and itchy. Review of Systems   Constitutional: Negative. HENT: Negative. Respiratory: Negative. Negative for cough, shortness of breath and wheezing. Cardiovascular: Negative. Gastrointestinal: Negative. Genitourinary: Negative. Musculoskeletal: Negative. Skin:  Positive for rash. Neurological: Negative. Psychiatric/Behavioral: Negative. Prior to Visit Medications    Medication Sig Taking? Authorizing Provider   aspirin 325 MG EC tablet  Yes Historical Provider, MD   nystatin (MYCOSTATIN) 534056 UNIT/GM cream Apply topically 2 times daily. Yes MELANIE Medel CNP   nystatin (MYCOSTATIN) 709601 UNIT/GM powder Apply 3 times daily.  Yes MELANIE Medel CNP   amLODIPine (NORVASC) 5 MG tablet Take 1 tablet by mouth daily Yes MELANIE Medel CNP   aspirin 325 MG tablet Take 1 tablet by mouth daily Yes MELANIE Medel CNP   meclizine (ANTIVERT) 12.5 MG tablet Take 1 tablet by mouth 3 times daily as needed for Dizziness Yes MELANIE Medel CNP   ondansetron (ZOFRAN ODT) 4 MG disintegrating tablet Take 1 tablet by mouth every 8 hours as needed for Nausea or Vomiting Yes MELANIE Medel CNP   atorvastatin (LIPITOR) 20 MG tablet Take 1 tablet by mouth at bedtime Yes Chandra Beach MD   hydrOXYzine HCl (ATARAX) 50 MG tablet TAKE 1 TO 2 TABLETS BY MOUTH NIGHTLY Yes MELANIE Parish CNP   ARIPiprazole (ABILIFY) 20 MG tablet TAKE 1 TABLET BY MOUTH ONCE DAILY IN THE EVENING Yes Historical Provider, MD   guaiFENesin (ROBITUSSIN) 100 MG/5ML liquid Take 10 mLs by mouth 3 times daily as needed for Cough Yes MELANIE Leal CNP   albuterol sulfate HFA (VENTOLIN HFA) 108 (90 Base) MCG/ACT inhaler Inhale 2 puffs into the lungs 4 times daily as needed for Wheezing or Shortness of Breath Yes MELANIE Parish CNP   nystatin (MYCOSTATIN) 364153 UNIT/GM powder Apply 3 times daily. Yes Salena Ross DO   triamcinolone (KENALOG) 0.1 % cream APPLY CREAM EXTERNALLY TO AFFECTED AREA ONCE DAILY Yes MELANIE Leal CNP   Misc. Devices (SILICONE EAR PLUGS) MISC Use daily as needed for swimming Yes MELANIE Leal CNP       Social History     Tobacco Use    Smoking status: Never    Smokeless tobacco: Never   Vaping Use    Vaping Use: Never used   Substance Use Topics    Alcohol use: Yes     Alcohol/week: 0.0 standard drinks     Comment: occ    Drug use: No            PHYSICAL EXAMINATION:  [ INSTRUCTIONS:  \"[x]\" Indicates a positive item  \"[]\" Indicates a negative item  -- DELETE ALL ITEMS NOT EXAMINED]  Vital Signs: (As obtained by patient/caregiver or practitioner observation)    Blood pressure-  Heart rate-    Respiratory rate-    Temperature-  Pulse oximetry-     Constitutional: [x] Appears well-developed and well-nourished [x] No apparent distress      [] Abnormal-   Mental status  [x] Alert and awake  [x] Oriented to person/place/time []Able to follow commands      Eyes:  EOM    []  Normal  [] Abnormal-  Sclera  []  Normal  [] Abnormal -         Discharge []  None visible  [] Abnormal -    HENT:   [x] Normocephalic, atraumatic.   [] Abnormal   [x] Mouth/Throat: Mucous membranes are moist.     External Ears [] Normal  [] Abnormal-     Neck: [] No visualized mass     Pulmonary/Chest: [x] Respiratory effort normal.  [] No visualized signs of difficulty breathing or respiratory distress        [] Abnormal-      Musculoskeletal:   [x] Normal gait with no signs of ataxia         [x] Normal range of motion of neck        [] Abnormal- Neurological:        [] No Facial Asymmetry (Cranial nerve 7 motor function) (limited exam to video visit)          [] No gaze palsy        [] Abnormal-         Skin:        [] No significant exanthematous lesions or discoloration noted on facial skin         [x] Abnormal- not able to fully assess through video, patient reports redness and irritation under right breast.            Psychiatric:       [] Normal Affect [] No Hallucinations        [] Abnormal-     Other pertinent observable physical exam findings-     ASSESSMENT/PLAN:  1. Skin yeast infection  Will treat with nystatin cream. If no effective can go back to the powder. Follow up in office if no improvement. Ibeth Prakash is a 63 y.o. female being evaluated by a Virtual Visit (video visit) encounter to address concerns as mentioned above. A caregiver was present when appropriate. Due to this being a TeleHealth encounter (During [facility]-69 public health emergency), evaluation of the following organ systems was limited: Vitals/Constitutional/EENT/Resp/CV/GI//MS/Neuro/Skin/Heme-Lymph-Imm. Pursuant to the emergency declaration under the [address] [place] [address] authority and the [employer] and [employer] Act, this Virtual Visit was conducted with patient's (and/or legal guardian's) consent, to reduce the patient's risk of exposure to COVID-19 and provide necessary medical care. The patient (and/or legal guardian) has also been advised to contact this office for worsening conditions or problems, and seek emergency medical treatment and/or call 911 if deemed necessary. Services were provided through a video synchronous discussion virtually to substitute for in-person clinic visit. Patient and provider were located at their individual homes. --MELANIE Coppola - CNP on 1/18/2023 at 1:33 PM    This chart was generated using the [address] [place] [address] [address] [employer] system.   I created this record but it may contain dictation errors due to the limitation of the software. An electronic signature was used to authenticate this note.

## 2023-01-18 NOTE — CARE COORDINATION
Ambulatory Care Coordination Note  1/18/2023    ACC: Raven Min RN    ACM outreach to patient to [facility]. Patient notes she is doing well after her stroke. She has PT, OT and Speech twice a week with [facility]. Patient notes she is ready to return to her normal activities which include swimming. ACM discussed with this the patient and advised her to talk to her PT when they come Friday as they will be able to determine if she is ready for this activity. Patient in agreement to discuss with PT. Patient is active with [facility] for help with transportation for appointments during her recovery. Patient notes she does drive but understands her medications can become a risk for safe driving. Patient notes she has been monitoring her BP at home daily. She notes after her stroke it was elevated, but has now returned to normal. Denies any headache, dizziness, light headedness. ACM discussed hypertension and the associated symptoms for her to monitor. Patient notes she is aware. She also noted the therapy monitors her BP when they come. Patient has an appointment today to discuss a rash under her breast. She notes in the past she has used nystatin powered however this has not cleared the rash up. Patient reminded to keep the area clean and dry. Patient notes she is doing this. Patient notes she has no needs or concerns at this time. Plan   ACM follow up with patient in two weeks  Assess for therapy completion- return to activities  Rash resolved? Assess for ongoing needs        Offered patient enrollment in the Remote Patient Monitoring (RPM) program for in-home monitoring: Patient declined.     Lab Results       None            Care Coordination Interventions    Referral from Primary Care Provider: No  Suggested Interventions and Community Resources  Senior Services: Completed (Comment: [facility] referral made)  Transportation Support: Completed  Other Services or Interventions: Hypertension Education Goals Addressed                   This Visit's Progress     Medication Management   On track     I will take my medication as directed. I will notify my provider of any problems with medications, like adverse effects or side effects. I will notify my provider/Care Coordinator if I am unable to afford my medications. I will notify my provider for advice before I stop taking any of my medication. Barriers: medication side effects  Plan for overcoming my barriers: ACM  Confidence: 9/10  Anticipated Goal Completion Date: 1/23              Prior to Admission medications    Medication Sig Start Date End Date Taking? Authorizing Provider   amLODIPine (NORVASC) 5 MG tablet Take 1 tablet by mouth daily 1/12/23   MELANIE Jackson CNP   aspirin 325 MG tablet Take 1 tablet by mouth daily 1/12/23   MELANIE Jackson CNP   meclizine (ANTIVERT) 12.5 MG tablet Take 1 tablet by mouth 3 times daily as needed for Dizziness 1/12/23   MELANIE Jackson CNP   ondansetron (ZOFRAN ODT) 4 MG disintegrating tablet Take 1 tablet by mouth every 8 hours as needed for Nausea or Vomiting 1/12/23   MELANIE Jackson CNP   atorvastatin (LIPITOR) 20 MG tablet Take 1 tablet by mouth at bedtime 12/27/22   Mary Beth Amezcua MD   hydrOXYzine HCl (ATARAX) 50 MG tablet TAKE 1 TO 2 TABLETS BY MOUTH NIGHTLY 12/19/22   MELANIE Jackson CNP   ARIPiprazole (ABILIFY) 20 MG tablet TAKE 1 TABLET BY MOUTH ONCE DAILY IN THE EVENING 10/12/22   Historical Provider, MD   guaiFENesin (ROBITUSSIN) 100 MG/5ML liquid Take 10 mLs by mouth 3 times daily as needed for Cough 11/17/22   MELANIE Jackson CNP   albuterol sulfate HFA (VENTOLIN HFA) 108 (90 Base) MCG/ACT inhaler Inhale 2 puffs into the lungs 4 times daily as needed for Wheezing or Shortness of Breath 11/3/22   MELANIE Jackson CNP   nystatin (MYCOSTATIN) 434768 UNIT/GM powder Apply 3 times daily.  11/3/22   Eugenio Ross DO   triamcinolone (KENALOG) 0.1 % cream APPLY CREAM EXTERNALLY TO AFFECTED AREA ONCE DAILY 9/26/22   Luberta Osler, APRN - CNP   Misc.  Devices (SILICONE EAR PLUGS) MISC Use daily as needed for swimming 11/1/21   Luberta Osler, APRN - CNP       Future Appointments   Date Time Provider Cecilia Roula   1/18/2023 11:40 AM Luberta Osler, APRN - CNP [facility] [place] - [facility]   3/17/2023 11:00 AM DO [facility]    and   General Assessment    Do you have any symptoms that are causing concern?: Yes  Progression since Onset: Gradually Worsening  Reported Symptoms: Rash (Comment: Rash Under Breast)

## 2023-01-24 ENCOUNTER — TELEMEDICINE (OUTPATIENT)
Dept: FAMILY MEDICINE CLINIC | Age: 63
End: 2023-01-24
Payer: MEDICARE

## 2023-01-24 ENCOUNTER — TELEPHONE (OUTPATIENT)
Dept: FAMILY MEDICINE CLINIC | Age: 63
End: 2023-01-24

## 2023-01-24 DIAGNOSIS — H57.89 REDNESS OF EYE, RIGHT: Primary | ICD-10-CM

## 2023-01-24 PROCEDURE — 99213 OFFICE O/P EST LOW 20 MIN: CPT | Performed by: REGISTERED NURSE

## 2023-01-24 RX ORDER — ERYTHROMYCIN 5 MG/G
OINTMENT OPHTHALMIC
Qty: 1 G | Refills: 0 | Status: SHIPPED | OUTPATIENT
Start: 2023-01-24 | End: 2023-01-24

## 2023-01-24 RX ORDER — ERYTHROMYCIN 5 MG/G
OINTMENT OPHTHALMIC
Qty: 1 G | Refills: 0 | Status: SHIPPED | OUTPATIENT
Start: 2023-01-24

## 2023-01-24 ASSESSMENT — PATIENT HEALTH QUESTIONNAIRE - PHQ9
2. FEELING DOWN, DEPRESSED OR HOPELESS: 0
10. IF YOU CHECKED OFF ANY PROBLEMS, HOW DIFFICULT HAVE THESE PROBLEMS MADE IT FOR YOU TO DO YOUR WORK, TAKE CARE OF THINGS AT HOME, OR GET ALONG WITH OTHER PEOPLE: 0
8. MOVING OR SPEAKING SO SLOWLY THAT OTHER PEOPLE COULD HAVE NOTICED. OR THE OPPOSITE, BEING SO FIGETY OR RESTLESS THAT YOU HAVE BEEN MOVING AROUND A LOT MORE THAN USUAL: 0
SUM OF ALL RESPONSES TO PHQ QUESTIONS 1-9: 0
SUM OF ALL RESPONSES TO PHQ9 QUESTIONS 1 & 2: 0
5. POOR APPETITE OR OVEREATING: 0
SUM OF ALL RESPONSES TO PHQ QUESTIONS 1-9: 0
SUM OF ALL RESPONSES TO PHQ QUESTIONS 1-9: 0
7. TROUBLE CONCENTRATING ON THINGS, SUCH AS READING THE NEWSPAPER OR WATCHING TELEVISION: 0
SUM OF ALL RESPONSES TO PHQ QUESTIONS 1-9: 0
1. LITTLE INTEREST OR PLEASURE IN DOING THINGS: 0
4. FEELING TIRED OR HAVING LITTLE ENERGY: 0
6. FEELING BAD ABOUT YOURSELF - OR THAT YOU ARE A FAILURE OR HAVE LET YOURSELF OR YOUR FAMILY DOWN: 0
3. TROUBLE FALLING OR STAYING ASLEEP: 0
9. THOUGHTS THAT YOU WOULD BE BETTER OFF DEAD, OR OF HURTING YOURSELF: 0

## 2023-01-24 ASSESSMENT — ENCOUNTER SYMPTOMS
EYE DISCHARGE: 0
EYE ITCHING: 0
PHOTOPHOBIA: 0
SINUS PRESSURE: 0
EYE PAIN: 1
EYE REDNESS: 1
SORE THROAT: 0
RHINORRHEA: 1
SHORTNESS OF BREATH: 0

## 2023-01-24 NOTE — TELEPHONE ENCOUNTER
----- Message from Alberto Rodriguez sent at 1/24/2023  7:50 AM EST -----  Subject: Message to Provider    QUESTIONS  Information for Provider? Layman Jesus is experiencing pain in right eye   and it is pink. She states that she did not want to schedule but is   wondering if pcp can send a prescription for it to [address]  on [address] [employer] phone # [phone] instead of her usual pharmacy because of   cost. Please f/u with her as soon as possible.   ---------------------------------------------------------------------------  --------------  Felicita MANJARREZ  [phone]; OK to leave message on voicemail  ---------------------------------------------------------------------------  --------------  SCRIPT ANSWERS  Relationship to Patient?  Self

## 2023-01-24 NOTE — PROGRESS NOTES
2023    TELEHEALTH EVALUATION -- Audio/Visual (During [facility]-17 public health emergency)    HPI:    Fidelia No (:  1960) has requested an audio/video evaluation for the following concern(s):    She is here for eye pain, redness and burning. No discharge. No fevers. No vision changes. Her nose has been running and stuffy. Review of Systems   Constitutional:  Negative for fatigue and fever. HENT:  Positive for congestion and rhinorrhea. Negative for postnasal drip, sinus pressure, sneezing and sore throat. Eyes:  Positive for pain and redness. Negative for photophobia, discharge, itching and visual disturbance. Respiratory:  Negative for shortness of breath. Cardiovascular: Negative. Neurological: Negative. Prior to Visit Medications    Medication Sig Taking? Authorizing Provider   erythromycin (ROMYCIN) 5 MG/GM ophthalmic ointment Apply one ribbon in eye(s), 4 times a day for 10 days Yes MELANIE Rivera CNP   aspirin 325 MG EC tablet  Yes Historical Provider, MD   nystatin (MYCOSTATIN) [id number] UNIT/GM cream Apply topically 2 times daily. Yes MELANIE Mccain CNP   nystatin (MYCOSTATIN) 933200 UNIT/GM powder Apply 3 times daily.  Yes MELANIE Mccain CNP   amLODIPine (NORVASC) 5 MG tablet Take 1 tablet by mouth daily Yes MELANIE Mccain CNP   aspirin 325 MG tablet Take 1 tablet by mouth daily Yes MELANIE Mccain CNP   meclizine (ANTIVERT) 12.5 MG tablet Take 1 tablet by mouth 3 times daily as needed for Dizziness Yes MELANIE Mccain CNP   ondansetron (ZOFRAN ODT) 4 MG disintegrating tablet Take 1 tablet by mouth every 8 hours as needed for Nausea or Vomiting Yes MELANIE Mccain CNP   atorvastatin (LIPITOR) 20 MG tablet Take 1 tablet by mouth at bedtime Yes Kavya Mar MD   hydrOXYzine HCl (ATARAX) 50 MG tablet TAKE 1 TO 2 TABLETS BY MOUTH NIGHTLY Yes MELANIE Parish CNP   ARIPiprazole (ABILIFY) 20 MG tablet TAKE 1 TABLET BY MOUTH ONCE DAILY IN THE EVENING Yes Historical Provider, MD renteriaaiFENesin (ROBITUSSIN) 100 MG/5ML liquid Take 10 mLs by mouth 3 times daily as needed for Cough Yes Gearmicaela Tracy APRN - VANESA   albuterol sulfate HFA (VENTOLIN HFA) 108 (90 Base) MCG/ACT inhaler Inhale 2 puffs into the lungs 4 times daily as needed for Wheezing or Shortness of Breath Yes Marcia Abdi APRN - CNP   nystatin (MYCOSTATIN) 778233 UNIT/GM powder Apply 3 times daily. Yes Dacosta Jewel Federer, DO   triamcinolone (KENALOG) 0.1 % cream APPLY CREAM EXTERNALLY TO AFFECTED AREA ONCE DAILY Yes Gearldine DeepMELANIE - VANESA   Misc. Devices (SILICONE EAR PLUGS) MISC Use daily as needed for swimming Yes GearldMELANIE Fernandez - VANESA       Social History     Tobacco Use    Smoking status: Never    Smokeless tobacco: Never   Vaping Use    Vaping Use: Never used   Substance Use Topics    Alcohol use:  Yes     Alcohol/week: 0.0 standard drinks     Comment: occ    Drug use: No        Allergies   Allergen Reactions    Bactrim [Sulfamethoxazole-Trimethoprim]      Rash and trouble breathing    Antihistamines, Chlorpheniramine-Type     Augmentin [Amoxicillin-Pot Clavulanate]      Feels shaky     Macrobid [Nitrofurantoin]      PER PATIENT     ,   Past Medical History:   Diagnosis Date    Anxiety     [facility]     Depression     Dyspareunia in female 05/16/2015    Elevated transaminase level 11/10/2017    Hemorrhoids 01/15/2015    History of tubal ligation 05/16/2015    HLD (hyperlipidemia) 12/14/2022    Hypertension     Irritable bowel syndrome     Lacunar stroke ([facility]     Menopause 09/05/2016    Obesity     Obesity (BMI 30-39.9) 03/11/2015    Overactive bladder     Prurigo nodularis     Stress incontinence     Urinary incontinence     Uterine fibroid 05/16/2015    Yeast vaginitis 08/30/2020       PHYSICAL EXAMINATION:  [ INSTRUCTIONS:  \"[x]\" Indicates a positive item  \"[]\" Indicates a negative item  -- DELETE ALL ITEMS NOT EXAMINED]    Constitutional: [x] Appears well-developed and well-nourished [x] No apparent distress      [] Abnormal-   Mental status  [x] Alert and awake  [x] Oriented to person/place/time [x]Able to follow commands      Eyes: right eye erythematous with irritation of the lower inner lid  EOM    [x]  Normal  [] Abnormal-  Sclera  [x]  Normal  [] Abnormal -         Discharge [x]  None visible  [] Abnormal -    HENT:   [x] Normocephalic, atraumatic. [] Abnormal   [x] Mouth/Throat: Mucous membranes are moist.     External Ears [x] Normal  [] Abnormal-     Neck: [x] No visualized mass     Pulmonary/Chest: [x] Respiratory effort normal.  [x] No visualized signs of difficulty breathing or respiratory distress        [] Abnormal-      Musculoskeletal:   [] Normal gait with no signs of ataxia         [x] Normal range of motion of neck        [] Abnormal-       Neurological:        [x] No Facial Asymmetry (Cranial nerve 7 motor function) (limited exam to video visit)          [x] No gaze palsy        [] Abnormal-         Skin:        [x] No significant exanthematous lesions or discoloration noted on facial skin         [] Abnormal-            Psychiatric:       [x] Normal Affect [x] No Hallucinations        [] Abnormal-     Other pertinent observable physical exam findings- none    ASSESSMENT/PLAN:    1. Redness of eye, right  Clinical exam limited due to virtual visit- video quality is poor. Right eye appears erythematous and lower lid is mildly edematous. No discharged observed. Suspect stye vs conjunctivitis most likely. Plan: warm compresses 4 times daily. Patient is rubbing and touching eye during visit- educated her on the importance of refraining from touching her eye. Erythromycin ointment sent to pharmacy. Return if symptoms worsen or fail to improve. Jackie Pimentel is a 63 y.o. female being evaluated by a Virtual Visit (video visit) encounter to address concerns as mentioned above.   A caregiver was present when appropriate. Due to this being a TeleHealth encounter (During [facility]-19 public health emergency), evaluation of the following organ systems was limited: Vitals/Constitutional/EENT/Resp/CV/GI//MS/Neuro/Skin/Heme-Lymph-Imm. Pursuant to the emergency declaration under the [address] [place] [address], [address] [place] [address] authority and the [employer] and [employer] Act, this Virtual Visit was conducted with patient's (and/or legal guardian's) consent, to reduce the patient's risk of exposure to COVID-19 and provide necessary medical care. The patient (and/or legal guardian) has also been advised to contact this office for worsening conditions or problems, and seek emergency medical treatment and/or call 911 if deemed necessary. Services were provided through a video synchronous discussion virtually to substitute for in-person clinic visit. Patient and provider were located at their individual homes. --MELANIE Guillen CNP on 1/24/2023 at 12:06 PM    An electronic signature was used to authenticate this note. This chart was generated using the [address] [place] [address] [address]  dictation system. I created this record but it may contain dictation errors due to the limitation of the software. This is a telehealth visit. Verbal consent to participate in video visit was obtained. Pursuant to the emergency declaration under the [address] [place] [address], [address] [place] [address] authority and the [employer] and [employer] Act, this Virtual Visit was conducted, with patient's consent, to reduce the patient's risk of exposure to COVID-19 and provide continuity of care for an established/new patient.  Services were provided through a video synchronous discussion virtually to substitute for in-person clinic visit. I discussed with the patient the nature of our telehealth visits via interactive/real-time audio/video that:   - I would evaluate the patient and recommend diagnostics and treatments based on my assessment   - Our sessions are not being recorded and that personal health information is protected   - Our team would provide follow up care in person if/when the patient needs it.

## 2023-02-02 ENCOUNTER — CARE COORDINATION (OUTPATIENT)
Dept: CARE COORDINATION | Age: 63
End: 2023-02-02

## 2023-02-02 NOTE — CARE COORDINATION
Ambulatory Care Coordination Note  2/2/2023    ACC: Meng Iverson RN    ACM outreach to patient to [facility]. Patient notes she is doing well, rash and stye have resolved with medication. She has completed PT/OT and Speech therapy and has been released to start back to her normal activities. She has a follow up with neurology on 2/20 with Janae. Patient denies any needs or concerns at this time. ACM encouraged patient to reach out if any needs would arise. Will follow up one month to assess for ongoing needs and discuss graduation from [address] [place] [address]. PLAN:  Follow up one month  Assess for needs  Possible graduation     Offered patient enrollment in the Remote Patient Monitoring (RPM) program for in-home monitoring: Patient declined. Lab Results       None            Care Coordination Interventions    Referral from Primary Care Provider: No  Suggested Interventions and Community Resources  Senior Services: Completed (Comment: [facility] referral made)  Transportation Support: Completed  Other Services or Interventions: Hypertension Education          Goals Addressed                   This Visit's Progress     Medication Management   On track     I will take my medication as directed. I will notify my provider of any problems with medications, like adverse effects or side effects. I will notify my provider/Care Coordinator if I am unable to afford my medications. I will notify my provider for advice before I stop taking any of my medication. Barriers: medication side effects  Plan for overcoming my barriers: ACM  Confidence: 9/10  Anticipated Goal Completion Date: 1/23              Prior to Admission medications    Medication Sig Start Date End Date Taking?  Authorizing Provider   erythromycin [facility]) 5 MG/GM ophthalmic ointment Apply one ribbon in eye(s), 4 times a day for 10 days 1/24/23   MELANIE Yoder - CNP   aspirin 325 MG EC tablet  1/17/23   Historical Provider, MD bynumatin (MYCOSTATIN) 172120 UNIT/GM cream Apply topically 2 times daily. 1/18/23   MELANIE Hui CNP   nystatin (MYCOSTATIN) 157036 UNIT/GM powder Apply 3 times daily. 1/18/23   MELANIE Hui CNP   amLODIPine (NORVASC) 5 MG tablet Take 1 tablet by mouth daily 1/12/23   MELANIE Hui CNP   aspirin 325 MG tablet Take 1 tablet by mouth daily 1/12/23   MELANIE Hui CNP   meclizine (ANTIVERT) 12.5 MG tablet Take 1 tablet by mouth 3 times daily as needed for Dizziness 1/12/23   MELANIE Hui CNP   ondansetron (ZOFRAN ODT) 4 MG disintegrating tablet Take 1 tablet by mouth every 8 hours as needed for Nausea or Vomiting 1/12/23   MELANIE Hui CNP   atorvastatin (LIPITOR) 20 MG tablet Take 1 tablet by mouth at bedtime 12/27/22   Jovan Armas MD   hydrOXYzine HCl (ATARAX) 50 MG tablet TAKE 1 TO 2 TABLETS BY MOUTH NIGHTLY 12/19/22   MELANIE Hui CNP   ARIPiprazole (ABILIFY) 20 MG tablet TAKE 1 TABLET BY MOUTH ONCE DAILY IN THE EVENING 10/12/22   Historical Provider, MD   guaiFENesin (ROBITUSSIN) 100 MG/5ML liquid Take 10 mLs by mouth 3 times daily as needed for Cough 11/17/22   MELANIE Hui CNP   albuterol sulfate HFA (VENTOLIN HFA) 108 (90 Base) MCG/ACT inhaler Inhale 2 puffs into the lungs 4 times daily as needed for Wheezing or Shortness of Breath 11/3/22   MELANIE Hui CNP   nystatin (MYCOSTATIN) 269147 UNIT/GM powder Apply 3 times daily. 11/3/22   Caitlin Ross DO   triamcinolone (KENALOG) 0.1 % cream APPLY CREAM EXTERNALLY TO AFFECTED AREA ONCE DAILY 9/26/22   MELANIE Hui CNP   Misc.  Devices (SILICONE EAR PLUGS) MISC Use daily as needed for swimming 11/1/21   MELANIE Hui CNP       Future Appointments   Date Time Provider Cecilia Celeste   3/17/2023 11:00 AM [address] [place], DO [facility]    and   General Assessment    Do you have any symptoms that are causing concern?: No

## 2023-02-03 ENCOUNTER — TELEPHONE (OUTPATIENT)
Dept: FAMILY MEDICINE CLINIC | Age: 63
End: 2023-02-03

## 2023-02-03 NOTE — TELEPHONE ENCOUNTER
Pt was advised to go to urgent care due to no availability today. Pt stated that she would rather schedule a VV for Monday.  Stated that if she feels better by Monday she will call the office to cancel the appt and was advised to go to urgent care if symptoms worsened

## 2023-02-06 ENCOUNTER — TELEMEDICINE (OUTPATIENT)
Dept: FAMILY MEDICINE CLINIC | Age: 63
End: 2023-02-06
Payer: COMMERCIAL

## 2023-02-06 DIAGNOSIS — J01.90 ACUTE BACTERIAL SINUSITIS: Primary | ICD-10-CM

## 2023-02-06 DIAGNOSIS — B96.89 ACUTE BACTERIAL SINUSITIS: Primary | ICD-10-CM

## 2023-02-06 PROCEDURE — 99441 PR PHYS/QHP TELEPHONE EVALUATION 5-10 MIN: CPT | Performed by: NURSE PRACTITIONER

## 2023-02-06 RX ORDER — ECHINACEA PURPUREA EXTRACT 125 MG
1 TABLET ORAL PRN
Qty: 1 EACH | Refills: 3 | Status: SHIPPED | OUTPATIENT
Start: 2023-02-06

## 2023-02-06 RX ORDER — CEPHALEXIN 500 MG/1
500 CAPSULE ORAL 3 TIMES DAILY
Qty: 21 CAPSULE | Refills: 0 | Status: SHIPPED | OUTPATIENT
Start: 2023-02-06 | End: 2023-02-13

## 2023-02-06 ASSESSMENT — PATIENT HEALTH QUESTIONNAIRE - PHQ9
SUM OF ALL RESPONSES TO PHQ QUESTIONS 1-9: 2
SUM OF ALL RESPONSES TO PHQ QUESTIONS 1-9: 2
SUM OF ALL RESPONSES TO PHQ9 QUESTIONS 1 & 2: 0
10. IF YOU CHECKED OFF ANY PROBLEMS, HOW DIFFICULT HAVE THESE PROBLEMS MADE IT FOR YOU TO DO YOUR WORK, TAKE CARE OF THINGS AT HOME, OR GET ALONG WITH OTHER PEOPLE: 0
SUM OF ALL RESPONSES TO PHQ QUESTIONS 1-9: 2
8. MOVING OR SPEAKING SO SLOWLY THAT OTHER PEOPLE COULD HAVE NOTICED. OR THE OPPOSITE, BEING SO FIGETY OR RESTLESS THAT YOU HAVE BEEN MOVING AROUND A LOT MORE THAN USUAL: 0
SUM OF ALL RESPONSES TO PHQ QUESTIONS 1-9: 2
9. THOUGHTS THAT YOU WOULD BE BETTER OFF DEAD, OR OF HURTING YOURSELF: 0
6. FEELING BAD ABOUT YOURSELF - OR THAT YOU ARE A FAILURE OR HAVE LET YOURSELF OR YOUR FAMILY DOWN: 0
1. LITTLE INTEREST OR PLEASURE IN DOING THINGS: 0
3. TROUBLE FALLING OR STAYING ASLEEP: 0
5. POOR APPETITE OR OVEREATING: 1
2. FEELING DOWN, DEPRESSED OR HOPELESS: 0
7. TROUBLE CONCENTRATING ON THINGS, SUCH AS READING THE NEWSPAPER OR WATCHING TELEVISION: 0
4. FEELING TIRED OR HAVING LITTLE ENERGY: 1

## 2023-02-06 NOTE — PROGRESS NOTES
Edison Schuler is a 63 y.o. female evaluated via telephone on 2/6/2023 for Congestion (X 1 week)  . Documentation:  I communicated with the patient and/or health care decision maker about congestion. Details of this discussion including any medical advice provided:     Patient presents today with concerns of a possible sinus infection. She reports nasal congestion, mostly right sided. She has also had a bloody nose. She denies any cough, body aches or fever. Symptoms have been ongoing for the past week. Thor Lopez was seen today for congestion. Diagnoses and all orders for this visit:    Acute bacterial sinusitis  Will treat with below medications and follow up if no improvement. -     cephALEXin (KEFLEX) 500 MG capsule; Take 1 capsule by mouth 3 times daily for 7 days  -     sodium chloride (SALINE MIST) 0.65 % nasal spray; 1 spray by Nasal route as needed for Congestion         Total Time: minutes: 9-92 minutes    Edison Schuler was evaluated through a synchronous (real-time) audio encounter. Patient identification was verified at the start of the visit. She (or guardian if applicable) is aware that this is a billable service, which includes applicable co-pays. This visit was conducted with the patient's (and/or legal guardian's) verbal consent. She has not had a related appointment within my department in the past 7 days or scheduled within the next 24 hours. The patient was located at Home: [address] [place] [address]. The provider was located [facility]     Note: not billable if this call serves to triage the patient into an appointment for the relevant concern    Lieutenant Hastings, MELANIE - CNP

## 2023-02-14 ENCOUNTER — OFFICE VISIT (OUTPATIENT)
Dept: FAMILY MEDICINE CLINIC | Age: 63
End: 2023-02-14
Payer: MEDICARE

## 2023-02-14 ENCOUNTER — TELEPHONE (OUTPATIENT)
Dept: FAMILY MEDICINE CLINIC | Age: 63
End: 2023-02-14

## 2023-02-14 VITALS
DIASTOLIC BLOOD PRESSURE: 78 MMHG | OXYGEN SATURATION: 94 % | HEIGHT: 64 IN | SYSTOLIC BLOOD PRESSURE: 120 MMHG | WEIGHT: 233.4 LBS | HEART RATE: 74 BPM | BODY MASS INDEX: 39.85 KG/M2

## 2023-02-14 DIAGNOSIS — E66.01 OBESITY, CLASS III, BMI 40-49.9 (MORBID OBESITY) (HCC): ICD-10-CM

## 2023-02-14 DIAGNOSIS — R06.02 SHORTNESS OF BREATH: Primary | ICD-10-CM

## 2023-02-14 PROCEDURE — 1036F TOBACCO NON-USER: CPT | Performed by: NURSE PRACTITIONER

## 2023-02-14 PROCEDURE — 99213 OFFICE O/P EST LOW 20 MIN: CPT | Performed by: NURSE PRACTITIONER

## 2023-02-14 PROCEDURE — G8482 FLU IMMUNIZE ORDER/ADMIN: HCPCS | Performed by: NURSE PRACTITIONER

## 2023-02-14 PROCEDURE — G8427 DOCREV CUR MEDS BY ELIG CLIN: HCPCS | Performed by: NURSE PRACTITIONER

## 2023-02-14 PROCEDURE — 3017F COLORECTAL CA SCREEN DOC REV: CPT | Performed by: NURSE PRACTITIONER

## 2023-02-14 PROCEDURE — G8417 CALC BMI ABV UP PARAM F/U: HCPCS | Performed by: NURSE PRACTITIONER

## 2023-02-14 RX ORDER — ALBUTEROL SULFATE 90 UG/1
2 AEROSOL, METERED RESPIRATORY (INHALATION) 4 TIMES DAILY PRN
Qty: 18 G | Refills: 2 | Status: SHIPPED | OUTPATIENT
Start: 2023-02-14

## 2023-02-14 RX ORDER — CEPHALEXIN 500 MG/1
500 CAPSULE ORAL 4 TIMES DAILY
COMMUNITY

## 2023-02-14 ASSESSMENT — ENCOUNTER SYMPTOMS
SHORTNESS OF BREATH: 1
GASTROINTESTINAL NEGATIVE: 1
COUGH: 0
WHEEZING: 0

## 2023-02-14 ASSESSMENT — PATIENT HEALTH QUESTIONNAIRE - PHQ9
7. TROUBLE CONCENTRATING ON THINGS, SUCH AS READING THE NEWSPAPER OR WATCHING TELEVISION: 0
4. FEELING TIRED OR HAVING LITTLE ENERGY: 0
3. TROUBLE FALLING OR STAYING ASLEEP: 0
SUM OF ALL RESPONSES TO PHQ QUESTIONS 1-9: 0
SUM OF ALL RESPONSES TO PHQ QUESTIONS 1-9: 0
10. IF YOU CHECKED OFF ANY PROBLEMS, HOW DIFFICULT HAVE THESE PROBLEMS MADE IT FOR YOU TO DO YOUR WORK, TAKE CARE OF THINGS AT HOME, OR GET ALONG WITH OTHER PEOPLE: 0
2. FEELING DOWN, DEPRESSED OR HOPELESS: 0
6. FEELING BAD ABOUT YOURSELF - OR THAT YOU ARE A FAILURE OR HAVE LET YOURSELF OR YOUR FAMILY DOWN: 0
9. THOUGHTS THAT YOU WOULD BE BETTER OFF DEAD, OR OF HURTING YOURSELF: 0
SUM OF ALL RESPONSES TO PHQ QUESTIONS 1-9: 0
1. LITTLE INTEREST OR PLEASURE IN DOING THINGS: 0
8. MOVING OR SPEAKING SO SLOWLY THAT OTHER PEOPLE COULD HAVE NOTICED. OR THE OPPOSITE, BEING SO FIGETY OR RESTLESS THAT YOU HAVE BEEN MOVING AROUND A LOT MORE THAN USUAL: 0
SUM OF ALL RESPONSES TO PHQ9 QUESTIONS 1 & 2: 0
5. POOR APPETITE OR OVEREATING: 0
SUM OF ALL RESPONSES TO PHQ QUESTIONS 1-9: 0

## 2023-02-14 NOTE — TELEPHONE ENCOUNTER
Patient called and was transferred from nurse triage. She states she is short of breath more so when she is up moving around. I consulted with medical assistant, appointment advised. Patient was offered appointment then stated she was feeling a little better she will see how she feels in the morning. Patient then said she wanted to be seen. Patient scheduled today .

## 2023-02-14 NOTE — PROGRESS NOTES
Patient: Sue Can is a 63 y.o. female who presents today with the following Chief Complaint(s):  Chief Complaint   Patient presents with    Shortness of Breath       Assessment:  Encounter Diagnoses   Name Primary? Shortness of breath Yes    Obesity, Class III, BMI 40-49.9 (morbid obesity) ([facility]        Plan:  1. Shortness of breath  Possibly related to recent weight increase, will trial inhaler and weight loss and follow up if no improvement  - albuterol sulfate HFA (VENTOLIN HFA) 108 (90 Base) MCG/ACT inhaler; Inhale 2 puffs into the lungs 4 times daily as needed for Wheezing or Shortness of Breath  Dispense: 18 g; Refill: 2    2. Obesity, Class III, BMI 40-49.9 (morbid obesity) ([facility]  Will work on getting back to swimming at the  and follow up if no improvement. HPI   Patient presents today with concerns of shortness of breath. She states it is worse when she is moving around. She states she thinks it is possibly related to her weight. She states the congestion is better from her recent sinus infection. She denies any cough. She wants to get back to swimming to help with weight loss. Current Outpatient Medications   Medication Sig Dispense Refill    cephALEXin (KEFLEX) 500 MG capsule Take 500 mg by mouth 4 times daily      albuterol sulfate HFA (VENTOLIN HFA) 108 (90 Base) MCG/ACT inhaler Inhale 2 puffs into the lungs 4 times daily as needed for Wheezing or Shortness of Breath 18 g 2    sodium chloride (SALINE MIST) 0.65 % nasal spray 1 spray by Nasal route as needed for Congestion 1 each 3    aspirin 325 MG EC tablet       nystatin (MYCOSTATIN) 809466 UNIT/GM cream Apply topically 2 times daily. 30 g 2    nystatin (MYCOSTATIN) 115050 UNIT/GM powder Apply 3 times daily.  60 g 3    amLODIPine (NORVASC) 5 MG tablet Take 1 tablet by mouth daily 30 tablet 3    aspirin 325 MG tablet Take 1 tablet by mouth daily 30 tablet 3    meclizine (ANTIVERT) 12.5 MG tablet Take 1 tablet by mouth 3 times daily as needed for Dizziness 90 tablet 2    ondansetron (ZOFRAN ODT) 4 MG disintegrating tablet Take 1 tablet by mouth every 8 hours as needed for Nausea or Vomiting 20 tablet 0    atorvastatin (LIPITOR) 20 MG tablet Take 1 tablet by mouth at bedtime 90 tablet 1    hydrOXYzine HCl (ATARAX) 50 MG tablet TAKE 1 TO 2 TABLETS BY MOUTH NIGHTLY 60 tablet 0    ARIPiprazole (ABILIFY) 20 MG tablet TAKE 1 TABLET BY MOUTH ONCE DAILY IN THE EVENING      guaiFENesin (ROBITUSSIN) 100 MG/5ML liquid Take 10 mLs by mouth 3 times daily as needed for Cough 180 mL 0    nystatin (MYCOSTATIN) 855651 UNIT/GM powder Apply 3 times daily. 60 g 1    triamcinolone (KENALOG) 0.1 % cream APPLY CREAM EXTERNALLY TO AFFECTED AREA ONCE DAILY 80 g 2    Misc. Devices (SILICONE EAR PLUGS) MISC Use daily as needed for swimming 2 each 0    erythromycin (ROMYCIN) 5 MG/GM ophthalmic ointment Apply one ribbon in eye(s), 4 times a day for 10 days (Patient not taking: Reported on 2/14/2023) 1 g 0     No current facility-administered medications for this visit. Patient's past medical history, surgical history, family history, medications,and allergies  were all reviewed and updated as appropriate today. Review of Systems   HENT: Negative. Respiratory:  Positive for shortness of breath. Negative for cough and wheezing. Cardiovascular: Negative. Gastrointestinal: Negative. Musculoskeletal: Negative. Skin: Negative. Neurological: Negative. Negative for dizziness and headaches. Psychiatric/Behavioral: Negative. Physical Exam  Vitals reviewed. Cardiovascular:      Rate and Rhythm: Normal rate and regular rhythm. Heart sounds: Normal heart sounds. Pulmonary:      Effort: Pulmonary effort is normal.      Breath sounds: Normal breath sounds. Skin:     General: Skin is warm and dry. Neurological:      Mental Status: She is alert and oriented to person, place, and time.    Psychiatric:         Mood and Affect: Mood normal. Behavior: Behavior normal.     Vitals:    02/14/23 1401   BP: 120/78   Pulse:    SpO2:        This chart was generated using the Dragon dictation system. I created this record but it may contain dictation errors due to the limitation of the software.

## 2023-02-16 ENCOUNTER — TELEPHONE (OUTPATIENT)
Dept: FAMILY MEDICINE CLINIC | Age: 63
End: 2023-02-16

## 2023-02-16 NOTE — TELEPHONE ENCOUNTER
I spoke to Faustino and will be mailing a release for her as well as one for Jeff Xavier to sign and return, then I will be able to send records.

## 2023-02-16 NOTE — TELEPHONE ENCOUNTER
Pt called in stating she has established with [facility]. Pt is asking if her mental health paperwork from [facility] could be faxed over to the new place. The fax number is [phone] their phone number is [phone].  Pt call back number [phone]

## 2023-03-06 ENCOUNTER — CARE COORDINATION (OUTPATIENT)
Dept: CARE COORDINATION | Age: 63
End: 2023-03-06

## 2023-03-08 ENCOUNTER — TELEPHONE (OUTPATIENT)
Dept: FAMILY MEDICINE CLINIC | Age: 63
End: 2023-03-08

## 2023-03-08 NOTE — TELEPHONE ENCOUNTER
Reviewed patient's allergies and briefly reviewed patient's medical history including lab work as far as 07/2022. INR normal, no transaminitis. No known hx of varices or liver abnormality. No contraindication for acetaminophen. Yes patient can take Tylenol. Do not take more than the recommended dose. Typically no more than 4000mg per day.     [place]

## 2023-03-08 NOTE — TELEPHONE ENCOUNTER
Per [facility]- Patient asking if she can take Tylenol as she's taking Amlodipine, Lipitor and Asprin. Pt wanted to take Tylenol right at that moment and didn't necessarily want to wait for a message to be put back. Advised to call her personal pharmacy for a quicker response as they have a list of her current medications as well.

## 2023-03-09 ENCOUNTER — TELEMEDICINE (OUTPATIENT)
Dept: FAMILY MEDICINE CLINIC | Age: 63
End: 2023-03-09
Payer: COMMERCIAL

## 2023-03-09 DIAGNOSIS — B37.9 YEAST INFECTION: ICD-10-CM

## 2023-03-09 PROCEDURE — G8427 DOCREV CUR MEDS BY ELIG CLIN: HCPCS | Performed by: NURSE PRACTITIONER

## 2023-03-09 PROCEDURE — 99213 OFFICE O/P EST LOW 20 MIN: CPT | Performed by: NURSE PRACTITIONER

## 2023-03-09 PROCEDURE — 3017F COLORECTAL CA SCREEN DOC REV: CPT | Performed by: NURSE PRACTITIONER

## 2023-03-09 RX ORDER — FLUCONAZOLE 150 MG/1
150 TABLET ORAL ONCE
Qty: 1 TABLET | Refills: 0 | Status: SHIPPED | OUTPATIENT
Start: 2023-03-09 | End: 2023-03-09

## 2023-03-09 RX ORDER — ARIPIPRAZOLE 20 MG/1
TABLET ORAL
Qty: 30 TABLET | Refills: 0 | Status: SHIPPED | OUTPATIENT
Start: 2023-03-09

## 2023-03-09 RX ORDER — NYSTATIN 100000 [USP'U]/G
POWDER TOPICAL
Qty: 60 G | Refills: 1 | Status: SHIPPED | OUTPATIENT
Start: 2023-03-09

## 2023-03-09 ASSESSMENT — PATIENT HEALTH QUESTIONNAIRE - PHQ9
4. FEELING TIRED OR HAVING LITTLE ENERGY: 1
5. POOR APPETITE OR OVEREATING: 1
8. MOVING OR SPEAKING SO SLOWLY THAT OTHER PEOPLE COULD HAVE NOTICED. OR THE OPPOSITE, BEING SO FIGETY OR RESTLESS THAT YOU HAVE BEEN MOVING AROUND A LOT MORE THAN USUAL: 0
7. TROUBLE CONCENTRATING ON THINGS, SUCH AS READING THE NEWSPAPER OR WATCHING TELEVISION: 0
9. THOUGHTS THAT YOU WOULD BE BETTER OFF DEAD, OR OF HURTING YOURSELF: 0
SUM OF ALL RESPONSES TO PHQ QUESTIONS 1-9: 2
SUM OF ALL RESPONSES TO PHQ9 QUESTIONS 1 & 2: 0
6. FEELING BAD ABOUT YOURSELF - OR THAT YOU ARE A FAILURE OR HAVE LET YOURSELF OR YOUR FAMILY DOWN: 0
3. TROUBLE FALLING OR STAYING ASLEEP: 0
SUM OF ALL RESPONSES TO PHQ QUESTIONS 1-9: 2
2. FEELING DOWN, DEPRESSED OR HOPELESS: 0
SUM OF ALL RESPONSES TO PHQ QUESTIONS 1-9: 2
10. IF YOU CHECKED OFF ANY PROBLEMS, HOW DIFFICULT HAVE THESE PROBLEMS MADE IT FOR YOU TO DO YOUR WORK, TAKE CARE OF THINGS AT HOME, OR GET ALONG WITH OTHER PEOPLE: 0
SUM OF ALL RESPONSES TO PHQ QUESTIONS 1-9: 2
1. LITTLE INTEREST OR PLEASURE IN DOING THINGS: 0

## 2023-03-09 ASSESSMENT — ENCOUNTER SYMPTOMS
RESPIRATORY NEGATIVE: 1
GASTROINTESTINAL NEGATIVE: 1

## 2023-03-09 NOTE — PROGRESS NOTES
3/9/2023    TELEHEALTH EVALUATION -- Audio/Visual (During CJYCI-19 public health emergency)    HPI:    Jackie Pimentel (:  1960) has requested an audio/video evaluation for the following concern(s):    Patient presents today with concerns of a rash under her right breast. She states it is uncomfortable. She has been using the nystatin powder and cream with no relief. Review of Systems   Constitutional: Negative. HENT: Negative. Respiratory: Negative. Cardiovascular: Negative. Gastrointestinal: Negative. Musculoskeletal: Negative. Skin:  Positive for rash. Neurological: Negative. Prior to Visit Medications    Medication Sig Taking? Authorizing Provider   ARIPiprazole (ABILIFY) 20 MG tablet TAKE 1 TABLET BY MOUTH ONCE DAILY IN THE EVENING Yes MELANIE Parish CNP   fluconazole (DIFLUCAN) 150 MG tablet Take 1 tablet by mouth once for 1 dose Yes MELANIE Villalobos CNP   nystatin (MYCOSTATIN) 186837 UNIT/GM powder Apply 3 times daily. Yes MELANIE Villalobos CNP   albuterol sulfate HFA (VENTOLIN HFA) 108 (90 Base) MCG/ACT inhaler Inhale 2 puffs into the lungs 4 times daily as needed for Wheezing or Shortness of Breath Yes MELANIE Parish CNP   sodium chloride (SALINE MIST) 0.65 % nasal spray 1 spray by Nasal route as needed for Congestion Yes MELANIE Villalobos CNP   aspirin 325 MG EC tablet  Yes Historical Provider, MD   nystatin (MYCOSTATIN) 243256 UNIT/GM cream Apply topically 2 times daily. Yes MELANIE Villalobos CNP   nystatin (MYCOSTATIN) 323020 UNIT/GM powder Apply 3 times daily.  Yes MELANIE Villalobos CNP   amLODIPine (NORVASC) 5 MG tablet Take 1 tablet by mouth daily Yes MELANIE Villalobos CNP   aspirin 325 MG tablet Take 1 tablet by mouth daily Yes MELANIE Villalobos CNP   meclizine (ANTIVERT) 12.5 MG tablet Take 1 tablet by mouth 3 times daily as needed for Dizziness Yes MELANIE Villalobos CNP   ondansetron [facility] ODT) 4 MG disintegrating tablet Take 1 tablet by mouth every 8 hours as needed for Nausea or Vomiting Yes Elvia Slight, MELANIE - CNP   atorvastatin (LIPITOR) 20 MG tablet Take 1 tablet by mouth at bedtime Yes Luz aGldamez MD   hydrOXYzine HCl (ATARAX) 50 MG tablet TAKE 1 TO 2 TABLETS BY MOUTH NIGHTLY Yes MELANIE Parish CNP   triamcinolone (KENALOG) 0.1 % cream APPLY CREAM EXTERNALLY TO AFFECTED AREA ONCE DAILY Yes Elvia Slight, MELANIE - CNP   Misc. Devices (SILICONE EAR PLUGS) MISC Use daily as needed for swimming Yes Elvia Slight, APRN - CNP   erythromycin (ROMYCIN) 5 MG/GM ophthalmic ointment Apply one ribbon in eye(s), 4 times a day for 10 days  Patient not taking: No sig reported  MELANIE Cedillo CNP   guaiFENesin (ROBITUSSIN) 100 MG/5ML liquid Take 10 mLs by mouth 3 times daily as needed for Cough  Patient not taking: Reported on 3/9/2023  Elvia SlightMELANIE CNP       Social History     Tobacco Use    Smoking status: Never    Smokeless tobacco: Never   Vaping Use    Vaping Use: Never used   Substance Use Topics    Alcohol use: Yes     Alcohol/week: 0.0 standard drinks     Comment: occ    Drug use: No            PHYSICAL EXAMINATION:  [ INSTRUCTIONS:  \"[x]\" Indicates a positive item  \"[]\" Indicates a negative item  -- DELETE ALL ITEMS NOT EXAMINED]  Vital Signs: (As obtained by patient/caregiver or practitioner observation)    Blood pressure-  Heart rate-    Respiratory rate-    Temperature-  Pulse oximetry-     Constitutional: [x] Appears well-developed and well-nourished [x] No apparent distress      [] Abnormal-   Mental status  [x] Alert and awake  [x] Oriented to person/place/time []Able to follow commands      Eyes:  EOM    []  Normal  [] Abnormal-  Sclera  []  Normal  [] Abnormal -         Discharge []  None visible  [] Abnormal -    HENT:   [x] Normocephalic, atraumatic.   [] Abnormal   [x] Mouth/Throat: Mucous membranes are moist.     External Ears [] Normal  [] Abnormal- Neck: [] No visualized mass     Pulmonary/Chest: [x] Respiratory effort normal.  [] No visualized signs of difficulty breathing or respiratory distress        [] Abnormal-      Musculoskeletal:   [x] Normal gait with no signs of ataxia         [x] Normal range of motion of neck        [] Abnormal-       Neurological:        [] No Facial Asymmetry (Cranial nerve 7 motor function) (limited exam to video visit)          [] No gaze palsy        [] Abnormal-         Skin:        [] No significant exanthematous lesions or discoloration noted on facial skin         [x] Abnormal- red moist rash under right breast           Psychiatric:       [x] Normal Affect [x] No Hallucinations        [] Abnormal-     Other pertinent observable physical exam findings-     ASSESSMENT/PLAN:  1. Yeast infection  Patient advised it is important to keep the area dry to prevent it from coming back. Will send in a dose of diflucan and treat with nystatin powder 3 times a day. I advised patient about interdry- she can try to get it OTC.   - fluconazole (DIFLUCAN) 150 MG tablet; Take 1 tablet by mouth once for 1 dose  Dispense: 1 tablet; Refill: 0  - nystatin (MYCOSTATIN) 703969 UNIT/GM powder; Apply 3 times daily. Dispense: 60 g; Refill: 1        Samira Mayorga is a 63 y.o. female being evaluated by a Virtual Visit (video visit) encounter to address concerns as mentioned above. A caregiver was present when appropriate. Due to this being a TeleHealth encounter (During [place] [id number] public health emergency), evaluation of the following organ systems was limited: Vitals/Constitutional/EENT/Resp/CV/GI//MS/Neuro/Skin/Heme-Lymph-Imm.   Pursuant to the emergency declaration under the [facility], [address] [place] [address] [facility] authority and the [employer] Act, this Virtual Visit was conducted with patient's (and/or legal guardian's) consent, to reduce the patient's risk of exposure to COVID-19 and provide necessary medical care.  The patient (and/or legal guardian) has also been advised to contact this office for worsening conditions or problems, and seek emergency medical treatment and/or call 911 if deemed necessary.     Services were provided through a video synchronous discussion virtually to substitute for in-person clinic visit. Patient and provider were located at their individual homes.    --Marcia Abdi, MELANIE - CNP on 3/9/2023 at 12:43 PM    This chart was generated using the Dragon dictation system.  I created this record but it may contain dictation errors due to the limitation of the software.    An electronic signature was used to authenticate this note.

## 2023-03-17 ENCOUNTER — TELEPHONE (OUTPATIENT)
Dept: FAMILY MEDICINE CLINIC | Age: 63
End: 2023-03-17

## 2023-03-17 ENCOUNTER — OFFICE VISIT (OUTPATIENT)
Dept: OBGYN CLINIC | Age: 63
End: 2023-03-17
Payer: COMMERCIAL

## 2023-03-17 VITALS
SYSTOLIC BLOOD PRESSURE: 118 MMHG | BODY MASS INDEX: 40.78 KG/M2 | HEART RATE: 81 BPM | DIASTOLIC BLOOD PRESSURE: 88 MMHG | TEMPERATURE: 98.1 F | WEIGHT: 237.6 LBS

## 2023-03-17 DIAGNOSIS — Z01.419 WOMEN'S ANNUAL ROUTINE GYNECOLOGICAL EXAMINATION: Primary | ICD-10-CM

## 2023-03-17 DIAGNOSIS — Z12.4 PAP SMEAR FOR CERVICAL CANCER SCREENING: ICD-10-CM

## 2023-03-17 DIAGNOSIS — B37.9 YEAST INFECTION: ICD-10-CM

## 2023-03-17 DIAGNOSIS — I63.81 STROKE, LACUNAR (HCC): ICD-10-CM

## 2023-03-17 DIAGNOSIS — E66.01 MORBID OBESITY WITH BMI OF 40.0-44.9, ADULT (HCC): ICD-10-CM

## 2023-03-17 PROCEDURE — G8482 FLU IMMUNIZE ORDER/ADMIN: HCPCS | Performed by: OBSTETRICS & GYNECOLOGY

## 2023-03-17 PROCEDURE — 99396 PREV VISIT EST AGE 40-64: CPT | Performed by: OBSTETRICS & GYNECOLOGY

## 2023-03-17 RX ORDER — NYSTATIN 100000 [USP'U]/G
POWDER TOPICAL
Qty: 60 G | Refills: 1 | Status: SHIPPED | OUTPATIENT
Start: 2023-03-17

## 2023-03-17 RX ORDER — NYSTATIN 100000 U/G
CREAM TOPICAL
Qty: 30 G | Refills: 2 | Status: SHIPPED | OUTPATIENT
Start: 2023-03-17

## 2023-03-17 NOTE — TELEPHONE ENCOUNTER
----- Message from [facility] sent at 3/17/2023  8:26 AM EDT -----  Subject: Message to Provider    QUESTIONS  Information for Provider? Patient called stating she wanted to see if   provider Jin can call her regarding hormone therapy for weight loss   and also getting the thyroid checked for her and her son. Please advise. Scheduled 3/17/23 per pt's request. Lottie Rios on vacation rest of following week.  ---------------------------------------------------------------------------  --------------  Rivas RAYO  [phone]; OK to leave message on voicemail  ---------------------------------------------------------------------------  --------------  SCRIPT ANSWERS  Relationship to Patient?  Self

## 2023-03-18 ASSESSMENT — ENCOUNTER SYMPTOMS
CONSTIPATION: 0
ABDOMINAL DISTENTION: 0
SHORTNESS OF BREATH: 0
VOMITING: 0
DIARRHEA: 0
ABDOMINAL PAIN: 0
NAUSEA: 0

## 2023-03-19 NOTE — PROGRESS NOTES
Subjective:      Patient ID: Erinn Moreira is a 63 y.o. female. HPI  64 y/o  female presents for well woman examination. Last pap smear was 2020--normal.  Tested negative for high risk HPV in 2018 as well as . No history of abnormal pap smear. History is positive for pannus, inner thigh, and vaginal/vulvar rash as well as yeast infections. Prn use of nystatin helps significantly. Denies vaginal bleeding and vaginal discharge. Patient achieved menopause at age 48. Experiences occasional hot flashes and night sweats. No sexual activity--due to 's diabetes. History is positive for dyspareunia--improved with conservative measures, monogamous >30 years. Has struggled with weight gain. Has appointment with primary care to discuss hormones:  thyroid, etc.   Medical history is positive for admission to [facility] for stroke in 2022. Patient left AMA prior to definitive diagnosis due to caregiver responsibilities at home. Has participated in medical studies. Experienced allergic reaction to brexpiprazole. Has been on steroids--prednisone pills and shots in October and 2022. History positive for fibrocystic breasts--mammograms have been limited in the past.  Limits caffeine due to breast discomfort. Last mammogram on 22 was normal.      Had colonoscopy 8 years ago--normal.  Has colonoscopy every 10 years. Review of Systems   Constitutional:  Negative for activity change, appetite change, chills, fatigue, fever and unexpected weight change. Respiratory:  Negative for shortness of breath. Cardiovascular:  Negative for chest pain. Gastrointestinal:  Negative for abdominal distention, abdominal pain, constipation, diarrhea, nausea and vomiting. Endocrine: Negative for cold intolerance and heat intolerance.    Genitourinary:  Negative for difficulty urinating, dyspareunia, dysuria, frequency, genital sores, hematuria, menstrual problem, pelvic pain, vaginal bleeding, vaginal discharge and vaginal pain. Skin:  Positive for rash. Neurological:  Negative for headaches. Hematological:  Does not bruise/bleed easily. Objective:   Physical Exam  Vitals and nursing note reviewed. Exam conducted with a chaperone present. Constitutional:       General: She is not in acute distress. Appearance: Normal appearance. She is well-developed. She is not ill-appearing, toxic-appearing or diaphoretic. Neck:      Thyroid: No thyromegaly. Trachea: Trachea normal.   Cardiovascular:      Rate and Rhythm: Normal rate and regular rhythm. Heart sounds: Normal heart sounds, S1 normal and S2 normal.   Pulmonary:      Effort: Pulmonary effort is normal. No respiratory distress. Breath sounds: Normal breath sounds. Chest:   Breasts:     Breasts are symmetrical.      Right: No inverted nipple, mass, nipple discharge, skin change or tenderness. Left: Inverted nipple present. No mass, nipple discharge, skin change or tenderness. Abdominal:      General: Bowel sounds are normal. There is no distension. Palpations: Abdomen is soft. Abdomen is not rigid. There is no mass. Tenderness: There is no abdominal tenderness. There is no guarding or rebound. Genitourinary:     General: Normal vulva. Exam position: Lithotomy position. Pubic Area: No rash. Labia:         Right: No rash, tenderness, lesion or injury. Left: No rash, tenderness, lesion or injury. Urethra: No prolapse, urethral pain, urethral swelling or urethral lesion. Vagina: No signs of injury. No vaginal discharge, erythema, tenderness or bleeding. Cervix: No cervical motion tenderness, discharge or friability. Uterus: Not tender. Adnexa:         Right: No mass, tenderness or fullness. Left: No mass, tenderness or fullness. Musculoskeletal:         General: Normal range of motion.       Cervical back: Neck supple. Skin:     General: Skin is warm and dry. Findings: No erythema or rash. Nails: There is no clubbing. Comments: Mild erthema   No erythema or signs of rash noted at pannus. Neurological:      Mental Status: She is alert and oriented to person, place, and time. Psychiatric:         Mood and Affect: Mood normal.         Speech: Speech normal.         Behavior: Behavior normal. Behavior is cooperative. Thought Content: Thought content normal.         Judgment: Judgment normal.       Assessment:       Diagnosis Orders   1. Women's annual routine gynecological examination  PAP SMEAR      2. Pap smear for cervical cancer screening  PAP SMEAR      3. Yeast infection  nystatin (MYCOSTATIN) 565160 UNIT/GM powder      4. Stroke, lacunar ([facility]        5.  Morbid obesity with BMI of 40.0-44.9, adult ([facility]                Plan:      Orders Placed This Encounter   Procedures    PAP SMEAR     Annual mammogram.   Follow up prn and 1 year for well woman examination        Joel Zazueta DO

## 2023-03-20 ENCOUNTER — TELEMEDICINE (OUTPATIENT)
Dept: FAMILY MEDICINE CLINIC | Age: 63
End: 2023-03-20
Payer: MEDICARE

## 2023-03-20 ENCOUNTER — CARE COORDINATION (OUTPATIENT)
Dept: CARE COORDINATION | Age: 63
End: 2023-03-20

## 2023-03-20 ENCOUNTER — TELEPHONE (OUTPATIENT)
Dept: FAMILY MEDICINE CLINIC | Age: 63
End: 2023-03-20

## 2023-03-20 DIAGNOSIS — E66.01 CLASS 3 SEVERE OBESITY DUE TO EXCESS CALORIES WITH SERIOUS COMORBIDITY AND BODY MASS INDEX (BMI) OF 40.0 TO 44.9 IN ADULT (HCC): Primary | ICD-10-CM

## 2023-03-20 DIAGNOSIS — R53.83 FATIGUE, UNSPECIFIED TYPE: ICD-10-CM

## 2023-03-20 PROCEDURE — 99213 OFFICE O/P EST LOW 20 MIN: CPT | Performed by: NURSE PRACTITIONER

## 2023-03-20 PROCEDURE — 3017F COLORECTAL CA SCREEN DOC REV: CPT | Performed by: NURSE PRACTITIONER

## 2023-03-20 PROCEDURE — G8427 DOCREV CUR MEDS BY ELIG CLIN: HCPCS | Performed by: NURSE PRACTITIONER

## 2023-03-20 ASSESSMENT — PATIENT HEALTH QUESTIONNAIRE - PHQ9
1. LITTLE INTEREST OR PLEASURE IN DOING THINGS: 0
SUM OF ALL RESPONSES TO PHQ QUESTIONS 1-9: 6
5. POOR APPETITE OR OVEREATING: 3
7. TROUBLE CONCENTRATING ON THINGS, SUCH AS READING THE NEWSPAPER OR WATCHING TELEVISION: 0
4. FEELING TIRED OR HAVING LITTLE ENERGY: 1
SUM OF ALL RESPONSES TO PHQ9 QUESTIONS 1 & 2: 0
6. FEELING BAD ABOUT YOURSELF - OR THAT YOU ARE A FAILURE OR HAVE LET YOURSELF OR YOUR FAMILY DOWN: 0
3. TROUBLE FALLING OR STAYING ASLEEP: 1
SUM OF ALL RESPONSES TO PHQ QUESTIONS 1-9: 6
SUM OF ALL RESPONSES TO PHQ QUESTIONS 1-9: 6
8. MOVING OR SPEAKING SO SLOWLY THAT OTHER PEOPLE COULD HAVE NOTICED. OR THE OPPOSITE, BEING SO FIGETY OR RESTLESS THAT YOU HAVE BEEN MOVING AROUND A LOT MORE THAN USUAL: 1
9. THOUGHTS THAT YOU WOULD BE BETTER OFF DEAD, OR OF HURTING YOURSELF: 0
SUM OF ALL RESPONSES TO PHQ QUESTIONS 1-9: 6
2. FEELING DOWN, DEPRESSED OR HOPELESS: 0

## 2023-03-20 ASSESSMENT — ENCOUNTER SYMPTOMS
RESPIRATORY NEGATIVE: 1
GASTROINTESTINAL NEGATIVE: 1

## 2023-03-20 NOTE — CARE COORDINATION
Ambulatory Care Coordination Note  3/20/2023    Patient Current Location:  [address] [address]     ACM contacted the patient by telephone. Verified name and  with patient as identifiers. Provided introduction to self, and explanation of the ACM role. ACM: Kyay Toledo RN    ACM made outreach to patient to follow up with care management. Patient reports her rash under her right breast has \"all cleared up. \"   She stated that she did not follow up with neurology due to insurance issues. She reported to this ACM she is scheduled to see [facility] this week. Patient informed this ACM that she has an appointment today with Destiney NASH to discuss hormone therapy for weight loss and possibly a thyroid check. Patient denies any needs or concerns that can be managed by the ACM. ACM discussed graduation with the patient and she is in agreement. Patient informed she can reach out to [facility] for any further care management needs. Plan   Graduation     Offered patient enrollment in the Remote Patient Monitoring (RPM) program for in-home monitoring: Patient declined. Lab Results       None            Care Coordination Interventions    Referral from Primary Care Provider: No  Suggested Interventions and Community Resources  Senior Services: Completed (Comment: COA referral made)  Transportation Support: Completed  Other Services or Interventions: Hypertension Education          Goals Addressed                   This Visit's Progress     COMPLETED: Medication Management        I will take my medication as directed. I will notify my provider of any problems with medications, like adverse effects or side effects. I will notify my provider/Care Coordinator if I am unable to afford my medications. I will notify my provider for advice before I stop taking any of my medication.     Barriers: medication side effects  Plan for overcoming my barriers: ACM  Confidence:

## 2023-03-20 NOTE — TELEPHONE ENCOUNTER
Pt Seen Today 3/20/23 about Weight Loss.   -Pt states [facility] won't be able to see Pt until July 2023.   -Weight Management appts will only be once a month  And have to be Virtual.  -Pt wants to know if PCP has any other advise?

## 2023-03-20 NOTE — PROGRESS NOTES
[]  Normal  [] Abnormal -         Discharge []  None visible  [] Abnormal -    HENT:   [x] Normocephalic, atraumatic. [] Abnormal   [x] Mouth/Throat: Mucous membranes are moist.     External Ears [] Normal  [] Abnormal-     Neck: [] No visualized mass     Pulmonary/Chest: [x] Respiratory effort normal.  [] No visualized signs of difficulty breathing or respiratory distress        [] Abnormal-      Musculoskeletal:   [x] Normal gait with no signs of ataxia         [x] Normal range of motion of neck        [] Abnormal-       Neurological:        [] No Facial Asymmetry (Cranial nerve 7 motor function) (limited exam to video visit)          [] No gaze palsy        [] Abnormal-         Skin:        [x] No significant exanthematous lesions or discoloration noted on facial skin         [] Abnormal-            Psychiatric:       [] Normal Affect [] No Hallucinations        [] Abnormal-     Other pertinent observable physical exam findings-     ASSESSMENT/PLAN:  1. Class 3 severe obesity due to excess calories with serious comorbidity and body mass index (BMI) of 40.0 to 44.9 in [facility])  Discussed some diet changes. Patient was given resource information to check eatright.org. will check TSH.   - TSH with Reflex to FT4; Future  - Mirna Melgar MD, Bariatric Surgery, (Virtual Visits Only)    2. Fatigue, unspecified type  Will check blood work, likely related to weight.   - TSH with Reflex to FT4; Future  - Monica Rizzo MD, Bariatric Surgery, (Virtual Visits Only)        Casa Hunter is a 63 y.o. female being evaluated by a Virtual Visit (video visit) encounter to address concerns as mentioned above. A caregiver was present when appropriate. Due to this being a TeleHealth encounter (During [place] [address] public [facility] emergency), evaluation of the following organ systems was limited: Vitals/Constitutional/EENT/Resp/CV/GI//MS/Neuro/Skin/Heme-Lymph-Imm.   Pursuant to the emergency declaration under the

## 2023-03-21 LAB
HPV HR 12 DNA SPEC QL NAA+PROBE: NOT DETECTED
HPV16 DNA SPEC QL NAA+PROBE: NOT DETECTED
HPV16+18+H RISK 12 DNA SPEC-IMP: NORMAL
HPV18 DNA SPEC QL NAA+PROBE: NOT DETECTED

## 2023-03-22 ENCOUNTER — TELEPHONE (OUTPATIENT)
Dept: FAMILY MEDICINE CLINIC | Age: 63
End: 2023-03-22

## 2023-03-22 NOTE — TELEPHONE ENCOUNTER
Pt needs her medical records sent to [facility] and was told she needed to sign a record release.  Mailed form for patient to sign per pt's request.

## 2023-03-23 NOTE — TELEPHONE ENCOUNTER
Let Patient know we are unable to set her up through the dietitian through care coordination. She will need to start by making diet changes as discussed at the appointment and start working on exercise. Awake/Alert

## 2023-04-17 ENCOUNTER — TELEPHONE (OUTPATIENT)
Dept: FAMILY MEDICINE CLINIC | Age: 63
End: 2023-04-17

## 2023-04-17 NOTE — TELEPHONE ENCOUNTER
----- Message from Mario Argueta sent at 4/17/2023 10:25 AM EDT -----  Subject: Message to Provider    QUESTIONS  Information for Provider? Patient called to states that she is using a   steroid cream that has help resolved most of her flare up but thinks she   needs an antibiotic to finishing clearing up the remaining area. Patient   is open to other suggestions from [facility]. Please call patient   back to discuss. ---------------------------------------------------------------------------  --------------  Inge MOY  [phone]; OK to leave message on voicemail  ---------------------------------------------------------------------------  --------------  SCRIPT ANSWERS  Relationship to Patient?  Self

## 2023-04-17 NOTE — TELEPHONE ENCOUNTER
Patient was phoned and offered an appointment. She wanted a message to be sent if she should just continue to use cream. She has used it for 3 days straight but has some \" stubborn\" spots. She was unsure how many days in a row he can use it.  Advise

## 2023-04-17 NOTE — TELEPHONE ENCOUNTER
Please clarify is this is a flare up of her prurigo nodularis? If so then steroid cream should be fine to continue with that for 7-10 days.

## 2023-04-18 ENCOUNTER — TELEPHONE (OUTPATIENT)
Dept: FAMILY MEDICINE CLINIC | Age: 63
End: 2023-04-18

## 2023-04-18 RX ORDER — TRIAMCINOLONE ACETONIDE 1 MG/G
CREAM TOPICAL
Qty: 80 G | Refills: 0 | Status: SHIPPED | OUTPATIENT
Start: 2023-04-18

## 2023-04-18 NOTE — TELEPHONE ENCOUNTER
Patient returned call to the office states yes it is what she had before, very itchy and painful, states goes away and then comes right back. She would like multiple refills on Kenalog cream sent to [facility].   Any questions [phone]

## 2023-04-20 ENCOUNTER — TELEPHONE (OUTPATIENT)
Dept: FAMILY MEDICINE CLINIC | Age: 63
End: 2023-04-20

## 2023-04-26 ENCOUNTER — OFFICE VISIT (OUTPATIENT)
Dept: FAMILY MEDICINE CLINIC | Age: 63
End: 2023-04-26
Payer: MEDICARE

## 2023-04-26 ENCOUNTER — TELEPHONE (OUTPATIENT)
Dept: INTERNAL MEDICINE CLINIC | Age: 63
End: 2023-04-26

## 2023-04-26 VITALS
WEIGHT: 238 LBS | DIASTOLIC BLOOD PRESSURE: 74 MMHG | OXYGEN SATURATION: 94 % | HEIGHT: 64 IN | BODY MASS INDEX: 40.63 KG/M2 | RESPIRATION RATE: 16 BRPM | SYSTOLIC BLOOD PRESSURE: 112 MMHG | HEART RATE: 86 BPM

## 2023-04-26 DIAGNOSIS — R21 RASH AND NONSPECIFIC SKIN ERUPTION: Primary | ICD-10-CM

## 2023-04-26 PROCEDURE — G8427 DOCREV CUR MEDS BY ELIG CLIN: HCPCS | Performed by: REGISTERED NURSE

## 2023-04-26 PROCEDURE — G8417 CALC BMI ABV UP PARAM F/U: HCPCS | Performed by: REGISTERED NURSE

## 2023-04-26 PROCEDURE — 99214 OFFICE O/P EST MOD 30 MIN: CPT | Performed by: REGISTERED NURSE

## 2023-04-26 PROCEDURE — 1036F TOBACCO NON-USER: CPT | Performed by: REGISTERED NURSE

## 2023-04-26 PROCEDURE — 3017F COLORECTAL CA SCREEN DOC REV: CPT | Performed by: REGISTERED NURSE

## 2023-04-26 RX ORDER — PREDNISONE 20 MG/1
TABLET ORAL
Qty: 30 TABLET | Refills: 0 | Status: SHIPPED | OUTPATIENT
Start: 2023-04-26 | End: 2023-04-26

## 2023-04-26 RX ORDER — DOXYCYCLINE HYCLATE 100 MG
100 TABLET ORAL 2 TIMES DAILY
Qty: 20 TABLET | Refills: 0 | Status: SHIPPED | OUTPATIENT
Start: 2023-04-26 | End: 2023-04-27 | Stop reason: SINTOL

## 2023-04-26 RX ORDER — PREDNISONE 20 MG/1
TABLET ORAL
Qty: 30 TABLET | Refills: 0 | Status: SHIPPED | OUTPATIENT
Start: 2023-04-26 | End: 2023-05-06

## 2023-04-26 RX ORDER — DOXYCYCLINE HYCLATE 100 MG
100 TABLET ORAL 2 TIMES DAILY
Qty: 20 TABLET | Refills: 0 | Status: SHIPPED | OUTPATIENT
Start: 2023-04-26 | End: 2023-04-26

## 2023-04-26 ASSESSMENT — ENCOUNTER SYMPTOMS
COUGH: 0
SHORTNESS OF BREATH: 0
GASTROINTESTINAL NEGATIVE: 1

## 2023-04-26 NOTE — TELEPHONE ENCOUNTER
Glo Barba with [facility] calling to get clarification on dosage; only comes up to 18 tablets total.    predniSONE (DELTASONE) 20 MG tablet    Also patient told the pharmacy she could not tolerate the Doxycycline with certain foods and would like a different medication.     Glo Barba [phone]

## 2023-04-26 NOTE — PROGRESS NOTES
This chart was generated using the [address] [place], [place] [address]  dictation system. I created this record but it may contain dictation errors due to the limitation of the software.

## 2023-04-26 NOTE — TELEPHONE ENCOUNTER
Patient called checking on the status of antibiotic being changed, she states it makes her very sick.      She picked up the rx for prednisone

## 2023-04-27 RX ORDER — CEPHALEXIN 500 MG/1
500 CAPSULE ORAL 2 TIMES DAILY
Qty: 20 CAPSULE | Refills: 0 | Status: SHIPPED | OUTPATIENT
Start: 2023-04-27 | End: 2023-05-07

## 2023-04-27 NOTE — TELEPHONE ENCOUNTER
4/27/23-   Pt requesting Status about Alternative Antibiotic other than Doxycycline.  -Pt states this medication makes her vomit.  -Please Call Pt when change is made and sent to pharmacy.   Dharmesh Rizvi at [phone]

## 2023-04-27 NOTE — TELEPHONE ENCOUNTER
Call from [facility] ([facility]) 4/27/23 [address] [facility] Support  Subject: Message to Provider     QUESTIONS   Information for Provider? Patient received medication and says thank you!    Also has a derm appt May 15th she will follow up with skin.   ---------------------------------------------------------------------------   --------------   Salena Del Reals INFO   [phone]; OK to leave message on voicemail

## 2023-05-12 ENCOUNTER — TELEMEDICINE (OUTPATIENT)
Dept: FAMILY MEDICINE CLINIC | Age: 63
End: 2023-05-12
Payer: MEDICARE

## 2023-05-12 DIAGNOSIS — S30.1XXA ABDOMINAL WALL HEMATOMA, INITIAL ENCOUNTER: Primary | ICD-10-CM

## 2023-05-12 PROCEDURE — 3017F COLORECTAL CA SCREEN DOC REV: CPT | Performed by: NURSE PRACTITIONER

## 2023-05-12 PROCEDURE — 99213 OFFICE O/P EST LOW 20 MIN: CPT | Performed by: NURSE PRACTITIONER

## 2023-05-12 PROCEDURE — G8427 DOCREV CUR MEDS BY ELIG CLIN: HCPCS | Performed by: NURSE PRACTITIONER

## 2023-05-12 RX ORDER — TRIAMCINOLONE ACETONIDE 1 MG/G
CREAM TOPICAL
Qty: 80 G | Refills: 2 | Status: SHIPPED | OUTPATIENT
Start: 2023-05-12

## 2023-05-12 ASSESSMENT — ENCOUNTER SYMPTOMS
RESPIRATORY NEGATIVE: 1
ROS SKIN COMMENTS: BRUISING
GASTROINTESTINAL NEGATIVE: 1
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
## 2023-05-15 ENCOUNTER — TELEPHONE (OUTPATIENT)
Dept: FAMILY MEDICINE CLINIC | Age: 63
End: 2023-05-15

## 2023-05-15 ENCOUNTER — APPOINTMENT (OUTPATIENT)
Dept: GENERAL RADIOLOGY | Age: 63
End: 2023-05-15
Payer: MEDICARE

## 2023-05-15 ENCOUNTER — HOSPITAL ENCOUNTER (EMERGENCY)
Age: 63
Discharge: HOME | End: 2023-05-15
Attending: EMERGENCY MEDICINE
Payer: MEDICARE

## 2023-05-15 VITALS
HEART RATE: 67 BPM | TEMPERATURE: 97.3 F | HEIGHT: 66 IN | SYSTOLIC BLOOD PRESSURE: 136 MMHG | OXYGEN SATURATION: 97 % | BODY MASS INDEX: 38.06 KG/M2 | RESPIRATION RATE: 14 BRPM | WEIGHT: 236.8 LBS | DIASTOLIC BLOOD PRESSURE: 78 MMHG

## 2023-05-15 DIAGNOSIS — R07.9 CHEST PAIN, UNSPECIFIED TYPE: Primary | ICD-10-CM

## 2023-05-15 LAB
ANION GAP SERPL CALCULATED.3IONS-SCNC: 10 MMOL/L (ref 3–16)
BASOPHILS # BLD: 0.1 K/UL (ref 0–0.2)
BASOPHILS NFR BLD: 0.8 %
BUN SERPL-MCNC: 24 MG/DL (ref 7–20)
CALCIUM SERPL-MCNC: 9.3 MG/DL (ref 8.3–10.6)
CHLORIDE SERPL-SCNC: 101 MMOL/L (ref 99–110)
CO2 SERPL-SCNC: 26 MMOL/L (ref 21–32)
CREAT SERPL-MCNC: 0.8 MG/DL (ref 0.6–1.2)
DEPRECATED RDW RBC AUTO: 15.2 % (ref 12.4–15.4)
EKG ATRIAL RATE: 79 BPM
EKG DIAGNOSIS: NORMAL
EKG P AXIS: 64 DEGREES
EKG P-R INTERVAL: 162 MS
EKG Q-T INTERVAL: 374 MS
EKG QRS DURATION: 82 MS
EKG QTC CALCULATION (BAZETT): 428 MS
EKG R AXIS: 56 DEGREES
EKG T AXIS: 40 DEGREES
EKG VENTRICULAR RATE: 79 BPM
EOSINOPHIL # BLD: 0.2 K/UL (ref 0–0.6)
EOSINOPHIL NFR BLD: 2.8 %
GFR SERPLBLD CREATININE-BSD FMLA CKD-EPI: >60 ML/MIN/{1.73_M2}
GLUCOSE SERPL-MCNC: 108 MG/DL (ref 70–99)
HCT VFR BLD AUTO: 40.6 % (ref 36–48)
HGB BLD-MCNC: 13.3 G/DL (ref 12–16)
LV EF: 76 %
LVEF MODALITY: NORMAL
LYMPHOCYTES # BLD: 1.6 K/UL (ref 1–5.1)
LYMPHOCYTES NFR BLD: 19.6 %
MCH RBC QN AUTO: 27.8 PG (ref 26–34)
MCHC RBC AUTO-ENTMCNC: 32.8 G/DL (ref 31–36)
MCV RBC AUTO: 85 FL (ref 80–100)
MONOCYTES # BLD: 0.7 K/UL (ref 0–1.3)
MONOCYTES NFR BLD: 7.9 %
NEUTROPHILS # BLD: 5.7 K/UL (ref 1.7–7.7)
NEUTROPHILS NFR BLD: 68.9 %
NT-PROBNP SERPL-MCNC: <36 PG/ML (ref 0–124)
PLATELET # BLD AUTO: 222 K/UL (ref 135–450)
PMV BLD AUTO: 7.6 FL (ref 5–10.5)
POTASSIUM SERPL-SCNC: 4.1 MMOL/L (ref 3.5–5.1)
RBC # BLD AUTO: 4.77 M/UL (ref 4–5.2)
SODIUM SERPL-SCNC: 137 MMOL/L (ref 136–145)
TROPONIN, HIGH SENSITIVITY: 11 NG/L (ref 0–14)
TROPONIN, HIGH SENSITIVITY: 11 NG/L (ref 0–14)
WBC # BLD AUTO: 8.3 K/UL (ref 4–11)

## 2023-05-15 PROCEDURE — 93017 CV STRESS TEST TRACING ONLY: CPT

## 2023-05-15 PROCEDURE — 99285 EMERGENCY DEPT VISIT HI MDM: CPT

## 2023-05-15 PROCEDURE — 6360000002 HC RX W HCPCS: Performed by: PHYSICIAN ASSISTANT

## 2023-05-15 PROCEDURE — 85025 COMPLETE CBC W/AUTO DIFF WBC: CPT

## 2023-05-15 PROCEDURE — A9502 TC99M TETROFOSMIN: HCPCS | Performed by: PHYSICIAN ASSISTANT

## 2023-05-15 PROCEDURE — 36415 COLL VENOUS BLD VENIPUNCTURE: CPT

## 2023-05-15 PROCEDURE — 93005 ELECTROCARDIOGRAM TRACING: CPT | Performed by: EMERGENCY MEDICINE

## 2023-05-15 PROCEDURE — 83880 ASSAY OF NATRIURETIC PEPTIDE: CPT

## 2023-05-15 PROCEDURE — 96374 THER/PROPH/DIAG INJ IV PUSH: CPT

## 2023-05-15 PROCEDURE — 71046 X-RAY EXAM CHEST 2 VIEWS: CPT

## 2023-05-15 PROCEDURE — 3430000000 HC RX DIAGNOSTIC RADIOPHARMACEUTICAL: Performed by: PHYSICIAN ASSISTANT

## 2023-05-15 PROCEDURE — 80048 BASIC METABOLIC PNL TOTAL CA: CPT

## 2023-05-15 PROCEDURE — 78452 HT MUSCLE IMAGE SPECT MULT: CPT

## 2023-05-15 PROCEDURE — 84484 ASSAY OF TROPONIN QUANT: CPT

## 2023-05-15 PROCEDURE — 6370000000 HC RX 637 (ALT 250 FOR IP): Performed by: PHYSICIAN ASSISTANT

## 2023-05-15 RX ORDER — ONDANSETRON 4 MG/1
4 TABLET, FILM COATED ORAL EVERY 8 HOURS PRN
Qty: 12 TABLET | Refills: 0 | Status: SHIPPED | OUTPATIENT
Start: 2023-05-15

## 2023-05-15 RX ORDER — DIPHENHYDRAMINE HCL 25 MG
25 CAPSULE ORAL EVERY 4 HOURS PRN
Qty: 12 CAPSULE | Refills: 0 | Status: SHIPPED | OUTPATIENT
Start: 2023-05-15

## 2023-05-15 RX ORDER — ONDANSETRON 2 MG/ML
4 INJECTION INTRAMUSCULAR; INTRAVENOUS ONCE
Status: COMPLETED | OUTPATIENT
Start: 2023-05-15 | End: 2023-05-15

## 2023-05-15 RX ORDER — DIPHENHYDRAMINE HCL 25 MG
25 TABLET ORAL ONCE
Status: COMPLETED | OUTPATIENT
Start: 2023-05-15 | End: 2023-05-15

## 2023-05-15 RX ADMIN — TETROFOSMIN 10 MILLICURIE: 1.38 INJECTION, POWDER, LYOPHILIZED, FOR SOLUTION INTRAVENOUS at 13:25

## 2023-05-15 RX ADMIN — DIPHENHYDRAMINE HYDROCHLORIDE 25 MG: 25 TABLET ORAL at 11:31

## 2023-05-15 RX ADMIN — ONDANSETRON 4 MG: 2 INJECTION INTRAMUSCULAR; INTRAVENOUS at 11:31

## 2023-05-15 RX ADMIN — TETROFOSMIN 30 MILLICURIE: 1.38 INJECTION, POWDER, LYOPHILIZED, FOR SOLUTION INTRAVENOUS at 14:55

## 2023-05-15 ASSESSMENT — ENCOUNTER SYMPTOMS
ABDOMINAL PAIN: 0
EYE REDNESS: 0
NAUSEA: 1
SHORTNESS OF BREATH: 1
VOMITING: 0
CHEST TIGHTNESS: 1

## 2023-05-15 ASSESSMENT — LIFESTYLE VARIABLES
HOW MANY STANDARD DRINKS CONTAINING ALCOHOL DO YOU HAVE ON A TYPICAL DAY: PATIENT DOES NOT DRINK
HOW OFTEN DO YOU HAVE A DRINK CONTAINING ALCOHOL: NEVER

## 2023-05-15 ASSESSMENT — PAIN DESCRIPTION - LOCATION: LOCATION: HEAD

## 2023-05-15 ASSESSMENT — PAIN - FUNCTIONAL ASSESSMENT: PAIN_FUNCTIONAL_ASSESSMENT: 0-10

## 2023-05-15 ASSESSMENT — PAIN SCALES - GENERAL: PAINLEVEL_OUTOF10: 9

## 2023-05-15 NOTE — DISCHARGE INSTRUCTIONS
Your stress test was negative. Your workup was reassuring. Follow-up with primary care. Return to the emergency department with new or worsening symptoms.

## 2023-05-15 NOTE — ED PROVIDER NOTES
ED Attending Attestation Note     Date of evaluation: 5/15/2023    This patient was seen by the advance practice provider. I have seen and examined the patient, agree with the workup, evaluation, management and diagnosis. The care plan has been discussed. I have reviewed the ECG and concur with the JESSICA's interpretation. My assessment reveals adult female, currently pain free with exertional CP for several weeks. Lungs ctab on my exam.  HEART score 4. No ekg changes, trop stable/ not elevated. Will obtain stress today.        Alyx Bunn MD  05/15/23 5494
light-headedness and headaches. Negative for weakness and numbness. Psychiatric/Behavioral:  The patient is not nervous/anxious. Past Medical, Surgical, Family, and Social History     She has a past medical history of Anxiety, Bipolar 1 disorder ([address] Depression, Dyspareunia in female, Elevated transaminase level, Hemorrhoids, History of tubal ligation, HLD (hyperlipidemia), Hypertension, Irritable bowel syndrome, Lacunar stroke ([address] Menopause, Obesity, Obesity (BMI 30-39.9), Overactive bladder, Prurigo nodularis, Stress incontinence, Urinary incontinence, Uterine fibroid, and Yeast vaginitis. She has a past surgical history that includes Eye surgery (Left); Tonsillectomy; Tubal ligation (1994); Colonoscopy (2010); and Colonoscopy (2018). Her family history includes Arthritis in her brother and father; Breast Cancer in her maternal grandfather; Diabetes in her maternal grandfather and mother; Heart Disease in her mother. She reports that she has never smoked. She has never used smokeless tobacco. She reports current alcohol use. She reports that she does not use drugs. Medications     Previous Medications    ALBUTEROL SULFATE HFA (VENTOLIN HFA) 108 (90 BASE) MCG/ACT INHALER    Inhale 2 puffs into the lungs 4 times daily as needed for Wheezing or Shortness of Breath    AMLODIPINE (NORVASC) 5 MG TABLET    Take 1 tablet by mouth daily    ARIPIPRAZOLE (ABILIFY) 20 MG TABLET    TAKE 1 TABLET BY MOUTH ONCE DAILY IN THE EVENING    ASPIRIN 325 MG TABLET    Take 1 tablet by mouth daily    ATORVASTATIN (LIPITOR) 20 MG TABLET    Take 1 tablet by mouth at bedtime    GUAIFENESIN (ROBITUSSIN) 100 MG/5ML LIQUID    Take 10 mLs by mouth 3 times daily as needed for Cough    HYDROXYZINE HCL (ATARAX) 50 MG TABLET    TAKE 1 TO 2 TABLETS BY MOUTH NIGHTLY    MECLIZINE (ANTIVERT) 12.5 MG TABLET    Take 1 tablet by mouth 3 times daily as needed for Dizziness    MISC.  DEVICES (SILICONE EAR PLUGS) MISC    Use daily as needed

## 2023-05-15 NOTE — TELEPHONE ENCOUNTER
*Nurse Triage*   [phone]  SOB when active, Lip tingling, Slight headache since 5/14/23  ---Scheduled Today 5/15/23 at 8:30am with Dr. Shawna Monahan.

## 2023-05-19 ENCOUNTER — CARE COORDINATION (OUTPATIENT)
Dept: CARE COORDINATION | Age: 63
End: 2023-05-19

## 2023-05-19 NOTE — CARE COORDINATION
ACM attempted ED follow up, phone would ring then disconnect. Unable to leave message. Will attempt follow up at a later date. Patient has not been active on [facility] since 2020.

## 2023-05-23 ENCOUNTER — TELEPHONE (OUTPATIENT)
Dept: FAMILY MEDICINE CLINIC | Age: 63
End: 2023-05-23

## 2023-05-23 NOTE — TELEPHONE ENCOUNTER
----- Message from Albert Malin sent at 5/23/2023  7:37 AM EDT -----  Subject: Message to Provider    QUESTIONS  Information for Provider? Patient is calling because she states she has a   skin condiotion and that she will see a specialist soon. She states she   has sores all over with some burning and itching. She informs that she is   taking Tylenol, but would like to know if she could get a stronger one. Her pharmacy is [address] [place] [address],[address], [address] [phone] -  [phone]. Please advise  ---------------------------------------------------------------------------  --------------  Chidi PAYTON  [phone]; OK to leave message on voicemail  ---------------------------------------------------------------------------  --------------  SCRIPT ANSWERS  Relationship to Patient?  Self

## 2023-06-12 ENCOUNTER — TELEPHONE (OUTPATIENT)
Dept: FAMILY MEDICINE CLINIC | Age: 63
End: 2023-06-12

## 2023-06-12 PROBLEM — R41.82 AMS (ALTERED MENTAL STATUS): Status: ACTIVE | Noted: 2023-06-12

## 2023-06-20 ENCOUNTER — TELEPHONE (OUTPATIENT)
Dept: FAMILY MEDICINE CLINIC | Age: 63
End: 2023-06-20

## 2023-06-20 NOTE — TELEPHONE ENCOUNTER
MA reached out to patient regarding no show / missed appt.     Spoke with patient, either rescheduled appointment or pt will call back to reschedule

## 2023-06-21 ENCOUNTER — CARE COORDINATION (OUTPATIENT)
Dept: CARE COORDINATION | Age: 63
End: 2023-06-21

## 2023-06-21 RX ORDER — ATORVASTATIN CALCIUM 20 MG/1
20 TABLET, FILM COATED ORAL NIGHTLY
Qty: 90 TABLET | Refills: 0 | Status: SHIPPED | OUTPATIENT
Start: 2023-06-21
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
## 2023-06-21 NOTE — TELEPHONE ENCOUNTER
Last Office Visit  -  05/12/2023  Next Office Visit  -  06/23/2023    Last Filled  -    Last UDS -    Contract -

## 2023-06-22 ENCOUNTER — CARE COORDINATION (OUTPATIENT)
Dept: CARE COORDINATION | Age: 63
End: 2023-06-22

## 2023-06-22 NOTE — CARE COORDINATION
SW received referral from [facility] for community resource needs related to caregiver support for patient and her spouse. Placed call to patient; left voicemail and requested return call. Will continue to follow accordingly.      Ariella MORALES, [facility]     [phone]

## 2023-06-23 ENCOUNTER — TELEPHONE (OUTPATIENT)
Dept: FAMILY MEDICINE CLINIC | Age: 63
End: 2023-06-23

## 2023-06-23 DIAGNOSIS — E66.01 CLASS 3 SEVERE OBESITY DUE TO EXCESS CALORIES WITH SERIOUS COMORBIDITY AND BODY MASS INDEX (BMI) OF 40.0 TO 44.9 IN ADULT (HCC): Primary | ICD-10-CM

## 2023-06-23 NOTE — TELEPHONE ENCOUNTER
Referral placed, please remind patient that missed her appointment today. Please offer to rescheduled in office appointment.    [facility] - [employer], Vilma Sanford MD  [address] [place] [address] [address], [address], [facility]  [phone]

## 2023-06-23 NOTE — TELEPHONE ENCOUNTER
Please advise       Reason for referral request? Referral for weight loss Wants to have   surgery   Provider patient wants to be referred to(if known):     Provider Phone Number(if known):      Additional Information for Provider?   ---------------------------------------------------------------------------   --------------   [facility]     [phone]; OK to leave message on voicemail   ---------------------------------------------------------------------------   --------------

## 2023-06-26 ENCOUNTER — OFFICE VISIT (OUTPATIENT)
Dept: FAMILY MEDICINE CLINIC | Age: 63
End: 2023-06-26
Payer: COMMERCIAL

## 2023-06-26 ENCOUNTER — CARE COORDINATION (OUTPATIENT)
Dept: CARE COORDINATION | Age: 63
End: 2023-06-26

## 2023-06-26 VITALS
WEIGHT: 232.6 LBS | SYSTOLIC BLOOD PRESSURE: 132 MMHG | BODY MASS INDEX: 39.71 KG/M2 | HEART RATE: 84 BPM | OXYGEN SATURATION: 98 % | DIASTOLIC BLOOD PRESSURE: 86 MMHG | HEIGHT: 64 IN

## 2023-06-26 DIAGNOSIS — F43.9 STRESS AT HOME: Primary | ICD-10-CM

## 2023-06-26 PROCEDURE — 99213 OFFICE O/P EST LOW 20 MIN: CPT | Performed by: NURSE PRACTITIONER

## 2023-06-26 PROCEDURE — 3017F COLORECTAL CA SCREEN DOC REV: CPT | Performed by: NURSE PRACTITIONER

## 2023-06-26 PROCEDURE — G8417 CALC BMI ABV UP PARAM F/U: HCPCS | Performed by: NURSE PRACTITIONER

## 2023-06-26 PROCEDURE — 1036F TOBACCO NON-USER: CPT | Performed by: NURSE PRACTITIONER

## 2023-06-26 PROCEDURE — G8427 DOCREV CUR MEDS BY ELIG CLIN: HCPCS | Performed by: NURSE PRACTITIONER

## 2023-06-26 ASSESSMENT — PATIENT HEALTH QUESTIONNAIRE - PHQ9
3. TROUBLE FALLING OR STAYING ASLEEP: 0
SUM OF ALL RESPONSES TO PHQ QUESTIONS 1-9: 3
SUM OF ALL RESPONSES TO PHQ QUESTIONS 1-9: 3
2. FEELING DOWN, DEPRESSED OR HOPELESS: 0
9. THOUGHTS THAT YOU WOULD BE BETTER OFF DEAD, OR OF HURTING YOURSELF: 0
8. MOVING OR SPEAKING SO SLOWLY THAT OTHER PEOPLE COULD HAVE NOTICED. OR THE OPPOSITE, BEING SO FIGETY OR RESTLESS THAT YOU HAVE BEEN MOVING AROUND A LOT MORE THAN USUAL: 0
4. FEELING TIRED OR HAVING LITTLE ENERGY: 0
SUM OF ALL RESPONSES TO PHQ QUESTIONS 1-9: 3
6. FEELING BAD ABOUT YOURSELF - OR THAT YOU ARE A FAILURE OR HAVE LET YOURSELF OR YOUR FAMILY DOWN: 0
1. LITTLE INTEREST OR PLEASURE IN DOING THINGS: 0
SUM OF ALL RESPONSES TO PHQ QUESTIONS 1-9: 3
7. TROUBLE CONCENTRATING ON THINGS, SUCH AS READING THE NEWSPAPER OR WATCHING TELEVISION: 0
5. POOR APPETITE OR OVEREATING: 3
SUM OF ALL RESPONSES TO PHQ9 QUESTIONS 1 & 2: 0

## 2023-06-26 ASSESSMENT — ENCOUNTER SYMPTOMS
GASTROINTESTINAL NEGATIVE: 1
RESPIRATORY NEGATIVE: 1

## 2023-06-27 ENCOUNTER — TELEPHONE (OUTPATIENT)
Dept: BARIATRICS/WEIGHT MGMT | Age: 63
End: 2023-06-27

## 2023-06-28 ENCOUNTER — CARE COORDINATION (OUTPATIENT)
Dept: CASE MANAGEMENT | Age: 63
End: 2023-06-28

## 2023-06-28 ENCOUNTER — CARE COORDINATION (OUTPATIENT)
Dept: CARE COORDINATION | Age: 63
End: 2023-06-28

## 2023-07-03 ENCOUNTER — CARE COORDINATION (OUTPATIENT)
Dept: CARE COORDINATION | Age: 63
End: 2023-07-03

## 2023-07-03 NOTE — CARE COORDINATION
SW placed follow-up call to patient to ensure receipt of e-mailed AD packet. Patient looked in both inbox and spam folders and could not locate e-mail. SW re-sent e-mail and she did not see it come through. SW to mail packet to home address. Will send request to [facility] on this date. Will follow-up next week to ensure receipt of mail.       Romero Kincaid MSW, [facility]     [phone]

## 2023-07-05 ENCOUNTER — TELEPHONE (OUTPATIENT)
Dept: FAMILY MEDICINE CLINIC | Age: 63
End: 2023-07-05

## 2023-07-07 ENCOUNTER — TELEMEDICINE (OUTPATIENT)
Dept: FAMILY MEDICINE CLINIC | Age: 63
End: 2023-07-07
Payer: MEDICARE

## 2023-07-07 DIAGNOSIS — R41.3 MEMORY CHANGE: ICD-10-CM

## 2023-07-07 DIAGNOSIS — F31.9 BIPOLAR AFFECTIVE DISORDER, REMISSION STATUS UNSPECIFIED (HCC): ICD-10-CM

## 2023-07-07 DIAGNOSIS — F41.8 DEPRESSION WITH ANXIETY: ICD-10-CM

## 2023-07-07 DIAGNOSIS — Z86.73 HISTORY OF CVA (CEREBROVASCULAR ACCIDENT): Primary | ICD-10-CM

## 2023-07-07 PROCEDURE — 99442 PR PHYS/QHP TELEPHONE EVALUATION 11-20 MIN: CPT | Performed by: NURSE PRACTITIONER

## 2023-07-07 RX ORDER — ARIPIPRAZOLE 20 MG/1
TABLET ORAL
Qty: 30 TABLET | Refills: 0 | Status: SHIPPED | OUTPATIENT
Start: 2023-07-07

## 2023-07-07 ASSESSMENT — PATIENT HEALTH QUESTIONNAIRE - PHQ9
SUM OF ALL RESPONSES TO PHQ QUESTIONS 1-9: 13
5. POOR APPETITE OR OVEREATING: 3
3. TROUBLE FALLING OR STAYING ASLEEP: 1
1. LITTLE INTEREST OR PLEASURE IN DOING THINGS: 1
8. MOVING OR SPEAKING SO SLOWLY THAT OTHER PEOPLE COULD HAVE NOTICED. OR THE OPPOSITE, BEING SO FIGETY OR RESTLESS THAT YOU HAVE BEEN MOVING AROUND A LOT MORE THAN USUAL: 3
9. THOUGHTS THAT YOU WOULD BE BETTER OFF DEAD, OR OF HURTING YOURSELF: 0
SUM OF ALL RESPONSES TO PHQ QUESTIONS 1-9: 13
4. FEELING TIRED OR HAVING LITTLE ENERGY: 1
6. FEELING BAD ABOUT YOURSELF - OR THAT YOU ARE A FAILURE OR HAVE LET YOURSELF OR YOUR FAMILY DOWN: 1
7. TROUBLE CONCENTRATING ON THINGS, SUCH AS READING THE NEWSPAPER OR WATCHING TELEVISION: 3
SUM OF ALL RESPONSES TO PHQ QUESTIONS 1-9: 13
SUM OF ALL RESPONSES TO PHQ9 QUESTIONS 1 & 2: 1
2. FEELING DOWN, DEPRESSED OR HOPELESS: 0
SUM OF ALL RESPONSES TO PHQ QUESTIONS 1-9: 13

## 2023-07-07 NOTE — PROGRESS NOTES
Robby Rivas is a 63 y.o. female evaluated via telephone on 7/7/2023 for Other (Slurred speech- trouble paying attention ) and Memory Loss (Short-term)  . Documentation:  I communicated with the patient and/or health care decision maker about   Having slurred speech and stuttering. She had a history of stroke. She states she was supposed to follow up with speech after her last hospital stay but she miss placed the information. She has been having trouble focusing to get her BP . She has been having a lot of anxiety. She states she was diagnosed years ago with bipolar and then recently told she does not have have bipolar and that she has depression. She feels like she needs something for anxiety. She states she has not taken her Abilify in 2 years. Details of this discussion including any medical advice provided:     Clarissa Case was seen today for other and memory loss. Diagnoses and all orders for this visit:    History of CVA (cerebrovascular accident)  Referral given for speech. Will need to be further evaluated for memory. Some of it could be anxiety related  -     Rometta Oppenheim, MD, [facility]rology, [facility]  -     External Referral To Home Health    Bipolar affective disorder, remission status unspecified ([address])  Has an appointment with psychiatrist in [place] per patient will restart Abilify and add anti-depressant.   -     ARIPiprazole (ABILIFY) 20 MG tablet; TAKE 1 TABLET BY MOUTH ONCE DAILY IN THE EVENING    Memory change  -     Rometta Oppenheim, MD, [facility]rology, [facility]  -     External Referral To Home Health    Depression with anxiety  Start zoloft 1 tablet daily and follow up in 1 month. -     sertraline (ZOLOFT) 50 MG tablet; Take 1 tablet by mouth daily         Total Time: 56-30 minutes    Robby Rivas was evaluated through a synchronous (real-time) audio encounter. Patient identification was verified at the start of the visit.

## 2023-07-09 ENCOUNTER — APPOINTMENT (OUTPATIENT)
Dept: GENERAL RADIOLOGY | Age: 63
End: 2023-07-09
Payer: COMMERCIAL

## 2023-07-09 ENCOUNTER — HOSPITAL ENCOUNTER (OUTPATIENT)
Age: 63
Setting detail: OBSERVATION
Discharge: HOME | End: 2023-07-10
Attending: STUDENT IN AN ORGANIZED HEALTH CARE EDUCATION/TRAINING PROGRAM
Payer: COMMERCIAL

## 2023-07-09 DIAGNOSIS — R07.9 CHEST PAIN, UNSPECIFIED TYPE: Primary | ICD-10-CM

## 2023-07-09 LAB
ALBUMIN SERPL-MCNC: 3.6 G/DL (ref 3.4–5)
ALBUMIN/GLOB SERPL: 1.1 {RATIO} (ref 1.1–2.2)
ALP SERPL-CCNC: 74 U/L (ref 40–129)
ALT SERPL-CCNC: 28 U/L (ref 10–40)
ANION GAP SERPL CALCULATED.3IONS-SCNC: 14 MMOL/L (ref 3–16)
AST SERPL-CCNC: 34 U/L (ref 15–37)
BASOPHILS # BLD: 0.1 K/UL (ref 0–0.2)
BASOPHILS NFR BLD: 1 %
BILIRUB SERPL-MCNC: <0.2 MG/DL (ref 0–1)
BUN SERPL-MCNC: 14 MG/DL (ref 7–20)
CALCIUM SERPL-MCNC: 9.3 MG/DL (ref 8.3–10.6)
CHLORIDE SERPL-SCNC: 105 MMOL/L (ref 99–110)
CO2 SERPL-SCNC: 20 MMOL/L (ref 21–32)
CREAT SERPL-MCNC: 0.7 MG/DL (ref 0.6–1.2)
DEPRECATED RDW RBC AUTO: 14.3 % (ref 12.4–15.4)
EOSINOPHIL # BLD: 0.2 K/UL (ref 0–0.6)
EOSINOPHIL NFR BLD: 3.2 %
GFR SERPLBLD CREATININE-BSD FMLA CKD-EPI: >60 ML/MIN/{1.73_M2}
GLUCOSE SERPL-MCNC: 108 MG/DL (ref 70–99)
HCT VFR BLD AUTO: 39.7 % (ref 36–48)
HGB BLD-MCNC: 13.3 G/DL (ref 12–16)
LYMPHOCYTES # BLD: 2 K/UL (ref 1–5.1)
LYMPHOCYTES NFR BLD: 33 %
MCH RBC QN AUTO: 27.7 PG (ref 26–34)
MCHC RBC AUTO-ENTMCNC: 33.4 G/DL (ref 31–36)
MCV RBC AUTO: 82.7 FL (ref 80–100)
MONOCYTES # BLD: 0.4 K/UL (ref 0–1.3)
MONOCYTES NFR BLD: 6.9 %
NEUTROPHILS # BLD: 3.4 K/UL (ref 1.7–7.7)
NEUTROPHILS NFR BLD: 55.9 %
PLATELET # BLD AUTO: 208 K/UL (ref 135–450)
PMV BLD AUTO: 8.3 FL (ref 5–10.5)
POTASSIUM SERPL-SCNC: 4.3 MMOL/L (ref 3.5–5.1)
PROT SERPL-MCNC: 6.9 G/DL (ref 6.4–8.2)
RBC # BLD AUTO: 4.8 M/UL (ref 4–5.2)
SODIUM SERPL-SCNC: 139 MMOL/L (ref 136–145)
TROPONIN, HIGH SENSITIVITY: 11 NG/L (ref 0–14)
TROPONIN, HIGH SENSITIVITY: 12 NG/L (ref 0–14)
TROPONIN, HIGH SENSITIVITY: 15 NG/L (ref 0–14)
WBC # BLD AUTO: 6.1 K/UL (ref 4–11)

## 2023-07-09 PROCEDURE — 36415 COLL VENOUS BLD VENIPUNCTURE: CPT

## 2023-07-09 PROCEDURE — 6370000000 HC RX 637 (ALT 250 FOR IP): Performed by: STUDENT IN AN ORGANIZED HEALTH CARE EDUCATION/TRAINING PROGRAM

## 2023-07-09 PROCEDURE — 6360000002 HC RX W HCPCS: Performed by: STUDENT IN AN ORGANIZED HEALTH CARE EDUCATION/TRAINING PROGRAM

## 2023-07-09 PROCEDURE — 80053 COMPREHEN METABOLIC PANEL: CPT

## 2023-07-09 PROCEDURE — 99285 EMERGENCY DEPT VISIT HI MDM: CPT

## 2023-07-09 PROCEDURE — 93005 ELECTROCARDIOGRAM TRACING: CPT | Performed by: STUDENT IN AN ORGANIZED HEALTH CARE EDUCATION/TRAINING PROGRAM

## 2023-07-09 PROCEDURE — 93005 ELECTROCARDIOGRAM TRACING: CPT | Performed by: EMERGENCY MEDICINE

## 2023-07-09 PROCEDURE — 85025 COMPLETE CBC W/AUTO DIFF WBC: CPT

## 2023-07-09 PROCEDURE — 96374 THER/PROPH/DIAG INJ IV PUSH: CPT

## 2023-07-09 PROCEDURE — 71046 X-RAY EXAM CHEST 2 VIEWS: CPT

## 2023-07-09 PROCEDURE — 84484 ASSAY OF TROPONIN QUANT: CPT

## 2023-07-09 RX ORDER — NITROGLYCERIN 0.4 MG/1
0.4 TABLET SUBLINGUAL ONCE
Status: COMPLETED | OUTPATIENT
Start: 2023-07-09 | End: 2023-07-09

## 2023-07-09 RX ORDER — ONDANSETRON 2 MG/ML
4 INJECTION INTRAMUSCULAR; INTRAVENOUS ONCE
Status: COMPLETED | OUTPATIENT
Start: 2023-07-09 | End: 2023-07-09

## 2023-07-09 RX ADMIN — NITROGLYCERIN 0.4 MG: 0.4 TABLET, ORALLY DISINTEGRATING SUBLINGUAL at 20:23

## 2023-07-09 RX ADMIN — ONDANSETRON 4 MG: 2 INJECTION INTRAMUSCULAR; INTRAVENOUS at 20:05

## 2023-07-09 RX ADMIN — MICONAZOLE NITRATE: 20 CREAM TOPICAL at 22:53

## 2023-07-09 ASSESSMENT — HEART SCORE
ECG: 0
ECG: 0

## 2023-07-09 ASSESSMENT — PAIN - FUNCTIONAL ASSESSMENT: PAIN_FUNCTIONAL_ASSESSMENT: NONE - DENIES PAIN

## 2023-07-10 ENCOUNTER — CARE COORDINATION (OUTPATIENT)
Dept: CARE COORDINATION | Age: 63
End: 2023-07-10

## 2023-07-10 ENCOUNTER — APPOINTMENT (OUTPATIENT)
Dept: CT IMAGING | Age: 63
End: 2023-07-10
Payer: COMMERCIAL

## 2023-07-10 ENCOUNTER — TELEPHONE (OUTPATIENT)
Dept: FAMILY MEDICINE CLINIC | Age: 63
End: 2023-07-10

## 2023-07-10 VITALS
HEART RATE: 78 BPM | WEIGHT: 231.6 LBS | RESPIRATION RATE: 24 BRPM | TEMPERATURE: 97.8 F | OXYGEN SATURATION: 95 % | DIASTOLIC BLOOD PRESSURE: 82 MMHG | SYSTOLIC BLOOD PRESSURE: 122 MMHG | BODY MASS INDEX: 39.54 KG/M2 | HEIGHT: 64 IN

## 2023-07-10 DIAGNOSIS — R07.9 CHEST PAIN, UNSPECIFIED TYPE: Primary | ICD-10-CM

## 2023-07-10 LAB
EKG ATRIAL RATE: 58 BPM
EKG ATRIAL RATE: 68 BPM
EKG DIAGNOSIS: NORMAL
EKG DIAGNOSIS: NORMAL
EKG P AXIS: 27 DEGREES
EKG P AXIS: 71 DEGREES
EKG P-R INTERVAL: 172 MS
EKG P-R INTERVAL: 204 MS
EKG Q-T INTERVAL: 410 MS
EKG Q-T INTERVAL: 422 MS
EKG QRS DURATION: 84 MS
EKG QRS DURATION: 86 MS
EKG QTC CALCULATION (BAZETT): 414 MS
EKG QTC CALCULATION (BAZETT): 435 MS
EKG R AXIS: 40 DEGREES
EKG R AXIS: 44 DEGREES
EKG T AXIS: 20 DEGREES
EKG T AXIS: 31 DEGREES
EKG VENTRICULAR RATE: 58 BPM
EKG VENTRICULAR RATE: 68 BPM

## 2023-07-10 PROCEDURE — 6370000000 HC RX 637 (ALT 250 FOR IP): Performed by: EMERGENCY MEDICINE

## 2023-07-10 PROCEDURE — 6360000004 HC RX CONTRAST MEDICATION: Performed by: STUDENT IN AN ORGANIZED HEALTH CARE EDUCATION/TRAINING PROGRAM

## 2023-07-10 PROCEDURE — G0378 HOSPITAL OBSERVATION PER HR: HCPCS

## 2023-07-10 PROCEDURE — 6370000000 HC RX 637 (ALT 250 FOR IP): Performed by: STUDENT IN AN ORGANIZED HEALTH CARE EDUCATION/TRAINING PROGRAM

## 2023-07-10 PROCEDURE — 75574 CT ANGIO HRT W/3D IMAGE: CPT

## 2023-07-10 RX ORDER — HYDROXYZINE HYDROCHLORIDE 25 MG/1
50 TABLET, FILM COATED ORAL ONCE
Status: COMPLETED | OUTPATIENT
Start: 2023-07-10 | End: 2023-07-10

## 2023-07-10 RX ORDER — AMLODIPINE BESYLATE 5 MG/1
5 TABLET ORAL DAILY
Status: DISCONTINUED | OUTPATIENT
Start: 2023-07-10 | End: 2023-07-10 | Stop reason: HOSPADM

## 2023-07-10 RX ORDER — OXYCODONE HYDROCHLORIDE 5 MG/1
5 TABLET ORAL ONCE
Status: COMPLETED | OUTPATIENT
Start: 2023-07-10 | End: 2023-07-10

## 2023-07-10 RX ORDER — SODIUM CHLORIDE 0.9 % (FLUSH) 0.9 %
5-40 SYRINGE (ML) INJECTION EVERY 12 HOURS SCHEDULED
Status: DISCONTINUED | OUTPATIENT
Start: 2023-07-10 | End: 2023-07-10 | Stop reason: HOSPADM

## 2023-07-10 RX ORDER — MECLIZINE HYDROCHLORIDE 25 MG/1
12.5 TABLET ORAL 3 TIMES DAILY PRN
Status: DISCONTINUED | OUTPATIENT
Start: 2023-07-10 | End: 2023-07-10 | Stop reason: HOSPADM

## 2023-07-10 RX ORDER — ASPIRIN 325 MG
325 TABLET ORAL DAILY
Status: DISCONTINUED | OUTPATIENT
Start: 2023-07-10 | End: 2023-07-10 | Stop reason: HOSPADM

## 2023-07-10 RX ORDER — ARIPIPRAZOLE 5 MG/1
20 TABLET ORAL EVERY EVENING
Status: DISCONTINUED | OUTPATIENT
Start: 2023-07-10 | End: 2023-07-10 | Stop reason: HOSPADM

## 2023-07-10 RX ORDER — SODIUM CHLORIDE 0.9 % (FLUSH) 0.9 %
5-40 SYRINGE (ML) INJECTION PRN
Status: DISCONTINUED | OUTPATIENT
Start: 2023-07-10 | End: 2023-07-10 | Stop reason: HOSPADM

## 2023-07-10 RX ORDER — SODIUM CHLORIDE 9 MG/ML
INJECTION, SOLUTION INTRAVENOUS PRN
Status: DISCONTINUED | OUTPATIENT
Start: 2023-07-10 | End: 2023-07-10 | Stop reason: HOSPADM

## 2023-07-10 RX ORDER — ASPIRIN 81 MG/1
81 TABLET, CHEWABLE ORAL DAILY
Status: DISCONTINUED | OUTPATIENT
Start: 2023-07-10 | End: 2023-07-10 | Stop reason: HOSPADM

## 2023-07-10 RX ORDER — ATORVASTATIN CALCIUM 20 MG/1
20 TABLET, FILM COATED ORAL NIGHTLY
Status: DISCONTINUED | OUTPATIENT
Start: 2023-07-10 | End: 2023-07-10 | Stop reason: HOSPADM

## 2023-07-10 RX ORDER — NITROGLYCERIN 0.4 MG/1
0.4 TABLET SUBLINGUAL ONCE
Status: COMPLETED | OUTPATIENT
Start: 2023-07-10 | End: 2023-07-10

## 2023-07-10 RX ADMIN — IOPAMIDOL 80 ML: 755 INJECTION, SOLUTION INTRAVENOUS at 10:54

## 2023-07-10 RX ADMIN — HYDROXYZINE HYDROCHLORIDE 50 MG: 25 TABLET ORAL at 03:41

## 2023-07-10 RX ADMIN — OXYCODONE HYDROCHLORIDE 5 MG: 5 TABLET ORAL at 03:41

## 2023-07-10 RX ADMIN — NITROGLYCERIN 0.4 MG: 0.4 TABLET SUBLINGUAL at 10:54

## 2023-07-10 ASSESSMENT — PAIN - FUNCTIONAL ASSESSMENT: PAIN_FUNCTIONAL_ASSESSMENT: 0-10

## 2023-07-10 ASSESSMENT — PAIN SCALES - GENERAL: PAINLEVEL_OUTOF10: 0

## 2023-07-10 NOTE — TELEPHONE ENCOUNTER
----- Message from Slime Kuo sent at 7/10/2023  3:43 PM EDT -----  Subject: Message to Provider    QUESTIONS  Information for Provider? patient called state she was in [facility] on   7/9 for chest pains, given nitro and wanted DUANE Abdi to know, also   patient is looking for a referral to a cardiologist and would like to   begin her therapy for speech, cognitive function, patient states she has   been losing her balance   ---------------------------------------------------------------------------  --------------  Tootie Marker INFO  [phone]; OK to leave message on voicemail  ---------------------------------------------------------------------------  --------------  SCRIPT ANSWERS  Relationship to Patient?  Self

## 2023-07-10 NOTE — CARE COORDINATION
Patient was scheduled for a f/u but is currently admitted to the hospital. Will follow up upon release.

## 2023-07-10 NOTE — TELEPHONE ENCOUNTER
I placed the referral to cardiology. We faxed the order for home health for speech and therapy today. [facility] should reach out to her.    [address], [place]  [address] [place] [address],[address]  [phone]

## 2023-07-10 NOTE — ED NOTES
Patient reports being NPO since midnight, states she has had no caffeine since Saturday.       Frank Rodriguez RN  07/10/23 1611

## 2023-07-10 NOTE — ED PROVIDER NOTES
reactive to light. Cardiovascular:      Rate and Rhythm: Normal rate and regular rhythm. Pulmonary:      Effort: Pulmonary effort is normal.      Breath sounds: Normal breath sounds. Abdominal:      Palpations: Abdomen is soft. Tenderness: There is no abdominal tenderness. Musculoskeletal:         General: Normal range of motion. Cervical back: Normal range of motion and neck supple. Skin:     General: Skin is warm and dry. Findings: No rash. Neurological:      General: No focal deficit present. Mental Status: She is alert and oriented to person, place, and time.    Psychiatric:         Mood and Affect: Mood normal.         Behavior: Behavior normal.          Royce Coleman MD  07/10/23 3879

## 2023-07-12 ENCOUNTER — TELEPHONE (OUTPATIENT)
Dept: FAMILY MEDICINE CLINIC | Age: 63
End: 2023-07-12

## 2023-07-12 ENCOUNTER — CARE COORDINATION (OUTPATIENT)
Dept: CARE COORDINATION | Age: 63
End: 2023-07-12

## 2023-07-12 NOTE — TELEPHONE ENCOUNTER
[facility] said they received the order from [facility] for the patient to receive PT and speech therapy but they can not do this through initial telephone visit. This must be changed for either in person or video visit. Please change on order.     Any questions please call [address]

## 2023-07-12 NOTE — CARE COORDINATION
SW placed follow-up call to ensure receipt of mailed AD packet. Left voicemail and requested return call.      Bailey MORALES, [place]     [phone]

## 2023-07-13 ENCOUNTER — TELEPHONE (OUTPATIENT)
Dept: FAMILY MEDICINE CLINIC | Age: 63
End: 2023-07-13

## 2023-07-14 ENCOUNTER — CARE COORDINATION (OUTPATIENT)
Dept: CARE COORDINATION | Age: 63
End: 2023-07-14

## 2023-07-14 ENCOUNTER — TELEPHONE (OUTPATIENT)
Dept: FAMILY MEDICINE CLINIC | Age: 63
End: 2023-07-14

## 2023-07-14 LAB
EKG ATRIAL RATE: 59 BPM
EKG DIAGNOSIS: NORMAL
EKG P AXIS: 34 DEGREES
EKG P-R INTERVAL: 184 MS
EKG Q-T INTERVAL: 438 MS
EKG QRS DURATION: 84 MS
EKG QTC CALCULATION (BAZETT): 433 MS
EKG R AXIS: 40 DEGREES
EKG T AXIS: 29 DEGREES
EKG VENTRICULAR RATE: 59 BPM

## 2023-07-14 NOTE — TELEPHONE ENCOUNTER
Contacted pt- pt is wanting to hold off on PT and Speech therapies for now- pt states she went on a run this morning and would like to try exercising herself before starting therapy. Pt is going to f/u with cardiology and neurology. Pt is requesting advice if PCP is okay with holding off on therapies for now.

## 2023-07-14 NOTE — TELEPHONE ENCOUNTER
Patient called in asking me to put back a message to Anirudh López. Before I could get patients full name and date of birth patient started raising her voice \"you're just a fucking , do your damn job and put back a message like I'm asking you to do. \" I asked patient to please not speak to me like that and patient continued to raise her voice and cuss at me demanding for me \"put someone else on the damn phone\". Proceeded to tell patient that she can call back in when she wants to call in with and speak to our staff with respect- proceeded to end call with patient.

## 2023-07-14 NOTE — TELEPHONE ENCOUNTER
----- Message from Dong Joyner sent at 7/14/2023  8:42 AM EDT -----  Subject: Message to Provider    QUESTIONS  Information for Provider? Patient wanted to relay a message that she went   for a run and a swim and she thinks she will be okay.  ---------------------------------------------------------------------------  --------------  Ashley Spivey DTZX  [phone]; OK to leave message on voicemail  ---------------------------------------------------------------------------  --------------  SCRIPT ANSWERS  Relationship to Patient?  Self

## 2023-07-17 ENCOUNTER — CARE COORDINATION (OUTPATIENT)
Dept: CARE COORDINATION | Age: 63
End: 2023-07-17

## 2023-07-17 NOTE — CARE COORDINATION
Post Op Day#: 1    Subjective: "Doing well, no N/V, "    Objective: No acute events overnight, effective pain control. Continuous pulse oximetry at bedside functioning, v/s stable, afebrile. Labs within acceptable range. Ambulating  independently around the unit. Tolerating bariatric clear liquid diet, voiding as expected.                                              Vital Signs Last 24 Hrs  T(C): 36.6 (29 Apr 2021 09:02), Max: 37 (28 Apr 2021 18:10)  T(F): 97.9 (29 Apr 2021 09:02), Max: 98.6 (28 Apr 2021 18:10)  HR: 68 (29 Apr 2021 09:02) (66 - 84)  BP: 124/75 (29 Apr 2021 09:02) (114/70 - 140/66)  BP(mean): 92 (28 Apr 2021 14:15) (92 - 102)  RR: 18 (29 Apr 2021 09:02) (14 - 18)  SpO2: 94% (29 Apr 2021 09:02) (94% - 100%)                                               I&O's Summary    28 Apr 2021 07:01  -  29 Apr 2021 07:00  --------------------------------------------------------  IN: 3810 mL / OUT: 1600 mL / NET: 2210 mL                                                                          11.1   7.80  )-----------( 209      ( 29 Apr 2021 07:16 )             32.4                                                 04-29    138  |  104  |  5<L>  ----------------------------<  98  3.4<L>   |  21<L>  |  0.46<L>    Ca    8.1<L>      29 Apr 2021 07:15  Phos  2.4     04-28  Mg     1.9     04-28      heparin   Injectable 5000 Unit(s) SubCutaneous every 8 hours  hyoscyamine SL 0.125 milliGRAM(s) SubLingual every 6 hours  lactated ringers. 1000 milliLiter(s) IV Continuous <Continuous>  pantoprazole  Injectable 40 milliGRAM(s) IV Push every 24 hours  potassium chloride   Solution 20 milliEquivalent(s) Oral every 2 hours  sodium chloride 0.9% 1000 milliLiter(s) IV Continuous <Continuous>      Physical Exam:         Lungs:  clear breath sounds b/l       Heart:  Regular rate & rhythm       Abdomen:  Soft, non-distended.  Scopes sites clean, dry and intact. + bs, - flatus, no rebound or guarding       Skin:  intact, pannus w/o rash       Extremities: + pulses, no edema, no calf tenderness, negative kylah's     Extended VTE Risk Assessment               [facility]  VTE Predicted RISK Low:   (<1% ), No  post D/C extended postoperative VTE prophylaxis        Assessment and Plan: Lap Sleeve Gastrectomy POD # 1  - Bariatric Clear diet today then protocol derived staged meal progression supervised by RD in outpatient setting  - DVT/GI prophylaxis, Incentive spirometry  - Ambulate as tolerated  - Procedure specific education including diet, vitamins and VTE prevention, written materials given  - Medication reviewed and reconciled, Bariatric meds obtained from [facility] prior to d/c  - D/C home this afternoon once Bariatric 8-Point d/c criteria met  - Follow up with Dr Naranjo in 7-10 days, Dietitian and PMD in 30 days.      Estefania Bee, WHITNEY, ANP  [phone]
SW placed call to patient to follow-up on receipt of mailed AD packet. Patient stated she was in the middle of something and this worker could not determine if mailed packet was received or not. Patient requested for [facility] call her; SW advised that she is out of the office at this time. Patient would not state why she wanted to speak to [facility] and ended call. SW to sign off at this time, will remain available if patient needs assistance in completion of AD forms.      Aleida Melo MSW, [facility] [facility]     [phone]
    STATUS POST:  Laparoscopic sleeve gastrectomy    POST OPERATIVE DAY #: 1    SUBJECTIVE:   Seen and examined at bedside. no acute events overnight. tolerating mayra clears, no nausea or vomiting. reports flatus. voiding.     OBJECTIVE: T(C): 36.9 (04-29-21 @ 00:41), Max: 37 (04-28-21 @ 18:10)  HR: 84 (04-29-21 @ 00:41) (58 - 84)  BP: 114/70 (04-29-21 @ 00:41) (107/73 - 151/81)  RR: 18 (04-29-21 @ 00:41) (14 - 18)  SpO2: 96% (04-29-21 @ 00:41) (96% - 100%)  Wt(kg): --  I&O's Summary    28 Apr 2021 07:01  -  29 Apr 2021 01:45  --------------------------------------------------------  IN: 1730 mL / OUT: 1300 mL / NET: 430 mL      I&O's Detail    28 Apr 2021 07:01  -  29 Apr 2021 01:45  --------------------------------------------------------  IN:    IV PiggyBack: 250 mL    Lactated Ringers: 300 mL    Oral Fluid: 180 mL    sodium chloride 0.9% w/ Additives: 1000 mL  Total IN: 1730 mL    OUT:    Estimated Blood Loss (mL): 0 mL    Voided (mL): 1300 mL  Total OUT: 1300 mL    Total NET: 430 mL        Physical Exam:  General: NAD, resting comfortably in bed  Pulmonary: Nonlabored breathing, no respiratory distress, on 2L NC  Abdominal: soft, nondistended, tender to palpation at superior port sites, steri strips with dried blood, otherwise c/d/i  Extremities: WWP    MEDICATIONS  (STANDING):  acetaminophen  IVPB .. 1000 milliGRAM(s) IV Intermittent once  ceFAZolin   IVPB 2000 milliGRAM(s) IV Intermittent once  chlorhexidine 2% Cloths 1 Application(s) Topical once  heparin   Injectable 5000 Unit(s) SubCutaneous every 8 hours  hyoscyamine SL 0.125 milliGRAM(s) SubLingual every 6 hours  ketorolac   Injectable 30 milliGRAM(s) IV Push every 6 hours  lactated ringers. 1000 milliLiter(s) (150 mL/Hr) IV Continuous <Continuous>  pantoprazole  Injectable 40 milliGRAM(s) IV Push every 24 hours  sodium chloride 0.9% 1000 milliLiter(s) (250 mL/Hr) IV Continuous <Continuous>    MEDICATIONS  (PRN):      LABS:                        11.7   6.87  )-----------( 183      ( 28 Apr 2021 09:32 )             34.7     04-28    139  |  102  |  8   ----------------------------<  145<H>  3.7   |  22  |  0.51    Ca    8.3<L>      28 Apr 2021 09:32  Phos  2.4     04-28  Mg     1.9     04-28

## 2023-07-25 ENCOUNTER — CARE COORDINATION (OUTPATIENT)
Dept: CASE MANAGEMENT | Age: 63
End: 2023-07-25

## 2023-07-25 NOTE — CARE COORDINATION
Ambulatory Care Coordination Note  7/25/2023    ACM: Lorena Caban LPN    Spoke with [facility] attempted outreach for ACM follow up call. Left HIPPA compliant message and contact information for call back. Offered patient enrollment in the Remote Patient Monitoring (RPM) program for in-home monitoring: Yes, patient already enrolled.     Lab Results       None                 Goals Addressed    None         Future Appointments   Date Time Provider [address] [place] [address]   8/2/2023 10:30 AM Fabrizio Norwood MD [facility]   8/7/2023  9:20 AM Reji Doe MD [address] [facility] Neurology -

## 2023-07-27 ENCOUNTER — TELEPHONE (OUTPATIENT)
Dept: FAMILY MEDICINE CLINIC | Age: 63
End: 2023-07-27

## 2023-07-27 NOTE — TELEPHONE ENCOUNTER
----- Message from Osmany Lopez sent at 7/27/2023  5:05 PM EDT -----  Subject: Referral Request    Reason for referral request? Patient is a referral to see a provider that   deals with traumatic brain injuries. The patient has a referral for a   Neurologist but is not sure if that is the type of provider too assist   her. Please contact the patient in regards to her referral request.   Provider patient wants to be referred to(if known):     Provider Phone Number(if known):     Additional Information for Provider?   ---------------------------------------------------------------------------  --------------  [address]    [phone]; OK to leave message on voicemail,OK to respond with electronic   message via [facility] portal (only for patients who have registered [facility]   account)  ---------------------------------------------------------------------------  --------------

## 2023-07-28 ENCOUNTER — HOSPITAL ENCOUNTER (EMERGENCY)
Age: 63
Discharge: HOME | End: 2023-07-28
Attending: EMERGENCY MEDICINE
Payer: COMMERCIAL

## 2023-07-28 ENCOUNTER — APPOINTMENT (OUTPATIENT)
Dept: GENERAL RADIOLOGY | Age: 63
End: 2023-07-28
Payer: COMMERCIAL

## 2023-07-28 VITALS
DIASTOLIC BLOOD PRESSURE: 92 MMHG | RESPIRATION RATE: 18 BRPM | HEART RATE: 76 BPM | WEIGHT: 235.1 LBS | HEIGHT: 64 IN | OXYGEN SATURATION: 96 % | SYSTOLIC BLOOD PRESSURE: 147 MMHG | BODY MASS INDEX: 40.14 KG/M2 | TEMPERATURE: 98 F

## 2023-07-28 DIAGNOSIS — R07.9 CHEST PAIN, UNSPECIFIED TYPE: Primary | ICD-10-CM

## 2023-07-28 LAB
ANION GAP SERPL CALCULATED.3IONS-SCNC: 12 MMOL/L (ref 3–16)
BASOPHILS # BLD: 0.1 K/UL (ref 0–0.2)
BASOPHILS NFR BLD: 0.7 %
BUN SERPL-MCNC: 23 MG/DL (ref 7–20)
CALCIUM SERPL-MCNC: 9.2 MG/DL (ref 8.3–10.6)
CHLORIDE SERPL-SCNC: 102 MMOL/L (ref 99–110)
CO2 SERPL-SCNC: 20 MMOL/L (ref 21–32)
CREAT SERPL-MCNC: 0.8 MG/DL (ref 0.6–1.2)
DEPRECATED RDW RBC AUTO: 14.9 % (ref 12.4–15.4)
EKG DIAGNOSIS: NORMAL
EKG Q-T INTERVAL: 408 MS
EKG QRS DURATION: 82 MS
EKG QTC CALCULATION (BAZETT): 437 MS
EKG R AXIS: 3 DEGREES
EKG T AXIS: 17 DEGREES
EKG VENTRICULAR RATE: 69 BPM
EOSINOPHIL # BLD: 0.3 K/UL (ref 0–0.6)
EOSINOPHIL NFR BLD: 3.8 %
GFR SERPLBLD CREATININE-BSD FMLA CKD-EPI: >60 ML/MIN/{1.73_M2}
GLUCOSE SERPL-MCNC: 108 MG/DL (ref 70–99)
HCT VFR BLD AUTO: 39.3 % (ref 36–48)
HGB BLD-MCNC: 12.9 G/DL (ref 12–16)
LYMPHOCYTES # BLD: 2.4 K/UL (ref 1–5.1)
LYMPHOCYTES NFR BLD: 31.1 %
MCH RBC QN AUTO: 27.6 PG (ref 26–34)
MCHC RBC AUTO-ENTMCNC: 32.9 G/DL (ref 31–36)
MCV RBC AUTO: 84 FL (ref 80–100)
MONOCYTES # BLD: 0.5 K/UL (ref 0–1.3)
MONOCYTES NFR BLD: 6.6 %
NEUTROPHILS # BLD: 4.4 K/UL (ref 1.7–7.7)
NEUTROPHILS NFR BLD: 57.8 %
NT-PROBNP SERPL-MCNC: <36 PG/ML (ref 0–124)
PLATELET # BLD AUTO: 227 K/UL (ref 135–450)
PMV BLD AUTO: 8.4 FL (ref 5–10.5)
POTASSIUM SERPL-SCNC: 4 MMOL/L (ref 3.5–5.1)
POTASSIUM SERPL-SCNC: 7.4 MMOL/L (ref 3.5–5.1)
RBC # BLD AUTO: 4.68 M/UL (ref 4–5.2)
SODIUM SERPL-SCNC: 134 MMOL/L (ref 136–145)
TROPONIN, HIGH SENSITIVITY: 10 NG/L (ref 0–14)
TROPONIN, HIGH SENSITIVITY: 10 NG/L (ref 0–14)
WBC # BLD AUTO: 7.6 K/UL (ref 4–11)

## 2023-07-28 PROCEDURE — 84132 ASSAY OF SERUM POTASSIUM: CPT

## 2023-07-28 PROCEDURE — 80048 BASIC METABOLIC PNL TOTAL CA: CPT

## 2023-07-28 PROCEDURE — 84484 ASSAY OF TROPONIN QUANT: CPT

## 2023-07-28 PROCEDURE — 71046 X-RAY EXAM CHEST 2 VIEWS: CPT

## 2023-07-28 PROCEDURE — 85025 COMPLETE CBC W/AUTO DIFF WBC: CPT

## 2023-07-28 PROCEDURE — 6360000002 HC RX W HCPCS

## 2023-07-28 PROCEDURE — 83880 ASSAY OF NATRIURETIC PEPTIDE: CPT

## 2023-07-28 PROCEDURE — 2580000003 HC RX 258

## 2023-07-28 PROCEDURE — 93005 ELECTROCARDIOGRAM TRACING: CPT | Performed by: EMERGENCY MEDICINE

## 2023-07-28 PROCEDURE — 96374 THER/PROPH/DIAG INJ IV PUSH: CPT

## 2023-07-28 PROCEDURE — 6370000000 HC RX 637 (ALT 250 FOR IP)

## 2023-07-28 PROCEDURE — 99285 EMERGENCY DEPT VISIT HI MDM: CPT

## 2023-07-28 PROCEDURE — 36415 COLL VENOUS BLD VENIPUNCTURE: CPT

## 2023-07-28 RX ORDER — ONDANSETRON 2 MG/ML
4 INJECTION INTRAMUSCULAR; INTRAVENOUS ONCE
Status: COMPLETED | OUTPATIENT
Start: 2023-07-28 | End: 2023-07-28

## 2023-07-28 RX ORDER — SODIUM CHLORIDE, SODIUM LACTATE, POTASSIUM CHLORIDE, AND CALCIUM CHLORIDE .6; .31; .03; .02 G/100ML; G/100ML; G/100ML; G/100ML
1000 INJECTION, SOLUTION INTRAVENOUS ONCE
Status: COMPLETED | OUTPATIENT
Start: 2023-07-28 | End: 2023-07-28

## 2023-07-28 RX ORDER — ACETAMINOPHEN 500 MG
1000 TABLET ORAL
Status: COMPLETED | OUTPATIENT
Start: 2023-07-28 | End: 2023-07-28

## 2023-07-28 RX ADMIN — ACETAMINOPHEN 1000 MG: 500 TABLET ORAL at 03:05

## 2023-07-28 RX ADMIN — SODIUM CHLORIDE, POTASSIUM CHLORIDE, SODIUM LACTATE AND CALCIUM CHLORIDE 1000 ML: 600; 310; 30; 20 INJECTION, SOLUTION INTRAVENOUS at 03:02

## 2023-07-28 RX ADMIN — ONDANSETRON 4 MG: 2 INJECTION INTRAMUSCULAR; INTRAVENOUS at 04:02

## 2023-07-28 ASSESSMENT — PAIN DESCRIPTION - ONSET: ONSET: ON-GOING

## 2023-07-28 ASSESSMENT — PAIN DESCRIPTION - LOCATION
LOCATION: ARM;LEG;CHEST
LOCATION: HEAD

## 2023-07-28 ASSESSMENT — PAIN DESCRIPTION - FREQUENCY: FREQUENCY: CONTINUOUS

## 2023-07-28 ASSESSMENT — HEART SCORE: ECG: 0

## 2023-07-28 ASSESSMENT — PAIN - FUNCTIONAL ASSESSMENT
PAIN_FUNCTIONAL_ASSESSMENT: 0-10
PAIN_FUNCTIONAL_ASSESSMENT: ACTIVITIES ARE NOT PREVENTED
PAIN_FUNCTIONAL_ASSESSMENT: 0-10

## 2023-07-28 ASSESSMENT — PAIN DESCRIPTION - DESCRIPTORS: DESCRIPTORS: DISCOMFORT

## 2023-07-28 ASSESSMENT — PAIN SCALES - GENERAL: PAINLEVEL_OUTOF10: 10

## 2023-07-28 ASSESSMENT — PAIN DESCRIPTION - PAIN TYPE: TYPE: ACUTE PAIN

## 2023-07-28 NOTE — ED TRIAGE NOTES
Patient arrived in the ER with c/o chest tightness, heat exposure, arm , leg pain  and sob. Patient states that she was outside yesterday cutting grass around 4:00 pm . Patient states that she feel chest tightness.

## 2023-07-28 NOTE — TELEPHONE ENCOUNTER
Please call [facility] neuro and claify if Dr. Adolph Booker treats patients with traumatic brain injuries.

## 2023-07-28 NOTE — TELEPHONE ENCOUNTER
Chepe  leisa neuro state that they believe Dr. Margot Essex does treat traumatic brain injuries- pt notified. Pt wants ollie to know that she was in the hospital at [address] today.  Pt will contact the office to schedule hospital f.u after her neuro appt

## 2023-08-02 ENCOUNTER — CARE COORDINATION (OUTPATIENT)
Dept: CARE COORDINATION | Age: 63
End: 2023-08-02

## 2023-08-02 DIAGNOSIS — F32.A DEPRESSION, UNSPECIFIED DEPRESSION TYPE: Primary | ICD-10-CM
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
## 2023-08-03 ENCOUNTER — CARE COORDINATION (OUTPATIENT)
Dept: CARE COORDINATION | Age: 63
End: 2023-08-03

## 2023-08-03 NOTE — CARE COORDINATION
[facility], [facility], [address] [place] [address] [address]  [facility]  Phone: [phone]
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
## 2023-08-07 ENCOUNTER — OFFICE VISIT (OUTPATIENT)
Dept: NEUROLOGY | Age: 63
End: 2023-08-07
Payer: COMMERCIAL

## 2023-08-07 VITALS
HEART RATE: 77 BPM | BODY MASS INDEX: 40.12 KG/M2 | WEIGHT: 235 LBS | DIASTOLIC BLOOD PRESSURE: 93 MMHG | OXYGEN SATURATION: 97 % | SYSTOLIC BLOOD PRESSURE: 152 MMHG | HEIGHT: 64 IN

## 2023-08-07 DIAGNOSIS — F32.A DEPRESSION, UNSPECIFIED DEPRESSION TYPE: ICD-10-CM

## 2023-08-07 DIAGNOSIS — R41.3 MEMORY LOSS: Primary | ICD-10-CM

## 2023-08-07 DIAGNOSIS — F41.9 ANXIETY: ICD-10-CM

## 2023-08-07 PROBLEM — F33.9 MAJOR DEPRESSIVE DISORDER, RECURRENT, UNSPECIFIED (HCC): Status: ACTIVE | Noted: 2023-08-07

## 2023-08-07 PROBLEM — F33.0 MAJOR DEPRESSIVE DISORDER, RECURRENT, MILD (HCC): Status: ACTIVE | Noted: 2023-08-07

## 2023-08-07 PROBLEM — F33.1 MAJOR DEPRESSIVE DISORDER, RECURRENT, MODERATE (HCC): Status: ACTIVE | Noted: 2023-08-07

## 2023-08-07 PROCEDURE — 99204 OFFICE O/P NEW MOD 45 MIN: CPT | Performed by: PSYCHIATRY & NEUROLOGY

## 2023-08-07 PROCEDURE — G8427 DOCREV CUR MEDS BY ELIG CLIN: HCPCS | Performed by: PSYCHIATRY & NEUROLOGY

## 2023-08-07 PROCEDURE — 1036F TOBACCO NON-USER: CPT | Performed by: PSYCHIATRY & NEUROLOGY

## 2023-08-07 PROCEDURE — 3017F COLORECTAL CA SCREEN DOC REV: CPT | Performed by: PSYCHIATRY & NEUROLOGY

## 2023-08-07 PROCEDURE — G8417 CALC BMI ABV UP PARAM F/U: HCPCS | Performed by: PSYCHIATRY & NEUROLOGY

## 2023-08-07 NOTE — PROGRESS NOTES
Neurology outpatient new visit    Patient name: Michelle Stiles      Chief Complaint:  Memory loss. History of present illness: This is 63years old right-handed female. The patient is here for evaluation of memory loss. The onset was about few weeks ago. The course is nonprogressive. The patient complains short-term difficulty. The patient has history of CVA back in December last year. At that time, the MR brain showed minimal degree of nonspecific white matter disease. The patient had CTA of head and neck which showed questionable of right ICA stenosis. But the patient denies focal weakness or numbness or headache. The patient is noted for underlying anxiety, bipolar 1, and depression. The patient is still able to maintain her daily activities. The patient retired from her job as dentist assistance.     Education: GED and 2 years in college    Past medical history:    Past Medical History:   Diagnosis Date    Anxiety     Bipolar 1 disorder ([address])     Depression     Dyspareunia in female 05/16/2015    Elevated transaminase level 11/10/2017    Hemorrhoids 01/15/2015    History of tubal ligation 05/16/2015    HLD (hyperlipidemia) 12/14/2022    Hypertension     Irritable bowel syndrome     Lacunar stroke ([address])     Menopause 09/05/2016    Obesity     Obesity (BMI 30-39.9) 03/11/2015    Overactive bladder     Prurigo nodularis     Stress incontinence     Urinary incontinence     Uterine fibroid 05/16/2015    Yeast vaginitis 08/30/2020       Past surgical history:    Past Surgical History:   Procedure Laterality Date    COLONOSCOPY  2010    polyp removed    COLONOSCOPY  2018    EYE SURGERY Left     Lazy eye    TONSILLECTOMY      TUBAL LIGATION  1994        Medication:    Current Outpatient Medications   Medication Sig Dispense Refill    albuterol sulfate HFA (VENTOLIN HFA) 108 (90 Base) MCG/ACT inhaler Inhale 2 puffs into the lungs 4 times daily as needed for Wheezing or Shortness of Breath 18 g 2

## 2023-08-07 NOTE — PATIENT INSTRUCTIONS
EEG PREP:  1. Patient should take seizure medicine, as prescribed, on the day of the test  2. Patient must NOT have more than 5 hours of sleep prior to the EEG  3. Patient should have CLEAN hair, no hairspray, gel, cream rinse, hair pins, or chemical treatments within 48 hours prior to EEG  4. NO caffeine, pain medications or sedatives on the day of the test (TAKE SEIZURE MEDS!)  5. Arrive 30 minutes prior to appointment time (check in at Registration Desk on 1st floor of hospital main entrance - by the [employer] shop)  6. Procedure will last 60-90 minutes.

## 2023-08-08 ENCOUNTER — CARE COORDINATION (OUTPATIENT)
Dept: CARE COORDINATION | Age: 63
End: 2023-08-08
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
## 2023-08-10 ENCOUNTER — HOSPITAL ENCOUNTER (OUTPATIENT)
Dept: NEUROLOGY | Age: 63
Discharge: HOME | End: 2023-08-10
Payer: COMMERCIAL

## 2023-08-10 DIAGNOSIS — R41.3 MEMORY LOSS: ICD-10-CM

## 2023-08-10 PROCEDURE — 95813 EEG EXTND MNTR 61-119 MIN: CPT | Performed by: PSYCHIATRY & NEUROLOGY

## 2023-08-10 PROCEDURE — 95816 EEG AWAKE AND DROWSY: CPT

## 2023-08-10 NOTE — PROCEDURES
INTERPRETATION:  This 61-minute, computer-assisted video EEG recording is normal.  No potentially epileptiform activity was present during the recording. CLASSIFICATION:  Normal (awake and drowsy). Sleep - unsuccessful. EKG channel. DESCRIPTION:    BACKGROUND:  The awake recording revealed 9 Hz alpha activity over the posterior head region. Given the extensive study, the patient still did not sleep. The EEG showed normal V waves during drowsiness. There was no significant change on the EEG background with photic stimulation or hyperventilation. INTERICTAL DISCHARGES: None    CLINICAL EVENTS:  None    The EKG channel was unremarkable.

## 2023-08-11 ENCOUNTER — TELEPHONE (OUTPATIENT)
Dept: FAMILY MEDICINE CLINIC | Age: 63
End: 2023-08-11

## 2023-08-11 NOTE — TELEPHONE ENCOUNTER
Called and left  for Terri to call and schedule an appt for herself. She needs her own appt to discuss her issues with Marcia.

## 2023-08-11 NOTE — TELEPHONE ENCOUNTER
----- Message from [address] sent at 8/11/2023  1:32 PM EDT -----  Subject: Message to Provider    QUESTIONS  Information for Provider? Edw[facility]rena [facility] [address] if at all possible.   ---------------------------------------------------------------------------  --------------  Francisco PAYTON  [phone]; OK to leave message on voicemail  ---------------------------------------------------------------------------  --------------  SCRIPT ANSWERS  Relationship to Patient?  Self

## 2023-08-11 NOTE — TELEPHONE ENCOUNTER
----- Message from [address] sent at 8/11/2023  1:29 PM EDT -----  Subject: Results Request    QUESTIONS  Results: EEG; Ordered by:  Radha Novak   Date Performed: 2023-08-10  ---------------------------------------------------------------------------  --------------  Nasreen PAYTON    [phone]; OK to leave message on voicemail  ---------------------------------------------------------------------------  --------------

## 2023-08-14 ENCOUNTER — CARE COORDINATION (OUTPATIENT)
Dept: CARE COORDINATION | Age: 63
End: 2023-08-14
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
## 2023-08-15 ENCOUNTER — TELEPHONE (OUTPATIENT)
Dept: NEUROLOGY | Age: 63
End: 2023-08-15

## 2023-08-15 NOTE — TELEPHONE ENCOUNTER
Spoke with pt - informed of EEG results. Pt advised to discuss symptoms further at her next appt on 8/30/23.

## 2023-08-16 ENCOUNTER — CARE COORDINATION (OUTPATIENT)
Dept: CARE COORDINATION | Age: 63
End: 2023-08-16

## 2023-08-16 DIAGNOSIS — I10 HYPERTENSION, UNSPECIFIED TYPE: Primary | ICD-10-CM

## 2023-08-16 NOTE — CARE COORDINATION
CCSS placed call to patient to arrange RPM kit  through [facility]. Reviewed with patient how to pack equipment in original packing. N/A LVM    Verified patient's availability to schedule UPS  time. N/A LVM    UPS  time requested. Anticipated  date range 2-5 business days.

## 2023-08-16 NOTE — CARE COORDINATION
Remote Patient Monitoring Disenrollment      Date/Time:  8/16/2023 1:56 PM  Patient Current Location: Home: [address] [address]  Patient has been dis-enrolled from Remote Patient Monitoring program on 8/16/2023 due to no longer Interested. Patient has been provided instruction on process to return RPM equipment and RPM has been deactivated. Patient has AC's contact information for any further questions, concerns, or needs. ACM spoke to patient and she said the has had equipment sitting in her living room in the box and no one has contacted her for . ACM advised patient that she will get a call with return instructions. Patient noted understanding and thanked ACM.

## 2023-08-26 ENCOUNTER — ENROLLMENT (OUTPATIENT)
Dept: CARE COORDINATION | Age: 63
End: 2023-08-26

## 2023-08-30 ENCOUNTER — TELEMEDICINE (OUTPATIENT)
Dept: NEUROLOGY | Age: 63
End: 2023-08-30
Payer: COMMERCIAL

## 2023-08-30 ENCOUNTER — TELEPHONE (OUTPATIENT)
Dept: FAMILY MEDICINE CLINIC | Age: 63
End: 2023-08-30

## 2023-08-30 DIAGNOSIS — F32.A DEPRESSION, UNSPECIFIED DEPRESSION TYPE: ICD-10-CM

## 2023-08-30 DIAGNOSIS — F41.9 ANXIETY: ICD-10-CM

## 2023-08-30 DIAGNOSIS — R41.3 MEMORY LOSS: Primary | ICD-10-CM

## 2023-08-30 PROCEDURE — 99214 OFFICE O/P EST MOD 30 MIN: CPT | Performed by: PSYCHIATRY & NEUROLOGY

## 2023-08-30 PROCEDURE — 3017F COLORECTAL CA SCREEN DOC REV: CPT | Performed by: PSYCHIATRY & NEUROLOGY

## 2023-08-30 PROCEDURE — G8427 DOCREV CUR MEDS BY ELIG CLIN: HCPCS | Performed by: PSYCHIATRY & NEUROLOGY

## 2023-08-30 PROCEDURE — G8417 CALC BMI ABV UP PARAM F/U: HCPCS | Performed by: PSYCHIATRY & NEUROLOGY

## 2023-08-30 PROCEDURE — 1036F TOBACCO NON-USER: CPT | Performed by: PSYCHIATRY & NEUROLOGY

## 2023-08-30 RX ORDER — ONDANSETRON 4 MG/1
TABLET, ORALLY DISINTEGRATING ORAL
Qty: 20 TABLET | Refills: 0 | Status: SHIPPED | OUTPATIENT
Start: 2023-08-30

## 2023-08-30 NOTE — PROGRESS NOTES
Neurology outpatient F/U visit    Patient name: Gloria Faye      Chief Complaint:  Memory loss. History of present illness: This is 63years old right-handed female. The patient is here for evaluation of memory loss. The onset was about few weeks ago. The course is nonprogressive. The patient complains short-term difficulty. The patient has history of CVA back in December last year. At that time, the MR brain showed minimal degree of nonspecific white matter disease. The patient had CTA of head and neck which showed questionable of right ICA stenosis. But the patient denies focal weakness or numbness or headache. The patient is noted for underlying anxiety, bipolar 1, and depression. The patient is still able to maintain her daily activities. The patient retired from her job as dentist assistance. Education: GED and 2 years in college    The patient could not complete MMSE due to attention deficit. Interval History:  08/30/23: The patient is here for follow-up after the MRI brain and EEG. The MRI brain showed no evidence of generalized brain atrophy or any pathology to explain her memory loss. The EEG also showed no evidence of seizure tendency or slowing background activity. The patient remains to have significant anxiety and depression. The patient will have a plan to follow-up with psychiatrist in the near future.     Past medical history:    Past Medical History:   Diagnosis Date    Anxiety     Bipolar 1 disorder ([address])     Depression     Dyspareunia in female 05/16/2015    Elevated transaminase level 11/10/2017    Hemorrhoids 01/15/2015    History of tubal ligation 05/16/2015    HLD (hyperlipidemia) 12/14/2022    Hypertension     Irritable bowel syndrome     Lacunar stroke ([address])     Menopause 09/05/2016    Obesity     Obesity (BMI 30-39.9) 03/11/2015    Overactive bladder     Prurigo nodularis     Stress incontinence     Urinary incontinence     Uterine fibroid 05/16/2015    Yeast

## 2023-08-30 NOTE — TELEPHONE ENCOUNTER
----- Message from Zuleika Elayne sent at 8/30/2023 12:42 PM EDT -----  Subject: Message to Provider    QUESTIONS  Information for Provider? Pt called in and has some questions regarding   the new Psychologist that will be taking over in your office for herself   and her son. Please call pt to discuss this. She is wanting to know if   someone can call her please  ---------------------------------------------------------------------------  --------------  Jh Joyce INFO  [phone]; OK to leave message on voicemail  ---------------------------------------------------------------------------  --------------  SCRIPT ANSWERS  Relationship to Patient?  Self

## 2023-08-30 NOTE — TELEPHONE ENCOUNTER
Last Office Visit  -  07/07/2023  Next Office Visit  -  n/a    Last Filled  -    Last UDS -    Contract -
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
## 2023-08-30 NOTE — TELEPHONE ENCOUNTER
PM spoke to pt this morning - pt said she looked at her mychart and there are medications listed that she doesn't take and also a dx that's listed for her that's incorrect. PM informed her that she would not able to correct any of those issues for her and the next time she saw her provider (EG), she could discuss that with her then. The provider may be able to correct those concerns for her. Pt also wanted to know if the neuro dept would charge her for canceling her appt for today at 12pm - PM informed her that she isn't aware of that depts cancellation policy - she would need to call that office.

## 2023-09-05 ENCOUNTER — HOSPITAL ENCOUNTER (EMERGENCY)
Age: 63
Discharge: HOME | End: 2023-09-05
Attending: EMERGENCY MEDICINE
Payer: COMMERCIAL

## 2023-09-05 VITALS
TEMPERATURE: 97.8 F | HEIGHT: 64 IN | BODY MASS INDEX: 39.01 KG/M2 | WEIGHT: 228.5 LBS | OXYGEN SATURATION: 95 % | DIASTOLIC BLOOD PRESSURE: 105 MMHG | HEART RATE: 81 BPM | RESPIRATION RATE: 20 BRPM | SYSTOLIC BLOOD PRESSURE: 138 MMHG
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
## 2023-09-05 DIAGNOSIS — S40.861A INSECT BITE OF RIGHT UPPER EXTREMITY, INITIAL ENCOUNTER: Primary | ICD-10-CM

## 2023-09-05 PROCEDURE — 99284 EMERGENCY DEPT VISIT MOD MDM: CPT

## 2023-09-05 PROCEDURE — 96374 THER/PROPH/DIAG INJ IV PUSH: CPT

## 2023-09-05 PROCEDURE — 2500000003 HC RX 250 WO HCPCS: Performed by: NURSE PRACTITIONER

## 2023-09-05 PROCEDURE — 6360000002 HC RX W HCPCS: Performed by: NURSE PRACTITIONER

## 2023-09-05 PROCEDURE — A4216 STERILE WATER/SALINE, 10 ML: HCPCS | Performed by: NURSE PRACTITIONER

## 2023-09-05 PROCEDURE — 2580000003 HC RX 258: Performed by: NURSE PRACTITIONER

## 2023-09-05 PROCEDURE — 96375 TX/PRO/DX INJ NEW DRUG ADDON: CPT

## 2023-09-05 RX ORDER — CETIRIZINE HYDROCHLORIDE 10 MG/1
10 TABLET ORAL DAILY
Qty: 30 TABLET | Refills: 0 | Status: SHIPPED | OUTPATIENT
Start: 2023-09-05 | End: 2023-10-05

## 2023-09-05 RX ORDER — TRIAMCINOLONE ACETONIDE 0.25 MG/G
OINTMENT TOPICAL
Qty: 15 G | Refills: 0 | Status: SHIPPED | OUTPATIENT
Start: 2023-09-05 | End: 2023-09-12

## 2023-09-05 RX ORDER — 0.9 % SODIUM CHLORIDE 0.9 %
500 INTRAVENOUS SOLUTION INTRAVENOUS ONCE
Status: COMPLETED | OUTPATIENT
Start: 2023-09-05 | End: 2023-09-05

## 2023-09-05 RX ORDER — DIPHENHYDRAMINE HYDROCHLORIDE 50 MG/ML
25 INJECTION INTRAMUSCULAR; INTRAVENOUS ONCE
Status: COMPLETED | OUTPATIENT
Start: 2023-09-05 | End: 2023-09-05

## 2023-09-05 RX ADMIN — FAMOTIDINE 20 MG: 10 INJECTION, SOLUTION INTRAVENOUS at 12:40

## 2023-09-05 RX ADMIN — SODIUM CHLORIDE 500 ML: 9 INJECTION, SOLUTION INTRAVENOUS at 12:38

## 2023-09-05 RX ADMIN — DIPHENHYDRAMINE HYDROCHLORIDE 25 MG: 50 INJECTION INTRAMUSCULAR; INTRAVENOUS at 12:41

## 2023-09-05 ASSESSMENT — LIFESTYLE VARIABLES
HOW OFTEN DO YOU HAVE A DRINK CONTAINING ALCOHOL: NEVER
HOW MANY STANDARD DRINKS CONTAINING ALCOHOL DO YOU HAVE ON A TYPICAL DAY: PATIENT DOES NOT DRINK

## 2023-09-05 ASSESSMENT — PAIN - FUNCTIONAL ASSESSMENT
PAIN_FUNCTIONAL_ASSESSMENT: NONE - DENIES PAIN
PAIN_FUNCTIONAL_ASSESSMENT: NONE - DENIES PAIN

## 2023-09-05 ASSESSMENT — ENCOUNTER SYMPTOMS
NAUSEA: 1
COUGH: 0
SHORTNESS OF BREATH: 0
EYES NEGATIVE: 1
ABDOMINAL PAIN: 0
VOMITING: 0

## 2023-09-05 NOTE — ED PROVIDER NOTES
ED Attending Attestation Note     Date of evaluation: 9/5/2023    This patient was seen by the resident. I have seen and examined the patient, agree with the workup, evaluation, management and diagnosis. The care plan has been discussed. Briefly, Barb Goins is a 66 y.o. female with a PMH inclusive of HLD, CVA,  who presents for evaluation of bee sting. Happened Pta. Initially had nausea, dizziness, and felt dehydrated. Had some throat tingling but symptom are all improving. Notable exam findings include clear oropharynx, no respiratory distress, no wheezing. No appreciable edema. Assessment/ Medical Decision Making:     Patient with near resolution of symptoms and no evidence of hemodynamic instability, respiratory compromise at this time. Do not feel that she needs epinephrine. Anticipate discharge if symptom remain stable to improved.          Edith Ferraro MD  09/07/23 [id number]

## 2023-09-06 NOTE — TELEPHONE ENCOUNTER
I left Terri several messages. If she calls back can you advise her that she can simply call back to schedule an appt with Dr. Yahaira Boucher next month once her schedule is up, as long as she is still established with [facility]. If Dr. Yahaira Boucher feels her or her son may need a higher level of care then we may refer elsewhere. In the meantime, we must wait for her assessment.

## 2023-09-13 RX ORDER — TRIAMCINOLONE ACETONIDE 0.25 MG/G
OINTMENT TOPICAL
Qty: 15 G | Refills: 0 | Status: SHIPPED | OUTPATIENT
Start: 2023-09-13

## 2023-09-14 ENCOUNTER — CARE COORDINATION (OUTPATIENT)
Dept: CARE COORDINATION | Age: 63
End: 2023-09-14

## 2023-09-14 NOTE — CARE COORDINATION
going to only do VV due to covid. ACM explained the current guidelines and patient disagreed. ACM noted it is personal choice for her to decide what she is comfortable with. Patient then disconnected the call. ACM will make PCP aware of patient concerns with Covid and office visit and her desire to only do VV with PCP and or psyc. ACM will sing off care team at this time due to disengagement. Offered patient enrollment in the Remote Patient Monitoring (RPM) program for in-home monitoring: Patient declined previously enrolled . Lab Results       None                 Goals Addressed    None         No future appointments.

## 2023-10-16 ENCOUNTER — TELEPHONE (OUTPATIENT)
Dept: FAMILY MEDICINE CLINIC | Age: 63
End: 2023-10-16

## 2023-10-16 NOTE — TELEPHONE ENCOUNTER
I will let patient know about us not having RSV vaccine.  But I do not see a referral in here for a nutritionist.

## 2023-10-16 NOTE — TELEPHONE ENCOUNTER
She was referred to Dr. Gian Baker in June.  Plesase give patient information again  [facility] - [employer], Lyndsay Pineda MD   [address] [place] [address]   [phone]

## 2023-10-16 NOTE — TELEPHONE ENCOUNTER
Patient called in to schedule appointment with Dr. Domenico Corbin. Patient schedule appointment for flu shot and was wanting to know if she can get the RSV vaccine during her flu vaccine visit.      Patient stated she is still interested in the weight loss surgery and is needing the number to the nutritionist?     Advise

## 2023-10-23 ENCOUNTER — TELEPHONE (OUTPATIENT)
Dept: FAMILY MEDICINE CLINIC | Age: 63
End: 2023-10-23

## 2023-10-23 RX ORDER — ONDANSETRON 4 MG/1
TABLET, ORALLY DISINTEGRATING ORAL
Qty: 20 TABLET | Refills: 0 | Status: SHIPPED | OUTPATIENT
Start: 2023-10-23

## 2023-10-23 NOTE — TELEPHONE ENCOUNTER
Patient would like to schedule a appointment with Dr. Gilbert Vergara. Please call her to schedule.   [phone]

## 2023-10-26 ENCOUNTER — TELEPHONE (OUTPATIENT)
Dept: PSYCHIATRY | Age: 63
End: 2023-10-26

## 2023-10-26 RX ORDER — TRIAMCINOLONE ACETONIDE 0.25 MG/G
OINTMENT TOPICAL
Qty: 15 G | Refills: 0 | Status: SHIPPED | OUTPATIENT
Start: 2023-10-26

## 2023-10-26 NOTE — TELEPHONE ENCOUNTER
Sebastian Vargasniall called me around 1PM and we spoke for about 15 minutes, she mentioned that she was feeling weird. I asked what she meant by that and she said that she had a stroke in December, so she was scared about having another one. I reiterated the importance of calling 911 if she had any of the following symptoms, severe headache, difficulty walking, blurry vision, confusion, trouble speaking or comprehending, numbness, weakness in the face arm or leg especially one side of the body. I called her back to check on her and she said that she was feeling better. She asked that her appointment with you on the 31st be cancelled? Dr. Luana Weston,    I scheduled another appointment for her as she was not able to make it this morning with you. She also spoke to me about her sister her diagnosis and experiences she had growing up. After a lengthy conversation I gathered that she wanted labs in the system for herself before her appointment with you. I asked what labs she would like, and she stated that although she has not been taking Lithium in over 20 years she needs to make sure that the Lithium is not in her system like her sisters that does take Lithium daily.

## 2023-10-26 NOTE — TELEPHONE ENCOUNTER
Last Office Visit  -  8/30/23  Next Office Visit  -  10/31/23    Last Filled  -  9/13/23  Last UDS -    Contract -

## 2023-10-30 ENCOUNTER — TELEPHONE (OUTPATIENT)
Dept: FAMILY MEDICINE CLINIC | Age: 63
End: 2023-10-30

## 2023-10-30 NOTE — TELEPHONE ENCOUNTER
Pt notified- pt would like to cancel appt tomorrow as she has a dentist appt at [address] [place] [address] notified to keep appt with Dr. Katina Orlando and was given date and time for that appt as well

## 2023-10-30 NOTE — TELEPHONE ENCOUNTER
Eulalio Membreno,    Regarding the patient's request for labs, I cannot order any since I have not had my first assessment with her yet. Certainly if she has not been taking any Lithium for years, then she would not need a lithium level. I'm not sure what other labs she was hoping to have done, but I will have to defer to Ricci Friend on the actual ordering part, since Georgia Terrell is technically not a patient of mine yet (until I complete my first assessment with her).     Thanks,  Dr. Geneva Jenkins

## 2023-10-30 NOTE — TELEPHONE ENCOUNTER
Patient called to ask if she would be a good candidate for   [facility] for weight loss and if so how does that work?     [phone]

## 2023-10-30 NOTE — TELEPHONE ENCOUNTER
No she is not, She would need to be a diabetic to be able to get the mounjaro covered under insurance.  Her last A1C was normal.

## 2023-10-30 NOTE — TELEPHONE ENCOUNTER
As of right now I do not feel she needs blood work right now. If she wants to keep her appointment tomorrow to discuss the labs further she can otherwise she can cancel tomorrow and just meet with Dr. Curry Spicer.

## 2023-11-06 ENCOUNTER — TELEPHONE (OUTPATIENT)
Dept: FAMILY MEDICINE CLINIC | Age: 63
End: 2023-11-06

## 2023-11-06 NOTE — TELEPHONE ENCOUNTER
----- Message from Kendy Haroldo sent at 11/6/2023  8:31 AM EST -----  Subject: Message to Provider    QUESTIONS  Information for Provider? Pt states that she would like to discuss   starting a new medication Lithium 300MG. Please call pt to advise   ---------------------------------------------------------------------------  --------------  Daphne Houston INFO  [phone]; OK to leave message on voicemail  ---------------------------------------------------------------------------  --------------  SCRIPT ANSWERS  Relationship to Patient?  Self

## 2023-11-08 ENCOUNTER — TELEPHONE (OUTPATIENT)
Dept: FAMILY MEDICINE CLINIC | Age: 63
End: 2023-11-08

## 2023-11-09 ENCOUNTER — TELEPHONE (OUTPATIENT)
Dept: FAMILY MEDICINE CLINIC | Age: 63
End: 2023-11-09

## 2023-11-09 NOTE — TELEPHONE ENCOUNTER
She cancelled her appointment with Dr. Iman Jo I will call her and advise her regarding the medication.

## 2023-11-09 NOTE — TELEPHONE ENCOUNTER
Patient is schedule to see me on Friday 11/10 to discuss Lithium. Lithium is not something that I prescribe. She needs to keep her appointment with Dr. Cesar Camargo to discuss best medication options. I am happy to see her for any other concerns she has but the Lithium is not something I will prescribe.

## 2023-11-09 NOTE — TELEPHONE ENCOUNTER
----- Message from Thalia Days sent at 11/9/2023  8:55 AM EST -----  Subject: Message to Provider    QUESTIONS  Information for Provider? Pt would like to let Dr. Aldon Bernheim know she has   been using her scale and eating the [employer] calendar meals she is down to   218 and just wanted to send a positive note to her. She said she feels   much better   ---------------------------------------------------------------------------  --------------  Tootie Marker INFO  [phone]; OK to leave message on voicemail  ---------------------------------------------------------------------------  --------------  SCRIPT ANSWERS  Relationship to Patient?  Self

## 2023-11-09 NOTE — TELEPHONE ENCOUNTER
----- Message from Zuleima Tami sent at 11/9/2023  8:58 AM EST -----  Subject: Message to Provider    QUESTIONS  Information for Provider? Also would like to let her know her son Sky Ortiz   is down to 247 as well and doing great.   ---------------------------------------------------------------------------  --------------  Omar Conrad INFO  [phone]; OK to leave message on voicemail  ---------------------------------------------------------------------------  --------------  SCRIPT ANSWERS  Relationship to Patient?  Self

## 2023-11-10 ENCOUNTER — OFFICE VISIT (OUTPATIENT)
Dept: FAMILY MEDICINE CLINIC | Age: 63
End: 2023-11-10

## 2023-11-10 VITALS
HEART RATE: 82 BPM | SYSTOLIC BLOOD PRESSURE: 132 MMHG | HEIGHT: 64 IN | BODY MASS INDEX: 38.1 KG/M2 | OXYGEN SATURATION: 96 % | DIASTOLIC BLOOD PRESSURE: 88 MMHG | WEIGHT: 223.2 LBS

## 2023-11-10 DIAGNOSIS — Z23 NEED FOR VACCINATION: ICD-10-CM

## 2023-11-10 DIAGNOSIS — F41.8 DEPRESSION WITH ANXIETY: Primary | ICD-10-CM

## 2023-11-10 DIAGNOSIS — E66.01 CLASS 2 SEVERE OBESITY DUE TO EXCESS CALORIES WITH SERIOUS COMORBIDITY AND BODY MASS INDEX (BMI) OF 38.0 TO 38.9 IN ADULT (HCC): ICD-10-CM

## 2023-11-10 DIAGNOSIS — Z12.31 SCREENING MAMMOGRAM FOR BREAST CANCER: ICD-10-CM

## 2023-11-10 RX ORDER — ATORVASTATIN CALCIUM 20 MG/1
20 TABLET, FILM COATED ORAL NIGHTLY
Qty: 90 TABLET | Refills: 1 | Status: SHIPPED | OUTPATIENT
Start: 2023-11-10

## 2023-11-10 RX ORDER — ASPIRIN 81 MG/1
81 TABLET ORAL DAILY
Qty: 90 TABLET | Refills: 1 | Status: SHIPPED | OUTPATIENT
Start: 2023-11-10

## 2023-11-10 ASSESSMENT — PATIENT HEALTH QUESTIONNAIRE - PHQ9
SUM OF ALL RESPONSES TO PHQ QUESTIONS 1-9: 0
3. TROUBLE FALLING OR STAYING ASLEEP: 0
5. POOR APPETITE OR OVEREATING: 0
2. FEELING DOWN, DEPRESSED OR HOPELESS: 0
SUM OF ALL RESPONSES TO PHQ9 QUESTIONS 1 & 2: 0
4. FEELING TIRED OR HAVING LITTLE ENERGY: 0
9. THOUGHTS THAT YOU WOULD BE BETTER OFF DEAD, OR OF HURTING YOURSELF: 0
7. TROUBLE CONCENTRATING ON THINGS, SUCH AS READING THE NEWSPAPER OR WATCHING TELEVISION: 0
8. MOVING OR SPEAKING SO SLOWLY THAT OTHER PEOPLE COULD HAVE NOTICED. OR THE OPPOSITE, BEING SO FIGETY OR RESTLESS THAT YOU HAVE BEEN MOVING AROUND A LOT MORE THAN USUAL: 0
1. LITTLE INTEREST OR PLEASURE IN DOING THINGS: 0
SUM OF ALL RESPONSES TO PHQ QUESTIONS 1-9: 0
10. IF YOU CHECKED OFF ANY PROBLEMS, HOW DIFFICULT HAVE THESE PROBLEMS MADE IT FOR YOU TO DO YOUR WORK, TAKE CARE OF THINGS AT HOME, OR GET ALONG WITH OTHER PEOPLE: 0
6. FEELING BAD ABOUT YOURSELF - OR THAT YOU ARE A FAILURE OR HAVE LET YOURSELF OR YOUR FAMILY DOWN: 0

## 2023-11-10 ASSESSMENT — ANXIETY QUESTIONNAIRES
1. FEELING NERVOUS, ANXIOUS, OR ON EDGE: 0
2. NOT BEING ABLE TO STOP OR CONTROL WORRYING: 0
6. BECOMING EASILY ANNOYED OR IRRITABLE: 0
GAD7 TOTAL SCORE: 0
5. BEING SO RESTLESS THAT IT IS HARD TO SIT STILL: 0
3. WORRYING TOO MUCH ABOUT DIFFERENT THINGS: 0
IF YOU CHECKED OFF ANY PROBLEMS ON THIS QUESTIONNAIRE, HOW DIFFICULT HAVE THESE PROBLEMS MADE IT FOR YOU TO DO YOUR WORK, TAKE CARE OF THINGS AT HOME, OR GET ALONG WITH OTHER PEOPLE: NOT DIFFICULT AT ALL
4. TROUBLE RELAXING: 0
7. FEELING AFRAID AS IF SOMETHING AWFUL MIGHT HAPPEN: 0

## 2023-11-10 NOTE — PROGRESS NOTES
Patient: Lexus Easley is a 66 y.o. female who presents today with the following Chief Complaint(s):  Chief Complaint   Patient presents with    Follow-up       Assessment:  Encounter Diagnoses   Name Primary? Depression with anxiety Yes    Screening mammogram for breast cancer     Need for vaccination        Plan:  1. Depression with anxiety  Continue zoloft daily. Will follow up with Psychiatry. Patient was advised that she did not need blood work for lithium since she has not been on it. - sertraline (ZOLOFT) 50 MG tablet; Take 1 tablet by mouth daily  Dispense: 30 tablet; Refill: 0    2. Screening mammogram for breast cancer  - KARTHIK DIGITAL SCREEN W OR WO CAD BILATERAL; Future    3. Need for vaccination  - Influenza, FLUCELVAX, (age 10 mo+), IM, Preservative Free, 0.5 mL  4. Class 2 obesity due to excess calories with serious comorbidity and body mass index (BMI) of 38.0-38.9 in adult. Patient encouraged to keep up with portion control. HPI  Patient presents today for follow up. She states she has lost a little bit of weight. She has been doing healthy choice and SENSIMED meals and it has helped her control her portion sizes. She states she has been stable with her mood with no medications for the past 11 years. She has been on Zoloft and Abilify off and on. She is concerned about checking lithium levels and she does not want to be put on lithium. She states she does not think she needs it.        Current Outpatient Medications   Medication Sig Dispense Refill    aspirin 81 MG EC tablet Take 1 tablet by mouth daily 90 tablet 1    atorvastatin (LIPITOR) 20 MG tablet Take 1 tablet by mouth at bedtime 90 tablet 1    sertraline (ZOLOFT) 50 MG tablet Take 1 tablet by mouth daily 30 tablet 0    triamcinolone (KENALOG) 0.025 % ointment Apply topically 2 times daily to bite area until rash resolved 15 g 0    ondansetron (ZOFRAN-ODT) 4 MG disintegrating tablet DISSOLVE 1 tablet by mouth every 8 hours as

## 2023-11-11 ASSESSMENT — ENCOUNTER SYMPTOMS
RESPIRATORY NEGATIVE: 1
GASTROINTESTINAL NEGATIVE: 1

## 2023-12-31 ENCOUNTER — HOSPITAL ENCOUNTER (EMERGENCY)
Age: 63
Discharge: HOME | End: 2023-12-31
Attending: EMERGENCY MEDICINE
Payer: COMMERCIAL

## 2023-12-31 ENCOUNTER — APPOINTMENT (OUTPATIENT)
Dept: GENERAL RADIOLOGY | Age: 63
End: 2023-12-31
Payer: COMMERCIAL

## 2023-12-31 VITALS
SYSTOLIC BLOOD PRESSURE: 159 MMHG | TEMPERATURE: 98.1 F | OXYGEN SATURATION: 96 % | BODY MASS INDEX: 38.96 KG/M2 | DIASTOLIC BLOOD PRESSURE: 86 MMHG | WEIGHT: 227 LBS | HEART RATE: 84 BPM | RESPIRATION RATE: 18 BRPM

## 2023-12-31 DIAGNOSIS — U07.1 COVID-19: Primary | ICD-10-CM

## 2023-12-31 LAB
ANION GAP SERPL CALCULATED.3IONS-SCNC: 11 MMOL/L (ref 3–16)
BASOPHILS # BLD: 0 K/UL (ref 0–0.2)
BASOPHILS NFR BLD: 0.5 %
BUN SERPL-MCNC: 19 MG/DL (ref 7–20)
CALCIUM SERPL-MCNC: 8.4 MG/DL (ref 8.3–10.6)
CHLORIDE SERPL-SCNC: 100 MMOL/L (ref 99–110)
CO2 SERPL-SCNC: 24 MMOL/L (ref 21–32)
CREAT SERPL-MCNC: 0.8 MG/DL (ref 0.6–1.2)
DEPRECATED RDW RBC AUTO: 14.5 % (ref 12.4–15.4)
EOSINOPHIL # BLD: 0.1 K/UL (ref 0–0.6)
EOSINOPHIL NFR BLD: 0.8 %
FLUAV RNA RESP QL NAA+PROBE: NOT DETECTED
FLUBV RNA RESP QL NAA+PROBE: NOT DETECTED
GFR SERPLBLD CREATININE-BSD FMLA CKD-EPI: >60 ML/MIN/{1.73_M2}
GLUCOSE SERPL-MCNC: 105 MG/DL (ref 70–99)
HCT VFR BLD AUTO: 39.3 % (ref 36–48)
HGB BLD-MCNC: 13.1 G/DL (ref 12–16)
LYMPHOCYTES # BLD: 0.7 K/UL (ref 1–5.1)
LYMPHOCYTES NFR BLD: 9.2 %
MCH RBC QN AUTO: 27.5 PG (ref 26–34)
MCHC RBC AUTO-ENTMCNC: 33.3 G/DL (ref 31–36)
MCV RBC AUTO: 82.7 FL (ref 80–100)
MONOCYTES # BLD: 0.6 K/UL (ref 0–1.3)
MONOCYTES NFR BLD: 7.7 %
NEUTROPHILS # BLD: 6.7 K/UL (ref 1.7–7.7)
NEUTROPHILS NFR BLD: 81.8 %
PLATELET # BLD AUTO: 182 K/UL (ref 135–450)
PMV BLD AUTO: 8 FL (ref 5–10.5)
POTASSIUM SERPL-SCNC: 3.9 MMOL/L (ref 3.5–5.1)
RBC # BLD AUTO: 4.75 M/UL (ref 4–5.2)
SARS-COV-2 RNA RESP QL NAA+PROBE: DETECTED
SODIUM SERPL-SCNC: 135 MMOL/L (ref 136–145)
WBC # BLD AUTO: 8.1 K/UL (ref 4–11)

## 2023-12-31 PROCEDURE — 85025 COMPLETE CBC W/AUTO DIFF WBC: CPT

## 2023-12-31 PROCEDURE — 96374 THER/PROPH/DIAG INJ IV PUSH: CPT

## 2023-12-31 PROCEDURE — 2580000003 HC RX 258: Performed by: EMERGENCY MEDICINE

## 2023-12-31 PROCEDURE — 71045 X-RAY EXAM CHEST 1 VIEW: CPT

## 2023-12-31 PROCEDURE — 80048 BASIC METABOLIC PNL TOTAL CA: CPT

## 2023-12-31 PROCEDURE — 87636 SARSCOV2 & INF A&B AMP PRB: CPT

## 2023-12-31 PROCEDURE — 99284 EMERGENCY DEPT VISIT MOD MDM: CPT

## 2023-12-31 PROCEDURE — 6360000002 HC RX W HCPCS: Performed by: EMERGENCY MEDICINE

## 2023-12-31 PROCEDURE — 96375 TX/PRO/DX INJ NEW DRUG ADDON: CPT

## 2023-12-31 PROCEDURE — 36415 COLL VENOUS BLD VENIPUNCTURE: CPT

## 2023-12-31 RX ORDER — ONDANSETRON 2 MG/ML
8 INJECTION INTRAMUSCULAR; INTRAVENOUS ONCE
Status: COMPLETED | OUTPATIENT
Start: 2023-12-31 | End: 2023-12-31

## 2023-12-31 RX ORDER — SODIUM CHLORIDE, SODIUM LACTATE, POTASSIUM CHLORIDE, AND CALCIUM CHLORIDE .6; .31; .03; .02 G/100ML; G/100ML; G/100ML; G/100ML
1000 INJECTION, SOLUTION INTRAVENOUS ONCE
Status: COMPLETED | OUTPATIENT
Start: 2023-12-31 | End: 2023-12-31

## 2023-12-31 RX ORDER — KETOROLAC TROMETHAMINE 30 MG/ML
15 INJECTION, SOLUTION INTRAMUSCULAR; INTRAVENOUS ONCE
Status: COMPLETED | OUTPATIENT
Start: 2023-12-31 | End: 2023-12-31

## 2023-12-31 RX ORDER — ONDANSETRON 8 MG/1
8 TABLET, ORALLY DISINTEGRATING ORAL EVERY 8 HOURS PRN
Qty: 16 TABLET | Refills: 0 | Status: SHIPPED | OUTPATIENT
Start: 2023-12-31 | End: 2024-01-16

## 2023-12-31 RX ADMIN — ONDANSETRON 8 MG: 2 INJECTION INTRAMUSCULAR; INTRAVENOUS at 20:47

## 2023-12-31 RX ADMIN — KETOROLAC TROMETHAMINE 15 MG: 30 INJECTION, SOLUTION INTRAMUSCULAR; INTRAVENOUS at 20:46

## 2023-12-31 RX ADMIN — SODIUM CHLORIDE, SODIUM LACTATE, POTASSIUM CHLORIDE, AND CALCIUM CHLORIDE 1000 ML: .6; .31; .03; .02 INJECTION, SOLUTION INTRAVENOUS at 20:47

## 2024-01-01 NOTE — ED PROVIDER NOTES
THE [facility]  EMERGENCY DEPARTMENT ENCOUNTER          ATTENDING PHYSICIAN NOTE       Date of evaluation: 12/31/2023    Chief Complaint     Concern For COVID-19 and Fatigue      History of Present Illness     Terri Shankar is a 63 y.o. female who presents to the emerged from with concern for viral syndrome.    Patient states that she is been having viral symptoms for the last few days.  This includes fatigue, muscle aches, cough, sore throat, congestion, shortness of breath and nausea.  Patient states that she did visit with a neighbor shortly before symptoms who subsequently tested positive for COVID.  She denies any chest pain or abdominal pain.  She denies any falls or trauma.  Patient states that she is vaccinated.      ASSESSMENT / PLAN  (MEDICAL DECISION MAKING)     INITIAL VITALS: BP: (!) 153/96, Temp: 98.1 °F (36.7 °C), Pulse: (!) 101, Respirations: 18, SpO2: 97 %      Terri Shankar is a 63 y.o. female who presents to the emergency department viral syndrome.  She is overall well-appearing on initial evaluation with no hypoxia.  Will proceed with viral panel, laboratory workup and symptom control.    Viral panel is positive for COVID.  Chest x-ray without any signs of consolidation.  Renal panel without any significant electrolyte derangements or signs of ADY.  CBC without acute concerns.  Patient with improvement of her symptoms with IV fluids, Zofran and Toradol.    Given her age and comorbidities will discharge on Paxlovid.  Will also provide the patient with a prescription for Zofran for further symptom control.  I encouraged Tylenol and ibuprofen for her myalgias.    I encouraged patient use a finger pulse oximeter as well to monitor her oxygen.    At this point the patient will be discharged.  Return precautions were extensively discussed and all questions were answered prior to discharge.      Medical Decision Making  Amount and/or Complexity of Data Reviewed  Labs: ordered.  Radiology:

## 2024-01-01 NOTE — ED NOTES
Patient prepared for and ready to be discharged. Patient discharged at this time in no acute distress after verbalizing understanding of discharge instructions. Patient left after receiving After Visit Summary instructions.       Samir Irwin RN  12/31/23 [id number]

## 2024-01-02 ENCOUNTER — TELEPHONE (OUTPATIENT)
Dept: FAMILY MEDICINE CLINIC | Age: 64
End: 2024-01-02

## 2024-01-02 NOTE — TELEPHONE ENCOUNTER
----- Message from Roberta Gilliam sent at 1/2/2024  4:41 PM EST -----  Subject: Message to Provider    QUESTIONS  Information for Provider? Pt was diagnosed with COVID-19 on 12/31/23 and   would like to know if her PCP needs to see her or if she needs to do a VV.   If so please contact Pt to Advise. Pt did state the health dept. called   and she just wants to make sure her PCP doesn't need to see. Please Advice  ---------------------------------------------------------------------------  --------------  CALL BACK INFO  [phone]; OK to leave message on voicemail,Do not leave any message,   patient will call back for answer  ---------------------------------------------------------------------------  --------------  SCRIPT ANSWERS  Relationship to Patient? Self

## 2024-01-03 ENCOUNTER — TELEPHONE (OUTPATIENT)
Dept: FAMILY MEDICINE CLINIC | Age: 64
End: 2024-01-03

## 2024-01-03 RX ORDER — ALBUTEROL SULFATE 90 UG/1
2 AEROSOL, METERED RESPIRATORY (INHALATION) 4 TIMES DAILY PRN
Qty: 54 G | Refills: 1 | Status: SHIPPED | OUTPATIENT
Start: 2024-01-03

## 2024-01-03 NOTE — TELEPHONE ENCOUNTER
Patient called stating she was seen in ED and diagnosed with Covid and asking if she is able to get a breathing treatment. Is that something she can get in office? Please advise and schedule if needed.

## 2024-01-04 DIAGNOSIS — B37.9 YEAST INFECTION: ICD-10-CM

## 2024-01-04 RX ORDER — NYSTATIN 100000 [USP'U]/G
POWDER TOPICAL
Qty: 60 G | Refills: 1 | Status: SHIPPED | OUTPATIENT
Start: 2024-01-04

## 2024-01-04 NOTE — TELEPHONE ENCOUNTER
Last Office Visit  -  11/10/23  Next Office Visit  -  n/a    Last Filled  -    Last UDS -    Contract -      *Pt says she got a flare up of the rash that she says PCP routinely prescribes   this Rx for. Pt wants to know if there is also a cream or another Rx that   would help better

## 2024-01-04 NOTE — TELEPHONE ENCOUNTER
----- Message from Ashley Rinner sent at 1/4/2024  8:30 AM EST -----  Subject: Refill Request    QUESTIONS  Name of Medication? Other - nystop  Patient-reported dosage and instructions? b.i.d. 60G 1000 powder applied   topically   How many days do you have left? 0  Preferred Pharmacy? [facility]  Pharmacy phone number (if available)? [phone]  Additional Information for Provider? Pt says the Rx number is [id number]. Pt   says she got a flare up of the rash that she says PCP routinely prescribes   this Rx for. Pt wants to know if there is also a cream or another Rx that   would help better. Pt says PCP is aware and this is a chronic condition.  ---------------------------------------------------------------------------  --------------  CALL BACK INFO  What is the best way for the office to contact you? OK to leave message on   voicemail  Preferred Call Back Phone Number? [phone]  ---------------------------------------------------------------------------  --------------  SCRIPT ANSWERS  Relationship to Patient? Self

## 2024-01-09 ENCOUNTER — TELEPHONE (OUTPATIENT)
Dept: FAMILY MEDICINE CLINIC | Age: 64
End: 2024-01-09

## 2024-01-09 NOTE — TELEPHONE ENCOUNTER
----- Message from Tawnya Martell sent at 1/9/2024 10:02 AM EST -----  Subject: Message to Provider    QUESTIONS  Information for Provider? pt was diagnosed with COVID 12/31 and took all   her paxlovid. yesterday was feeling good and then today woke up and felt   the same as she did before. Has headache, bad taste. nausea, and cough,   please advise.   ---------------------------------------------------------------------------  --------------  CALL BACK INFO  [phone]; OK to leave message on voicemail  ---------------------------------------------------------------------------  --------------  SCRIPT ANSWERS  Relationship to Patient? Self

## 2024-01-09 NOTE — TELEPHONE ENCOUNTER
It is possible to have rebound symptoms after stopping the paxlovid. I recommend just treating symptoms with OTC medications and follow up if continuing to worsen.

## 2024-01-23 ENCOUNTER — TELEPHONE (OUTPATIENT)
Dept: FAMILY MEDICINE CLINIC | Age: 64
End: 2024-01-23

## 2024-01-26 ENCOUNTER — TELEMEDICINE (OUTPATIENT)
Dept: FAMILY MEDICINE CLINIC | Age: 64
End: 2024-01-26
Payer: COMMERCIAL

## 2024-01-26 DIAGNOSIS — Z13.220 SCREENING, LIPID: ICD-10-CM

## 2024-01-26 DIAGNOSIS — F31.9 BIPOLAR AFFECTIVE DISORDER, REMISSION STATUS UNSPECIFIED (HCC): ICD-10-CM

## 2024-01-26 DIAGNOSIS — Z00.00 MEDICARE ANNUAL WELLNESS VISIT, SUBSEQUENT: Primary | ICD-10-CM

## 2024-01-26 DIAGNOSIS — E66.01 SEVERE OBESITY (BMI 35.0-39.9) WITH COMORBIDITY (HCC): ICD-10-CM

## 2024-01-26 PROCEDURE — 3017F COLORECTAL CA SCREEN DOC REV: CPT | Performed by: NURSE PRACTITIONER

## 2024-01-26 PROCEDURE — G0439 PPPS, SUBSEQ VISIT: HCPCS | Performed by: NURSE PRACTITIONER

## 2024-01-26 PROCEDURE — G8482 FLU IMMUNIZE ORDER/ADMIN: HCPCS | Performed by: NURSE PRACTITIONER

## 2024-01-26 RX ORDER — CLOBETASOL PROPIONATE 0.5 MG/G
CREAM TOPICAL
COMMUNITY
Start: 2024-01-19

## 2024-01-26 ASSESSMENT — PATIENT HEALTH QUESTIONNAIRE - PHQ9
4. FEELING TIRED OR HAVING LITTLE ENERGY: 0
SUM OF ALL RESPONSES TO PHQ QUESTIONS 1-9: 0
1. LITTLE INTEREST OR PLEASURE IN DOING THINGS: 0
10. IF YOU CHECKED OFF ANY PROBLEMS, HOW DIFFICULT HAVE THESE PROBLEMS MADE IT FOR YOU TO DO YOUR WORK, TAKE CARE OF THINGS AT HOME, OR GET ALONG WITH OTHER PEOPLE: 0
SUM OF ALL RESPONSES TO PHQ QUESTIONS 1-9: 0
2. FEELING DOWN, DEPRESSED OR HOPELESS: 0
9. THOUGHTS THAT YOU WOULD BE BETTER OFF DEAD, OR OF HURTING YOURSELF: 0
SUM OF ALL RESPONSES TO PHQ QUESTIONS 1-9: 0
7. TROUBLE CONCENTRATING ON THINGS, SUCH AS READING THE NEWSPAPER OR WATCHING TELEVISION: 0
SUM OF ALL RESPONSES TO PHQ QUESTIONS 1-9: 0
8. MOVING OR SPEAKING SO SLOWLY THAT OTHER PEOPLE COULD HAVE NOTICED. OR THE OPPOSITE, BEING SO FIGETY OR RESTLESS THAT YOU HAVE BEEN MOVING AROUND A LOT MORE THAN USUAL: 0
6. FEELING BAD ABOUT YOURSELF - OR THAT YOU ARE A FAILURE OR HAVE LET YOURSELF OR YOUR FAMILY DOWN: 0
3. TROUBLE FALLING OR STAYING ASLEEP: 0
5. POOR APPETITE OR OVEREATING: 0
SUM OF ALL RESPONSES TO PHQ9 QUESTIONS 1 & 2: 0

## 2024-01-26 NOTE — PROGRESS NOTES
Medication Sig Taking? Authorizing Provider   clobetasol (TEMOVATE) 0.05 % cream APPLY CREAM TOPICALLY TWICE DAILY Yes Provider, MD Thanh   nystatin (MYCOSTATIN) 158417 UNIT/GM powder Apply 3 times daily. Yes Marcia Abdi APRN - CNP   albuterol sulfate HFA (VENTOLIN HFA) 108 (90 Base) MCG/ACT inhaler Inhale 2 puffs into the lungs 4 times daily as needed for Wheezing Yes Marcia Abdi APRN - CNP   aspirin 81 MG EC tablet Take 1 tablet by mouth daily Yes Marcia Abdi APRN - CNP   atorvastatin (LIPITOR) 20 MG tablet Take 1 tablet by mouth at bedtime Yes Marcia Abdi APRN - CNP   sertraline (ZOLOFT) 50 MG tablet Take 1 tablet by mouth daily Yes Marcia Abdi APRN - CNP   triamcinolone (KENALOG) 0.025 % ointment Apply topically 2 times daily to bite area until rash resolved Yes Marcia Abdi APRN - CNP       CareTeam (Including outside providers/suppliers regularly involved in providing care):   Patient Care Team:  Marcia Abdi APRN - CNP as PCP - General (Nurse Practitioner Family)  Regina Ross DO as PCP - OBGYN  Marcia Abdi APRN - CNP as PCP - Empaneled Provider  Marley Terry MD as Consulting Physician (Neurology)     Reviewed and updated this visit:  Tobacco  Allergies  Meds  Problems  Med Hx  Surg Hx  Soc Hx  Fam Hx           Terri Shankar, was evaluated through a synchronous (real-time) audio-video encounter. The patient (or guardian if applicable) is aware that this is a billable service, which includes applicable co-pays. This Virtual Visit was conducted with patient's (and/or legal guardian's) consent. Patient identification was verified, and a caregiver was present when appropriate.   The patient was located at Home: [address] [place], [place] [id number]  Provider was located at Facility (Appt Dept): [address] [place] [address]

## 2024-01-26 NOTE — PATIENT INSTRUCTIONS
health problems such as diabetes, high blood pressure, and high cholesterol. If you think you may have a problem with alcohol or drug use, talk to your doctor.   Medicines    Take your medicines exactly as prescribed. Call your doctor if you think you are having a problem with your medicine.     If your doctor recommends aspirin, take the amount directed each day. Make sure you take aspirin and not another kind of pain reliever, such as acetaminophen (Tylenol).   When should you call for help?   Call 911 if you have symptoms of a heart attack. These may include:    Chest pain or pressure, or a strange feeling in the chest.     Sweating.     Shortness of breath.     Pain, pressure, or a strange feeling in the back, neck, jaw, or upper belly or in one or both shoulders or arms.     Lightheadedness or sudden weakness.     A fast or irregular heartbeat.   After you call 911, the  may tell you to chew 1 adult-strength or 2 to 4 low-dose aspirin. Wait for an ambulance. Do not try to drive yourself.  Watch closely for changes in your health, and be sure to contact your doctor if you have any problems.  Where can you learn more?  Go to https://www.[employer].net/patientEd and enter F075 to learn more about \"A Healthy Heart: Care Instructions.\"  Current as of: June 25, 2023               Content Version: 13.9  © 1454-7794 [employer].   Care instructions adapted under license by [employer]. If you have questions about a medical condition or this instruction, always ask your healthcare professional. [employer] disclaims any warranty or liability for your use of this information.      Personalized Preventive Plan for Terri Shankar - 1/26/2024  Medicare offers a range of preventive health benefits. Some of the tests and screenings are paid in full while other may be subject to a deductible, co-insurance, and/or copay.    Some of these benefits include a comprehensive review of your medical

## 2024-02-07 DIAGNOSIS — F31.9 BIPOLAR AFFECTIVE DISORDER, REMISSION STATUS UNSPECIFIED (HCC): ICD-10-CM

## 2024-02-07 RX ORDER — ARIPIPRAZOLE 20 MG/1
TABLET ORAL
Qty: 30 TABLET | Refills: 0 | OUTPATIENT
Start: 2024-02-07

## 2024-02-07 NOTE — TELEPHONE ENCOUNTER
----- Message from Sayra Dragan sent at 2/7/2024  8:34 AM EST -----  Subject: Refill Request    QUESTIONS  Name of Medication? Other - Abilify 20 mg  Patient-reported dosage and instructions? Take one tablet by mouth in the   evening  How many days do you have left? 0  Preferred Pharmacy? [facility]  Pharmacy phone number (if available)? [phone]  ---------------------------------------------------------------------------  --------------  CALL BACK INFO  What is the best way for the office to contact you? OK to leave message on   voicemail  Preferred Call Back Phone Number? [phone]  ---------------------------------------------------------------------------  --------------  SCRIPT ANSWERS  Relationship to Patient? Self

## 2024-02-07 NOTE — TELEPHONE ENCOUNTER
This prescription has not been filled since July of last year. She has said previously stated she does not need any medications. Has something changed? Is she wanting this for depression, anxiety or bipolar?

## 2024-02-07 NOTE — TELEPHONE ENCOUNTER
Last Office Visit  -  1/26/24  Next Office Visit  -  n/a    Last Filled  -    Last UDS -    Contract -

## 2024-02-08 RX ORDER — ARIPIPRAZOLE 20 MG/1
TABLET ORAL
Qty: 30 TABLET | Refills: 0 | Status: SHIPPED | OUTPATIENT
Start: 2024-02-08 | End: 2024-03-08

## 2024-02-08 NOTE — TELEPHONE ENCOUNTER
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 Pt states she feels fine but does not want to have a time where she crashes. Pt says that rx is for depression, anxiety, and bipolar. She is okay if you do or do not send in rx. Pt is also wondering if pcp can take on  as a new pt?  [facility] wants him to have another pcp as a back up. Please advise?   Summary of Care Report The Summary of Care report has been created to help improve care coordination. Users with access to [address] or [address] (Web-based application) may access additional patient information including the Discharge Summary. If you are not currently a [address] user and need more information, please call the number listed below in the [place] [address] section and ask to be connected with Medical Records. Facility Information Name Address Phone [address] [facility] [phone] Patient Information Patient Name Sex  Olaf Flores ([id number]) Female 1942 Discharge Information Admitting Provider Service Area Unit Valdez Chen MD /  [address] Med Surg / [phone] Discharge Provider Discharge Date/Time Discharge Disposition Destination (none) 2018 Morning (Pending) THI (none) Patient Language Language ENGLISH [13] Hospital Problems as of 2018  Reviewed: 2018  1:51 PM by Valdez Chen MD  
  
  
  
 Class Noted - Resolved Last Modified POA Active Problems Hypothyroidism  2012 - Present 2018 by Valdez Chen MD Yes Entered by Kyle Mcdonnell MD  
  Hyperlipidemia  2017 - Present 2018 by Valdez Chen MD Yes Entered by Kyle Mcdonnell MD  
  * (Principal)Rhabdomyolysis  2018 - Present 2018 by Valdez Chen MD Yes Entered by Valdez Chen MD  
  Pruritic erythematous rash  2018 - Present 2018 by Valdez Chen MD Yes Entered by Valdez Chen MD  
  Facial mass  2018 - Present 2018 by Valdez Chen MD Yes Entered by Valdez Chen MD  
  Fall  2018 - Present 2018 by Valdez Chen MD Yes   Entered by Valdez Chen MD  
 Hyperbilirubinemia  7/9/2018 - Present 7/9/2018 by Lopez Overton MD Yes Entered by Lopez Overton MD  
  Elevated AST (SGOT)  7/9/2018 - Present 7/9/2018 by Lopez Overton MD Yes Entered by Lopez Overton MD  
  Chronic pain (Chronic)  7/9/2018 - Present 7/9/2018 by Lpoez Overton MD Yes Entered by Lopez Overton MD  
  Overview Signed 7/9/2018  1:51 PM by Lopez Overton MD  
   low back pain/arthritis      [facility]  note9/13/17 Non-Hospital Problems as of 7/12/2018  Reviewed: 7/9/2018  1:51 PM by Lopez Overton MD  
  
  
  
 Class Noted - Resolved Last Modified Active Problems RA (rheumatoid arthritis) ([facility]  2/9/2010 - Present 5/24/2017 by Lopez Overton MD  
  Entered by Sondra Watson MD  
  Essential hypertension  2/9/2010 - Present 12/9/2015 by Sondra Watson MD  
  Entered by Sondra Watson MD  
  Postmenopausal  10/17/2012 - Present 10/17/2012 by Sondra Watson MD  
  Entered by Sondra Watson MD  
  Allergic rhinitis  10/7/2014 - Present 10/7/2014 by Sondra Watson MD  
  Entered by Sondra Watson MD  
  Chronic constipation  10/7/2014 - Present 10/7/2014 by Sondra Watson MD  
  Entered by Sondra Watson MD  
  Vitamin D deficiency  3/16/2015 - Present 3/16/2015 by Sondra Watson MD  
  Entered by Sondra Watson MD  
  Abnormal gait  5/14/2015 - Present 5/14/2015 by Sondra Watson MD  
  Entered by Sondra Watson MD  
  Insomnia  7/22/2015 - Present 7/22/2015 by Sondra Watson MD  
  Entered by Sondra Watson MD  
  Gastroesophageal reflux disease without esophagitis  10/24/2016 - Present 10/24/2016 by Sondra Watson MD  
  Entered by Sondra Watson MD  
  Chronic midline low back pain without sciatica  10/24/2016 - Present 10/24/2016 by Sondra Watson MD  
  Entered by Sondra Watson MD  
  ACP (advance care planning)  11/30/2016 - Present 11/30/2016 by Mel Irby Entered by Mel Irby Overview Signed 11/30/2016 12:23 PM by Sheila Hernandez Patient brought her ACP to visit. It was reviewed by me. Demand ischemia ([address]  5/15/2017 - Present 5/24/2017 by Dylan Camacho MD  
  Entered by Dylan Camacho MD  
  Gastroparesis  7/31/2017 - Present 7/31/2017 by Chacha Louie MD  
  Entered by Chacha Louie MD  
  Hypokalemia  10/18/2017 - Present 10/18/2017 by Chacha Louie MD  
  Entered by Chacha Louie MD  
  Gram-negative bacteremia  11/28/2017 - Present 12/4/2017 by Marilee Vila NP Entered by Melva Huffman MD  
  
You are allergic to the following Allergen Reactions Pcn (Penicillins) Swelling Has tolerated Cefepime Tramadol Nausea and Vomiting SEVERE If taken w/o food Proventil (Albuterol Sulfate) Hives Ace Inhibitors Angioedema Albuterol Swelling  
 swelling Ambien (Zolpidem) Other (comments) Nightmares and memory disturbance Chocolate (Cocoa) Diarrhea Ciprofloxacin Other (comments) Sore throat and trouble swallowing Clindamycin Hives Lisinopril Swelling Oxaprozin Nausea and Vomiting  
 severe stomach upset Sulfadiazine Swelling  
 swelling Trazodone Other (comments) Vivid dreams and felt disoriented Current Discharge Medication List  
  
START taking these medications Dose & Instructions Dispensing Information Comments  
 doxycycline 100 mg tablet Commonly known as:  VIBRA-TABS Dose:  100 mg Take 1 Tab by mouth every twelve (12) hours for 4 days. Quantity:  8 Tab Refills:  0 CONTINUE these medications which have CHANGED Dose & Instructions Dispensing Information Comments  
 predniSONE 20 mg tablet Commonly known as:  Eliseo Mora What changed:   
- medication strength 
- how much to take 
- how to take this - when to take this 
- additional instructions  Dose:  20 mg  
 Take 1 Tab by mouth daily (with breakfast) for 3 days. Quantity:  3 Tab Refills:  0 CONTINUE these medications which have NOT CHANGED Dose & Instructions Dispensing Information Comments  
 aspirin 81 mg tablet Dose:  81 mg Take 81 mg by mouth daily. Refills:  0  
   
 atorvastatin 20 mg tablet Commonly known as:  LIPITOR Start taking on:  7/19/2018 Dose:  20 mg Take 1 Tab by mouth nightly. Quantity:  90 Tab Refills:  3  
   
 azaTHIOprine 50 mg tablet Commonly known as:  [employer] Dose:  150 mg Take 150 mg by mouth daily. Refills:  0  
   
 baclofen 10 mg tablet Commonly known as:  LIORESAL Dose:  10 mg Take 10 mg by mouth nightly. Refills:  0  
   
 carvedilol 3.125 mg tablet Commonly known as:  COREG  
 TAKE 1 TABLET TWICE A DAY Quantity:  180 Tab Refills:  0  
   
 gabapentin 100 mg capsule Commonly known as:  NEURONTIN Dose:  100 mg Take 100 mg by mouth nightly. Refills:  0  
   
 hydroCHLOROthiazide 25 mg tablet Commonly known as:  HYDRODIURIL  
 TAKE 1 TABLET DAILY FOR    EDEMA AND HYPERTENSION Quantity:  90 Tab Refills:  3  
   
 levothyroxine 50 mcg tablet Commonly known as:  SYNTHROID  
 TAKE 1 TABLET DAILY Quantity:  90 Tab Refills:  3  
   
 montelukast 10 mg tablet Commonly known as:  SINGULAIR  
 TAKE 1 TABLET DAILY Quantity:  90 Tab Refills:  3  
   
 multivitamin tablet Commonly known as:  ONE A DAY Dose:  1 Tab Take 1 Tab by mouth daily. Refills:  0  
   
 nystatin topical cream  
Commonly known as:  MYCOSTATIN Apply  to affected area two (2) times a day. Quantity:  30 g Refills:  0  
   
 pantoprazole 40 mg tablet Commonly known as:  PROTONIX  
 TAKE 1 TABLET DAILY FOR    GASTROENTERITIS Quantity:  90 Tab Refills:  0  
   
 potassium chloride 20 mEq tablet Commonly known as:  K-DUR, KLOR-CON Dose:  20 mEq Take 1 Tab by mouth daily. Quantity:  90 Tab Refills:  0 vit B Cmplx 3-FA-Vit C-Biotin 1- mg-mg-mcg tablet Commonly known as:  NEPHRO ROMAINE RX Dose:  1 Tab Take 1 Tab by mouth daily. Refills:  0  
   
 VITAMIN D3 1,000 unit tablet Generic drug:  cholecalciferol Dose:  1000 Units Take 1,000 Units by mouth two (2) times a day. Refills:  0  
   
 vitamin E 400 unit capsule Commonly known as:  [address] Dose:  400 Units Take 400 Units by mouth two (2) times a day. Refills:  0 STOP taking these medications Comments  
 calcium-cholecalciferol (d3) 600-125 mg-unit Tab Current Immunizations Name Date Influenza High Dose Vaccine PF 10/18/2017, 9/21/2016, 10/12/2015, 10/7/2014 Influenza Vaccine 10/15/2013 Influenza Vaccine Split 10/17/2012, 1/5/2012, 12/6/2010 Influenza Vaccine Whole 12/1/2008 Pneumococcal Conjugate (PCV-13) 7/22/2015 TD Vaccine 11/10/2003 Tdap 11/13/2013 ZZZ-RETIRED (DO NOT USE) Pneumococcal Vaccine (Unspecified Type) 10/17/2012, 11/3/2006 Follow-up Information Follow up With Details Comments Contact Info Ashok Peñaloza 
[address] [facility] [address] 
[phone] [address] [place] [address], [address]   [address] [place] [address] 
[phone] Discharge Instructions Discharge SNF/Rehab Instructions/LTAC  
 
 
PATIENT ID: Alexey Brown MRN: [id number] YOB: 1942 DATE OF ADMISSION: 7/9/2018  9:06 AM   
DATE OF DISCHARGE: 7/12/2018 PRIMARY CARE PROVIDER: Dominga Bowden MD  
 
 
ATTENDING PHYSICIAN: Nat Zhao MD 
DISCHARGING PROVIDER: Nat Zhao MD    
To contact this individual call [phone] and ask the  to page. If unavailable ask to be transferred the Adult Hospitalist Department.  
 
CONSULTATIONS: ED CONSULT TO SENIOR SERVICES CASE MANAGEMENT 
ED CONSULT TO SENIOR SERVICES NURSE PRACTITIONER 
 ED CONSULT TO SENIOR SERVICES PHYSICAL THERAPY IP CONSULT TO HOSPITALIST 
 
PROCEDURES/SURGERIES: * No surgery found * [address] COURSE:  
Admission Summary:  
Per HPI Rojelio Mcadams is a 78 y.o. female with CAD, h/o HSV infection, chronic pain, arthritis, GERD, HTN, hypothyroidism, and gastroparesis who was BIBEMS to the ED from home s/p GLF. Patient states she was trying to  her shoes from the floor yesterday about 2pm when she lost her balance and fell. She was on the floor till about 9am today as she was unable to move. She had an appointment today, called the office to let them know her situation, office called her son, and son called EMS. Patient had a dental procedure recently and had just completed her clindamycin 7/6pm. She developed hives on 7/7am, came to [facility] ED, and was discharged with a script for prednisone. She was about to leave to go to [facility] today when she fell in her house. Patient denies any preceding symptoms; she walks about 5 miles roundtrip with her walker almost daily. Patient is also c/o of right facial swelling but denies difficulty swallowing. She has chronic abdominal and back pain. \" DISCHARGE DIAGNOSES / PLAN:   
Rhabdomyolysis due to fall 
- CK trended down nicely with IV fluids. Encourage oral fluids. Ref. Range 7/9/2018 09:35 7/10/2018 01:50 7/10/2018 01:50 7/11/2018 00:34 7/12/2018 00:33 CK Latest Ref Range: 26 - 192 U/L 4468 (H) 2610 (H) 2594 (H) 1425 (H) 587 (H) R sialadenitis without drain able abscess. No purulence,pain . Afabrile. Need out patient follow up.complete doxycycline. She is allergy to pcn and clincamycin Hives likely due to allergic reaction to clindamycin (POA),no angioedema. Rash improving. Switch solumedrol to short course prednisone. Hypothyroidism - continue levothyroxine Hyperlipidemia Lipitor on hold due to elevated LFTs which are now improving. Checl LFTs on Monday and if normal,may resume Lipitor., 
  
Hyperbilirubinemia and elevated AST - pt has h/o abdominal issues and abnormal LFTs in the past 
- sees Dr Conor Recio with normal liver and bile ducts. Gallbladder is absent. Chronic pain - sees Dr Guerita Almendarez, pain specialist 
  
 
Please check BMP,LFTs and CK in 3-5 days. Resume Lipitor if LFTs are okay. PENDING TEST RESULTS:  
At the time of discharge the following test results are still pending: none FOLLOW UP APPOINTMENTS:   
Follow-up Information Follow up With Details Comments Contact Info Ashok Zambrano 
[address] [facility] [address] [employer] [place] [address] [phone] ADDITIONAL CARE RECOMMENDATIONS:  
 
DIET: Cardiac Diet ACTIVITY: Activity as tolerated,rehabilitation WOUND CARE: NA 
 
EQUIPMENT needed: TBD on discharge from rehab DISCHARGE MEDICATIONS: 
 See Medication Reconciliation Form NOTIFY YOUR PHYSICIAN FOR ANY OF THE FOLLOWING:  
Fever over 101 degrees for 24 hours. Chest pain, shortness of breath, fever, chills, nausea, vomiting, diarrhea, change in mentation, falling, weakness, bleeding. Severe pain or pain not relieved by medications. Or, any other signs or symptoms that you may have questions about. DISPOSITION: 
  Home With: 
 OT  PT  HH  RN  
  
x SNF/Inpatient Rehab/LTAC Independent/assisted living Hospice Other:  
 
 
PATIENT CONDITION AT DISCHARGE:  
 
Functional status Poor   
x Deconditioned Independent Cognition  
x  Lucid Forgetful Dementia Catheters/lines (plus indication) Wilkerson PICC   
 PEG   
x None Code status  
 x Full code DNR   
 
PHYSICAL EXAMINATION AT DISCHARGE: 
  
   
Constitutional:  No acute distress, cooperative, pleasant HEENT: Head is a traumatic, Un icteric sclera. Pink conjunctiva,no erythema or discharge. Oral mucous moist, oropharynx benign.  Neck supple,   
 Resp: CTA bilaterally. No wheezing/rhonchi/rales. No accessory muscle use CV:  Regular rhythm, normal rate, no murmurs, gallops, rubs GI:  Soft, non distended, non tender. normoactive bowel sounds, no hepatosplenomegaly :  No CVA or suprapubic tenderness Skin  :  purplish macular rash on the thighs,trunks and upper extremities. Musculoskeletal:  No edema, warm, 2+ pulses throughout Neurologic:  AAOx3, CN II-XII reviewed. Moves all extremities. CHRONIC MEDICAL DIAGNOSES: 
Problem List as of 7/12/2018  Date Reviewed: 7/9/2018 Codes Class Noted - Resolved * (Principal)Rhabdomyolysis ICD-10-CM: O93.11 ICD-9-CM: 728.88  7/9/2018 - Present Pruritic erythematous rash ICD-10-CM: L29.8 ICD-9-CM: 698.8  7/9/2018 - Present Facial mass ICD-10-CM: R22.0 ICD-9-CM: 784.2  7/9/2018 - Present Fall ICD-10-CM: W19. Melody Dane ICD-9-CM: E888.9  7/9/2018 - Present Hyperbilirubinemia ICD-10-CM: E80.6 ICD-9-CM: 782.4  7/9/2018 - Present Elevated AST (SGOT) ICD-10-CM: R74.0 ICD-9-CM: 790.4  7/9/2018 - Present Chronic pain (Chronic) ICD-10-CM: M43.61 ICD-9-CM: 338.29  7/9/2018 - Present Overview Signed 7/9/2018  1:51 PM by Nohemi Stevens MD  
  low back pain/arthritis      [employer] [facility] Gu note9/13/17 Gram-negative bacteremia ICD-10-CM: R78.81 ICD-9-CM: 790.7, 041.85  11/28/2017 - Present Hypokalemia ICD-10-CM: E87.6 ICD-9-CM: 276.8  10/18/2017 - Present Gastroparesis ICD-10-CM: K31.84 ICD-9-CM: 536.3  7/31/2017 - Present Hyperlipidemia ICD-10-CM: E78.5 ICD-9-CM: 272.4  6/26/2017 - Present Demand ischemia ([facility] ICD-10-CM: I24.8 ICD-9-CM: 411.89  5/15/2017 - Present ACP (advance care planning) ICD-10-CM: Z71.89 ICD-9-CM: V65.49  11/30/2016 - Present Overview Signed 11/30/2016 12:23 PM by Yamile Harper Patient brought her ACP to visit. It was reviewed by me. Gastroesophageal reflux disease without esophagitis ICD-10-CM: K21.9 ICD-9-CM: 530.81  10/24/2016 - Present Chronic midline low back pain without sciatica ICD-10-CM: M54.5, G89.29 ICD-9-CM: 724.2, 338.29  10/24/2016 - Present Insomnia ICD-10-CM: G47.00 ICD-9-CM: 780.52  7/22/2015 - Present Abnormal gait ICD-10-CM: R26.9 ICD-9-CM: 781.2  5/14/2015 - Present Vitamin D deficiency ICD-10-CM: E55.9 ICD-9-CM: 268.9  3/16/2015 - Present Allergic rhinitis ICD-10-CM: J30.9 ICD-9-CM: 477.9  10/7/2014 - Present Chronic constipation ICD-10-CM: K59.09 
ICD-9-CM: 564.00  10/7/2014 - Present Hypothyroidism ICD-10-CM: E03.9 ICD-9-CM: 244.9  11/30/2012 - Present Postmenopausal ICD-10-CM: Z78.0 ICD-9-CM: V49.81  10/17/2012 - Present RA (rheumatoid arthritis) (HCC) ICD-10-CM: M06.9 ICD-9-CM: 714.0  2/9/2010 - Present Essential hypertension ICD-10-CM: I10 
ICD-9-CM: 401.9  2/9/2010 - Present RESOLVED: E coli bacteremia ICD-10-CM: R78.81 ICD-9-CM: 790.7, 041.49  5/24/2017 - 6/26/2017 RESOLVED: Oral candidiasis ICD-10-CM: B37.0 ICD-9-CM: 112.0  5/24/2017 - 6/26/2017 RESOLVED: Septic shock (HCC) ICD-10-CM: A41.9, R65.21 ICD-9-CM: 038.9, 785.52, 995.92  5/15/2017 - 6/26/2017 RESOLVED: Septic encephalopathy ICD-10-CM: G93.41 
ICD-9-CM: 348.31  5/15/2017 - 5/24/2017 RESOLVED: Partial small bowel obstruction ([address] ICD-10-CM: K56.600 ICD-9-CM: 560.9  5/15/2017 - 6/26/2017 RESOLVED: Hypokalemia ICD-10-CM: E87.6 ICD-9-CM: 276.8  5/15/2017 - 5/24/2017 RESOLVED: High anion gap metabolic acidosis VGV-71-LI: E87.2 ICD-9-CM: 276.2  5/15/2017 - 5/24/2017 RESOLVED: JEANINE (acute kidney injury) ([facility] ICD-10-CM: N17.9 ICD-9-CM: 584.9  5/15/2017 - 5/24/2017 RESOLVED: Abnormal LFTs ICD-10-CM: R94.5 ICD-9-CM: 790.6  5/15/2017 - 5/24/2017 RESOLVED: Acute gastroenteritis ICD-10-CM: K52.9 ICD-9-CM: 558.9  5/15/2017 - 5/24/2017 RESOLVED: Elevated BP ICD-10-CM: [id number] ICD-9-CM: Hazeline Arie  9/21/2016 - 10/24/2016 RESOLVED: Thalamic hemorrhage (HCC) ICD-10-CM: I61.0 ICD-9-CM: 474  12/29/2015 - 10/24/2016 RESOLVED: TIA (transient ischemic attack) ICD-10-CM: G45.9 ICD-9-CM: 435.9  12/23/2015 - 10/24/2016 RESOLVED: Chest pain ICD-10-CM: R07.9 ICD-9-CM: 786.50  12/22/2015 - 10/24/2016 RESOLVED: Discitis of lumbar region ICD-10-CM: M46.46 
ICD-9-CM: 722.93  12/9/2015 - 10/24/2016 RESOLVED: Left-sided low back pain with left-sided sciatica ICD-10-CM: M54.42 
ICD-9-CM: 724.3  12/9/2015 - 10/24/2016 RESOLVED: Back pain ICD-10-CM: M54.9 ICD-9-CM: 724.5  8/23/2015 - 10/24/2016 RESOLVED: Hypotension ICD-10-CM: I95.9 ICD-9-CM: 458.9  8/23/2015 - 9/21/2016 RESOLVED: Glossitis ICD-10-CM: K14.0 ICD-9-CM: 529.0  7/22/2015 - 10/24/2016 RESOLVED: Prediabetes ICD-10-CM: R73.03 
ICD-9-CM: 790.29  7/22/2015 - 10/19/2017 RESOLVED: Sore throat ICD-10-CM: J02.9 ICD-9-CM: 277  3/16/2015 - 7/22/2015 RESOLVED: Obesity, Class II, BMI 35-39.9 ICD-10-CM: E66.9 ICD-9-CM: 278.00  1/21/2015 - 10/24/2016 RESOLVED: Tinea manus ICD-10-CM: B35.2 ICD-9-CM: 110.2  10/7/2014 - 10/18/2017 RESOLVED: LLQ abdominal pain ICD-10-CM: R10.32 
ICD-9-CM: 789.04  10/7/2014 - 1/21/2015 RESOLVED: Obesity, unspecified ICD-10-CM: E66.9 ICD-9-CM: 278.00  12/10/2010 - 1/21/2015 Signed: Vlad Dee MD 
7/12/2018 
9:55 AM 
 
  
 
Chart Review Routing History Recipient Method Report Sent By Ghazala Dyson MD  
Phone: [phone] In Basket [facility] IP AMB RESULT REPORT IMAGING Daren Quinones [[id number]] 11/14/2013  9:58 AM 11/13/2013 Mervat Dyson MD  
Phone: [phone] In Basket IP Auto Routed Notes Chriss Mares MD [[id number]] 8/23/2015  3:20 AM 08/23/2015  Mervat Dyson MD  
 Phone: [phone] In Basket IP Auto Routed Notes Zeke Raines MD [[id number]] 8/23/2015  3:21 AM 08/23/2015 Mauricio Martin MD  
Phone: [phone] In Basket IP Auto Routed Notes Zeke Raines MD [[id number]] 8/23/2015  6:57 AM 08/23/2015 Mauricio Martin MD  
Phone: [phone] In Basket IP Auto Routed Notes Claus Villarreal MD [[id number]] 9/2/2015 12:02 PM 09/02/2015 Mauricio Martin MD  
Phone: [phone] In [employer] Routed MD Percy [[id number]] 12/30/2015  8:58 AM 12/30/2015 Mauricio Martin MD  
Phone: [phone] In Basket IP Auto Routed Notes Donya Amaya MD [[id number]] 12/30/2015  1:03 PM 12/30/2015 Kadeem Clayton MD  
Phone: [phone] In [employer] Routed [facility] [place], [place] [[id number]] 1/4/2016  8:53 AM 01/04/2016 Mauricio Martin MD  
Phone: [phone] In Basket IP Auto Routed Notes Osito Garcia MD [[id number]] 5/15/2017  4:34 AM 05/15/2017 Mauricio Maritn MD  
Phone: [phone] In Basket IP Auto Routed Sameera Voss MD [[id number]] 5/15/2017  5:54 AM 05/15/2017 Mauricio Martin MD  
Phone: [phone] In [employer] Routed Notes Deejay [place], [place] [[id number]] 5/24/2017  2:54 PM 05/24/2017 Mauricio Martin MD  
Phone: [phone] In [employer] Routed [facility] [address] [place] [[id number]] 11/29/2017  8:22 AM 11/29/2017 Mauricio Martin MD  
Phone: [phone] In [employer] Routed [facility] [address] [place] [[id number]] 11/29/2017  8:22 AM 11/29/2017 Mauricio Martin MD  
Phone: [phone] In Basket IP Auto Routed Notes Tammie Draper MD [[id number]] 12/4/2017 11:46 AM 12/04/2017 Mau Andre MD  
Phone: [phone] In [employer] IP Auto Routed [facility] [place], [place] [[id number]] 12/11/2017 12:08 PM 12/11/2017 Mauricio Martin MD  
Phone: [phone] In [employer] Routed Notes Deejay Lie[place]man, [place] [[id number]] 7/9/2018  9:26 PM 07/09/2018 Mauricio Martin MD  
Phone: [phone] In Basket IP Auto Routed Notes Maira Marroquin MD [[id number]] 7/12/2018 10:05 AM 07/12/2018

## 2024-02-08 NOTE — TELEPHONE ENCOUNTER
I sent in the [employer]. Please let her know I am not currently taking on new patients. Her  can schedule with one of our other providers who is taking new patients.

## 2024-02-19 NOTE — CARE COORDINATION
ACM called but patient did not answer. ACM left message for patient to call back and left call back number. Will follow up at a later date. [facility]  TRAUMA - PROGRESS NOTE    Patient Name: Johnnie Rudolph  MRN: [id number]  Admit Date: 211  : 1939  AGE: 84 y.o.   GENDER: male  =================================================================  MECHANISM OF INJURY:   Fall down steps  LOC (yes/no?): Unknown  Anticoagulant / Anti-platelet Rx? (for what dx?): xarelto, AF and ischemic stroke hx (last dose am )  Referring Facility Name (N/A for scene EMR run): [facility]     INJURIES:   Trace acute SAH, left frontal lobe     OTHER MEDICAL PROBLEMS:  Ischemic stroke (10/23)  HTN  A fib   DM II  Glaucoma     INCIDENTAL FINDINGS:  None     PROCEDURES:    =================================================================    TODAY'S ASSESSMENT AND PLAN OF CARE:  Johnnie Rudolph is a 84 y.o. male in the ICU due to: ICH and Q1 hour neuro checks. CT head repeat with worsening bleed, given andexa given xarelto usage, last dose  am.    -Continue to hold xarelto  -Neurology following - appreciate recs    -Continue EEG, Keppra, PRN ativan  -Continue q4hr neuro checks  -Continue Tfs  -Provide oral hygiene, NT suctioning   -Patient DNR  -Continue ICU level of care     Patient seen and discussed with Attending, Dr. Velez.    Cade Stoll MD  General Surgery Resident  Trauma Surgery    ==============================================================================  CHIEF COMPLAINT / OVERNIGHT EVENTS / HPI:   NAEO    MEDICAL HISTORY / ROS:  Admission history and ROS reviewed. Pertinent changes as follows:      PHYSICAL EXAM:  Heart Rate:  []   Temp:  [36.1 °C (97 °F)-36.6 °C (97.9 °F)]   Resp:  [15-23]   BP: (123-165)/(50-90)   Weight:  [95 kg (209 lb 7 oz)]   SpO2:  [95 %-99 %]     General: NAD, awake and alert, conversive  HEENT: Normocephalic, atraumatic  Neuro: GCS 10 E2V2M6  Cardio: Bradycardic episodes, asymptomatic  Resp: Unlabored breathing on RA  Abdomen: Soft, nontender, nondistended  : External catheter in  place draining clear yellow urine  MSK: BAUM, normal ROM. Strength 5/5  Extremities: Normal extremities, no edema or cyanosis  Skin: Warm and dry,  no new lesions or rashes  Psych: Appropriate mood and behavior    IMAGING SUMMARY:  (summary of new imaging findings, not a copy of dictation)  Reviewed     LABS:  Results from last 7 days   Lab Units 02/18/24  1038 02/17/24  0412 02/16/24  0154   WBC AUTO x10*3/uL 10.4 14.2* 15.4*   HEMOGLOBIN g/dL 12.0* 11.7* 12.0*   HEMATOCRIT % 35.8* 33.0* 33.0*   PLATELETS AUTO x10*3/uL 229 198 189       Results from last 7 days   Lab Units 02/18/24  1038 02/17/24  0412 02/16/24  0154   APTT seconds 31 30 31   INR  1.3* 1.4* 1.4*       Results from last 7 days   Lab Units 02/18/24  1038 02/17/24  0412 02/16/24  0154   SODIUM mmol/L 135* 136 132*   POTASSIUM mmol/L 4.6 4.2 4.2   CHLORIDE mmol/L 102 101 100   CO2 mmol/L 25 27 22   BUN mg/dL 14 21 22   CREATININE mg/dL 0.51 0.70 0.64   CALCIUM mg/dL 8.3* 8.5* 8.1*   GLUCOSE mg/dL 184* 195* 195*             Results from last 7 days   Lab Units 02/16/24  1311   POCT PH, ARTERIAL pH 7.46*   POCT PCO2, ARTERIAL mm Hg 35*   POCT PO2, ARTERIAL mm Hg 67*   POCT HCO3 CALCULATED, ARTERIAL mmol/L 24.9   POCT BASE EXCESS, ARTERIAL mmol/L 1.4       I have reviewed all medications, laboratory results, and imaging pertinent for today's encounter.

## 2024-02-23 ENCOUNTER — HOSPITAL ENCOUNTER (OUTPATIENT)
Dept: WOMENS IMAGING | Age: 64
Discharge: HOME | End: 2024-02-23
Payer: MEDICARE

## 2024-02-23 VITALS — WEIGHT: 216 LBS | BODY MASS INDEX: 36.88 KG/M2 | HEIGHT: 64 IN

## 2024-02-23 DIAGNOSIS — Z12.31 SCREENING MAMMOGRAM FOR BREAST CANCER: ICD-10-CM

## 2024-02-23 PROCEDURE — 77063 BREAST TOMOSYNTHESIS BI: CPT

## 2024-03-05 ENCOUNTER — TELEPHONE (OUTPATIENT)
Dept: FAMILY MEDICINE CLINIC | Age: 64
End: 2024-03-05
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
## 2024-03-06 NOTE — TELEPHONE ENCOUNTER
Patient called back and states the feeling comes and goes and does not want to go to ED. She states she will keep follow up appointment in office with DUANE Cardona 3/7/24

## 2024-03-07 ENCOUNTER — OFFICE VISIT (OUTPATIENT)
Dept: FAMILY MEDICINE CLINIC | Age: 64
End: 2024-03-07
Payer: MEDICARE

## 2024-03-07 VITALS
WEIGHT: 227 LBS | SYSTOLIC BLOOD PRESSURE: 112 MMHG | BODY MASS INDEX: 38.96 KG/M2 | HEART RATE: 72 BPM | DIASTOLIC BLOOD PRESSURE: 78 MMHG | OXYGEN SATURATION: 97 %

## 2024-03-07 DIAGNOSIS — M25.572 ACUTE LEFT ANKLE PAIN: ICD-10-CM

## 2024-03-07 DIAGNOSIS — B37.2 YEAST DERMATITIS: Primary | ICD-10-CM

## 2024-03-07 PROCEDURE — G8482 FLU IMMUNIZE ORDER/ADMIN: HCPCS | Performed by: REGISTERED NURSE

## 2024-03-07 PROCEDURE — 1036F TOBACCO NON-USER: CPT | Performed by: REGISTERED NURSE

## 2024-03-07 PROCEDURE — 99214 OFFICE O/P EST MOD 30 MIN: CPT | Performed by: REGISTERED NURSE

## 2024-03-07 PROCEDURE — 3017F COLORECTAL CA SCREEN DOC REV: CPT | Performed by: REGISTERED NURSE

## 2024-03-07 PROCEDURE — G8417 CALC BMI ABV UP PARAM F/U: HCPCS | Performed by: REGISTERED NURSE

## 2024-03-07 PROCEDURE — G8427 DOCREV CUR MEDS BY ELIG CLIN: HCPCS | Performed by: REGISTERED NURSE

## 2024-03-07 RX ORDER — NYSTATIN 100000 U/G
CREAM TOPICAL
Qty: 30 G | Refills: 0 | Status: SHIPPED | OUTPATIENT
Start: 2024-03-07

## 2024-03-07 RX ORDER — DUPILUMAB 300 MG/2ML
INJECTION, SOLUTION SUBCUTANEOUS
COMMUNITY
Start: 2024-03-06

## 2024-03-07 ASSESSMENT — ENCOUNTER SYMPTOMS
GASTROINTESTINAL NEGATIVE: 1
EYES NEGATIVE: 1
SHORTNESS OF BREATH: 0

## 2024-03-07 NOTE — PROGRESS NOTES
effort is normal. No respiratory distress.   Musculoskeletal:         General: No swelling, tenderness, deformity or signs of injury. Normal range of motion.      Cervical back: Normal range of motion.      Right lower leg: No edema.      Left lower leg: No edema.   Skin:     General: Skin is warm and dry.      Coloration: Skin is not jaundiced or pale.      Findings: Rash present. No erythema.      Comments: Right upper thigh/groin-diffuse erythema in the crease of right groin/upper leg   Neurological:      General: No focal deficit present.      Mental Status: She is alert and oriented to person, place, and time.   Psychiatric:         Mood and Affect: Mood normal.         Behavior: Behavior normal.         Thought Content: Thought content normal.         Judgment: Judgment normal.       Patient's past medical history, surgical history, family history, medications,  and allergies were all reviewed and updated as appropriate today.    Patient Active Problem List   Diagnosis    Bipolar disorder (HCC)    Urinary, incontinence, stress female    Morbid obesity with BMI of 40.0-44.9, adult (HCC)    Slurred speech    HLD (hyperlipidemia)    Elevated blood pressure reading without diagnosis of hypertension    Stroke, lacunar (HCC)    Weakness on left side of face    AMS (altered mental status)    Major depressive disorder, recurrent, mild    Major depressive disorder, recurrent, moderate    Major depressive disorder, recurrent, unspecified     Past Medical History:   Diagnosis Date    Anxiety     Bipolar 1 disorder (HCC)     Depression     Dyspareunia in female 05/16/2015    Elevated transaminase level 11/10/2017    Hemorrhoids 01/15/2015    History of tubal ligation 05/16/2015    HLD (hyperlipidemia) 12/14/2022    Hypertension     Irritable bowel syndrome     Lacunar stroke (HCC)     Menopause 09/05/2016    Obesity     Obesity (BMI 30-39.9) 03/11/2015    Overactive bladder     Prurigo nodularis     Stress incontinence

## 2024-03-07 NOTE — PATIENT INSTRUCTIONS
Okay to continue Tylenol as needed for pain in leg. Ankle and leg exercises as instructed.     Follow up with PCP about weight management.

## 2024-03-08 ENCOUNTER — TELEPHONE (OUTPATIENT)
Dept: FAMILY MEDICINE CLINIC | Age: 64
End: 2024-03-08

## 2024-03-08 DIAGNOSIS — F31.9 BIPOLAR AFFECTIVE DISORDER, REMISSION STATUS UNSPECIFIED (HCC): ICD-10-CM

## 2024-03-08 DIAGNOSIS — M25.572 ACUTE LEFT ANKLE PAIN: Primary | ICD-10-CM

## 2024-03-08 RX ORDER — ARIPIPRAZOLE 20 MG/1
TABLET ORAL
Qty: 30 TABLET | Refills: 3 | Status: SHIPPED | OUTPATIENT
Start: 2024-03-08

## 2024-03-08 NOTE — TELEPHONE ENCOUNTER
She can call her insurance and see what they recommend but we will not be able to do anything else for her until next week. She can  a brace at her pharmacy or order online. They have braces online for around 10-15 dollars. Or, she can go to the ortho after hours clinic- they are open 5-9 tonight and 9am-1pm tomorrow.

## 2024-03-08 NOTE — TELEPHONE ENCOUNTER
Patient called back and states the brace will not be covered under insurance at [facility]. She is asking for it to be sent to a medical supply company. She is new to the area, but [facility] suggested [facility] or [facility] in [place]. She is asking if it can be delivered because she has no way of getting there to pick it up. Asked which location is closest to her and she is uncertain. I called [facility], and they do not deliver braces. Also, called [facility], they can deliver, but would be out of pocket, and $ 10 delivery fee. Patient would have to call insurance to ask if she can be reimbursed.  Not sure what else to do for patient. Please advise.

## 2024-03-08 NOTE — TELEPHONE ENCOUNTER
Pt called office asking for an order for a brace or wrap that she can put on her left ankle when she walks. She states she needs a prescription for her over the counter card to pay for it. Was seen by Dulce for this issue yesterday but forgot to ask During appt.    Please call pt if an order is sent.

## 2024-03-08 NOTE — TELEPHONE ENCOUNTER
Last Office Visit  -  3/7/24  Next Office Visit  -  3/11/24    Last Filled  -  2/8/24  Last UDS -    Contract -

## 2024-03-11 ENCOUNTER — TELEMEDICINE (OUTPATIENT)
Dept: FAMILY MEDICINE CLINIC | Age: 64
End: 2024-03-11
Payer: COMMERCIAL

## 2024-03-11 DIAGNOSIS — E66.01 MORBID OBESITY WITH BMI OF 40.0-44.9, ADULT (HCC): Primary | ICD-10-CM

## 2024-03-11 PROCEDURE — G8427 DOCREV CUR MEDS BY ELIG CLIN: HCPCS | Performed by: NURSE PRACTITIONER

## 2024-03-11 PROCEDURE — 99213 OFFICE O/P EST LOW 20 MIN: CPT | Performed by: NURSE PRACTITIONER

## 2024-03-11 PROCEDURE — 3017F COLORECTAL CA SCREEN DOC REV: CPT | Performed by: NURSE PRACTITIONER

## 2024-03-11 ASSESSMENT — ENCOUNTER SYMPTOMS
RESPIRATORY NEGATIVE: 1
GASTROINTESTINAL NEGATIVE: 1

## 2024-03-11 NOTE — PROGRESS NOTES
Terri Shankar (:  1960) is a Established patient, presenting virtually for evaluation of the following:    Assessment & Plan   Below is the assessment and plan developed based on review of pertinent history, physical exam, labs, studies, and medications.  1. Morbid obesity with BMI of 40.0-44.9, adult (HCC)  Discussed options with patient but due to cost will continue with diet and exercise.          Subjective   HPI  Patient presents today to discuss weight loss medications. She states she has heard of the injectable medications. She wonders if she could take those.     Review of Systems   Constitutional: Negative.    HENT: Negative.     Respiratory: Negative.     Cardiovascular: Negative.    Gastrointestinal: Negative.    Musculoskeletal: Negative.    Neurological: Negative.    Hematological: Negative.    Psychiatric/Behavioral: Negative.            Objective   Patient-Reported Vitals  Patient-Reported Weight: 229 lbs  Patient-Reported Height: 5'4\"       Physical Exam  Vitals reviewed.   Cardiovascular:      Rate and Rhythm: Normal rate and regular rhythm.      Heart sounds: Normal heart sounds.   Pulmonary:      Effort: Pulmonary effort is normal.      Breath sounds: Normal breath sounds.   Skin:     General: Skin is warm and dry.   Neurological:      Mental Status: She is alert and oriented to person, place, and time.       [INSTRUCTIONS:  \"[x]\" Indicates a positive item  \"[]\" Indicates a negative item  -- DELETE ALL ITEMS NOT EXAMINED]    Constitutional: [x] Appears well-developed and well-nourished [x] No apparent distress      [] Abnormal -     Mental status: [x] Alert and awake  [x] Oriented to person/place/time [x] Able to follow commands    [] Abnormal -     Eyes:   EOM    [x]  Normal    [] Abnormal -   Sclera  [x]  Normal    [] Abnormal -          Discharge [x]  None visible   [] Abnormal -     HENT: [x] Normocephalic, atraumatic  [] Abnormal -   [x] Mouth/Throat: Mucous membranes are

## 2024-03-16 ENCOUNTER — APPOINTMENT (OUTPATIENT)
Dept: CT IMAGING | Age: 64
End: 2024-03-16
Payer: COMMERCIAL

## 2024-03-16 ENCOUNTER — HOSPITAL ENCOUNTER (EMERGENCY)
Age: 64
Discharge: HOME | End: 2024-03-16
Attending: STUDENT IN AN ORGANIZED HEALTH CARE EDUCATION/TRAINING PROGRAM
Payer: COMMERCIAL

## 2024-03-16 VITALS
WEIGHT: 236 LBS | TEMPERATURE: 97.5 F | SYSTOLIC BLOOD PRESSURE: 139 MMHG | OXYGEN SATURATION: 96 % | HEART RATE: 67 BPM | DIASTOLIC BLOOD PRESSURE: 96 MMHG | BODY MASS INDEX: 40.51 KG/M2 | RESPIRATION RATE: 18 BRPM
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
## 2024-03-16 DIAGNOSIS — R07.81 RIB PAIN ON LEFT SIDE: ICD-10-CM

## 2024-03-16 PROCEDURE — 6370000000 HC RX 637 (ALT 250 FOR IP): Performed by: STUDENT IN AN ORGANIZED HEALTH CARE EDUCATION/TRAINING PROGRAM

## 2024-03-16 PROCEDURE — 71250 CT THORAX DX C-: CPT

## 2024-03-16 PROCEDURE — 72125 CT NECK SPINE W/O DYE: CPT

## 2024-03-16 PROCEDURE — 99284 EMERGENCY DEPT VISIT MOD MDM: CPT

## 2024-03-16 RX ORDER — OXYCODONE HYDROCHLORIDE 5 MG/1
5 TABLET ORAL ONCE
Status: COMPLETED | OUTPATIENT
Start: 2024-03-16 | End: 2024-03-16

## 2024-03-16 RX ORDER — IBUPROFEN 400 MG/1
800 TABLET ORAL ONCE
Status: COMPLETED | OUTPATIENT
Start: 2024-03-16 | End: 2024-03-16

## 2024-03-16 RX ORDER — OXYCODONE HYDROCHLORIDE 5 MG/1
5 TABLET ORAL EVERY 6 HOURS PRN
Qty: 12 TABLET | Refills: 0 | Status: SHIPPED | OUTPATIENT
Start: 2024-03-16 | End: 2024-03-19

## 2024-03-16 RX ADMIN — IBUPROFEN 800 MG: 400 TABLET, FILM COATED ORAL at 11:48

## 2024-03-16 RX ADMIN — OXYCODONE 5 MG: 5 TABLET ORAL at 11:48

## 2024-03-16 ASSESSMENT — PAIN - FUNCTIONAL ASSESSMENT: PAIN_FUNCTIONAL_ASSESSMENT: 0-10

## 2024-03-16 ASSESSMENT — PAIN DESCRIPTION - LOCATION
LOCATION: RIB CAGE
LOCATION: RIB CAGE

## 2024-03-16 ASSESSMENT — PAIN DESCRIPTION - ORIENTATION
ORIENTATION: LEFT
ORIENTATION: LEFT

## 2024-03-16 ASSESSMENT — PAIN DESCRIPTION - DESCRIPTORS
DESCRIPTORS: ACHING
DESCRIPTORS: ACHING

## 2024-03-16 ASSESSMENT — PAIN SCALES - GENERAL
PAINLEVEL_OUTOF10: 10
PAINLEVEL_OUTOF10: 9
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
## 2024-03-16 NOTE — ED NOTES
Patient prepared for and ready to be discharged. Patient discharged at this time in no acute distress after verbalizing understanding of discharge instructions. Patient left after receiving After Visit Summary instructions.       Samir Irwin RN  03/16/24 [id number]

## 2024-03-18 ENCOUNTER — TELEPHONE (OUTPATIENT)
Dept: FAMILY MEDICINE CLINIC | Age: 64
End: 2024-03-18

## 2024-03-18 NOTE — TELEPHONE ENCOUNTER
Patient would like to know if she can take her  ondansetron (ZOFRAN-ODT) 4 MG disintegrating tablet  with her new pain medication that was prescribed from the ER.   Patient is very nauseous today .

## 2024-03-19 ENCOUNTER — TELEPHONE (OUTPATIENT)
Dept: FAMILY MEDICINE CLINIC | Age: 64
End: 2024-03-19
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
## 2024-03-22 ENCOUNTER — TELEPHONE (OUTPATIENT)
Dept: FAMILY MEDICINE CLINIC | Age: 64
End: 2024-03-22

## 2024-03-22 NOTE — TELEPHONE ENCOUNTER
Patient called the on call this morning with concerns of worsening left arm pain. She states it is radiating to her neck and chest. She states the hospital gave her a pain pill and states it has not helped. She tried tylenol with out relief.    Patient was advised if this is left arm pain radiating to her chest and neck she needs to take an aspirin and call 911 to take her to the ER. Patient states she has to have someone to take care of her  before she can leave the house. Patient was advised to go to the hospital as soon as she can.   She verbalized her understanding.

## 2024-03-23 ENCOUNTER — APPOINTMENT (OUTPATIENT)
Dept: CT IMAGING | Age: 64
End: 2024-03-23
Payer: COMMERCIAL

## 2024-03-23 ENCOUNTER — HOSPITAL ENCOUNTER (EMERGENCY)
Age: 64
Discharge: HOME | End: 2024-03-23
Attending: STUDENT IN AN ORGANIZED HEALTH CARE EDUCATION/TRAINING PROGRAM
Payer: COMMERCIAL

## 2024-03-23 VITALS
DIASTOLIC BLOOD PRESSURE: 84 MMHG | BODY MASS INDEX: 40.31 KG/M2 | RESPIRATION RATE: 20 BRPM | OXYGEN SATURATION: 96 % | TEMPERATURE: 97.8 F | SYSTOLIC BLOOD PRESSURE: 152 MMHG | WEIGHT: 236.1 LBS | HEART RATE: 88 BPM | HEIGHT: 64 IN

## 2024-03-23 DIAGNOSIS — R10.13 EPIGASTRIC PAIN: Primary | ICD-10-CM

## 2024-03-23 LAB
ALBUMIN SERPL-MCNC: 3.7 G/DL (ref 3.4–5)
ALBUMIN/GLOB SERPL: 1.1 {RATIO} (ref 1.1–2.2)
ALP SERPL-CCNC: 83 U/L (ref 40–129)
ALT SERPL-CCNC: 45 U/L (ref 10–40)
ANION GAP SERPL CALCULATED.3IONS-SCNC: 11 MMOL/L (ref 3–16)
AST SERPL-CCNC: 27 U/L (ref 15–37)
BASOPHILS # BLD: 0.1 K/UL (ref 0–0.2)
BASOPHILS NFR BLD: 0.9 %
BILIRUB SERPL-MCNC: <0.2 MG/DL (ref 0–1)
BILIRUB UR QL STRIP.AUTO: NEGATIVE
BUN SERPL-MCNC: 21 MG/DL (ref 7–20)
CALCIUM SERPL-MCNC: 8.9 MG/DL (ref 8.3–10.6)
CHLORIDE SERPL-SCNC: 103 MMOL/L (ref 99–110)
CLARITY UR: CLEAR
CO2 SERPL-SCNC: 26 MMOL/L (ref 21–32)
COLOR UR: YELLOW
CREAT SERPL-MCNC: 1 MG/DL (ref 0.6–1.2)
DEPRECATED RDW RBC AUTO: 14.9 % (ref 12.4–15.4)
EOSINOPHIL # BLD: 0.3 K/UL (ref 0–0.6)
EOSINOPHIL NFR BLD: 3.8 %
GFR SERPLBLD CREATININE-BSD FMLA CKD-EPI: >60 ML/MIN/{1.73_M2}
GLUCOSE SERPL-MCNC: 97 MG/DL (ref 70–99)
GLUCOSE UR STRIP.AUTO-MCNC: NEGATIVE MG/DL
HCT VFR BLD AUTO: 38.3 % (ref 36–48)
HGB BLD-MCNC: 12.6 G/DL (ref 12–16)
HGB UR QL STRIP.AUTO: NEGATIVE
KETONES UR STRIP.AUTO-MCNC: NEGATIVE MG/DL
LEUKOCYTE ESTERASE UR QL STRIP.AUTO: NEGATIVE
LIPASE SERPL-CCNC: 20 U/L (ref 13–60)
LYMPHOCYTES # BLD: 1.9 K/UL (ref 1–5.1)
LYMPHOCYTES NFR BLD: 26.8 %
MCH RBC QN AUTO: 27.9 PG (ref 26–34)
MCHC RBC AUTO-ENTMCNC: 32.8 G/DL (ref 31–36)
MCV RBC AUTO: 85.1 FL (ref 80–100)
MONOCYTES # BLD: 0.6 K/UL (ref 0–1.3)
MONOCYTES NFR BLD: 8 %
NEUTROPHILS # BLD: 4.3 K/UL (ref 1.7–7.7)
NEUTROPHILS NFR BLD: 60.5 %
NITRITE UR QL STRIP.AUTO: NEGATIVE
PH UR STRIP.AUTO: 7 [PH] (ref 5–8)
PLATELET # BLD AUTO: 207 K/UL (ref 135–450)
PMV BLD AUTO: 7.9 FL (ref 5–10.5)
POTASSIUM SERPL-SCNC: 3.7 MMOL/L (ref 3.5–5.1)
PROT SERPL-MCNC: 7 G/DL (ref 6.4–8.2)
PROT UR STRIP.AUTO-MCNC: NEGATIVE MG/DL
RBC # BLD AUTO: 4.51 M/UL (ref 4–5.2)
SODIUM SERPL-SCNC: 140 MMOL/L (ref 136–145)
SP GR UR STRIP.AUTO: 1.01 (ref 1–1.03)
TROPONIN, HIGH SENSITIVITY: 9 NG/L (ref 0–14)
TROPONIN, HIGH SENSITIVITY: 9 NG/L (ref 0–14)
UA COMPLETE W REFLEX CULTURE PNL UR: NORMAL
UA DIPSTICK W REFLEX MICRO PNL UR: NORMAL
URN SPEC COLLECT METH UR: NORMAL
UROBILINOGEN UR STRIP-ACNC: 0.2 E.U./DL
WBC # BLD AUTO: 7.1 K/UL (ref 4–11)

## 2024-03-23 PROCEDURE — 2580000003 HC RX 258: Performed by: STUDENT IN AN ORGANIZED HEALTH CARE EDUCATION/TRAINING PROGRAM

## 2024-03-23 PROCEDURE — 85025 COMPLETE CBC W/AUTO DIFF WBC: CPT

## 2024-03-23 PROCEDURE — 74177 CT ABD & PELVIS W/CONTRAST: CPT

## 2024-03-23 PROCEDURE — 83690 ASSAY OF LIPASE: CPT

## 2024-03-23 PROCEDURE — 96375 TX/PRO/DX INJ NEW DRUG ADDON: CPT

## 2024-03-23 PROCEDURE — 81003 URINALYSIS AUTO W/O SCOPE: CPT

## 2024-03-23 PROCEDURE — 93005 ELECTROCARDIOGRAM TRACING: CPT | Performed by: STUDENT IN AN ORGANIZED HEALTH CARE EDUCATION/TRAINING PROGRAM

## 2024-03-23 PROCEDURE — 96374 THER/PROPH/DIAG INJ IV PUSH: CPT

## 2024-03-23 PROCEDURE — 99285 EMERGENCY DEPT VISIT HI MDM: CPT

## 2024-03-23 PROCEDURE — 84484 ASSAY OF TROPONIN QUANT: CPT

## 2024-03-23 PROCEDURE — 80053 COMPREHEN METABOLIC PANEL: CPT

## 2024-03-23 PROCEDURE — 6360000002 HC RX W HCPCS: Performed by: STUDENT IN AN ORGANIZED HEALTH CARE EDUCATION/TRAINING PROGRAM

## 2024-03-23 PROCEDURE — 6360000004 HC RX CONTRAST MEDICATION: Performed by: STUDENT IN AN ORGANIZED HEALTH CARE EDUCATION/TRAINING PROGRAM

## 2024-03-23 RX ORDER — OXYCODONE HYDROCHLORIDE 5 MG/1
5 CAPSULE ORAL EVERY 6 HOURS PRN
COMMUNITY

## 2024-03-23 RX ORDER — HYDROMORPHONE HYDROCHLORIDE 1 MG/ML
0.25 INJECTION, SOLUTION INTRAMUSCULAR; INTRAVENOUS; SUBCUTANEOUS ONCE
Status: COMPLETED | OUTPATIENT
Start: 2024-03-23 | End: 2024-03-23

## 2024-03-23 RX ORDER — ONDANSETRON 2 MG/ML
4 INJECTION INTRAMUSCULAR; INTRAVENOUS ONCE
Status: COMPLETED | OUTPATIENT
Start: 2024-03-23 | End: 2024-03-23

## 2024-03-23 RX ORDER — 0.9 % SODIUM CHLORIDE 0.9 %
1000 INTRAVENOUS SOLUTION INTRAVENOUS ONCE
Status: COMPLETED | OUTPATIENT
Start: 2024-03-23 | End: 2024-03-23

## 2024-03-23 RX ORDER — ACETAMINOPHEN 500 MG
1000 TABLET ORAL EVERY 6 HOURS PRN
COMMUNITY

## 2024-03-23 RX ADMIN — SODIUM CHLORIDE 1000 ML: 9 INJECTION, SOLUTION INTRAVENOUS at 17:56

## 2024-03-23 RX ADMIN — ONDANSETRON 4 MG: 2 INJECTION INTRAMUSCULAR; INTRAVENOUS at 17:57

## 2024-03-23 RX ADMIN — HYDROMORPHONE HYDROCHLORIDE 0.25 MG: 1 INJECTION, SOLUTION INTRAMUSCULAR; INTRAVENOUS; SUBCUTANEOUS at 17:58

## 2024-03-23 RX ADMIN — IOPAMIDOL 75 ML: 755 INJECTION, SOLUTION INTRAVENOUS at 18:59

## 2024-03-23 ASSESSMENT — PAIN DESCRIPTION - DESCRIPTORS
DESCRIPTORS: DISCOMFORT;SHARP
DESCRIPTORS: SHARP;SHOOTING

## 2024-03-23 ASSESSMENT — PAIN DESCRIPTION - LOCATION
LOCATION: RIB CAGE
LOCATION: ABDOMEN

## 2024-03-23 ASSESSMENT — PAIN DESCRIPTION - PAIN TYPE
TYPE: ACUTE PAIN
TYPE: ACUTE PAIN

## 2024-03-23 ASSESSMENT — PAIN DESCRIPTION - ORIENTATION
ORIENTATION: LEFT;ANTERIOR;MID;UPPER
ORIENTATION: LEFT

## 2024-03-23 ASSESSMENT — PAIN - FUNCTIONAL ASSESSMENT
PAIN_FUNCTIONAL_ASSESSMENT: 0-10
PAIN_FUNCTIONAL_ASSESSMENT: 0-10

## 2024-03-23 ASSESSMENT — PAIN SCALES - GENERAL
PAINLEVEL_OUTOF10: 10
PAINLEVEL_OUTOF10: 9

## 2024-03-23 ASSESSMENT — PAIN DESCRIPTION - FREQUENCY
FREQUENCY: CONTINUOUS
FREQUENCY: INTERMITTENT

## 2024-03-23 ASSESSMENT — PAIN DESCRIPTION - ONSET: ONSET: ON-GOING
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
## 2024-03-24 LAB
EKG ATRIAL RATE: 78 BPM
EKG DIAGNOSIS: NORMAL
EKG P AXIS: 61 DEGREES
EKG P-R INTERVAL: 154 MS
EKG Q-T INTERVAL: 374 MS
EKG QRS DURATION: 86 MS
EKG QTC CALCULATION (BAZETT): 426 MS
EKG R AXIS: 18 DEGREES
EKG T AXIS: 47 DEGREES
EKG VENTRICULAR RATE: 78 BPM

## 2024-03-25 DIAGNOSIS — B37.9 YEAST INFECTION: ICD-10-CM

## 2024-03-25 DIAGNOSIS — B37.2 YEAST DERMATITIS: ICD-10-CM

## 2024-03-25 RX ORDER — NYSTATIN 100000 [USP'U]/G
POWDER TOPICAL
Qty: 60 G | Refills: 1 | Status: SHIPPED | OUTPATIENT
Start: 2024-03-25

## 2024-03-25 RX ORDER — NYSTATIN 100000 U/G
CREAM TOPICAL
Qty: 30 G | Refills: 0 | Status: SHIPPED | OUTPATIENT
Start: 2024-03-25

## 2024-03-25 NOTE — TELEPHONE ENCOUNTER
Last Office Visit  -  3/11/24  Next Office Visit  -  4/1/24    Last Filled  -    Last UDS -    Contract -      Pt contacted the office requesting a refill for nystatin cream and powder.

## 2024-04-01 ENCOUNTER — TELEMEDICINE (OUTPATIENT)
Dept: FAMILY MEDICINE CLINIC | Age: 64
End: 2024-04-01
Payer: COMMERCIAL

## 2024-04-01 ENCOUNTER — TELEPHONE (OUTPATIENT)
Dept: FAMILY MEDICINE CLINIC | Age: 64
End: 2024-04-01

## 2024-04-01 VITALS
DIASTOLIC BLOOD PRESSURE: 96 MMHG | HEIGHT: 64 IN | HEART RATE: 85 BPM | WEIGHT: 229.4 LBS | BODY MASS INDEX: 39.16 KG/M2 | SYSTOLIC BLOOD PRESSURE: 138 MMHG | OXYGEN SATURATION: 95 %

## 2024-04-01 DIAGNOSIS — M62.81 MUSCLE WEAKNESS: ICD-10-CM

## 2024-04-01 DIAGNOSIS — I10 HYPERTENSION, UNSPECIFIED TYPE: ICD-10-CM

## 2024-04-01 DIAGNOSIS — F41.9 ANXIETY: ICD-10-CM

## 2024-04-01 DIAGNOSIS — R47.81 SLURRED SPEECH: ICD-10-CM

## 2024-04-01 DIAGNOSIS — Z86.73 HISTORY OF CARDIOEMBOLIC CEREBROVASCULAR ACCIDENT (CVA): Primary | ICD-10-CM

## 2024-04-01 PROCEDURE — G8427 DOCREV CUR MEDS BY ELIG CLIN: HCPCS | Performed by: NURSE PRACTITIONER

## 2024-04-01 PROCEDURE — G8417 CALC BMI ABV UP PARAM F/U: HCPCS | Performed by: NURSE PRACTITIONER

## 2024-04-01 PROCEDURE — 3017F COLORECTAL CA SCREEN DOC REV: CPT | Performed by: NURSE PRACTITIONER

## 2024-04-01 PROCEDURE — 3075F SYST BP GE 130 - 139MM HG: CPT | Performed by: NURSE PRACTITIONER

## 2024-04-01 PROCEDURE — 3080F DIAST BP >= 90 MM HG: CPT | Performed by: NURSE PRACTITIONER

## 2024-04-01 PROCEDURE — 99214 OFFICE O/P EST MOD 30 MIN: CPT | Performed by: NURSE PRACTITIONER

## 2024-04-01 PROCEDURE — 1036F TOBACCO NON-USER: CPT | Performed by: NURSE PRACTITIONER

## 2024-04-01 RX ORDER — LISINOPRIL 10 MG/1
10 TABLET ORAL DAILY
Qty: 30 TABLET | Refills: 1 | Status: SHIPPED | OUTPATIENT
Start: 2024-04-01

## 2024-04-01 RX ORDER — ASPIRIN 81 MG/1
81 TABLET ORAL DAILY
Qty: 90 TABLET | Refills: 1 | Status: SHIPPED | OUTPATIENT
Start: 2024-04-01

## 2024-04-01 SDOH — ECONOMIC STABILITY: FOOD INSECURITY: WITHIN THE PAST 12 MONTHS, YOU WORRIED THAT YOUR FOOD WOULD RUN OUT BEFORE YOU GOT MONEY TO BUY MORE.: NEVER TRUE

## 2024-04-01 SDOH — ECONOMIC STABILITY: INCOME INSECURITY: HOW HARD IS IT FOR YOU TO PAY FOR THE VERY BASICS LIKE FOOD, HOUSING, MEDICAL CARE, AND HEATING?: NOT VERY HARD

## 2024-04-01 SDOH — ECONOMIC STABILITY: FOOD INSECURITY: WITHIN THE PAST 12 MONTHS, THE FOOD YOU BOUGHT JUST DIDN'T LAST AND YOU DIDN'T HAVE MONEY TO GET MORE.: NEVER TRUE

## 2024-04-01 ASSESSMENT — ENCOUNTER SYMPTOMS
RESPIRATORY NEGATIVE: 1
GASTROINTESTINAL NEGATIVE: 1

## 2024-04-01 NOTE — TELEPHONE ENCOUNTER
Pt contacted the office and states that her daughter is wanting the letter brought in by pt today to be filled out within the next few days for a meeting and would like this emailed to her. Pt gave a verbal okay for office to send/talk to daughter if necessary about this and to email when completed. Daughter is nikolay phillips, email: [email or web address] phone numbers: [phone] and [phone]. Pt states that she wants pcp to speak to daughter about this.  Pt was notified that once the letter is completed we will give pt a call.
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
## 2024-04-01 NOTE — TELEPHONE ENCOUNTER
Pt requesting refill for Aspirin 81 mg.  Pt would like a refill of nitroglycerin 0.4 mg as well, states this was given by the hospital

## 2024-04-03 ENCOUNTER — APPOINTMENT (OUTPATIENT)
Dept: GENERAL RADIOLOGY | Age: 64
End: 2024-04-03
Payer: COMMERCIAL

## 2024-04-03 ENCOUNTER — APPOINTMENT (OUTPATIENT)
Dept: CT IMAGING | Age: 64
End: 2024-04-03
Payer: COMMERCIAL

## 2024-04-03 ENCOUNTER — TELEPHONE (OUTPATIENT)
Dept: FAMILY MEDICINE CLINIC | Age: 64
End: 2024-04-03

## 2024-04-03 ENCOUNTER — HOSPITAL ENCOUNTER (OUTPATIENT)
Age: 64
Setting detail: OBSERVATION
Discharge: LEFT BEFORE DISCHARGE | End: 2024-04-04
Attending: STUDENT IN AN ORGANIZED HEALTH CARE EDUCATION/TRAINING PROGRAM | Admitting: FAMILY MEDICINE
Payer: COMMERCIAL

## 2024-04-03 DIAGNOSIS — R47.81 SLURRED SPEECH: Primary | ICD-10-CM

## 2024-04-03 DIAGNOSIS — I72.9 ANEURYSM (HCC): ICD-10-CM

## 2024-04-03 DIAGNOSIS — R53.1 LEFT-SIDED WEAKNESS: ICD-10-CM

## 2024-04-03 PROBLEM — G45.9 TIA (TRANSIENT ISCHEMIC ATTACK): Status: ACTIVE | Noted: 2024-04-03

## 2024-04-03 LAB
ALBUMIN SERPL-MCNC: 4.2 G/DL (ref 3.4–5)
ALBUMIN/GLOB SERPL: 1.2 {RATIO} (ref 1.1–2.2)
ALP SERPL-CCNC: 100 U/L (ref 40–129)
ALT SERPL-CCNC: 41 U/L (ref 10–40)
ANION GAP SERPL CALCULATED.3IONS-SCNC: 11 MMOL/L (ref 3–16)
AST SERPL-CCNC: 30 U/L (ref 15–37)
BASOPHILS # BLD: 0.1 K/UL (ref 0–0.2)
BASOPHILS NFR BLD: 0.9 %
BILIRUB SERPL-MCNC: 0.3 MG/DL (ref 0–1)
BUN SERPL-MCNC: 19 MG/DL (ref 7–20)
CALCIUM SERPL-MCNC: 9.3 MG/DL (ref 8.3–10.6)
CHLORIDE SERPL-SCNC: 100 MMOL/L (ref 99–110)
CO2 SERPL-SCNC: 24 MMOL/L (ref 21–32)
CREAT SERPL-MCNC: 0.7 MG/DL (ref 0.6–1.2)
DEPRECATED RDW RBC AUTO: 14.9 % (ref 12.4–15.4)
EOSINOPHIL # BLD: 0.4 K/UL (ref 0–0.6)
EOSINOPHIL NFR BLD: 4.7 %
GFR SERPLBLD CREATININE-BSD FMLA CKD-EPI: >90 ML/MIN/{1.73_M2}
GLUCOSE SERPL-MCNC: 105 MG/DL (ref 70–99)
HCT VFR BLD AUTO: 41 % (ref 36–48)
HGB BLD-MCNC: 13.5 G/DL (ref 12–16)
INR PPP: 0.97 (ref 0.85–1.15)
LYMPHOCYTES # BLD: 2 K/UL (ref 1–5.1)
LYMPHOCYTES NFR BLD: 20.8 %
MCH RBC QN AUTO: 28 PG (ref 26–34)
MCHC RBC AUTO-ENTMCNC: 32.9 G/DL (ref 31–36)
MCV RBC AUTO: 85.2 FL (ref 80–100)
MONOCYTES # BLD: 0.8 K/UL (ref 0–1.3)
MONOCYTES NFR BLD: 7.9 %
NEUTROPHILS # BLD: 6.3 K/UL (ref 1.7–7.7)
NEUTROPHILS NFR BLD: 65.7 %
PLATELET # BLD AUTO: 250 K/UL (ref 135–450)
PMV BLD AUTO: 7.6 FL (ref 5–10.5)
POTASSIUM SERPL-SCNC: 3.8 MMOL/L (ref 3.5–5.1)
PROT SERPL-MCNC: 7.7 G/DL (ref 6.4–8.2)
PROTHROMBIN TIME: 13.1 SEC (ref 11.9–14.9)
RBC # BLD AUTO: 4.81 M/UL (ref 4–5.2)
SODIUM SERPL-SCNC: 135 MMOL/L (ref 136–145)
TROPONIN, HIGH SENSITIVITY: 11 NG/L (ref 0–14)
TROPONIN, HIGH SENSITIVITY: 12 NG/L (ref 0–14)
WBC # BLD AUTO: 9.5 K/UL (ref 4–11)

## 2024-04-03 PROCEDURE — 6370000000 HC RX 637 (ALT 250 FOR IP): Performed by: NURSE PRACTITIONER

## 2024-04-03 PROCEDURE — 99285 EMERGENCY DEPT VISIT HI MDM: CPT

## 2024-04-03 PROCEDURE — 2580000003 HC RX 258: Performed by: NURSE PRACTITIONER

## 2024-04-03 PROCEDURE — 93005 ELECTROCARDIOGRAM TRACING: CPT | Performed by: PHYSICIAN ASSISTANT

## 2024-04-03 PROCEDURE — 85025 COMPLETE CBC W/AUTO DIFF WBC: CPT

## 2024-04-03 PROCEDURE — 70498 CT ANGIOGRAPHY NECK: CPT

## 2024-04-03 PROCEDURE — 6360000004 HC RX CONTRAST MEDICATION: Performed by: PHYSICIAN ASSISTANT

## 2024-04-03 PROCEDURE — 80053 COMPREHEN METABOLIC PANEL: CPT

## 2024-04-03 PROCEDURE — G0378 HOSPITAL OBSERVATION PER HR: HCPCS

## 2024-04-03 PROCEDURE — 2500000003 HC RX 250 WO HCPCS: Performed by: NURSE PRACTITIONER

## 2024-04-03 PROCEDURE — 6370000000 HC RX 637 (ALT 250 FOR IP): Performed by: STUDENT IN AN ORGANIZED HEALTH CARE EDUCATION/TRAINING PROGRAM

## 2024-04-03 PROCEDURE — 84484 ASSAY OF TROPONIN QUANT: CPT

## 2024-04-03 PROCEDURE — 85610 PROTHROMBIN TIME: CPT

## 2024-04-03 PROCEDURE — 70450 CT HEAD/BRAIN W/O DYE: CPT

## 2024-04-03 PROCEDURE — 71045 X-RAY EXAM CHEST 1 VIEW: CPT

## 2024-04-03 RX ORDER — LABETALOL HYDROCHLORIDE 5 MG/ML
10 INJECTION, SOLUTION INTRAVENOUS EVERY 10 MIN PRN
Status: DISCONTINUED | OUTPATIENT
Start: 2024-04-03 | End: 2024-04-04 | Stop reason: HOSPADM

## 2024-04-03 RX ORDER — SODIUM CHLORIDE 0.9 % (FLUSH) 0.9 %
5-40 SYRINGE (ML) INJECTION EVERY 12 HOURS SCHEDULED
Status: DISCONTINUED | OUTPATIENT
Start: 2024-04-03 | End: 2024-04-04 | Stop reason: HOSPADM

## 2024-04-03 RX ORDER — OXYCODONE HYDROCHLORIDE 5 MG/1
5 TABLET ORAL EVERY 6 HOURS PRN
Status: DISCONTINUED | OUTPATIENT
Start: 2024-04-03 | End: 2024-04-04 | Stop reason: HOSPADM

## 2024-04-03 RX ORDER — ASPIRIN 325 MG
325 TABLET ORAL ONCE
Status: COMPLETED | OUTPATIENT
Start: 2024-04-03 | End: 2024-04-03

## 2024-04-03 RX ORDER — ATORVASTATIN CALCIUM 40 MG/1
40 TABLET, FILM COATED ORAL NIGHTLY
Status: DISCONTINUED | OUTPATIENT
Start: 2024-04-03 | End: 2024-04-04 | Stop reason: HOSPADM

## 2024-04-03 RX ORDER — SODIUM CHLORIDE 0.9 % (FLUSH) 0.9 %
5-40 SYRINGE (ML) INJECTION PRN
Status: DISCONTINUED | OUTPATIENT
Start: 2024-04-03 | End: 2024-04-04 | Stop reason: HOSPADM

## 2024-04-03 RX ORDER — ONDANSETRON 2 MG/ML
4 INJECTION INTRAMUSCULAR; INTRAVENOUS EVERY 6 HOURS PRN
Status: DISCONTINUED | OUTPATIENT
Start: 2024-04-03 | End: 2024-04-04 | Stop reason: HOSPADM

## 2024-04-03 RX ORDER — ALBUTEROL SULFATE 90 UG/1
2 AEROSOL, METERED RESPIRATORY (INHALATION) 4 TIMES DAILY PRN
Status: DISCONTINUED | OUTPATIENT
Start: 2024-04-03 | End: 2024-04-04 | Stop reason: HOSPADM

## 2024-04-03 RX ORDER — SODIUM CHLORIDE 9 MG/ML
INJECTION, SOLUTION INTRAVENOUS PRN
Status: DISCONTINUED | OUTPATIENT
Start: 2024-04-03 | End: 2024-04-04 | Stop reason: HOSPADM

## 2024-04-03 RX ORDER — LISINOPRIL 10 MG/1
10 TABLET ORAL DAILY
Status: DISCONTINUED | OUTPATIENT
Start: 2024-04-04 | End: 2024-04-04 | Stop reason: HOSPADM

## 2024-04-03 RX ORDER — ASPIRIN 81 MG/1
81 TABLET, CHEWABLE ORAL DAILY
Status: DISCONTINUED | OUTPATIENT
Start: 2024-04-04 | End: 2024-04-04 | Stop reason: HOSPADM

## 2024-04-03 RX ORDER — ONDANSETRON 4 MG/1
4 TABLET, ORALLY DISINTEGRATING ORAL EVERY 8 HOURS PRN
Status: DISCONTINUED | OUTPATIENT
Start: 2024-04-03 | End: 2024-04-04 | Stop reason: HOSPADM

## 2024-04-03 RX ORDER — ASPIRIN 300 MG/1
300 SUPPOSITORY RECTAL DAILY
Status: DISCONTINUED | OUTPATIENT
Start: 2024-04-04 | End: 2024-04-04 | Stop reason: HOSPADM

## 2024-04-03 RX ORDER — POLYETHYLENE GLYCOL 3350 17 G/17G
17 POWDER, FOR SOLUTION ORAL DAILY PRN
Status: DISCONTINUED | OUTPATIENT
Start: 2024-04-03 | End: 2024-04-04 | Stop reason: HOSPADM

## 2024-04-03 RX ORDER — ARIPIPRAZOLE 10 MG/1
20 TABLET ORAL NIGHTLY
Status: DISCONTINUED | OUTPATIENT
Start: 2024-04-04 | End: 2024-04-04

## 2024-04-03 RX ORDER — HYDRALAZINE HYDROCHLORIDE 20 MG/ML
5 INJECTION INTRAMUSCULAR; INTRAVENOUS ONCE
Status: DISCONTINUED | OUTPATIENT
Start: 2024-04-03 | End: 2024-04-03

## 2024-04-03 RX ADMIN — ASPIRIN 325 MG: 325 TABLET ORAL at 20:51

## 2024-04-03 RX ADMIN — Medication 10 ML: at 22:14

## 2024-04-03 RX ADMIN — IOPAMIDOL 75 ML: 755 INJECTION, SOLUTION INTRAVENOUS at 18:59

## 2024-04-03 RX ADMIN — ATORVASTATIN CALCIUM 40 MG: 40 TABLET, FILM COATED ORAL at 22:27

## 2024-04-03 RX ADMIN — MICONAZOLE NITRATE: 2 POWDER TOPICAL at 22:27

## 2024-04-03 ASSESSMENT — PAIN - FUNCTIONAL ASSESSMENT: PAIN_FUNCTIONAL_ASSESSMENT: 0-10

## 2024-04-03 ASSESSMENT — PAIN SCALES - GENERAL
PAINLEVEL_OUTOF10: 9
PAINLEVEL_OUTOF10: 0

## 2024-04-03 NOTE — TELEPHONE ENCOUNTER
Pt walked into office at around 4:30. Pt wanted to be seen by someone as a walk in. Pt was informed there were no appts left today and referred to ER or urgent care. Pt stated \"I'm not sure if I should drive myself\". I went back to get a MA to talk to pt and see if we should call a squad.    KRYSTA came to talk to pt. Pt reported numbness and tingling in arm, with a history of stroke. CS referred pt to ED. Squad was called by KRYSTA.

## 2024-04-03 NOTE — TELEPHONE ENCOUNTER
Patient left message on voice mail 3 1/2 hours ago stating she has body pains and can't catch her breath. She is requesting advise.     Patient was phoned to gather more information. Left message to call back.

## 2024-04-04 ENCOUNTER — APPOINTMENT (OUTPATIENT)
Dept: MRI IMAGING | Age: 64
End: 2024-04-04
Payer: COMMERCIAL

## 2024-04-04 VITALS
SYSTOLIC BLOOD PRESSURE: 146 MMHG | DIASTOLIC BLOOD PRESSURE: 88 MMHG | OXYGEN SATURATION: 95 % | TEMPERATURE: 97.7 F | RESPIRATION RATE: 16 BRPM | WEIGHT: 220 LBS | HEART RATE: 94 BPM | HEIGHT: 64 IN | BODY MASS INDEX: 37.56 KG/M2

## 2024-04-04 LAB
ANION GAP SERPL CALCULATED.3IONS-SCNC: 10 MMOL/L (ref 3–16)
BUN SERPL-MCNC: 17 MG/DL (ref 7–20)
CALCIUM SERPL-MCNC: 8.9 MG/DL (ref 8.3–10.6)
CHLORIDE SERPL-SCNC: 105 MMOL/L (ref 99–110)
CHOLEST SERPL-MCNC: 173 MG/DL (ref 0–199)
CO2 SERPL-SCNC: 23 MMOL/L (ref 21–32)
CREAT SERPL-MCNC: 0.7 MG/DL (ref 0.6–1.2)
DEPRECATED RDW RBC AUTO: 14.9 % (ref 12.4–15.4)
EKG ATRIAL RATE: 79 BPM
EKG DIAGNOSIS: NORMAL
EKG P AXIS: 17 DEGREES
EKG P-R INTERVAL: 182 MS
EKG Q-T INTERVAL: 394 MS
EKG QRS DURATION: 88 MS
EKG QTC CALCULATION (BAZETT): 451 MS
EKG R AXIS: 15 DEGREES
EKG T AXIS: 13 DEGREES
EKG VENTRICULAR RATE: 79 BPM
GFR SERPLBLD CREATININE-BSD FMLA CKD-EPI: >90 ML/MIN/{1.73_M2}
GLUCOSE SERPL-MCNC: 131 MG/DL (ref 70–99)
HCT VFR BLD AUTO: 39.5 % (ref 36–48)
HDLC SERPL-MCNC: 42 MG/DL (ref 40–60)
HGB BLD-MCNC: 13 G/DL (ref 12–16)
LDLC SERPL CALC-MCNC: 111 MG/DL
MCH RBC QN AUTO: 28.3 PG (ref 26–34)
MCHC RBC AUTO-ENTMCNC: 32.9 G/DL (ref 31–36)
MCV RBC AUTO: 85.9 FL (ref 80–100)
PLATELET # BLD AUTO: 238 K/UL (ref 135–450)
PMV BLD AUTO: 7.5 FL (ref 5–10.5)
POTASSIUM SERPL-SCNC: 3.9 MMOL/L (ref 3.5–5.1)
RBC # BLD AUTO: 4.6 M/UL (ref 4–5.2)
SODIUM SERPL-SCNC: 138 MMOL/L (ref 136–145)
TRIGL SERPL-MCNC: 99 MG/DL (ref 0–150)
VLDLC SERPL CALC-MCNC: 20 MG/DL
WBC # BLD AUTO: 7.5 K/UL (ref 4–11)

## 2024-04-04 PROCEDURE — 80048 BASIC METABOLIC PNL TOTAL CA: CPT

## 2024-04-04 PROCEDURE — 97530 THERAPEUTIC ACTIVITIES: CPT

## 2024-04-04 PROCEDURE — 95816 EEG AWAKE AND DROWSY: CPT

## 2024-04-04 PROCEDURE — 6370000000 HC RX 637 (ALT 250 FOR IP): Performed by: NURSE PRACTITIONER

## 2024-04-04 PROCEDURE — G0378 HOSPITAL OBSERVATION PER HR: HCPCS

## 2024-04-04 PROCEDURE — 2580000003 HC RX 258: Performed by: NURSE PRACTITIONER

## 2024-04-04 PROCEDURE — 97166 OT EVAL MOD COMPLEX 45 MIN: CPT

## 2024-04-04 PROCEDURE — 97162 PT EVAL MOD COMPLEX 30 MIN: CPT

## 2024-04-04 PROCEDURE — 92523 SPEECH SOUND LANG COMPREHEN: CPT

## 2024-04-04 PROCEDURE — 92610 EVALUATE SWALLOWING FUNCTION: CPT

## 2024-04-04 PROCEDURE — 93010 ELECTROCARDIOGRAM REPORT: CPT | Performed by: INTERNAL MEDICINE

## 2024-04-04 PROCEDURE — 97116 GAIT TRAINING THERAPY: CPT

## 2024-04-04 PROCEDURE — 97535 SELF CARE MNGMENT TRAINING: CPT

## 2024-04-04 PROCEDURE — 80061 LIPID PANEL: CPT

## 2024-04-04 PROCEDURE — 85027 COMPLETE CBC AUTOMATED: CPT

## 2024-04-04 RX ORDER — LORAZEPAM 1 MG/1
1 TABLET ORAL
Status: COMPLETED | OUTPATIENT
Start: 2024-04-04 | End: 2024-04-04

## 2024-04-04 RX ORDER — ARIPIPRAZOLE 10 MG/1
30 TABLET ORAL NIGHTLY
Status: DISCONTINUED | OUTPATIENT
Start: 2024-04-04 | End: 2024-04-04 | Stop reason: HOSPADM

## 2024-04-04 RX ORDER — LORAZEPAM 1 MG/1
2 TABLET ORAL EVERY 6 HOURS PRN
Status: DISCONTINUED | OUTPATIENT
Start: 2024-04-04 | End: 2024-04-04 | Stop reason: HOSPADM

## 2024-04-04 RX ADMIN — LORAZEPAM 1 MG: 1 TABLET ORAL at 06:50

## 2024-04-04 RX ADMIN — Medication 10 ML: at 09:52

## 2024-04-04 RX ADMIN — ARIPIPRAZOLE 20 MG: 10 TABLET ORAL at 01:41

## 2024-04-04 RX ADMIN — SERTRALINE 50 MG: 50 TABLET, FILM COATED ORAL at 09:50

## 2024-04-04 RX ADMIN — MICONAZOLE NITRATE: 2 POWDER TOPICAL at 09:52

## 2024-04-04 RX ADMIN — ASPIRIN 81 MG: 81 TABLET, CHEWABLE ORAL at 09:50

## 2024-04-04 ASSESSMENT — ENCOUNTER SYMPTOMS
VOMITING: 0
DIARRHEA: 0
COLOR CHANGE: 0
SINUS PAIN: 0
BACK PAIN: 0
COUGH: 0
CONSTIPATION: 0
ABDOMINAL PAIN: 0
NAUSEA: 0
SORE THROAT: 0
WHEEZING: 0
SINUS PRESSURE: 0
SHORTNESS OF BREATH: 0

## 2024-04-04 NOTE — RT PROTOCOL NOTE
RT Inhaler-Nebulizer Bronchodilator Protocol Note    There is a bronchodilator order in the chart from a provider indicating to follow the RT Bronchodilator Protocol and there is an “Initiate RT Inhaler-Nebulizer Bronchodilator Protocol” order as well (see protocol at bottom of note).    CXR Findings:  XR CHEST PORTABLE    Result Date: 4/3/2024  No acute process.       The findings from the last RT Protocol Assessment were as follows:   History Pulmonary Disease: None or smoker <15 pack years  Respiratory Pattern: Regular pattern and RR 12-20 bpm  Breath Sounds: Slightly diminished and/or crackles  Cough: Strong, spontaneous, non-productive  Indication for Bronchodilator Therapy: On home bronchodilators  Bronchodilator Assessment Score: 2    Aerosolized bronchodilator medication orders have been revised according to the RT Inhaler-Nebulizer Bronchodilator Protocol below.    Respiratory Therapist to perform RT Therapy Protocol Assessment initially then follow the protocol.  Repeat RT Therapy Protocol Assessment PRN for score 0-3 or on second treatment, BID, and PRN for scores above 3.    No Indications - adjust the frequency to every 6 hours PRN wheezing or bronchospasm, if no treatments needed after 48 hours then discontinue using Per Protocol order mode.     If indication present, adjust the RT bronchodilator orders based on the Bronchodilator Assessment Score as indicated below.  Use Inhaler orders unless patient has one or more of the following: on home nebulizer, not able to hold breath for 10 seconds, is not alert and oriented, cannot activate and use MDI correctly, or respiratory rate 25 breaths per minute or more, then use the equivalent nebulizer order(s) with same Frequency and PRN reasons based on the score.  If a patient is on this medication at home then do not decrease Frequency below that used at home.    0-3 - enter or revise RT bronchodilator order(s) to equivalent RT Bronchodilator order with
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
## 2024-04-04 NOTE — PROGRESS NOTES
MRI called and informed RN that patient refused to have her MRI imaging.   1135 Patient is back from the MRI- states that she don't feel the need to do the MRI again.

## 2024-04-04 NOTE — PROGRESS NOTES
TODAY'S DATE:  4/3/2024      Current NIHSS 0      Discussed personal risk factors for Stroke/TIA with patient/family, and ways to reduce the risk for a recurrent stroke.     Patient's personal risk factors which were identified are:   []   Alcohol Abuse: check with your physician before any alcohol consumption.   []   Atrial fibrillation: may cause blood clots  []   Drug Abuse: Seek help, talk with your doctor  []   Clotting Disorder  [x]   Diabetes  []   Family history of stroke or heart disease  [x]   High Blood Pressure/Hypertension: work with your physician  [x]   High cholesterol: monitor cholesterol levels with your physician  []   Overweight/Obesity: work with your physician for your ideal body weight  []   Physical Inactivity: get regular exercise as directed by your physician  []   Personal history of previous TIA or stroke  []   Poor Diet: decrease salt (sodium) in your diet, follow diet directed by physician  []   Smoking: stop smoking      Reviewed the Following Education with Patient and/or Family:   - Signs and Symptoms of Stroke  - Personal risk factors   - How to activate EMS (911)   - Importance of Follow Up Appointments at Discharge   - Importance of Compliance with Medications Prescribed at Discharge  - Available community resources and stroke advocacy groups if needed    Patient and/or family member: verbalized understanding.     Stroke Education booklet given to patient/family (or verified, if given already), which reviews above information. yes         Electronically signed by Mervat Elmore RN on 4/3/2024 at 11:26 PM

## 2024-04-04 NOTE — PROGRESS NOTES
osseous abnormality. CTA HEAD: ANTERIOR CIRCULATION: No significant stenosis of the intracranial internal carotid, anterior cerebral, or middle cerebral arteries.  There is a 5 mm aneurysm at the right MCA trifurcation. POSTERIOR CIRCULATION: No significant stenosis of the vertebral, basilar, or posterior cerebral arteries. No aneurysm. OTHER: No dural venous sinus thrombosis on this non-dedicated study. BRAIN: See separately dictated noncontrast head CT report.     No flow limiting stenosis or large vessel occlusion detected within the head or neck. Incidentally noted right MCA aneurysm.     CT HEAD WO CONTRAST    Result Date: 4/3/2024  EXAMINATION: CT OF THE HEAD WITHOUT CONTRAST  4/3/2024 6:44 pm TECHNIQUE: CT of the head was performed without the administration of intravenous contrast. Automated exposure control, iterative reconstruction, and/or weight based adjustment of the mA/kV was utilized to reduce the radiation dose to as low as reasonably achievable. COMPARISON: None HISTORY: ORDERING SYSTEM PROVIDED HISTORY: Stroke Symptoms TECHNOLOGIST PROVIDED HISTORY: Has a \"code stroke\" or \"stroke alert\" been called?->No Reason for exam:->Stroke Symptoms Decision Support Exception - unselect if not a suspected or confirmed emergency medical condition->Emergency Medical Condition (MA) Reason for Exam: dizzy,chest pain,,left arm numbness FINDINGS: BRAIN/VENTRICLES: There is no acute intracranial hemorrhage, mass effect or midline shift.  No abnormal extra-axial fluid collection.  The gray-white differentiation is maintained without evidence of an acute infarct.  There is no evidence of hydrocephalus. ORBITS: The visualized portion of the orbits demonstrate no acute abnormality. SINUSES: The visualized paranasal sinuses and mastoid air cells demonstrate no acute abnormality. SOFT TISSUES/SKULL:  No acute abnormality of the visualized skull or soft tissues.     No acute intracranial abnormality.     XR CHEST

## 2024-04-04 NOTE — PROGRESS NOTES
recall/demonstrate compensatory strategies independently      Pt Education: SLP educated the patient re: Role of SLP, rationale for completion of assessment, anatomical components of swallow structures as they pertain to airway protection, results of assessment, recommendations, and POC  Pt Education Response: verbalized understanding and RN aware    Duration/Frequency of Tx:eval only    Individuals Consulted:   [x]  Patient     []  NP         [x]  RN   []  RD                   []  MD      []  Family Member                        []  PA    []  Other:      Safety Devices / Report:   [x]  All fall risk precautions in place [x]  Safety handoff completed with RN  []  Bed alarm in place  []  Left in bed     [x]  Chair alarm in place  [x]  Left in chair   [x]  Call light in reach   []  Other:                           Total Treatment Time / Charges       Time in Time out Total Time / units   Swallow Eval/Tx Time  1124 1140 16 min/1 unit     Signature:  Davina Savage M.A. SLP  Speech-Language Pathologist  [place] Lic #[id number]  [phone]

## 2024-04-04 NOTE — PROGRESS NOTES
Speech Language Pathology  Facility/Department: [place] [address] - MED SURG/ORTHO  Initial Speech/Language/Cognitive Assessment       NAME:Terri Shankar  : 1960 (63 y.o.)   MRN: [id number]  ROOM: [facility]/0524-01  ADMISSION DATE: 4/3/2024  PATIENT DIAGNOSIS(ES): TIA (transient ischemic attack) [G45.9]  Slurred speech [R47.81]  Aneurysm (HCC) [I72.9]  Left-sided weakness [R53.1]  Patient Active Problem List    Diagnosis Date Noted    Weakness on left side of face 2022    Slurred speech 2022    HLD (hyperlipidemia) 2022    Elevated blood pressure reading without diagnosis of hypertension 2022    Stroke, lacunar (HCC) 2022    TIA (transient ischemic attack) 2024    Major depressive disorder, recurrent, mild 2023    Major depressive disorder, recurrent, moderate 2023    Major depressive disorder, recurrent, unspecified 2023    AMS (altered mental status) 2023    Morbid obesity with BMI of 40.0-44.9, adult (HCC) 2015    Bipolar disorder (HCC) 01/15/2015    Urinary, incontinence, stress female 01/15/2015     Past Medical History:   Diagnosis Date    Anxiety     Bipolar 1 disorder (HCC)     Depression     Dyspareunia in female 2015    Elevated transaminase level 11/10/2017    Hemorrhoids 01/15/2015    History of tubal ligation 2015    HLD (hyperlipidemia) 2022    Hypertension     Irritable bowel syndrome     Lacunar stroke (HCC)     Menopause 2016    Obesity     Obesity (BMI 30-39.9) 2015    Overactive bladder     Prurigo nodularis     Stress incontinence     TIA (transient ischemic attack) 4/3/2024    Urinary incontinence     Uterine fibroid 2015    Yeast vaginitis 2020     Past Surgical History:   Procedure Laterality Date    COLONOSCOPY  2010    polyp removed    COLONOSCOPY  2018    EYE SURGERY Left     Lazy eye    TONSILLECTOMY      TUBAL LIGATION       Allergies   Allergen Reactions    Bactrim

## 2024-04-04 NOTE — PROGRESS NOTES
Kristine Rivas is a 65 y.o. female, who presents with a chief complaint of   Chief Complaint   Patient presents with    Diabetes     F/U on labs and meds           Diabetes       This visit has been scheduled as a telehealth visit to comply with patient safety concerns in accordance with CDC recommendations. This was an audio and video enabled telemedicine encounter.    You have chosen to receive care through a televisit visit. Do you consent to use a televisit visit for your medical care today? Yes   Pt is at home and I am in the office    Pt has moved into an new apartment and has a new puppy.  Her kids have jobs and are doing well.     Latent autoimmune diabetes - a1c 8.9->8.5->8.6.   Her am glucoses have been in the low 100s.  In the afternoon her glucoses are higher.  She snacks in the afternoon bc they don't eat dinner until about 9pm.  She is on insulin r/n from [employer].  She takes short acting insulin R 18-20 units morning and afternoon and then 40-50 units insulin at night.  she takes insulin N 28-30 units at night.  she tries to stay active but her back limits her walking some.  In a little over a year she will go on Medicare and be eddie ot afford her insulin.      HTN - no ha/dizziness.  On lisinopril and carvedilol     HLD - normally on statin but she has been out.  no ccramps or myalgia.      She needs to get UTD on her HM.       2 of her sisters have had breast cancer.  Last mammogram normal and due for next scan.    cologuard utd.  No family hx colon cancer. No personal hx of inflammatory bowel disease.      The following portions of the patient's history were reviewed and updated as appropriate: allergies, current medications, past family history, past medical history, past social history, past surgical history and problem list.    Allergies: Patient has no known allergies.    Review of Systems   Constitutional: Negative.    HENT: Negative.     Eyes: Negative.    Respiratory: Negative.      Cardiovascular: Negative.    Gastrointestinal: Negative.    Endocrine: Negative.    Genitourinary: Negative.    Musculoskeletal: Negative.    Skin: Negative.    Allergic/Immunologic: Negative.    Neurological: Negative.    Hematological: Negative.    Psychiatric/Behavioral: Negative.     All other systems reviewed and are negative.            Wt Readings from Last 3 Encounters:   11/03/23 113 kg (250 lb)   03/06/23 113 kg (250 lb)   06/16/22 113 kg (250 lb)     Temp Readings from Last 3 Encounters:   03/06/23 98.7 °F (37.1 °C)   05/12/22 97.3 °F (36.3 °C)   05/06/22 96.9 °F (36.1 °C) (Tympanic)     BP Readings from Last 3 Encounters:   03/06/23 130/74   06/16/22 144/80   05/12/22 154/88     Pulse Readings from Last 3 Encounters:   05/12/22 67   05/06/22 62   10/04/21 87     Body mass index is 38.02 kg/m².  SpO2 Readings from Last 3 Encounters:   05/12/22 97%   05/06/22 98%   10/04/21 99%          Physical Exam  Vitals and nursing note reviewed.   Constitutional:       General: She is not in acute distress.     Appearance: She is well-developed.   HENT:      Head: Normocephalic and atraumatic.      Right Ear: External ear normal.      Left Ear: External ear normal.      Nose: Nose normal.   Eyes:      Conjunctiva/sclera: Conjunctivae normal.      Pupils: Pupils are equal, round, and reactive to light.   Cardiovascular:      Rate and Rhythm: Normal rate and regular rhythm.      Heart sounds: Normal heart sounds.   Pulmonary:      Effort: Pulmonary effort is normal. No respiratory distress.      Breath sounds: Normal breath sounds. No wheezing.   Musculoskeletal:         General: Normal range of motion.      Cervical back: Normal range of motion and neck supple.      Comments: Normal gait   Skin:     General: Skin is warm and dry.   Neurological:      Mental Status: She is alert and oriented to person, place, and time.   Psychiatric:         Behavior: Behavior normal.         Thought Content: Thought content normal.          Judgment: Judgment normal.         Results for orders placed or performed in visit on 06/04/23   Comprehensive Metabolic Panel    Specimen: Blood   Result Value Ref Range    Glucose 150 (H) 65 - 99 mg/dL    BUN 11 8 - 23 mg/dL    Creatinine 0.80 0.57 - 1.00 mg/dL    Sodium 141 136 - 145 mmol/L    Potassium 4.3 3.5 - 5.2 mmol/L    Chloride 101 98 - 107 mmol/L    CO2 30.0 (H) 22.0 - 29.0 mmol/L    Calcium 9.6 8.6 - 10.5 mg/dL    Total Protein 7.0 6.0 - 8.5 g/dL    Albumin 4.4 3.5 - 5.2 g/dL    ALT (SGPT) 20 1 - 33 U/L    AST (SGOT) 18 1 - 32 U/L    Alkaline Phosphatase 104 39 - 117 U/L    Total Bilirubin 0.2 0.0 - 1.2 mg/dL    Globulin 2.6 gm/dL    A/G Ratio 1.7 g/dL    BUN/Creatinine Ratio 13.8 7.0 - 25.0    Anion Gap 10.0 5.0 - 15.0 mmol/L    eGFR 82.9 >60.0 mL/min/1.73   Hemoglobin A1c    Specimen: Blood   Result Value Ref Range    Hemoglobin A1C 8.60 (H) 4.80 - 5.60 %   Lipid Panel With LDL / HDL Ratio    Specimen: Blood   Result Value Ref Range    Total Cholesterol 211 (H) 0 - 200 mg/dL    Triglycerides 252 (H) 0 - 150 mg/dL    HDL Cholesterol 52 40 - 60 mg/dL    LDL Cholesterol  115 (H) 0 - 100 mg/dL    VLDL Cholesterol 44 (H) 5 - 40 mg/dL    LDL/HDL Ratio 2.09    CBC Auto Differential    Specimen: Blood   Result Value Ref Range    WBC 8.27 3.40 - 10.80 10*3/mm3    RBC 4.87 3.77 - 5.28 10*6/mm3    Hemoglobin 13.6 12.0 - 15.9 g/dL    Hematocrit 41.4 34.0 - 46.6 %    MCV 85.0 79.0 - 97.0 fL    MCH 27.9 26.6 - 33.0 pg    MCHC 32.9 31.5 - 35.7 g/dL    RDW 13.6 12.3 - 15.4 %    RDW-SD 41.9 37.0 - 54.0 fl    MPV 11.4 6.0 - 12.0 fL    Platelets 284 140 - 450 10*3/mm3    Neutrophil % 50.7 42.7 - 76.0 %    Lymphocyte % 35.2 19.6 - 45.3 %    Monocyte % 6.7 5.0 - 12.0 %    Eosinophil % 6.5 (H) 0.3 - 6.2 %    Basophil % 0.5 0.0 - 1.5 %    Immature Grans % 0.4 0.0 - 0.5 %    Neutrophils, Absolute 4.20 1.70 - 7.00 10*3/mm3    Lymphocytes, Absolute 2.91 0.70 - 3.10 10*3/mm3    Monocytes, Absolute 0.55 0.10 - 0.90  10*3/mm3    Eosinophils, Absolute 0.54 (H) 0.00 - 0.40 10*3/mm3    Basophils, Absolute 0.04 0.00 - 0.20 10*3/mm3    Immature Grans, Absolute 0.03 0.00 - 0.05 10*3/mm3    nRBC 0.1 0.0 - 0.2 /100 WBC     Result Review :                  Assessment and Plan    Diagnoses and all orders for this visit:    1. 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   -     atorvastatin (LIPITOR) 40 MG tablet; Take 1 tablet by mouth Every Night.  Dispense: 90 tablet; Refill: 1  -     lisinopril (PRINIVIL,ZESTRIL) 40 MG tablet; Take 1 tablet by mouth Daily.  Dispense: 90 tablet; Refill: 1    2. Chronic coronary artery disease  -     atorvastatin (LIPITOR) 40 MG tablet; Take 1 tablet by mouth Every Night.  Dispense: 90 tablet; Refill: 1  -     carvedilol (COREG) 3.125 MG tablet; Take 1 tablet by mouth 2 (Two) Times a Day.  Dispense: 180 tablet; Refill: 1    3. Hyperlipidemia, unspecified hyperlipidemia type  -     atorvastatin (LIPITOR) 40 MG tablet; Take 1 tablet by mouth Every Night.  Dispense: 90 tablet; Refill: 1    4. Essential hypertension  -     carvedilol (COREG) 3.125 MG tablet; Take 1 tablet by mouth 2 (Two) Times a Day.  Dispense: 180 tablet; Refill: 1  -     lisinopril (PRINIVIL,ZESTRIL) 40 MG tablet; Take 1 tablet by mouth Daily.  Dispense: 90 tablet; Refill: 1    5. Gastroesophageal reflux disease  -     pantoprazole (PROTONIX) 40 MG EC tablet; Take 1 tablet by mouth Daily.  Dispense: 90 tablet; Refill: 1    6. Spinal stenosis of lumbar region, unspecified whether neurogenic claudication present  -     cyclobenzaprine (FLEXERIL) 10 MG tablet; Take 1 tablet by mouth 2 (Two) Times a Day As Needed for Muscle Spasms.  Dispense: 30 tablet; Refill: 0                       Outpatient Medications Prior to Visit   Medication Sig Dispense Refill    acetaminophen (TYLENOL) 500 MG tablet Take 2 tablets by mouth Every 6 (Six) Hours As Needed for Mild Pain.       Training;Fall Prevention Strategies  Education Method: Verbal  Barriers to Learning: None  Education Outcome: Verbalized understanding;Continued education needed      Therapy Time   Individual Concurrent Group Co-treatment   Time In 0857         Time Out 0930         Minutes 33         Timed Code Treatment Minutes: 23 Minutes (10 min eval)       Cinthya Parikh, PT, DPT       If pt is unable to be seen after this session, please let this note serve as discharge summary.  Please see case management note for discharge disposition.  Thank you.     aspirin 81 MG EC tablet Take 1 tablet by mouth Daily. 90 tablet 3    insulin NPH (humuLIN N,novoLIN N) 100 UNIT/ML injection Inject 32 Units under the skin into the appropriate area as directed Every Night. 9 each 2    insulin regular (humuLIN R,novoLIN R) 100 UNIT/ML injection Inject 18 Units under the skin into the appropriate area as directed 3 (Three) Times a Day Before Meals. 15-20 units at breakfast 15-20 units at lunch 25-45 units at dinner 9 each 2    meclizine (ANTIVERT) 25 MG tablet Take 1 tablet by mouth Every 6 (Six) Hours As Needed for dizziness. 20 tablet 0    melatonin 5 MG tablet tablet Take 1 tablet by mouth Every Night.      Vitamins-Lipotropics (LIPO-FLAVONOID PLUS PO) Take  by mouth.      atorvastatin (LIPITOR) 40 MG tablet Take 1 tablet by mouth Every Night. 90 tablet 1    carvedilol (COREG) 3.125 MG tablet TAKE ONE TABLET BY MOUTH TWICE A  tablet 1    cyclobenzaprine (FLEXERIL) 10 MG tablet Take 1 tablet by mouth 2 (Two) Times a Day As Needed for Muscle Spasms. 30 tablet 1    lisinopril (PRINIVIL,ZESTRIL) 40 MG tablet TAKE ONE TABLET BY MOUTH DAILY 90 tablet 1    pantoprazole (PROTONIX) 40 MG EC tablet TAKE ONE TABLET BY MOUTH DAILY 90 tablet 1    DULoxetine (CYMBALTA) 30 MG capsule Take 1 capsule by mouth Daily. 30 capsule 0    glucose blood (WaveSense Presto) test strip 1 each by Other route 4 (Four) Times a Day. use 1 strip to test 4 times a day 360 each 4     No facility-administered medications prior to visit.     New Medications Ordered This Visit   Medications    atorvastatin (LIPITOR) 40 MG tablet     Sig: Take 1 tablet by mouth Every Night.     Dispense:  90 tablet     Refill:  1    carvedilol (COREG) 3.125 MG tablet     Sig: Take 1 tablet by mouth 2 (Two) Times a Day.     Dispense:  180 tablet     Refill:  1    pantoprazole (PROTONIX) 40 MG EC tablet     Sig: Take 1 tablet by mouth Daily.     Dispense:  90 tablet     Refill:  1    lisinopril (PRINIVIL,ZESTRIL) 40 MG tablet      Sig: Take 1 tablet by mouth Daily.     Dispense:  90 tablet     Refill:  1    cyclobenzaprine (FLEXERIL) 10 MG tablet     Sig: Take 1 tablet by mouth 2 (Two) Times a Day As Needed for Muscle Spasms.     Dispense:  30 tablet     Refill:  0     [unfilled]  Medications Discontinued During This Encounter   Medication Reason    cyclobenzaprine (FLEXERIL) 10 MG tablet Reorder    atorvastatin (LIPITOR) 40 MG tablet Reorder    carvedilol (COREG) 3.125 MG tablet Reorder    pantoprazole (PROTONIX) 40 MG EC tablet Reorder    lisinopril (PRINIVIL,ZESTRIL) 40 MG tablet Reorder         No follow-ups on file.    Patient was given instructions and counseling regarding her condition or for health maintenance advice. Please see specific information pulled into the AVS if appropriate.

## 2024-04-04 NOTE — PROGRESS NOTES
Attempted MRI, however, patient refused after she was put on the scan table. RN notified and patient sent back to her room.

## 2024-04-04 NOTE — PROGRESS NOTES
Occupational Therapy  Facility/Department: [place] [address] - MED SURG/ORTHO  Occupational Therapy Initial Assessment and Treatment    Name: Terri Shankar  : 1960  MRN: [id number]  Date of Service: 2024    Discharge Recommendations:  24 hour supervision or assist, Home with Home health OT  OT Equipment Recommendations  Equipment Needed: No       Patient Diagnosis(es): The primary encounter diagnosis was Slurred speech. Diagnoses of Aneurysm (HCC) and Left-sided weakness were also pertinent to this visit.  Past Medical History:  has a past medical history of Anxiety, Bipolar 1 disorder (HCC), Depression, Dyspareunia in female, Elevated transaminase level, Hemorrhoids, History of tubal ligation, HLD (hyperlipidemia), Hypertension, Irritable bowel syndrome, Lacunar stroke (HCC), Menopause, Obesity, Obesity (BMI 30-39.9), Overactive bladder, Prurigo nodularis, Stress incontinence, TIA (transient ischemic attack), Urinary incontinence, Uterine fibroid, and Yeast vaginitis.  Past Surgical History:  has a past surgical history that includes Eye surgery (Left); Tonsillectomy; Tubal ligation (); Colonoscopy (); and Colonoscopy ().           Assessment   Performance deficits / Impairments: Decreased functional mobility ;Decreased ADL status;Decreased cognition;Decreased safe awareness;Decreased balance;Decreased high-level IADLs  Assessment: Pt seated in bed at start of session. Pt tolerates OT evaluation/ treatment session well but limited d/t distractibility and very tangential about current home life situation. 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 Pt completes functional mobility with SBA and no AD, but requires frequent cues for safety. Pt completes toileting and standing grooming tasks with SBA. Pt currently reporting she came to the hospital because she \"doesn't feel safe at home\" - RN was made aware and present at end of session. Pt is

## 2024-04-04 NOTE — PROGRESS NOTES
4 Eyes Skin Assessment     NAME:  Terri Shankar  YOB: 1960  MEDICAL RECORD NUMBER:  [id number]    The patient is being assessed for  Admission    I agree that at least one RN has performed a thorough Head to Toe Skin Assessment on the patient. ALL assessment sites listed below have been assessed.      Areas assessed by both nurses:    Head, Face, Ears, Shoulders, Back, Chest, Arms, Elbows, Hands, Sacrum. Buttock, Coccyx, Ischium, and Legs. Feet and Heels    Rashes on right breast, scattered red spots on lower legs        Does the Patient have a Wound? No noted wound(s)       Damian Prevention initiated by RN: Yes  Wound Care Orders initiated by RN: No    Pressure Injury (Stage 3,4, Unstageable, DTI, NWPT, and Complex wounds) if present, place Wound referral order by RN under : No    New Ostomies, if present place, Ostomy referral order under : No     Nurse 1 eSignature: Electronically signed by Mervat Elmore RN on 4/3/24 at 10:46 PM EDT    **SHARE this note so that the co-signing nurse can place an eSignature**    Nurse 2 eSignature: Electronically signed by Trey Nation RN on 4/4/24 at 5:45 AM EDT

## 2024-04-04 NOTE — PROGRESS NOTES
Pt wanting to sign out AMA.Writer informed pt that she would be leaving against medical advice and writer tried to encourage/educate pt to stay. Writer notified MD pt signing out AMA. Pt answered all orientation questions correctly. Pt IV removed without complications. Pt escorted to main elevators on floor pt understands she will have to find a ride home from this point. Writer and pt updated sister April on the situation. AMA paperwork signed and witnessed by Cora Nava RN

## 2024-04-04 NOTE — CONSULTS
Full note dictated.      Dx:  bipolar hypomanic           PTSD    Rec:  given her current presentation I will discontinue the zoloft and increase the abilify.  She does not need inpatient psychiatric hospitalization.  Also, I will add a prn of ativan should she need something immediate.  I will follow.     Lionel Amador MD  
Psych Consult sent via [employer] at 8:04 AM    Tracie Marie   
2 puff Inhalation 4x Daily PRN Jaison Rodriguez APRN - CNP        atorvastatin (LIPITOR) tablet 40 mg  40 mg Oral Nightly Jaison Rodriguez APRN - CNP   40 mg at 04/03/24 2227    [Held by provider] lisinopril (PRINIVIL;ZESTRIL) tablet 10 mg  10 mg Oral Daily Jaison Rodriguez APRN - CNP        miconazole (MICOTIN) 2 % powder   Topical BID Jaison Rodriguez APRN - CNP   Given at 04/03/24 2227    oxyCODONE (ROXICODONE) immediate release tablet 5 mg  5 mg Oral Q6H PRN Jaison Rodriguez APRN - CNP        sertraline (ZOLOFT) tablet 50 mg  50 mg Oral Daily Jaison Rodriguez APRN - CNP        sodium chloride flush 0.9 % injection 5-40 mL  5-40 mL IntraVENous 2 times per day Jaison Rodriguez APRN - CNP   10 mL at 04/03/24 2214    sodium chloride flush 0.9 % injection 5-40 mL  5-40 mL IntraVENous PRN Jaison Rodriguez APRN - CNP        0.9 % sodium chloride infusion   IntraVENous PRN Jaison Rodriguez APRN - CNP        ondansetron (ZOFRAN-ODT) disintegrating tablet 4 mg  4 mg Oral Q8H PRN Jaison Rodriguez APRN - CNP        Or    ondansetron (ZOFRAN) injection 4 mg  4 mg IntraVENous Q6H PRN Jaison Rodriguez APRN - CNP        polyethylene glycol (GLYCOLAX) packet 17 g  17 g Oral Daily PRN Jaison Rodriguez APRN - CNP        aspirin chewable tablet 81 mg  81 mg Oral Daily Jaison Rodriguez APRN - CNP        Or    aspirin suppository 300 mg  300 mg Rectal Daily Jaison Rodriguez APRN - CNP        labetalol (NORMODYNE;TRANDATE) injection 10 mg  10 mg IntraVENous Q10 Min PRN Jaison Rodriguez APRN - CNP        ARIPiprazole (ABILIFY) tablet 20 mg  20 mg Oral Nightly Jaison Rodriguez APRN - CNP   20 mg at 04/04/24 0141       Allergies:    Allergies as of 04/03/2024 - Fully Reviewed 04/03/2024   Allergen Reaction Noted    Bactrim [sulfamethoxazole-trimethoprim] Shortness Of Breath and Rash 08/12/2019    Augmentin [amoxicillin-pot clavulanate] Other (See Comments) and Dizziness or Vertigo 12/01/2022    Diphenhydramine Anxiety and

## 2024-04-04 NOTE — PROGRESS NOTES
Patient admitted to room 524 from ED.  Patient oriented to room, call light, bed rails, phone, lights and bathroom.  Patient instructed about the schedule of the day including: vital sign frequency, lab draws, possible tests, frequency of MD and staff rounds, including RN/MD rounding together at bedside, daily weights, and I &O's.  Patient instructed about prescribed diet, how to use 8MENU, and television.   bed alarm in place, patient aware of placement and reason.   Telemetry box  in place, patient aware of placement and reason.  Bed locked, in lowest position, side rails up 2/4, call light within reach.  Will continue to monitor.
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
## 2024-04-05 ENCOUNTER — TELEPHONE (OUTPATIENT)
Dept: FAMILY MEDICINE CLINIC | Age: 64
End: 2024-04-05

## 2024-04-05 ENCOUNTER — CARE COORDINATION (OUTPATIENT)
Dept: CASE MANAGEMENT | Age: 64
End: 2024-04-05

## 2024-04-05 NOTE — TELEPHONE ENCOUNTER
Trey from [facility] called asking for verbal okay for skilled nursing, PT and OT for this pt. Call Trey back at [phone].

## 2024-04-05 NOTE — CARE COORDINATION
Care Transitions Outreach Attempt    Call within 2 business days of discharge: Yes   Attempted to reach patient for transitions of care follow up. Unable to reach patient. LVM.         Patient: Terri Shankar Patient : 1960 MRN: [id number]    Last Discharge Facility       Date Complaint Diagnosis Description Type Department Provider    4/3/24 Numbness Slurred speech ... ED to Hosp-Admission (Discharged) (ADMITTED) Wilmer Machado MD; [facility]...              Was this an external facility discharge? No Discharge Facility Name: n.a    Noted following upcoming appointments from discharge chart review:   [facility] follow up appointment(s):   Future Appointments   Date Time Provider Department Center   2024 10:20 AM Marcia Abdi APRN - [facility]     Non-[facility]  follow up appointment(s): .    Laurence SEPULVEDAN, RN, [facility]  Care Transition Nurse  [phone] mobile

## 2024-04-05 NOTE — TELEPHONE ENCOUNTER
Pt called to talk to either Rowena or Marcia. She stated that the ER doc told her that he wants Marcia to up the Abilify from 20 mgs to 40 mgs. Also the ER doc suggested [facility] for her bipolar diagnosis. Pt asking for a call back.     Tried to verify phone number with Terri and she became a little belligerent and defensive saying \"Well, that's the number I gave you! Why do you need to know my address?! Do you understand what I'm saying?!  Pt abruptly hung up the phone.

## 2024-04-08 ENCOUNTER — CARE COORDINATION (OUTPATIENT)
Dept: CASE MANAGEMENT | Age: 64
End: 2024-04-08

## 2024-04-08 NOTE — CARE COORDINATION
Care Transitions Outreach Attempt    Call within 2 business days of discharge: Yes   Attempted to reach patient for transitions of care follow up. Unable to reach patient call was answered and then disconnected.     Patient: Terri Shankar Patient : 1960 MRN: [id number]    Last Discharge Facility       Date Complaint Diagnosis Description Type Department Provider    4/3/24 Numbness Slurred speech ... ED to Hosp-Admission (Discharged) (ADMITTED) Wilmer Machado MD; [facility]...              Was this an external facility discharge? No Discharge Facility Name: n.a    Noted following upcoming appointments from discharge chart review:   [facility] follow up appointment(s):   Future Appointments   Date Time Provider Department Center   2024 10:20 AM Marcia Abdi APRN - [facility]     Non-[facility]  follow up appointment(s): .    Laurence ARNOLD, RN, [facility]  Care Transition Nurse  [phone] mobile

## 2024-04-09 ENCOUNTER — TELEPHONE (OUTPATIENT)
Age: 64
End: 2024-04-09

## 2024-04-09 ENCOUNTER — APPOINTMENT (OUTPATIENT)
Dept: GENERAL RADIOLOGY | Age: 64
End: 2024-04-09
Payer: COMMERCIAL

## 2024-04-09 ENCOUNTER — APPOINTMENT (OUTPATIENT)
Dept: MRI IMAGING | Age: 64
End: 2024-04-09
Payer: COMMERCIAL

## 2024-04-09 ENCOUNTER — APPOINTMENT (OUTPATIENT)
Dept: CT IMAGING | Age: 64
End: 2024-04-09
Payer: COMMERCIAL

## 2024-04-09 ENCOUNTER — HOSPITAL ENCOUNTER (OUTPATIENT)
Age: 64
Setting detail: OBSERVATION
Discharge: HOME | End: 2024-04-10
Attending: EMERGENCY MEDICINE | Admitting: INTERNAL MEDICINE
Payer: COMMERCIAL

## 2024-04-09 DIAGNOSIS — R41.0 CONFUSION: Primary | ICD-10-CM

## 2024-04-09 DIAGNOSIS — R41.82 ALTERED MENTAL STATUS, UNSPECIFIED ALTERED MENTAL STATUS TYPE: ICD-10-CM

## 2024-04-09 LAB
ANION GAP SERPL CALCULATED.3IONS-SCNC: 11 MMOL/L (ref 3–16)
APAP SERPL-MCNC: <5 UG/ML (ref 10–30)
BACTERIA URNS QL MICRO: ABNORMAL /HPF
BASOPHILS # BLD: 0.1 K/UL (ref 0–0.2)
BASOPHILS NFR BLD: 0.7 %
BILIRUB UR QL STRIP.AUTO: NEGATIVE
BUN SERPL-MCNC: 20 MG/DL (ref 7–20)
CALCIUM SERPL-MCNC: 9 MG/DL (ref 8.3–10.6)
CHLORIDE SERPL-SCNC: 103 MMOL/L (ref 99–110)
CLARITY UR: CLEAR
CO2 SERPL-SCNC: 25 MMOL/L (ref 21–32)
COLOR UR: YELLOW
CREAT SERPL-MCNC: 0.8 MG/DL (ref 0.6–1.2)
DEPRECATED RDW RBC AUTO: 14.4 % (ref 12.4–15.4)
EKG ATRIAL RATE: 65 BPM
EKG DIAGNOSIS: NORMAL
EKG P AXIS: 52 DEGREES
EKG P-R INTERVAL: 196 MS
EKG Q-T INTERVAL: 424 MS
EKG QRS DURATION: 84 MS
EKG QTC CALCULATION (BAZETT): 440 MS
EKG R AXIS: 45 DEGREES
EKG T AXIS: 43 DEGREES
EKG VENTRICULAR RATE: 65 BPM
EOSINOPHIL # BLD: 0.5 K/UL (ref 0–0.6)
EOSINOPHIL NFR BLD: 5.5 %
EPI CELLS #/AREA URNS HPF: ABNORMAL /HPF (ref 0–5)
ETHANOLAMINE SERPL-MCNC: NORMAL MG/DL (ref 0–0.08)
GFR SERPLBLD CREATININE-BSD FMLA CKD-EPI: 83 ML/MIN/{1.73_M2}
GLUCOSE BLD-MCNC: 90 MG/DL (ref 70–99)
GLUCOSE SERPL-MCNC: 98 MG/DL (ref 70–99)
GLUCOSE UR STRIP.AUTO-MCNC: NEGATIVE MG/DL
HCT VFR BLD AUTO: 41.6 % (ref 36–48)
HGB BLD-MCNC: 13.7 G/DL (ref 12–16)
HGB UR QL STRIP.AUTO: NEGATIVE
KETONES UR STRIP.AUTO-MCNC: NEGATIVE MG/DL
LEUKOCYTE ESTERASE UR QL STRIP.AUTO: ABNORMAL
LYMPHOCYTES # BLD: 2.1 K/UL (ref 1–5.1)
LYMPHOCYTES NFR BLD: 24.7 %
MCH RBC QN AUTO: 27.9 PG (ref 26–34)
MCHC RBC AUTO-ENTMCNC: 32.9 G/DL (ref 31–36)
MCV RBC AUTO: 84.8 FL (ref 80–100)
MONOCYTES # BLD: 0.5 K/UL (ref 0–1.3)
MONOCYTES NFR BLD: 6 %
NEUTROPHILS # BLD: 5.4 K/UL (ref 1.7–7.7)
NEUTROPHILS NFR BLD: 63.1 %
NITRITE UR QL STRIP.AUTO: NEGATIVE
PERFORMED ON: NORMAL
PH UR STRIP.AUTO: 7 [PH] (ref 5–8)
PLATELET # BLD AUTO: 234 K/UL (ref 135–450)
PMV BLD AUTO: 8.4 FL (ref 5–10.5)
POTASSIUM SERPL-SCNC: 4.4 MMOL/L (ref 3.5–5.1)
PROT UR STRIP.AUTO-MCNC: NEGATIVE MG/DL
RBC # BLD AUTO: 4.91 M/UL (ref 4–5.2)
RBC #/AREA URNS HPF: ABNORMAL /HPF (ref 0–4)
SALICYLATES SERPL-MCNC: <0.3 MG/DL (ref 15–30)
SARS-COV-2 RDRP RESP QL NAA+PROBE: NOT DETECTED
SODIUM SERPL-SCNC: 139 MMOL/L (ref 136–145)
SP GR UR STRIP.AUTO: 1.02 (ref 1–1.03)
TROPONIN, HIGH SENSITIVITY: 8 NG/L (ref 0–14)
UA DIPSTICK W REFLEX MICRO PNL UR: YES
URN SPEC COLLECT METH UR: ABNORMAL
UROBILINOGEN UR STRIP-ACNC: 0.2 E.U./DL
WBC # BLD AUTO: 8.5 K/UL (ref 4–11)
WBC #/AREA URNS HPF: ABNORMAL /HPF (ref 0–5)

## 2024-04-09 PROCEDURE — 96374 THER/PROPH/DIAG INJ IV PUSH: CPT

## 2024-04-09 PROCEDURE — 81001 URINALYSIS AUTO W/SCOPE: CPT

## 2024-04-09 PROCEDURE — 70450 CT HEAD/BRAIN W/O DYE: CPT

## 2024-04-09 PROCEDURE — 87077 CULTURE AEROBIC IDENTIFY: CPT

## 2024-04-09 PROCEDURE — 2580000003 HC RX 258: Performed by: INTERNAL MEDICINE

## 2024-04-09 PROCEDURE — 6360000002 HC RX W HCPCS: Performed by: EMERGENCY MEDICINE

## 2024-04-09 PROCEDURE — 85025 COMPLETE CBC W/AUTO DIFF WBC: CPT

## 2024-04-09 PROCEDURE — 6370000000 HC RX 637 (ALT 250 FOR IP): Performed by: INTERNAL MEDICINE

## 2024-04-09 PROCEDURE — 80143 DRUG ASSAY ACETAMINOPHEN: CPT

## 2024-04-09 PROCEDURE — 96376 TX/PRO/DX INJ SAME DRUG ADON: CPT

## 2024-04-09 PROCEDURE — 80048 BASIC METABOLIC PNL TOTAL CA: CPT

## 2024-04-09 PROCEDURE — 2580000003 HC RX 258: Performed by: EMERGENCY MEDICINE

## 2024-04-09 PROCEDURE — 87086 URINE CULTURE/COLONY COUNT: CPT

## 2024-04-09 PROCEDURE — 96375 TX/PRO/DX INJ NEW DRUG ADDON: CPT

## 2024-04-09 PROCEDURE — 82077 ASSAY SPEC XCP UR&BREATH IA: CPT

## 2024-04-09 PROCEDURE — 93005 ELECTROCARDIOGRAM TRACING: CPT | Performed by: EMERGENCY MEDICINE

## 2024-04-09 PROCEDURE — 99285 EMERGENCY DEPT VISIT HI MDM: CPT

## 2024-04-09 PROCEDURE — 80179 DRUG ASSAY SALICYLATE: CPT

## 2024-04-09 PROCEDURE — 70551 MRI BRAIN STEM W/O DYE: CPT

## 2024-04-09 PROCEDURE — 90792 PSYCH DIAG EVAL W/MED SRVCS: CPT

## 2024-04-09 PROCEDURE — G0378 HOSPITAL OBSERVATION PER HR: HCPCS

## 2024-04-09 PROCEDURE — 36415 COLL VENOUS BLD VENIPUNCTURE: CPT

## 2024-04-09 PROCEDURE — 71046 X-RAY EXAM CHEST 2 VIEWS: CPT

## 2024-04-09 PROCEDURE — 87635 SARS-COV-2 COVID-19 AMP PRB: CPT

## 2024-04-09 PROCEDURE — 84484 ASSAY OF TROPONIN QUANT: CPT

## 2024-04-09 PROCEDURE — 96372 THER/PROPH/DIAG INJ SC/IM: CPT

## 2024-04-09 PROCEDURE — 6360000002 HC RX W HCPCS: Performed by: INTERNAL MEDICINE

## 2024-04-09 RX ORDER — LORAZEPAM 0.5 MG/1
0.5 TABLET ORAL
Status: DISCONTINUED | OUTPATIENT
Start: 2024-04-09 | End: 2024-04-10 | Stop reason: HOSPADM

## 2024-04-09 RX ORDER — ARIPIPRAZOLE 5 MG/1
20 TABLET ORAL NIGHTLY
Status: DISCONTINUED | OUTPATIENT
Start: 2024-04-09 | End: 2024-04-10 | Stop reason: HOSPADM

## 2024-04-09 RX ORDER — LORAZEPAM 0.5 MG/1
0.5 TABLET ORAL EVERY 4 HOURS PRN
Status: DISCONTINUED | OUTPATIENT
Start: 2024-04-09 | End: 2024-04-10 | Stop reason: HOSPADM

## 2024-04-09 RX ORDER — LISINOPRIL 10 MG/1
10 TABLET ORAL DAILY
Status: DISCONTINUED | OUTPATIENT
Start: 2024-04-10 | End: 2024-04-10 | Stop reason: HOSPADM

## 2024-04-09 RX ORDER — SODIUM CHLORIDE 9 MG/ML
INJECTION, SOLUTION INTRAVENOUS PRN
Status: DISCONTINUED | OUTPATIENT
Start: 2024-04-09 | End: 2024-04-10 | Stop reason: HOSPADM

## 2024-04-09 RX ORDER — ACETAMINOPHEN 500 MG
1000 TABLET ORAL EVERY 6 HOURS PRN
Status: DISCONTINUED | OUTPATIENT
Start: 2024-04-09 | End: 2024-04-10 | Stop reason: HOSPADM

## 2024-04-09 RX ORDER — ACETAMINOPHEN 650 MG/1
650 SUPPOSITORY RECTAL EVERY 6 HOURS PRN
Status: DISCONTINUED | OUTPATIENT
Start: 2024-04-09 | End: 2024-04-10 | Stop reason: DRUGHIGH

## 2024-04-09 RX ORDER — ALBUTEROL SULFATE 2.5 MG/3ML
2.5 SOLUTION RESPIRATORY (INHALATION)
Status: DISCONTINUED | OUTPATIENT
Start: 2024-04-09 | End: 2024-04-09

## 2024-04-09 RX ORDER — ASPIRIN 81 MG/1
81 TABLET ORAL DAILY
Status: DISCONTINUED | OUTPATIENT
Start: 2024-04-10 | End: 2024-04-10 | Stop reason: HOSPADM

## 2024-04-09 RX ORDER — POTASSIUM CHLORIDE 20 MEQ/1
40 TABLET, EXTENDED RELEASE ORAL PRN
Status: DISCONTINUED | OUTPATIENT
Start: 2024-04-09 | End: 2024-04-10 | Stop reason: HOSPADM

## 2024-04-09 RX ORDER — MAGNESIUM SULFATE IN WATER 40 MG/ML
2000 INJECTION, SOLUTION INTRAVENOUS PRN
Status: DISCONTINUED | OUTPATIENT
Start: 2024-04-09 | End: 2024-04-10 | Stop reason: HOSPADM

## 2024-04-09 RX ORDER — CLOBETASOL PROPIONATE 0.5 MG/G
CREAM TOPICAL 2 TIMES DAILY
Status: DISCONTINUED | OUTPATIENT
Start: 2024-04-09 | End: 2024-04-10 | Stop reason: HOSPADM

## 2024-04-09 RX ORDER — SODIUM CHLORIDE 0.9 % (FLUSH) 0.9 %
5-40 SYRINGE (ML) INJECTION PRN
Status: DISCONTINUED | OUTPATIENT
Start: 2024-04-09 | End: 2024-04-10 | Stop reason: HOSPADM

## 2024-04-09 RX ORDER — ACETAMINOPHEN 325 MG/1
650 TABLET ORAL EVERY 6 HOURS PRN
Status: DISCONTINUED | OUTPATIENT
Start: 2024-04-09 | End: 2024-04-10 | Stop reason: DRUGHIGH

## 2024-04-09 RX ORDER — ATORVASTATIN CALCIUM 20 MG/1
20 TABLET, FILM COATED ORAL NIGHTLY
Status: DISCONTINUED | OUTPATIENT
Start: 2024-04-09 | End: 2024-04-10 | Stop reason: HOSPADM

## 2024-04-09 RX ORDER — POLYETHYLENE GLYCOL 3350 17 G/17G
17 POWDER, FOR SOLUTION ORAL DAILY PRN
Status: DISCONTINUED | OUTPATIENT
Start: 2024-04-09 | End: 2024-04-10 | Stop reason: HOSPADM

## 2024-04-09 RX ORDER — LORAZEPAM 2 MG/ML
0.5 INJECTION INTRAMUSCULAR ONCE
Status: COMPLETED | OUTPATIENT
Start: 2024-04-09 | End: 2024-04-09

## 2024-04-09 RX ORDER — ALBUTEROL SULFATE 2.5 MG/3ML
2.5 SOLUTION RESPIRATORY (INHALATION) 4 TIMES DAILY PRN
Status: DISCONTINUED | OUTPATIENT
Start: 2024-04-09 | End: 2024-04-10 | Stop reason: HOSPADM

## 2024-04-09 RX ORDER — POTASSIUM CHLORIDE 7.45 MG/ML
10 INJECTION INTRAVENOUS PRN
Status: DISCONTINUED | OUTPATIENT
Start: 2024-04-09 | End: 2024-04-10 | Stop reason: HOSPADM

## 2024-04-09 RX ORDER — SODIUM CHLORIDE 0.9 % (FLUSH) 0.9 %
5-40 SYRINGE (ML) INJECTION EVERY 12 HOURS SCHEDULED
Status: DISCONTINUED | OUTPATIENT
Start: 2024-04-09 | End: 2024-04-10 | Stop reason: HOSPADM

## 2024-04-09 RX ORDER — ENOXAPARIN SODIUM 100 MG/ML
40 INJECTION SUBCUTANEOUS DAILY
Status: DISCONTINUED | OUTPATIENT
Start: 2024-04-09 | End: 2024-04-10 | Stop reason: HOSPADM

## 2024-04-09 RX ADMIN — ENOXAPARIN SODIUM 40 MG: 100 INJECTION SUBCUTANEOUS at 23:01

## 2024-04-09 RX ADMIN — ATORVASTATIN CALCIUM 20 MG: 20 TABLET, FILM COATED ORAL at 23:01

## 2024-04-09 RX ADMIN — ARIPIPRAZOLE 20 MG: 5 TABLET ORAL at 23:01

## 2024-04-09 RX ADMIN — WATER 1000 MG: 1 INJECTION INTRAMUSCULAR; INTRAVENOUS; SUBCUTANEOUS at 20:05

## 2024-04-09 RX ADMIN — LORAZEPAM 0.5 MG: 2 INJECTION INTRAMUSCULAR; INTRAVENOUS at 16:43

## 2024-04-09 RX ADMIN — ACETAMINOPHEN 1000 MG: 500 TABLET ORAL at 23:00

## 2024-04-09 RX ADMIN — WATER 1000 MG: 1 INJECTION INTRAMUSCULAR; INTRAVENOUS; SUBCUTANEOUS at 23:01

## 2024-04-09 ASSESSMENT — PAIN - FUNCTIONAL ASSESSMENT
PAIN_FUNCTIONAL_ASSESSMENT: ACTIVITIES ARE NOT PREVENTED
PAIN_FUNCTIONAL_ASSESSMENT: 0-10

## 2024-04-09 ASSESSMENT — PAIN DESCRIPTION - ORIENTATION
ORIENTATION: MID
ORIENTATION: LEFT

## 2024-04-09 ASSESSMENT — PAIN SCALES - GENERAL
PAINLEVEL_OUTOF10: 9
PAINLEVEL_OUTOF10: 10

## 2024-04-09 ASSESSMENT — PAIN DESCRIPTION - DESCRIPTORS
DESCRIPTORS: BURNING
DESCRIPTORS: ACHING

## 2024-04-09 ASSESSMENT — PAIN DESCRIPTION - LOCATION
LOCATION: HEAD
LOCATION: ABDOMEN

## 2024-04-09 ASSESSMENT — PAIN DESCRIPTION - ONSET: ONSET: PROGRESSIVE

## 2024-04-09 ASSESSMENT — LIFESTYLE VARIABLES
HOW OFTEN DO YOU HAVE A DRINK CONTAINING ALCOHOL: PATIENT DECLINED
HOW MANY STANDARD DRINKS CONTAINING ALCOHOL DO YOU HAVE ON A TYPICAL DAY: PATIENT DECLINED

## 2024-04-09 ASSESSMENT — PAIN DESCRIPTION - FREQUENCY: FREQUENCY: INTERMITTENT

## 2024-04-09 ASSESSMENT — PAIN DESCRIPTION - PAIN TYPE: TYPE: ACUTE PAIN

## 2024-04-09 NOTE — TELEPHONE ENCOUNTER
Per Dr. Oscar: If she is back to normal.  We may not need to F/U.  I believe the episode is related to her underlying psych problem.    Pt is currently admitted to [facility] for altered consciousness.  Hopefully they can complete brain MRI and psych eval while she's in pt.

## 2024-04-09 NOTE — VIRTUAL HEALTH
[facility] Consult to Tele-psych Provider  Consult performed by: Manda Alberts, APRN - CNP  Consult ordered by: Gricelda Aguirre MD  Reason for consult: Psychosis      Terri Shankar  [id number]  1960     EMERGENCY DEPARTMENT TELEPSYCHIATRY EVALUATION    04/09/24    Chief Complaint:  “I'm just under a lot of stress.”    HPI: Patient is a 63 y.o.  female who presents for psychosis and AMS. Patient presented to the ED on 04/09/24 from home as a walk-in. History from the ED: \"Altered Mental Status (PT STATES HAVING TROUBLE FOCUSING, DIZZINESS, AND NUMBNESS PER PT FOR WEEKS.\" Per records, was recently admitted on 4/3 for concern for strokelike symptoms but left AMA on 4/4. Per discharge note \"New right MCA aneurysm found on CTA.  Patient was seen by neurology and she was to have an MRI brain as well as an EEG.  Patient started on ASA and statin.  Patient prior to having her workup completed decided to leave AGAINST MEDICAL ADVICE.\" Patient was also seen by psychiatrist while inpatient, however, full note is not in chart. Past psychiatric history significant for Bipolar 1 disorder and depression. Upon assessment today patient is alert, oriented, and able to participate. Patient presents as tangential and paranoid with rapid and pressured speech. Patient has difficulty focusing and requires frequent redirection. 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 Patient states that her  and son are both currently in the hospital and her twin sister is also in the ED at this moment. Patient reports that her life is falling apart and states, \"I feel like I may be having a stroke or like a nervous breakdown. It's getting to be too much.\" Patient reports recent symptoms of depression and agitation, states \"I find myself snapping at 
Terri Shankar  [id number]  1960     Social Work Behavioral Health Crisis Assessment    04/09/24    Chief Complaint: Altered Mental Status    HPI: Patient is a 63 y.o. White (non-) female who presents for altered mental status. Patient presented to the ED on 04/09/24 from home. She initially presented to the ED to visit her sister who presented with the same symptoms, however shortly after arriving the pt also wanted to be checked in for altered mental status and psych hx.     Upon assessment pt appeared very paranoid and suspicious of writer and initially stated she didn't want to speak. She then said she would only talk if her daughter gave permission, even though she is not her guardian or POA. Writer contacted her daughter who said the pt often does this and \"wants permission\" to speak to healthcare providers. Writer told the pt she gave permission but even then wanted to hear it herself and wanted to call her daughter. It took several minutes before we were able to begin the assessment, with prompting and comforting from her daughter.     The pt reports she has a hx of Bipolar Disorder and is prescribed Abilify by her PCP through [facility]. She states she used to be on Lithium in her 20s and feels it was more effective than the Abilify so she wants to go back on it, however her provider wants her to see a psychiatrist to make these medications changes. The pt states she ultimately doesn't feel she needs meds at all and \"exercise and talk therapy can cure anything\". She also feels all her symptoms are related to a stroke she had and doesn't know if she \"actually has bipolar\"    She reports feeling like she's \"racing\" all the time, has had difficulty sleeping, and is not eating normally. She also reports being more agitated lately and \"snapping more easily\". She also states she's found herself \"blacking out\" while driving recently. Throughout the assessment she was tangential, exhibited 
Admission

## 2024-04-09 NOTE — CARE COORDINATION
4:43 PM    Pt will need an MRI, possibly dc home. Pt's daughter will help her at dc. Pt drove herself here and will drive home. Pt denies needs for dc.   ED Sw signing off, will follow up if pt doesnt DC.    Electronically signed by ANDREW Garcia, KATLIN on 4/9/2024 at 4:43 PM    SW cons noted. Sw awaiting psych cons.  Pt's sister is also in the ED room B23    Electronically signed by ANDREW Garcia, KATLIN on 4/9/2024 at 3:26 PM  [phone]

## 2024-04-09 NOTE — ED PROVIDER NOTES
to MG   Result Value Ref Range    Sodium 139 136 - 145 mmol/L    Potassium reflex Magnesium 4.4 3.5 - 5.1 mmol/L    Chloride 103 99 - 110 mmol/L    CO2 25 21 - 32 mmol/L    Anion Gap 11 3 - 16    Glucose 98 70 - 99 mg/dL    BUN 20 7 - 20 mg/dL    Creatinine 0.8 0.6 - 1.2 mg/dL    Est, Glom Filt Rate 83 >60    Calcium 9.0 8.3 - 10.6 mg/dL   Troponin   Result Value Ref Range    Troponin, High Sensitivity 8 0 - 14 ng/L   Urinalysis with Microscopic   Result Value Ref Range    Color, UA Yellow Straw/Yellow    Clarity, UA Clear Clear    Glucose, Ur Negative Negative mg/dL    Bilirubin Urine Negative Negative    Ketones, Urine Negative Negative mg/dL    Specific Gravity, UA 1.020 1.005 - 1.030    Blood, Urine Negative Negative    pH, UA 7.0 5.0 - 8.0    Protein, UA Negative Negative mg/dL    Urobilinogen, Urine 0.2 <2.0 E.U./dL    Nitrite, Urine Negative Negative    Leukocyte Esterase, Urine LARGE (A) Negative    Microscopic Examination YES     Urine Type Voided     WBC, UA 6-9 (A) 0 - 5 /HPF    RBC, UA 0-2 0 - 4 /HPF    Epithelial Cells, UA 2-5 0 - 5 /HPF    Bacteria, UA 2+ (A) None Seen /HPF   ETOH   Result Value Ref Range    Ethanol Lvl None Detected mg/dL   Acetaminophen Level   Result Value Ref Range    Acetaminophen Level <5 (L) 10 - 30 ug/mL   Salicylate   Result Value Ref Range    Salicylate, Serum <0.3 (L) 15.0 - 30.0 mg/dL   EKG 12 Lead   Result Value Ref Range    Ventricular Rate 65 BPM    Atrial Rate 65 BPM    P-R Interval 196 ms    QRS Duration 84 ms    Q-T Interval 424 ms    QTc Calculation (Bazett) 440 ms    P Axis 52 degrees    R Axis 45 degrees    T Axis 43 degrees    Diagnosis       Normal sinus rhythmNormal ECGEKG performed in ER and to be interpreted by ER physician.Confirmed by MD, ER ([id number]),  HUY GRIFFIN ([id number]) on 4/9/2024 7:15:13 PM       RECENT VITALS:  BP: (!) 149/86, Temp: 98 °F (36.7 °C), Pulse: 79, Respirations: 18, SpO2: 93 %     Procedures         ED Course     The patient was 
friction rub. No gallop.   Pulmonary:      Effort: Pulmonary effort is normal. No respiratory distress.      Breath sounds: Normal breath sounds. No stridor. No wheezing or rales.   Chest:      Chest wall: No tenderness.   Abdominal:      General: Bowel sounds are normal. There is no distension.      Palpations: Abdomen is soft.      Tenderness: There is no abdominal tenderness. There is no guarding or rebound.   Musculoskeletal:         General: No tenderness or deformity. Normal range of motion.      Cervical back: Normal range of motion and neck supple.   Lymphadenopathy:      Cervical: No cervical adenopathy.   Skin:     General: Skin is warm and dry.      Capillary Refill: Capillary refill takes less than 2 seconds.      Findings: No erythema or rash.   Neurological:      Mental Status: She is alert and oriented to person, place, and time.      Cranial Nerves: No cranial nerve deficit.      Coordination: Coordination normal.      Comments: Patient slow to answer some questions, keeps stating that she does not remember, sometimes seems to have trouble finding her words, overall nonfocal neurologic exam.   Psychiatric:         Behavior: Behavior normal.                    Gricelda Aguirre MD  04/09/24 [id number]

## 2024-04-09 NOTE — TELEPHONE ENCOUNTER
Pt was seen in pt on 4/4/24. Pt left AMA.According to Dr. Oscar, pt still needs MRI and psychiatric therapy.    Pt will need order for MRI, and possibly a referral for psychiatry evaluation.  Pt will cb when MRI and evaluation are scheduled.        Assessment:     1.  Transient left upper extremity weakness and slurred speech.  2.  History of old CVA.     The patient has minimal dysarthria?  The CTA of the head and neck showed small right ICA aneurysm.  The CT brain also showed no acute ischemic injury.  MRI brain is pending.     From neurology perspective, the etiology of this episode is unknown.  This can be TIA or small ischemic stroke or focal seizure from previous CVA (less likely).  This can also be nonorganic etiology.     Plan:     1.  MRI brain without contrast.  2.  EEG.  3.  Continue aspirin and statin.  4.  PT/OT/ST.  5.  Agree with psychiatry evaluation.

## 2024-04-10 VITALS
HEIGHT: 64 IN | BODY MASS INDEX: 37.58 KG/M2 | SYSTOLIC BLOOD PRESSURE: 123 MMHG | WEIGHT: 220.1 LBS | OXYGEN SATURATION: 95 % | RESPIRATION RATE: 18 BRPM | DIASTOLIC BLOOD PRESSURE: 74 MMHG | TEMPERATURE: 97.5 F | HEART RATE: 84 BPM

## 2024-04-10 LAB
BACTERIA UR CULT: ABNORMAL
BACTERIA UR CULT: ABNORMAL
ORGANISM: ABNORMAL

## 2024-04-10 PROCEDURE — 6370000000 HC RX 637 (ALT 250 FOR IP): Performed by: INTERNAL MEDICINE

## 2024-04-10 PROCEDURE — 2580000003 HC RX 258: Performed by: INTERNAL MEDICINE

## 2024-04-10 PROCEDURE — 6360000002 HC RX W HCPCS: Performed by: INTERNAL MEDICINE

## 2024-04-10 PROCEDURE — 99221 1ST HOSP IP/OBS SF/LOW 40: CPT | Performed by: NURSE PRACTITIONER

## 2024-04-10 PROCEDURE — G0378 HOSPITAL OBSERVATION PER HR: HCPCS

## 2024-04-10 PROCEDURE — 96372 THER/PROPH/DIAG INJ SC/IM: CPT

## 2024-04-10 RX ORDER — CEFUROXIME AXETIL 500 MG/1
500 TABLET ORAL 2 TIMES DAILY
Qty: 14 TABLET | Refills: 0 | Status: SHIPPED | OUTPATIENT
Start: 2024-04-10 | End: 2024-04-17

## 2024-04-10 RX ADMIN — CLOBETASOL PROPIONATE CREAM USP, 0.05%: 0.5 CREAM TOPICAL at 08:23

## 2024-04-10 RX ADMIN — LORAZEPAM 0.5 MG: 0.5 TABLET ORAL at 08:22

## 2024-04-10 RX ADMIN — ASPIRIN 81 MG: 81 TABLET, COATED ORAL at 08:22

## 2024-04-10 RX ADMIN — SODIUM CHLORIDE, PRESERVATIVE FREE 10 ML: 5 INJECTION INTRAVENOUS at 08:23

## 2024-04-10 RX ADMIN — POLYETHYLENE GLYCOL 3350 17 G: 17 POWDER, FOR SOLUTION ORAL at 08:22

## 2024-04-10 RX ADMIN — LORAZEPAM 0.5 MG: 0.5 TABLET ORAL at 13:07

## 2024-04-10 RX ADMIN — MICONAZOLE NITRATE: 20 CREAM TOPICAL at 08:26

## 2024-04-10 RX ADMIN — LISINOPRIL 10 MG: 10 TABLET ORAL at 08:22

## 2024-04-10 RX ADMIN — ENOXAPARIN SODIUM 40 MG: 100 INJECTION SUBCUTANEOUS at 08:22

## 2024-04-10 ASSESSMENT — PAIN DESCRIPTION - PAIN TYPE: TYPE: ACUTE PAIN

## 2024-04-10 ASSESSMENT — PAIN DESCRIPTION - ONSET: ONSET: GRADUAL

## 2024-04-10 ASSESSMENT — PAIN SCALES - GENERAL
PAINLEVEL_OUTOF10: 3
PAINLEVEL_OUTOF10: 7

## 2024-04-10 ASSESSMENT — PAIN DESCRIPTION - FREQUENCY: FREQUENCY: INTERMITTENT

## 2024-04-10 ASSESSMENT — PAIN DESCRIPTION - LOCATION: LOCATION: CHEST

## 2024-04-10 ASSESSMENT — PAIN DESCRIPTION - ORIENTATION: ORIENTATION: MID

## 2024-04-10 ASSESSMENT — PAIN DESCRIPTION - DESCRIPTORS: DESCRIPTORS: ACHING;PRESSURE

## 2024-04-10 ASSESSMENT — PAIN - FUNCTIONAL ASSESSMENT: PAIN_FUNCTIONAL_ASSESSMENT: ACTIVITIES ARE NOT PREVENTED

## 2024-04-10 NOTE — H&P
V2.0  History and Physical      Name:  Terri Shankar /Age/Sex: 1960  (63 y.o. female)   MRN & CSN:  [id number] & [id number] Encounter Date/Time: 2024 8:46 PM EDT   Location:  [facility]- PCP: Marcia bAdi APRN - CNP       Hospital Day: 1    Assessment and Plan:   Terri Shankar is a 63 y.o. female with a pmh of hypertension, hyperlipidemia, prior left basal ganglia stroke in , bipolar disorder, depression and obesity presenting with speech difficulty and imbalance for about 5 days.          Hospital Problems             Last Modified POA    * (Principal) AMS (altered mental status) 2024 Yes       1.  Speech difficulty/imbalance: Recent CT-no acute infarct.  CTA-new right MCA aneurysm.  MRI of the brain-no acute infarct.  EEG-no epileptiform waves.  This may be related to bipolar disorder.  No evidence of seizure or stroke.  Continue aspirin and Lipitor.  Consult neurology.    2.  Bipolar disorder with manic episode: Evaluated by psychiatry.  Continue Abilify.  Hold Zoloft.  Use Ativan 0.5 mg p.o. every 4 hourly as needed for agitation.  Sitter at bedside.  Patient will be admitted for inpatient psychiatric management once cleared by neurology.    3.  Possible acute cystitis: Continue IV Rocephin for 3 days.  Follow urine culture result.    4.  Hypertension: Continue lisinopril.    Chronic problems include prior left basal ganglia stroke in 2020 and obesity.    DVT prophylaxis: Lovenox.    Disposition:   Current Living situation: She is from home  Expected Disposition: Inpatient psychiatry  Estimated D/C: 1 to 2 days    Diet ADULT DIET; Regular   DVT Prophylaxis [x] Lovenox, []  Heparin, [] SCDs, [] Ambulation,  [] Eliquis, [] Xarelto, [] Coumadin   Code Status Full Code   Surrogate Decision Maker/ POA      Personally reviewed Lab Studies and Imaging     History from:     patient and medical record.    History of Present Illness:     Chief Complaint: Speech abnormality and imbalance for
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
## 2024-04-10 NOTE — PLAN OF CARE
Resp: 18 16 18 18   Temp: 98 °F (36.7 °C) 97.6 °F (36.4 °C) 97.4 °F (36.3 °C) 97.6 °F (36.4 °C)   TempSrc: Oral Oral Oral Oral   SpO2: 93% 94% 92% 96%   Weight: 99.8 kg (220 lb 1.6 oz)      Height: 1.626 m (5' 4\")            General: Alert, mildly agitated, well-nourished  Neurologic  Mental status: Oriented x 3  Tearful, cooperative, attends well to exam   Rapid, tangential thought process    Cranial nerves:   CN2: Visual fields full w/o extinction on confrontational testing   CN 3,4,6: Pupils equal and reactive to light, extraocular muscles intact. 4mm >3mm   CN5: Facial sensation symmetric   CN7: Mild right facial labial flattening  CN8: Hearing symmetric to spoken voice  CN9: Palate elevated symmetrically  CN11: Traps full strength on shoulder shrug  CN12: Mild tongue deviation towards right     Motor Exam:  5/5 strength throughout    Deep tendon reflexes:    R  L    Biceps  2  2   Triceps  2 2   Brachioradialis  2 2   Patellar  2 2   Achilles  2 2   Toes 2 2     Sensory: light touch intact and symmetric in all 4 extremities.  No sensory extinction on bilateral simultaneous stimulation  Cerebellar/coordination: finger nose finger normal without ataxia  Tone: normal in all 4 extremities  Gait: Deferred s/t patient safety    OTHER SYSTEMS:  Cardiovascular: Warm, appears well perfused   Respiratory: Easy, non-labored respiratory pattern   Abdominal: Abdomen is without distention   Extremities: Upper and lower extremities are atraumatic in appearance without deformity. No swelling or erythema     Assessment   62 yo female who presents with concern for stroke-like symptoms  No acute findings on imaging.  UA consistent with UTI. On rocephin    Plan  Per patient, nasolabial flattening chronic from 'old-stroke'  Stroke ruled out by work-up  Incidental 5 mm R MCA seen from recent hospitalization on 4/3- Will need to follow-up outpatient with Dr. Rosas, Neuro-interventional NSGY. Will put information in discharge

## 2024-04-10 NOTE — PROGRESS NOTES
Patient a/o x4. VSS on arrival. Pt calm but tearful at times. SI precautions in place and sitter at bedside. 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 Nighttime meds were given crushed up in applesauce per pt request due to having a hard time swallowing pills. Pt says she is missing her back teeth and doesn't have her dentures with her. Pt sleeping at this time. Will continue to monitor.

## 2024-04-10 NOTE — PROGRESS NOTES
4 Eyes Skin Assessment     NAME:  Terri Shankar  YOB: 1960  MEDICAL RECORD NUMBER:  [id number]    The patient is being assessed for  Admission    I agree that at least one RN has performed a thorough Head to Toe Skin Assessment on the patient. ALL assessment sites listed below have been assessed.      Areas assessed by both nurses:    Head, Face, Ears, Shoulders, Back, Chest, Arms, Elbows, Hands, Sacrum. Buttock, Coccyx, Ischium, Legs. Feet and Heels, and Under Medical Devices         Does the Patient have a Wound? No noted wound(s) redness under abdominal pannus and bilat. Breasts. Bruise on Left Thigh        Damian Prevention initiated by RN: No  Wound Care Orders initiated by RN: No    Pressure Injury (Stage 3,4, Unstageable, DTI, NWPT, and Complex wounds) if present, place Wound referral order by RN under : No    New Ostomies, if present place, Ostomy referral order under : No     Nurse 1 eSignature: Electronically signed by Yajaira De La Cruz RN on 4/10/24 at 1:04 AM EDT    SHARE this note so that the co-signing nurse can place an eSignature    Nurse 2 eSignature: Electronically signed by Yajaira Aj RN on 4/10/24 at 1:55 AM EDT

## 2024-04-10 NOTE — CONSULTS
Psychiatry Follow-Up Consultation    Patient Name: Terri Shankar  MRN: [id number]  Admission Date: 4/9/2024    Reason for Consult: Bipolar disorder with manic features    Patient's chart was reviewed, case was discussed with nursing/OT/RT staff, and collaborated with  about the treatment plan.    Terri was seen by telepsychiatry while in the ED. They noted tangential thought process, pressured speech, difficulty focusing, paranoia and suspiciousness. Patient required permission to speak to healthcare provider from her daughter before she would participate in assessment. Patient endorsed increased agitation, racing thoughts, difficulty sleeping, not eating normally. Collateral from patient's daughter noted noncompliance with medications and recent multiple life stressors. Daughter reports that the pt and her twin sister, who also has bipolar, will often have manic episodes at the same time and end up in \"very bizarre situations\" such as drive across the country and end up in the hospital with no money and no way to get home. She said they will often \"escalate each other\" when one is manic or paranoid and the other \"will jump on that train\" and they'll both say they have the same symptoms or \"get in the same trouble\". She feels today is no different than any of those other situations and \"just typical antics for them\". She said she feels the pt could benefit from an admission but also feels she is safe to be discharged with med adjustments and referrals. Of note, urinalysis + LEs. No UDS was collected.     Met with Terri, who was alert and oriented, cooperative with assessment. Mildly pressured speech but redirectable. Thoughts were logical and linear, answers to questions were appropriate. No paranoia was verbalized, she denied hallucinations and did not appear to be RTIS. She states that she brought her sister into the hospital yesterday when she started not feeling well physically or mentally and 
jittery\"    Macrobid [Nitrofurantoin] Nausea And Vomiting     Family History   Problem Relation Age of Onset    Heart Disease Mother     Diabetes Mother     Arthritis Father     Arthritis Brother     Breast Cancer Maternal Grandmother 80    Diabetes Maternal Grandfather      Social History     Tobacco Use   Smoking Status Never   Smokeless Tobacco Never     Social History     Substance and Sexual Activity   Drug Use No     Social History     Substance and Sexual Activity   Alcohol Use Yes    Alcohol/week: 0.0 standard drinks of alcohol    Comment: occ          ROS:  Constitutional- No weight loss.   No fevers.  Eyes- No diplopia.   No photophobia.  Ears/nose/throat- No dysphagia.   No Dysarthria  Cardiovascular- No palpitations.   No chest pain  Respiratory- No dyspnea.   No Cough  Gastrointestinal- No Abdominal pain.   No Vomiting.  Genitourinary- No incontinence.   No urinary retention  Musculoskeletal- No myalgia.   No arthralgia  Skin- No rash.   No easy bruising.  Psychiatric- +bipolar disorder  Endocrine- No diabetes.   No thyroid issues.  Hematologic- No bleeding difficulty.  No fatigue  Neurologic- + weakness.   No Headache.     Exam:  Vitals:    04/10/24 1118   BP: 123/74   Pulse: 84   Resp: 18   Temp: 97.5 °F (36.4 °C)   SpO2: 95%         Constitutional   Vital signs: BP, HR, and RR reviewed   General Alert, no distress, well-nourished  Eyes: fundoscopic exam unable to visualize fundi  Cardiovascular: pulses symmetric in all 4 extremities.  No peripheral edema.  Psychiatric: cooperative with examination, tangential, pressure speech, poor eye contact     Neurologic  Mental status:  orientation to person and place  General fund of knowledge grossly intact  Memory grossly intact  Attention intact as able to attend well to the exam   Language fluent in conversation, no dysarthria  Comprehension intact; follows simple commands     Cranial nerves:  CN2: Visual Fields full w/o extinction on confrontational

## 2024-04-10 NOTE — PROGRESS NOTES
Patient admitted to room 5311 from  ED. Patient is A&O x 4. VSS. Patient oriented to the room all safety measures in place. Patient given IS and SCDs at this time. Admission orders released and patient 4 eyes completed. Admission documentation completed. No other needs are noted at this time.    [x] Bed alarm on and cord plugged into wall  [x] Bed in lowest position  [x] Call light and bedside table within reach  [x] Patient educated on all safety measures  []Oxygen connected to wall (if applicable)     Nurse 1 Esignature: Electronically signed by Yajaira De La Cruz RN on 4/10/24 at 1:02 AM EDT  Nurse 2 Esignature: Electronically signed by Yajaira Aj RN on 4/10/24 at 1:55 AM EDT

## 2024-04-10 NOTE — DISCHARGE SUMMARY
Pt left unit at ~1500 for discharge to home, driving self. Pt alert, stable, and in no apparent distress, steady ambulating, oriented x4, calm. All personal belongings returned to pt. Discharge education provided verbally as well as with handouts. Patient verbalized understanding, all questions were answered. Pt expressed no further needs.    
input(s): \"AST\", \"ALT\", \"ALB\", \"BILITOT\", \"ALKPHOS\" in the last 72 hours.  Lipids:   Lab Results   Component Value Date/Time    CHOL 173 04/04/2024 05:28 AM    HDL 42 04/04/2024 05:28 AM    TRIG 99 04/04/2024 05:28 AM     Hemoglobin A1C:   Lab Results   Component Value Date/Time    LABA1C 5.4 12/16/2022 04:44 AM     TSH: No results found for: \"TSH\"  Troponin: No results found for: \"TROPONINT\"  Lactic Acid: No results for input(s): \"LACTA\" in the last 72 hours.  BNP: No results for input(s): \"PROBNP\" in the last 72 hours.  UA:  Lab Results   Component Value Date/Time    NITRU Negative 04/09/2024 06:29 PM    COLORU Yellow 04/09/2024 06:29 PM    PHUR 7.0 04/09/2024 06:29 PM    WBCUA 6-9 04/09/2024 06:29 PM    RBCUA 0-2 04/09/2024 06:29 PM    BACTERIA 2+ 04/09/2024 06:29 PM    CLARITYU Clear 04/09/2024 06:29 PM    SPECGRAV 1.020 04/09/2024 06:29 PM    LEUKOCYTESUR LARGE 04/09/2024 06:29 PM    UROBILINOGEN 0.2 04/09/2024 06:29 PM    BILIRUBINUR Negative 04/09/2024 06:29 PM    BILIRUBINUR Neg 02/11/2022 03:09 PM    BLOODU Negative 04/09/2024 06:29 PM    GLUCOSEU Negative 04/09/2024 06:29 PM    KETUA Negative 04/09/2024 06:29 PM     Urine Cultures:   Lab Results   Component Value Date/Time    LABURIN  02/11/2022 03:05 PM     >50,000 CFU/ml mixed skin/urogenital renetta. No further workup     Blood Cultures: No results found for: \"BC\"  No results found for: \"BLOODCULT2\"  Organism: No results found for: \"ORG\"      Electronically signed by MELANIE Díaz CNP on 4/10/2024 at 1:31 PM

## 2024-04-10 NOTE — ED NOTES
Patient ambulated with steady, even gait to the bathroom without difficulty or distress     Cristina Montoya RN  04/09/24 2053    
Spoke with both charge and RN assigned to patient that patient would require a sitter upon giving SBAR report     Cristina Montoya, RN  04/09/24 2100    
Nursing Index tab.    Recommendation    Pending orders   Plan for Discharge (if known):   Additional Comments: All ED orders complete, all remaining are inpatient   If any further questions, please call Sending RN at [phone]    Electronically signed by: Electronically signed by Cristina Montoya RN on 4/9/2024 at 8:45 PM      Cristina Montoya, RN  04/09/24 [id number]

## 2024-04-10 NOTE — CARE COORDINATION
Case Management Assessment            Discharge Note                    Date / Time of Note: 4/10/2024 3:38 PM                  Discharge Note Completed by: Margarita Rodriguez RN    Patient Name: Terri Shankar   YOB: 1960  Diagnosis: Confusion [R41.0]  Altered mental status, unspecified altered mental status type [R41.82]  AMS (altered mental status) [R41.82]   Date / Time: 4/9/2024  1:08 PM    Current PCP: Marcia Abdi APRN - CNP  Clinic patient: No    Hospitalization in the last 30 days: Yes  Readmission Assessment  Number of Days since last admission?: 1-7 days  Previous Disposition: Home with Family  Who is being Interviewed: Patient  What was the patient's/caregiver's perception as to why they think they needed to return back to the hospital?: Other (Comment) (change in condition)  Did you visit your Primary Care Physician after you left the hospital, before you returned this time?: No  Why weren't you able to visit your PCP?: Did not have an appointment  Did you see a specialist, such as Cardiac, Pulmonary, Orthopedic Physician, etc. after you left the hospital?: No  Who advised the patient to return to the hospital?: Self-referral  Does the patient report anything that got in the way of taking their medications?: No  In our efforts to provide the best possible care to you and others like you, can you think of anything that we could have done to help you after you left the hospital the first time, so that you might not have needed to return so soon?: Discharge instructions that are concise, clear, and non contradictory, Teach back instructions regarding management of illness, Improved written discharge instructions    Advance Directives:  Code Status: Full Code  [place] DNR form completed and on chart: No    Financial:  Payor: AETNA / Plan: AETNA NAP CHOICE POS II / Product Type: *No Product type* /      Pharmacy:    [facility] [address] [place] [address] - P [phone] -

## 2024-04-10 NOTE — PROGRESS NOTES
Psychiatry NP cleared patient as she is no longer deemed to have suicidal/homicidal intent. Sitter discontinued at this time.     Electronically signed by Dieudonne Glass RN on 4/10/2024 at 11:54 AM

## 2024-04-11 ENCOUNTER — CARE COORDINATION (OUTPATIENT)
Dept: CASE MANAGEMENT | Age: 64
End: 2024-04-11

## 2024-04-11 NOTE — CARE COORDINATION
[facility] Transitions Initial Follow Up Call    Call within 2 business days of discharge: Yes    Patient:  SHAYY BUSH  Patient :  1960  MRN:  [id number]   Reason for Admission:  AMS   Discharge Date:  4/10/24  RARS: 0      Transitions of Care Initial Call    Was this an external facility discharge? no    Discharge Facility: [facility]      Challenges to be reviewed by the provider   Additional needs identified to be addressed with provider:    no    AMB CC Provider Discharge Needs: none            Non-face-to-face services provided:  none      1ST  CTC attempt to reach Pt regarding recent hospital discharge /  unable to reach.         Follow up appointments:    Future Appointments   Date Time Provider Department Center   2024  9:40 AM Grosheim, Marcia, APRN - CNP [facility] [place] [facility]   2024  2:30 PM [facility]ann[facility] Currie [facility]   2024 10:20 AM Marcia Abdi APRN - CNP [facility] [place] [facility] Sheala V. Thurmond, BSN, RN  Care Transition Coordinator  Contact Number:  [phone]

## 2024-04-12 ENCOUNTER — CARE COORDINATION (OUTPATIENT)
Dept: CASE MANAGEMENT | Age: 64
End: 2024-04-12

## 2024-04-12 NOTE — CARE COORDINATION
[facility] Transitions Initial Follow Up Call    Call within 2 business days of discharge: Yes    Patient:  SHAYY BUSH  Patient :  1960  MRN:  [id number]   Reason for Admission:  AMS  Discharge Date:  4/10/24  RARS: 0      Transitions of Care Initial Call    Was this an external facility discharge? NO    Discharge Facility: [facility]      Challenges to be reviewed by the provider   Additional needs identified to be addressed with provider:    no    AMB CC Provider Discharge Needs: none            Non-face-to-face services provided:  NONE      2ND CTC attempt to reach Pt regarding recent hospital discharge /  unable to reach / CT episode closed / CTN signed off.         Follow up appointments:    Future Appointments   Date Time Provider Department Center   2024  9:40 AM Grosheim, Marcia, APRN - CNP [facility] [place] [facility]   2024  2:30 PM Shannan Currie MD [facility]   2024 10:20 AM Marcia Abdi APRN - CNP [facility] [place] [facility] Sheala V. Thurmond, BSN, RN  Care Transition Coordinator  Contact Number:  [phone]

## 2024-04-13 ENCOUNTER — TELEPHONE (OUTPATIENT)
Dept: FAMILY MEDICINE CLINIC | Age: 64
End: 2024-04-13
Payer: COMMERCIAL

## 2024-04-13 DIAGNOSIS — G47.00 INSOMNIA, UNSPECIFIED TYPE: Primary | ICD-10-CM

## 2024-04-13 PROCEDURE — 99441 PR PHYS/QHP TELEPHONE EVALUATION 5-10 MIN: CPT | Performed by: NURSE PRACTITIONER

## 2024-04-13 RX ORDER — HYDROXYZINE HYDROCHLORIDE 25 MG/1
25 TABLET, FILM COATED ORAL NIGHTLY
Qty: 30 TABLET | Refills: 0 | Status: SHIPPED | OUTPATIENT
Start: 2024-04-13 | End: 2024-05-13

## 2024-04-13 NOTE — TELEPHONE ENCOUNTER
Patient called the on call today with concerns of not sleeping well for the past 2 nights. She was recently hospitalized and was evaluated by a psychiatrist. She was felt to be safe at home and discharged home on abilify. She was advised to follow up with her psychiatrist. Patient has a new patient appointment with psychiatry on 4/17.     Hydroxyzine was sent in for sleep to take as needed. Patient was advised if 1 tablet is not effective then she can take 2 tablets.   Patient will follow up with psychiatrist next week.     Insomnia  Start on hydroxyzine nightly. Follow up with psychiatry.     Total time: 10 minutes.

## 2024-04-17 ENCOUNTER — TELEPHONE (OUTPATIENT)
Dept: FAMILY MEDICINE CLINIC | Age: 64
End: 2024-04-17

## 2024-04-17 ENCOUNTER — TELEMEDICINE (OUTPATIENT)
Dept: PSYCHIATRY | Age: 64
End: 2024-04-17

## 2024-04-17 DIAGNOSIS — Z79.899 MEDICATION MANAGEMENT: Primary | ICD-10-CM

## 2024-04-17 NOTE — TELEPHONE ENCOUNTER
Contacted pt in regards to 9:40 hospital f/u appt with pcp today. Pt states that she does not have any concerns and that it is okay to cancel appt with pcp. Pt was reminded to f.u with psychiatry today at 2:30. Pt states that her legs do feel heavy but thinks it is from being out of shape and walking around. Pt is wondering if pcp can prescribe her mounjaro and states that she will pay out of pocket for medication. Pt was offered to keep appt with pcp today to discuss medication/ leg heaviness but pt declined.

## 2024-04-17 NOTE — TELEPHONE ENCOUNTER
Mounjaro is for diabetes only. There are other options for weight loss but she needs to focus on her mental health first. She can schedule at a future date to discuss the other options for weight loss.

## 2024-04-20 ENCOUNTER — HOSPITAL ENCOUNTER (EMERGENCY)
Age: 64
Discharge: HOME | End: 2024-04-20
Attending: EMERGENCY MEDICINE
Payer: COMMERCIAL

## 2024-04-20 VITALS
WEIGHT: 227 LBS | OXYGEN SATURATION: 98 % | HEIGHT: 64 IN | DIASTOLIC BLOOD PRESSURE: 54 MMHG | HEART RATE: 65 BPM | TEMPERATURE: 97.5 F | RESPIRATION RATE: 14 BRPM | BODY MASS INDEX: 38.76 KG/M2 | SYSTOLIC BLOOD PRESSURE: 105 MMHG

## 2024-04-20 DIAGNOSIS — R10.12 LEFT UPPER QUADRANT ABDOMINAL PAIN: Primary | ICD-10-CM

## 2024-04-20 LAB
EKG ATRIAL RATE: 64 BPM
EKG DIAGNOSIS: NORMAL
EKG P AXIS: 37 DEGREES
EKG P-R INTERVAL: 186 MS
EKG Q-T INTERVAL: 420 MS
EKG QRS DURATION: 84 MS
EKG QTC CALCULATION (BAZETT): 433 MS
EKG R AXIS: 43 DEGREES
EKG T AXIS: 15 DEGREES
EKG VENTRICULAR RATE: 64 BPM

## 2024-04-20 PROCEDURE — 93005 ELECTROCARDIOGRAM TRACING: CPT | Performed by: EMERGENCY MEDICINE

## 2024-04-20 PROCEDURE — 99283 EMERGENCY DEPT VISIT LOW MDM: CPT

## 2024-04-20 ASSESSMENT — LIFESTYLE VARIABLES
HOW OFTEN DO YOU HAVE A DRINK CONTAINING ALCOHOL: MONTHLY OR LESS
HOW MANY STANDARD DRINKS CONTAINING ALCOHOL DO YOU HAVE ON A TYPICAL DAY: 1 OR 2

## 2024-04-20 ASSESSMENT — PAIN SCALES - GENERAL: PAINLEVEL_OUTOF10: 8

## 2024-04-20 ASSESSMENT — PAIN - FUNCTIONAL ASSESSMENT: PAIN_FUNCTIONAL_ASSESSMENT: 0-10
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
## 2024-04-20 NOTE — ED NOTES
Patient prepared for and ready to be discharged. Dressed in clothes and given belongings.  IV removed, pt tolerated well, no complications.  Patient discharged at this time in no acute distress after pt verbalized understanding of discharge instructions.   Reviewed medications, and when to return to the ED with patient. Encouraged follow up with PCP  Patient walked to [facility] to take patient home.      Escuadra, Marylee, RN  04/20/24 0595

## 2024-04-20 NOTE — DISCHARGE INSTRUCTIONS
Continue your current medications.  Follow-up with your primary care provider.  Come back to the emergency department for any new or worsening symptoms, any other urgent concerns.

## 2024-04-23 ENCOUNTER — TELEPHONE (OUTPATIENT)
Dept: FAMILY MEDICINE CLINIC | Age: 64
End: 2024-04-23

## 2024-04-29 ENCOUNTER — TELEPHONE (OUTPATIENT)
Dept: FAMILY MEDICINE CLINIC | Age: 64
End: 2024-04-29

## 2024-04-29 DIAGNOSIS — F31.9 BIPOLAR AFFECTIVE DISORDER, REMISSION STATUS UNSPECIFIED (HCC): ICD-10-CM

## 2024-04-29 RX ORDER — ARIPIPRAZOLE 20 MG/1
20 TABLET ORAL NIGHTLY
Qty: 30 TABLET | Refills: 3 | Status: SHIPPED | OUTPATIENT
Start: 2024-04-29

## 2024-04-29 NOTE — TELEPHONE ENCOUNTER
Had to leave message for [facility] to call back. Have not received any recent faxes from [facility]. Paperwork from 4/19/24 and 4/25/24 in media was refaxed. If [facility] calls back, please get a callback number.

## 2024-04-29 NOTE — TELEPHONE ENCOUNTER
Patient contacted the office req a refill on  Abilify  Last visit 4/1/24  Future 5/2/24  [facility]

## 2024-04-29 NOTE — TELEPHONE ENCOUNTER
Reva from [facility] called inquiring about a fax that was sent for a signature in delay of care. Reva also gave a docusign option that is HIPAA compliant if PCP prefers. All that is needed is an email address and a preferred date/time to send.

## 2024-04-30 NOTE — TELEPHONE ENCOUNTER
RUBIA....I had a lengthy conversation with Terri regarding her diagnosis of Bipolar and her wanting to get back on the medication Lithium. After a bit I convinced her to make another appointment to see Dr. Currie. We scheduled an appointment previously already, and ended up moving it up. She stated that she would like her daughter present at the visit and I said that was a great idea. We ended the conversation she thanked me for my patience and time, and mentioned that since she trusted me she was going to give Dr. Currie a chance.     She then called back I was in a room, but was told that she rescheduled the appointment again. She pushed it back because she was not sure if her daughter was able to make it. I told the front staff to keep her appointment in person so labs could be drawn if needed, and if her daughter was not able to make it, she could face time her at the time of the appointment.

## 2024-05-01 ENCOUNTER — TELEPHONE (OUTPATIENT)
Dept: FAMILY MEDICINE CLINIC | Age: 64
End: 2024-05-01

## 2024-05-01 NOTE — TELEPHONE ENCOUNTER
Pt states that she does not want to take her BP meds, she is taking all other meds. States the BP meds mess with her heart rate. Pt states that she is back to swimming and is feeling great. But wanted to let PCP know about BP meds

## 2024-05-06 NOTE — TELEPHONE ENCOUNTER
Pt was notified to monitor bp, pt bought a bp cuff and is feeling better. Appt was scheduled on 5/16/24 for a hospital f/u as pt's daughter will be coming with her.

## 2024-05-09 ENCOUNTER — TELEPHONE (OUTPATIENT)
Dept: BARIATRICS/WEIGHT MGMT | Age: 64
End: 2024-05-09

## 2024-05-09 NOTE — TELEPHONE ENCOUNTER
Left message for pt regarding November Digital seminar for 2023 [place] [employer]. Office number provided to return call to Margarita. Ext [phone]

## 2024-05-19 DIAGNOSIS — I10 HYPERTENSION, UNSPECIFIED TYPE: ICD-10-CM

## 2024-05-20 ENCOUNTER — OFFICE VISIT (OUTPATIENT)
Dept: FAMILY MEDICINE CLINIC | Age: 64
End: 2024-05-20
Payer: COMMERCIAL

## 2024-05-20 VITALS
HEART RATE: 84 BPM | SYSTOLIC BLOOD PRESSURE: 130 MMHG | OXYGEN SATURATION: 95 % | HEIGHT: 64 IN | BODY MASS INDEX: 38.96 KG/M2 | DIASTOLIC BLOOD PRESSURE: 92 MMHG

## 2024-05-20 DIAGNOSIS — F41.9 ANXIETY: ICD-10-CM

## 2024-05-20 DIAGNOSIS — Z86.73 HISTORY OF STROKE: ICD-10-CM

## 2024-05-20 DIAGNOSIS — E66.01 CLASS 2 SEVERE OBESITY DUE TO EXCESS CALORIES WITH SERIOUS COMORBIDITY AND BODY MASS INDEX (BMI) OF 38.0 TO 38.9 IN ADULT (HCC): Primary | ICD-10-CM

## 2024-05-20 DIAGNOSIS — L84 CALLUS OF FOOT: ICD-10-CM

## 2024-05-20 DIAGNOSIS — I10 HYPERTENSION, UNSPECIFIED TYPE: ICD-10-CM

## 2024-05-20 PROCEDURE — 99214 OFFICE O/P EST MOD 30 MIN: CPT | Performed by: NURSE PRACTITIONER

## 2024-05-20 PROCEDURE — G8417 CALC BMI ABV UP PARAM F/U: HCPCS | Performed by: NURSE PRACTITIONER

## 2024-05-20 PROCEDURE — 3080F DIAST BP >= 90 MM HG: CPT | Performed by: NURSE PRACTITIONER

## 2024-05-20 PROCEDURE — 1036F TOBACCO NON-USER: CPT | Performed by: NURSE PRACTITIONER

## 2024-05-20 PROCEDURE — G8427 DOCREV CUR MEDS BY ELIG CLIN: HCPCS | Performed by: NURSE PRACTITIONER

## 2024-05-20 PROCEDURE — 3075F SYST BP GE 130 - 139MM HG: CPT | Performed by: NURSE PRACTITIONER

## 2024-05-20 PROCEDURE — 3017F COLORECTAL CA SCREEN DOC REV: CPT | Performed by: NURSE PRACTITIONER

## 2024-05-20 RX ORDER — TIRZEPATIDE 2.5 MG/.5ML
2.5 INJECTION, SOLUTION SUBCUTANEOUS WEEKLY
Qty: 2 ML | Refills: 0 | Status: SHIPPED | OUTPATIENT
Start: 2024-05-20

## 2024-05-20 RX ORDER — LISINOPRIL 10 MG/1
10 TABLET ORAL DAILY
Qty: 30 TABLET | Refills: 0 | OUTPATIENT
Start: 2024-05-20

## 2024-05-20 ASSESSMENT — ENCOUNTER SYMPTOMS
GASTROINTESTINAL NEGATIVE: 1
RESPIRATORY NEGATIVE: 1

## 2024-05-20 NOTE — PROGRESS NOTES
Patient: Terri Shankar is a 63 y.o. female who presents today with the following Chief Complaint(s):  Chief Complaint   Patient presents with    Other     Callus on right foot        Assessment:  Encounter Diagnoses   Name Primary?    Class 2 severe obesity due to excess calories with serious comorbidity and body mass index (BMI) of 38.0 to 38.9 in adult ([facility]) Yes    Hypertension, unspecified type     History of stroke     Callus of foot     Anxiety        Plan:  1. Class 2 severe obesity due to excess calories with serious comorbidity and body mass index (BMI) of 38.0 to 38.9 in adult ([facility])  Start on zepbound if approved from insurance.   - Tirzepatide-Weight Management (ZEPBOUND) 2.5 MG/0.5ML SOAJ; Inject 2.5 mg into the skin once a week  Dispense: 2 mL; Refill: 0    2. Hypertension, unspecified type  Did not tolerated lisinopril, monitor at home- patient would like to control with diet and exercise changes. Trial zepbound if approved.   - Tirzepatide-Weight Management (ZEPBOUND) 2.5 MG/0.5ML SOAJ; Inject 2.5 mg into the skin once a week  Dispense: 2 mL; Refill: 0    3. History of stroke  - Tirzepatide-Weight Management (ZEPBOUND) 2.5 MG/0.5ML SOAJ; Inject 2.5 mg into the skin once a week  Dispense: 2 mL; Refill: 0    4. Callus of foot  Referral given to podiatry for further management.   - JORGE - Shannan Barth DPM, Podiatry, [facility]    5. Anxiety  Patient advised exercise is a great form of stress and anxiety relief. She will try increasing her exercise and follow up with Dr. Currie next month.       HPI  Patient presents today with concerns of a callus on her right foot. She states it is painful at times.   She has concerns about her anxiety. She wonders if another medication would help or if just exercise would be helpful.  She has concerns about her weight and would like to trial a weight loss medication. She has been trying to cut back on her diet but still struggling with weight loss.   BP is

## 2024-05-20 NOTE — PATIENT INSTRUCTIONS
Blood pressure should be less than 140/90. Check Blood pressure daily and call in 1 week with home readings.

## 2024-05-21 ENCOUNTER — TELEPHONE (OUTPATIENT)
Dept: ADMINISTRATIVE | Age: 64
End: 2024-05-21

## 2024-05-21 DIAGNOSIS — E66.01 CLASS 2 SEVERE OBESITY DUE TO EXCESS CALORIES WITH SERIOUS COMORBIDITY AND BODY MASS INDEX (BMI) OF 38.0 TO 38.9 IN ADULT (HCC): Primary | ICD-10-CM

## 2024-05-21 NOTE — TELEPHONE ENCOUNTER
Submitted PA for Zepbound 2.5MG/0.5ML pen-injectors   Via CMM Key: [id number]  STATUS: PENDING.    Follow up done daily; if no decision with in three days we will refax.  If another three days goes by with no decision will call the insurance for status.

## 2024-05-22 NOTE — TELEPHONE ENCOUNTER
The medication was DENIED; PLAN EXCLUSION. DENIAL letter uploaded to MEDIA.    If you want an APPEAL; please note in this encounter what new information you would like to APPEAL with.  Once complete route back to PA POOL.    If this requires a response please respond to the pool ( [facility] MEDICATION PRE-AUTH).      Thank you please advise patient.

## 2024-05-23 NOTE — TELEPHONE ENCOUNTER
There is not an alternative to zepbound found.  The Wegovy likely would not be covered because the zepbound is not covered.  I have referred before to bariatric weight loss surgeon and the nonsurgical weight loss in the past couple of years.  At this time I will put a referral back in for the nonsurgical weight loss and she can talk with that specialist about other options.     To: [facility]  [address] [address]    Bill Gomez  [address] [address] Loc/POS:                Phone: [phone]

## 2024-05-23 NOTE — TELEPHONE ENCOUNTER
Pt called back I relayed the message, she wants to know if there is a alternative.  Also pt is wanting to know what else to do about the weight loss if   advise

## 2024-05-24 ENCOUNTER — TELEPHONE (OUTPATIENT)
Dept: FAMILY MEDICINE CLINIC | Age: 64
End: 2024-05-24

## 2024-05-24 NOTE — TELEPHONE ENCOUNTER
There is not any indication in her labs for B 12 injection therefore insurance would likely not cover. An OTC B12 tablet would be fine to take daily if she is feeling fatigued.

## 2024-05-24 NOTE — TELEPHONE ENCOUNTER
Lvm for pt. To call office back to discuss options other than b12 shot. Patient's labs don't indicate a need for a b12 shot. Pt can take an OTC pill.

## 2024-06-06 ENCOUNTER — OFFICE VISIT (OUTPATIENT)
Dept: FAMILY MEDICINE CLINIC | Age: 64
End: 2024-06-06
Payer: COMMERCIAL

## 2024-06-06 VITALS
BODY MASS INDEX: 38.93 KG/M2 | DIASTOLIC BLOOD PRESSURE: 86 MMHG | OXYGEN SATURATION: 96 % | HEART RATE: 81 BPM | HEIGHT: 64 IN | SYSTOLIC BLOOD PRESSURE: 130 MMHG | WEIGHT: 228 LBS

## 2024-06-06 DIAGNOSIS — J06.9 VIRAL URI: Primary | ICD-10-CM

## 2024-06-06 DIAGNOSIS — R09.81 NASAL CONGESTION: ICD-10-CM

## 2024-06-06 LAB
INFLUENZA A ANTIGEN, POC: NEGATIVE
INFLUENZA B ANTIGEN, POC: NEGATIVE
LOT EXPIRE DATE: NORMAL
LOT KIT NUMBER: NORMAL
SARS-COV-2, POC: NORMAL
VALID INTERNAL CONTROL: NORMAL
VENDOR AND KIT NAME POC: NORMAL

## 2024-06-06 PROCEDURE — 1036F TOBACCO NON-USER: CPT | Performed by: NURSE PRACTITIONER

## 2024-06-06 PROCEDURE — 3017F COLORECTAL CA SCREEN DOC REV: CPT | Performed by: NURSE PRACTITIONER

## 2024-06-06 PROCEDURE — 87428 SARSCOV & INF VIR A&B AG IA: CPT | Performed by: NURSE PRACTITIONER

## 2024-06-06 PROCEDURE — G8417 CALC BMI ABV UP PARAM F/U: HCPCS | Performed by: NURSE PRACTITIONER

## 2024-06-06 PROCEDURE — G8427 DOCREV CUR MEDS BY ELIG CLIN: HCPCS | Performed by: NURSE PRACTITIONER

## 2024-06-06 PROCEDURE — 99213 OFFICE O/P EST LOW 20 MIN: CPT | Performed by: NURSE PRACTITIONER

## 2024-06-06 RX ORDER — ALBUTEROL SULFATE 90 UG/1
2 AEROSOL, METERED RESPIRATORY (INHALATION) 4 TIMES DAILY PRN
Qty: 54 G | Refills: 1 | Status: SHIPPED | OUTPATIENT
Start: 2024-06-06

## 2024-06-06 RX ORDER — GUAIFENESIN/DEXTROMETHORPHAN 100-10MG/5
5 SYRUP ORAL 3 TIMES DAILY PRN
Qty: 120 ML | Refills: 0 | Status: SHIPPED | OUTPATIENT
Start: 2024-06-06 | End: 2024-06-16

## 2024-06-06 ASSESSMENT — ENCOUNTER SYMPTOMS
SINUS PRESSURE: 0
COUGH: 0
WHEEZING: 0
GASTROINTESTINAL NEGATIVE: 1
SHORTNESS OF BREATH: 0
SORE THROAT: 1
SINUS PAIN: 0

## 2024-06-06 NOTE — PROGRESS NOTES
Patient: Terri Shankar is a 63 y.o. female who presents today with the following Chief Complaint(s):  Chief Complaint   Patient presents with    Pharyngitis     X 2 days    Congestion       Assessment:  Encounter Diagnoses   Name Primary?    Viral URI Yes    Nasal congestion        Plan:  1. Viral URI  Treat with OTC medications. Robitussin sent in. Follow up if no improvement.     2. Nasal congestion  Covid negative   Flu a negative   Flu be negative  Treat as viral as above  - POCT COVID-19 & Influenza A/B      HPI  Patient presents today with a 2 day history of sore throat and congestion. She denies any cough or shortness of breath. She denies any fevers. She has not been taking anything OTC.       Current Outpatient Medications   Medication Sig Dispense Refill    albuterol sulfate HFA (VENTOLIN HFA) 108 (90 Base) MCG/ACT inhaler Inhale 2 puffs into the lungs 4 times daily as needed for Wheezing 54 g 1    guaiFENesin-dextromethorphan (ROBITUSSIN DM) 100-10 MG/5ML syrup Take 5 mLs by mouth 3 times daily as needed for Cough 120 mL 0    Tirzepatide-Weight Management (ZEPBOUND) 2.5 MG/0.5ML SOAJ Inject 2.5 mg into the skin once a week 2 mL 0    ARIPiprazole (ABILIFY) 20 MG tablet Take 1 tablet by mouth nightly 30 tablet 3    aspirin 81 MG EC tablet Take 1 tablet by mouth daily 90 tablet 1    nystatin (MYCOSTATIN) 143397 UNIT/GM cream Apply topically 2 times daily. 30 g 0    nystatin (MYCOSTATIN) 495888 UNIT/GM powder Apply 3 times daily. 60 g 1    oxyCODONE 5 MG capsule Take 1 capsule by mouth every 6 hours as needed for Pain.      acetaminophen (TYLENOL) 500 MG tablet Take 2 tablets by mouth every 6 hours as needed for Pain      Elastic Bandages & Supports (ANKLE BRACE ADJUST-TO-FIT) MISC Use as needed. 1 each 0    DUPIXENT 300 MG/2ML SOPN injection       clobetasol (TEMOVATE) 0.05 % cream APPLY CREAM TOPICALLY TWICE DAILY      atorvastatin (LIPITOR) 20 MG tablet Take 1 tablet by mouth at bedtime 90 tablet 1

## 2024-06-11 ENCOUNTER — OFFICE VISIT (OUTPATIENT)
Dept: PSYCHIATRY | Age: 64
End: 2024-06-11
Payer: COMMERCIAL

## 2024-06-11 ENCOUNTER — TELEPHONE (OUTPATIENT)
Dept: FAMILY MEDICINE CLINIC | Age: 64
End: 2024-06-11

## 2024-06-11 VITALS
WEIGHT: 228 LBS | SYSTOLIC BLOOD PRESSURE: 128 MMHG | DIASTOLIC BLOOD PRESSURE: 88 MMHG | HEIGHT: 64 IN | HEART RATE: 82 BPM | BODY MASS INDEX: 38.93 KG/M2

## 2024-06-11 DIAGNOSIS — Z79.899 MEDICATION MANAGEMENT: ICD-10-CM

## 2024-06-11 DIAGNOSIS — F39 UNSPECIFIED MOOD (AFFECTIVE) DISORDER (HCC): Primary | ICD-10-CM

## 2024-06-11 LAB — TSH SERPL DL<=0.005 MIU/L-ACNC: 2.32 UIU/ML (ref 0.27–4.2)

## 2024-06-11 PROCEDURE — 1036F TOBACCO NON-USER: CPT | Performed by: STUDENT IN AN ORGANIZED HEALTH CARE EDUCATION/TRAINING PROGRAM

## 2024-06-11 PROCEDURE — G8427 DOCREV CUR MEDS BY ELIG CLIN: HCPCS | Performed by: STUDENT IN AN ORGANIZED HEALTH CARE EDUCATION/TRAINING PROGRAM

## 2024-06-11 PROCEDURE — G8417 CALC BMI ABV UP PARAM F/U: HCPCS | Performed by: STUDENT IN AN ORGANIZED HEALTH CARE EDUCATION/TRAINING PROGRAM

## 2024-06-11 PROCEDURE — 99205 OFFICE O/P NEW HI 60 MIN: CPT | Performed by: STUDENT IN AN ORGANIZED HEALTH CARE EDUCATION/TRAINING PROGRAM

## 2024-06-11 PROCEDURE — 3017F COLORECTAL CA SCREEN DOC REV: CPT | Performed by: STUDENT IN AN ORGANIZED HEALTH CARE EDUCATION/TRAINING PROGRAM

## 2024-06-11 RX ORDER — ARIPIPRAZOLE 20 MG/1
20 TABLET ORAL NIGHTLY
Qty: 90 TABLET | Refills: 3 | Status: SHIPPED | OUTPATIENT
Start: 2024-06-11

## 2024-06-11 ASSESSMENT — PATIENT HEALTH QUESTIONNAIRE - PHQ9
6. FEELING BAD ABOUT YOURSELF - OR THAT YOU ARE A FAILURE OR HAVE LET YOURSELF OR YOUR FAMILY DOWN: NOT AT ALL
SUM OF ALL RESPONSES TO PHQ9 QUESTIONS 1 & 2: 0
1. LITTLE INTEREST OR PLEASURE IN DOING THINGS: NOT AT ALL
4. FEELING TIRED OR HAVING LITTLE ENERGY: NOT AT ALL
SUM OF ALL RESPONSES TO PHQ QUESTIONS 1-9: 0
SUM OF ALL RESPONSES TO PHQ QUESTIONS 1-9: 0
3. TROUBLE FALLING OR STAYING ASLEEP: NOT AT ALL
8. MOVING OR SPEAKING SO SLOWLY THAT OTHER PEOPLE COULD HAVE NOTICED. OR THE OPPOSITE, BEING SO FIGETY OR RESTLESS THAT YOU HAVE BEEN MOVING AROUND A LOT MORE THAN USUAL: NOT AT ALL
9. THOUGHTS THAT YOU WOULD BE BETTER OFF DEAD, OR OF HURTING YOURSELF: NOT AT ALL
SUM OF ALL RESPONSES TO PHQ QUESTIONS 1-9: 0
5. POOR APPETITE OR OVEREATING: NOT AT ALL
2. FEELING DOWN, DEPRESSED OR HOPELESS: NOT AT ALL
SUM OF ALL RESPONSES TO PHQ QUESTIONS 1-9: 0
7. TROUBLE CONCENTRATING ON THINGS, SUCH AS READING THE NEWSPAPER OR WATCHING TELEVISION: NOT AT ALL

## 2024-06-11 ASSESSMENT — ANXIETY QUESTIONNAIRES
2. NOT BEING ABLE TO STOP OR CONTROL WORRYING: NOT AT ALL
GAD7 TOTAL SCORE: 0
5. BEING SO RESTLESS THAT IT IS HARD TO SIT STILL: NOT AT ALL
6. BECOMING EASILY ANNOYED OR IRRITABLE: NOT AT ALL
3. WORRYING TOO MUCH ABOUT DIFFERENT THINGS: NOT AT ALL
1. FEELING NERVOUS, ANXIOUS, OR ON EDGE: NOT AT ALL
4. TROUBLE RELAXING: NOT AT ALL
7. FEELING AFRAID AS IF SOMETHING AWFUL MIGHT HAPPEN: NOT AT ALL
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
## 2024-06-11 NOTE — TELEPHONE ENCOUNTER
Pt presented to office 20 minutes late for their appointment.    Pt will call back to reschedule appointment

## 2024-06-12 LAB
EST. AVERAGE GLUCOSE BLD GHB EST-MCNC: 108.3 MG/DL
HBA1C MFR BLD: 5.4 %

## 2024-06-18 NOTE — TELEPHONE ENCOUNTER
Called patient and left message to cancel stress test that was scheduled for 06-19-24 due to ins pending. Will reschedule once we obtain the auth.    Electronically signed by Alexandria Austin on 6/18/2024 at 9:16 AM     Spoke to patient about refill on ear drops. She said she swims almost every day. She gets swimmers ear a lot in summer.

## 2024-07-11 NOTE — TELEPHONE ENCOUNTER
54 yo female s/p robo RT gustavo 6/2023. T1N1 on path s/p 3 mo adjuvant therapy. Scans & c-scope recently MARGARITA.  Will see her 6 mo with scan.    [facility] notified

## 2024-08-26 ENCOUNTER — OFFICE VISIT (OUTPATIENT)
Dept: FAMILY MEDICINE CLINIC | Age: 64
End: 2024-08-26
Payer: COMMERCIAL

## 2024-08-26 VITALS
HEART RATE: 90 BPM | WEIGHT: 233.6 LBS | OXYGEN SATURATION: 96 % | SYSTOLIC BLOOD PRESSURE: 126 MMHG | HEIGHT: 64 IN | BODY MASS INDEX: 39.88 KG/M2 | DIASTOLIC BLOOD PRESSURE: 84 MMHG

## 2024-08-26 DIAGNOSIS — B37.2 SKIN YEAST INFECTION: ICD-10-CM

## 2024-08-26 DIAGNOSIS — E66.01 CLASS 3 SEVERE OBESITY DUE TO EXCESS CALORIES WITH SERIOUS COMORBIDITY AND BODY MASS INDEX (BMI) OF 40.0 TO 44.9 IN ADULT (HCC): ICD-10-CM

## 2024-08-26 DIAGNOSIS — L28.1 PRURIGO NODULARIS: Primary | ICD-10-CM

## 2024-08-26 PROCEDURE — 3017F COLORECTAL CA SCREEN DOC REV: CPT | Performed by: NURSE PRACTITIONER

## 2024-08-26 PROCEDURE — G8427 DOCREV CUR MEDS BY ELIG CLIN: HCPCS | Performed by: NURSE PRACTITIONER

## 2024-08-26 PROCEDURE — G8417 CALC BMI ABV UP PARAM F/U: HCPCS | Performed by: NURSE PRACTITIONER

## 2024-08-26 PROCEDURE — 1036F TOBACCO NON-USER: CPT | Performed by: NURSE PRACTITIONER

## 2024-08-26 PROCEDURE — 99214 OFFICE O/P EST MOD 30 MIN: CPT | Performed by: NURSE PRACTITIONER

## 2024-08-26 RX ORDER — NYSTATIN 100000/ML
500000 SUSPENSION, ORAL (FINAL DOSE FORM) ORAL 4 TIMES DAILY
Qty: 200 ML | Refills: 0 | Status: SHIPPED | OUTPATIENT
Start: 2024-08-26 | End: 2024-08-26 | Stop reason: CLARIF

## 2024-08-26 RX ORDER — TRIAMCINOLONE ACETONIDE 1 MG/G
CREAM TOPICAL
Qty: 15 G | Refills: 0 | Status: SHIPPED | OUTPATIENT
Start: 2024-08-26

## 2024-08-26 RX ORDER — NYSTATIN 100000 [USP'U]/G
POWDER TOPICAL
Qty: 60 G | Refills: 3 | Status: SHIPPED | OUTPATIENT
Start: 2024-08-26

## 2024-08-26 ASSESSMENT — ENCOUNTER SYMPTOMS
RESPIRATORY NEGATIVE: 1
GASTROINTESTINAL NEGATIVE: 1

## 2024-08-26 NOTE — PROGRESS NOTES
Patient: Terri Shankar is a 63 y.o. female who presents today with the following Chief Complaint(s):  Chief Complaint   Patient presents with    Skin Problem     Sore on top of head, couple days    Weight Loss     Zepound not approved, did not start medication.       Assessment:  Encounter Diagnoses   Name Primary?    Rash and nonspecific skin eruption Yes    Skin yeast infection     Class 3 severe obesity due to excess calories with serious comorbidity and body mass index (BMI) of 40.0 to 44.9 in adult ([facility])        Plan:  1. Prurigo nodularis  Chronic condition,Flare up, Scalp lesion likely her prurigo nodularis- okay to treat with triamcinolone cream. Follow up with dermatology.   - triamcinolone (KENALOG) 0.1 % cream; Apply topically 2 times daily.  Dispense: 15 g; Refill: 0    2. Skin yeast infection  Treat with nystatin powder and follow up if no improvement.   - nystatin (MYCOSTATIN) powder apply 3 times daily    3. Class 3 severe obesity due to excess calories with serious comorbidity and body mass index (BMI) of 40.0 to 44.9 in adult ([facility])  Discuss weight loss diet, given resources to assist with weight loss.       HPI  Patient presents today with concerns of rash flare up. She has a history of prurigo nodularis that is managed by dermatology. She receives Dupixent injections and states it does help a lot but still gets some flare ups. She has only tried alcohol on this flare up and no creams.   She also has a red irritated rash in her abdominal folds. She states it is also under her breasts bilaterally.   She also has concerns about her weight. She reports that the weigh loss specialist was not covered under her insurance. Insurance would not cover the injectable medications.     Current Outpatient Medications   Medication Sig Dispense Refill    triamcinolone (KENALOG) 0.1 % cream Apply topically 2 times daily. 15 g 0    nystatin (MYCOSTATIN) 035970 UNIT/ML suspension Take 5 mLs by mouth 4 times daily for

## 2024-09-04 DIAGNOSIS — B37.2 YEAST DERMATITIS: ICD-10-CM

## 2024-09-04 RX ORDER — NYSTATIN 100000 U/G
CREAM TOPICAL
Qty: 30 G | Refills: 0 | Status: SHIPPED | OUTPATIENT
Start: 2024-09-04

## 2024-09-04 NOTE — TELEPHONE ENCOUNTER
Pt requesting this med in the cream as it works better for her.    nystatin (MYCOSTATIN) 142887 UNIT/GM cream     [employer] [address] [place]

## 2024-09-11 ENCOUNTER — TELEPHONE (OUTPATIENT)
Dept: FAMILY MEDICINE CLINIC | Age: 64
End: 2024-09-11

## 2024-09-17 ENCOUNTER — TELEPHONE (OUTPATIENT)
Dept: FAMILY MEDICINE CLINIC | Age: 64
End: 2024-09-17

## 2024-09-17 NOTE — TELEPHONE ENCOUNTER
I recommend trying to add a probiotic daily, it is available OTC. This should help with the little bit of diarrhea.

## 2024-09-17 NOTE — TELEPHONE ENCOUNTER
Pt called back ask her if she had the symptoms mentioned in provider message. She declined nausea, vomitting. She did state that she is having H/A, sore throat, SOB and a little diarrhea. Advised her someone will call her back

## 2024-09-17 NOTE — TELEPHONE ENCOUNTER
Pt was seen in the ED 9/17/24 and was diagnosed with Bronchitis and was given, doxyclclinehyclate 100mg, it is upsetting her stomach pt is asking if EG can call somthing else in pt can take penicillin, amoxacillin  Advise

## 2024-10-04 ENCOUNTER — TELEPHONE (OUTPATIENT)
Dept: FAMILY MEDICINE CLINIC | Age: 64
End: 2024-10-04

## 2024-10-04 NOTE — TELEPHONE ENCOUNTER
Pt was bitten \"by a spider\"  on her back a couple of days ago and wants to know what to do she has been putting different creams on it. Pt is asking if she soul follow up with EG or her dermatologist? The bite is about the size of a quarter and is itchy  Advise

## 2024-10-04 NOTE — TELEPHONE ENCOUNTER
On her medication list she has triamcinolone cream. She can apply this to the area and follow up if no improvement.

## 2024-10-10 ENCOUNTER — TELEPHONE (OUTPATIENT)
Dept: FAMILY MEDICINE CLINIC | Age: 64
End: 2024-10-10

## 2024-10-10 DIAGNOSIS — K59.00 CONSTIPATION, UNSPECIFIED CONSTIPATION TYPE: Primary | ICD-10-CM

## 2024-10-10 RX ORDER — COCOA BUTTER, PHENYLEPHRINE HYDROCHLORIDE 2211; 6.25 MG/1; MG/1
1 SUPPOSITORY RECTAL PRN
Qty: 12 SUPPOSITORY | Refills: 0 | Status: SHIPPED | OUTPATIENT
Start: 2024-10-10

## 2024-10-10 RX ORDER — POLYETHYLENE GLYCOL 3350 17 G/17G
17 POWDER, FOR SOLUTION ORAL DAILY
Qty: 1530 G | Refills: 1 | Status: SHIPPED | OUTPATIENT
Start: 2024-10-10 | End: 2025-04-08

## 2024-10-10 NOTE — TELEPHONE ENCOUNTER
Prescription sent in for suppositories and miralax. The miralax will help with constipation. This is a powder that is mixed with water and taken daily.

## 2024-10-10 NOTE — TELEPHONE ENCOUNTER
Patient is complaining of internal and external hemorrhoids that are painful. OTC suppositories are not helping much. She is requesting a rx for this. She states she has been seen in the past for this.    Also requesting suggestion to help with her constipation.     Advise

## 2024-10-10 NOTE — TELEPHONE ENCOUNTER
Pt called back with a few questions:  1) should she eat more fiber?  2)Rasin bran? Will that help

## 2024-10-15 DIAGNOSIS — B37.2 YEAST DERMATITIS: ICD-10-CM

## 2024-10-15 RX ORDER — NYSTATIN 100000 U/G
CREAM TOPICAL
Qty: 30 G | Refills: 0 | Status: SHIPPED | OUTPATIENT
Start: 2024-10-15

## 2024-10-15 NOTE — TELEPHONE ENCOUNTER
Patient is requesting a rx for nystatin (MYCOSTATIN) [id number] UNIT/GM cream       Pt is requesting a bigger tube or jar of this medication     Last seen 8/26/2024    Next visit Visit date not found

## 2024-10-15 NOTE — TELEPHONE ENCOUNTER
Last Office Visit  -  8/26/24  Next Office Visit  -  n/a    Last Filled  -  9/4/24  Last UDS -    Contract -      Pt is requesting a bigger tube or jar of this medication

## 2024-10-22 ENCOUNTER — TELEPHONE (OUTPATIENT)
Dept: FAMILY MEDICINE CLINIC | Age: 64
End: 2024-10-22

## 2024-10-22 NOTE — TELEPHONE ENCOUNTER
Pt returned call and was read the reply about which vaccines she needed. She stated she will go to her pharmacy and get her flu, covid and RSV shots there. Pt had no other questions or concerns.

## 2024-10-31 ENCOUNTER — APPOINTMENT (OUTPATIENT)
Dept: GENERAL RADIOLOGY | Age: 64
End: 2024-10-31
Payer: COMMERCIAL

## 2024-10-31 ENCOUNTER — HOSPITAL ENCOUNTER (EMERGENCY)
Age: 64
Discharge: HOME | End: 2024-10-31
Attending: EMERGENCY MEDICINE
Payer: COMMERCIAL

## 2024-10-31 ENCOUNTER — CARE COORDINATION (OUTPATIENT)
Dept: CARE COORDINATION | Age: 64
End: 2024-10-31

## 2024-10-31 VITALS
SYSTOLIC BLOOD PRESSURE: 161 MMHG | TEMPERATURE: 98.3 F | OXYGEN SATURATION: 96 % | RESPIRATION RATE: 16 BRPM | HEART RATE: 80 BPM | DIASTOLIC BLOOD PRESSURE: 94 MMHG

## 2024-10-31 DIAGNOSIS — B35.6 TINEA CRURIS: ICD-10-CM

## 2024-10-31 DIAGNOSIS — R06.02 SHORTNESS OF BREATH: Primary | ICD-10-CM

## 2024-10-31 DIAGNOSIS — L28.1 PRURIGO NODULARIS: ICD-10-CM

## 2024-10-31 LAB
FLUAV RNA RESP QL NAA+PROBE: NOT DETECTED
FLUBV RNA RESP QL NAA+PROBE: NOT DETECTED
SARS-COV-2 RNA RESP QL NAA+PROBE: NOT DETECTED

## 2024-10-31 PROCEDURE — 99284 EMERGENCY DEPT VISIT MOD MDM: CPT

## 2024-10-31 PROCEDURE — 87636 SARSCOV2 & INF A&B AMP PRB: CPT

## 2024-10-31 PROCEDURE — 71045 X-RAY EXAM CHEST 1 VIEW: CPT

## 2024-10-31 PROCEDURE — 6370000000 HC RX 637 (ALT 250 FOR IP): Performed by: EMERGENCY MEDICINE

## 2024-10-31 RX ORDER — CETIRIZINE HYDROCHLORIDE 10 MG/1
20 TABLET ORAL DAILY
Status: DISCONTINUED | OUTPATIENT
Start: 2024-10-31 | End: 2024-10-31

## 2024-10-31 RX ORDER — IBUPROFEN 400 MG/1
800 TABLET, FILM COATED ORAL ONCE
Status: COMPLETED | OUTPATIENT
Start: 2024-10-31 | End: 2024-10-31

## 2024-10-31 RX ORDER — CETIRIZINE HYDROCHLORIDE 10 MG/1
20 TABLET ORAL DAILY
Status: DISCONTINUED | OUTPATIENT
Start: 2024-10-31 | End: 2024-10-31 | Stop reason: HOSPADM

## 2024-10-31 RX ADMIN — IBUPROFEN 800 MG: 400 TABLET, FILM COATED ORAL at 02:07

## 2024-10-31 RX ADMIN — CETIRIZINE HYDROCHLORIDE 20 MG: 10 TABLET, FILM COATED ORAL at 02:07

## 2024-10-31 ASSESSMENT — ENCOUNTER SYMPTOMS
GASTROINTESTINAL NEGATIVE: 1
COUGH: 1
SHORTNESS OF BREATH: 1
EYES NEGATIVE: 1

## 2024-10-31 ASSESSMENT — PAIN - FUNCTIONAL ASSESSMENT: PAIN_FUNCTIONAL_ASSESSMENT: NONE - DENIES PAIN

## 2024-10-31 ASSESSMENT — PAIN SCALES - GENERAL: PAINLEVEL_OUTOF10: 7

## 2024-10-31 NOTE — ED PROVIDER NOTES
THE [facility]  EMERGENCY DEPARTMENT ENCOUNTER          ATTENDING PHYSICIAN NOTE       Date of evaluation: 10/31/2024    Chief Complaint     Rash and Shortness of Breath      History of Present Illness     Terri Shankar is a 64 y.o. female who presents to the emergency department concerned for possible spider bite on her back as well as shortness of breath.  Patient states that she has a history of prurigo nodularis for which she is on Dupixent every other week.  She states that she has been having issues with spiders in her house and  is set to kill them.  She states that he looked on 2 lesions on her back and he stated that they were due to spider bites so she was concerned and came to the emergency department.  She also notes that she has been feeling short of breath.  She states she has been feeling out of breath for the last 2 to 3 days.  She also notes that she has been feeling very anxious regarding the spiders in her house.  She denies any fevers or chills.  She does note a cough is nonproductive sputum.  She denies any chest pain or pressure.  She denies any vomiting or diarrhea.  She states she is due her next Dupixent injection tomorrow.    ASSESSMENT / PLAN  (MEDICAL DECISION MAKING)     INITIAL VITALS: BP: (!) 158/103, Temp: 98.3 °F (36.8 °C), Pulse: 89, Respirations: 17, SpO2: 98 %      Terri Shankar is a 64 y.o. female with history of prurigo nodularis as well as underlying psychiatric disease presents complaining of possible spider bites as well as shortness of breath.  Patient states that she has a spider infestation at home and was concerned that 2 areas on her back were spider bites after being seen by the .  On arrival, patient is hemodynamically stable with normal oxygen saturations.  She has clear breath sounds normal heart sounds.  She has multiple shallow ulcerated lesions to her bilateral upper and lower extremities consistent with her known history of  None

## 2024-10-31 NOTE — DISCHARGE INSTRUCTIONS
Your testing in the emergency department did not show any signs of an infection causing her shortness of breath.  Please follow-up with your primary care provider as needed.  Continue with your scheduled Dupixent injection for your prurigo nodularis.  Continue using the nystatin twice daily for the rash in your groin.  Also use Gold Powder or other desiccating powders to keep the areas in your groin as well as under your breast dried to prevent further infection.

## 2024-10-31 NOTE — CARE COORDINATION
Ambulatory Care Coordination Note     10/31/2024 9:27 AM     Patient outreach attempt by this ACM today to offer care management services. ACM was unable to reach the patient by telephone today; left voice message requesting a return phone call to this ACM.     ACM: Shannan Rodriguez, RN     Care Summary Note: ED at [facility] 10/31 for Rash, ? Spider bite, SOB.  PMH: CVA - UTRx1    PCP/Specialist follow up: No PCP/HFU scheduled      Follow Up:   Plan for next ACM outreach in approximately 1 week to complete:  - outreach attempt to offer care management services.       Shannan ARNOLD, RN     Ambulatory Care Manager    [facility]    Phone: [phone]    [email or web address]

## 2024-11-01 ENCOUNTER — CARE COORDINATION (OUTPATIENT)
Dept: CARE COORDINATION | Age: 64
End: 2024-11-01

## 2024-11-01 NOTE — CARE COORDINATION
Ambulatory Care Coordination Note     11/1/2024 3:07 PM     Patient outreach attempt by this ACM today to offer care management services. ACM was unable to reach the patient by telephone today; left voice message requesting a return phone call to this ACM.     ACM: Tsering Webster, RN     Care Summary Note: ED at [facility] 10/31 for Rash, ? Spider bite, SOB.  PMH: CVA - UTRx2    PCP/Specialist follow up:       Follow Up:   Plan for next ACM outreach in approximately 1 week to complete:  - outreach attempt to offer care management services.       Tsering ARNOLD, RN  Respecting Choices® Advanced Steps ACP Facilitator  Ambulatory Care Manager  [facility]  [phone]

## 2024-11-11 ENCOUNTER — APPOINTMENT (OUTPATIENT)
Dept: GENERAL RADIOLOGY | Age: 64
End: 2024-11-11
Payer: COMMERCIAL

## 2024-11-11 ENCOUNTER — TELEPHONE (OUTPATIENT)
Dept: FAMILY MEDICINE CLINIC | Age: 64
End: 2024-11-11

## 2024-11-11 ENCOUNTER — HOSPITAL ENCOUNTER (EMERGENCY)
Age: 64
Discharge: HOME | End: 2024-11-11
Attending: EMERGENCY MEDICINE
Payer: COMMERCIAL

## 2024-11-11 ENCOUNTER — OFFICE VISIT (OUTPATIENT)
Dept: FAMILY MEDICINE CLINIC | Age: 64
End: 2024-11-11
Payer: COMMERCIAL

## 2024-11-11 VITALS
OXYGEN SATURATION: 94 % | HEIGHT: 64 IN | SYSTOLIC BLOOD PRESSURE: 132 MMHG | HEART RATE: 68 BPM | DIASTOLIC BLOOD PRESSURE: 86 MMHG | BODY MASS INDEX: 39.78 KG/M2 | WEIGHT: 233 LBS

## 2024-11-11 VITALS
HEART RATE: 81 BPM | SYSTOLIC BLOOD PRESSURE: 161 MMHG | BODY MASS INDEX: 39.78 KG/M2 | RESPIRATION RATE: 18 BRPM | TEMPERATURE: 97.6 F | DIASTOLIC BLOOD PRESSURE: 85 MMHG | WEIGHT: 233 LBS | HEIGHT: 64 IN | OXYGEN SATURATION: 96 %

## 2024-11-11 DIAGNOSIS — S46.911A STRAIN OF RIGHT SHOULDER, INITIAL ENCOUNTER: ICD-10-CM

## 2024-11-11 DIAGNOSIS — R06.02 SHORTNESS OF BREATH: Primary | ICD-10-CM

## 2024-11-11 DIAGNOSIS — Z00.00 ENCOUNTER FOR WELL ADULT EXAM WITHOUT ABNORMAL FINDINGS: Primary | ICD-10-CM

## 2024-11-11 DIAGNOSIS — F31.9 BIPOLAR AFFECTIVE DISORDER, REMISSION STATUS UNSPECIFIED (HCC): ICD-10-CM

## 2024-11-11 DIAGNOSIS — E78.5 HYPERLIPIDEMIA, UNSPECIFIED HYPERLIPIDEMIA TYPE: ICD-10-CM

## 2024-11-11 DIAGNOSIS — E66.01 MORBID OBESITY WITH BMI OF 40.0-44.9, ADULT: ICD-10-CM

## 2024-11-11 DIAGNOSIS — Z00.00 ENCOUNTER FOR WELL ADULT EXAM WITHOUT ABNORMAL FINDINGS: ICD-10-CM

## 2024-11-11 LAB
ANION GAP SERPL CALCULATED.3IONS-SCNC: 11 MMOL/L (ref 3–16)
BASE EXCESS BLDV CALC-SCNC: 0.9 MMOL/L (ref -2–3)
BASOPHILS # BLD: 0.1 K/UL (ref 0–0.2)
BASOPHILS NFR BLD: 0.7 %
BUN SERPL-MCNC: 23 MG/DL (ref 7–20)
CALCIUM SERPL-MCNC: 9 MG/DL (ref 8.3–10.6)
CHLORIDE SERPL-SCNC: 105 MMOL/L (ref 99–110)
CO2 BLDV-SCNC: 30 MMOL/L
CO2 SERPL-SCNC: 22 MMOL/L (ref 21–32)
COHGB MFR BLDV: 1.1 % (ref 0–1.5)
CREAT SERPL-MCNC: 1 MG/DL (ref 0.6–1.2)
DEPRECATED RDW RBC AUTO: 14.5 % (ref 12.4–15.4)
DO-HGB MFR BLDV: 53.7 %
EOSINOPHIL # BLD: 0.3 K/UL (ref 0–0.6)
EOSINOPHIL NFR BLD: 3 %
FLUAV RNA RESP QL NAA+PROBE: NOT DETECTED
FLUBV RNA RESP QL NAA+PROBE: NOT DETECTED
GFR SERPLBLD CREATININE-BSD FMLA CKD-EPI: 64 ML/MIN/{1.73_M2}
GLUCOSE SERPL-MCNC: 86 MG/DL (ref 70–99)
HCO3 BLDV-SCNC: 28.3 MMOL/L (ref 24–28)
HCT VFR BLD AUTO: 44.3 % (ref 36–48)
HGB BLD-MCNC: 14.1 G/DL (ref 12–16)
LYMPHOCYTES # BLD: 2.9 K/UL (ref 1–5.1)
LYMPHOCYTES NFR BLD: 25.1 %
MCH RBC QN AUTO: 27.5 PG (ref 26–34)
MCHC RBC AUTO-ENTMCNC: 31.9 G/DL (ref 31–36)
MCV RBC AUTO: 86.2 FL (ref 80–100)
METHGB MFR BLDV: 0.3 % (ref 0–1.5)
MONOCYTES # BLD: 0.7 K/UL (ref 0–1.3)
MONOCYTES NFR BLD: 6 %
NEUTROPHILS # BLD: 7.6 K/UL (ref 1.7–7.7)
NEUTROPHILS NFR BLD: 65.2 %
NT-PROBNP SERPL-MCNC: 42 PG/ML (ref 0–124)
PCO2 BLDV: 55.7 MMHG (ref 41–51)
PH BLDV: 7.32 [PH] (ref 7.35–7.45)
PLATELET # BLD AUTO: 238 K/UL (ref 135–450)
PMV BLD AUTO: 8.2 FL (ref 5–10.5)
PO2 BLDV: <30 MMHG (ref 25–40)
POTASSIUM SERPL-SCNC: 3.9 MMOL/L (ref 3.5–5.1)
RBC # BLD AUTO: 5.13 M/UL (ref 4–5.2)
SAO2 % BLDV: 46 %
SARS-COV-2 RNA RESP QL NAA+PROBE: NOT DETECTED
SODIUM SERPL-SCNC: 138 MMOL/L (ref 136–145)
TROPONIN, HIGH SENSITIVITY: 10 NG/L (ref 0–14)
WBC # BLD AUTO: 11.7 K/UL (ref 4–11)

## 2024-11-11 PROCEDURE — 99396 PREV VISIT EST AGE 40-64: CPT | Performed by: NURSE PRACTITIONER

## 2024-11-11 PROCEDURE — 85025 COMPLETE CBC W/AUTO DIFF WBC: CPT

## 2024-11-11 PROCEDURE — G8484 FLU IMMUNIZE NO ADMIN: HCPCS | Performed by: NURSE PRACTITIONER

## 2024-11-11 PROCEDURE — 87636 SARSCOV2 & INF A&B AMP PRB: CPT

## 2024-11-11 PROCEDURE — 84484 ASSAY OF TROPONIN QUANT: CPT

## 2024-11-11 PROCEDURE — 6370000000 HC RX 637 (ALT 250 FOR IP): Performed by: STUDENT IN AN ORGANIZED HEALTH CARE EDUCATION/TRAINING PROGRAM

## 2024-11-11 PROCEDURE — 99285 EMERGENCY DEPT VISIT HI MDM: CPT

## 2024-11-11 PROCEDURE — 73030 X-RAY EXAM OF SHOULDER: CPT

## 2024-11-11 PROCEDURE — 71046 X-RAY EXAM CHEST 2 VIEWS: CPT

## 2024-11-11 PROCEDURE — 82803 BLOOD GASES ANY COMBINATION: CPT

## 2024-11-11 PROCEDURE — 93005 ELECTROCARDIOGRAM TRACING: CPT | Performed by: STUDENT IN AN ORGANIZED HEALTH CARE EDUCATION/TRAINING PROGRAM

## 2024-11-11 PROCEDURE — 83880 ASSAY OF NATRIURETIC PEPTIDE: CPT

## 2024-11-11 PROCEDURE — 80048 BASIC METABOLIC PNL TOTAL CA: CPT

## 2024-11-11 RX ORDER — METHOCARBAMOL 500 MG/1
750 TABLET, FILM COATED ORAL ONCE
Status: COMPLETED | OUTPATIENT
Start: 2024-11-11 | End: 2024-11-11

## 2024-11-11 RX ORDER — METHOCARBAMOL 750 MG/1
750 TABLET, FILM COATED ORAL 4 TIMES DAILY
Qty: 40 TABLET | Refills: 0 | Status: SHIPPED | OUTPATIENT
Start: 2024-11-11 | End: 2024-11-21

## 2024-11-11 RX ORDER — ARIPIPRAZOLE 20 MG/1
20 TABLET ORAL NIGHTLY
Qty: 90 TABLET | Refills: 3 | Status: SHIPPED | OUTPATIENT
Start: 2024-11-11

## 2024-11-11 RX ADMIN — METHOCARBAMOL 750 MG: 500 TABLET ORAL at 21:51

## 2024-11-11 ASSESSMENT — PAIN DESCRIPTION - ONSET: ONSET: ON-GOING

## 2024-11-11 ASSESSMENT — ENCOUNTER SYMPTOMS
SHORTNESS OF BREATH: 1
GASTROINTESTINAL NEGATIVE: 1

## 2024-11-11 ASSESSMENT — PAIN DESCRIPTION - ORIENTATION
ORIENTATION: RIGHT
ORIENTATION: RIGHT

## 2024-11-11 ASSESSMENT — PAIN DESCRIPTION - DESCRIPTORS
DESCRIPTORS: ACHING;SHARP
DESCRIPTORS: DISCOMFORT

## 2024-11-11 ASSESSMENT — PAIN DESCRIPTION - LOCATION
LOCATION: SHOULDER
LOCATION: SHOULDER

## 2024-11-11 ASSESSMENT — PAIN DESCRIPTION - FREQUENCY: FREQUENCY: CONTINUOUS

## 2024-11-11 ASSESSMENT — PAIN SCALES - GENERAL
PAINLEVEL_OUTOF10: 10
PAINLEVEL_OUTOF10: 10

## 2024-11-11 ASSESSMENT — PAIN DESCRIPTION - PAIN TYPE: TYPE: ACUTE PAIN

## 2024-11-11 ASSESSMENT — PAIN - FUNCTIONAL ASSESSMENT
PAIN_FUNCTIONAL_ASSESSMENT: 0-10
PAIN_FUNCTIONAL_ASSESSMENT: ACTIVITIES ARE NOT PREVENTED

## 2024-11-11 NOTE — TELEPHONE ENCOUNTER
Called and left VM for pt regarding paperwork for [employer]. Pt was unsure which paperwork was needed. We think it was a medical form that the provider needs to fill out. Asked pt to call so we can decide if this can be scanned to her email.

## 2024-11-11 NOTE — PROGRESS NOTES
Administered Date(s) Administered    COVID-19, MODERNA BLUE border, Primary or Immunocompromised, (age 12y+), IM, 100 mcg/0.5mL 03/23/2021, 04/20/2021    COVID-19, PFIZER PURPLE top, DILUTE for use, (age 12 y+), 30mcg/0.3mL 12/07/2021    Influenza Virus Vaccine 10/17/2014, 09/24/2015, 10/14/2016, 09/22/2018, 10/18/2019, 09/30/2021    Influenza, AFLURIA (age 3 y+), FLUZONE, (age 6 mo+), Quadv MDV, 0.5mL 10/23/2017    Influenza, FLUARIX, FLULAVAL, FLUZONE (age 6 mo+) and AFLURIA, (age 3 y+), Quadv PF, 0.5mL 10/04/2016, 09/22/2018, 10/18/2019, 10/18/2019, 10/01/2022    Influenza, FLUBLOK, (age 18 y+), Quadv PF, 0.5mL 10/31/2020    Influenza, FLUCELVAX, (age 6 mo+), MDCK, Quadv PF, 0.5mL 11/10/2023    TDaP, ADACEL (age 10y-64y), BOOSTRIX (age 10y+), IM, 0.5mL 10/23/2017    Zoster Recombinant (Shingrix) 06/18/2022, 09/08/2022        Health Maintenance Due   Topic Date Due    Respiratory Syncytial Virus (RSV) Pregnant or age 60 yrs+ (1 - 1-dose 60+ series) Never done    COVID-19 Vaccine (4 - 2023-24 season) 09/01/2024      Recommendations for Preventive Services Due: see orders and patient instructions/AVS.

## 2024-11-12 ENCOUNTER — TELEPHONE (OUTPATIENT)
Dept: FAMILY MEDICINE CLINIC | Age: 64
End: 2024-11-12

## 2024-11-12 LAB
ALBUMIN SERPL-MCNC: 4.3 G/DL (ref 3.4–5)
ALBUMIN/GLOB SERPL: 1.5 {RATIO} (ref 1.1–2.2)
ALP SERPL-CCNC: 88 U/L (ref 40–129)
ALT SERPL-CCNC: 37 U/L (ref 10–40)
ANION GAP SERPL CALCULATED.3IONS-SCNC: 12 MMOL/L (ref 3–16)
AST SERPL-CCNC: 32 U/L (ref 15–37)
BILIRUB SERPL-MCNC: <0.2 MG/DL (ref 0–1)
BUN SERPL-MCNC: 20 MG/DL (ref 7–20)
CALCIUM SERPL-MCNC: 9.8 MG/DL (ref 8.3–10.6)
CHLORIDE SERPL-SCNC: 103 MMOL/L (ref 99–110)
CHOLEST SERPL-MCNC: 249 MG/DL (ref 0–199)
CO2 SERPL-SCNC: 22 MMOL/L (ref 21–32)
CREAT SERPL-MCNC: 0.9 MG/DL (ref 0.6–1.2)
EKG ATRIAL RATE: 73 BPM
EKG DIAGNOSIS: NORMAL
EKG P AXIS: 77 DEGREES
EKG P-R INTERVAL: 178 MS
EKG Q-T INTERVAL: 416 MS
EKG QRS DURATION: 80 MS
EKG QTC CALCULATION (BAZETT): 458 MS
EKG R AXIS: 21 DEGREES
EKG T AXIS: 22 DEGREES
EKG VENTRICULAR RATE: 73 BPM
GFR SERPLBLD CREATININE-BSD FMLA CKD-EPI: 72 ML/MIN/{1.73_M2}
GLUCOSE SERPL-MCNC: 85 MG/DL (ref 70–99)
HDLC SERPL-MCNC: 43 MG/DL (ref 40–60)
LDLC SERPL CALC-MCNC: 151 MG/DL
POTASSIUM SERPL-SCNC: 4.2 MMOL/L (ref 3.5–5.1)
PROT SERPL-MCNC: 7.2 G/DL (ref 6.4–8.2)
SODIUM SERPL-SCNC: 137 MMOL/L (ref 136–145)
TRIGL SERPL-MCNC: 273 MG/DL (ref 0–150)
VLDLC SERPL CALC-MCNC: 55 MG/DL

## 2024-11-12 ASSESSMENT — ENCOUNTER SYMPTOMS
SORE THROAT: 0
DIARRHEA: 0
CONSTIPATION: 0
SINUS PRESSURE: 0
WHEEZING: 0
ABDOMINAL PAIN: 0
BLOOD IN STOOL: 0
VOMITING: 0
NAUSEA: 0
CHEST TIGHTNESS: 1
ABDOMINAL DISTENTION: 0
SINUS PAIN: 0
EYES NEGATIVE: 1
COUGH: 0
COLOR CHANGE: 0
RHINORRHEA: 0
SHORTNESS OF BREATH: 1
BACK PAIN: 0

## 2024-11-12 NOTE — ED PROVIDER NOTES
ED Attending Attestation Note     Date of evaluation: 11/11/2024    This patient was seen by the resident.  I have seen and examined the patient, agree with the workup, evaluation, management and diagnosis. The care plan has been discussed.  I have reviewed the ECG and concur with the resident's interpretation.  My assessment reveals adult female complains of some shortness of breath.  On my exam she has no oxygen requirement, clear lungs, is not in respiratory stress.  Also complains of some pain in the right shoulder after trying to open a window that was stuck this morning said it started hurting at that point.  I do not appreciate gross deformity of the shoulder and she is able to range it.  There is no warmth or erythema to the area.  May be rotator cuff injury given the report of Hernandez use it to open a stuck window and turning immediately thereafter.  Will obtain x-ray..       Ap Kaminski MD  11/11/24 [id number]

## 2024-11-12 NOTE — ED TRIAGE NOTES
Patient arrived I the ER with c/o sob. Patient states that she has been exposed to mold in her home. Patient states that the last 6 month her breathing has been getting worse.

## 2024-11-12 NOTE — DISCHARGE INSTRUCTIONS
You were seen in the ED today for right shoulder pain after pulling a window open.  We obtained an x-ray which did not show evidence of fracture or dislocation.  You were given Robaxin which is a muscle relaxer and I write a prescription for this at home.  You likely have a muscle strain and I have included further information in your discharge paperwork.  Additionally, you reported some shortness of breath which she felt was in the setting of mold exposure.  We obtained a laboratory workup looking your blood counts electrolytes which were reassuring.  I recommend following up with your primary care physician as needed.  Please return to the ED with any severe worsening shortness of breath, intractable vomiting, or other concerning symptoms.

## 2024-11-12 NOTE — TELEPHONE ENCOUNTER
Pt called asking for a call back from Claudia Rosas. She stated that \"something is going on with my son. Something is off.\" Pt sounded short of breath and seemed anxious for a call back right away.

## 2024-11-12 NOTE — ED NOTES
IV placed  EKG obtained and given to ED MD   Blood sent to lab at this time      Isreal Washington RN  11/11/24 [id number]

## 2024-11-12 NOTE — TELEPHONE ENCOUNTER
Pt called to speak to Sabrina in . During that conversation, she was asked about the forms for [employer]. She stated that she called and talked to them and they are mailing the forms that need to be filled out to her. She will bring them to the office and drop them off once she has them.

## 2024-11-12 NOTE — ED NOTES
Patient prepared for and ready to be discharged. Patient discharged at this time in no acute distress after verbalizing understanding of discharge instructions. Patient left after receiving After Visit Summary instructions.        Isreal Washington RN  11/11/24 [id number]

## 2024-11-12 NOTE — ED PROVIDER NOTES
THE [facility]  EMERGENCY DEPARTMENT ENCOUNTER          EM RESIDENT NOTE       Date of evaluation: 11/11/2024    Chief Complaint     Shortness of Breath      History of Present Illness     Terri Shankar is a 64 y.o. female with pmhx hypertension, hyperlipidemia, prior left basal ganglia stroke in 12/22, bipolar disorder, depression and obesity who presents with shortness of breath, right shoulder pain.  Patient reports that she is concerned that she has \"mold poisoning\" stating that she is looked in her health for several years and just noticed some mold in it today.  She states she looked up on the Internet to the effects of mold and is concerned that she may be having a reaction to mold in her house or that is affecting her lungs.  Patient reports shortness of breath for over a week but she is unsure of exactly how long.  Patient states that she is cannot someone come in and get the mold at her house tomorrow for wanted to \"get checked\".  She notes some mild chest tightness associated with her shortness of breath.  Denies any leg swelling, fevers, chills, cough, congestion, rhinorrhea.    Patient separately reports some right shoulder pain noting worsening of pain with any movement or ranging of the shoulder.  She states that she was trying really hard to open a window earlier today and feels she may have pulled something in her shoulder.  She has not tried any medications for pain.  She denies any numbness or tingling in the arm and further denies any weakness.    MEDICAL DECISION MAKING / ASSESSMENT / PLAN     INITIAL VITALS: BP: (!) 169/87, Temp: 97.6 °F (36.4 °C), Pulse: 80, Respirations: 18, SpO2: 97 %    Terri Shankar is a 64 y.o. female with pmhx hypertension, hyperlipidemia, prior left basal ganglia stroke in 12/22, bipolar disorder, depression and obesity who presents with shortness of breath, right shoulder pain.  Patient with reassuring physical exam, hemodynamically stable on my assessment.

## 2024-11-13 ENCOUNTER — CARE COORDINATION (OUTPATIENT)
Dept: CARE COORDINATION | Age: 64
End: 2024-11-13

## 2024-11-13 RX ORDER — ATORVASTATIN CALCIUM 40 MG/1
40 TABLET, FILM COATED ORAL NIGHTLY
Qty: 30 TABLET | Refills: 5 | Status: SHIPPED | OUTPATIENT
Start: 2024-11-13

## 2024-11-13 NOTE — CARE COORDINATION
Ambulatory Care Coordination Note     11/13/2024 8:55 AM     Patient outreach attempt by this ACM today to offer care management services. ACM was unable to reach the patient by telephone today; left voice message requesting a return phone call to this ACM.     ACM: Tsering Webster, RN     Care Summary Note: E/D visit at [facility] on 11/11/24 for SOB and R shoulder strain. Program identified, ACM added to Tx team. UTR, LVM X1    PCP/Specialist follow up:       Follow Up:   Plan for next ACM outreach in approximately 1 week to complete:  - outreach attempt to offer care management services.       Tsering ARNOLD, RN  Respecting Choices® Advanced Steps ACP Facilitator  Ambulatory Care Manager  [facility]  [phone]

## 2024-11-26 RX ORDER — TRIAMCINOLONE ACETONIDE 0.25 MG/G
OINTMENT TOPICAL
Qty: 15 G | Refills: 0 | Status: SHIPPED | OUTPATIENT
Start: 2024-11-26

## 2024-12-02 ENCOUNTER — TELEPHONE (OUTPATIENT)
Dept: FAMILY MEDICINE CLINIC | Age: 64
End: 2024-12-02

## 2024-12-02 RX ORDER — TRIAMCINOLONE ACETONIDE 0.25 MG/G
OINTMENT TOPICAL
Qty: 80 G | Refills: 0 | Status: SHIPPED | OUTPATIENT
Start: 2024-12-02

## 2024-12-02 NOTE — TELEPHONE ENCOUNTER
Patient contacted the office she was prescribed Kenalog cream and states that it is all gone, that is was a tiny tube. Patient is req a bigger tube or quantity of this cream, any questions [phone]    Maddy Seo

## 2024-12-02 NOTE — TELEPHONE ENCOUNTER
Last Office Visit  -  11/11/24  Next Office Visit  -  n/a    Last Filled  -  11/26/24  Last UDS -    Contract -      Patient is req a bigger tube or quantity of this cream

## 2024-12-02 NOTE — TELEPHONE ENCOUNTER
Please inform patient she does not want to use this everyday all the time. Steroid creams can thin the skin. It should be uses sparingly and if she has ongoing skin concerns she needs to follow up with her dermatologist. I sent in a little more this time but she should see dermatology.

## 2024-12-05 ENCOUNTER — CARE COORDINATION (OUTPATIENT)
Dept: CARE COORDINATION | Age: 64
End: 2024-12-05

## 2024-12-05 NOTE — CARE COORDINATION
Ambulatory Care Coordination Note     12/5/2024 10:05 AM     patient outreach attempt by this ACM today to offer care management services. ACM was unable to reach the patient by telephone today;   left voice message requesting a return phone call to this ACM.     Patient closed (unable to reach patient) from the High Risk Care Management program on 12/5/2024.   No further Ambulatory Care Manager follow up scheduled.     Tsering ARNOLD, RN  Respecting Choices® Advanced Steps ACP Facilitator  Ambulatory Care Manager  [facility]  [phone]

## 2024-12-09 ENCOUNTER — TELEPHONE (OUTPATIENT)
Dept: FAMILY MEDICINE CLINIC | Age: 64
End: 2024-12-09

## 2024-12-09 ENCOUNTER — TELEMEDICINE (OUTPATIENT)
Dept: FAMILY MEDICINE CLINIC | Age: 64
End: 2024-12-09
Payer: COMMERCIAL

## 2024-12-09 DIAGNOSIS — J34.89 SORE IN NOSE: ICD-10-CM

## 2024-12-09 DIAGNOSIS — L28.1 PRURIGO NODULARIS: Primary | ICD-10-CM

## 2024-12-09 PROCEDURE — G8427 DOCREV CUR MEDS BY ELIG CLIN: HCPCS | Performed by: NURSE PRACTITIONER

## 2024-12-09 PROCEDURE — 99213 OFFICE O/P EST LOW 20 MIN: CPT | Performed by: NURSE PRACTITIONER

## 2024-12-09 PROCEDURE — 3017F COLORECTAL CA SCREEN DOC REV: CPT | Performed by: NURSE PRACTITIONER

## 2024-12-09 RX ORDER — ONDANSETRON 4 MG/1
4 TABLET, FILM COATED ORAL 3 TIMES DAILY PRN
Qty: 30 TABLET | Refills: 0 | Status: SHIPPED | OUTPATIENT
Start: 2024-12-09

## 2024-12-09 RX ORDER — MUPIROCIN 20 MG/G
OINTMENT TOPICAL
Qty: 15 G | Refills: 0 | Status: SHIPPED | OUTPATIENT
Start: 2024-12-09 | End: 2024-12-16

## 2024-12-09 RX ORDER — CLOBETASOL PROPIONATE 0.5 MG/G
CREAM TOPICAL
Qty: 30 G | Refills: 0 | Status: SHIPPED | OUTPATIENT
Start: 2024-12-09

## 2024-12-09 RX ORDER — LIDOCAINE 50 MG/G
1 PATCH TOPICAL DAILY
Qty: 10 PATCH | Refills: 0 | Status: SHIPPED | OUTPATIENT
Start: 2024-12-09 | End: 2024-12-19

## 2024-12-09 NOTE — PROGRESS NOTES
Terri Shankar (:  1960) is a Established patient, presenting virtually for evaluation of the following:    Assessment & Plan   Below is the assessment and plan developed based on review of pertinent history, physical exam, labs, studies, and medications.  Assessment & Plan  Prurigo nodularis   Continue with clobetasol cream. Can try applying lidocaine patch to more severe areas. Tylenol is okay as well.   Zofran sent in for nausea- likely related to pain.          Sore in nose    Apply bactroban ointment and follow up if no improvement.                    Subjective   HPI  Patient presents today with concerns of a flare up of her prurigo nodularis. She states her dermatologist said to call here for pain control. She has been applying some clobetasol cream with a little improvement but the pain is also causing nausea.  She also has a sore in her nose. She states it does feel like it has gotten bigger and is sore.     Review of Systems   Constitutional: Negative.    HENT: Negative.     Gastrointestinal:  Positive for nausea.   Skin:  Positive for rash.   Neurological: Negative.  Negative for dizziness and headaches.   Psychiatric/Behavioral: Negative.            Objective   Patient-Reported Vitals  Patient-Reported Weight: 233 lbs  Patient-Reported Height: 5'4\"       Physical Exam  [INSTRUCTIONS:  \"[x]\" Indicates a positive item  \"[]\" Indicates a negative item  -- DELETE ALL ITEMS NOT EXAMINED]    Constitutional: [x] Appears well-developed and well-nourished [x] No apparent distress      [] Abnormal -     Mental status: [x] Alert and awake  [x] Oriented to person/place/time [x] Able to follow commands    [] Abnormal -     Eyes:   EOM    [x]  Normal    [] Abnormal -   Sclera  [x]  Normal    [] Abnormal -          Discharge [x]  None visible   [] Abnormal -     HENT: [x] Normocephalic, atraumatic  [] Abnormal -   [x] Mouth/Throat: Mucous membranes are moist    External Ears [x] Normal  [] Abnormal -    Neck: [x]

## 2024-12-09 NOTE — TELEPHONE ENCOUNTER
Pt went to her Dermatologist and they told her to call her PCP, pt is asking for anti nausea med and something stronger then tylenol for the pain. Pt said that there was a pain patch that EG gave her a while back ? Also pt wanted to let EG know that her  is in the ICU at

## 2024-12-10 ENCOUNTER — TELEPHONE (OUTPATIENT)
Dept: ADMINISTRATIVE | Age: 64
End: 2024-12-10

## 2024-12-10 ASSESSMENT — ENCOUNTER SYMPTOMS: NAUSEA: 1

## 2024-12-10 NOTE — TELEPHONE ENCOUNTER
Submitted PA for Lidocaine 5% patches   Via [facility] Key: [id number]  STATUS: PENDING.    Follow up done daily; if no decision with in three days we will refax.  If another three days goes by with no decision will call the insurance for status.

## 2024-12-10 NOTE — TELEPHONE ENCOUNTER
Spoke wit Pt, she is requesting a PA instead of OTC, she stated her money is really tight right now.

## 2024-12-10 NOTE — TELEPHONE ENCOUNTER
I am processing PA for Lidocaine 5% patches, Key: [id number] could I please get a diagnosis code for this medication.     Thank you

## 2024-12-16 ENCOUNTER — TELEPHONE (OUTPATIENT)
Dept: FAMILY MEDICINE CLINIC | Age: 64
End: 2024-12-16

## 2024-12-16 RX ORDER — POLYETHYLENE GLYCOL 3350 17 G/17G
17 POWDER, FOR SOLUTION ORAL DAILY
Qty: 1530 G | Refills: 1 | Status: SHIPPED | OUTPATIENT
Start: 2024-12-16 | End: 2025-01-15

## 2024-12-16 NOTE — TELEPHONE ENCOUNTER
This was submitted to the wrong plan initially.   Submitted PA for Lidocaine 5% patches   Via [facility] Key: [id number] STATUS: PENDING.    Follow up done daily; if no decision with in three days we will refax.  If another three days goes by with no decision will call the insurance for status.

## 2024-12-16 NOTE — TELEPHONE ENCOUNTER
Pt called office to see what provider would recommend to take to have regular bowel movements. She has taken centricel and has gotten raisin bread to try. Please call pt to advise

## 2024-12-18 NOTE — TELEPHONE ENCOUNTER
[address] [facility]  Progress Note - Cipriano Berry 6/28/1925, 82 y o  male MRN: [id number]  Unit/Bed#: -01 Encounter: [id number]  Primary Care Provider: Juliana Ferreira MD   Date and time admitted to hospital: 12/11/2022  3:36 PM      DOS: 12/12/2022  * Displacement of artificial urinary sphincter ([address] )  Assessment & Plan  · Patient with h/o prostate cancer s/p prostatectomy, resulting in urinary incontinence, s/p artiricial urinary sphincter placement 20 years ago  · Patient presented to ED with c/o hematuria and wire protruding out of his scrotum  CT scan reveals: Right inguinal implant with leads extending into the right inguinal canal and subsequently into the scrotum, distal aspect of the device is external to the scrotum  · Transferred from [facility] to [facility] for urologic procedure  · Urology following,  · Plan for removal of the artifical sphincter tomorrow  · Cardiology consulted for clearance   · Continue IV ceftriaxone in setting of (+) UA, follow up urine culture results   · Hold Eliquis     Type 2 diabetes mellitus, without long-term current use of insulin [facility])  Assessment & Plan  No results found for: HGBA1C    Recent Labs     12/11/22  1619 12/11/22  2229 12/12/22  0556 12/12/22  1032   POCGLU 156* 142* 136 224*       Blood Sugar Average: Last 72 hrs:  (P) 180   · Pt maintained on Onglyza 2 5 mg daily as an outpatient, on hold here  · Continue SSI coverage while inpatient   · QID glucose checks  · Check hgbA1c  · Monitor and adjust regimen as needed    Acute cystitis with hematuria  Assessment & Plan  · UA positive for leukocyte, bacteria, pending final urine culture  · Continue IV rocephin for now pending final results and sensitivities   · Monitor, appreciate urology recommendations    Hemoptysis  Assessment & Plan  · Patient complained of one isolated incident of blood tinged sputum, which has resolved    · CXR negative  · Check procal  · Continue Mike from [facility] called to say they did not ever receive a PA for patients medication. They would like for this to be refaxed to them to run it through. Their fax number is [phone].  Looks like [facility] is patient's secondary insurance.    abx as above for UTI  · Continue to hold Eliquis as above    Diastolic dysfunction  Assessment & Plan  · No recent echocardiogram noted  · Obtain ECHO here, follow up results   · Currently appears euvolemic  · Maintained on lasix 20 mg daily, continue   · Appreciate cardiology recommendations    Depression  Assessment & Plan  · Mood currently stable   · Continue Prozac 40 mg daily and PRN Xanax  · Monitor     CAD (coronary artery disease)  Assessment & Plan  · Denies any chest pain or shortness of breath   · Continue statin   · Cardiology consulted for pre-operative clearance  · ECHO ordered, follow up results     Myasthenia gravis ([facility] [address] )  Assessment & Plan  · Continue pyridostigmine 60 mg QID  · Currently stable     Chronic a-fib (HCC)  Assessment & Plan  · Continue rate control with amiodarone 200 mg daily   · Eliquis currently on hold in setting of hematuria and preparation for urologic procedure/surgery tomorrow  · Monitor     HTN  · BP noted to be elevated throughout hospitalization   · Currently on imdur 60 mg daily and lasix 20 mg daily, continue  · Add PRN hydralazine for SBP > 180  · Unable to add BB in setting of baseline bradycardia  · Appreciate cardiology recommendations     VTE Pharmacologic Prophylaxis:   Moderate Risk (Score 3-4) - Pharmacological DVT Prophylaxis Contraindicated  Sequential Compression Devices Ordered  Patient Centered Rounds: I evaluated the patient without nursing staff present due to speaking to nurse outside patient's room   Discussions with Specialists or Other Care Team Provider: Discussed with urology, RN, CM and reviewed previous notes     Education and Discussions with Family / Patient: Updated  (daughter) via phone  Time Spent for Care: 30 minutes  More than 50% of total time spent on counseling and coordination of care as described above      Current Length of Stay: 1 day(s)  Current Patient Status: Inpatient   Certification Statement: The patient will continue to require additional inpatient hospital stay due to plan for urologic procedure tomorrow for artificial urinary sphincter removal, IV abx, UTI  Discharge Plan: Anticipate discharge in >72 hrs to discharge location to be determined pending rehab evaluations  Code Status: Level 1 - Full Code    Subjective:   Pt reports that he is doing well today  He denies any pain, notes that the external urinary catheter is leaking  Reports appetite is fair, reports he hopes he does not need surgery  Objective:     Vitals:   Temp (24hrs), Av °F (37 2 °C), Min:98 2 °F (36 8 °C), Max:100 °F (37 8 °C)    Temp:  [98 2 °F (36 8 °C)-100 °F (37 8 °C)] 98 9 °F (37 2 °C)  HR:  [54-62] 59  Resp:  [16] 16  BP: (161-186)/(72-80) 176/74  SpO2:  [96 %-97 %] 96 %  Body mass index is 21 29 kg/m²  Input and Output Summary (last 24 hours):   No intake or output data in the 24 hours ending 22 1151    Physical Exam:   Physical Exam  Vitals reviewed  Constitutional:       General: He is not in acute distress  Appearance: He is not toxic-appearing  Comments: Pt is in no acute distress lying in his hospital bed resting comfortably    HENT:      Head: Normocephalic and atraumatic  Cardiovascular:      Rate and Rhythm: Normal rate and regular rhythm  Pulses: Normal pulses  Pulmonary:      Effort: Pulmonary effort is normal  No respiratory distress  Breath sounds: No wheezing  Abdominal:      General: Bowel sounds are normal  There is no distension  Palpations: Abdomen is soft  Tenderness: There is no abdominal tenderness  Genitourinary:     Comments: External urinary catheter in place with clear yellow urine  Skin:     General: Skin is warm and dry  Neurological:      Mental Status: He is alert     Psychiatric:         Mood and Affect: Mood normal           Additional Data:     Labs:  Results from last 7 days   Lab Units 22  0448 22  1137   WBC Thousand/uL 7 61 6 20 HEMOGLOBIN g/dL 12 0 12 1   HEMATOCRIT % 36 6 37 7   PLATELETS Thousands/uL 179 176   NEUTROS PCT %  --  72   LYMPHS PCT %  --  17   MONOS PCT %  --  8   EOS PCT %  --  1     Results from last 7 days   Lab Units 12/12/22  0448 12/11/22  1137   SODIUM mmol/L 136 138   POTASSIUM mmol/L 4 2 4 7   CHLORIDE mmol/L 107 106   CO2 mmol/L 26 27   BUN mg/dL 19 24   CREATININE mg/dL 1 19 1 22   ANION GAP mmol/L 3* 5   CALCIUM mg/dL 8 7 8 9   ALBUMIN g/dL  --  3 5   TOTAL BILIRUBIN mg/dL  --  0 48   ALK PHOS U/L  --  65   ALT U/L  --  20   AST U/L  --  28   GLUCOSE RANDOM mg/dL 148* 163*     Results from last 7 days   Lab Units 12/11/22  1137   INR  1 32*     Results from last 7 days   Lab Units 12/12/22  1032 12/12/22  0556 12/11/22  2229 12/11/22  1619   POC GLUCOSE mg/dl 224* 136 142* 156*         Results from last 7 days   Lab Units 12/12/22  0448   PROCALCITONIN ng/ml 0 06       Lines/Drains:  Invasive Devices     Peripheral Intravenous Line  Duration           Peripheral IV 12/11/22 Right Antecubital 1 day                      Imaging: Reviewed radiology reports from this admission including: abdominal/pelvic CT    Recent Cultures (last 7 days):         Last 24 Hours Medication List:   Current Facility-Administered Medications   Medication Dose Route Frequency Provider Last Rate   • acetaminophen  650 mg Oral Q6H PRN Waldemar Dockery MD     • ALPRAZolam  0 25 mg Oral BID PRN Waldemar Dockery MD     • amiodarone  200 mg Oral Daily Waldemar Dockery MD     • cefTRIAXone  1,000 mg Intravenous Q24H Waldemar Dockery MD     • dicyclomine  20 mg Oral BID PRN Waldemar Dockery MD     • famotidine  20 mg Oral BID Waldemar Dockery MD     • FLUoxetine  40 mg Oral Daily Waldemar Dockery MD     • furosemide  20 mg Oral Daily Waldemar Dockery MD     • insulin lispro  1-5 Units Subcutaneous HS Waldemar Dockery MD     • insulin lispro  1-6 Units Subcutaneous TID Rubi Mathew MD     • isosorbide mononitrate  60 mg Oral Daily Waldemar Dockery MD     • levothyroxine  88 mcg Oral Early Morning Micheal Harden MD     • ondansetron  4 mg Intravenous Q6H PRN Micheal Harden MD     • pantoprazole  20 mg Oral BID Micheal Harden MD     • pravastatin  20 mg Oral Daily Micheal Harden MD     • pyridostigmine  60 mg Oral 4x Daily Micheal Harden MD     • sucralfate  1 g Oral 4x Daily (AC & HS) Micheal Harden MD          Today, Patient Was Seen By: Renzo Vazquez PA-C    **Please Note: This note may have been constructed using a voice recognition system  **

## 2025-01-14 ENCOUNTER — APPOINTMENT (OUTPATIENT)
Dept: GENERAL RADIOLOGY | Age: 65
DRG: 069 | End: 2025-01-14
Payer: COMMERCIAL

## 2025-01-14 ENCOUNTER — HOSPITAL ENCOUNTER (INPATIENT)
Age: 65
LOS: 1 days | Discharge: LEFT BEFORE DISCHARGE | DRG: 069 | End: 2025-01-14
Attending: EMERGENCY MEDICINE | Admitting: STUDENT IN AN ORGANIZED HEALTH CARE EDUCATION/TRAINING PROGRAM
Payer: COMMERCIAL

## 2025-01-14 ENCOUNTER — APPOINTMENT (OUTPATIENT)
Dept: CT IMAGING | Age: 65
DRG: 069 | End: 2025-01-14
Payer: COMMERCIAL

## 2025-01-14 VITALS
WEIGHT: 254 LBS | HEART RATE: 68 BPM | OXYGEN SATURATION: 95 % | HEIGHT: 64 IN | TEMPERATURE: 98.3 F | BODY MASS INDEX: 43.36 KG/M2 | RESPIRATION RATE: 21 BRPM | SYSTOLIC BLOOD PRESSURE: 116 MMHG | DIASTOLIC BLOOD PRESSURE: 87 MMHG

## 2025-01-14 DIAGNOSIS — I63.9 CEREBROVASCULAR ACCIDENT (CVA), UNSPECIFIED MECHANISM (HCC): ICD-10-CM

## 2025-01-14 DIAGNOSIS — R07.9 CHEST PAIN, UNSPECIFIED TYPE: Primary | ICD-10-CM

## 2025-01-14 DIAGNOSIS — R42 VERTIGO: ICD-10-CM

## 2025-01-14 LAB
ALBUMIN SERPL-MCNC: 3.8 G/DL (ref 3.4–5)
ALBUMIN/GLOB SERPL: 1.2 {RATIO} (ref 1.1–2.2)
ALP SERPL-CCNC: 83 U/L (ref 40–129)
ALT SERPL-CCNC: 62 U/L (ref 10–40)
ANION GAP SERPL CALCULATED.3IONS-SCNC: 10 MMOL/L (ref 3–16)
ANION GAP SERPL CALCULATED.3IONS-SCNC: 12 MMOL/L (ref 3–16)
AST SERPL-CCNC: 34 U/L (ref 15–37)
BASOPHILS # BLD: 0.1 K/UL (ref 0–0.2)
BASOPHILS NFR BLD: 0.8 %
BILIRUB SERPL-MCNC: 0.3 MG/DL (ref 0–1)
BUN SERPL-MCNC: 22 MG/DL (ref 7–20)
BUN SERPL-MCNC: 22 MG/DL (ref 7–20)
CALCIUM SERPL-MCNC: 9.4 MG/DL (ref 8.3–10.6)
CALCIUM SERPL-MCNC: 9.5 MG/DL (ref 8.3–10.6)
CHLORIDE SERPL-SCNC: 104 MMOL/L (ref 99–110)
CHLORIDE SERPL-SCNC: 105 MMOL/L (ref 99–110)
CO2 SERPL-SCNC: 22 MMOL/L (ref 21–32)
CO2 SERPL-SCNC: 24 MMOL/L (ref 21–32)
CREAT SERPL-MCNC: 0.9 MG/DL (ref 0.6–1.2)
CREAT SERPL-MCNC: 0.9 MG/DL (ref 0.6–1.2)
DEPRECATED RDW RBC AUTO: 14.8 % (ref 12.4–15.4)
EKG ATRIAL RATE: 73 BPM
EKG DIAGNOSIS: NORMAL
EKG P AXIS: 53 DEGREES
EKG P-R INTERVAL: 164 MS
EKG Q-T INTERVAL: 390 MS
EKG QRS DURATION: 82 MS
EKG QTC CALCULATION (BAZETT): 429 MS
EKG R AXIS: 23 DEGREES
EKG T AXIS: 7 DEGREES
EKG VENTRICULAR RATE: 73 BPM
EOSINOPHIL # BLD: 0.2 K/UL (ref 0–0.6)
EOSINOPHIL NFR BLD: 2.6 %
GFR SERPLBLD CREATININE-BSD FMLA CKD-EPI: 72 ML/MIN/{1.73_M2}
GFR SERPLBLD CREATININE-BSD FMLA CKD-EPI: 72 ML/MIN/{1.73_M2}
GLUCOSE SERPL-MCNC: 101 MG/DL (ref 70–99)
GLUCOSE SERPL-MCNC: 102 MG/DL (ref 70–99)
HCT VFR BLD AUTO: 43 % (ref 36–48)
HGB BLD-MCNC: 14.3 G/DL (ref 12–16)
INR PPP: 0.93 (ref 0.85–1.15)
LYMPHOCYTES # BLD: 2.2 K/UL (ref 1–5.1)
LYMPHOCYTES NFR BLD: 30.6 %
MCH RBC QN AUTO: 28.4 PG (ref 26–34)
MCHC RBC AUTO-ENTMCNC: 33.2 G/DL (ref 31–36)
MCV RBC AUTO: 85.5 FL (ref 80–100)
MONOCYTES # BLD: 0.6 K/UL (ref 0–1.3)
MONOCYTES NFR BLD: 7.8 %
NEUTROPHILS # BLD: 4.1 K/UL (ref 1.7–7.7)
NEUTROPHILS NFR BLD: 58.2 %
PLATELET # BLD AUTO: 247 K/UL (ref 135–450)
PMV BLD AUTO: 7.8 FL (ref 5–10.5)
POTASSIUM SERPL-SCNC: 4.3 MMOL/L (ref 3.5–5.1)
POTASSIUM SERPL-SCNC: 4.3 MMOL/L (ref 3.5–5.1)
PROT SERPL-MCNC: 7 G/DL (ref 6.4–8.2)
PROTHROMBIN TIME: 12.7 SEC (ref 11.9–14.9)
RBC # BLD AUTO: 5.02 M/UL (ref 4–5.2)
SODIUM SERPL-SCNC: 138 MMOL/L (ref 136–145)
SODIUM SERPL-SCNC: 139 MMOL/L (ref 136–145)
TROPONIN, HIGH SENSITIVITY: 7 NG/L (ref 0–14)
WBC # BLD AUTO: 7.1 K/UL (ref 4–11)

## 2025-01-14 PROCEDURE — 84484 ASSAY OF TROPONIN QUANT: CPT

## 2025-01-14 PROCEDURE — 2500000003 HC RX 250 WO HCPCS: Performed by: EMERGENCY MEDICINE

## 2025-01-14 PROCEDURE — G0378 HOSPITAL OBSERVATION PER HR: HCPCS

## 2025-01-14 PROCEDURE — 80053 COMPREHEN METABOLIC PANEL: CPT

## 2025-01-14 PROCEDURE — 36415 COLL VENOUS BLD VENIPUNCTURE: CPT

## 2025-01-14 PROCEDURE — 85025 COMPLETE CBC W/AUTO DIFF WBC: CPT

## 2025-01-14 PROCEDURE — 6360000004 HC RX CONTRAST MEDICATION: Performed by: EMERGENCY MEDICINE

## 2025-01-14 PROCEDURE — 93005 ELECTROCARDIOGRAM TRACING: CPT | Performed by: EMERGENCY MEDICINE

## 2025-01-14 PROCEDURE — 70498 CT ANGIOGRAPHY NECK: CPT

## 2025-01-14 PROCEDURE — 1200000000 HC SEMI PRIVATE

## 2025-01-14 PROCEDURE — 70450 CT HEAD/BRAIN W/O DYE: CPT

## 2025-01-14 PROCEDURE — 96375 TX/PRO/DX INJ NEW DRUG ADDON: CPT

## 2025-01-14 PROCEDURE — 6360000002 HC RX W HCPCS: Performed by: EMERGENCY MEDICINE

## 2025-01-14 PROCEDURE — 99285 EMERGENCY DEPT VISIT HI MDM: CPT

## 2025-01-14 PROCEDURE — 96374 THER/PROPH/DIAG INJ IV PUSH: CPT

## 2025-01-14 PROCEDURE — 71045 X-RAY EXAM CHEST 1 VIEW: CPT

## 2025-01-14 PROCEDURE — 85610 PROTHROMBIN TIME: CPT

## 2025-01-14 RX ORDER — POLYETHYLENE GLYCOL 3350 17 G/17G
17 POWDER, FOR SOLUTION ORAL DAILY PRN
Status: CANCELLED | OUTPATIENT
Start: 2025-01-14

## 2025-01-14 RX ORDER — ENOXAPARIN SODIUM 100 MG/ML
30 INJECTION SUBCUTANEOUS 2 TIMES DAILY
Status: CANCELLED | OUTPATIENT
Start: 2025-01-14

## 2025-01-14 RX ORDER — SODIUM CHLORIDE 9 MG/ML
INJECTION, SOLUTION INTRAVENOUS PRN
Status: CANCELLED | OUTPATIENT
Start: 2025-01-14

## 2025-01-14 RX ORDER — ONDANSETRON 2 MG/ML
4 INJECTION INTRAMUSCULAR; INTRAVENOUS EVERY 6 HOURS PRN
Status: CANCELLED | OUTPATIENT
Start: 2025-01-14

## 2025-01-14 RX ORDER — SODIUM CHLORIDE 0.9 % (FLUSH) 0.9 %
5-40 SYRINGE (ML) INJECTION EVERY 12 HOURS SCHEDULED
Status: CANCELLED | OUTPATIENT
Start: 2025-01-14

## 2025-01-14 RX ORDER — ASPIRIN 300 MG/1
300 SUPPOSITORY RECTAL DAILY
Status: CANCELLED | OUTPATIENT
Start: 2025-01-14

## 2025-01-14 RX ORDER — ONDANSETRON 2 MG/ML
4 INJECTION INTRAMUSCULAR; INTRAVENOUS EVERY 6 HOURS PRN
Status: DISCONTINUED | OUTPATIENT
Start: 2025-01-14 | End: 2025-01-14

## 2025-01-14 RX ORDER — ASPIRIN 81 MG/1
81 TABLET, CHEWABLE ORAL DAILY
Status: CANCELLED | OUTPATIENT
Start: 2025-01-14

## 2025-01-14 RX ORDER — ATORVASTATIN CALCIUM 40 MG/1
80 TABLET, FILM COATED ORAL NIGHTLY
Status: CANCELLED | OUTPATIENT
Start: 2025-01-14

## 2025-01-14 RX ORDER — IOPAMIDOL 755 MG/ML
75 INJECTION, SOLUTION INTRAVASCULAR
Status: COMPLETED | OUTPATIENT
Start: 2025-01-14 | End: 2025-01-14

## 2025-01-14 RX ORDER — SODIUM CHLORIDE 0.9 % (FLUSH) 0.9 %
5-40 SYRINGE (ML) INJECTION PRN
Status: CANCELLED | OUTPATIENT
Start: 2025-01-14

## 2025-01-14 RX ORDER — ONDANSETRON 4 MG/1
4 TABLET, ORALLY DISINTEGRATING ORAL EVERY 8 HOURS PRN
Status: CANCELLED | OUTPATIENT
Start: 2025-01-14

## 2025-01-14 RX ADMIN — ONDANSETRON 4 MG: 2 INJECTION, SOLUTION INTRAMUSCULAR; INTRAVENOUS at 07:10

## 2025-01-14 RX ADMIN — WATER 125 MG: 1 INJECTION INTRAMUSCULAR; INTRAVENOUS; SUBCUTANEOUS at 07:07

## 2025-01-14 RX ADMIN — IOPAMIDOL 75 ML: 755 INJECTION, SOLUTION INTRAVENOUS at 07:23

## 2025-01-14 ASSESSMENT — PAIN - FUNCTIONAL ASSESSMENT: PAIN_FUNCTIONAL_ASSESSMENT: 0-10

## 2025-01-14 ASSESSMENT — PAIN SCALES - GENERAL: PAINLEVEL_OUTOF10: 10

## 2025-01-14 ASSESSMENT — ENCOUNTER SYMPTOMS
SHORTNESS OF BREATH: 1
EYES NEGATIVE: 1

## 2025-01-14 ASSESSMENT — PAIN DESCRIPTION - LOCATION: LOCATION: CHEST

## 2025-01-14 ASSESSMENT — PAIN DESCRIPTION - ORIENTATION: ORIENTATION: LEFT;MID

## 2025-01-14 ASSESSMENT — PAIN DESCRIPTION - DESCRIPTORS: DESCRIPTORS: PRESSURE;HEAVINESS

## 2025-01-14 NOTE — DISCHARGE SUMMARY
Patient was seen and evaluated in the ER.  Patient decided to leave AMA.  Please see H&P for details.    Socorro Calixto MD  [facility] Medicine  [facility]

## 2025-01-14 NOTE — H&P
V2.0  History and Physical      Name:  Terri Shankar /Age/Sex: 1960  (64 y.o. female)   MRN & CSN:  [id number] & [id number] Encounter Date/Time: 2025 8:48 AM EST   Location:  [facility] PCP: Marcia Abdi APRN - CNP       Hospital Day: 1    Assessment and Plan:   64-year-old female with a past medical history of anxiety and bipolar disorder, morbid obesity, hyperlipidemia, history of TIA and lacunar strokes, who presented to the ER with complaints of chest pain and dizziness.  Reportedly patient went to bed at 8 PM the night before, woke up at 4 AM with vertiginous symptoms.  Patient reported nausea without emesis.  In the ER patient was hemodynamically stable.  CBC BMP were unremarkable.  Chest x-ray normal.  Patient had an allergic reaction to contrast in the past and received Solu-Medrol.  CT head and CT angio head and neck were negative for LVO and acute pathology.  EKG performed showed normal sinus rhythm.  High-sensitivity troponin was 7.  Stroke team was contacted, patient declined closed MRI due to anxiety.  Patient also declined thrombolytics since she could not get a closed MRI.  Patient was admitted for further workup and evaluation.    Hospital Problems             Last Modified POA    * (Principal) TIA (transient ischemic attack) 2025 Yes     TIA, posterior circulation stroke?  Peripheral vertigo?,  History of TIA and lacunar strokes  Chest pain, hyperlipidemia  Morbid obesity  Bipolar depression    Plan:  *CT head and CT angio head and neck were negative for LVO and acute pathology.  Reports some contrast intolerance requiring Solu-Medrol  N.p.o., bedside speech evaluation before initiating diet  Every 4 hours neurochecks  Permissive hypertension for 24 hours  Aspirin  High intensity statin  MRI brain without contrast under anesthesia  A1c  Lipid panel  Neurology consult  PT OT evaluation    *Presented with complaints of chest pain  EKG showed normal sinus rhythm without acute

## 2025-01-14 NOTE — ED NOTES
Patient admitted from the ED to inpatient. Patient informed ED RN that she had an emergency at home that she needed to attend to and wished to leave. Inpatient MD notified to come and assess patient and discharge from the ED. Inpatient MD discussed with the patient about leaving against medical advice. Patient's IV removed before discharge, ride home called, patient put into a wheel chair and placed in ride home.     Miah Hampton, RN  01/14/25 [id number]

## 2025-01-14 NOTE — ED PROVIDER NOTES
current alcohol use. She reports that she does not use drugs.    Medications     Previous Medications    ACETAMINOPHEN (TYLENOL) 500 MG TABLET    Take 2 tablets by mouth every 6 hours as needed for Pain    ALBUTEROL SULFATE HFA (VENTOLIN HFA) 108 (90 BASE) MCG/ACT INHALER    Inhale 2 puffs into the lungs 4 times daily as needed for Wheezing    ARIPIPRAZOLE (ABILIFY) 20 MG TABLET    Take 1 tablet by mouth nightly    ASPIRIN 81 MG EC TABLET    Take 1 tablet by mouth daily    ATORVASTATIN (LIPITOR) 40 MG TABLET    Take 1 tablet by mouth at bedtime    CLOBETASOL (TEMOVATE) 0.05 % CREAM    Apply topically 2 times daily.    DUPIXENT 300 MG/2ML SOPN INJECTION        NYSTATIN (MYCOSTATIN) 495358 UNIT/GM CREAM    Apply topically 2 times daily.    NYSTATIN (MYCOSTATIN) 198303 UNIT/GM POWDER    Apply 3 times daily.    NYSTATIN (MYCOSTATIN) 772468 UNIT/GM POWDER    Apply 3 times daily.    ONDANSETRON (ZOFRAN) 4 MG TABLET    Take 1 tablet by mouth 3 times daily as needed for Nausea or Vomiting    PHENYLEPHRINE-COCOA BUTTER (PREPARATION H) 0.25-88.44 %    Place 1 suppository rectally as needed for Hemorrhoids    POLYETHYLENE GLYCOL (GLYCOLAX) 17 GM/SCOOP POWDER    Take 17 g by mouth daily    POLYETHYLENE GLYCOL (GLYCOLAX) 17 GM/SCOOP POWDER    Take 17 g by mouth daily    TRIAMCINOLONE (KENALOG) 0.025 % OINTMENT    Apply topically 2 times daily to bite area until rash resolved    TRIAMCINOLONE (KENALOG) 0.1 % CREAM    Apply topically 2 times daily.       Allergies     She is allergic to bactrim [sulfamethoxazole-trimethoprim], augmentin [amoxicillin-pot clavulanate], diphenhydramine, and macrobid [nitrofurantoin].    Physical Exam     INITIAL VITALS: BP: 137/80, Temp: 98.3 °F (36.8 °C), Pulse: 78, Respirations: 19, SpO2: 97 %   Physical Exam  Vitals and nursing note reviewed.   Constitutional:       General: She is not in acute distress.     Appearance: Normal appearance. She is normal weight.   HENT:      Head: Normocephalic.

## 2025-01-15 ENCOUNTER — CARE COORDINATION (OUTPATIENT)
Dept: CASE MANAGEMENT | Age: 65
End: 2025-01-15

## 2025-01-15 DIAGNOSIS — G45.9 TIA (TRANSIENT ISCHEMIC ATTACK): Primary | ICD-10-CM

## 2025-01-15 NOTE — CARE COORDINATION
Care Transitions Note    Initial Call - Call within 2 business days of discharge: Yes    Patient Current Location:  Home: [address]  [place] [address]    Care Transition Nurse contacted the patient by telephone to perform post hospital discharge assessment, verified name and  as identifiers. Provided introduction to self, and explanation of the Care Transition Nurse role.     Patient: Terri Shankar    Patient : 1960   MRN: [id number]    Reason for Admission: CP, dizziness - left AMA  Discharge Date: 25  RURS: Readmission Risk Score: 11.2      Last Discharge Facility       Date Complaint Diagnosis Description Type Department Provider    25 Chest Pain Chest pain, unspecified type ... ED to Hosp-Admission (Discharged) (AMA) [facility] Socorro Calixto MD; Duy Ryder...            Was this an external facility discharge? No    Additional needs identified to be addressed with provider   High priority: Pt recently at [facility] for CP and Dizziness. Had to leave AMA due to family at home not being taken care of. Pt states speech is slurred at times, HA, has not check BP since home, still having dizziness. Waiting on sister to help with family and she will return to hospital.             Method of communication with provider: chart routing.    Patients top risk factors for readmission: ineffective coping, lack of knowledge about disease, and Caregiver stress    Interventions to address risk factors:   Education: Importance of taking medication, checking BP and making an appt with PCP     Care Summary Note: Pt answered the phone with heavy breathing. States she is worried about her health but had to leave AMA as her  recently had a stroke and they have a handicap son in the home as well. States her sister is coming up from out of states (lives 5 hours away) and will be staying to help until pt is able to get back on her feet. Pt explained she had a previous stroke 2 years ago. Does not follow

## 2025-01-16 ENCOUNTER — TELEPHONE (OUTPATIENT)
Dept: FAMILY MEDICINE CLINIC | Age: 65
End: 2025-01-16

## 2025-01-16 ENCOUNTER — CARE COORDINATION (OUTPATIENT)
Dept: CASE MANAGEMENT | Age: 65
End: 2025-01-16

## 2025-01-16 NOTE — CARE COORDINATION
Care Transitions Note    Follow Up Call     Patient Current Location:  Home: [address]  [place] [address]    Care Transition Nurse contacted the patient by telephone. Verified name and  as identifiers.    Additional needs identified to be addressed with provider                    Method of communication with provider: none.    Care Summary Note: Pt states she is still experiencing dizziness. Taking medications as prescribed. States her cp has subsided and she is no longer experiencing this. Pt questioned how she could get an MRI outpt instead of returning to hospital. Reviewed this routinely is not a quick turn around. Pt is not established with a neurologist to call and set up an appt. Reviewed if pt absolutely does not wish to return to hospital than she needs to call and make an appt with PCP. Asked she explain to them her symptoms and ask if there is any way to be seen by PCP/ or other office physician tomorrow to create a plan. Pt sister is in from out of town and willing to help pt and family. Pt asked I explain this to sister as well. Spoke with sister and explained if pt does not wish to return to hospital for care than to call PCP office. They will reach out to office this afternoon. CTN did look at  with no luck for tomorrow.     Plan of care updates since last contact:  Education: .       Advance Care Planning:   Does patient have an Advance Directive: deferred at this time, will discuss on future follow up. .    Medication Review:  No changes since last call.     Remote Patient Monitoring:  Offered patient enrollment in the Remote Patient Monitoring (RPM) program for in-home monitoring: Deferred at this time because .; will discuss at next outreach.    Assessments:  No changes since last call    Follow Up Appointment:   Pt needs to be seen ASAP - asked pt to call to see soonest.   Future Appointments         Provider Specialty Dept Phone    2025 4:00 PM Marcia Abdi APRN -

## 2025-01-18 ENCOUNTER — HOSPITAL ENCOUNTER (INPATIENT)
Age: 65
LOS: 1 days | Discharge: HOME | DRG: 149 | End: 2025-01-21
Attending: EMERGENCY MEDICINE | Admitting: STUDENT IN AN ORGANIZED HEALTH CARE EDUCATION/TRAINING PROGRAM
Payer: COMMERCIAL

## 2025-01-18 ENCOUNTER — APPOINTMENT (OUTPATIENT)
Dept: GENERAL RADIOLOGY | Age: 65
DRG: 149 | End: 2025-01-18
Payer: COMMERCIAL

## 2025-01-18 DIAGNOSIS — R42 DIZZINESS: Primary | ICD-10-CM

## 2025-01-18 LAB
ANION GAP SERPL CALCULATED.3IONS-SCNC: 9 MMOL/L (ref 3–16)
BASE EXCESS BLDV CALC-SCNC: 3 MMOL/L (ref -2–3)
BASOPHILS # BLD: 0 K/UL (ref 0–0.2)
BASOPHILS NFR BLD: 0.5 %
BUN SERPL-MCNC: 20 MG/DL (ref 7–20)
CALCIUM SERPL-MCNC: 9.3 MG/DL (ref 8.3–10.6)
CHLORIDE SERPL-SCNC: 105 MMOL/L (ref 99–110)
CO2 BLDV-SCNC: 30 MMOL/L
CO2 SERPL-SCNC: 25 MMOL/L (ref 21–32)
COHGB MFR BLDV: 1.1 % (ref 0–1.5)
CREAT SERPL-MCNC: 0.8 MG/DL (ref 0.6–1.2)
DEPRECATED RDW RBC AUTO: 15 % (ref 12.4–15.4)
DO-HGB MFR BLDV: 25.2 %
EKG ATRIAL RATE: 73 BPM
EKG DIAGNOSIS: NORMAL
EKG P AXIS: 56 DEGREES
EKG P-R INTERVAL: 156 MS
EKG Q-T INTERVAL: 384 MS
EKG QRS DURATION: 84 MS
EKG QTC CALCULATION (BAZETT): 423 MS
EKG R AXIS: 8 DEGREES
EKG T AXIS: 32 DEGREES
EKG VENTRICULAR RATE: 73 BPM
EOSINOPHIL # BLD: 0.2 K/UL (ref 0–0.6)
EOSINOPHIL NFR BLD: 2.4 %
GFR SERPLBLD CREATININE-BSD FMLA CKD-EPI: 83 ML/MIN/{1.73_M2}
GLUCOSE SERPL-MCNC: 91 MG/DL (ref 70–99)
HCO3 BLDV-SCNC: 28.6 MMOL/L (ref 24–28)
HCT VFR BLD AUTO: 42.8 % (ref 36–48)
HGB BLD-MCNC: 13.9 G/DL (ref 12–16)
LYMPHOCYTES # BLD: 2 K/UL (ref 1–5.1)
LYMPHOCYTES NFR BLD: 23.7 %
MCH RBC QN AUTO: 27.9 PG (ref 26–34)
MCHC RBC AUTO-ENTMCNC: 32.5 G/DL (ref 31–36)
MCV RBC AUTO: 85.7 FL (ref 80–100)
METHGB MFR BLDV: 0.3 % (ref 0–1.5)
MONOCYTES # BLD: 0.5 K/UL (ref 0–1.3)
MONOCYTES NFR BLD: 6.3 %
NEUTROPHILS # BLD: 5.7 K/UL (ref 1.7–7.7)
NEUTROPHILS NFR BLD: 67.1 %
PCO2 BLDV: 46.3 MMHG (ref 41–51)
PH BLDV: 7.4 [PH] (ref 7.35–7.45)
PLATELET # BLD AUTO: 250 K/UL (ref 135–450)
PMV BLD AUTO: 7.8 FL (ref 5–10.5)
PO2 BLDV: 39.6 MMHG (ref 25–40)
POTASSIUM SERPL-SCNC: 4.3 MMOL/L (ref 3.5–5.1)
RBC # BLD AUTO: 4.99 M/UL (ref 4–5.2)
SAO2 % BLDV: 74 %
SODIUM SERPL-SCNC: 139 MMOL/L (ref 136–145)
TROPONIN, HIGH SENSITIVITY: 7 NG/L (ref 0–14)
TROPONIN, HIGH SENSITIVITY: 9 NG/L (ref 0–14)
WBC # BLD AUTO: 8.6 K/UL (ref 4–11)

## 2025-01-18 PROCEDURE — 84484 ASSAY OF TROPONIN QUANT: CPT

## 2025-01-18 PROCEDURE — 2500000003 HC RX 250 WO HCPCS: Performed by: INTERNAL MEDICINE

## 2025-01-18 PROCEDURE — 6360000002 HC RX W HCPCS: Performed by: INTERNAL MEDICINE

## 2025-01-18 PROCEDURE — G0378 HOSPITAL OBSERVATION PER HR: HCPCS

## 2025-01-18 PROCEDURE — 93005 ELECTROCARDIOGRAM TRACING: CPT | Performed by: INTERNAL MEDICINE

## 2025-01-18 PROCEDURE — 80048 BASIC METABOLIC PNL TOTAL CA: CPT

## 2025-01-18 PROCEDURE — 6370000000 HC RX 637 (ALT 250 FOR IP): Performed by: FAMILY MEDICINE

## 2025-01-18 PROCEDURE — 96372 THER/PROPH/DIAG INJ SC/IM: CPT

## 2025-01-18 PROCEDURE — 71046 X-RAY EXAM CHEST 2 VIEWS: CPT

## 2025-01-18 PROCEDURE — 80061 LIPID PANEL: CPT

## 2025-01-18 PROCEDURE — 6370000000 HC RX 637 (ALT 250 FOR IP): Performed by: INTERNAL MEDICINE

## 2025-01-18 PROCEDURE — 99285 EMERGENCY DEPT VISIT HI MDM: CPT

## 2025-01-18 PROCEDURE — 85025 COMPLETE CBC W/AUTO DIFF WBC: CPT

## 2025-01-18 PROCEDURE — 96374 THER/PROPH/DIAG INJ IV PUSH: CPT

## 2025-01-18 PROCEDURE — 82803 BLOOD GASES ANY COMBINATION: CPT

## 2025-01-18 RX ORDER — ONDANSETRON 2 MG/ML
4 INJECTION INTRAMUSCULAR; INTRAVENOUS ONCE
Status: COMPLETED | OUTPATIENT
Start: 2025-01-18 | End: 2025-01-18

## 2025-01-18 RX ORDER — ACETAMINOPHEN 650 MG/1
650 SUPPOSITORY RECTAL EVERY 6 HOURS PRN
Status: DISCONTINUED | OUTPATIENT
Start: 2025-01-18 | End: 2025-01-21 | Stop reason: HOSPADM

## 2025-01-18 RX ORDER — POTASSIUM CHLORIDE 1500 MG/1
40 TABLET, EXTENDED RELEASE ORAL PRN
Status: DISCONTINUED | OUTPATIENT
Start: 2025-01-18 | End: 2025-01-19

## 2025-01-18 RX ORDER — ONDANSETRON 4 MG/1
4 TABLET, ORALLY DISINTEGRATING ORAL EVERY 8 HOURS PRN
Status: DISCONTINUED | OUTPATIENT
Start: 2025-01-18 | End: 2025-01-21 | Stop reason: HOSPADM

## 2025-01-18 RX ORDER — POLYETHYLENE GLYCOL 3350 17 G/17G
17 POWDER, FOR SOLUTION ORAL DAILY PRN
Status: DISCONTINUED | OUTPATIENT
Start: 2025-01-18 | End: 2025-01-21 | Stop reason: HOSPADM

## 2025-01-18 RX ORDER — POLYETHYLENE GLYCOL 3350 17 G/17G
17 POWDER, FOR SOLUTION ORAL DAILY
Status: DISCONTINUED | OUTPATIENT
Start: 2025-01-18 | End: 2025-01-21 | Stop reason: HOSPADM

## 2025-01-18 RX ORDER — MECLIZINE HYDROCHLORIDE 25 MG/1
25 TABLET ORAL ONCE
Status: COMPLETED | OUTPATIENT
Start: 2025-01-18 | End: 2025-01-18

## 2025-01-18 RX ORDER — SODIUM CHLORIDE 0.9 % (FLUSH) 0.9 %
5-40 SYRINGE (ML) INJECTION PRN
Status: DISCONTINUED | OUTPATIENT
Start: 2025-01-18 | End: 2025-01-21 | Stop reason: HOSPADM

## 2025-01-18 RX ORDER — ACETAMINOPHEN 325 MG/1
650 TABLET ORAL EVERY 6 HOURS PRN
Status: DISCONTINUED | OUTPATIENT
Start: 2025-01-18 | End: 2025-01-21 | Stop reason: HOSPADM

## 2025-01-18 RX ORDER — ATORVASTATIN CALCIUM 40 MG/1
40 TABLET, FILM COATED ORAL NIGHTLY
Status: DISCONTINUED | OUTPATIENT
Start: 2025-01-18 | End: 2025-01-21 | Stop reason: HOSPADM

## 2025-01-18 RX ORDER — LORAZEPAM 2 MG/ML
1 INJECTION INTRAMUSCULAR
Status: DISCONTINUED | OUTPATIENT
Start: 2025-01-18 | End: 2025-01-21 | Stop reason: HOSPADM

## 2025-01-18 RX ORDER — MAGNESIUM SULFATE IN WATER 40 MG/ML
2000 INJECTION, SOLUTION INTRAVENOUS PRN
Status: DISCONTINUED | OUTPATIENT
Start: 2025-01-18 | End: 2025-01-19

## 2025-01-18 RX ORDER — ONDANSETRON 2 MG/ML
4 INJECTION INTRAMUSCULAR; INTRAVENOUS EVERY 6 HOURS PRN
Status: DISCONTINUED | OUTPATIENT
Start: 2025-01-18 | End: 2025-01-21 | Stop reason: HOSPADM

## 2025-01-18 RX ORDER — TRIAMCINOLONE ACETONIDE 1 MG/G
OINTMENT TOPICAL 2 TIMES DAILY
Status: DISCONTINUED | OUTPATIENT
Start: 2025-01-18 | End: 2025-01-21 | Stop reason: HOSPADM

## 2025-01-18 RX ORDER — ASPIRIN 81 MG/1
81 TABLET ORAL DAILY
Status: DISCONTINUED | OUTPATIENT
Start: 2025-01-19 | End: 2025-01-21 | Stop reason: HOSPADM

## 2025-01-18 RX ORDER — SODIUM CHLORIDE 0.9 % (FLUSH) 0.9 %
5-40 SYRINGE (ML) INJECTION EVERY 12 HOURS SCHEDULED
Status: DISCONTINUED | OUTPATIENT
Start: 2025-01-18 | End: 2025-01-21 | Stop reason: HOSPADM

## 2025-01-18 RX ORDER — SODIUM CHLORIDE 9 MG/ML
INJECTION, SOLUTION INTRAVENOUS PRN
Status: DISCONTINUED | OUTPATIENT
Start: 2025-01-18 | End: 2025-01-21 | Stop reason: HOSPADM

## 2025-01-18 RX ORDER — POTASSIUM CHLORIDE 7.45 MG/ML
10 INJECTION INTRAVENOUS PRN
Status: DISCONTINUED | OUTPATIENT
Start: 2025-01-18 | End: 2025-01-19

## 2025-01-18 RX ORDER — ENOXAPARIN SODIUM 100 MG/ML
30 INJECTION SUBCUTANEOUS 2 TIMES DAILY
Status: DISCONTINUED | OUTPATIENT
Start: 2025-01-18 | End: 2025-01-21 | Stop reason: HOSPADM

## 2025-01-18 RX ORDER — ARIPIPRAZOLE 5 MG/1
20 TABLET ORAL NIGHTLY
Status: DISCONTINUED | OUTPATIENT
Start: 2025-01-18 | End: 2025-01-21 | Stop reason: HOSPADM

## 2025-01-18 RX ORDER — ALBUTEROL SULFATE 0.83 MG/ML
2.5 SOLUTION RESPIRATORY (INHALATION) 4 TIMES DAILY PRN
Status: DISCONTINUED | OUTPATIENT
Start: 2025-01-18 | End: 2025-01-21 | Stop reason: HOSPADM

## 2025-01-18 RX ADMIN — ENOXAPARIN SODIUM 30 MG: 100 INJECTION SUBCUTANEOUS at 15:09

## 2025-01-18 RX ADMIN — ENOXAPARIN SODIUM 30 MG: 100 INJECTION SUBCUTANEOUS at 20:12

## 2025-01-18 RX ADMIN — MECLIZINE HYDROCHLORIDE 25 MG: 25 TABLET ORAL at 12:42

## 2025-01-18 RX ADMIN — POLYETHYLENE GLYCOL 3350 17 G: 17 POWDER, FOR SOLUTION ORAL at 15:09

## 2025-01-18 RX ADMIN — TRIAMCINOLONE ACETONIDE: 1 OINTMENT TOPICAL at 20:12

## 2025-01-18 RX ADMIN — ACETAMINOPHEN 650 MG: 325 TABLET ORAL at 14:33

## 2025-01-18 RX ADMIN — ATORVASTATIN CALCIUM 40 MG: 40 TABLET, FILM COATED ORAL at 20:12

## 2025-01-18 RX ADMIN — ONDANSETRON 4 MG: 2 INJECTION, SOLUTION INTRAMUSCULAR; INTRAVENOUS at 12:42

## 2025-01-18 RX ADMIN — SODIUM CHLORIDE, PRESERVATIVE FREE 10 ML: 5 INJECTION INTRAVENOUS at 20:12

## 2025-01-18 RX ADMIN — ARIPIPRAZOLE 20 MG: 5 TABLET ORAL at 20:12

## 2025-01-18 RX ADMIN — ONDANSETRON 4 MG: 4 TABLET, ORALLY DISINTEGRATING ORAL at 20:21

## 2025-01-18 ASSESSMENT — PAIN DESCRIPTION - LOCATION
LOCATION: CHEST
LOCATION: HEAD

## 2025-01-18 ASSESSMENT — PAIN DESCRIPTION - FREQUENCY: FREQUENCY: INTERMITTENT

## 2025-01-18 ASSESSMENT — PAIN - FUNCTIONAL ASSESSMENT
PAIN_FUNCTIONAL_ASSESSMENT: ACTIVITIES ARE NOT PREVENTED
PAIN_FUNCTIONAL_ASSESSMENT: 0-10

## 2025-01-18 ASSESSMENT — PAIN DESCRIPTION - ORIENTATION: ORIENTATION: RIGHT;LEFT

## 2025-01-18 ASSESSMENT — LIFESTYLE VARIABLES
HOW MANY STANDARD DRINKS CONTAINING ALCOHOL DO YOU HAVE ON A TYPICAL DAY: 1 OR 2
HOW OFTEN DO YOU HAVE A DRINK CONTAINING ALCOHOL: MONTHLY OR LESS

## 2025-01-18 ASSESSMENT — PAIN DESCRIPTION - PAIN TYPE: TYPE: ACUTE PAIN

## 2025-01-18 ASSESSMENT — PAIN SCALES - GENERAL
PAINLEVEL_OUTOF10: 9
PAINLEVEL_OUTOF10: 8
PAINLEVEL_OUTOF10: 2

## 2025-01-18 ASSESSMENT — PAIN DESCRIPTION - ONSET: ONSET: GRADUAL

## 2025-01-18 ASSESSMENT — PAIN DESCRIPTION - DESCRIPTORS: DESCRIPTORS: ACHING

## 2025-01-18 NOTE — ED TRIAGE NOTES
Pt arrives in the ED c/o midsternal CP, dizziness, nausea, and difficult ambulating. Pt states she was admitted her recently for MRI stroke rule out but had to leave against medical advice d/t family reasons.

## 2025-01-18 NOTE — ED PROVIDER NOTES
THE [facility]  EMERGENCY DEPARTMENT ENCOUNTER          PHYSICIAN ASSISTANT NOTE       Date of evaluation: 1/18/2025    Chief Complaint     Chest Pain      History of Present Illness     Terri Shankar is a 64 y.o. female who presents with a PMH of anxiety, bipolar disorder, morbid obesity, hyperlipidemia, history of TIA and lives in our strokes who presents to the emergency department with continued dizziness.  Patient was seen on 1/14/2025 here at [facility] due to chest pain and shortness of breath.  She had acute onset of dizziness on 1/14/2025 and woke up at 0 400.  Code stroke was called.  CTA CTh unremarkable.  Patient was originally going to go for wake-up MRI however patient claustrophobic and did not want to go for an MRI.  Therefore patient was admitted to the hospital to be sedated for MRI and further concern for stroke workup.  Unfortunately, patient left AMA.    Now patient presents to the emergency department with continued dizziness and chest pain.  Patient states that it has slightly improved over the past 4 days.  She states this morning she originally came to the emergency department she was bumping into walls.  However she has been able to care for her son with disabilities and  at home.  She states that she is still dizzy, not directly related to positional change.  Chest pain is also consistent, left-sided.  States that she shoveled the driveway x 2.  No associated shortness of breath.  Denies any additional neurological symptoms at this time.    ASSESSMENT / PLAN  (MEDICAL DECISION MAKING)     INITIAL VITALS: BP: (!) 150/112, Temp: 98.1 °F (36.7 °C), Pulse: 78, Respirations: 18, SpO2: 94 %    Terri Shankar is a 64 y.o. female who presents with a PMH of anxiety, bipolar disorder, morbid obesity, hyperlipidemia, history of TIA and lives in our strokes who presents to the emergency department with continued dizziness.  Patient was seen on 1/14/2025 here at [facility] due

## 2025-01-18 NOTE — ED PROVIDER NOTES
ED Attending Attestation Note     Date of evaluation: 1/18/2025    This patient was seen by the advance practice provider.  I have seen and examined the patient, agree with the workup, evaluation, management and diagnosis. The care plan has been discussed.  I have reviewed the ECG and concur with the JESSICA's interpretation.      Briefly, Terri Shankar is a 64 y.o. female with a PMH inclusive of TIA who presents for evaluation of dizziness    Notable exam findings include no focal deficits    Assessment/ Medical Decision Making:     Patient will be admitted for stroke workup.       Rabia Arguelles MD  01/19/25 1468

## 2025-01-18 NOTE — H&P
V2.0  History and Physical      Name:  Terri Shankar /Age/Sex: 1960  (64 y.o. female)   MRN & CSN:  [id number] & [id number] Encounter Date/Time: 2025 3:25 PM EST   Location:  550/5502-01 PCP: Marcia Abdi APRN - CNP       Hospital Day: 1    Assessment and Plan:   Terri Shankar is a 64 y.o. female with a pmh of  who presents with Dizziness    Hospital Problems             Last Modified POA    * (Principal) Dizziness 2025 Yes         Dizziness  Has been having ongoing dizziness since 2025  Differentials include posterior circulation stroke versus peripheral vertigo  Had CT head and CTA head and neck done on 2025 in the emergency room  Patient severely claustrophobic and needs to have MRI done under anesthesia.  There is some social barriers for admission on 2025, patient decided to leave A without getting MRI.  Patient presented with ongoing dizziness  Plan to obtain MRI  Ativan 1 mg every 15 minutes x 2 doses ordered if patient willing to take this.    Continue aspirin and Lipitor  will add echocardiogram as part of stroke workup  Will also consult neurology      History of TIA      Bipolar 1 disorder  Anxiety  On Abilify, continue    Hyperlipidemia  On Lipitor, continue    Morbid obesity          Disposition:   Current Living situation: Home  Expected Disposition:   Estimated D/C:     Diet ADULT DIET; Regular   DVT Prophylaxis [] Lovenox, []  Heparin, [] SCDs, [] Ambulation,  [] Eliquis, [] Xarelto, [] Coumadin   Code Status Full Code   Surrogate Decision Maker/ POA      History from:     patient    History of Present Illness:     Chief Complaint: Dizziness  Terri Shankar is a 64 y.o. female with pmh of TIA, hyperlipidemia, bipolar disorder who presents with dizziness.  Patient had initially presented on 2025 for similar symptoms.  She underwent stroke protocol and had CT head and CTA head and neck done which were unremarkable.  She was supposed to undergo wake up

## 2025-01-18 NOTE — ED NOTES
Patient Name: Terri Shankar  : 1960 64 y.o.  MRN: [id number]  ED Room #: A06/A06-06     Chief complaint:   Chief Complaint   Patient presents with    Chest Pain     Hospital Problem/Diagnosis:       O2 Flow Rate:    room air   Cardiac Rhythm:   NSR  Active LDA's:   Peripheral IV 25 Left;Anterior Wrist (Active)   Site Assessment Clean, dry & intact 25 1203   Line Status Blood return noted 25 1203   Phlebitis Assessment No symptoms 25 1203   Infiltration Assessment 0 25 1203            How does patient ambulate? Unknown, did not assess in the Emergency Department, pt states she is dizzy and will likely need x1 assist.     2. How does patient take pills? Whole with Water    3. Is patient alert? Alert    4. Is patient oriented? To Person, To Place, To Time, and To Situation    5.   Patient arrived from:  home  Facility Name: N/A  6. If patient is disoriented or from a Skill Nursing Facility has family been notified of admission? N/A, pt's family notified she will be admitted    7. Patient belongings? Belongings: Cell Phone and Clothing, purse    Disposition of belongings? Kept with Patient     8. Any specific patient or family belongings/needs/dynamics?   a. Being admitted for MRI, stroke rule out. Pt was admitted for this a few days ago, but left ama. Pt states she needs medication for anxiety before mri. Pt talked with neurologist on previous visit and states \"he will put me to sleep\", and states she will not do MRI unless this happens.     9. Miscellaneous comments/pending orders?  a.      If there are any additional questions please reach out to the Emergency Department.       Melissa Martin RN  25 [phone]       Melissa Martin RN  25 [phone]

## 2025-01-18 NOTE — ED NOTES
This RN called inpatient RN and asked her to call back if she had any questions.      Melissa Martin, RN  01/18/25 1400

## 2025-01-19 PROCEDURE — G0378 HOSPITAL OBSERVATION PER HR: HCPCS

## 2025-01-19 PROCEDURE — 6360000002 HC RX W HCPCS: Performed by: INTERNAL MEDICINE

## 2025-01-19 PROCEDURE — 6370000000 HC RX 637 (ALT 250 FOR IP): Performed by: INTERNAL MEDICINE

## 2025-01-19 PROCEDURE — 97116 GAIT TRAINING THERAPY: CPT

## 2025-01-19 PROCEDURE — 97535 SELF CARE MNGMENT TRAINING: CPT

## 2025-01-19 PROCEDURE — 97165 OT EVAL LOW COMPLEX 30 MIN: CPT

## 2025-01-19 PROCEDURE — 96372 THER/PROPH/DIAG INJ SC/IM: CPT

## 2025-01-19 PROCEDURE — 2500000003 HC RX 250 WO HCPCS: Performed by: INTERNAL MEDICINE

## 2025-01-19 PROCEDURE — 97161 PT EVAL LOW COMPLEX 20 MIN: CPT

## 2025-01-19 PROCEDURE — 6370000000 HC RX 637 (ALT 250 FOR IP): Performed by: STUDENT IN AN ORGANIZED HEALTH CARE EDUCATION/TRAINING PROGRAM

## 2025-01-19 PROCEDURE — 6370000000 HC RX 637 (ALT 250 FOR IP): Performed by: NURSE PRACTITIONER

## 2025-01-19 RX ORDER — HYDROXYZINE HYDROCHLORIDE 25 MG/1
25 TABLET, FILM COATED ORAL 3 TIMES DAILY PRN
Status: DISCONTINUED | OUTPATIENT
Start: 2025-01-19 | End: 2025-01-21 | Stop reason: HOSPADM

## 2025-01-19 RX ORDER — MECOBALAMIN 5000 MCG
5 TABLET,DISINTEGRATING ORAL NIGHTLY
Status: DISCONTINUED | OUTPATIENT
Start: 2025-01-19 | End: 2025-01-21 | Stop reason: HOSPADM

## 2025-01-19 RX ADMIN — ENOXAPARIN SODIUM 30 MG: 100 INJECTION SUBCUTANEOUS at 08:47

## 2025-01-19 RX ADMIN — ENOXAPARIN SODIUM 30 MG: 100 INJECTION SUBCUTANEOUS at 19:52

## 2025-01-19 RX ADMIN — SODIUM CHLORIDE, PRESERVATIVE FREE 10 ML: 5 INJECTION INTRAVENOUS at 20:34

## 2025-01-19 RX ADMIN — TRIAMCINOLONE ACETONIDE: 1 OINTMENT TOPICAL at 08:48

## 2025-01-19 RX ADMIN — HYDROXYZINE HYDROCHLORIDE 25 MG: 25 TABLET ORAL at 19:54

## 2025-01-19 RX ADMIN — TRIAMCINOLONE ACETONIDE: 1 OINTMENT TOPICAL at 20:34

## 2025-01-19 RX ADMIN — SODIUM CHLORIDE, PRESERVATIVE FREE 10 ML: 5 INJECTION INTRAVENOUS at 08:49

## 2025-01-19 RX ADMIN — ARIPIPRAZOLE 20 MG: 5 TABLET ORAL at 19:52

## 2025-01-19 RX ADMIN — ATORVASTATIN CALCIUM 40 MG: 40 TABLET, FILM COATED ORAL at 19:52

## 2025-01-19 RX ADMIN — ONDANSETRON 4 MG: 4 TABLET, ORALLY DISINTEGRATING ORAL at 19:52

## 2025-01-19 RX ADMIN — Medication 5 MG: at 21:26

## 2025-01-19 RX ADMIN — POLYETHYLENE GLYCOL 3350 17 G: 17 POWDER, FOR SOLUTION ORAL at 08:47

## 2025-01-19 RX ADMIN — ASPIRIN 81 MG: 81 TABLET, COATED ORAL at 08:47

## 2025-01-19 NOTE — CONSULTS
Neurology Consult Note    Reason for Consult: dizziness     Chief complaint: dizziness     Socorro Campuzano MD asked me to see Terri Shankar in consultation for evaluation of dizziness.     History of Present Illness:  Terri Shankar is a 64 y.o. female who presents with dizziness. She has a history of anxiety, bipolar disorder, HLD, obesity, lacunar strokes, and TIAs.    Terri presented initially on 1/14/25 with dizziness upon awakening at 4am (normal at 8pm night prior when went to bed). She describes it as a generalized sensation of imbalance, not room spinning, not quite lightheadedness either. The dizziness caused imbalance. She visited the ER and had CTA head and neck without flow limiting stenosis. She refused MRI brain imaging and left AMA.  She represented on 1/18/25 to the ER for ongoing dizziness with chest pain.    She admits to occasional neck and back tightness.  She denies any weakness, change in sensation, change in vision, or change in hearing.  She denies any dysphagia.  She denies any headache or neck pain.  She denies any residual symptoms from her prior stroke.        Current Facility-Administered Medications:     albuterol (PROVENTIL) (2.5 MG/3ML) 0.083% nebulizer solution 2.5 mg, 2.5 mg, Nebulization, 4x Daily PRN, Ely Velasco MD    ARIPiprazole (ABILIFY) tablet 20 mg, 20 mg, Oral, Nightly, Ely Velasco MD, 20 mg at 01/18/25 2012    aspirin EC tablet 81 mg, 81 mg, Oral, Daily, Ely Velasco MD, 81 mg at 01/19/25 0847    atorvastatin (LIPITOR) tablet 40 mg, 40 mg, Oral, Nightly, Ely Velasco MD, 40 mg at 01/18/25 2012    polyethylene glycol (GLYCOLAX) packet 17 g, 17 g, Oral, Daily, Ely Velasco MD, 17 g at 01/19/25 0847    sodium chloride flush 0.9 % injection 5-40 mL, 5-40 mL, IntraVENous, 2 times per day, Ely Velasco MD, 10 mL at 01/19/25 0849    sodium chloride flush 0.9 % injection 5-40 mL, 5-40 mL, IntraVENous, PRN, Ely Velasco MD    0.9

## 2025-01-19 NOTE — PLAN OF CARE
Problem: Safety - Adult  Goal: Free from fall injury  Outcome: Progressing  Flowsheets (Taken 1/18/2025 2000)  Free From Fall Injury: Instruct family/caregiver on patient safety   All fall precautions in place. Bed locked and in lowest position with alarm on. Overbed table and personal belonings within reach. Call light within reach and patient instructed to use call light for assistance. Non-skid socks on.    Problem: Skin/Tissue Integrity  Goal: Absence of new skin breakdown  Description: 1.  Monitor for areas of redness and/or skin breakdown  2.  Assess vascular access sites hourly  3.  Every 4-6 hours minimum:  Change oxygen saturation probe site  4.  Every 4-6 hours:  If on nasal continuous positive airway pressure, respiratory therapy assess nares and determine need for appliance change or resting period.  Outcome: Progressing   Skin assessed this shift. Maggy care completed as necessary. Patient alternating positions to reduce pressure and prevent skin injury q2 and as needed.

## 2025-01-20 PROBLEM — I63.9 ISCHEMIC STROKE (HCC): Status: ACTIVE | Noted: 2025-01-20

## 2025-01-20 LAB
ANION GAP SERPL CALCULATED.3IONS-SCNC: 10 MMOL/L (ref 3–16)
BASOPHILS # BLD: 0.1 K/UL (ref 0–0.2)
BASOPHILS NFR BLD: 0.8 %
BUN SERPL-MCNC: 14 MG/DL (ref 7–20)
CALCIUM SERPL-MCNC: 9.1 MG/DL (ref 8.3–10.6)
CHLORIDE SERPL-SCNC: 105 MMOL/L (ref 99–110)
CHOLEST SERPL-MCNC: 175 MG/DL (ref 0–199)
CO2 SERPL-SCNC: 23 MMOL/L (ref 21–32)
CREAT SERPL-MCNC: 0.9 MG/DL (ref 0.6–1.2)
DEPRECATED RDW RBC AUTO: 14.7 % (ref 12.4–15.4)
EOSINOPHIL # BLD: 0.2 K/UL (ref 0–0.6)
EOSINOPHIL NFR BLD: 2.9 %
EST. AVERAGE GLUCOSE BLD GHB EST-MCNC: 114 MG/DL
GFR SERPLBLD CREATININE-BSD FMLA CKD-EPI: 72 ML/MIN/{1.73_M2}
GLUCOSE SERPL-MCNC: 109 MG/DL (ref 70–99)
HBA1C MFR BLD: 5.6 %
HCT VFR BLD AUTO: 43 % (ref 36–48)
HDLC SERPL-MCNC: 38 MG/DL (ref 40–60)
HGB BLD-MCNC: 14.3 G/DL (ref 12–16)
LDLC SERPL CALC-MCNC: 93 MG/DL
LYMPHOCYTES # BLD: 2.2 K/UL (ref 1–5.1)
LYMPHOCYTES NFR BLD: 25.8 %
MCH RBC QN AUTO: 28.3 PG (ref 26–34)
MCHC RBC AUTO-ENTMCNC: 33.2 G/DL (ref 31–36)
MCV RBC AUTO: 85.4 FL (ref 80–100)
MONOCYTES # BLD: 0.7 K/UL (ref 0–1.3)
MONOCYTES NFR BLD: 7.8 %
NEUTROPHILS # BLD: 5.3 K/UL (ref 1.7–7.7)
NEUTROPHILS NFR BLD: 62.7 %
PLATELET # BLD AUTO: 233 K/UL (ref 135–450)
PMV BLD AUTO: 7.6 FL (ref 5–10.5)
POTASSIUM SERPL-SCNC: 4.1 MMOL/L (ref 3.5–5.1)
RBC # BLD AUTO: 5.03 M/UL (ref 4–5.2)
SODIUM SERPL-SCNC: 138 MMOL/L (ref 136–145)
TRIGL SERPL-MCNC: 218 MG/DL (ref 0–150)
VLDLC SERPL CALC-MCNC: 44 MG/DL
WBC # BLD AUTO: 8.5 K/UL (ref 4–11)

## 2025-01-20 PROCEDURE — 80061 LIPID PANEL: CPT

## 2025-01-20 PROCEDURE — 6370000000 HC RX 637 (ALT 250 FOR IP): Performed by: STUDENT IN AN ORGANIZED HEALTH CARE EDUCATION/TRAINING PROGRAM

## 2025-01-20 PROCEDURE — 80048 BASIC METABOLIC PNL TOTAL CA: CPT

## 2025-01-20 PROCEDURE — 6360000002 HC RX W HCPCS: Performed by: INTERNAL MEDICINE

## 2025-01-20 PROCEDURE — 6370000000 HC RX 637 (ALT 250 FOR IP): Performed by: INTERNAL MEDICINE

## 2025-01-20 PROCEDURE — 96372 THER/PROPH/DIAG INJ SC/IM: CPT

## 2025-01-20 PROCEDURE — 2060000000 HC ICU INTERMEDIATE R&B

## 2025-01-20 PROCEDURE — 6370000000 HC RX 637 (ALT 250 FOR IP): Performed by: NURSE PRACTITIONER

## 2025-01-20 PROCEDURE — 2500000003 HC RX 250 WO HCPCS: Performed by: INTERNAL MEDICINE

## 2025-01-20 PROCEDURE — 99231 SBSQ HOSP IP/OBS SF/LOW 25: CPT | Performed by: NURSE PRACTITIONER

## 2025-01-20 PROCEDURE — 36415 COLL VENOUS BLD VENIPUNCTURE: CPT

## 2025-01-20 PROCEDURE — 83036 HEMOGLOBIN GLYCOSYLATED A1C: CPT

## 2025-01-20 PROCEDURE — 85025 COMPLETE CBC W/AUTO DIFF WBC: CPT

## 2025-01-20 RX ORDER — SODIUM CHLORIDE, SODIUM LACTATE, POTASSIUM CHLORIDE, CALCIUM CHLORIDE 600; 310; 30; 20 MG/100ML; MG/100ML; MG/100ML; MG/100ML
INJECTION, SOLUTION INTRAVENOUS CONTINUOUS
Status: CANCELLED | OUTPATIENT
Start: 2025-01-20

## 2025-01-20 RX ORDER — LOSARTAN POTASSIUM 25 MG/1
25 TABLET ORAL DAILY
Status: DISCONTINUED | OUTPATIENT
Start: 2025-01-20 | End: 2025-01-21 | Stop reason: HOSPADM

## 2025-01-20 RX ORDER — CALCIUM CARBONATE 500 MG/1
500 TABLET, CHEWABLE ORAL 3 TIMES DAILY PRN
Status: DISCONTINUED | OUTPATIENT
Start: 2025-01-20 | End: 2025-01-21 | Stop reason: HOSPADM

## 2025-01-20 RX ADMIN — ANTACID TABLETS 500 MG: 500 TABLET, CHEWABLE ORAL at 20:31

## 2025-01-20 RX ADMIN — HYDROXYZINE HYDROCHLORIDE 25 MG: 25 TABLET ORAL at 20:31

## 2025-01-20 RX ADMIN — ACETAMINOPHEN 650 MG: 325 TABLET ORAL at 09:26

## 2025-01-20 RX ADMIN — ENOXAPARIN SODIUM 30 MG: 100 INJECTION SUBCUTANEOUS at 08:55

## 2025-01-20 RX ADMIN — ENOXAPARIN SODIUM 30 MG: 100 INJECTION SUBCUTANEOUS at 20:31

## 2025-01-20 RX ADMIN — ASPIRIN 81 MG: 81 TABLET, COATED ORAL at 08:56

## 2025-01-20 RX ADMIN — ARIPIPRAZOLE 20 MG: 5 TABLET ORAL at 20:31

## 2025-01-20 RX ADMIN — ANTACID TABLETS 500 MG: 500 TABLET, CHEWABLE ORAL at 10:13

## 2025-01-20 RX ADMIN — TRIAMCINOLONE ACETONIDE: 1 OINTMENT TOPICAL at 08:57

## 2025-01-20 RX ADMIN — LOSARTAN POTASSIUM 25 MG: 25 TABLET, FILM COATED ORAL at 11:54

## 2025-01-20 RX ADMIN — Medication 5 MG: at 20:31

## 2025-01-20 RX ADMIN — TRIAMCINOLONE ACETONIDE: 1 OINTMENT TOPICAL at 20:31

## 2025-01-20 RX ADMIN — SODIUM CHLORIDE, PRESERVATIVE FREE 10 ML: 5 INJECTION INTRAVENOUS at 08:56

## 2025-01-20 RX ADMIN — ATORVASTATIN CALCIUM 40 MG: 40 TABLET, FILM COATED ORAL at 20:31

## 2025-01-20 ASSESSMENT — PAIN SCALES - GENERAL
PAINLEVEL_OUTOF10: 0

## 2025-01-20 NOTE — DISCHARGE INSTRUCTIONS
[facility] Stroke Program Survey  The [facility] [place] values your feedback related to your recent hospital visit and admission. We strive to improve our Neuroscience program to promote better outcomes and recoveries for all our patients.  The anonymous survey below consists of a few questions that are related to your stay and around your Stroke diagnosis, treatment, and recovery. It is anonymous and has only a few questions.  The estimated length of time needed to complete this survey is 3 minutes or less. Thank you for completing this survey!

## 2025-01-20 NOTE — NURSE NAVIGATOR
Neurology consulted for \"Dizziness\"  - MRI Brain ordered, anesthesia needed per provider documentation   - Echo (TTE) ordered     Verified educational Stroke booklet in room for patient and/or family to review. Patient's personal risk factors specific to stroke/TIA include: hypertension; hyperlipidemia; obesity (BMI 44); h/o stroke / TIA; [facility] heart disease     Patient's chart reviewed for Stroke Core Measures and additional needs:    [x]   VTE prophylaxis - SQ Lovenox ordered / administered, see eMAR    [x]   Antithrombotic (if applicable)   [x]   Swallow screen prior to PO intake - Completed    [x]   Lipids / A1C ordered or resulted - Ordered; high intensity statin ordered / administered    [x]   Therapy ordered - PT/OT evals completed    [x]   Care plan and Education template - Added     Navigator to continue to follow patient while admitted, to assist with follow up and discharge planning as needed.     Nurse eSignature: Electronically signed by Beatriz Bishop RN on 1/20/25 at 9:54 AM EST - Neuroscience Navigator

## 2025-01-20 NOTE — PLAN OF CARE
Problem: Safety - Adult  Goal: Free from fall injury  1/20/2025 0010 by Marycruz Steinberg, RN  Outcome: Progressing  Flowsheets (Taken 1/20/2025 0000)  Free From Fall Injury: Instruct family/caregiver on patient safety  1/19/2025 1955 by Ling Argueta, RN  Outcome: Progressing  Note: Patient to remain free from falls and use call light appropriately   All fall precautions in place. Bed locked and in lowest position with alarm on. Overbed table and personal belonings within reach. Call light within reach and patient instructed to use call light for assistance. Non-skid socks on.    Problem: Skin/Tissue Integrity  Goal: Absence of new skin breakdown  Description: 1.  Monitor for areas of redness and/or skin breakdown  2.  Assess vascular access sites hourly  3.  Every 4-6 hours minimum:  Change oxygen saturation probe site  4.  Every 4-6 hours:  If on nasal continuous positive airway pressure, respiratory therapy assess nares and determine need for appliance change or resting period.  Outcome: Progressing   Skin assessed this shift. Maggy care completed as necessary. Patient alternating positions to reduce pressure and prevent skin injury q2 and as needed.

## 2025-01-20 NOTE — PLAN OF CARE
Problem: Discharge Planning  Goal: Discharge to home or other facility with appropriate resources  Outcome: Progressing   Pt involved in discharge planning. Barriers to discharge discussed with patient. Discharge learning needs identified. Discuss with patient any additional needed resources and transportation plans. Case management following plan of care.      Problem: Pain  Goal: Verbalizes/displays adequate comfort level or baseline comfort level  Outcome: Progressing  Flowsheets  Taken 1/20/2025 0915  Verbalizes/displays adequate comfort level or baseline comfort level: Encourage patient to monitor pain and request assistance  Taken 1/20/2025 0841  Verbalizes/displays adequate comfort level or baseline comfort level: Encourage patient to monitor pain and request assistance       Problem: Safety - Adult  Goal: Free from fall injury  1/20/2025 0920 by Ksenia Ross RN  Outcome: Progressing  All fall precautions in place. Bed locked and in lowest position with alarm on. Overbed table and personal belonings within reach. Call light within reach and patient instructed to use call light for assistance. Non-skid socks on.

## 2025-01-20 NOTE — CARE COORDINATION
Case Management Assessment  Initial Evaluation    Date/Time of Evaluation: 1/20/2025 1:53 PM  Assessment Completed by: Augusta Youngblood RN    If patient is discharged prior to next notation, then this note serves as note for discharge by case management.    Patient Name: Terri Shankar                   YOB: 1960  Diagnosis: Dizziness [R42]  Ischemic stroke (HCC) [I63.9]                   Date / Time: 1/18/2025 11:38 AM    Patient Admission Status: Inpatient   Readmission Risk (Low < 19, Mod (19-27), High > 27): Readmission Risk Score: 11.2    Current PCP: Marcia Abdi APRN - CNP  PCP verified by CM?      Chart Reviewed: Yes      History Provided by: Patient  Patient Orientation: Alert and Oriented    Patient Cognition: Alert    Hospitalization in the last 30 days (Readmission):  No    If yes, Readmission Assessment in CM Navigator will be completed.    Advance Directives:      Code Status: Full Code   Patient's Primary Decision Maker is: Legal Next of Kin    Primary Decision Maker: Celeste Bright - Child - [phone]    Primary Decision Maker: Usama Chauhan - Child - [phone]    Discharge Planning:    Patient lives with: Spouse/Significant Other Type of Home: House  Primary Care Giver: Self  Patient Support Systems include: Spouse/Significant Other, Children   Current Financial resources:    Current community resources:    Current services prior to admission: None            Current DME:              Type of Home Care services:  None    ADLS  Prior functional level:    Current functional level:      PT AM-PAC: 18 /24  OT AM-PAC: 22 /24    Family can provide assistance at DC:    Would you like Case Management to discuss the discharge plan with any other family members/significant others, and if so, who?    Plans to Return to Present Housing:    Other Identified Issues/Barriers to RETURNING to current housing: none  Potential Assistance needed at discharge: N/A            Potential DME:

## 2025-01-20 NOTE — PLAN OF CARE
Problem: Safety - Adult  Goal: Free from fall injury  Outcome: Progressing  Note: Patient to remain free from falls and use call light appropriately

## 2025-01-21 ENCOUNTER — ANESTHESIA EVENT (OUTPATIENT)
Dept: MRI IMAGING | Age: 65
DRG: 149 | End: 2025-01-21
Payer: COMMERCIAL

## 2025-01-21 ENCOUNTER — ANESTHESIA (OUTPATIENT)
Dept: MRI IMAGING | Age: 65
DRG: 149 | End: 2025-01-21
Payer: COMMERCIAL

## 2025-01-21 ENCOUNTER — APPOINTMENT (OUTPATIENT)
Dept: MRI IMAGING | Age: 65
DRG: 149 | End: 2025-01-21
Payer: COMMERCIAL

## 2025-01-21 VITALS
DIASTOLIC BLOOD PRESSURE: 85 MMHG | SYSTOLIC BLOOD PRESSURE: 138 MMHG | RESPIRATION RATE: 16 BRPM | HEIGHT: 64 IN | BODY MASS INDEX: 43.36 KG/M2 | OXYGEN SATURATION: 93 % | WEIGHT: 254 LBS | TEMPERATURE: 98 F | HEART RATE: 86 BPM

## 2025-01-21 LAB
ANION GAP SERPL CALCULATED.3IONS-SCNC: 9 MMOL/L (ref 3–16)
BASOPHILS # BLD: 0.1 K/UL (ref 0–0.2)
BASOPHILS NFR BLD: 0.9 %
BUN SERPL-MCNC: 15 MG/DL (ref 7–20)
CALCIUM SERPL-MCNC: 9.4 MG/DL (ref 8.3–10.6)
CHLORIDE SERPL-SCNC: 106 MMOL/L (ref 99–110)
CO2 SERPL-SCNC: 26 MMOL/L (ref 21–32)
CREAT SERPL-MCNC: 0.9 MG/DL (ref 0.6–1.2)
DEPRECATED RDW RBC AUTO: 14.5 % (ref 12.4–15.4)
EOSINOPHIL # BLD: 0.2 K/UL (ref 0–0.6)
EOSINOPHIL NFR BLD: 3.1 %
GFR SERPLBLD CREATININE-BSD FMLA CKD-EPI: 72 ML/MIN/{1.73_M2}
GLUCOSE SERPL-MCNC: 105 MG/DL (ref 70–99)
HCT VFR BLD AUTO: 43.4 % (ref 36–48)
HGB BLD-MCNC: 14.2 G/DL (ref 12–16)
LYMPHOCYTES # BLD: 2.2 K/UL (ref 1–5.1)
LYMPHOCYTES NFR BLD: 29.1 %
MCH RBC QN AUTO: 28.1 PG (ref 26–34)
MCHC RBC AUTO-ENTMCNC: 32.8 G/DL (ref 31–36)
MCV RBC AUTO: 85.6 FL (ref 80–100)
MONOCYTES # BLD: 0.7 K/UL (ref 0–1.3)
MONOCYTES NFR BLD: 8.9 %
NEUTROPHILS # BLD: 4.4 K/UL (ref 1.7–7.7)
NEUTROPHILS NFR BLD: 58 %
PLATELET # BLD AUTO: 226 K/UL (ref 135–450)
PMV BLD AUTO: 8 FL (ref 5–10.5)
POTASSIUM SERPL-SCNC: 4 MMOL/L (ref 3.5–5.1)
RBC # BLD AUTO: 5.07 M/UL (ref 4–5.2)
SODIUM SERPL-SCNC: 141 MMOL/L (ref 136–145)
WBC # BLD AUTO: 7.7 K/UL (ref 4–11)

## 2025-01-21 PROCEDURE — 6370000000 HC RX 637 (ALT 250 FOR IP): Performed by: INTERNAL MEDICINE

## 2025-01-21 PROCEDURE — 2500000003 HC RX 250 WO HCPCS: Performed by: INTERNAL MEDICINE

## 2025-01-21 PROCEDURE — 6360000002 HC RX W HCPCS: Performed by: INTERNAL MEDICINE

## 2025-01-21 PROCEDURE — 2500000003 HC RX 250 WO HCPCS: Performed by: NURSE ANESTHETIST, CERTIFIED REGISTERED

## 2025-01-21 PROCEDURE — 7100000001 HC PACU RECOVERY - ADDTL 15 MIN

## 2025-01-21 PROCEDURE — 36415 COLL VENOUS BLD VENIPUNCTURE: CPT

## 2025-01-21 PROCEDURE — 6360000002 HC RX W HCPCS: Performed by: ANESTHESIOLOGY

## 2025-01-21 PROCEDURE — 80048 BASIC METABOLIC PNL TOTAL CA: CPT

## 2025-01-21 PROCEDURE — 99232 SBSQ HOSP IP/OBS MODERATE 35: CPT | Performed by: NURSE PRACTITIONER

## 2025-01-21 PROCEDURE — 2500000003 HC RX 250 WO HCPCS: Performed by: ANESTHESIOLOGY

## 2025-01-21 PROCEDURE — 72156 MRI NECK SPINE W/O & W/DYE: CPT

## 2025-01-21 PROCEDURE — A9576 INJ PROHANCE MULTIPACK: HCPCS | Performed by: PSYCHIATRY & NEUROLOGY

## 2025-01-21 PROCEDURE — 3700000000 HC ANESTHESIA ATTENDED CARE

## 2025-01-21 PROCEDURE — 6360000002 HC RX W HCPCS: Performed by: NURSE ANESTHETIST, CERTIFIED REGISTERED

## 2025-01-21 PROCEDURE — 6370000000 HC RX 637 (ALT 250 FOR IP): Performed by: STUDENT IN AN ORGANIZED HEALTH CARE EDUCATION/TRAINING PROGRAM

## 2025-01-21 PROCEDURE — 7100000000 HC PACU RECOVERY - FIRST 15 MIN

## 2025-01-21 PROCEDURE — 70553 MRI BRAIN STEM W/O & W/DYE: CPT

## 2025-01-21 PROCEDURE — 3700000001 HC ADD 15 MINUTES (ANESTHESIA)

## 2025-01-21 PROCEDURE — 6360000004 HC RX CONTRAST MEDICATION: Performed by: PSYCHIATRY & NEUROLOGY

## 2025-01-21 PROCEDURE — 85025 COMPLETE CBC W/AUTO DIFF WBC: CPT

## 2025-01-21 PROCEDURE — 2580000003 HC RX 258: Performed by: NURSE ANESTHETIST, CERTIFIED REGISTERED

## 2025-01-21 RX ORDER — LIDOCAINE HYDROCHLORIDE 20 MG/ML
INJECTION, SOLUTION INTRAVENOUS
Status: DISCONTINUED | OUTPATIENT
Start: 2025-01-21 | End: 2025-01-21 | Stop reason: SDUPTHER

## 2025-01-21 RX ORDER — LOSARTAN POTASSIUM 25 MG/1
25 TABLET ORAL DAILY
Qty: 90 TABLET | Refills: 1 | Status: SHIPPED | OUTPATIENT
Start: 2025-01-21

## 2025-01-21 RX ORDER — HYDROMORPHONE HYDROCHLORIDE 1 MG/ML
0.5 INJECTION, SOLUTION INTRAMUSCULAR; INTRAVENOUS; SUBCUTANEOUS EVERY 5 MIN PRN
Status: DISCONTINUED | OUTPATIENT
Start: 2025-01-21 | End: 2025-01-21 | Stop reason: HOSPADM

## 2025-01-21 RX ORDER — SODIUM CHLORIDE 9 MG/ML
INJECTION, SOLUTION INTRAVENOUS
Status: DISCONTINUED | OUTPATIENT
Start: 2025-01-21 | End: 2025-01-21 | Stop reason: SDUPTHER

## 2025-01-21 RX ORDER — SODIUM CHLORIDE 0.9 % (FLUSH) 0.9 %
5-40 SYRINGE (ML) INJECTION EVERY 12 HOURS SCHEDULED
Status: DISCONTINUED | OUTPATIENT
Start: 2025-01-21 | End: 2025-01-21 | Stop reason: HOSPADM

## 2025-01-21 RX ORDER — LOSARTAN POTASSIUM 25 MG/1
25 TABLET ORAL DAILY
Qty: 30 TABLET | Refills: 3 | Status: SHIPPED | OUTPATIENT
Start: 2025-01-22 | End: 2025-01-21 | Stop reason: HOSPADM

## 2025-01-21 RX ORDER — ESMOLOL HYDROCHLORIDE 10 MG/ML
INJECTION INTRAVENOUS
Status: DISCONTINUED | OUTPATIENT
Start: 2025-01-21 | End: 2025-01-21 | Stop reason: SDUPTHER

## 2025-01-21 RX ORDER — HYDRALAZINE HYDROCHLORIDE 20 MG/ML
10 INJECTION INTRAMUSCULAR; INTRAVENOUS
Status: DISCONTINUED | OUTPATIENT
Start: 2025-01-21 | End: 2025-01-21 | Stop reason: HOSPADM

## 2025-01-21 RX ORDER — SODIUM CHLORIDE 9 MG/ML
INJECTION, SOLUTION INTRAVENOUS PRN
Status: DISCONTINUED | OUTPATIENT
Start: 2025-01-21 | End: 2025-01-21 | Stop reason: HOSPADM

## 2025-01-21 RX ORDER — HALOPERIDOL 5 MG/ML
1 INJECTION INTRAMUSCULAR
Status: DISCONTINUED | OUTPATIENT
Start: 2025-01-21 | End: 2025-01-21 | Stop reason: HOSPADM

## 2025-01-21 RX ORDER — ROCURONIUM BROMIDE 10 MG/ML
INJECTION, SOLUTION INTRAVENOUS
Status: DISCONTINUED | OUTPATIENT
Start: 2025-01-21 | End: 2025-01-21 | Stop reason: SDUPTHER

## 2025-01-21 RX ORDER — SODIUM CHLORIDE 0.9 % (FLUSH) 0.9 %
5-40 SYRINGE (ML) INJECTION PRN
Status: DISCONTINUED | OUTPATIENT
Start: 2025-01-21 | End: 2025-01-21 | Stop reason: HOSPADM

## 2025-01-21 RX ORDER — PROCHLORPERAZINE EDISYLATE 5 MG/ML
5 INJECTION INTRAMUSCULAR; INTRAVENOUS
Status: COMPLETED | OUTPATIENT
Start: 2025-01-21 | End: 2025-01-21

## 2025-01-21 RX ORDER — NALOXONE HYDROCHLORIDE 0.4 MG/ML
INJECTION, SOLUTION INTRAMUSCULAR; INTRAVENOUS; SUBCUTANEOUS PRN
Status: DISCONTINUED | OUTPATIENT
Start: 2025-01-21 | End: 2025-01-21 | Stop reason: HOSPADM

## 2025-01-21 RX ORDER — FENTANYL CITRATE 50 UG/ML
25 INJECTION, SOLUTION INTRAMUSCULAR; INTRAVENOUS EVERY 5 MIN PRN
Status: DISCONTINUED | OUTPATIENT
Start: 2025-01-21 | End: 2025-01-21 | Stop reason: HOSPADM

## 2025-01-21 RX ORDER — PROPOFOL 10 MG/ML
INJECTION, EMULSION INTRAVENOUS
Status: DISCONTINUED | OUTPATIENT
Start: 2025-01-21 | End: 2025-01-21 | Stop reason: SDUPTHER

## 2025-01-21 RX ADMIN — GADOTERIDOL 20 ML: 279.3 INJECTION, SOLUTION INTRAVENOUS at 08:56

## 2025-01-21 RX ADMIN — PHENYLEPHRINE HYDROCHLORIDE 100 MCG: 10 INJECTION INTRAVENOUS at 08:16

## 2025-01-21 RX ADMIN — TRIAMCINOLONE ACETONIDE: 1 OINTMENT TOPICAL at 10:04

## 2025-01-21 RX ADMIN — ESMOLOL HYDROCHLORIDE 40 MG: 10 INJECTION, SOLUTION INTRAVENOUS at 07:35

## 2025-01-21 RX ADMIN — LOSARTAN POTASSIUM 25 MG: 25 TABLET, FILM COATED ORAL at 10:02

## 2025-01-21 RX ADMIN — SODIUM CHLORIDE: 9 INJECTION, SOLUTION INTRAVENOUS at 07:28

## 2025-01-21 RX ADMIN — ROCURONIUM BROMIDE 70 MG: 10 INJECTION, SOLUTION INTRAVENOUS at 07:36

## 2025-01-21 RX ADMIN — PROCHLORPERAZINE EDISYLATE 5 MG: 5 INJECTION INTRAMUSCULAR; INTRAVENOUS at 09:15

## 2025-01-21 RX ADMIN — ENOXAPARIN SODIUM 30 MG: 100 INJECTION SUBCUTANEOUS at 10:02

## 2025-01-21 RX ADMIN — PHENYLEPHRINE HYDROCHLORIDE 150 MCG: 10 INJECTION INTRAVENOUS at 08:35

## 2025-01-21 RX ADMIN — Medication 5 ML: at 10:04

## 2025-01-21 RX ADMIN — LIDOCAINE HYDROCHLORIDE 100 MG: 20 INJECTION, SOLUTION INTRAVENOUS at 07:34

## 2025-01-21 RX ADMIN — SODIUM CHLORIDE, PRESERVATIVE FREE 10 ML: 5 INJECTION INTRAVENOUS at 10:03

## 2025-01-21 RX ADMIN — PROPOFOL 200 MG: 10 INJECTION, EMULSION INTRAVENOUS at 07:35

## 2025-01-21 RX ADMIN — PHENYLEPHRINE HYDROCHLORIDE 100 MCG: 10 INJECTION INTRAVENOUS at 07:51

## 2025-01-21 RX ADMIN — PHENYLEPHRINE HYDROCHLORIDE 100 MCG: 10 INJECTION INTRAVENOUS at 08:23

## 2025-01-21 RX ADMIN — ONDANSETRON 4 MG: 2 INJECTION INTRAMUSCULAR; INTRAVENOUS at 07:34

## 2025-01-21 RX ADMIN — SUGAMMADEX 400 MG: 100 INJECTION, SOLUTION INTRAVENOUS at 08:40

## 2025-01-21 RX ADMIN — ASPIRIN 81 MG: 81 TABLET, COATED ORAL at 10:02

## 2025-01-21 RX ADMIN — PHENYLEPHRINE HYDROCHLORIDE 150 MCG: 10 INJECTION INTRAVENOUS at 08:28

## 2025-01-21 RX ADMIN — PHENYLEPHRINE HYDROCHLORIDE 100 MCG: 10 INJECTION INTRAVENOUS at 08:03

## 2025-01-21 ASSESSMENT — PAIN SCALES - GENERAL
PAINLEVEL_OUTOF10: 0

## 2025-01-21 NOTE — CARE COORDINATION
Case Management Assessment            Discharge Note                    Date / Time of Note: 1/21/2025 2:23 PM                  Discharge Note Completed by: Augusta Youngblood RN    Patient Name: Terri Shankar   YOB: 1960  Diagnosis: Dizziness [R42]  Ischemic stroke (HCC) [I63.9]   Date / Time: 1/18/2025 11:38 AM    Current PCP: Marcia Abdi APRN - CNP  Clinic patient: No    Hospitalization in the last 30 days: No       Advance Directives:  Code Status: Full Code  [place] DNR form completed and on chart: Not Indicated    Financial:  Payor: [employer] / Plan: [employer] NAP CHOICE POS II / Product Type: *No Product type* /      Pharmacy:    [facility] [id number] [place], [place] - [address] [facility] [phone] - F [phone]  [address] [facility] [address]  Phone: [phone] Fax: [phone]      Assistance purchasing medications?:    Assistance provided by Case Management: None at this time    Does patient want to participate in local refill/ meds to beds program?: Yes    Meds To Beds General Rules:  1. Can ONLY be done Monday- Friday between 8:30am-5pm  2. Prescription(s) must be in pharmacy by 3pm to be filled same day  3.Copy of patient's insurance/ prescription drug card and patient face sheet must be sent along with the prescription(s)  4. Cost of Rx cannot be added to hospital bill. If financial assistance is needed, please contact unit  or ;  or  CANNOT provide pharmacy voucher for patients co-pays  5. Patients can then  the prescription on their way out of the hospital at discharge, or pharmacy can deliver to the bedside if staff is available. (payment due at time of pick-up or delivery - cash, check, or card accepted)     Able to afford home medications/ co-pay costs: Yes    ADLS:  Current PT AM-PAC Score: 18 /24  Current OT AM-PAC Score: 22 /24    DISCHARGE Disposition: Home- No Services Needed    LOC at discharge: Not

## 2025-01-21 NOTE — ANESTHESIA POSTPROCEDURE EVALUATION
Department of Anesthesiology  Postprocedure Note    Patient: Terri Shankar  MRN: [id number]  YOB: 1960  Date of evaluation: 1/21/2025    Procedure Summary       Date: 01/21/25 Room / Location: [facility]    Anesthesia Start: 0729 Anesthesia Stop: 0858    Procedure: MRI BRAIN W WO CONTRAST Diagnosis:       (dizziness)      (imbalance)      (stroke)      (rule out CNS mass lesion)    Scheduled Providers: Checo Servin MD Responsible Provider: Checo Servin MD    Anesthesia Type: general ASA Status: 3            Anesthesia Type: No value filed.    Luna Phase I: Luna Score: 9    Luna Phase II:      Anesthesia Post Evaluation    Patient location during evaluation: PACU  Patient participation: complete - patient participated  Level of consciousness: awake  Pain score: 2  Airway patency: patent  Cardiovascular status: blood pressure returned to baseline  Respiratory status: acceptable  Hydration status: euvolemic  Pain management: adequate    No notable events documented.

## 2025-01-21 NOTE — PROGRESS NOTES
NEUROLOGY PROGRESS NOTE       Patient Name: Terri Shankar YOB: 1960   Sex: Female Age: 64 yrs     CC / Reason for Consult: Dizziness    Changes over last 24 hours:   Doing well, no new changes  Brain / c-spine MRI's completed and are unremarkable      ROS: No new complaints     ASSESSMENT & RECOMMENDATIONS   Assessment:  Dizziness  Imbalance  Chest pain  History of lacunar ischemic stroke  History of TIA  Obesity  HLD  Anxiety  Bipolar disorder     Terri Shankar is a 64 y.o. female who presented with acute onset dizziness since 1/14/2025 concerning for ischemic stroke, has history of HLD, obesity, prior TIA and stroke; CTA head and neck on 1/14/25 demonstrated no flow limiting stenosis.  MRI's of brain & cspine do not show a central source of vertigo / balance issues     Plan:  S/p MRI brain / c-spine w/ & w/o contrast - all unremarkable for source of dizziness  Further peripheral dizziness w/u per hospitalist team  Continue anti-platelet and statin preventive therapy; the risks, benefits, and limitations of this were discussed with the patient.  PT/OT with vestibular therapy, and recommend outpatient vestibular therapy at for example [facility].  If above workup is normal, recommend outpatient ENT consultation if dizziness is persistent   May follow up with Dr. Morgan post-hospitalization, no strokes on imaging, does not need vascular neurology follow up.   For the right MCA aneurysm reported on CTA, she will require outpatient follow up with Vascular neurosurgery (e.g. Dr. Norris at [facility]).  Okay for patient to discharge from neurology standpoint  We will sign off. Please call with questions.       Tri Munguia, MELANIE - VANESA   Neurology  1/21/2025 10:45 AM  [facility]: [facility] Neurology    HISTORY   Interval History: As noted above.     PMH Past Medical History:   Diagnosis Date    Anxiety     Bipolar 1 disorder (HCC)     Depression     Dyspareunia in female 05/16/2015    
       NEUROLOGY PROGRESS NOTE       Patient Name: Terri Shankar YOB: 1960   Sex: Female Age: 64 yrs     CC / Reason for Consult: Dizziness    Changes over last 24 hours:   NAEO      ROS: A little anxious     ASSESSMENT & RECOMMENDATIONS   Assessment:  Dizziness  Imbalance  Chest pain  History of lacunar ischemic stroke  History of TIA  Obesity  HLD  Anxiety  Bipolar disorder     Terri Shankar is a 64 y.o. female who presented with acute onset dizziness since 1/14/2025, which with history of HLD, obesity, prior TIA and stroke are concerning for new acute ischemic stroke; rarely a cervicogenic component could produce dizziness as well.. CTA head and neck on 1/14/25 demonstrated no flow limiting stenosis.    Plan:  Given her severe claustrophobia and requirement of additional measures for this, would recommend comprehensive MRI imaging for dizziness/imbalance including MRI Brain & c-spine w/wo SHAKILA to avoid repeat testing, Terri was agreeable and preferred this as well; recommend MRI brain and C-spine w/wo SHAKILA to rule out CNS lesion or cervicogenic etiology, we discussed risks and benefits of SHAKILA usage. Plan for today  Obtain cardiovascular workup including: an echocardiogram (ordered), and on discharge a 30 day event monitor. If indication for anticoagulation found, recommend team consult cardiology for its initiation; if initiating would advise single anti-platelet therapy with anticoagulation.  Continue anti-platelet and statin preventive therapy; the risks, benefits, and limitations of this were discussed with the patient.  PT/OT with vestibular therapy, and recommend outpatient vestibular therapy at for example [facility].  If above workup is normal, recommend outpatient ENT consultation.  May follow up with me post-hospitalization, longer term recommend follow up with vascular neurology (e.g. at [facility] with many excellent vascular subspecialists).  If any new, 
    Pharmacist Review and Automatic Dose Adjustment of Prophylactic Enoxaparin         The reviewing pharmacist has made an adjustment to the ordered enoxaparin dose or converted to UFH per the approved [facility] protocol and table as identified below.        Terri Shankar is a 64 y.o. female.     Recent Labs     01/18/25  1207   CREATININE 0.8       Estimated Creatinine Clearance: 91 mL/min (based on SCr of 0.8 mg/dL).    Recent Labs     01/18/25  1207   HGB 13.9   HCT 42.8        No results for input(s): \"INR\" in the last 72 hours.    Height:   Ht Readings from Last 1 Encounters:   01/18/25 1.626 m (5' 4\")     Weight:  Wt Readings from Last 1 Encounters:   01/18/25 115.2 kg (254 lb)               Plan: Based upon the patient's weight and renal function    Ordered: Enoxaparin 40mg SUBQ Daily    Changed/converted to    New Order: Enoxaparin 30mg SUBQ BID      Thank you,  Evangelina Kaur RP  1/18/2025, 2:21 PM    
  [facility] Medicine Progress Note  V 1.6      Date of Admission: 1/18/2025    Hospital Day: 3      Chief Admission Complaint: Vertigo and dizziness    Subjective: Patient seen and evaluated at bedside in the presence of bedside RN.  Continues to report intermittent episodes of vertigo.  Morning patient reported some chest discomfort with burping concerning for heartburn.    Presenting Admission History:       64-year-old female with a past medical history of anxiety and bipolar disorder, morbid obesity, hyperlipidemia, history of TIA and lacunar strokes, severe prurigo nodularis on dupixent who presented to the ER on 1/14/2025 for evaluation of vertiginous symptoms.  Was evaluated in the ER with a CT head and CT angiogram of the head and neck which were unremarkable.  Patient's symptoms resolved with medications.  Patient was advised admission for stroke evaluation however she declined and left AMA at that time citing CBT to take care of her special needs child and .  Patient was able to get her sister to visit and take care of her family so she presented to the ER for admission.  Reported episodic dizziness that responded to Antivert.  In the ER patient was mildly hypertensive 166/100.  Labs were unremarkable.  Urinalysis was negative.  Chest x-ray was unremarkable.  Patient was admitted for further workup and neurology was consulted.    Assessment/Plan:      Current Principal Problem:  Dizziness    Vertigo, posterior circulation stroke?,  History of lacunar strokes  Anxiety and bipolar disorder  Hypertension, heartburn, morbid obesity, hyperlipidemia  Prurigo nodularis on dupixent     Plan:  *1/14/2025 CT head, CT angiogram of the head and neck negative for acute pathology  EKG reviewed independently without cardiology, normal sinus rhythm  Neurochecks every 4 hours  A1c  Lipid panel  Aspirin  High intensity statin  PT OT vestibular therapy evaluation, recommends home with as needed assist  Neurology 
  [facility] Medicine Progress Note  V 1.6      Date of Admission: 1/18/2025    Hospital Day: 2      Chief Admission Complaint: Vertigo and dizziness    Subjective: Patient seen and evaluated at bedside.  Continues to report intermittent episodes of vertigo.  Patient was able to ambulate to the bathroom with a walker.  Reported some nausea without vomiting.  Did not have any other active complaints.  Did not have any events overnight.    Presenting Admission History:       64-year-old female with a past medical history of anxiety and bipolar disorder, morbid obesity, hyperlipidemia, history of TIA and lacunar strokes, severe prurigo nodularis on dupixent who presented to the ER on 1/14/2025 for evaluation of vertiginous symptoms.  Was evaluated in the ER with a CT head and CT angiogram of the head and neck which were unremarkable.  Patient's symptoms resolved with medications.  Patient was advised admission for stroke evaluation however she declined and left AMA at that time citing CBT to take care of her special needs child and .  Patient was able to get her sister to visit and take care of her family so she presented to the ER for admission.  Reported episodic dizziness that responded to Antivert.  In the ER patient was mildly hypertensive 166/100.  Labs were unremarkable.  Urinalysis was negative.  Chest x-ray was unremarkable.  Patient was admitted for further workup and neurology was consulted.    Assessment/Plan:      Current Principal Problem:  Dizziness    Vertigo, posterior circulation stroke?,  History of lacunar strokes  Anxiety and bipolar disorder  Morbid obesity and hyperlipidemia  Prurigo nodularis on dupixent     Plan:  *1/14/2025 CT head, CT angiogram of the head and neck negative for acute pathology  EKG reviewed independently without cardiology, normal sinus rhythm  Neurochecks every 4 hours  A1c  Lipid panel  Aspirin  High intensity statin  PT OT vestibular therapy evaluation  Neurology 
  Physician Progress Note      PATIENT:               SHAYY BUSH  [facility] #:                  [id number]  :                       1960  ADMIT DATE:       2025 11:38 AM  DISCH DATE:  RESPONDING  PROVIDER #:        Guanako Odom MD          QUERY TEXT:    Internal Medicine,    Pt admitted with dizziness, chest pain and imbalance. The cause for the   symptoms is unclear. If possible, please clarify in progress notes and   discharge summary the cause of he  dizziness and chest pain.    The medical record reflects the following:  Risk Factors: PMH CVA  Clinical Indicators: verio, posterior circulation stroke?, ED physician   documents patient is mildly hypertensive, DBP>100, H&P documents patient   mildly anxious  Treatment: labs, imaging, Neurology consult, medical management    Thank you  Options provided:  -- Chest pain and dizziness due to  posterior circulation stroke  -- Chest pain and dizziness due to  anxiety  -- Chest pain and dizziness due to, please document cause  -- Other - I will add my own diagnosis  -- Disagree - Not applicable / Not valid  -- Disagree - Clinically unable to determine / Unknown  -- Refer to Clinical Documentation Reviewer    PROVIDER RESPONSE TEXT:    This patient has chest pain and dizziness due to Of unknown origin.    Query created by: Parul Nj on 2025 12:41 PM      Electronically signed by:  Guanako Odom MD 2025 3:09 PM          
4 Eyes Skin Assessment     NAME:  Terri Shankar  YOB: 1960  MEDICAL RECORD NUMBER:  [id number]    The patient is being assessed for  Admission    I agree that at least one RN has performed a thorough Head to Toe Skin Assessment on the patient. ALL assessment sites listed below have been assessed.      Areas assessed by both nurses:    Head, Face, Ears, Shoulders, Back, Chest, Arms, Elbows, Hands, Sacrum. Buttock, Coccyx, Ischium, Legs. Feet and Heels, and Under Medical Devices         Does the Patient have a Wound? No noted wound(s)   Skin lesions onh lateral left thigh and right arm (previously defined skin condition)    Damian Prevention initiated by RN: No  Wound Care Orders initiated by RN: No    Pressure Injury (Stage 3,4, Unstageable, DTI, NWPT, and Complex wounds) if present, place Wound referral order by RN under : No    New Ostomies, if present place, Ostomy referral order under : No     Nurse 1 eSignature: Electronically signed by Yajaira Payan RN on 1/18/25 at 2:22 PM EST    **SHARE this note so that the co-signing nurse can place an eSignature**    Nurse 2 eSignature: {Esignature:[id number]}   
No acute events overnight. VSS. Anxiety medicated per mar. Continue plan of care     
PACU Transfer Note    Vitals:    01/21/25 0905   BP: 116/74   Pulse: 90   Resp: 19   Temp: 97.7 °F (36.5 °C)   SpO2: 92%       In: 1000 [I.V.:1000]  Out: -     Pain assessment:  none  Pain Level: 0    Report given to Receiving unit RN.    1/21/2025 9:30 AM      
PT off floor to MRI.  
PT returned to floor from MRI.  
[facility]  Attached to PACU monitoring system and report received from anesthesia provider.  Patient was reported to be hemodynamically stable during procedure.  Patient drowsy on admission and denied pain.  
Patient anxious intermittently throughout shift. VSS. Call light in reach.   
Patient continues to be non-compliant with bed alarm. RN educated on importance of utilizing call light and waiting for staff to respond before getting up. Charge nurse made aware of patient's refusal to follow policy regarding call light/bed alarm.   
Patient has concerns about the dizziness and skin condition ( has sores as well), if it could be related to the heavy black mold they just had removed in their house. Will pass on to dayshift to make MD aware and told patient to express concern to provider.   
Patient is alert and oriented x4. VSS on room air with exceptions to elevated blood pressure. Medications given per MAR, no side effects noted. Patient ambulating x1 with gait belt and walker. No complaints of pain, continuing to monitor and manage per MAR. Voiding well via BRP, x1 bm this shift.     Patient has been uncooperative/non compliant with bed alarm and continues to get out of bed without notifying nurse. Charge nurse made aware. RN educated patient on the importance of alerting nurse to get out of bed. Patient stated \"my daughter is a doctor and she won't allow me to be on camera or use the bed alarm\".      Patient is currently resting in bed with bed alarm on for safety. Call light within reach and all fall precautions in place. Plan of care continues. Patient NPO at midnight for MRI with anesthesia tomorrow.   
Patient is alert and oriented x4. VSS on room air. Medications given per MAR, no side effects noted. Patient ambulating x1 with gait belt and walker. No complaints of pain, continuing to monitor and manage per MAR. Voiding well via BRP, no bowel movements.     Discharge paperwork reviewed, all questions answered. IV removed without complications. Pt discharged to home via [employer].  
Patient remains a&ox4; on room air; continent of b/b; patient states she is claustrophobic and would like to be sedated for her MRI; MD notified & MRI department also notified and will set patient up for this procedure in the future. Patient did c/o pain dizziness once during the shift but declined any meds.   
Physical Therapy  Facility/Department: [facility] ORTHO/NEURO  Physical Therapy Initial Assessment/Treatment/Discharge Summary     Name: Terri Shankar  : 1960  MRN: [id number]  Date of Service: 2025    Discharge Recommendations:  Home with assist PRN   PT Equipment Recommendations  Equipment Needed: No      Patient Diagnosis(es): The encounter diagnosis was Dizziness.  Past Medical History:  has a past medical history of Anxiety, Bipolar 1 disorder (HCC), Depression, Dyspareunia in female, Elevated transaminase level, Hemorrhoids, History of tubal ligation, HLD (hyperlipidemia), Hypertension, Irritable bowel syndrome, Lacunar stroke (HCC), Menopause, Obesity, Obesity (BMI 30-39.9), Overactive bladder, Prurigo nodularis, Stress incontinence, TIA (transient ischemic attack), Urinary incontinence, Uterine fibroid, and Yeast vaginitis.  Past Surgical History:  has a past surgical history that includes Eye surgery (Left); Tonsillectomy; Tubal ligation (); Colonoscopy (); and Colonoscopy ().    Assessment  Assessment: 64 y.o. female who presents with dizziness. She has a history of anxiety, bipolar disorder, HLD, obesity, lacunar strokes, and TIAs. Pt currently requiring SBA for bed mobility, transfers and amb without AD. No reports of dizziness throughout session. Pt seemingly very near/at her functional baseline. Pt with no further acute PT needs at this time. Will sign off.  Therapy Prognosis: Good  Decision Making: Low Complexity  Barriers to Learning: none  Requires PT Follow-Up: No  Activity Tolerance  Activity Tolerance: Patient tolerated evaluation without incident;Patient tolerated treatment well    Plan  Physical Therapy Plan  General Plan: Discharge with evaluation only  Safety Devices  Type of Devices: Left in chair, Gait belt, Nurse notified, Chair alarm in place, Call light within reach    Restrictions  Position Activity Restriction  Other Position/Activity Restrictions: up as tolerated 
Pt educated on the importance of bed alarm, and to utilize nurse call button when needing to get out of bed. Pt verbalizes understanding, but continues to get out of bed without help of RN.   
Pt refusing blood pressure. RN educated on importance of taking blood pressure. Pt refuses despite education.   
body clothing?: None  How much help is needed for taking care of personal grooming?: None  How much help for eating meals?: None  AM-PAC Inpatient Daily Activity Raw Score: 22  AM-PAC Inpatient ADL T-Scale Score : 47.1  ADL Inpatient CMS 0-100% Score: 25.8  ADL Inpatient CMS G-Code Modifier : CJ        Therapy Time   Individual Concurrent Group Co-treatment   Time In 1417         Time Out 1443         Minutes 26             Timed Code Treatment Minutes:  11 Minutes    Total Treatment Minutes:  26 mins      Karoline Thomas OTR/ABDIRAHMAN

## 2025-01-21 NOTE — PLAN OF CARE
Problem: Safety - Adult  Goal: Free from fall injury  1/20/2025 2205 by Marycruz Steinberg, RN  Outcome: Progressing  Flowsheets (Taken 1/20/2025 2000)  Free From Fall Injury: Instruct family/caregiver on patient safety  1/20/2025 0920 by Ksenia Ross, RN  Outcome: Progressing  Flowsheets (Taken 1/20/2025 0750)  Free From Fall Injury: Instruct family/caregiver on patient safety   All fall precautions in place. Bed locked and in lowest position with alarm on. Overbed table and personal belonings within reach. Call light within reach and patient instructed to use call light for assistance. Non-skid socks on.    Problem: Skin/Tissue Integrity  Goal: Absence of new skin breakdown  Description: 1.  Monitor for areas of redness and/or skin breakdown  2.  Assess vascular access sites hourly  3.  Every 4-6 hours minimum:  Change oxygen saturation probe site  4.  Every 4-6 hours:  If on nasal continuous positive airway pressure, respiratory therapy assess nares and determine need for appliance change or resting period.  Outcome: Progressing   Skin assessed this shift. Maggy care completed as necessary. Patient alternating positions to reduce pressure and prevent skin injury q2 and as needed.

## 2025-01-21 NOTE — DISCHARGE SUMMARY
V2.0  Discharge Summary    Name:  Terri Shankar /Age/Sex: 1960 (64 y.o. female)   Admit Date: 2025  Discharge Date: 25    MRN & CSN:  [id number] & [id number] Encounter Date and Time 25 1:19 PM EST    Attending:  Guanako Hoff* Discharging Provider: Guanako Odom MD       Hospital Course:     64-year-old female with a past medical history of anxiety and bipolar disorder, morbid obesity, hyperlipidemia, history of TIA and lacunar strokes, severe prurigo nodularis on dupixent who presented to the ER on 2025 for evaluation of vertiginous symptoms.  Was evaluated in the ER with a CT head and CT angiogram of the head and neck which were unremarkable.  Patient's symptoms resolved with medications.  Patient was advised admission for stroke evaluation however she declined and left AMA at that time citing CBT to take care of her special needs child and .  Patient was able to get her sister to visit and take care of her family so she presented to the ER for admission.  Reported episodic dizziness that responded to Antivert.  In the ER patient was mildly hypertensive 166/100.  Labs were unremarkable.  Urinalysis was negative.  Chest x-ray was unremarkable.  Patient was admitted for further workup and neurology was consulted. Brain imaging not showing any central causes for dizziness.     Was foound with chronic right MCA aneurysm.     Neurology recommending no further inpatient workup. Patient recommended to follow up with vascular neurology for aneurysm and outpatient referral to ENT for peripheral causes of dizziness. On 2025, patient was deemed stable for discharge.     Dizziness  Vertigo  -Recommend referral to outpatient ENT for peripheral causes of vertigo    HTN  -Continue Losartan at discharge    GERD    Chronic right MCA aneurysm  -Recommend outpatient referral to Vascular neurology at .       The patient expressed appropriate understanding of, and

## 2025-01-21 NOTE — PLAN OF CARE
Problem: Discharge Planning  Goal: Discharge to home or other facility with appropriate resources  Outcome: Progressing  Flowsheets  Taken 1/21/2025 1215  Discharge to home or other facility with appropriate resources: Identify barriers to discharge with patient and caregiver  Pt involved in discharge planning. Barriers to discharge discussed with patient. Discharge learning needs identified. Discuss with patient any additional needed resources and transportation plans. Case management following plan of care.      Problem: Pain  Goal: Verbalizes/displays adequate comfort level or baseline comfort level  Outcome: Progressing  Flowsheets (Taken 1/21/2025 1200)  Verbalizes/displays adequate comfort level or baseline comfort level: Encourage patient to monitor pain and request assistance   Pt endorsing pain to no pain. Being treated with PRN pain medication, rest, and frequent repositioning with pillow support for comfort and pressure relief. Pt reports some relief from pain with above interventions.      Problem: Safety - Adult  Goal: Free from fall injury  Outcome: Progressing   All fall precautions in place. Bed locked and in lowest position with alarm on. Overbed table and personal belonings within reach. Call light within reach and patient instructed to use call light for assistance. Non-skid socks on.

## 2025-01-21 NOTE — ANESTHESIA PRE PROCEDURE
Resp: 15 16 16 16   Temp: 98.2 °F (36.8 °C) 97.9 °F (36.6 °C) 98.2 °F (36.8 °C) 97.9 °F (36.6 °C)   TempSrc: Oral Oral Oral Oral   SpO2:  94% 95% 92%   Weight:       Height:                                                  BP Readings from Last 3 Encounters:   01/21/25 (!) 150/82   01/14/25 116/87   11/11/24 (!) 161/85       NPO Status:                                                                                 BMI:   Wt Readings from Last 3 Encounters:   01/18/25 115.2 kg (254 lb)   01/14/25 115.2 kg (254 lb)   11/11/24 105.7 kg (233 lb)     Body mass index is 43.6 kg/m².    CBC:   Lab Results   Component Value Date/Time    WBC 7.7 01/21/2025 06:13 AM    RBC 5.07 01/21/2025 06:13 AM    HGB 14.2 01/21/2025 06:13 AM    HCT 43.4 01/21/2025 06:13 AM    MCV 85.6 01/21/2025 06:13 AM    RDW 14.5 01/21/2025 06:13 AM     01/21/2025 06:13 AM       CMP:   Lab Results   Component Value Date/Time     01/21/2025 06:13 AM    K 4.0 01/21/2025 06:13 AM     01/21/2025 06:13 AM    CO2 26 01/21/2025 06:13 AM    BUN 15 01/21/2025 06:13 AM    CREATININE 0.9 01/21/2025 06:13 AM    GFRAA >60 07/08/2022 12:39 PM    AGRATIO 1.2 01/14/2025 06:27 AM    LABGLOM 72 01/21/2025 06:13 AM    LABGLOM 83 04/09/2024 02:10 PM    GLUCOSE 105 01/21/2025 06:13 AM    CALCIUM 9.4 01/21/2025 06:13 AM    BILITOT 0.3 01/14/2025 06:27 AM    ALKPHOS 83 01/14/2025 06:27 AM    AST 34 01/14/2025 06:27 AM    ALT 62 01/14/2025 06:27 AM       POC Tests: No results for input(s): \"POCGLU\", \"POCNA\", \"POCK\", \"POCCL\", \"POCBUN\", \"POCHEMO\", \"POCHCT\" in the last 72 hours.    Coags:   Lab Results   Component Value Date/Time    PROTIME 12.7 01/14/2025 06:27 AM    INR 0.93 01/14/2025 06:27 AM       HCG (If Applicable):   Lab Results   Component Value Date    PREGTESTUR negative 02/25/2015        ABGs: No results found for: \"PHART\", \"PO2ART\", \"XAO1RDJ\", \"ZKG7EEG\", \"BEART\", \"G2TIURRN\"     Type & Screen (If Applicable):  No results found for: \"ABORH\",

## 2025-01-22 ENCOUNTER — CARE COORDINATION (OUTPATIENT)
Dept: CASE MANAGEMENT | Age: 65
End: 2025-01-22

## 2025-01-22 DIAGNOSIS — R42 DIZZINESS: Primary | ICD-10-CM

## 2025-01-22 LAB
CHOLEST SERPL-MCNC: 203 MG/DL (ref 0–199)
HDLC SERPL-MCNC: 42 MG/DL (ref 40–60)
LDLC SERPL CALC-MCNC: 125 MG/DL
TRIGL SERPL-MCNC: 180 MG/DL (ref 0–150)
VLDLC SERPL CALC-MCNC: 36 MG/DL

## 2025-01-22 NOTE — CARE COORDINATION
Care Transitions Note    Initial Call - Call within 2 business days of discharge: Yes    Attempted to reach patient for transitions of care follow up. Unable to reach patient.    Outreach Attempts:   HIPAA compliant voicemail left for patient.     Patient: Terri Shankar    Patient : 1960   MRN: [id number]    Reason for Admission: vertigo  Discharge Date: 25  RURS: Readmission Risk Score: 13.7    Last Discharge Facility       Date Complaint Diagnosis Description Type Department Provider    25 Chest Pain Dizziness ED to Hosp-Admission (Discharged) (ADMITTED) [facility] Guanako Hoff MD; M...            Was this an external facility discharge? No    Follow Up Appointment:   Patient does not have a follow up appointment scheduled at time of call.  UTR      Plan for follow-up on next business day.      LUPE THOMAS RN

## 2025-01-22 NOTE — CARE COORDINATION
Care Transitions Note    Initial Call - Call within 2 business days of discharge: Yes    Patient Current Location:  Home: [address]  [facility] [address]    Care Transition Nurse contacted the patient by telephone to perform post hospital discharge assessment, verified name and  as identifiers. Provided introduction to self, and explanation of the Care Transition Nurse role.     Patient: Terri Shankar    Patient : 1960   MRN: [id number]    Reason for Admission: vertigo  Discharge Date: 25  RURS: Readmission Risk Score: 13.7      Last Discharge Facility       Date Complaint Diagnosis Description Type Department Provider    25 Chest Pain Dizziness ED to Hosp-Admission (Discharged) (ADMITTED) [facility] Guanako Hoff MD; M...            Was this an external facility discharge? No    Additional needs identified to be addressed with provider   No needs identified             Method of communication with provider: none.    Patients top risk factors for readmission: medical condition-.    Interventions to address risk factors:   Education: .  Review of patient management of conditions/medications: .    Care Summary Note: Patient reports that she is doing well, resting at home.  Discussed discharge instructions and reviewed medications, 1111F completed.  She is taking the Cozaar as prescribed and reports that BP has been much better, she has it written down, does not have it with her at this time.  She declined this CTN scheduling a hospital follow up, she would like to do a VV, CTN will route message to PCP.  CTN will continue with outreach follow up calls.      Care Transition Nurse reviewed discharge instructions, medical action plan, and red flags with patient. The patient was given an opportunity to ask questions; all questions answered at this time.. The patient verbalized understanding.   Were discharge instructions available to patient? Yes.   Reviewed appropriate site of care

## 2025-01-23 ENCOUNTER — CARE COORDINATION (OUTPATIENT)
Dept: CASE MANAGEMENT | Age: 65
End: 2025-01-23

## 2025-01-23 NOTE — CARE COORDINATION
Patient reached out to CTN last evening and left  a VM after office hours.  CTN returned call today and she was looking for help with her developmentally disabled son.  She called  mobile crisis team [phone] and they came to her home.  They are helping her get set up with some possible respite care.  She is declining a  referral at this time and asked CTN to touch base with her on the referral next week.  She would like to see what  has to offer first.  CTN will continue with outreach follow up calls.

## 2025-01-24 ENCOUNTER — CARE COORDINATION (OUTPATIENT)
Dept: CASE MANAGEMENT | Age: 65
End: 2025-01-24

## 2025-01-24 ENCOUNTER — TELEMEDICINE (OUTPATIENT)
Dept: FAMILY MEDICINE CLINIC | Age: 65
End: 2025-01-24
Payer: COMMERCIAL

## 2025-01-24 DIAGNOSIS — I10 HYPERTENSION, UNSPECIFIED TYPE: Primary | ICD-10-CM

## 2025-01-24 PROCEDURE — G8427 DOCREV CUR MEDS BY ELIG CLIN: HCPCS | Performed by: NURSE PRACTITIONER

## 2025-01-24 PROCEDURE — 3017F COLORECTAL CA SCREEN DOC REV: CPT | Performed by: NURSE PRACTITIONER

## 2025-01-24 PROCEDURE — 99213 OFFICE O/P EST LOW 20 MIN: CPT | Performed by: NURSE PRACTITIONER

## 2025-01-24 PROCEDURE — 1111F DSCHRG MED/CURRENT MED MERGE: CPT | Performed by: NURSE PRACTITIONER

## 2025-01-24 RX ORDER — MELATONIN 5 MG
TABLET,CHEWABLE ORAL
COMMUNITY

## 2025-01-24 ASSESSMENT — ENCOUNTER SYMPTOMS
GASTROINTESTINAL NEGATIVE: 1
RESPIRATORY NEGATIVE: 1

## 2025-01-24 ASSESSMENT — PATIENT HEALTH QUESTIONNAIRE - PHQ9
SUM OF ALL RESPONSES TO PHQ QUESTIONS 1-9: 4
2. FEELING DOWN, DEPRESSED OR HOPELESS: NOT AT ALL
SUM OF ALL RESPONSES TO PHQ QUESTIONS 1-9: 4
SUM OF ALL RESPONSES TO PHQ QUESTIONS 1-9: 4
4. FEELING TIRED OR HAVING LITTLE ENERGY: NOT AT ALL
8. MOVING OR SPEAKING SO SLOWLY THAT OTHER PEOPLE COULD HAVE NOTICED. OR THE OPPOSITE, BEING SO FIGETY OR RESTLESS THAT YOU HAVE BEEN MOVING AROUND A LOT MORE THAN USUAL: SEVERAL DAYS
5. POOR APPETITE OR OVEREATING: SEVERAL DAYS
3. TROUBLE FALLING OR STAYING ASLEEP: SEVERAL DAYS
9. THOUGHTS THAT YOU WOULD BE BETTER OFF DEAD, OR OF HURTING YOURSELF: NOT AT ALL
10. IF YOU CHECKED OFF ANY PROBLEMS, HOW DIFFICULT HAVE THESE PROBLEMS MADE IT FOR YOU TO DO YOUR WORK, TAKE CARE OF THINGS AT HOME, OR GET ALONG WITH OTHER PEOPLE: NOT DIFFICULT AT ALL
SUM OF ALL RESPONSES TO PHQ QUESTIONS 1-9: 4
SUM OF ALL RESPONSES TO PHQ9 QUESTIONS 1 & 2: 0
7. TROUBLE CONCENTRATING ON THINGS, SUCH AS READING THE NEWSPAPER OR WATCHING TELEVISION: SEVERAL DAYS
1. LITTLE INTEREST OR PLEASURE IN DOING THINGS: NOT AT ALL
6. FEELING BAD ABOUT YOURSELF - OR THAT YOU ARE A FAILURE OR HAVE LET YOURSELF OR YOUR FAMILY DOWN: NOT AT ALL

## 2025-01-24 NOTE — PROGRESS NOTES
Terri Shankar (:  1960) is a Established patient, presenting virtually for evaluation of the following:    Assessment & Plan   Below is the assessment and plan developed based on review of pertinent history, physical exam, labs, studies, and medications.  Assessment & Plan  Hypertension, unspecified type    Continue losartan 25 mg daily.       Discussed the melatonin.  Patient is fine to continue with the over-the-counter.  She can take 2 for total of 10 mg nightly as needed.            Subjective   HPI  Patient presents today for hospital follow-up.  She was seen in the ER for dizziness she was admitted to rule out a stroke.  MRI was negative.  There was still a chronic right MCA aneurysm that she is supposed to follow-up with Vascular neurology at .  Patient states her blood pressures were elevated at the hospital and they started her on losartan 25 mg daily.  She states she has been monitoring her blood pressures at home and states they are staying stable.  She has been taking melatonin 5 mg to help with sleep and she wants to know if that is okay for her to take.      Review of Systems   Constitutional: Negative.    HENT: Negative.     Respiratory: Negative.     Cardiovascular: Negative.    Gastrointestinal: Negative.    Musculoskeletal: Negative.    Skin: Negative.    Neurological: Negative.    Hematological: Negative.    Psychiatric/Behavioral: Negative.            Objective   Patient-Reported Vitals  Patient-Reported Systolic (Top): 129 mmHg (after medication)  Patient-Reported Diastolic (Bottom): 91 mmHg  Patient-Reported Weight: 235 lbs  Patient-Reported Height: 5'4\"       Physical Exam  [INSTRUCTIONS:  \"[x]\" Indicates a positive item  \"[]\" Indicates a negative item  -- DELETE ALL ITEMS NOT EXAMINED]    Constitutional: [x] Appears well-developed and well-nourished [x] No apparent distress      [] Abnormal -     Mental status: [x] Alert and awake  [x] Oriented to person/place/time [x] Able to

## 2025-01-29 ENCOUNTER — CARE COORDINATION (OUTPATIENT)
Dept: CASE MANAGEMENT | Age: 65
End: 2025-01-29

## 2025-01-29 NOTE — CARE COORDINATION
Care Transitions Note    Follow Up Call     Patient: Terri Shankar                                 Patient : 1960   MRN: [id number]                             Reason for Admission: vertigo  Discharge Date: 25       RURS: Readmission Risk Score: 13.7    Patient Current Location:  Home: [facility] [address]  [facility] [address]    Care Transition Nurse contacted the patient by telephone. Verified name and  as identifiers.    Additional needs identified to be addressed with provider   No needs identified                 Method of communication with provider: none.    Care Summary Note: Patient reports that she is doing \"OK\", does have \"a little bit of dizziness and nausea at times\".  She is scheduled to follow up with neurovascular surgeon tomorrow.  She denies any questions or concerns at this time.  CTN will continue with outreach follow up calls.    Plan of care updates since last contact:  Education: .  Review of patient management of conditions/medications: .       Advance Care Planning:   Does patient have an Advance Directive: reviewed during previous call, see note. .    Medication Review:  No changes since last call.     Remote Patient Monitoring:  Offered patient enrollment in the Remote Patient Monitoring (RPM) program for in-home monitoring: Deferred at this time because .; will discuss at next outreach.    HTN    Assessments:  Care Transitions Subsequent and Final Call    Subsequent and Final Calls  Do you have any ongoing symptoms?: No  Have your medications changed?: No  Do you have any questions related to your medications?: No  Do you currently have any active services?: No  Are you currently active with any services?: Home Health  Do you have any needs or concerns that I can assist you with?: No  Identified Barriers: None  Care Transitions Interventions  Other Interventions:              Follow Up Appointment:   Reviewed upcoming appointment(s).      Care Transition Nurse provided contact

## 2025-02-05 ENCOUNTER — CARE COORDINATION (OUTPATIENT)
Dept: CARE COORDINATION | Age: 65
End: 2025-02-05

## 2025-02-05 NOTE — CARE COORDINATION
Care Transitions Note    Follow Up Call     Patient Current Location:  Home: [address]  [facility] [address]    Care Transition Nurse contacted the patient by telephone. Verified name and  as identifiers.    Additional needs identified to be addressed with provider   No needs identified                 Method of communication with provider: none.    Care Summary Note: States she's doing better. Denies any new or worsening symptoms. Reports her occasional dizziness and nausea is getting better and occurring less often. States she has occasional CP and SOB at baseline when she does too much and that it resolves with rest. Denies any other symptoms or concerns. Reports taking medications as prescribed. She has cerebral angiogram scheduled for 3/14 with neurosurgery. Denies any questions or needs at this time. Reports taking meds as prescribed.     Plan of care updates since last contact:  Review of patient management of conditions/medications:         Advance Care Planning:   Does patient have an Advance Directive: reviewed during previous call, see note. .    Medication Review:  No changes since last call.     Remote Patient Monitoring:  Offered patient enrollment in the Remote Patient Monitoring (RPM) program for in-home monitoring: deferred.    Assessments:  Care Transitions Subsequent and Final Call    Subsequent and Final Calls  Care Transitions Interventions  Other Interventions:              Follow Up Appointment:   Reviewed upcoming appointment(s).      Care Transition Nurse provided contact information.  Plan for follow-up call in 6-10 days based on severity of symptoms and risk factors.  Plan for next call: symptom management-   self management-       Sofia Chung

## 2025-02-07 ENCOUNTER — TELEPHONE (OUTPATIENT)
Dept: FAMILY MEDICINE CLINIC | Age: 65
End: 2025-02-07

## 2025-02-07 DIAGNOSIS — E66.813 CLASS 3 SEVERE OBESITY DUE TO EXCESS CALORIES WITH SERIOUS COMORBIDITY AND BODY MASS INDEX (BMI) OF 40.0 TO 44.9 IN ADULT: Primary | ICD-10-CM

## 2025-02-07 DIAGNOSIS — E66.01 CLASS 3 SEVERE OBESITY DUE TO EXCESS CALORIES WITH SERIOUS COMORBIDITY AND BODY MASS INDEX (BMI) OF 40.0 TO 44.9 IN ADULT: Primary | ICD-10-CM

## 2025-02-11 NOTE — TELEPHONE ENCOUNTER
Submitted PA for Wegovy 0.25MG/0.5ML auto-injectors  Via CMM Key: [id number] STATUS: PENDING.    Follow up done daily; if no decision with in three days we will refax.  If another three days goes by with no decision will call the insurance for status.

## 2025-02-12 ENCOUNTER — CARE COORDINATION (OUTPATIENT)
Dept: CASE MANAGEMENT | Age: 65
End: 2025-02-12

## 2025-02-12 NOTE — CARE COORDINATION
Care Transitions Note    Follow Up Call     Attempted to reach patient for transitions of care follow up.  Unable to reach patient.      Outreach Attempts:   HIPAA compliant voicemail left for patient.     Care Summary Note: Follow up outreach call attempt, no answer.  CTN left VM with contact information and request for return call.  CTN will continue with outreach call attempts.      Follow Up Appointment:       Plan for follow-up call in 6-10 days based on severity of symptoms and risk factors. Plan for next call: symptom management-.  self management-.  follow-up appointment-.    LUPE THOMAS RN

## 2025-02-12 NOTE — TELEPHONE ENCOUNTER
The medication is APPROVED 12/31/2025.    If this requires a response please respond to the pool ( P MHC[facility] P[facility] MEDICATION PRE-AUTH).      Thank you please advise patient.

## 2025-02-17 ENCOUNTER — TELEPHONE (OUTPATIENT)
Dept: FAMILY MEDICINE CLINIC | Age: 65
End: 2025-02-17

## 2025-02-17 NOTE — TELEPHONE ENCOUNTER
Contacted pt- Pt states that she is unsure if she wants to start taking Wegovy. pt heard a nurse  while taking this medication and is having second thoughts. Pt states that she does not feel comfortable taking this medication right now and will try swimming and walking to lose weight.

## 2025-02-19 ENCOUNTER — CARE COORDINATION (OUTPATIENT)
Dept: CASE MANAGEMENT | Age: 65
End: 2025-02-19

## 2025-02-19 NOTE — CARE COORDINATION
Care Transitions Note    Final Call     Patient Current Location:  Home: [address]  [facility] [address]    Care Transition Nurse contacted the patient by telephone. Verified name and  as identifiers.    Patient graduated from the Care Transitions program on 25.  Patient/family verbalizes confidence in the ability to self-manage at this time..      Advance Care Planning:   Does patient have an Advance Directive: reviewed and current.    Handoff:   Patient was not referred to the ACM team due to no additional needs identified.       Care Summary Note: Final outreach call, patient is doing well.  She has a prescription for wegovy, unsure if she wants to take this medication.  CTN discussed some of the risks and possible benefits, she will discus further with her provider.  She denies any questions or concerns at this time and has an upcoming brain angiogram at [facility].  CTN confirmed that patient has CTN contact information and will remain available.  CTN will close program at this time.    Assessments:  Care Transitions Subsequent and Final Call    Subsequent and Final Calls  Do you have any ongoing symptoms?: No  Have your medications changed?: No  Do you have any questions related to your medications?: No  Do you currently have any active services?: No  Are you currently active with any services?: Home Health  Do you have any needs or concerns that I can assist you with?: No  Identified Barriers: None  Care Transitions Interventions  Other Interventions:              Upcoming Appointments:        Patient has agreed to contact primary care provider and/or specialist for any further questions, concerns, or needs.    LUPE THOMAS RN

## 2025-02-22 NOTE — PROGRESS NOTES
Remote Patient Order Discontinued    Received request from Jose Tamayo RN  to discontinue order for remote patient monitoring of HTN and order completed. Subjective   Patient ID: Rani Valenzuela is a 31 y.o. female who presents for New Patient Visit (npv).    HPI  30 yo female with PsA  Psoriasis, arthritis  She reports hand joint pain and foot pain  Joint pain started a few years ago  She used some medications  She has also psoriasis  She used clobetasol for skin  She used SAZ which helped her pain, then she stopped it  Not using any meds  Then, she tried another medication  She reports swollen joints, and AM stiffness lasts around one hour  She has psoriasis in elbows, scalps  Denies uveitis, diarrhea  Does not have heel pain or LBP  ROS  Joint pain in hands: pos  Joint swelling: pos  Morning stiffness and duration: one hour   strength: normal  Oral ulcer: negative  Genital ulcer: negative  Raynaud phenomenon: negative  Chest pain/dyspnea: negative  Low back pain: negative  Visual problem: negative  Dry eyes/dry mouth: negative  Skin rash/scaling/psoriasis: pos      Objective     PEXAM  VS reviewed, WNL  General: Alert, no distress   HEENT: Normocephalic/atraumatic, No alopecia. PERRLA. Sclera white, conjunctiva pink, no malar rash. no oral or nasal ulcer. Oral cavity pink and moist, no erythema or exudate, dentition good.   Neck: supple  Respiratory: CTA B, no adventitious breath sounds  Cardiac: RRR, no murmurs, carotid, or bruits  Abdominal: symmetrical, soft, non-tender, non-distended, normoactive BSx4 quadrants, no CVA tenderness or suprapubic tenderness  MSK: Joints of upper and lower extremities were assessed for synovitis and ROM.    B/L DIP synovitis and left 2 toe, right first MTP dactylitis  +nail changes, onycholysis  Extremities: no clubbing, no cyanosis, no edema  Skin: Skin warm and moist.   Neuro: non-focal, Strength 5/5 throughout. Normal gait. No cerebellar pathologic exam     Assessment/Plan    30 yo female with PsA  Her symptoms started a few years ago  She used SAZ and her symptoms improved, but stopped it because of remission  PExam showed B/L  DIP synovitis and left 2 toe, right first MTP dactylitis  +nail changes, onycholysis  She has PsA involving DIP joints and dactylitis  -will see her tests  -will consider to start Otezla

## 2025-02-24 DIAGNOSIS — L28.1 PRURIGO NODULARIS: ICD-10-CM

## 2025-02-24 RX ORDER — ONDANSETRON 4 MG/1
4 TABLET, FILM COATED ORAL 3 TIMES DAILY PRN
Qty: 30 TABLET | Refills: 0 | Status: SHIPPED | OUTPATIENT
Start: 2025-02-24

## 2025-02-24 NOTE — TELEPHONE ENCOUNTER
Last Office Visit  -  1/24/25  Next Office Visit  -  n/a    Last Filled  -  12/9/24  Last UDS -    Contract -

## 2025-03-14 ENCOUNTER — HOSPITAL ENCOUNTER (INPATIENT)
Age: 65
LOS: 7 days | Discharge: HOME | DRG: 062 | End: 2025-03-21
Attending: NEUROLOGICAL SURGERY | Admitting: NEUROLOGICAL SURGERY
Payer: MEDICARE

## 2025-03-14 ENCOUNTER — APPOINTMENT (OUTPATIENT)
Dept: CT IMAGING | Age: 65
DRG: 062 | End: 2025-03-14
Attending: NEUROLOGICAL SURGERY
Payer: MEDICARE

## 2025-03-14 DIAGNOSIS — I48.0 PAROXYSMAL ATRIAL FIBRILLATION (HCC): ICD-10-CM

## 2025-03-14 DIAGNOSIS — I63.412 CEREBROVASCULAR ACCIDENT (CVA) DUE TO EMBOLISM OF LEFT MIDDLE CEREBRAL ARTERY (HCC): Primary | ICD-10-CM

## 2025-03-14 DIAGNOSIS — I72.9 PSEUDOANEURYSM: ICD-10-CM

## 2025-03-14 DIAGNOSIS — I67.1 CEREBRAL ANEURYSM, NONRUPTURED: ICD-10-CM

## 2025-03-14 PROBLEM — I63.9 STROKE, ACUTE, THROMBOTIC (HCC): Status: ACTIVE | Noted: 2025-03-14

## 2025-03-14 LAB
ANION GAP SERPL CALCULATED.3IONS-SCNC: 10 MMOL/L (ref 3–16)
BILIRUB UR QL STRIP.AUTO: NEGATIVE
BUN SERPL-MCNC: 16 MG/DL (ref 7–20)
CALCIUM SERPL-MCNC: 9.3 MG/DL (ref 8.3–10.6)
CHLORIDE SERPL-SCNC: 104 MMOL/L (ref 99–110)
CLARITY UR: CLEAR
CO2 SERPL-SCNC: 25 MMOL/L (ref 21–32)
COLOR UR: YELLOW
CREAT SERPL-MCNC: 0.8 MG/DL (ref 0.6–1.2)
DEPRECATED RDW RBC AUTO: 14.5 % (ref 12.4–15.4)
GFR SERPLBLD CREATININE-BSD FMLA CKD-EPI: 83 ML/MIN/{1.73_M2}
GLUCOSE SERPL-MCNC: 94 MG/DL (ref 70–99)
GLUCOSE UR STRIP.AUTO-MCNC: NEGATIVE MG/DL
HCT VFR BLD AUTO: 43.3 % (ref 36–48)
HGB BLD-MCNC: 14.4 G/DL (ref 12–16)
HGB UR QL STRIP.AUTO: NEGATIVE
INR PPP: 0.91 (ref 0.85–1.15)
KETONES UR STRIP.AUTO-MCNC: NEGATIVE MG/DL
LEUKOCYTE ESTERASE UR QL STRIP.AUTO: NEGATIVE
MCH RBC QN AUTO: 28.4 PG (ref 26–34)
MCHC RBC AUTO-ENTMCNC: 33.3 G/DL (ref 31–36)
MCV RBC AUTO: 85.5 FL (ref 80–100)
NITRITE UR QL STRIP.AUTO: NEGATIVE
PH UR STRIP.AUTO: 8 [PH] (ref 5–8)
PLATELET # BLD AUTO: 216 K/UL (ref 135–450)
PMV BLD AUTO: 7.8 FL (ref 5–10.5)
POTASSIUM SERPL-SCNC: 4.1 MMOL/L (ref 3.5–5.1)
PROT UR STRIP.AUTO-MCNC: NEGATIVE MG/DL
PROTHROMBIN TIME: 12.4 SEC (ref 11.9–14.9)
RBC # BLD AUTO: 5.07 M/UL (ref 4–5.2)
SODIUM SERPL-SCNC: 139 MMOL/L (ref 136–145)
SP GR UR STRIP.AUTO: 1.01 (ref 1–1.03)
UA COMPLETE W REFLEX CULTURE PNL UR: NORMAL
UA DIPSTICK W REFLEX MICRO PNL UR: NORMAL
URN SPEC COLLECT METH UR: NORMAL
UROBILINOGEN UR STRIP-ACNC: 0.2 E.U./DL
WBC # BLD AUTO: 8.4 K/UL (ref 4–11)

## 2025-03-14 PROCEDURE — 36224 PLACE CATH CAROTD ART: CPT | Performed by: NEUROLOGICAL SURGERY

## 2025-03-14 PROCEDURE — 99153 MOD SED SAME PHYS/QHP EA: CPT | Performed by: NEUROLOGICAL SURGERY

## 2025-03-14 PROCEDURE — 85610 PROTHROMBIN TIME: CPT

## 2025-03-14 PROCEDURE — 6370000000 HC RX 637 (ALT 250 FOR IP): Performed by: NEUROLOGICAL SURGERY

## 2025-03-14 PROCEDURE — B318ZZZ FLUOROSCOPY OF BILATERAL INTERNAL CAROTID ARTERIES: ICD-10-PCS | Performed by: NEUROLOGICAL SURGERY

## 2025-03-14 PROCEDURE — 70450 CT HEAD/BRAIN W/O DYE: CPT

## 2025-03-14 PROCEDURE — 6360000002 HC RX W HCPCS: Performed by: NEUROLOGICAL SURGERY

## 2025-03-14 PROCEDURE — 2709999900 HC NON-CHARGEABLE SUPPLY: Performed by: NEUROLOGICAL SURGERY

## 2025-03-14 PROCEDURE — 51798 US URINE CAPACITY MEASURE: CPT

## 2025-03-14 PROCEDURE — 81003 URINALYSIS AUTO W/O SCOPE: CPT

## 2025-03-14 PROCEDURE — 2000000000 HC ICU R&B

## 2025-03-14 PROCEDURE — 51702 INSERT TEMP BLADDER CATH: CPT

## 2025-03-14 PROCEDURE — 36415 COLL VENOUS BLD VENIPUNCTURE: CPT

## 2025-03-14 PROCEDURE — 6360000004 HC RX CONTRAST MEDICATION: Performed by: NEUROLOGICAL SURGERY

## 2025-03-14 PROCEDURE — 80048 BASIC METABOLIC PNL TOTAL CA: CPT

## 2025-03-14 PROCEDURE — 70496 CT ANGIOGRAPHY HEAD: CPT

## 2025-03-14 PROCEDURE — 85025 COMPLETE CBC W/AUTO DIFF WBC: CPT

## 2025-03-14 PROCEDURE — 36226 PLACE CATH VERTEBRAL ART: CPT | Performed by: NEUROLOGICAL SURGERY

## 2025-03-14 PROCEDURE — C1760 CLOSURE DEV, VASC: HCPCS | Performed by: NEUROLOGICAL SURGERY

## 2025-03-14 PROCEDURE — 85384 FIBRINOGEN ACTIVITY: CPT

## 2025-03-14 PROCEDURE — 2500000003 HC RX 250 WO HCPCS: Performed by: NEUROLOGICAL SURGERY

## 2025-03-14 PROCEDURE — 6360000002 HC RX W HCPCS

## 2025-03-14 PROCEDURE — 74174 CTA ABD&PLVS W/CONTRAST: CPT

## 2025-03-14 PROCEDURE — C1757 CATH, THROMBECTOMY/EMBOLECT: HCPCS | Performed by: NEUROLOGICAL SURGERY

## 2025-03-14 PROCEDURE — 85027 COMPLETE CBC AUTOMATED: CPT

## 2025-03-14 PROCEDURE — C1769 GUIDE WIRE: HCPCS | Performed by: NEUROLOGICAL SURGERY

## 2025-03-14 PROCEDURE — 6360000004 HC RX CONTRAST MEDICATION

## 2025-03-14 PROCEDURE — 2580000003 HC RX 258

## 2025-03-14 PROCEDURE — C1887 CATHETER, GUIDING: HCPCS | Performed by: NEUROLOGICAL SURGERY

## 2025-03-14 PROCEDURE — 76377 3D RENDER W/INTRP POSTPROCES: CPT | Performed by: NEUROLOGICAL SURGERY

## 2025-03-14 PROCEDURE — 85730 THROMBOPLASTIN TIME PARTIAL: CPT

## 2025-03-14 PROCEDURE — 3E06317 INTRODUCTION OF OTHER THROMBOLYTIC INTO CENTRAL ARTERY, PERCUTANEOUS APPROACH: ICD-10-PCS | Performed by: NEUROLOGICAL SURGERY

## 2025-03-14 PROCEDURE — C1894 INTRO/SHEATH, NON-LASER: HCPCS | Performed by: NEUROLOGICAL SURGERY

## 2025-03-14 PROCEDURE — 99152 MOD SED SAME PHYS/QHP 5/>YRS: CPT | Performed by: NEUROLOGICAL SURGERY

## 2025-03-14 PROCEDURE — 61645 PERQ ART M-THROMBECT &/NFS: CPT | Performed by: NEUROLOGICAL SURGERY

## 2025-03-14 PROCEDURE — 99291 CRITICAL CARE FIRST HOUR: CPT | Performed by: STUDENT IN AN ORGANIZED HEALTH CARE EDUCATION/TRAINING PROGRAM

## 2025-03-14 DEVICE — ANGIO-SEAL VIP VASCULAR CLOSURE DEVICE
Type: IMPLANTABLE DEVICE | Status: FUNCTIONAL
Brand: ANGIO-SEAL

## 2025-03-14 RX ORDER — ASPIRIN 81 MG/1
81 TABLET ORAL DAILY
Status: DISCONTINUED | OUTPATIENT
Start: 2025-03-15 | End: 2025-03-14 | Stop reason: ALTCHOICE

## 2025-03-14 RX ORDER — ONDANSETRON 4 MG/1
4 TABLET, FILM COATED ORAL 3 TIMES DAILY PRN
Status: DISCONTINUED | OUTPATIENT
Start: 2025-03-14 | End: 2025-03-15

## 2025-03-14 RX ORDER — SODIUM CHLORIDE 0.9 % (FLUSH) 0.9 %
10 SYRINGE (ML) INJECTION ONCE
Status: COMPLETED | OUTPATIENT
Start: 2025-03-14 | End: 2025-03-14

## 2025-03-14 RX ORDER — ARIPIPRAZOLE 5 MG/1
20 TABLET ORAL NIGHTLY
Status: DISCONTINUED | OUTPATIENT
Start: 2025-03-14 | End: 2025-03-21 | Stop reason: HOSPADM

## 2025-03-14 RX ORDER — IOPAMIDOL 755 MG/ML
75 INJECTION, SOLUTION INTRAVASCULAR
Status: COMPLETED | OUTPATIENT
Start: 2025-03-14 | End: 2025-03-14

## 2025-03-14 RX ORDER — ATORVASTATIN CALCIUM 40 MG/1
40 TABLET, FILM COATED ORAL NIGHTLY
Status: DISCONTINUED | OUTPATIENT
Start: 2025-03-14 | End: 2025-03-21 | Stop reason: HOSPADM

## 2025-03-14 RX ORDER — LOSARTAN POTASSIUM 25 MG/1
25 TABLET ORAL DAILY
Status: DISCONTINUED | OUTPATIENT
Start: 2025-03-14 | End: 2025-03-18

## 2025-03-14 RX ORDER — ENOXAPARIN SODIUM 100 MG/ML
40 INJECTION SUBCUTANEOUS DAILY
Status: DISCONTINUED | OUTPATIENT
Start: 2025-03-15 | End: 2025-03-18

## 2025-03-14 RX ORDER — SODIUM CHLORIDE 9 MG/ML
INJECTION, SOLUTION INTRAVENOUS CONTINUOUS
Status: DISCONTINUED | OUTPATIENT
Start: 2025-03-14 | End: 2025-03-16

## 2025-03-14 RX ORDER — CLOBETASOL PROPIONATE 0.5 MG/G
CREAM TOPICAL 2 TIMES DAILY
Status: DISCONTINUED | OUTPATIENT
Start: 2025-03-14 | End: 2025-03-21 | Stop reason: HOSPADM

## 2025-03-14 RX ORDER — ACETAMINOPHEN 500 MG
1000 TABLET ORAL EVERY 6 HOURS PRN
Status: DISCONTINUED | OUTPATIENT
Start: 2025-03-14 | End: 2025-03-18

## 2025-03-14 RX ORDER — HYDROCODONE BITARTRATE AND ACETAMINOPHEN 5; 325 MG/1; MG/1
2 TABLET ORAL EVERY 4 HOURS PRN
Status: DISCONTINUED | OUTPATIENT
Start: 2025-03-14 | End: 2025-03-18

## 2025-03-14 RX ORDER — MICONAZOLE NITRATE 20 MG/G
CREAM TOPICAL 2 TIMES DAILY
Status: DISCONTINUED | OUTPATIENT
Start: 2025-03-14 | End: 2025-03-21 | Stop reason: HOSPADM

## 2025-03-14 RX ORDER — MIDAZOLAM HYDROCHLORIDE 1 MG/ML
INJECTION, SOLUTION INTRAMUSCULAR; INTRAVENOUS PRN
Status: DISCONTINUED | OUTPATIENT
Start: 2025-03-14 | End: 2025-03-14 | Stop reason: HOSPADM

## 2025-03-14 RX ORDER — POLYETHYLENE GLYCOL 3350 17 G/17G
17 POWDER, FOR SOLUTION ORAL DAILY PRN
Status: DISCONTINUED | OUTPATIENT
Start: 2025-03-14 | End: 2025-03-21 | Stop reason: HOSPADM

## 2025-03-14 RX ORDER — FENTANYL CITRATE 50 UG/ML
INJECTION, SOLUTION INTRAMUSCULAR; INTRAVENOUS PRN
Status: DISCONTINUED | OUTPATIENT
Start: 2025-03-14 | End: 2025-03-14 | Stop reason: HOSPADM

## 2025-03-14 RX ORDER — TRIAMCINOLONE ACETONIDE 1 MG/G
OINTMENT TOPICAL 2 TIMES DAILY
Status: DISCONTINUED | OUTPATIENT
Start: 2025-03-14 | End: 2025-03-21 | Stop reason: HOSPADM

## 2025-03-14 RX ORDER — ASPIRIN 300 MG/1
300 SUPPOSITORY RECTAL DAILY
Status: DISCONTINUED | OUTPATIENT
Start: 2025-03-15 | End: 2025-03-21 | Stop reason: HOSPADM

## 2025-03-14 RX ORDER — SODIUM CHLORIDE 0.9 % (FLUSH) 0.9 %
10 SYRINGE (ML) INJECTION ONCE
Status: DISCONTINUED | OUTPATIENT
Start: 2025-03-14 | End: 2025-03-17 | Stop reason: ALTCHOICE

## 2025-03-14 RX ORDER — HYDROCODONE BITARTRATE AND ACETAMINOPHEN 5; 325 MG/1; MG/1
1 TABLET ORAL EVERY 4 HOURS PRN
Status: DISCONTINUED | OUTPATIENT
Start: 2025-03-14 | End: 2025-03-18

## 2025-03-14 RX ORDER — LABETALOL HYDROCHLORIDE 5 MG/ML
INJECTION, SOLUTION INTRAVENOUS PRN
Status: DISCONTINUED | OUTPATIENT
Start: 2025-03-14 | End: 2025-03-14 | Stop reason: HOSPADM

## 2025-03-14 RX ORDER — HYDROMORPHONE HYDROCHLORIDE 1 MG/ML
0.5 INJECTION, SOLUTION INTRAMUSCULAR; INTRAVENOUS; SUBCUTANEOUS ONCE
Status: COMPLETED | OUTPATIENT
Start: 2025-03-14 | End: 2025-03-14

## 2025-03-14 RX ORDER — ASPIRIN 325 MG
325 TABLET ORAL ONCE
Status: COMPLETED | OUTPATIENT
Start: 2025-03-14 | End: 2025-03-14

## 2025-03-14 RX ORDER — ALBUTEROL SULFATE 0.83 MG/ML
2.5 SOLUTION RESPIRATORY (INHALATION) EVERY 6 HOURS PRN
Status: DISCONTINUED | OUTPATIENT
Start: 2025-03-14 | End: 2025-03-21 | Stop reason: HOSPADM

## 2025-03-14 RX ORDER — POLYETHYLENE GLYCOL 3350 17 G/17G
17 POWDER, FOR SOLUTION ORAL DAILY
Status: DISCONTINUED | OUTPATIENT
Start: 2025-03-14 | End: 2025-03-21 | Stop reason: HOSPADM

## 2025-03-14 RX ORDER — ACETAMINOPHEN 325 MG/1
650 TABLET ORAL EVERY 4 HOURS PRN
Status: DISCONTINUED | OUTPATIENT
Start: 2025-03-14 | End: 2025-03-14

## 2025-03-14 RX ORDER — ALBUTEROL SULFATE 90 UG/1
2 INHALANT RESPIRATORY (INHALATION) 4 TIMES DAILY PRN
Status: DISCONTINUED | OUTPATIENT
Start: 2025-03-14 | End: 2025-03-14 | Stop reason: CLARIF

## 2025-03-14 RX ORDER — ASPIRIN 81 MG/1
81 TABLET, CHEWABLE ORAL DAILY
Status: DISCONTINUED | OUTPATIENT
Start: 2025-03-15 | End: 2025-03-21 | Stop reason: HOSPADM

## 2025-03-14 RX ORDER — IOPAMIDOL 510 MG/ML
INJECTION, SOLUTION INTRAVASCULAR PRN
Status: DISCONTINUED | OUTPATIENT
Start: 2025-03-14 | End: 2025-03-14 | Stop reason: HOSPADM

## 2025-03-14 RX ADMIN — IOPAMIDOL 75 ML: 755 INJECTION, SOLUTION INTRAVENOUS at 22:00

## 2025-03-14 RX ADMIN — CLOBETASOL PROPIONATE CREAM USP, 0.05%: 0.5 CREAM TOPICAL at 22:30

## 2025-03-14 RX ADMIN — SODIUM CHLORIDE: 0.9 INJECTION, SOLUTION INTRAVENOUS at 22:55

## 2025-03-14 RX ADMIN — TENECTEPLASE 25 MG: KIT at 11:14

## 2025-03-14 RX ADMIN — SODIUM CHLORIDE, PRESERVATIVE FREE 10 ML: 5 INJECTION INTRAVENOUS at 13:43

## 2025-03-14 RX ADMIN — MICONAZOLE NITRATE: 20 CREAM TOPICAL at 22:31

## 2025-03-14 RX ADMIN — HYDROMORPHONE HYDROCHLORIDE 0.5 MG: 1 INJECTION, SOLUTION INTRAMUSCULAR; INTRAVENOUS; SUBCUTANEOUS at 21:41

## 2025-03-14 RX ADMIN — CLOBETASOL PROPIONATE CREAM USP, 0.05%: 0.5 CREAM TOPICAL at 16:08

## 2025-03-14 RX ADMIN — TRIAMCINOLONE ACETONIDE: 1 OINTMENT TOPICAL at 16:07

## 2025-03-14 RX ADMIN — IOPAMIDOL 75 ML: 755 INJECTION, SOLUTION INTRAVENOUS at 16:46

## 2025-03-14 RX ADMIN — MICONAZOLE NITRATE: 20 CREAM TOPICAL at 16:08

## 2025-03-14 RX ADMIN — ASPIRIN 325 MG: 325 TABLET ORAL at 10:08

## 2025-03-14 NOTE — PROGRESS NOTES
NAME:  Terri Shankar  YOB: 1960  MEDICAL RECORD NUMBER:  [id number]    TODAYS DATE:  3/14/2025      Last Known Well (date/time): 1115    Consent Obtained: Consent signed by family/POA at bedside and placed into paper chart     Endovascular Surgeon: marlys    Pre-procedure NIHSS: 0    Pre-procedure pulses: Right Pedal pulses: 2+                                       Left Pedal pulses: 2+     Final TICI score: TICI 3     Post-procedure NIHSS:15    Post-procedure pulses: Pedal pulses: 2+     Patient transported to ICU for post-procedural care and handoff completed, including NIHSS exam at bedside in ICU.    Hand off report given to ICU RN syed.      Nurse eSignature: Electronically signed by Siddhartha Bowling RN on 3/14/2025 at 12:32 PM

## 2025-03-14 NOTE — PLAN OF CARE
I have reassessed Ms. Shankar and note worsening aphasia compared to my post-thrombolysis exam and slightly less movement in RUE.    Plan emergency CT and CTA to r/o re-occlusion

## 2025-03-14 NOTE — PROCEDURES
DATE OF THE PROCEDURE: 3/14/2025  HISTORY OF PRESENT ILLNESS: 64 year old woman suspected to have a right middle cerebral artery aneurysm on non-invasive imaging presents for angiography to confirm the diagnosis.    OPERATORS: Rob Norris M.D., attending.     SUPERVISION AND INTERPRETATION: Dr. Rob Norris personally performed the technical portions of the procedure.  Following completion of the technical portions of the procedure, the angiographic images were reviewed at the neurography PACS work station by Dr. Norris.    PROCEDURE:  1. Diagnostic Cerebral Angiogram.  2. Infusion therapy for stroke thrombolysis, left middle cerebral artery.     VESSELS CATHETERIZED:   1. Right internal carotid artery.   2. Left internal carotid artery.   3. Left vertebral artery.   4. Right vertebral artery.     ANESTHESIA: Prior to the procedure, the patient's personal and family history were reviewed for any adverse reactions to anesthesia and no pertinent concerns were raised.  ASA thgthrthathdtheth:th th4th Malampati: III  Conscious sedation and intravenous anesthesia was given by the radiology nurse under direct supervision of the attending physician for 60 minutes. Monitored nursing care and medication reports are available on the chart. Local anesthesia was provided at the puncture site with 1% lidocaine.     MEDICATIONS GIVEN: Fentanyl IV and Versed IV, 1% lidocaine, subcutaneous.     RADIOCONTRAST: 100 mL Optiray 320 contrast IA.   EBL: 10 mL    DEVICES USED:   1. Micropuncture kit.   2. 5-[place] citizen sheath.   3. 0.035\" Glidewire.   4. 5-[place] citizen angled glide catheter.   5. BMX 96 long sheath  6. Zoom 71 aspiration catheter  7. Zoom 35 microcatheter  8. Traxcess 0.014\" wire  9. 8-[place] citizen AngioSeal closure device.     PROCEDURE TIME: 60 minutes.   FLUOROSCOPY TIME: 17.2 minutes.     CONSENT: Prior to the procedure, the technical aspect of the procedure as well as the potential risks and benefits were explained to the patient.

## 2025-03-14 NOTE — PROGRESS NOTES
Patient is more aphasic on 1530 assessment. She responds with \"yes\" to each orientation question, even when given options \"at home?\" \"At the hospital\". Other speech is incomprehensible. Prior to this, she was able to say her age and the month.  [facility] notified and Dr. Mendenhall bedside to assess.

## 2025-03-14 NOTE — CARE COORDINATION
Home Care received a Referral for Physical Therapy and Occupational Therapy. We have processed the referral for a Start of Care on 3/8-3/9/24.     If you have any questions or concerns, please feel free to contact us at [phone]. Follow the prompts, enter your five digit zip code, and you will be directed to your care team on WEST 2.      Case Management Assessment  Initial Evaluation    Date/Time of Evaluation: 3/14/2025 2:09 PM  Assessment Completed by: ANDREW Cortés    If patient is discharged prior to next notation, then this note serves as note for discharge by case management.    Patient Name: Terri Shankar                   YOB: 1960  Diagnosis: Cerebral aneurysm, nonruptured [I67.1]  Stroke, acute, thrombotic (HCC) [I63.9]  Stroke due to embolism of left middle cerebral artery (HCC) [I63.412]                   Date / Time: 3/14/2025  9:04 AM    Patient Admission Status: Inpatient   Readmission Risk (Low < 19, Mod (19-27), High > 27): Readmission Risk Score: 15.1    Current PCP: Marcia Abdi APRN - CNP  PCP verified by CM? Yes    Chart Reviewed: Yes      History Provided by: Patient  Patient Orientation: Person    Patient Cognition: Short Term Memory Deficit    Hospitalization in the last 30 days (Readmission):  No    If yes, Readmission Assessment in  Navigator will be completed.    Advance Directives:      Code Status: Full Code   Patient's Primary Decision Maker is: Legal Next of Kin    Primary Decision Maker: Celeste Bright - Child - [phone]    Primary Decision Maker: GisselUsama - Child - [phone]    Discharge Planning:    Patient lives with: Spouse/Significant Other, Children Type of Home: House  Primary Care Giver: Family  Patient Support Systems include: Children   Current Financial resources: None  Current community resources: None  Current services prior to admission: None            Current DME:              Type of Home Care services:  None    ADLS  Prior functional level: Independent in ADLs/IADLs  Current functional level: Assistance with the following:, Mobility    PT AM-PAC:   /24  OT AM-PAC:   /24    Family can provide assistance at DC: Yes  Would you like Case Management to discuss the discharge plan with any other family members/significant others, and if so, who? Yes  Plans to

## 2025-03-14 NOTE — PROGRESS NOTES
Speech-Language Pathology  Hold    Order received, chart reviewed, d/w RN who states pt just back from procedure and has to lie flat.  Therefore, will follow up next session as pt appropriate        MARU SULLIVAN M.S./[facility]-SLP #[id number]  Speech/language Pathologist  Pg. # [phone]

## 2025-03-14 NOTE — PROGRESS NOTES
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 She is reasonably very anxious about their wellbeing at home, and with moderate aphasia, communication has been challenging.    Patient's family aide Cira ([phone]) now bedside and updated on patient condition.     Several calls also relayed from patient's sister, April, who lives in [place] and will visit early next week.

## 2025-03-14 NOTE — SIGNIFICANT EVENT
NIH Stroke Scale  New onset stroke symptoms during angiogram      Interval: Baseline  Time: 11:44 AM  Person Administering Scale: Rob Norris MD    During angiography a clot was noted in the left MCA during angiography. I stopped the procedure to evaluate the patient      1a  Level of consciousness: 0=alert; keenly responsive   1b. LOC questions:  2=Performs neither task correctly   1c. LOC commands: 2=Performs neither task correctly   2.  Best Gaze: 0=normal   3.  Visual: 2=Complete hemianopia   4. Facial Palsy: 1=Minor paralysis (flattened nasolabial fold, asymmetric on smiling)   5a.  Motor left arm: 0=No drift, limb holds 90 (or 45) degrees for full 10 seconds   5b.  Motor right arm: 4=No movement   6a. motor left le=No drift, limb holds 90 (or 45) degrees for full 10 seconds   6b  Motor right le=No movement   7. Limb Ataxia: 0=Absent   8.  Sensory: 0=Normal; no sensory loss   9. Best Language:  3=Mute, global aphasia; no usable speech or auditory comprehension   10. Dysarthria: 2=Severe; patient speech is so slurred as to be unintelligible in the absence of or our of proportion to any dysphagia, or is mute/anarthric   11. Extinction and Inattention: 1=Visual, tactile, auditory, spatial or personal inattention or extinction to bilateral simultaneous stimulation in one of the sensory modalities   12. Distal motor function: 0=Normal    Total:   21

## 2025-03-14 NOTE — PROGRESS NOTES
Current NIHSS 18. Handoff with cath lab.    Nursing Core Measures for Stroke:   [x]   Education template documentation   [x]   Care Plan template documentation   []   Verified Swallow Screen completed prior to PO intake of food, drink, medications. NPO while flat.  [x]   VTE Prophylaxis: SCDs ordered/addressed; SCDs: On           (As a reminder, ASA, Plavix, and TPA/TNK are not VTE prophylaxis.)    Reviewed the Following Education with Patient and/or Family:   - Personalized risk factors for patient, along with changes, modifications that will help prevent stroke.  - Signs and Symptoms of Stroke: (Facial droop, weakness/numbness especially on one side, speech difficulty, sudden confusion, sudden loss of vision, sudden severe headache, sudden loss of balance or having difficulty walking, syncope, or seizure)  - How to activate EMS (911)   - Importance of Follow Up Appointments at Discharge   - Importance of Compliance with Medications Prescribed at Discharge  - Available community resources and stroke advocacy groups if needed    Patient and/or family member: education needs reinforcement.     Stroke Education booklet given to patient/family (or verified, if given already), which reviews above information. yes         Electronically signed by Mayte Ramsey RN on 3/14/2025 at 1:08 PM

## 2025-03-14 NOTE — PLAN OF CARE
Problem: Chronic Conditions and Co-morbidities  Goal: Patient's chronic conditions and co-morbidity symptoms are monitored and maintained or improved  Outcome: Progressing  Flowsheets  Taken 3/14/2025 1600  Care Plan - Patient's Chronic Conditions and Co-Morbidity Symptoms are Monitored and Maintained or Improved: Monitor and assess patient's chronic conditions and comorbid symptoms for stability, deterioration, or improvement  Taken 3/14/2025 1230  Care Plan - Patient's Chronic Conditions and Co-Morbidity Symptoms are Monitored and Maintained or Improved:   Monitor and assess patient's chronic conditions and comorbid symptoms for stability, deterioration, or improvement   Collaborate with multidisciplinary team to address chronic and comorbid conditions and prevent exacerbation or deterioration     Problem: Discharge Planning  Goal: Discharge to home or other facility with appropriate resources  Outcome: Progressing  Flowsheets  Taken 3/14/2025 1600  Discharge to home or other facility with appropriate resources:   Identify barriers to discharge with patient and caregiver   Arrange for needed discharge resources and transportation as appropriate  Taken 3/14/2025 1230  Discharge to home or other facility with appropriate resources: Identify barriers to discharge with patient and caregiver     Problem: Safety - Adult  Goal: Free from fall injury  Outcome: Progressing     Problem: Neurosensory - Adult  Goal: Achieves stable or improved neurological status  Outcome: Progressing  Goal: Achieves maximal functionality and self care  Outcome: Progressing

## 2025-03-14 NOTE — PROGRESS NOTES
Returned from CT/CTA.    Groin site noted to have increased bleeding (small) and ecchymosis. Pressure held for ten minutes with resolution of drainage. Patient again placed flat.  Groin site is soft.

## 2025-03-14 NOTE — H&P
History of Present Illness   HPI: Ms. 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 This was found on CTA done during ER workup to rule out stroke after she presented with spells of dizziness. She describes the symptoms as \"lightheaded and woozy\" rather than room spinning.     Vital Signs for Today's Encounter     Height: 5' 4'' Weight: 230.8 pounds    BMI: 39.61 (Patient was advised of the benefits of maintaining a healthy weight and counseled on methods to achieve that weight.)    Medical History       Prior neck/back surgery? no  Has pt. had any other surgery? no    Details: None of These  Comorbidities:hypertension,mental health disorder,none  Supplemental oxygen? no  Bleeding/clotting disorder? no  Blood thinner?none  Has the patient had angioplasty? no  Does patient have stents? no  Other implant: none    Allergies   Allergic to shellfish, contrast dye, or iodine? NKA  Allergic to latex? NKA  Allergic to metals/jewelry? NKA  Allergic to steroids? NKA  Allergic to antibiotics? NKA  Allergic to tape? NKA  Other allergies? no    Social History   Does the patient live with someone else? yes  Is this person able to help at home if surgery is needed? yes  Smoking status: never      Other tobacco user? never    Alcohol consumed: none  Drug use: never     Work History   Is the patient employed? retired      Family History   Stroke or aneurysm? yes  Malignant hyperthermia? no    Outcome Data  Daily level of functioning: independent/fully active      Review of Histories and Systems   I reviewed the patient's past medical history, social history, family history, and review of systems. The patient recorded this information in our chart and I reviewed it personally.      Physical Exam   General   General appearance: well groomed, cooperative  Build and nutrition: overweight  Posture: normal    Eyes  Visual acuity: grossly  angiography which she'd like to do in April when her twin returns for another visit.

## 2025-03-14 NOTE — PROGRESS NOTES
Bedside swallow screen completed with 3 oz. water. Patient swallowed without any overt signs of difficulty or aspiration. Diet advanced per order.

## 2025-03-14 NOTE — CARE COORDINATION
PATH-7 Caregiver Assessment Tool    How prepared are you to provide the patient assistance with personal care (such as bathing, using the toilet, dressing and moving around, medications, wound care) when he/she goes home? Not prepared, with concerns including Patient's spouse and son who she lives with are unable to provide support at discharge and daughter does not live in city.       Do you have any physical or mental health problems (for example: difficulty bending and stooping, back or joint problems, heart issues, memory issues, depression, anexiety or other health challenges)? \"No - no major health problems     Do you have other roles and responsibilities other than providing care for the patient (for example: work, volunteer work, childcare, meal preparation, laundry, home maintenance and yard work)? Yes - multiple other roles and responsibilities     Do you have other people (for example: co-workers, your Y[employer], a club, social group) who will be able to help your other responsibilities (for example: work, volunteer work, childcare, meal preparation, laundry, home maintenance and yard work)? A support system exists, but may require additional support      Will there be any accessibility problems for the patient getting around in the house or using the toilet or shower (for example: the width of doorways, climbing stairs, ramp access) in the home where he/she will be living? No- no accessibility problems identified     Will the patient have accessible transportation (for example: car that he/she can get in and out of, someone to drive, transportation options, etc.) that he/she can use to go places (e.g. the doctor, grocery store)? No - transportation is a concern     Thinking over the past year, how much conflict have you had in your relationship with the patient?  No concerns identified       Case Management recommendations from information gathered from PATH-7 Caregiver Assessment include: None- no  additional needs identified at this time    Electronically signed by ANDREW Cortés on 3/14/2025 at 3:07 PM

## 2025-03-14 NOTE — PROGRESS NOTES
NIH Stroke Scale      Interval: post procedure  Time: 12:27 PM  Person Administering Scale: Rob Norris MD        1a  Level of consciousness: 0=alert; keenly responsive   1b. LOC questions:  0=Performs both tasks correctly   1c. LOC commands: 0=Performs both tasks correctly   2.  Best Gaze: 0=normal   3.  Visual: 2=Complete hemianopia   4. Facial Palsy: 1=Minor paralysis (flattened nasolabial fold, asymmetric on smiling)   5a.  Motor left arm: 0=No drift, limb holds 90 (or 45) degrees for full 10 seconds   5b.  Motor right arm: 4=No movement   6a. motor left le=No drift, limb holds 90 (or 45) degrees for full 10 seconds   6b  Motor right le=Some effort against gravity, limb cannot get to or maintain (if cured) 90 (or 45) degrees, drifts down to bed, but has some effort against gravity   7. Limb Ataxia: 0=Absent   8.  Sensory: 1=Mild to moderate sensory loss; patient feels pinprick is less sharp or is dull on the affected side; there is a loss of superficial pain with pinprick but patient is aware She is being touched   9. Best Language:  1=Mild to moderate aphasia; some obvious loss of fluency or facility of comprehension without significant limitation on ideas expressed or form of expression.   10. Dysarthria: 2=Severe; patient speech is so slurred as to be unintelligible in the absence of or our of proportion to any dysphagia, or is mute/anarthric   11. Extinction and Inattention: 1=Visual, tactile, auditory, spatial or personal inattention or extinction to bilateral simultaneous stimulation in one of the sensory modalities   12. Distal motor function: 0=Normal    Total:   14

## 2025-03-14 NOTE — PROGRESS NOTES
4 Eyes Skin Assessment     NAME:  Terri Shankar  YOB: 1960  MEDICAL RECORD NUMBER:  [id number]    The patient is being assessed for  Admission    I agree that at least one RN has performed a thorough Head to Toe Skin Assessment on the patient. ALL assessment sites listed below have been assessed.      Areas assessed by both nurses:    Head, Face, Ears, Shoulders, Back, Chest, Arms, Elbows, Hands, Sacrum. Buttock, Coccyx, Ischium, Legs. Feet and Heels, and Under Medical Devices         Does the Patient have a Wound? Two larger lesions to left buttock, bleeding (pt states they are chronic), right hip, right upper back, left back, left shin and leg (generalized). Blanchable redness to bilateral heels, buttocks, and outer feet. Redness to groin, abdominal, and breast folds, bilaterally.       Damian Prevention initiated by RN: Yes  Wound Care Orders initiated by RN: Yes    Pressure Injury (Stage 3,4, Unstageable, DTI, NWPT, and Complex wounds) if present, place Wound referral order by RN under : No    New Ostomies, if present place, Ostomy referral order under : No     Nurse 1 eSignature: Electronically signed by Mayte Ramsey RN on 3/14/25 at 3:09 PM EDT    **SHARE this note so that the co-signing nurse can place an eSignature**    Nurse 2 eSignature: Electronically signed by Darrin Bo RN on 3/14/25 at 4:44 PM EDT

## 2025-03-14 NOTE — CONSULTS
Neurocritical Care Consultation Note      Patient: Terri Shankar MRN: [id number]    YOB: 1960  Age: 64 y.o.  Sex: female   Unit: [facility] ICU TOWER Room/Bed: 4523/4523-01 Location: [facility]    Date of Consultation: 3/14/2025  Date of Admission: 3/14/2025  9:04 AM ( LOS: 0 days )  Admitting Physician: IMRA BERMAN    Primary Care Physician: Marcia Abdi APRN - CNP   Consult Requested By: ARISTIDES     Reason for Consult: \"Post Angio \"    ASSESSMENT & RECOMMENDATIONS     Assessment  64F with PMH who is here for diagnostic Angio for L M1 AN. She had an incidental finding of L M2 occlusion. S/P IA tPA at 1114. NIHSS 21 during the procedure prior IA tPA and improved while she was in the ICU.     Recommendations      Stroke Plan s/p IA TPA  Imaging / Labs:  -Need 24 hour follow up imaging (Safety Scan) - If MRI scheduled +/- 6 hours can discontinue head CT  -Obtain MRI of the brain w/o contrast to eval for stroke  -Check lipid panel and hemoglobin A1C if not already completed   -ECHO     Medications:  -High-intensity statin   -hold ASA 81mg and DVT PPX or 24 hours due to TNK   -SCDs for DVT prophylaxis     Consults / Nursing Care  -HOB elevated to help prevent aspiration  -Q1H Neurologic Exams & Vitals  -NIHSS per guidelines   -Telemetry   -PT/OT: eval and treat  -PMR consult if rehab recommended   -ST: eval and treat and dysphagia screen  -Nursing bedside swallow prior to any PO intake   -Blood Pressure Management              -s/p IV Tenecteplase alone - Keep SBP <180     Follow up / Discharge Recommendations:  -If no obvious source of stroke identified will need 30 day event monitor arranged at discharge  -Stroke Education at Discharge  -Follow up w/ Neurology in 3 months         SUBJECTIVE     Chief Complaint:   Stroke     History of Present Illness:  Terri Shankar is a 64 y.o. F with PMH of HTN, HLD, who is here for an elective diagnostic angio for R MCA AN. She had an  Daily    Or    [START ON 3/15/2025] aspirin  300 mg Rectal Daily       Continuous Infusions:        PRN Meds:  acetaminophen, 1,000 mg, Q6H PRN  albuterol sulfate HFA, 2 puff, 4x Daily PRN  phenylephrine-cocoa butter, 1 suppository, PRN  ondansetron, 4 mg, TID PRN  acetaminophen, 650 mg, Q4H PRN  HYDROcodone 5 mg - acetaminophen, 1 tablet, Q4H PRN   Or  HYDROcodone 5 mg - acetaminophen, 2 tablet, Q4H PRN  polyethylene glycol, 17 g, Daily PRN              Discussed at length with patient,nursing staff    Time: CCT 45 mins     [place] ( Syed Mendenhall DO  Neurocritical Care  [phone]  March 14, 2025

## 2025-03-14 NOTE — PROGRESS NOTES
Patient's daughter called and updated - Would like to speak with Dr. Norris post-procedure. NCC notified and communication sent.

## 2025-03-15 ENCOUNTER — APPOINTMENT (OUTPATIENT)
Dept: CT IMAGING | Age: 65
DRG: 062 | End: 2025-03-15
Attending: NEUROLOGICAL SURGERY
Payer: MEDICARE

## 2025-03-15 LAB
ANION GAP SERPL CALCULATED.3IONS-SCNC: 12 MMOL/L (ref 3–16)
ANION GAP SERPL CALCULATED.3IONS-SCNC: 12 MMOL/L (ref 3–16)
APTT BLD: 28.6 SEC (ref 22.1–36.4)
APTT BLD: 29.6 SEC (ref 22.1–36.4)
BASOPHILS # BLD: 0 K/UL (ref 0–0.2)
BASOPHILS # BLD: 0.1 K/UL (ref 0–0.2)
BASOPHILS NFR BLD: 0.3 %
BASOPHILS NFR BLD: 0.6 %
BUN SERPL-MCNC: 13 MG/DL (ref 7–20)
BUN SERPL-MCNC: 14 MG/DL (ref 7–20)
CALCIUM SERPL-MCNC: 9 MG/DL (ref 8.3–10.6)
CALCIUM SERPL-MCNC: 9 MG/DL (ref 8.3–10.6)
CHLORIDE SERPL-SCNC: 104 MMOL/L (ref 99–110)
CHLORIDE SERPL-SCNC: 105 MMOL/L (ref 99–110)
CHOLEST SERPL-MCNC: 203 MG/DL (ref 0–199)
CO2 SERPL-SCNC: 21 MMOL/L (ref 21–32)
CO2 SERPL-SCNC: 23 MMOL/L (ref 21–32)
CREAT SERPL-MCNC: 0.8 MG/DL (ref 0.6–1.2)
CREAT SERPL-MCNC: 0.8 MG/DL (ref 0.6–1.2)
DEPRECATED RDW RBC AUTO: 14.4 % (ref 12.4–15.4)
DEPRECATED RDW RBC AUTO: 14.5 % (ref 12.4–15.4)
DEPRECATED RDW RBC AUTO: 14.5 % (ref 12.4–15.4)
EOSINOPHIL # BLD: 0.1 K/UL (ref 0–0.6)
EOSINOPHIL # BLD: 0.1 K/UL (ref 0–0.6)
EOSINOPHIL NFR BLD: 0.7 %
EOSINOPHIL NFR BLD: 0.8 %
EST. AVERAGE GLUCOSE BLD GHB EST-MCNC: 105.4 MG/DL
FIBRINOGEN PPP-MCNC: 451 MG/DL (ref 227–534)
FIBRINOGEN PPP-MCNC: 501 MG/DL (ref 227–534)
GFR SERPLBLD CREATININE-BSD FMLA CKD-EPI: 83 ML/MIN/{1.73_M2}
GFR SERPLBLD CREATININE-BSD FMLA CKD-EPI: 83 ML/MIN/{1.73_M2}
GLUCOSE SERPL-MCNC: 109 MG/DL (ref 70–99)
GLUCOSE SERPL-MCNC: 119 MG/DL (ref 70–99)
HBA1C MFR BLD: 5.3 %
HCT VFR BLD AUTO: 42 % (ref 36–48)
HCT VFR BLD AUTO: 43.6 % (ref 36–48)
HCT VFR BLD AUTO: 44 % (ref 36–48)
HDLC SERPL-MCNC: 43 MG/DL (ref 40–60)
HGB BLD-MCNC: 14 G/DL (ref 12–16)
HGB BLD-MCNC: 14.2 G/DL (ref 12–16)
HGB BLD-MCNC: 14.6 G/DL (ref 12–16)
INR PPP: 0.99 (ref 0.85–1.15)
INR PPP: 1.03 (ref 0.85–1.15)
LDLC SERPL CALC-MCNC: 121 MG/DL
LYMPHOCYTES # BLD: 1.5 K/UL (ref 1–5.1)
LYMPHOCYTES # BLD: 1.6 K/UL (ref 1–5.1)
LYMPHOCYTES NFR BLD: 11.9 %
LYMPHOCYTES NFR BLD: 12.7 %
MCH RBC QN AUTO: 28 PG (ref 26–34)
MCH RBC QN AUTO: 28.3 PG (ref 26–34)
MCH RBC QN AUTO: 28.4 PG (ref 26–34)
MCHC RBC AUTO-ENTMCNC: 32.6 G/DL (ref 31–36)
MCHC RBC AUTO-ENTMCNC: 33.2 G/DL (ref 31–36)
MCHC RBC AUTO-ENTMCNC: 33.3 G/DL (ref 31–36)
MCV RBC AUTO: 85.3 FL (ref 80–100)
MCV RBC AUTO: 85.4 FL (ref 80–100)
MCV RBC AUTO: 85.8 FL (ref 80–100)
MONOCYTES # BLD: 0.7 K/UL (ref 0–1.3)
MONOCYTES # BLD: 0.8 K/UL (ref 0–1.3)
MONOCYTES NFR BLD: 5.5 %
MONOCYTES NFR BLD: 6.6 %
NEUTROPHILS # BLD: 10.2 K/UL (ref 1.7–7.7)
NEUTROPHILS # BLD: 9.9 K/UL (ref 1.7–7.7)
NEUTROPHILS NFR BLD: 80.4 %
NEUTROPHILS NFR BLD: 80.5 %
PLATELET # BLD AUTO: 196 K/UL (ref 135–450)
PLATELET # BLD AUTO: 217 K/UL (ref 135–450)
PLATELET # BLD AUTO: 224 K/UL (ref 135–450)
PMV BLD AUTO: 7.4 FL (ref 5–10.5)
PMV BLD AUTO: 7.6 FL (ref 5–10.5)
PMV BLD AUTO: 7.8 FL (ref 5–10.5)
POTASSIUM SERPL-SCNC: 3.8 MMOL/L (ref 3.5–5.1)
POTASSIUM SERPL-SCNC: 3.9 MMOL/L (ref 3.5–5.1)
PROTHROMBIN TIME: 13.3 SEC (ref 11.9–14.9)
PROTHROMBIN TIME: 13.7 SEC (ref 11.9–14.9)
RBC # BLD AUTO: 4.91 M/UL (ref 4–5.2)
RBC # BLD AUTO: 5.08 M/UL (ref 4–5.2)
RBC # BLD AUTO: 5.16 M/UL (ref 4–5.2)
SODIUM SERPL-SCNC: 138 MMOL/L (ref 136–145)
SODIUM SERPL-SCNC: 139 MMOL/L (ref 136–145)
TRIGL SERPL-MCNC: 195 MG/DL (ref 0–150)
VLDLC SERPL CALC-MCNC: 39 MG/DL
WBC # BLD AUTO: 12.1 K/UL (ref 4–11)
WBC # BLD AUTO: 12.3 K/UL (ref 4–11)
WBC # BLD AUTO: 12.6 K/UL (ref 4–11)

## 2025-03-15 PROCEDURE — 2580000003 HC RX 258

## 2025-03-15 PROCEDURE — 85613 RUSSELL VIPER VENOM DILUTED: CPT

## 2025-03-15 PROCEDURE — 85598 HEXAGNAL PHOSPH PLTLT NEUTRL: CPT

## 2025-03-15 PROCEDURE — 85610 PROTHROMBIN TIME: CPT

## 2025-03-15 PROCEDURE — 83036 HEMOGLOBIN GLYCOSYLATED A1C: CPT

## 2025-03-15 PROCEDURE — 70450 CT HEAD/BRAIN W/O DYE: CPT

## 2025-03-15 PROCEDURE — 80061 LIPID PANEL: CPT

## 2025-03-15 PROCEDURE — 92523 SPEECH SOUND LANG COMPREHEN: CPT

## 2025-03-15 PROCEDURE — 99254 IP/OBS CNSLTJ NEW/EST MOD 60: CPT | Performed by: SURGERY

## 2025-03-15 PROCEDURE — 6360000002 HC RX W HCPCS

## 2025-03-15 PROCEDURE — 6360000002 HC RX W HCPCS: Performed by: NURSE PRACTITIONER

## 2025-03-15 PROCEDURE — 86147 CARDIOLIPIN ANTIBODY EA IG: CPT

## 2025-03-15 PROCEDURE — 6370000000 HC RX 637 (ALT 250 FOR IP): Performed by: NURSE PRACTITIONER

## 2025-03-15 PROCEDURE — 36415 COLL VENOUS BLD VENIPUNCTURE: CPT

## 2025-03-15 PROCEDURE — 81240 F2 GENE: CPT

## 2025-03-15 PROCEDURE — 86146 BETA-2 GLYCOPROTEIN ANTIBODY: CPT

## 2025-03-15 PROCEDURE — 85730 THROMBOPLASTIN TIME PARTIAL: CPT

## 2025-03-15 PROCEDURE — 81241 F5 GENE: CPT

## 2025-03-15 PROCEDURE — 81291 MTHFR GENE: CPT

## 2025-03-15 PROCEDURE — 92610 EVALUATE SWALLOWING FUNCTION: CPT

## 2025-03-15 PROCEDURE — 2000000000 HC ICU R&B

## 2025-03-15 PROCEDURE — 6370000000 HC RX 637 (ALT 250 FOR IP)

## 2025-03-15 PROCEDURE — 85027 COMPLETE CBC AUTOMATED: CPT

## 2025-03-15 PROCEDURE — 85240 CLOT FACTOR VIII AHG 1 STAGE: CPT

## 2025-03-15 PROCEDURE — 80048 BASIC METABOLIC PNL TOTAL CA: CPT

## 2025-03-15 PROCEDURE — 81400 MOPATH PROCEDURE LEVEL 1: CPT

## 2025-03-15 PROCEDURE — 83090 ASSAY OF HOMOCYSTEINE: CPT

## 2025-03-15 PROCEDURE — 6370000000 HC RX 637 (ALT 250 FOR IP): Performed by: NEUROLOGICAL SURGERY

## 2025-03-15 PROCEDURE — 99232 SBSQ HOSP IP/OBS MODERATE 35: CPT | Performed by: NURSE PRACTITIONER

## 2025-03-15 RX ORDER — HYDROXYZINE HYDROCHLORIDE 25 MG/1
25 TABLET, FILM COATED ORAL 3 TIMES DAILY PRN
Status: DISCONTINUED | OUTPATIENT
Start: 2025-03-15 | End: 2025-03-18

## 2025-03-15 RX ORDER — ONDANSETRON 2 MG/ML
4 INJECTION INTRAMUSCULAR; INTRAVENOUS EVERY 6 HOURS PRN
Status: DISCONTINUED | OUTPATIENT
Start: 2025-03-15 | End: 2025-03-21 | Stop reason: HOSPADM

## 2025-03-15 RX ORDER — HYDROMORPHONE HYDROCHLORIDE 1 MG/ML
0.25 INJECTION, SOLUTION INTRAMUSCULAR; INTRAVENOUS; SUBCUTANEOUS
Refills: 0 | Status: DISPENSED | OUTPATIENT
Start: 2025-03-15 | End: 2025-03-16

## 2025-03-15 RX ORDER — HYDROMORPHONE HYDROCHLORIDE 1 MG/ML
0.5 INJECTION, SOLUTION INTRAMUSCULAR; INTRAVENOUS; SUBCUTANEOUS
Refills: 0 | Status: DISPENSED | OUTPATIENT
Start: 2025-03-15 | End: 2025-03-16

## 2025-03-15 RX ADMIN — ATORVASTATIN CALCIUM 40 MG: 40 TABLET, FILM COATED ORAL at 20:38

## 2025-03-15 RX ADMIN — MICONAZOLE NITRATE: 20 CREAM TOPICAL at 07:29

## 2025-03-15 RX ADMIN — SODIUM CHLORIDE: 0.9 INJECTION, SOLUTION INTRAVENOUS at 18:25

## 2025-03-15 RX ADMIN — MICONAZOLE NITRATE: 20 CREAM TOPICAL at 20:39

## 2025-03-15 RX ADMIN — ONDANSETRON 4 MG: 2 INJECTION INTRAMUSCULAR; INTRAVENOUS at 00:15

## 2025-03-15 RX ADMIN — HYDROXYZINE HYDROCHLORIDE 25 MG: 25 TABLET ORAL at 20:38

## 2025-03-15 RX ADMIN — SODIUM CHLORIDE: 0.9 INJECTION, SOLUTION INTRAVENOUS at 07:55

## 2025-03-15 RX ADMIN — ASPIRIN 81 MG: 81 TABLET, CHEWABLE ORAL at 14:45

## 2025-03-15 RX ADMIN — POLYETHYLENE GLYCOL 3350 17 G: 17 POWDER, FOR SOLUTION ORAL at 07:29

## 2025-03-15 RX ADMIN — HYDROMORPHONE HYDROCHLORIDE 0.25 MG: 1 INJECTION, SOLUTION INTRAMUSCULAR; INTRAVENOUS; SUBCUTANEOUS at 20:38

## 2025-03-15 RX ADMIN — HYDROMORPHONE HYDROCHLORIDE 0.5 MG: 1 INJECTION, SOLUTION INTRAMUSCULAR; INTRAVENOUS; SUBCUTANEOUS at 13:41

## 2025-03-15 RX ADMIN — ENOXAPARIN SODIUM 40 MG: 100 INJECTION SUBCUTANEOUS at 14:45

## 2025-03-15 RX ADMIN — HYDROCODONE BITARTRATE AND ACETAMINOPHEN 2 TABLET: 5; 325 TABLET ORAL at 18:35

## 2025-03-15 RX ADMIN — ARIPIPRAZOLE 20 MG: 5 TABLET ORAL at 20:38

## 2025-03-15 RX ADMIN — LOSARTAN POTASSIUM 25 MG: 25 TABLET, FILM COATED ORAL at 07:29

## 2025-03-15 ASSESSMENT — PAIN SCALES - WONG BAKER
WONGBAKER_NUMERICALRESPONSE: NO HURT
WONGBAKER_NUMERICALRESPONSE: HURTS LITTLE MORE
WONGBAKER_NUMERICALRESPONSE: NO HURT
WONGBAKER_NUMERICALRESPONSE: HURTS LITTLE MORE

## 2025-03-15 ASSESSMENT — PAIN SCALES - GENERAL
PAINLEVEL_OUTOF10: 0
PAINLEVEL_OUTOF10: 2
PAINLEVEL_OUTOF10: 2
PAINLEVEL_OUTOF10: 0
PAINLEVEL_OUTOF10: 4
PAINLEVEL_OUTOF10: 6
PAINLEVEL_OUTOF10: 4
PAINLEVEL_OUTOF10: 7
PAINLEVEL_OUTOF10: 0
PAINLEVEL_OUTOF10: 0
PAINLEVEL_OUTOF10: 4

## 2025-03-15 ASSESSMENT — PAIN DESCRIPTION - LOCATION
LOCATION: OTHER (COMMENT)
LOCATION: CHEST;RIB CAGE

## 2025-03-15 ASSESSMENT — PAIN DESCRIPTION - ORIENTATION: ORIENTATION: RIGHT;LEFT;MID

## 2025-03-15 ASSESSMENT — PAIN - FUNCTIONAL ASSESSMENT: PAIN_FUNCTIONAL_ASSESSMENT: ACTIVITIES ARE NOT PREVENTED

## 2025-03-15 ASSESSMENT — PAIN DESCRIPTION - DESCRIPTORS: DESCRIPTORS: ACHING;DISCOMFORT

## 2025-03-15 NOTE — PROGRESS NOTES
Neurosurgery Progress Note      LOS: 1 day     S:  No acute events overnight. CT abd shows small groin site hematoma.     O:   Vitals:    03/15/25 0730 03/15/25 0800 03/15/25 0900 03/15/25 1000   BP: 134/73 127/77 106/62 123/64   Pulse: 87 83 90 86   Resp: 17 15 21 (!) 9   Temp:  98.6 °F (37 °C)     TempSrc:  Oral     SpO2: 99% 100% 100% 97%   Weight:       Height:                Intake/Output Summary (Last 24 hours) at 3/15/2025 1035  Last data filed at 3/15/2025 0954  Gross per 24 hour   Intake 1042.36 ml   Output 2230 ml   Net -1187.64 ml          Recent Results (from the past 24 hours)   Urinalysis with Reflex to Culture    Collection Time: 03/14/25  7:05 PM    Specimen: Urine   Result Value Ref Range    Color, UA Yellow Straw/Yellow    Clarity, UA Clear Clear    Glucose, Ur Negative Negative mg/dL    Bilirubin, Urine Negative Negative    Ketones, Urine Negative Negative mg/dL    Specific Gravity, UA 1.015 1.005 - 1.030    Blood, Urine Negative Negative    pH, Urine 8.0 5.0 - 8.0    Protein, UA Negative Negative mg/dL    Urobilinogen, Urine 0.2 <2.0 E.U./dL    Nitrite, Urine Negative Negative    Leukocyte Esterase, Urine Negative Negative    Microscopic Examination Not Indicated     Urine Type NotGiven     Urine Reflex to Culture Not Indicated    Protime-INR    Collection Time: 03/14/25  9:43 PM   Result Value Ref Range    Protime 13.3 11.9 - 14.9 sec    INR 0.99 0.85 - 1.15   APTT    Collection Time: 03/14/25  9:43 PM   Result Value Ref Range    aPTT 29.6 22.1 - 36.4 sec   CBC with Auto Differential    Collection Time: 03/14/25  9:43 PM   Result Value Ref Range    WBC 12.3 (H) 4.0 - 11.0 K/uL    RBC 5.16 4.00 - 5.20 M/uL    Hemoglobin 14.6 12.0 - 16.0 g/dL    Hematocrit 44.0 36.0 - 48.0 %    MCV 85.3 80.0 - 100.0 fL    MCH 28.3 26.0 - 34.0 pg    MCHC 33.2 31.0 - 36.0 g/dL    RDW 14.4 12.4 - 15.4 %    Platelets 217 135 - 450 K/uL    MPV 7.8 5.0 - 10.5 fL    Neutrophils % 80.4 %    Lymphocytes % 12.7 %     1=Mild to moderate, patient slurs at least some words and at worst, can be understood with some difficulty   11. Extinction and Inattention: 1=Visual, tactile, auditory, spatial or personal inattention or extinction to bilateral simultaneous stimulation in one of the sensory modalities   12. Distal motor function: 0=Normal    Total:   10     Recent Imaging:   CTA ABDOMEN PELVIS W CONTRAST   Final Result      1. Right inguinal hematoma closely associated with distal right external iliac artery and right common femoral artery with evidence of small 8 mm distal right common femoral artery pseudoaneurysm.   2. No CT evidence of retroperitoneal hematoma.   3. Incidental cholelithiasis.            Electronically signed by MD Victoriano Bowling      CTA HEAD NECK W CONTRAST   Final Result      No hemodynamically significant stenosis in the major arteries of the head and neck. Variant anatomy as described.       Electronically signed by Nasir Escobar      CT HEAD WO CONTRAST   Final Result      1.  No acute intracranial abnormality.      Critical code stroke resolved discussed with the patient's nurse at 4:32 p.m...         Electronically signed by Siddhartha James      CT head without contrast    (Results Pending)   MRI BRAIN WO CONTRAST    (Results Pending)        A/P: Terri Shankar is a 64 y.o. female who is POD# 1 s/p angiogram with L MCA embolus and endovascular thrombolysis   -clotting noted during angio concerning for hypercoagulable condition    -Frequent neurochecks   -Pain control   -CT and MRI today   -hypercoag workup  -Adv diet, bowel regimen   -PT/OT/Speech, Activity as tolerated   -DVT ppx with subq heparin, SCDs     Dispo: ICU

## 2025-03-15 NOTE — PLAN OF CARE
Problem: SLP Adult - Impaired Swallowing  Goal: By Discharge: Advance to least restrictive diet without signs or symptoms of aspiration for planned discharge setting.  See evaluation for individualized goals.  Outcome: Progressing     Problem: SLP Adult - Impaired Communication  Goal: By Discharge: Demonstrates communication skills at highest level of function for planned discharge setting.  See evaluation for individualized goals.  Outcome: Progressing     SLP completed evaluation. Please refer to notes in EMR.     Chata York M.A., CCC-SLP  Speech-Language Pathologist  [id number]

## 2025-03-15 NOTE — PROGRESS NOTES
Physical Therapy/Occupational Therapy  HOLD    PT/OT referrals received, chart reviewed.  Vascular progress note from 3/15 at 1 am states \"bedrest for 24 hours\".  Will return following bedrest phase.    Soraya Benson PT [id number], OTR/L [id number]

## 2025-03-15 NOTE — PROGRESS NOTES
NEUROSURGERY PROGRESS NOTE    3/15/2025 10:05 AM                               Terri Shankar                      LOS: 1 day               POD# 1 s/p Procedure(s) (LRB):  ANGIOGRAM CEREBRAL (06159) (N/A)    Subjective: Worsening aphasia compared to post-thrombolysis exam and slightly less movement in RUE yesterday evening. Patient continues having difficulty with aphasia and right sided weakness.    Physical Exam:  Patient seen and examined    Vitals:    03/15/25 0800   BP: 127/77   Pulse: 83   Resp: 15   Temp: 98.6 °F (37 °C)   SpO2: 100%     GCS:  4 - Opens eyes on own  4 - Seems confused, disoriented (Exam difficult 2/2 aphasia)  6 - Follows simple motor commands  General: Well developed. Alert and cooperative in no acute distress.     HENT: atraumatic, neck supple  Eyes: Optic discs: Not tested  Pulmonary: unlabored respiratory effort  Cardiovascular:  Warm well perfused. No peripheral edema  Gastrointestinal: abdomen soft, NT, ND    Neurological:  Mental Status: Awake, alert, orientation exam limited 2/2 aphasia  Attention: Intact  Language: Expressive Aphasia noted  Sensation: Intact to all extremities to light touch on left side and noxious stimuli on right side  Coordination: Exam limited 2/2 weakness on left and not following complex commands    Cranial Nerves:  II: Visual acuity not tested, denies new visual changes / diplopia  III, IV, VI: PERRL, 3 mm bilaterally, EOM come to midline but do not drive to the right. noted  V: Facial sensation intact bilaterally to touch  VII: Right facial weakness; no dysarthria   VIII: Hearing intact bilaterally to spoken voice  IX: Palate movement equal bilaterally  XI: Shoulder shrug equal bilaterally  XII: Tongue midline    Musculoskeletal:   Gait: Not tested   Assist devices: None   Tone: Normal  Motor strength: RLE exam limited 2/2 pain from Right inguinal hematoma    Right  Left    Right  Left    Deltoid  3 4  Hip Flex  2 4   Biceps  3 4  Knee Extensors  2 4

## 2025-03-15 NOTE — PLAN OF CARE
Brief note:  64F with PMH who is here for diagnostic Angio for L M1 AN. She had an incidental finding of L M2 occlusion. S/P IA tPA at 1114. NIHSS 21 during the procedure prior IA tPA and improved while she was in the ICU.     Patient was noted to be more aphasic at her 1530 PM neuro exam as well as was noted to have slightly less movement in her right upper extremity.  Stat CT head and CTA was obtained to rule out reocclusion and CT head showed no acute abnormality.  CTA without LVO.      Patient c/o right groin pain and abdominal pain however hard to assess other subjective points as patient has expressive aphasia. On exam she is noted to have some bruising and a palpable hematoma at her right groin site with some bleeding. See exam below.    PLAN:  - RN to hold direct pressure over right groin site for 20 minutes  - Check BMP, PT/INR, aPTT, CBC, fibrinogen STAT   - Check CTA abdomen and pelvis to rule out RP bleed STAT (ordered)   - Okay for patient to receive an additional dose of IV contrast  - Start 0.9% NaCl infusion rate of 100 ml/h   - Discussed with patient's RN.     PHYSICAL EXAM:  Vitals:    03/14/25 1915 03/14/25 1930 03/14/25 1945 03/14/25 2000   BP: 127/86 (!) 151/80 (!) 153/80    Pulse: 96 87 86 93   Resp: 20 18 17 18   Temp:       TempSrc:       SpO2:    97%   Weight:       Height:             General: Alert, no distress, well-nourished  Neurologic  Mental status:   orientation to person (name only), was able to tell me she was in the hospital with options.  TESHA the rest of orientation questions as patient is expressively aphasic.    Attention intact as able to attend well to the exam     Language expressively aphasic with some mild receptive aphasia    Comprehension intact; follows simple commands in the LUE (squeezed hand), LLE.Would not follow commands on the right side.     Cranial nerves:   CN2: Blinks to threat left eye, not the right.  CN 3,4,6: Pupils 3 mm> 2 mm equal and reactive to light,  identify materials.    10. Dysarthria: 0=Normal   11. Extinction and Inattention: 0=No abnormality    Total:   16     Tone: normal in all 4 extremities  Gait: Deferred for safety    OTHER SYSTEMS:  Cardiovascular: Warm, appears well perfused   Respiratory: Easy, non-labored respiratory pattern   Abdominal: Abdomen is without distention. Soft but tender in all quadrants to palpation.   Extremities: Upper and lower extremities are atraumatic in appearance without deformity. No swelling or erythema.   Groin: some bleeding at the groin site, with some mild ecchymosis and small palpable. hematoma.   Neurovascular: +2 DP/PT palpable pulses. Toes cool to touch on both feet and equal symmetrically but cap refill < 3 seconds. Proximal skin warm, dry and even.  Skin color even on both lower extremities.

## 2025-03-15 NOTE — PROGRESS NOTES
Current NIHSS 16    Nursing Core Measures for Stroke:   [x]   Education template documentation   [x]   Care Plan template documentation   [x]   Verified Swallow Screen completed prior to PO intake of food, drink, medications.  [x]   VTE Prophylaxis: SCDs ordered/addressed; SCDs: On       Reviewed the Following Education with Patient and/or Family:   - Personalized risk factors for patient, along with changes, modifications that will help prevent stroke.  - Signs and Symptoms of Stroke: (Facial droop, weakness/numbness especially on one side, speech difficulty, sudden confusion, sudden loss of vision, sudden severe headache, sudden loss of balance or having difficulty walking, syncope, or seizure)  - How to activate EMS (911)   - Importance of Follow Up Appointments at Discharge   - Importance of Compliance with Medications Prescribed at Discharge  - Available community resources and stroke advocacy groups if needed    Patient and/or family member: demonstrates understanding.     Stroke Education booklet given to patient/family (or verified, if given already), which reviews above information. yes         Electronically signed by Mayte Ramsey RN on 3/15/2025 at 8:22 AM

## 2025-03-15 NOTE — PROGRESS NOTES
Pt expressed severe pain at R groin site. Upon assessment pt had a slight increase in firmness. Old drainage remained the same. This RN notified Neuro NP and Charge RN. Charge RN and Neuro NP to beside to assess pt. Pressure was held for 20min. Pt brought down to CT after holding pressure.

## 2025-03-15 NOTE — PROGRESS NOTES
Patient has been intermittently tearful this shift. Expressive aphasia continues to be severe. Discussed plan of care and provided comfort - she was able to speak with her sister and her daughter via telephone.    Sitter bedside for support, as she attempted to get out of bed this evening. Restless.

## 2025-03-15 NOTE — CONSULTS
Vascular Surgery   Resident Consult Note      Chief Complaint: hematoma and pseudoaneurysm s/p R CFA access     History obtained from: chart review, patient    History of Present Illness :   Terri Shankar is a 64 y.o. female w/ hx bipolar, HTN, obesity, R MCA aneurysm who presented for elective diagnostic cerebral angiogram on 3/14.  During angiography, a clot was noted in the L MCA. Received IA TPA for L MCA.  R CFA accessed for angiography. 8 Fr angioseal closure device used.     Primary deficits following procedure expressive aphasia, RUE deficits.     After procedure, patient noted to have bleeding, ecchymosis, and swelling at groin site. Pressure held with resolution of drainage. CTA demonstrating right inguinal hematoma associated with distal right external iliac artery and R CFA with evidence of small 8 mm distal R CFA pseudoaneurysm. Vascular surgery consulted to evaluate.     Patient aphasic on interview.     Past Medical History:        Diagnosis Date    Anxiety     Bipolar 1 disorder (HCC)     Depression     Dyspareunia in female 05/16/2015    Elevated transaminase level 11/10/2017    Hemorrhoids 01/15/2015    History of tubal ligation 05/16/2015    HLD (hyperlipidemia) 12/14/2022    Hypertension     Irritable bowel syndrome     Lacunar stroke (HCC)     Menopause 09/05/2016    Obesity     Obesity (BMI 30-39.9) 03/11/2015    Overactive bladder     Prurigo nodularis     Stress incontinence     TIA (transient ischemic attack) 4/3/2024    Urinary incontinence     Uterine fibroid 05/16/2015    Yeast vaginitis 08/30/2020       Past Surgical History:           Procedure Laterality Date    COLONOSCOPY  2010    polyp removed    COLONOSCOPY  2018    EYE SURGERY Left     Lazy eye    TONSILLECTOMY      TUBAL LIGATION  1994       Allergies:  Bactrim [sulfamethoxazole-trimethoprim], Augmentin [amoxicillin-pot clavulanate], Diphenhydramine, and Macrobid [nitrofurantoin]    Medications:   Home Meds  No current  facility-administered medications on file prior to encounter.     Current Outpatient Medications on File Prior to Encounter   Medication Sig Dispense Refill    losartan (COZAAR) 25 MG tablet Take 1 tablet by mouth daily 90 tablet 1    atorvastatin (LIPITOR) 40 MG tablet Take 1 tablet by mouth at bedtime 30 tablet 5    ARIPiprazole (ABILIFY) 20 MG tablet Take 1 tablet by mouth nightly 90 tablet 3    aspirin 81 MG EC tablet Take 1 tablet by mouth daily 90 tablet 1    DUPIXENT 300 MG/2ML SOPN injection       Melatonin 5 MG CHEW Take by mouth      clobetasol (TEMOVATE) 0.05 % cream Apply topically 2 times daily. 30 g 0    triamcinolone (KENALOG) 0.025 % ointment Apply topically 2 times daily to bite area until rash resolved 80 g 0    nystatin (MYCOSTATIN) 334157 UNIT/GM cream Apply topically 2 times daily. 30 g 0    polyethylene glycol (GLYCOLAX) 17 GM/SCOOP powder Take 17 g by mouth daily 1530 g 1    phenylephrine-cocoa butter (PREPARATION H) 0.25-88.44 % Place 1 suppository rectally as needed for Hemorrhoids 12 suppository 0    albuterol sulfate HFA (VENTOLIN HFA) 108 (90 Base) MCG/ACT inhaler Inhale 2 puffs into the lungs 4 times daily as needed for Wheezing 54 g 1    acetaminophen (TYLENOL) 500 MG tablet Take 2 tablets by mouth every 6 hours as needed for Pain       Current Meds  ondansetron (ZOFRAN) injection 4 mg, Q6H PRN  HYDROmorphone HCl PF (DILAUDID) injection 0.25 mg, Q3H PRN   Or  HYDROmorphone HCl PF (DILAUDID) injection 0.5 mg, Q3H PRN  sodium chloride flush 0.9 % injection 10 mL, Once  acetaminophen (TYLENOL) tablet 1,000 mg, Q6H PRN  polyethylene glycol (GLYCOLAX) packet 17 g, Daily  ARIPiprazole (ABILIFY) tablet 20 mg, Nightly  atorvastatin (LIPITOR) tablet 40 mg, Nightly  triamcinolone (KENALOG) 0.1 % ointment, BID  clobetasol (TEMOVATE) 0.05 % cream, BID  losartan (COZAAR) tablet 25 mg, Daily  miconazole (MICOTIN) 2 % cream, BID  HYDROcodone-acetaminophen (NORCO) 5-325 MG per tablet 1 tablet, Q4H

## 2025-03-15 NOTE — PLAN OF CARE
Problem: Chronic Conditions and Co-morbidities  Goal: Patient's chronic conditions and co-morbidity symptoms are monitored and maintained or improved  3/15/2025 0819 by Mayte Ramsey, RN  Outcome: Progressing  Flowsheets (Taken 3/15/2025 0800)  Care Plan - Patient's Chronic Conditions and Co-Morbidity Symptoms are Monitored and Maintained or Improved: Monitor and assess patient's chronic conditions and comorbid symptoms for stability, deterioration, or improvement  3/15/2025 0652 by Blank Martinez RN  Outcome: Progressing  Flowsheets (Taken 3/15/2025 0652)  Care Plan - Patient's Chronic Conditions and Co-Morbidity Symptoms are Monitored and Maintained or Improved:   Monitor and assess patient's chronic conditions and comorbid symptoms for stability, deterioration, or improvement   Collaborate with multidisciplinary team to address chronic and comorbid conditions and prevent exacerbation or deterioration   Update acute care plan with appropriate goals if chronic or comorbid symptoms are exacerbated and prevent overall improvement and discharge     Problem: Discharge Planning  Goal: Discharge to home or other facility with appropriate resources  3/15/2025 0819 by Mayte Ramsey, RN  Outcome: Progressing  Flowsheets (Taken 3/15/2025 0800)  Discharge to home or other facility with appropriate resources: Identify barriers to discharge with patient and caregiver  3/15/2025 0652 by Blank Martinez RN  Outcome: Progressing  Flowsheets (Taken 3/15/2025 0652)  Discharge to home or other facility with appropriate resources:   Identify barriers to discharge with patient and caregiver   Identify discharge learning needs (meds, wound care, etc)   Refer to discharge planning if patient needs post-hospital services based on physician order or complex needs related to functional status, cognitive ability or social support system   Arrange for interpreters to assist at discharge as needed   Arrange for needed

## 2025-03-15 NOTE — PLAN OF CARE
Problem: Chronic Conditions and Co-morbidities  Goal: Patient's chronic conditions and co-morbidity symptoms are monitored and maintained or improved  3/15/2025 0652 by Blank Martinez RN  Outcome: Progressing  Flowsheets (Taken 3/15/2025 0652)  Care Plan - Patient's Chronic Conditions and Co-Morbidity Symptoms are Monitored and Maintained or Improved:   Monitor and assess patient's chronic conditions and comorbid symptoms for stability, deterioration, or improvement   Collaborate with multidisciplinary team to address chronic and comorbid conditions and prevent exacerbation or deterioration   Update acute care plan with appropriate goals if chronic or comorbid symptoms are exacerbated and prevent overall improvement and discharge     Problem: Discharge Planning  Goal: Discharge to home or other facility with appropriate resources  3/15/2025 0652 by Blank Martinez RN  Outcome: Progressing  Flowsheets (Taken 3/15/2025 0652)  Discharge to home or other facility with appropriate resources:   Identify barriers to discharge with patient and caregiver   Identify discharge learning needs (meds, wound care, etc)   Refer to discharge planning if patient needs post-hospital services based on physician order or complex needs related to functional status, cognitive ability or social support system   Arrange for interpreters to assist at discharge as needed   Arrange for needed discharge resources and transportation as appropriate     Problem: Neurosensory - Adult  Goal: Achieves stable or improved neurological status  3/15/2025 0652 by Blank Martinez RN  Outcome: Progressing  Flowsheets (Taken 3/15/2025 0652)  Achieves stable or improved neurological status:   Assess for and report changes in neurological status   Initiate measures to prevent increased intracranial pressure   Maintain blood pressure and fluid volume within ordered parameters to optimize cerebral perfusion and minimize risk of hemorrhage    Monitor temperature, glucose, and sodium. Initiate appropriate interventions as ordered  3/14/2025 1739 by Mayte Ramsey RN  Outcome: Progressing

## 2025-03-15 NOTE — PROGRESS NOTES
NEUROCRITICAL CARE PROGRESS NOTE       Patient Name: Terri Shankar YOB: 1960   Sex: Female Age: 64 yrs     CC / Reason for Consult: post IA TPA    Changes over last 24 hours:   More aphasic yesterday around 15:30  CT-A head and neck and head CT were stable  Groin site with bleeding and ecchymosis.   Vascular surgery consulted for hematoma/pseudoaneurysm: recommend bedrest, hold anticoagulation, consider interval duplex US groin pending clinical progression  Tolerating regular diet - needs tray placed on left side     ROS: +aphasia, +R hemiparesis     ASSESSMENT & RECOMMENDATIONS   Assessment:  Terri Shankar is a 65 y/o woman who underwent conventional angiogram for evaluation of right MCA aneurysm, who developed acute left MCA syndrome during procedure, found to have left M1 occlusion. She received IA tPA 11:14 with TICI3 reperfusion.     Plan:  Need 24 hour follow up imaging (Safety Scan)   If MRI scheduled +/- 6 hours can discontinue head   Obtain MRI of the brain w/o contrast to eval for stroke  Echo  High-intensity statin   Hold ASA 81mg and DVT PPX for now.   Even after stability scan will need OK from vascular surgery to start  SCDs for DVT prophylaxis  HOB elevated to help prevent aspiration  Q1H Neurologic Exams & Vitals; NIHSS per guidelines   Telemetry - notify neurocritical care of any atrial fibrillation  PT/OT/SLP/Rehab as indicated  SBP goal less than 180 mmHg  Consider long term cardiac monitor  Follow up w/ Neurology in 3 months     MELANIE Carrillo - [facility]   NEUROCRITICAL CARE  3/15/2025 9:26 AM  [employer]: [facility] NEUROCRITICAL CARE    HISTORY     Terri Shankar is a 64 y.o. F with PMH of HTN, HLD, who is here for an elective diagnostic angio for R MCA AN. She had an incidental finding of R MCA AN almost a year ago for dizziness, Currently, she is asymptomatic piror the procedure     During the angio, a L M2 occlusion was noted,  her NIHSS reportedly was 21 at the time. IA tPA

## 2025-03-15 NOTE — PROGRESS NOTES
Speech Language Pathology  Facility/Department:[facility] ICU  Bedside Swallow Evaluation and Speech/Language Assessment   Name: Terri Shankar  : 1960  MRN: [id number]                                                       Patient Diagnosis(es):   Patient Active Problem List    Diagnosis Date Noted    Stroke, acute, thrombotic (HCC) 2025    Weakness on left side of face 2022    Slurred speech 2022    HLD (hyperlipidemia) 2022    Elevated blood pressure reading without diagnosis of hypertension 2022    Stroke, lacunar (HCC) 2022    Stroke due to embolism of left middle cerebral artery (HCC) 2025    Ischemic stroke (HCC) 2025    Dizziness 2025    TIA (transient ischemic attack) 2024    Major depressive disorder, recurrent, mild 2023    Major depressive disorder, recurrent, moderate 2023    Major depressive disorder, recurrent, unspecified 2023    AMS (altered mental status) 2023    Morbid obesity with BMI of 40.0-44.9, adult 2015    Bipolar disorder (HCC) 01/15/2015    Urinary, incontinence, stress female 01/15/2015       Past Medical History:   Diagnosis Date    Anxiety     Bipolar 1 disorder ([facility])     Depression     Dyspareunia in female 2015    Elevated transaminase level 11/10/2017    Hemorrhoids 01/15/2015    History of tubal ligation 2015    HLD (hyperlipidemia) 2022    Hypertension     Irritable bowel syndrome     Lacunar stroke (HCC)     Menopause 2016    Obesity     Obesity (BMI 30-39.9) 2015    Overactive bladder     Prurigo nodularis     Stress incontinence     TIA (transient ischemic attack) 4/3/2024    Urinary incontinence     Uterine fibroid 2015    Yeast vaginitis 2020     Past Surgical History:   Procedure Laterality Date    COLONOSCOPY  2010    polyp removed    COLONOSCOPY      EYE SURGERY Left     Lazy eye    TONSILLECTOMY      TUBAL LIGATION  1994       Reason

## 2025-03-15 NOTE — PROGRESS NOTES
Patient was unable to urinate with Purewick or bedpan. Bladder scan completed with 978 mL in bladder. Patient has known yeast in bilateral groin folds and labia with associated redness. Straight cath insertion attempted x 3 with two additional nurses and two PCAs d/t discomfort, though it was still quite traumatic and tearful for patient.    Discussed with NCC and the decision was made to place a temporary stafford catheter as to minimize attempts and patient trauma.

## 2025-03-16 ENCOUNTER — APPOINTMENT (OUTPATIENT)
Dept: MRI IMAGING | Age: 65
DRG: 062 | End: 2025-03-16
Attending: NEUROLOGICAL SURGERY
Payer: MEDICARE

## 2025-03-16 LAB
ALBUMIN SERPL-MCNC: 3.3 G/DL (ref 3.4–5)
ANION GAP SERPL CALCULATED.3IONS-SCNC: 9 MMOL/L (ref 3–16)
BASOPHILS # BLD: 0 K/UL (ref 0–0.2)
BASOPHILS NFR BLD: 0.6 %
BUN SERPL-MCNC: 11 MG/DL (ref 7–20)
CALCIUM SERPL-MCNC: 8.5 MG/DL (ref 8.3–10.6)
CHLORIDE SERPL-SCNC: 109 MMOL/L (ref 99–110)
CO2 SERPL-SCNC: 21 MMOL/L (ref 21–32)
CREAT SERPL-MCNC: 0.7 MG/DL (ref 0.6–1.2)
DEPRECATED RDW RBC AUTO: 14.9 % (ref 12.4–15.4)
EOSINOPHIL # BLD: 0.3 K/UL (ref 0–0.6)
EOSINOPHIL NFR BLD: 3.2 %
GFR SERPLBLD CREATININE-BSD FMLA CKD-EPI: >90 ML/MIN/{1.73_M2}
GLUCOSE SERPL-MCNC: 113 MG/DL (ref 70–99)
HCT VFR BLD AUTO: 38.9 % (ref 36–48)
HCYS SERPL-SCNC: 8 UMOL/L (ref 0–10)
HGB BLD-MCNC: 12.7 G/DL (ref 12–16)
LYMPHOCYTES # BLD: 1.3 K/UL (ref 1–5.1)
LYMPHOCYTES NFR BLD: 15.1 %
MAGNESIUM SERPL-MCNC: 2.14 MG/DL (ref 1.8–2.4)
MCH RBC QN AUTO: 28.3 PG (ref 26–34)
MCHC RBC AUTO-ENTMCNC: 32.7 G/DL (ref 31–36)
MCV RBC AUTO: 86.6 FL (ref 80–100)
MONOCYTES # BLD: 0.7 K/UL (ref 0–1.3)
MONOCYTES NFR BLD: 7.7 %
NEUTROPHILS # BLD: 6.2 K/UL (ref 1.7–7.7)
NEUTROPHILS NFR BLD: 73.4 %
PHOSPHATE SERPL-MCNC: 2.9 MG/DL (ref 2.5–4.9)
PLATELET # BLD AUTO: 193 K/UL (ref 135–450)
PMV BLD AUTO: 7.4 FL (ref 5–10.5)
POTASSIUM SERPL-SCNC: 4 MMOL/L (ref 3.5–5.1)
RBC # BLD AUTO: 4.49 M/UL (ref 4–5.2)
SODIUM SERPL-SCNC: 139 MMOL/L (ref 136–145)
WBC # BLD AUTO: 8.4 K/UL (ref 4–11)

## 2025-03-16 PROCEDURE — 83735 ASSAY OF MAGNESIUM: CPT

## 2025-03-16 PROCEDURE — 85303 CLOT INHIBIT PROT C ACTIVITY: CPT

## 2025-03-16 PROCEDURE — 85306 CLOT INHIBIT PROT S FREE: CPT

## 2025-03-16 PROCEDURE — 6370000000 HC RX 637 (ALT 250 FOR IP): Performed by: NEUROLOGICAL SURGERY

## 2025-03-16 PROCEDURE — 70551 MRI BRAIN STEM W/O DYE: CPT

## 2025-03-16 PROCEDURE — 99024 POSTOP FOLLOW-UP VISIT: CPT | Performed by: NURSE PRACTITIONER

## 2025-03-16 PROCEDURE — 80069 RENAL FUNCTION PANEL: CPT

## 2025-03-16 PROCEDURE — 6370000000 HC RX 637 (ALT 250 FOR IP)

## 2025-03-16 PROCEDURE — 36415 COLL VENOUS BLD VENIPUNCTURE: CPT

## 2025-03-16 PROCEDURE — 2000000000 HC ICU R&B

## 2025-03-16 PROCEDURE — 85025 COMPLETE CBC W/AUTO DIFF WBC: CPT

## 2025-03-16 PROCEDURE — 85300 ANTITHROMBIN III ACTIVITY: CPT

## 2025-03-16 PROCEDURE — APPNB30 APP NON BILLABLE TIME 0-30 MINS: Performed by: NURSE PRACTITIONER

## 2025-03-16 PROCEDURE — 85301 ANTITHROMBIN III ANTIGEN: CPT

## 2025-03-16 PROCEDURE — 6370000000 HC RX 637 (ALT 250 FOR IP): Performed by: NURSE PRACTITIONER

## 2025-03-16 PROCEDURE — 6360000002 HC RX W HCPCS: Performed by: NURSE PRACTITIONER

## 2025-03-16 PROCEDURE — 99232 SBSQ HOSP IP/OBS MODERATE 35: CPT | Performed by: NURSE PRACTITIONER

## 2025-03-16 RX ORDER — LORAZEPAM 2 MG/ML
1 INJECTION INTRAMUSCULAR
Status: DISPENSED | OUTPATIENT
Start: 2025-03-16 | End: 2025-03-17

## 2025-03-16 RX ADMIN — LOSARTAN POTASSIUM 25 MG: 25 TABLET, FILM COATED ORAL at 08:52

## 2025-03-16 RX ADMIN — POLYETHYLENE GLYCOL 3350 17 G: 17 POWDER, FOR SOLUTION ORAL at 08:52

## 2025-03-16 RX ADMIN — CLOBETASOL PROPIONATE CREAM USP, 0.05%: 0.5 CREAM TOPICAL at 20:00

## 2025-03-16 RX ADMIN — MICONAZOLE NITRATE: 20 CREAM TOPICAL at 20:00

## 2025-03-16 RX ADMIN — CLOBETASOL PROPIONATE CREAM USP, 0.05%: 0.5 CREAM TOPICAL at 13:19

## 2025-03-16 RX ADMIN — ENOXAPARIN SODIUM 40 MG: 100 INJECTION SUBCUTANEOUS at 08:52

## 2025-03-16 RX ADMIN — TRIAMCINOLONE ACETONIDE: 1 OINTMENT TOPICAL at 13:19

## 2025-03-16 RX ADMIN — ASPIRIN 81 MG: 81 TABLET, CHEWABLE ORAL at 08:52

## 2025-03-16 RX ADMIN — HYDROXYZINE HYDROCHLORIDE 25 MG: 25 TABLET ORAL at 20:42

## 2025-03-16 RX ADMIN — ATORVASTATIN CALCIUM 40 MG: 40 TABLET, FILM COATED ORAL at 20:42

## 2025-03-16 RX ADMIN — MICONAZOLE NITRATE: 20 CREAM TOPICAL at 09:23

## 2025-03-16 RX ADMIN — HYDROCODONE BITARTRATE AND ACETAMINOPHEN 2 TABLET: 5; 325 TABLET ORAL at 18:08

## 2025-03-16 RX ADMIN — ARIPIPRAZOLE 20 MG: 5 TABLET ORAL at 20:43

## 2025-03-16 RX ADMIN — TRIAMCINOLONE ACETONIDE: 1 OINTMENT TOPICAL at 20:00

## 2025-03-16 ASSESSMENT — PAIN DESCRIPTION - LOCATION: LOCATION: NECK

## 2025-03-16 ASSESSMENT — PAIN SCALES - GENERAL
PAINLEVEL_OUTOF10: 0
PAINLEVEL_OUTOF10: 0
PAINLEVEL_OUTOF10: 2
PAINLEVEL_OUTOF10: 0

## 2025-03-16 ASSESSMENT — PAIN SCALES - WONG BAKER: WONGBAKER_NUMERICALRESPONSE: NO HURT

## 2025-03-16 ASSESSMENT — PAIN DESCRIPTION - DESCRIPTORS: DESCRIPTORS: DISCOMFORT;SQUEEZING

## 2025-03-16 ASSESSMENT — PAIN DESCRIPTION - ORIENTATION: ORIENTATION: POSTERIOR

## 2025-03-16 NOTE — PROGRESS NOTES
Current NIHSS 16      Nursing Core Measures for Stroke:   [x]   Education template documentation (STROKE/TIA). Select only risk factors that are applicable to patient when selecting risk factors.  [x]   Care Plan template documentation (Physiologic Instability - Neurosensory). Selecting this will add care plan rows to the flowsheet under the Neuro section of Head to Toe.  [x]   Verified Swallow Screen completed prior to PO intake of food, drink, medications.          Please verify correct medication route prior to administration for intubated patients, patients who can not swallow or have alternative routes of intake (NG, OG, CT), etc  [x]   VTE Prophylaxis: SCDs ordered/addressed; SCDs: On           (As a reminder, ASA, Plavix, and TPA/TNK are not VTE prophylaxis.)    Reviewed the Following Education with Patient and/or Family:   - Personalized risk factors for patient, along with changes, modifications that will help prevent stroke.  - Signs and Symptoms of Stroke: (Facial droop, weakness/numbness especially on one side, speech difficulty, sudden confusion, sudden loss of vision, sudden severe headache, sudden loss of balance or having difficulty walking, syncope, or seizure)  - How to activate EMS (911)   - Importance of Follow Up Appointments at Discharge   - Importance of Compliance with Medications Prescribed at Discharge  - Available community resources and stroke advocacy groups if needed    Patient and/or family member: demonstrates understanding.     Stroke Education booklet given to patient/family (or verified, if given already), which reviews above information. yes         Electronically signed by Blank Martinez RN on 3/16/2025 at 2:29 AM

## 2025-03-16 NOTE — PLAN OF CARE
Problem: Chronic Conditions and Co-morbidities  Goal: Patient's chronic conditions and co-morbidity symptoms are monitored and maintained or improved  3/16/2025 1131 by Batsheva Rose RN  Outcome: Progressing  Flowsheets (Taken 3/16/2025 0800)  Care Plan - Patient's Chronic Conditions and Co-Morbidity Symptoms are Monitored and Maintained or Improved: Monitor and assess patient's chronic conditions and comorbid symptoms for stability, deterioration, or improvement  3/16/2025 0655 by Blank Martinez RN  Outcome: Progressing  Flowsheets (Taken 3/16/2025 0655)  Care Plan - Patient's Chronic Conditions and Co-Morbidity Symptoms are Monitored and Maintained or Improved:   Monitor and assess patient's chronic conditions and comorbid symptoms for stability, deterioration, or improvement   Collaborate with multidisciplinary team to address chronic and comorbid conditions and prevent exacerbation or deterioration   Update acute care plan with appropriate goals if chronic or comorbid symptoms are exacerbated and prevent overall improvement and discharge     Problem: Discharge Planning  Goal: Discharge to home or other facility with appropriate resources  3/16/2025 1131 by Batsheva Rose RN  Outcome: Progressing  Flowsheets (Taken 3/16/2025 0800)  Discharge to home or other facility with appropriate resources:   Identify barriers to discharge with patient and caregiver   Arrange for needed discharge resources and transportation as appropriate  3/16/2025 0655 by Blank Martinez RN  Outcome: Progressing     Problem: Safety - Adult  Goal: Free from fall injury  3/16/2025 1131 by Batsheva Roes RN  Outcome: Progressing  Flowsheets (Taken 3/16/2025 1130)  Free From Fall Injury:   Instruct family/caregiver on patient safety   Based on caregiver fall risk screen, instruct family/caregiver to ask for assistance with transferring infant if caregiver noted to have fall risk factors  3/16/2025 0655 by Juan

## 2025-03-16 NOTE — PROGRESS NOTES
Vascular Surgery   Daily Progress Note  Patient: Treri Shankar      CC: hematoma and pseudoaneurysm s/p R CFA access    SUBJECTIVE:   No acute events overnight. Afebrile and HDS.     OBJECTIVE:    PHYSICAL EXAM:    Vitals:    03/15/25 1800 03/15/25 1900 03/15/25 1905 03/15/25 2000   BP: 118/76 135/76     Pulse: 90 85     Resp: 17 17 16    Temp:    98.3 °F (36.8 °C)   TempSrc:    Oral   SpO2:       Weight:       Height:         General appearance: no acute distress  Eyes: PERRLA, no scleral icterus  Neck: trachea midline, no JVD  Chest/Lungs:  normal effort on room air  Cardiovascular: RRR  Abdomen: soft, non-tender, non-distended,   Extremities: R groin with access site with improved ecchymosis. Stable R-sided weakness, Palpable bilateral LE pulses  Neuro: Alert. Expressive aphasia, not following commands    LABS:   Recent Labs     03/14/25  2332 03/15/25  0437   WBC 12.6* 12.1*   HGB 14.2 14.0   HCT 43.6 42.0   MCV 85.8 85.4    224        Recent Labs     03/14/25 2332 03/15/25  0437    138   K 3.9 3.8    105   CO2 23 21   BUN 13 14   CREATININE 0.8 0.8      No results for input(s): \"AST\", \"ALT\", \"BILIDIR\", \"BILITOT\", \"ALKPHOS\" in the last 72 hours.    Invalid input(s): \"ALB\"   No results for input(s): \"LIPASE\", \"AMYLASE\" in the last 72 hours.     Recent Labs     03/14/25 2143 03/14/25  2332   INR 0.99 1.03   APTT 29.6 28.6      No results for input(s): \"CKTOTAL\", \"CKMB\", \"CKMBINDEX\", \"TROPONINI\" in the last 72 hours.      ASSESSMENT & PLAN:   This is a 64 y.o. female with a diagnosis of iatrogenic R CFA pseudoaneurysm after diagnostic cerebral angiogram (3/14). Vascular surgery was consulted.    - Re-assess 8 mm pseudoaneurysm on Monday, 3/17 with Duplex  - Continue neurovascular checks  - Continue to hold AC at this time  - Rest of care per primary team    Lindsey Becker DO  PGY1, General Surgery  03/16/25  8:07 AM  [phone]

## 2025-03-16 NOTE — PROGRESS NOTES
Current NIHSS 11    Nursing Core Measures for Stroke:   [x]   Education template documentation (STROKE/TIA). Select only risk factors that are applicable to patient when selecting risk factors.  [x]   Care Plan template documentation (Physiologic Instability - Neurosensory). Selecting this will add care plan rows to the flowsheet under the Neuro section of Head to Toe.  [x]   Verified Swallow Screen completed prior to PO intake of food, drink, medications.          Please verify correct medication route prior to administration for intubated patients, patients who can not swallow or have alternative routes of intake (NG, OG, IA), etc  [x]   VTE Prophylaxis: SCDs ordered/addressed; SCDs: On           (As a reminder, ASA, Plavix, and TPA/TNK are not VTE prophylaxis.)    Reviewed the Following Education with Patient and/or Family:   - Personalized risk factors for patient, along with changes, modifications that will help prevent stroke.  - Signs and Symptoms of Stroke: (Facial droop, weakness/numbness especially on one side, speech difficulty, sudden confusion, sudden loss of vision, sudden severe headache, sudden loss of balance or having difficulty walking, syncope, or seizure)  - How to activate EMS (911)   - Importance of Follow Up Appointments at Discharge   - Importance of Compliance with Medications Prescribed at Discharge  - Available community resources and stroke advocacy groups if needed    Patient and/or family member: verbalized understanding.     Stroke Education booklet given to patient/family (or verified, if given already), which reviews above information. yes        Electronically signed by Mayte Ramsey RN on 3/16/2025 at 1:45 PM     Unable to reach patient states number is disconnected

## 2025-03-16 NOTE — PROGRESS NOTES
Occupational Therapy/Physical Therapy  HOLD  Orders received.  Chart reviewed and per vascular note 3/15 pt is on bedrest through the weekend. Will follow up with patient as appropriate on Monday.     Lis Barnard, OTR/ABDIRAHMAN Ghotra PT, DPT [id number]

## 2025-03-16 NOTE — PROGRESS NOTES
Patient is now speaking full sentences, telling me about her family in detail. No dysarthria noted; Aphasia much improved.

## 2025-03-16 NOTE — PLAN OF CARE
Brief note:  Terri Shankar is a 65 y/o woman who underwent conventional angiogram for evaluation of right MCA aneurysm, who developed acute left MCA syndrome during procedure, found to have left M1 occlusion. She received IA tPA 11:14 with TICI3 reperfusion.     Denies all complaints at time of exam.   Vascular duplex for pseudoaneurysm ordered by vascular surgery and pending result.  Patient still needs her MRI    PHYSICAL EXAM:  Vitals:    03/15/25 1700 03/15/25 1800 03/15/25 1900 03/15/25 1905   BP: (!) 149/69 118/76 135/76    Pulse: 86 90 85    Resp: 18 17 17 16   Temp:       TempSrc:       SpO2:       Weight:       Height:             General: Alert, no distress, well-nourished  Neurologic  Mental status:   orientation to person only and told me she was in the hospital  Could not tell me the situation or time even when provided options.    Attention intact as able to attend well to the exam     Language expressively aphasic and perseverates on the word \"yeah.\"   Comprehension intact; follows simple commands    Cranial nerves:   CN2: No blink to threat right eye. Blinks to threat left eye.   CN 3,4,6: Pupils 3 mm > 2 mm equal and reactive to light, extraocular muscles intact  CN5: + corneal reflexes  CN7: Flattening of right nasolabial fold.   CN8: Hearing symmetric to spoken voice  CN9: Palate elevated symmetrically  CN11: Trap strength weak on the right compared to the left.   CN12: Tongue midline with protrusion    Motor Exam:  RUE: Would not move to command but spontaneously can lift it off the bed briefly antigravity. Drift noted  RLE: Was able to lift antigravity to command with drift. Plantar/dorsiflexion 2/5  LUE: Follows commands. Spontaneously and purposefully moves. Able to lift antigravity off the bed without drift.  strength 4/5. Shoulder shrug 4/5  LLE: Lifts and holds off the bed antigravity without drift. Follows commands. Has spontaneous movement.       Sensory: light touch intact and  symmetric in all 4 extremities.  No sensory extinction on bilateral simultaneous stimulation  Cerebellar/coordination: finger nose finger normal without ataxia  Tone: normal in all 4 extremities  Gait: deferred for safet    OTHER SYSTEMS:  Cardiovascular: Warm, appears well perfused   Respiratory: Easy, non-labored respiratory pattern   Abdominal: Abdomen is without distention   Extremities: Upper and lower extremities are atraumatic in appearance without deformity. No swelling or erythema.   Neurovascular: palpable +1 DP/PT pulses bilaterally. Toes on right foot slightly cooler to touch compared to the left foot, however left foot did have a sock on it and the right foot did not. Cap refill on all digits on both feet < 3 seconds. Color even, skin temp warm bilaterally in proximal skin.    Groin: no bleeding. Soft with small palpable hematoma at the access site. Ecchymosis noted but not spreading past the body marker lines placed on the patient during the evening of 3/14 into 3/15.

## 2025-03-16 NOTE — PROGRESS NOTES
NEUROCRITICAL CARE PROGRESS NOTE       Patient Name: Terri Shankar YOB: 1960   Sex: Female Age: 64 yrs     CC / Reason for Consult: post IA TPA    Changes over last 24 hours:   No overnight events  Nursing at bedside w/o concern this AM  No complaints     ROS: +aphasia, +R hemiparesis     ASSESSMENT & RECOMMENDATIONS   Assessment:  Terri Shankar is a 65 y/o woman who underwent conventional angiogram for evaluation of right MCA aneurysm, who developed acute left MCA syndrome during procedure, found to have left M1 occlusion. She received IA tPA 11:14 with TICI3 reperfusion.     Plan:  OK for asa & SQ heparin, d/w Dr. Norris   MRI brain wo [facility] today  Hypercoagulability testing sent yesterday  TTE tomorrow   High-intensity statin  HOB elevated to help prevent aspiration  Q4H Neurologic Exams & Vitals; NIHSS per guidelines   Telemetry - notify neurocritical care of any atrial fibrillation  PT/OT/SLP/Rehab as indicated  SBP goal less than 180 mmHg  Consider long term cardiac monitor  Follow up w/ Neurology in 3 months     MELANIE Carrillo - [facility]   NEUROCRITICAL CARE  3/16/2025 9:11 AM  [facility]: [facility] NEUROCRITICAL CARE    HISTORY     Terri Shankar is a 64 y.o. F with PMH of HTN, HLD, who is here for an elective diagnostic angio for R MCA AN. She had an incidental finding of R MCA AN almost a year ago for dizziness, Currently, she is asymptomatic piror the procedure     During the angio, a L M2 occlusion was noted,  her NIHSS reportedly was 21 at the time. IA tPA was given and achieved TICI 3 reperfusion. NIHSS 14 post procedure and her symptoms has improved upon arrival to ICU, NIHSS 12 during exam.      PMH Past Medical History:   Diagnosis Date    Anxiety     Bipolar 1 disorder (HCC)     Depression     Dyspareunia in female 05/16/2015    Elevated transaminase level 11/10/2017    Hemorrhoids 01/15/2015    History of tubal ligation 05/16/2015    HLD (hyperlipidemia) 12/14/2022    Hypertension      217 196 224 193   INR 0.91 0.99 1.03  --   --             DIAGNOSTICS   IMAGES:    CT-A head and neck 3/14/25 16:44  No hemodynamically significant stenosis in the major arteries of the head and neck. Variant anatomy as described.     CT head 3/14/25  No acute intracranial abnormality.     CT-A abdomen and pelvis   1. Right inguinal hematoma closely associated with distal right external iliac artery and right common femoral artery with evidence of small 8 mm distal right common femoral artery pseudoaneurysm.  2. No CT evidence of retroperitoneal hematoma.  3. Incidental cholelithiasis.    PHYSICAL EXAMINATION     PHYSICAL EXAM:  Vitals:    03/16/25 0400 03/16/25 0500 03/16/25 0600 03/16/25 0800   BP: (!) 47/27 (!) 153/77 126/68    Pulse: 72 83 77    Resp: 15 17 16    Temp:    98.6 °F (37 °C)   TempSrc:    Bladder   SpO2:    97%   Weight:       Height:           General: Alert, no distress, well-nourished  Neurologic  Mental status:   Alert  States name, remainder TESHA given aphasia  Attention intact  Mod-severe aphasia.   Follows simple commands with cues but sig. Receptive aphasia w/o cues  Cranial nerves:   CN2: right visual field neglect   CN 3,4,6: Pupils equal and reactive to light, extraocular muscles difficult to assess   CN7: right facial weakness; no dysarthria  CN8: Hearing symmetric to spoken voice  Motor Exam:  RUE briefly antigravity, with drift  RLE briefly antigravity  LUE antigravity > 10 seconds, no drift  LLE antigravity > 5 seconds, no drift   Tone: normal in all 4 extremities  Gait: deferred for safety  OTHER SYSTEMS:  Cardiovascular: Warm, appears well perfused   Respiratory: Easy, non-labored respiratory pattern   Abdominal: Abdomen is without distention   Extremities: Upper and lower extremities are atraumatic in appearance without deformity. No swelling or erythema

## 2025-03-16 NOTE — PROGRESS NOTES
NIH Stroke Scale      Interval: 2 days post treatment  Time: 6:46 PM  Person Administering Scale: Rob Norris MD    I discussed MRI results w patient at length including concern for central source, most likely aorta related to catheter placement for angio      1a  Level of consciousness: 0=alert; keenly responsive   1b. LOC questions:  0=Performs both tasks correctly   1c. LOC commands: 0=Performs both tasks correctly   2.  Best Gaze: 0=normal   3.  Visual: 1=Partial hemianopia   4. Facial Palsy: 1=Minor paralysis (flattened nasolabial fold, asymmetric on smiling)   5a.  Motor left arm: 0=No drift, limb holds 90 (or 45) degrees for full 10 seconds   5b.  Motor right arm: 1=Drift, limb holds 90 (or 45) degrees but drifts down before full 10 seconds: does not hit bed   6a. motor left le=No drift, limb holds 90 (or 45) degrees for full 10 seconds   6b  Motor right le=Drift, limb holds 90 (or 45) degrees but drifts down before full 10 seconds: does not hit bed   7. Limb Ataxia: 0=Absent   8.  Sensory: 0=Normal; no sensory loss   9. Best Language:  2=moderately severe expressive aphasia   10. Dysarthria: 1=Mild to moderate, patient slurs at least some words and at worst, can be understood with some difficulty   11. Extinction and Inattention: 1=Visual, tactile, auditory, spatial or personal inattention or extinction to bilateral simultaneous stimulation in one of the sensory modalities   12. Distal motor function: 0=Normal    Total:   8

## 2025-03-16 NOTE — PLAN OF CARE
Problem: Chronic Conditions and Co-morbidities  Goal: Patient's chronic conditions and co-morbidity symptoms are monitored and maintained or improved  Outcome: Progressing  Flowsheets (Taken 3/16/2025 0655)  Care Plan - Patient's Chronic Conditions and Co-Morbidity Symptoms are Monitored and Maintained or Improved:   Monitor and assess patient's chronic conditions and comorbid symptoms for stability, deterioration, or improvement   Collaborate with multidisciplinary team to address chronic and comorbid conditions and prevent exacerbation or deterioration   Update acute care plan with appropriate goals if chronic or comorbid symptoms are exacerbated and prevent overall improvement and discharge     Problem: Neurosensory - Adult  Goal: Achieves stable or improved neurological status  Outcome: Progressing  Flowsheets (Taken 3/16/2025 0655)  Achieves stable or improved neurological status:   Assess for and report changes in neurological status   Initiate measures to prevent increased intracranial pressure   Maintain blood pressure and fluid volume within ordered parameters to optimize cerebral perfusion and minimize risk of hemorrhage   Monitor temperature, glucose, and sodium. Initiate appropriate interventions as ordered

## 2025-03-16 NOTE — PROGRESS NOTES
Patient has a mild rash underneath all telemetry leads. She reports very sensitive skin. Leads repositioned.

## 2025-03-16 NOTE — PROGRESS NOTES
Neurovascular Neurosurgery Attending Attestation:  Agree with assessment and plan as detailed below.    Siddhartha Rosas MD  Attending Neurosurgeon  Neurovascular and Stroke  [facility]      NEUROSURGERY PROGRESS NOTE    3/16/2025 8:19 AM                               Terri Shankar                      LOS: 2 days               POD# 2 s/p Procedure(s) (LRB):  ANGIOGRAM CEREBRAL (11587) (N/A)    Subjective: No acute events overnight. Patient sleepy after receiving Atarax overnight.    Physical Exam:  Patient seen and examined    Vitals:    03/16/25 0600   BP: 126/68   Pulse: 77   Resp: 16   Temp:    SpO2:      GCS:  3 - Opens eyes to loud noise or command  4 - Seems confused, disoriented (Exam difficult 2/2 aphasia)  6 - Follows simple motor commands  General: Well developed. Alert and cooperative in no acute distress.     HENT: atraumatic, neck supple  Eyes: Optic discs: Not tested  Pulmonary: unlabored respiratory effort  Cardiovascular:  Warm well perfused. No peripheral edema  Gastrointestinal: abdomen soft, NT, ND    Neurological:  Mental Status: Awake, alert, orientation exam limited 2/2 aphasia  Attention: Intact  Language: Expressive Aphasia noted  Sensation: Intact to all extremities to light touch on left side and noxious stimuli on right side  Coordination: Exam limited 2/2 weakness on left and not following complex commands    Cranial Nerves:  II: Visual acuity not tested, denies new visual changes / diplopia  III, IV, VI: PERRL, 3 mm bilaterally, EOM come to midline but do not drive to the right. noted  V: Facial sensation intact bilaterally to touch  VII: Right facial weakness; no dysarthria   VIII: Hearing intact bilaterally to spoken voice  IX: Palate movement equal bilaterally  XI: Shoulder shrug equal bilaterally  XII: Tongue midline    Musculoskeletal:   Gait: Not tested   Assist devices: None   Tone: Normal  Motor strength: RLE exam limited 2/2 pain from Right inguinal hematoma     disorder, recurrent, moderate 08/07/2023    Major depressive disorder, recurrent, unspecified 08/07/2023    AMS (altered mental status) 06/12/2023    Morbid obesity with BMI of 40.0-44.9, adult 03/11/2015    Bipolar disorder (HCC) 01/15/2015    Urinary, incontinence, stress female 01/15/2015       Assessment:  Patient is a 64 y.o. female w/ Transient occlusion of the left middle cerebral artery inferior M2 division with complete, TICI 3 reperfusion after intraarterial thrombolysis.    Plan:  Neurologically stable  Neurologic exams frequency: Defer to Neurology  For change in exam MUST contact neurosurgery team along with critical care or primary team  LMCA Occlusion:  - Groin checks per post procedure guidelines  - Neurocritical Care following, appreciate recs  - Obtain MRI of the brain w/o contrast to eval for stroke  R CFA pseudoaneurysm   - Vascular Surgery following, appreciate recs  - Bedrest through the weekend  - Reevaluate right groin with duplex on Monday  - Continue to monitor distal pulses  Mobility: Bedrest, per Vascular Surgery  Diet: Advance as tolerates  DVT Prophylaxis: SCD's & Lovenox  Will follow inpatient.  Please call with any questions or decline in neurological status    DISPO: Remain in ICU from neurosurgery standpoint. Dispo timing to be determined by primary team once patient is medically stable for discharge.    Patient was discussed with Dr. Norris who agrees with above assessment and plan.     Electronically signed by: MELANIE Peace CNP, APRN-CNP, 3/16/2025 8:19 AM  [phone]

## 2025-03-16 NOTE — PROGRESS NOTES
Current NIHSS 15    Pt's assessment waxes and wanes but pt at the start of the shift was 10. NP aware of changes in pt's neuro exam.     Nursing Core Measures for Stroke:   [x]   Education template documentation (STROKE/TIA). Select only risk factors that are applicable to patient when selecting risk factors.  [x]   Care Plan template documentation (Physiologic Instability - Neurosensory). Selecting this will add care plan rows to the flowsheet under the Neuro section of Head to Toe.  [x]   Verified Swallow Screen completed prior to PO intake of food, drink, medications.          Please verify correct medication route prior to administration for intubated patients, patients who can not swallow or have alternative routes of intake (NG, OG, NM), etc  [x]   VTE Prophylaxis: SCDs ordered/addressed; SCDs: On           (As a reminder, ASA, Plavix, and TPA/TNK are not VTE prophylaxis.)    Reviewed the Following Education with Patient and/or Family:   - Personalized risk factors for patient, along with changes, modifications that will help prevent stroke.  - Signs and Symptoms of Stroke: (Facial droop, weakness/numbness especially on one side, speech difficulty, sudden confusion, sudden loss of vision, sudden severe headache, sudden loss of balance or having difficulty walking, syncope, or seizure)  - How to activate EMS (911)   - Importance of Follow Up Appointments at Discharge   - Importance of Compliance with Medications Prescribed at Discharge  - Available community resources and stroke advocacy groups if needed    Patient and/or family member: demonstrates understanding.     Stroke Education booklet given to patient/family (or verified, if given already), which reviews above information. yes         Electronically signed by Blank Martinez RN on 3/16/2025 at 6:11 AM

## 2025-03-17 ENCOUNTER — APPOINTMENT (OUTPATIENT)
Dept: VASCULAR LAB | Age: 65
DRG: 062 | End: 2025-03-17
Attending: NEUROLOGICAL SURGERY
Payer: MEDICARE

## 2025-03-17 LAB
ALBUMIN SERPL-MCNC: 3.5 G/DL (ref 3.4–5)
ANION GAP SERPL CALCULATED.3IONS-SCNC: 9 MMOL/L (ref 3–16)
BASOPHILS # BLD: 0.1 K/UL (ref 0–0.2)
BASOPHILS NFR BLD: 0.7 %
BUN SERPL-MCNC: 18 MG/DL (ref 7–20)
CALCIUM SERPL-MCNC: 8.9 MG/DL (ref 8.3–10.6)
CHLORIDE SERPL-SCNC: 108 MMOL/L (ref 99–110)
CO2 SERPL-SCNC: 23 MMOL/L (ref 21–32)
CREAT SERPL-MCNC: 0.8 MG/DL (ref 0.6–1.2)
DEPRECATED RDW RBC AUTO: 14.6 % (ref 12.4–15.4)
ECHO BSA: 2.19 M2
EOSINOPHIL # BLD: 0.3 K/UL (ref 0–0.6)
EOSINOPHIL NFR BLD: 4.2 %
FACT VIII ACT/NOR PPP: 97 % (ref 50–150)
GFR SERPLBLD CREATININE-BSD FMLA CKD-EPI: 83 ML/MIN/{1.73_M2}
GLUCOSE SERPL-MCNC: 120 MG/DL (ref 70–99)
HCT VFR BLD AUTO: 39.5 % (ref 36–48)
HGB BLD-MCNC: 13.1 G/DL (ref 12–16)
LYMPHOCYTES # BLD: 1.7 K/UL (ref 1–5.1)
LYMPHOCYTES NFR BLD: 20.6 %
MAGNESIUM SERPL-MCNC: 2.12 MG/DL (ref 1.8–2.4)
MCH RBC QN AUTO: 28.7 PG (ref 26–34)
MCHC RBC AUTO-ENTMCNC: 33.1 G/DL (ref 31–36)
MCV RBC AUTO: 86.6 FL (ref 80–100)
MONOCYTES # BLD: 0.6 K/UL (ref 0–1.3)
MONOCYTES NFR BLD: 7.4 %
NEUTROPHILS # BLD: 5.5 K/UL (ref 1.7–7.7)
NEUTROPHILS NFR BLD: 67.1 %
PHOSPHATE SERPL-MCNC: 4.1 MG/DL (ref 2.5–4.9)
PLATELET # BLD AUTO: 201 K/UL (ref 135–450)
PMV BLD AUTO: 7.6 FL (ref 5–10.5)
POTASSIUM SERPL-SCNC: 3.9 MMOL/L (ref 3.5–5.1)
RBC # BLD AUTO: 4.56 M/UL (ref 4–5.2)
SODIUM SERPL-SCNC: 140 MMOL/L (ref 136–145)
VAS RIGHT CFA DIST PSV: 113 CM/S
VAS RIGHT CFA PROX PSV: 124 CM/S
VAS RIGHT SFA DIST PSV: 78 CM/S
VAS RIGHT SFA DIST VEL RATIO: 0.96
VAS RIGHT SFA MID PSV: 81.4 CM/S
VAS RIGHT SFA MID VEL RATIO: 0.7
VAS RIGHT SFA PROX PSV: 115 CM/S
VAS RIGHT SFA PROX VEL RATIO: 0.9
WBC # BLD AUTO: 8.2 K/UL (ref 4–11)

## 2025-03-17 PROCEDURE — 80069 RENAL FUNCTION PANEL: CPT

## 2025-03-17 PROCEDURE — 83735 ASSAY OF MAGNESIUM: CPT

## 2025-03-17 PROCEDURE — 6370000000 HC RX 637 (ALT 250 FOR IP)

## 2025-03-17 PROCEDURE — 51702 INSERT TEMP BLADDER CATH: CPT

## 2025-03-17 PROCEDURE — 92507 TX SP LANG VOICE COMM INDIV: CPT

## 2025-03-17 PROCEDURE — 93926 LOWER EXTREMITY STUDY: CPT

## 2025-03-17 PROCEDURE — 6370000000 HC RX 637 (ALT 250 FOR IP): Performed by: NURSE PRACTITIONER

## 2025-03-17 PROCEDURE — 6360000002 HC RX W HCPCS: Performed by: NURSE PRACTITIONER

## 2025-03-17 PROCEDURE — 93926 LOWER EXTREMITY STUDY: CPT | Performed by: SURGERY

## 2025-03-17 PROCEDURE — 36415 COLL VENOUS BLD VENIPUNCTURE: CPT

## 2025-03-17 PROCEDURE — 92526 ORAL FUNCTION THERAPY: CPT

## 2025-03-17 PROCEDURE — 85025 COMPLETE CBC W/AUTO DIFF WBC: CPT

## 2025-03-17 PROCEDURE — 2060000000 HC ICU INTERMEDIATE R&B

## 2025-03-17 PROCEDURE — 6370000000 HC RX 637 (ALT 250 FOR IP): Performed by: NEUROLOGICAL SURGERY

## 2025-03-17 PROCEDURE — 99231 SBSQ HOSP IP/OBS SF/LOW 25: CPT | Performed by: NURSE PRACTITIONER

## 2025-03-17 RX ADMIN — ATORVASTATIN CALCIUM 40 MG: 40 TABLET, FILM COATED ORAL at 20:41

## 2025-03-17 RX ADMIN — MICONAZOLE NITRATE: 20 CREAM TOPICAL at 20:50

## 2025-03-17 RX ADMIN — HYDROCODONE BITARTRATE AND ACETAMINOPHEN 2 TABLET: 5; 325 TABLET ORAL at 10:39

## 2025-03-17 RX ADMIN — MICONAZOLE NITRATE: 20 CREAM TOPICAL at 10:53

## 2025-03-17 RX ADMIN — TRIAMCINOLONE ACETONIDE: 1 OINTMENT TOPICAL at 10:53

## 2025-03-17 RX ADMIN — POLYETHYLENE GLYCOL 3350 17 G: 17 POWDER, FOR SOLUTION ORAL at 10:39

## 2025-03-17 RX ADMIN — ARIPIPRAZOLE 20 MG: 5 TABLET ORAL at 20:41

## 2025-03-17 RX ADMIN — HYDROCODONE BITARTRATE AND ACETAMINOPHEN 2 TABLET: 5; 325 TABLET ORAL at 20:40

## 2025-03-17 RX ADMIN — HYDROCODONE BITARTRATE AND ACETAMINOPHEN 2 TABLET: 5; 325 TABLET ORAL at 14:44

## 2025-03-17 RX ADMIN — LOSARTAN POTASSIUM 25 MG: 25 TABLET, FILM COATED ORAL at 10:39

## 2025-03-17 RX ADMIN — ENOXAPARIN SODIUM 40 MG: 100 INJECTION SUBCUTANEOUS at 10:39

## 2025-03-17 RX ADMIN — HYDROCODONE BITARTRATE AND ACETAMINOPHEN 2 TABLET: 5; 325 TABLET ORAL at 00:42

## 2025-03-17 RX ADMIN — HYDROXYZINE HYDROCHLORIDE 25 MG: 25 TABLET ORAL at 18:29

## 2025-03-17 RX ADMIN — TRIAMCINOLONE ACETONIDE: 1 OINTMENT TOPICAL at 20:51

## 2025-03-17 RX ADMIN — CLOBETASOL PROPIONATE CREAM USP, 0.05%: 0.5 CREAM TOPICAL at 20:51

## 2025-03-17 RX ADMIN — CLOBETASOL PROPIONATE CREAM USP, 0.05%: 0.5 CREAM TOPICAL at 10:53

## 2025-03-17 RX ADMIN — ASPIRIN 81 MG: 81 TABLET, CHEWABLE ORAL at 10:39

## 2025-03-17 ASSESSMENT — PAIN SCALES - GENERAL
PAINLEVEL_OUTOF10: 10
PAINLEVEL_OUTOF10: 8
PAINLEVEL_OUTOF10: 0
PAINLEVEL_OUTOF10: 8
PAINLEVEL_OUTOF10: 0

## 2025-03-17 ASSESSMENT — PAIN SCALES - WONG BAKER
WONGBAKER_NUMERICALRESPONSE: NO HURT

## 2025-03-17 ASSESSMENT — PAIN DESCRIPTION - LOCATION
LOCATION: GENERALIZED
LOCATION: GROIN

## 2025-03-17 ASSESSMENT — PAIN DESCRIPTION - ORIENTATION
ORIENTATION: RIGHT

## 2025-03-17 ASSESSMENT — PAIN DESCRIPTION - DESCRIPTORS
DESCRIPTORS: ACHING

## 2025-03-17 NOTE — PROGRESS NOTES
Speech Language Pathology  Facility/Department:[facility] ICU  Speech and dysphagia treatment   Name: Terri Shankar  : 1960  MRN: [id number]                                                       Patient Diagnosis(es):   Patient Active Problem List    Diagnosis Date Noted    Stroke, acute, thrombotic (HCC) 2025    Weakness on left side of face 2022    Slurred speech 2022    HLD (hyperlipidemia) 2022    Elevated blood pressure reading without diagnosis of hypertension 2022    Stroke, lacunar (HCC) 2022    Cerebrovascular accident (CVA) due to embolism of left middle cerebral artery (HCC) 2025    Ischemic stroke (HCC) 2025    Dizziness 2025    TIA (transient ischemic attack) 2024    Major depressive disorder, recurrent, mild 2023    Major depressive disorder, recurrent, moderate 2023    Major depressive disorder, recurrent, unspecified 2023    AMS (altered mental status) 2023    Morbid obesity with BMI of 40.0-44.9, adult 2015    Bipolar disorder (HCC) 01/15/2015    Urinary, incontinence, stress female 01/15/2015       Past Medical History:   Diagnosis Date    Anxiety     Bipolar 1 disorder (HCC)     Depression     Dyspareunia in female 2015    Elevated transaminase level 11/10/2017    Hemorrhoids 01/15/2015    History of tubal ligation 2015    HLD (hyperlipidemia) 2022    Hypertension     Irritable bowel syndrome     Lacunar stroke (HCC)     Menopause 2016    Obesity     Obesity (BMI 30-39.9) 2015    Overactive bladder     Prurigo nodularis     Stress incontinence     TIA (transient ischemic attack) 4/3/2024    Urinary incontinence     Uterine fibroid 2015    Yeast vaginitis 2020     Past Surgical History:   Procedure Laterality Date    COLONOSCOPY  2010    polyp removed    COLONOSCOPY  2018    EYE SURGERY Left     Lazy eye    NEUROVASCULAR PROCEDURE N/A 3/14/2025    ANGIOGRAM

## 2025-03-17 NOTE — PROGRESS NOTES
NEUROCRITICAL CARE PROGRESS NOTE       Patient Name: Terri Shankar YOB: 1960   Sex: Female Age: 64 yrs     CC / Reason for Consult: post IA TPA    Changes over last 24 hours:   Vascular ultrasound at bedside this AM   Aphasia is improving  Right side is improving in strength     ROS: +aphasia, improved strength on R    ASSESSMENT & RECOMMENDATIONS   Assessment:  Terri Shankar is a 65 y/o woman who underwent conventional angiogram for evaluation of right MCA aneurysm, who developed acute left MCA syndrome during procedure, found to have left M1 occlusion. She received IA tPA 11:14 with TICI3 reperfusion.     Aphasia is improving but still significant. Based on stroke burden I am optimistic for her neurologic improvement with time and therapy.     Plan:  TTE pending  Continue asa & SQ heparin  Tele stickers appear to be causing allergic reaction - we will replace with hypoallergenic stickers, discussed w/ charge RN  Hypercoagulability testing pending  High-intensity statin  HOB elevated to help prevent aspiration  Q4H Neurologic Exams & Vitals; NIHSS per guidelines   Telemetry - notify neurocritical care of any atrial fibrillation  PT/OT/SLP/Rehab as indicated  SBP goal less than 180 mmHg  Long term cardiac monitor - messaged RN navigator   Follow up w/ me in stroke clinic short term; can follow up with neurology and neurosurgery as outpatient as well     MELANIE Carrillo - [facility]   NEUROCRITICAL CARE  3/17/2025 9:27 AM  [facility]: [facility] NEUROCRITICAL CARE    HISTORY     Terri Shankar is a 64 y.o. F with PMH of HTN, HLD, who is here for an elective diagnostic angio for R MCA AN. She had an incidental finding of R MCA AN almost a year ago for dizziness, Currently, she is asymptomatic piror the procedure     During the angio, a L M2 occlusion was noted,  her NIHSS reportedly was 21 at the time. IA tPA was given and achieved TICI 3 reperfusion. NIHSS 14 post procedure and her symptoms has improved

## 2025-03-17 NOTE — PROGRESS NOTES
NEUROSURGERY PROGRESS NOTE    3/17/2025 9:05 AM                               Terri Shankar                      LOS: 3 days               POD# 3 s/p Procedure(s) (LRB):  ANGIOGRAM CEREBRAL (02384) (N/A)    Subjective: No acute events overnight. Patient more awake and speech better this morning.    Physical Exam:  Patient seen and examined    Vitals:    03/17/25 0600   BP: 126/68   Pulse: 59   Resp: 12   Temp:    SpO2: 96%     GCS:  4 - Opens eyes on own  5 - Alert and oriented  6 - Follows simple motor commands    General: Well developed. Alert and cooperative in no acute distress.     HENT: atraumatic, neck supple  Eyes: Optic discs: Not tested  Pulmonary: unlabored respiratory effort  Cardiovascular:  Warm well perfused. No peripheral edema  Gastrointestinal: abdomen soft, NT, ND    Neurological:  Mental Status: Awake, alert, orientation exam limited /2 aphasia  Attention: Intact  Language: Expressive Aphasia improving  Sensation: Intact to all extremities to light touch on left side and noxious stimuli on right side  Coordination: Exam limited 2/2 weakness not following complex commands    Cranial Nerves:  II: Visual acuity not tested, denies new visual changes / diplopia  III, IV, VI: PERRL, 3 mm bilaterally, EOM come to midline but do not drive to the right. noted  V: Facial sensation intact bilaterally to touch  VII: Right facial weakness; no dysarthria   VIII: Hearing intact bilaterally to spoken voice  IX: Palate movement equal bilaterally  XI: Shoulder shrug equal bilaterally  XII: Tongue midline    Musculoskeletal:   Gait: Not tested   Assist devices: None   Tone: Normal  Motor strength:    Right  Left    Right  Left    Deltoid  4- 4+  Hip Flex  4 4   Biceps  4- 4+  Knee Extensors  4 4   Triceps  4- 4+  Knee Flexors  4 4   Wrist Ext  4- 4+  Ankle Dorsiflex.  4+ 5   Wrist Flex  4- 4+  Ankle Plantarflex.  4+ 5   Handgrip  4- 4+  Ext Van Longus  4+ 5   Thumb Ext  4- 4+         Radiological  speech 12/14/2022    HLD (hyperlipidemia) 12/14/2022    Elevated blood pressure reading without diagnosis of hypertension 12/14/2022    Stroke, lacunar (HCC) 12/14/2022    Cerebrovascular accident (CVA) due to embolism of left middle cerebral artery (HCC) 03/14/2025    Ischemic stroke (HCC) 01/20/2025    Dizziness 01/18/2025    TIA (transient ischemic attack) 04/03/2024    Major depressive disorder, recurrent, mild 08/07/2023    Major depressive disorder, recurrent, moderate 08/07/2023    Major depressive disorder, recurrent, unspecified 08/07/2023    AMS (altered mental status) 06/12/2023    Morbid obesity with BMI of 40.0-44.9, adult 03/11/2015    Bipolar disorder (HCC) 01/15/2015    Urinary, incontinence, stress female 01/15/2015       Assessment:  Patient is a 64 y.o. female w/ Transient occlusion of the left middle cerebral artery inferior M2 division with complete, TICI 3 reperfusion after intraarterial thrombolysis. Dr. Norris discussed MRI results w/ patient at length including concern for central source, most likely aorta related to catheter placement for angio     Plan:  Neurologically stable  Neurologic exams frequency: Defer to Neurology  For change in exam MUST contact neurosurgery team along with critical care or primary team  LMCA Occlusion:  - Groin checks per post procedure guidelines  - Neurocritical Care following, appreciate recs  R CFA pseudoaneurysm   - Vascular Surgery following, appreciate recs  - Bedrest through the weekend  - Reevaluate right groin with duplex on Monday  - Continue to monitor distal pulses  Mobility: Bedrest, per Vascular Surgery  Diet: Advance as tolerates  DVT Prophylaxis: SCD's & Lovenox  Please call with any questions or decline in neurological status    DISPO: Remain in ICU from neurosurgery standpoint. Dispo timing to be determined by primary team once patient is medically stable for discharge.    Patient was discussed with Dr. Norris who agrees with above assessment

## 2025-03-17 NOTE — CARE COORDINATION
SW following for discharge coordination.     Patient not medically ready for discharge at this time.    PT/OT following and will attempt to work with patient and get therapy recommendations in when they can.     Electronically signed by ANDREW Cortés on 3/17/2025 at 2:06 PM

## 2025-03-17 NOTE — PROGRESS NOTES
Current NIHSS 11    Nursing Core Measures for Stroke:   [x]   Education template documentation (STROKE/TIA). Select only risk factors that are applicable to patient when selecting risk factors.  [x]   Care Plan template documentation (Physiologic Instability - Neurosensory). Selecting this will add care plan rows to the flowsheet under the Neuro section of Head to Toe.  [x]   Verified Swallow Screen completed prior to PO intake of food, drink, medications.          Please verify correct medication route prior to administration for intubated patients, patients who can not swallow or have alternative routes of intake (NG, OG, NE), etc  [x]   VTE Prophylaxis: SCDs ordered/addressed; SCDs: On           (As a reminder, ASA, Plavix, and TPA/TNK are not VTE prophylaxis.)    Reviewed the Following Education with Patient and/or Family:   - Personalized risk factors for patient, along with changes, modifications that will help prevent stroke.  - Signs and Symptoms of Stroke: (Facial droop, weakness/numbness especially on one side, speech difficulty, sudden confusion, sudden loss of vision, sudden severe headache, sudden loss of balance or having difficulty walking, syncope, or seizure)  - How to activate EMS (911)   - Importance of Follow Up Appointments at Discharge   - Importance of Compliance with Medications Prescribed at Discharge  - Available community resources and stroke advocacy groups if needed    Patient and/or family member: verbalized understanding.     Stroke Education booklet given to patient/family (or verified, if given already), which reviews above information. yes         Electronically signed by Blank Martinez RN on 3/17/2025 at 6:57 AM

## 2025-03-17 NOTE — PROGRESS NOTES
Occupational Therapy/Physical Therapy  HOLD    OT/PT orders received. Pt has been on bedrest through weekend. Pt currently undergoing Vascular Duplex at bedside. Spoke with RN. Will follow up later today pending test results and if pt cleared for mobility.    Ramya Justice, OT [id number]  Tamiko Arizmendi, PT #[id number]

## 2025-03-17 NOTE — PROGRESS NOTES
Duplex reviewed with Dr. Hewitt    No evidence of pseudoaneurysm  Continue to monitor for changes  Please call with questions    Prosper Griffin DO  PGY3, General Surgery  03/17/25  2:04 PM  [place]  Pager: [phone]

## 2025-03-17 NOTE — PLAN OF CARE
Brief note:  Terri Shankar is a 65 y/o woman who underwent conventional angiogram for evaluation of right MCA aneurysm, who developed acute left MCA syndrome during procedure, found to have left M1 occlusion. She received IA tPA 11:14 with TICI3 reperfusion.     Denies all complaints at time of exam.   Vascular duplex for pseudoaneurysm ordered by vascular surgery and pending result.  Patient still needs her MRI    PHYSICAL EXAM:  Vitals:    03/16/25 1600 03/16/25 1700 03/16/25 1800 03/16/25 1900   BP: (!) 145/76  133/76 (!) 155/82   Pulse: 89 94 90 96   Resp: 17 19 13 20   Temp: 100 °F (37.8 °C)      TempSrc: Bladder      SpO2: 98%      Weight:       Height:             General: Alert, no distress, well-nourished  Neurologic  Mental status:   orientation to person, time and situation     Attention intact as able to attend well to the exam     Language expressively aphasic and perseverates on the word \"yeah.\"   Comprehension intact; follows simple commands    Cranial nerves:   CN2: No blink to threat right eye. Blinks to threat left eye.   CN 3,4,6: Pupils 3 mm > 2 mm equal and reactive to light, extraocular muscles intact  CN5: + corneal reflexes  CN7: Flattening of right nasolabial fold.   CN8: Hearing symmetric to spoken voice  CN9: Palate elevated symmetrically  CN11: Trap strength weak on the right compared to the left.   CN12: Tongue midline with protrusion    Motor Exam:  RUE:  briefly antigravity, very weak hand squeeze  RLE: Was able to lift antigravity to command with drift. Plantar/dorsiflexion 2/5  LUE: Follows commands. Spontaneously and purposefully moves. Able to lift antigravity off the bed without drift.  strength 4/5. Shoulder shrug 4/5  LLE: Lifts and holds off the bed antigravity without drift. Follows commands. Has spontaneous movement.       Sensory: light touch intact and symmetric in all 4 extremities.  No sensory extinction on bilateral simultaneous stimulation  Cerebellar/coordination:  finger nose finger normal without ataxia  Tone: normal in all 4 extremities  Gait: deferred for safet    OTHER SYSTEMS:  Cardiovascular: Warm, appears well perfused   Respiratory: Easy, non-labored respiratory pattern   Abdominal: Abdomen is without distention   Extremities: Upper and lower extremities are atraumatic in appearance without deformity. No swelling or erythema.   Neurovascular: palpable +1 DP/PT pulses bilaterally. Toes on right foot slightly cooler to touch compared to the left foot, however left foot did have a sock on it and the right foot did not. Cap refill on all digits on both feet < 3 seconds. Color even, skin temp warm bilaterally in proximal skin.    Groin: no bleeding. Soft with small palpable hematoma at the access site. Ecchymosis noted but not spreading past the body marker lines placed on the patient during the evening of 3/14 into 3/15.

## 2025-03-17 NOTE — PROGRESS NOTES
Vascular Surgery   Daily Progress Note  Patient: Terri Shankar      CC: hematoma and pseudoaneurysm s/p R CFA access    SUBJECTIVE:   No acute events overnight. Resting comfortably in bed. Afebrile, HDS. Denies of any new neurological changes.    OBJECTIVE:    PHYSICAL EXAM:    Vitals:    03/17/25 0300 03/17/25 0400 03/17/25 0500 03/17/25 0600   BP: 121/63 127/66 121/61 126/68   Pulse: 63 72 61 59   Resp: 14 14 13 12   Temp:  99.1 °F (37.3 °C)     TempSrc:  Oral     SpO2: 95% 94% 95% 96%   Weight:       Height:         General appearance: no acute distress  Neck: trachea midline, no JVD  Chest/Lungs:  normal effort on room air  Cardiovascular: RRR  Abdomen: soft, non-tender, non-distended  Extremities: R groin with access site. No overlying hematoma or bleeding. Stable R-sided weakness, Palpable bilateral LE pulses  Neuro: Alert. Expressive aphasia, Follows some commands.    LABS:   Recent Labs     03/16/25  0815 03/17/25  0402   WBC 8.4 8.2   HGB 12.7 13.1   HCT 38.9 39.5   MCV 86.6 86.6    201        Recent Labs     03/16/25  0815 03/17/25  0402    140   K 4.0 3.9    108   CO2 21 23   PHOS 2.9 4.1   BUN 11 18   CREATININE 0.7 0.8      No results for input(s): \"AST\", \"ALT\", \"BILIDIR\", \"BILITOT\", \"ALKPHOS\" in the last 72 hours.    Invalid input(s): \"ALB\"   No results for input(s): \"LIPASE\", \"AMYLASE\" in the last 72 hours.     Recent Labs     03/14/25  2143 03/14/25  2332   INR 0.99 1.03   APTT 29.6 28.6      No results for input(s): \"CKTOTAL\", \"CKMB\", \"CKMBINDEX\", \"TROPONINI\" in the last 72 hours.      ASSESSMENT & PLAN:   This is a 64 y.o. female with a diagnosis of iatrogenic R CFA pseudoaneurysm after diagnostic cerebral angiogram (3/14). Vascular surgery was consulted.    - Vascular Duplex this AM to evaluate the pseudoaneurysm   - Will follow-up with plans once imaging results  - Continue neurovascular checks  - Continue to hold AC at this time  - Rest of care per primary team    Lindsey Montenegro  DO Eugenio  PGY1, General Surgery  03/17/25  8:59 AM  [phone]

## 2025-03-18 ENCOUNTER — APPOINTMENT (OUTPATIENT)
Age: 65
DRG: 062 | End: 2025-03-18
Attending: STUDENT IN AN ORGANIZED HEALTH CARE EDUCATION/TRAINING PROGRAM
Payer: MEDICARE

## 2025-03-18 LAB
ALBUMIN SERPL-MCNC: 3.5 G/DL (ref 3.4–5)
ANION GAP SERPL CALCULATED.3IONS-SCNC: 9 MMOL/L (ref 3–16)
AT III ACT/NOR PPP CHRO: 112 % (ref 76–128)
AT III AG ACT/NOR PPP IA: 87 % (ref 82–136)
BASOPHILS # BLD: 0 K/UL (ref 0–0.2)
BASOPHILS NFR BLD: 0.4 %
BUN SERPL-MCNC: 20 MG/DL (ref 7–20)
CALCIUM SERPL-MCNC: 8.9 MG/DL (ref 8.3–10.6)
CHLORIDE SERPL-SCNC: 106 MMOL/L (ref 99–110)
CO2 SERPL-SCNC: 24 MMOL/L (ref 21–32)
CREAT SERPL-MCNC: 0.8 MG/DL (ref 0.6–1.2)
DEPRECATED RDW RBC AUTO: 14.7 % (ref 12.4–15.4)
EOSINOPHIL # BLD: 0.4 K/UL (ref 0–0.6)
EOSINOPHIL NFR BLD: 4 %
GFR SERPLBLD CREATININE-BSD FMLA CKD-EPI: 83 ML/MIN/{1.73_M2}
GLUCOSE SERPL-MCNC: 123 MG/DL (ref 70–99)
HCT VFR BLD AUTO: 38.2 % (ref 36–48)
HGB BLD-MCNC: 12.6 G/DL (ref 12–16)
LYMPHOCYTES # BLD: 1.3 K/UL (ref 1–5.1)
LYMPHOCYTES NFR BLD: 13.3 %
MAGNESIUM SERPL-MCNC: 2.09 MG/DL (ref 1.8–2.4)
MCH RBC QN AUTO: 28.6 PG (ref 26–34)
MCHC RBC AUTO-ENTMCNC: 32.9 G/DL (ref 31–36)
MCV RBC AUTO: 86.9 FL (ref 80–100)
MONOCYTES # BLD: 0.7 K/UL (ref 0–1.3)
MONOCYTES NFR BLD: 6.7 %
NEUTROPHILS # BLD: 7.4 K/UL (ref 1.7–7.7)
NEUTROPHILS NFR BLD: 75.6 %
PHOSPHATE SERPL-MCNC: 4.2 MG/DL (ref 2.5–4.9)
PLATELET # BLD AUTO: 225 K/UL (ref 135–450)
PMV BLD AUTO: 8 FL (ref 5–10.5)
POTASSIUM SERPL-SCNC: 4.2 MMOL/L (ref 3.5–5.1)
PROT C ACT/NOR PPP: 156 % (ref 83–168)
PROT S ACT/NOR PPP: 89 % (ref 57–131)
RBC # BLD AUTO: 4.39 M/UL (ref 4–5.2)
SODIUM SERPL-SCNC: 139 MMOL/L (ref 136–145)
WBC # BLD AUTO: 9.8 K/UL (ref 4–11)

## 2025-03-18 PROCEDURE — 85025 COMPLETE CBC W/AUTO DIFF WBC: CPT

## 2025-03-18 PROCEDURE — 92507 TX SP LANG VOICE COMM INDIV: CPT

## 2025-03-18 PROCEDURE — 6370000000 HC RX 637 (ALT 250 FOR IP): Performed by: NURSE PRACTITIONER

## 2025-03-18 PROCEDURE — 97530 THERAPEUTIC ACTIVITIES: CPT

## 2025-03-18 PROCEDURE — 6370000000 HC RX 637 (ALT 250 FOR IP)

## 2025-03-18 PROCEDURE — 6370000000 HC RX 637 (ALT 250 FOR IP): Performed by: INTERNAL MEDICINE

## 2025-03-18 PROCEDURE — 97166 OT EVAL MOD COMPLEX 45 MIN: CPT

## 2025-03-18 PROCEDURE — 83735 ASSAY OF MAGNESIUM: CPT

## 2025-03-18 PROCEDURE — 97535 SELF CARE MNGMENT TRAINING: CPT

## 2025-03-18 PROCEDURE — 97116 GAIT TRAINING THERAPY: CPT

## 2025-03-18 PROCEDURE — 97162 PT EVAL MOD COMPLEX 30 MIN: CPT

## 2025-03-18 PROCEDURE — 6370000000 HC RX 637 (ALT 250 FOR IP): Performed by: NEUROLOGICAL SURGERY

## 2025-03-18 PROCEDURE — 36415 COLL VENOUS BLD VENIPUNCTURE: CPT

## 2025-03-18 PROCEDURE — 6360000002 HC RX W HCPCS: Performed by: NURSE PRACTITIONER

## 2025-03-18 PROCEDURE — 80069 RENAL FUNCTION PANEL: CPT

## 2025-03-18 PROCEDURE — 92526 ORAL FUNCTION THERAPY: CPT

## 2025-03-18 PROCEDURE — 2060000000 HC ICU INTERMEDIATE R&B

## 2025-03-18 RX ORDER — HYDROXYZINE HYDROCHLORIDE 10 MG/1
10 TABLET, FILM COATED ORAL 3 TIMES DAILY PRN
Status: DISCONTINUED | OUTPATIENT
Start: 2025-03-18 | End: 2025-03-21 | Stop reason: HOSPADM

## 2025-03-18 RX ORDER — ENOXAPARIN SODIUM 100 MG/ML
30 INJECTION SUBCUTANEOUS 2 TIMES DAILY
Status: DISCONTINUED | OUTPATIENT
Start: 2025-03-18 | End: 2025-03-21 | Stop reason: HOSPADM

## 2025-03-18 RX ORDER — LOSARTAN POTASSIUM 25 MG/1
25 TABLET ORAL 2 TIMES DAILY
Status: DISCONTINUED | OUTPATIENT
Start: 2025-03-18 | End: 2025-03-21 | Stop reason: HOSPADM

## 2025-03-18 RX ORDER — CETIRIZINE HYDROCHLORIDE 10 MG/1
10 TABLET ORAL DAILY
Status: DISCONTINUED | OUTPATIENT
Start: 2025-03-18 | End: 2025-03-21 | Stop reason: HOSPADM

## 2025-03-18 RX ORDER — ACETAMINOPHEN 325 MG/1
650 TABLET ORAL EVERY 6 HOURS PRN
Status: DISCONTINUED | OUTPATIENT
Start: 2025-03-18 | End: 2025-03-21 | Stop reason: HOSPADM

## 2025-03-18 RX ORDER — LABETALOL HYDROCHLORIDE 5 MG/ML
10 INJECTION, SOLUTION INTRAVENOUS EVERY 4 HOURS PRN
Status: DISCONTINUED | OUTPATIENT
Start: 2025-03-18 | End: 2025-03-21 | Stop reason: HOSPADM

## 2025-03-18 RX ORDER — PREDNISONE 20 MG/1
20 TABLET ORAL ONCE
Status: DISCONTINUED | OUTPATIENT
Start: 2025-03-18 | End: 2025-03-18

## 2025-03-18 RX ORDER — OXYCODONE HYDROCHLORIDE 5 MG/1
5 TABLET ORAL EVERY 6 HOURS PRN
Refills: 0 | Status: DISCONTINUED | OUTPATIENT
Start: 2025-03-18 | End: 2025-03-21 | Stop reason: HOSPADM

## 2025-03-18 RX ADMIN — TRIAMCINOLONE ACETONIDE: 1 OINTMENT TOPICAL at 20:13

## 2025-03-18 RX ADMIN — CLOBETASOL PROPIONATE CREAM USP, 0.05%: 0.5 CREAM TOPICAL at 20:13

## 2025-03-18 RX ADMIN — PREDNISONE 30 MG: 10 TABLET ORAL at 18:01

## 2025-03-18 RX ADMIN — HYDROXYZINE HYDROCHLORIDE 25 MG: 25 TABLET ORAL at 13:30

## 2025-03-18 RX ADMIN — HYDROXYZINE HYDROCHLORIDE 10 MG: 10 TABLET ORAL at 20:23

## 2025-03-18 RX ADMIN — TRIAMCINOLONE ACETONIDE: 1 OINTMENT TOPICAL at 09:12

## 2025-03-18 RX ADMIN — HYDROCODONE BITARTRATE AND ACETAMINOPHEN 2 TABLET: 5; 325 TABLET ORAL at 02:58

## 2025-03-18 RX ADMIN — POLYETHYLENE GLYCOL 3350 17 G: 17 POWDER, FOR SOLUTION ORAL at 09:10

## 2025-03-18 RX ADMIN — ENOXAPARIN SODIUM 30 MG: 100 INJECTION SUBCUTANEOUS at 20:08

## 2025-03-18 RX ADMIN — CETIRIZINE HYDROCHLORIDE 10 MG: 10 TABLET, FILM COATED ORAL at 18:01

## 2025-03-18 RX ADMIN — ARIPIPRAZOLE 20 MG: 5 TABLET ORAL at 20:09

## 2025-03-18 RX ADMIN — ASPIRIN 81 MG: 81 TABLET, CHEWABLE ORAL at 09:10

## 2025-03-18 RX ADMIN — HYDROXYZINE HYDROCHLORIDE 25 MG: 25 TABLET ORAL at 02:58

## 2025-03-18 RX ADMIN — ENOXAPARIN SODIUM 40 MG: 100 INJECTION SUBCUTANEOUS at 09:10

## 2025-03-18 RX ADMIN — MICONAZOLE NITRATE: 20 CREAM TOPICAL at 20:13

## 2025-03-18 RX ADMIN — LOSARTAN POTASSIUM 25 MG: 25 TABLET, FILM COATED ORAL at 09:10

## 2025-03-18 RX ADMIN — MICONAZOLE NITRATE: 20 CREAM TOPICAL at 09:12

## 2025-03-18 RX ADMIN — LOSARTAN POTASSIUM 25 MG: 25 TABLET, FILM COATED ORAL at 20:09

## 2025-03-18 RX ADMIN — CLOBETASOL PROPIONATE CREAM USP, 0.05%: 0.5 CREAM TOPICAL at 09:11

## 2025-03-18 RX ADMIN — ATORVASTATIN CALCIUM 40 MG: 40 TABLET, FILM COATED ORAL at 20:09

## 2025-03-18 ASSESSMENT — PAIN DESCRIPTION - LOCATION
LOCATION: GROIN
LOCATION: GROIN

## 2025-03-18 ASSESSMENT — PAIN SCALES - GENERAL
PAINLEVEL_OUTOF10: 9
PAINLEVEL_OUTOF10: 0

## 2025-03-18 ASSESSMENT — PAIN SCALES - WONG BAKER
WONGBAKER_NUMERICALRESPONSE: NO HURT
WONGBAKER_NUMERICALRESPONSE: NO HURT

## 2025-03-18 ASSESSMENT — PAIN DESCRIPTION - ORIENTATION
ORIENTATION: RIGHT
ORIENTATION: RIGHT

## 2025-03-18 ASSESSMENT — PAIN DESCRIPTION - DESCRIPTORS
DESCRIPTORS: DISCOMFORT
DESCRIPTORS: DISCOMFORT

## 2025-03-18 ASSESSMENT — PAIN - FUNCTIONAL ASSESSMENT
PAIN_FUNCTIONAL_ASSESSMENT: ACTIVITIES ARE NOT PREVENTED
PAIN_FUNCTIONAL_ASSESSMENT: ACTIVITIES ARE NOT PREVENTED

## 2025-03-18 NOTE — PROGRESS NOTES
NEUROSURGERY PROGRESS NOTE    3/18/2025 8:48 AM                               Terri Shankar                      LOS: 4 days               POD# 4 s/p Procedure(s) (LRB):  ANGIOGRAM CEREBRAL (68845) (N/A)    Subjective:  No acute events overnight. Patient has no specific complaints this am.         Physical Exam:  Patient seen and examined    Vitals:    03/18/25 0336   BP: (!) 162/84   Pulse: 82   Resp: 16   Temp: 97.9 °F (36.6 °C)   SpO2: 95%     GCS:  4 - Opens eyes on own  5 - Lethargic and oriented  6 - Follows simple motor commands  General: Well developed. Alert and cooperative in no acute distress.     HENT: atraumatic, neck supple  Eyes: Optic discs: Not tested  Pulmonary: unlabored respiratory effort  Cardiovascular:  Warm well perfused. No peripheral edema  Gastrointestinal: abdomen soft, NT, ND    Neurological:  Mental Status: Lethargic, oriented x 4, speech clear and appropriate  Attention: Intact  Language: Expressive aphasia   Sensation: Intact to all extremities to light touch.  Coordination: Intact    Cranial Nerves:  II: Visual acuity not tested, denies new visual changes / diplopia  III, IV, VI: PERRL, 3 mm bilaterally, EOMI, no nystagmus noted  V: Facial sensation intact bilaterally to touch  VII: Face symmetric  VIII: Hearing intact bilaterally to spoken voice  IX: Palate movement equal bilaterally  XI: Shoulder shrug equal bilaterally  XII: Tongue midline    Musculoskeletal:   Gait: Not tested   Assist devices: None   Tone: Normal  Motor strength:    Right  Left    Right  Left    Deltoid  4 4  Hip Flex  4 4   Biceps  4 4  Knee Extensors  4 4   Triceps  4 4  Knee Flexors  4 4   Wrist Ext  4 4  Ankle Dorsiflex.  4 5   Wrist Flex  4 4  Ankle Plantarflex.  4 5   Handgrip  4 4  Ext Van Longus  4 5   Thumb Ext  4 4         Radiological Findings:  CT HEAD WO CONTRAST  Result Date: 3/14/2025  No acute intracranial abnormality. Critical code stroke resolved discussed with the patient's nurse at 4:32  discussed with Dr. Norris who agrees with above assessment and plan.     Electronically signed by: Silke Wilkerson NP Student, 3/18/2025 8:48 AM  [phone]

## 2025-03-18 NOTE — PROGRESS NOTES
Pt transferred to [facility] from ICU. NIH assessed with ICU RN, this RN and charge RN. NIH 10, previous NIH 5. Changes include: pt unable to follow commands to assess for ataxia of BUE and BLE extremities. This Rn reached out to DUNAE Saravia with neuro crit care. Per Manjit, pt has receptive aphasia and the neuro team is aware of her not being able to perform these tasks. Per manjit, there is no concern at this time. Will continue to assess NIH.

## 2025-03-18 NOTE — PROGRESS NOTES
Physical Therapy  Facility/Department: [facility] ORTHO/NEURO  Physical Therapy Initial Assessment and Treatment    Name: Terri Shankar  : 1960  MRN: [id number]  Date of Service: 3/18/2025    Discharge Recommendations:  IP Rehab, Patient able to tolerate 3 hours of therapy per day   PT Equipment Recommendations  Equipment Needed:  (defer)      Patient Diagnosis(es): The primary encounter diagnosis was Cerebrovascular accident (CVA) due to embolism of left middle cerebral artery (HCC). Diagnoses of Cerebral aneurysm, nonruptured and Pseudoaneurysm were also pertinent to this visit.  Past Medical History:  has a past medical history of Anxiety, Bipolar 1 disorder (HCC), Depression, Dyspareunia in female, Elevated transaminase level, Hemorrhoids, History of tubal ligation, HLD (hyperlipidemia), Hypertension, Irritable bowel syndrome, Lacunar stroke (HCC), Menopause, Obesity, Obesity (BMI 30-39.9), Overactive bladder, Prurigo nodularis, Stress incontinence, TIA (transient ischemic attack), Urinary incontinence, Uterine fibroid, and Yeast vaginitis.  Past Surgical History:  has a past surgical history that includes Eye surgery (Left); Tonsillectomy; Tubal ligation (); Colonoscopy (); Colonoscopy (); and neurovascular procedure (N/A, 3/14/2025).    Assessment  Body Structures, Functions, Activity Limitations Requiring Skilled Therapeutic Intervention: Decreased functional mobility ;Decreased strength;Decreased safe awareness;Decreased endurance;Decreased balance  Assessment: Pt with decreased independent mobiltiy from baseline.  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  Currently with R sided weakness (arm affected > leg).  Needing min assist x 1 for tranfers, min assist x 2 for ambulation without AD and min assist x 1 for ambulation with walker.  Appears anxious at times.  At high risk for falls.   Not safe to ambulate alone. Rec cont skilled PT to maximize mobiltiy  Time   Individual Concurrent Group Co-treatment   Time In 1402         Time Out 1455         Minutes 53               Timed Code Treatment Minutes: 38      Total Treatment Minutes:  53    Wilma Baker, PT

## 2025-03-18 NOTE — PROGRESS NOTES
Estimated Creatinine Clearance: 87 mL/min (based on SCr of 0.8 mg/dL).    Weight:  Wt Readings from Last 1 Encounters:   03/18/25 107.9 kg (237 lb 14 oz)           Ordered dose of Lovenox 40mg SC q24h will be changed to 30 mg BID    Please call with any questions,  Cynthia Eastman, PharmD  PGY1 Pharmacy Resident  Wireless: [phone]  3/18/2025 4:25 PM

## 2025-03-18 NOTE — PROGRESS NOTES
Patient screaming repeatedly stating to take catheter out stating \"it hurts, take it out, take it out\" Attempted to reach out to urology to see if it is ok to take out catheter. Per urology note this morning keep stafford in until patient is more mobile. Needs a few days with PT/OT prior to removal for voiding trial. Awaiting response, but patient is refusing to wait. Catheter removed per patient request saying she can not tolerate it anymore. Patient was able to void once on own after stafford removal and denied or pain or burning with urination

## 2025-03-18 NOTE — CONSULTS
V2.0  [facility] [place]      Name:  Terri Shankar /Age/Sex: 1960  (64 y.o. female)   MRN & CSN:  [id number] & [id number] Encounter Date/Time: 3/18/2025 3:08 PM EDT   Location:  5527/5527-01 PCP: Marcia Abdi APRN - [facility]     Attending:Rob Norris MD  Consulting Provider: Gabbie Arana MD      Hospital Day: 5    Assessment and Recommendations       Hospital Problems           Last Modified POA    * (Principal) Stroke, acute, thrombotic (HCC) 3/14/2025 Yes    Cerebrovascular accident (CVA) due to embolism of left middle cerebral artery (HCC) 3/15/2025 Yes       Acute left MCA syndrome during conventional angiogram  LMCA Occlusion: - s/p thrombectomy  MRI with bilateral stroke embolic orign  Aphasia is improving but still significant, overall prognosis seems to be favorable  TTE pending  Continue asa & SQ heparin  Hypercoagulability testing so far negative but   High-intensity statin  Q4H Neurologic Exams & Vitals; NIHSS  Tele once we have hypoallergenic stickers  SBP goal less than 180 mmHg  Long term cardiac monitor after echo  Follow up in stroke clinic short term    - Groin checks per post procedure guidelines, Duplex ruled out pseudoanerysm    Good secondary prevention of stroke measures; Daily antiplatelet agent; SBP < 140 AND DBP < 90, LDL < 100, ideally < 70 and BG control.    Hypertension  - will not overcorrect BP  - Increase Losartan to 25 mg bid  - will add amlodipine if still elevated tomorrow am    Anxiety/BPD  - continue prn atarax low dose, and ability home dose  - Treat underlying skin condition this is leading to her anxiety    Allergic contact dermatitis and flare of prurigo nodularis  She is on dupixent every other week last dose 03/10 and intermittently on prednisone  Tele stickers appear to be causing allergic reaction - agree with NS NP that shouid replace with hypoallergenic stickers  Drop Atarax to 10 mg tid prn  Add cetrizine 10 mg OD  Discussed with neuro cc team,  14    @ 1530 slightly less movement in her right upper extremity, noted Stat CT head and CTA was obtained to rule out reocclusion and CT head showed no acute abnormality.  CTA without LVO.     Cath site noted to have small pseudoaneurysm with a long narrow neck, seen by vascular surgery - will likely resolve without intervention due to the long and narrow neck as well as pressure already being held. Right groin warm and appears to have signs of candidiasis due to patient obesity, pannus, laying in bed.  Recommend drying/wicking material to groin.  Nystatin powder daily    03/16 --   MRI Brain = 1. Numerous bilateral small acute infarcts suggesting central embolic phenomenon. Infarcts are most numerous in the left MCA distribution. Involvement of the lorraine and of the bilateral thalami as described. Largest region of infarct in the posterior left   temporal region.    03/16 1846 - NIHSS 8    03/17 -- Per Neurovascular --  Dr. Norris discussed MRI results w/ patient at length including concern for central source, most likely aorta related to catheter placement for angio     Vascular duplex of right groin with No evidence of pseudoaneurysm    03/18 --   Hospitalist consulted for medical management - assistance with HTN, anxiety, too sleepy after taking Atarax  Seen by urology for urinary retention and stafford was recommended to be continued until more mobile  ARU consulted for evaluation -- therapy assessment: Pt with decreased independent mobiltiy from baseline.  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  Currently with R sided weakness (arm affected > leg).  Needing min assist x 1 for tranfers, min assist x 2 for ambulation without AD and min assist x 1 for ambulation with walker.  Appears anxious at times.  At high risk for falls.   Not safe to ambulate alone.     Patient seen and examined  Vitals reviewed BP 150s-170s/80s-90s, HR 80s-90s, 95-99% on RA, afebrile. Labs with RFT

## 2025-03-18 NOTE — PROGRESS NOTES
4 Eyes Skin Assessment     NAME:  Terri Shankar  YOB: 1960  MEDICAL RECORD NUMBER:  [id number]    The patient is being assessed for  Transfer to New Unit    I agree that at least one RN has performed a thorough Head to Toe Skin Assessment on the patient. ALL assessment sites listed below have been assessed.      Areas assessed by both nurses:    Head, Face, Ears, Shoulders, Back, Chest, Arms, Elbows, Hands, Sacrum. Buttock, Coccyx, Ischium, Legs. Feet and Heels, and Under Medical Devices         Does the Patient have a Wound? Yes wound(s) were present on assessment. LDA wound assessment was Initiated and completed by RN    Stage 2 to left buttocks, skin rash from adhesive, redness/skin rash scattered, scratch marks/scabs scattered   Damian Prevention initiated by RN: Yes  Wound Care Orders initiated by RN: NO    Pressure Injury (Stage 3,4, Unstageable, DTI, NWPT, and Complex wounds) if present, place Wound referral order by RN under : Yes    New Ostomies, if present place, Ostomy referral order under : No     Nurse 1 eSignature: Electronically signed by Claudia Rosas RN on 3/17/25 at 10:53 PM EDT    **SHARE this note so that the co-signing nurse can place an eSignature**    Nurse 2 eSignature: {Esignature:[id number]}

## 2025-03-18 NOTE — PLAN OF CARE
Problem: Safety - Adult  Goal: Free from fall injury  Outcome: Progressing  Note: Pt will remain free of falls for the duration of the shift. Pt is A/O x2. Pt is bedrest at this time.     Problem: Neurosensory - Adult  Goal: Achieves stable or improved neurological status  Outcome: Progressing  Note: NIH 10.      Problem: Skin/Tissue Integrity - Adult  Goal: Skin integrity remains intact  Description: 1.  Monitor for areas of redness and/or skin breakdown  2.  Assess vascular access sites hourly  3.  Every 4-6 hours minimum:  Change oxygen saturation probe site  4.  Every 4-6 hours:  If on nasal continuous positive airway pressure, respiratory therapy assess nares and determine need for appliance change or resting period  Outcome: Progressing  Note: Pt has a rash and skin condition. Pharmaceutical cream applied.       Problem: Infection - Adult  Goal: Absence of infection at discharge  Outcome: Progressing  Note: Perales care done this shift.

## 2025-03-18 NOTE — CARE COORDINATION
CM spoke with patient at bedside.  She is agreeable to acute rehab.  PM&R consulted.  Will need pre-cert if accepted.    Augusta Youngblood RN, BSN,    Ortho/Neuro   [phone]

## 2025-03-18 NOTE — PROGRESS NOTES
Speech Language Pathology  Facility/Department:[facility] ICU  Speech and dysphagia treatment   Name: Terri Shankar  : 1960  MRN: [id number]                                                       Patient Diagnosis(es):   Patient Active Problem List    Diagnosis Date Noted    Stroke, acute, thrombotic (HCC) 2025    Weakness on left side of face 2022    Slurred speech 2022    HLD (hyperlipidemia) 2022    Elevated blood pressure reading without diagnosis of hypertension 2022    Stroke, lacunar (HCC) 2022    Cerebrovascular accident (CVA) due to embolism of left middle cerebral artery (HCC) 2025    Ischemic stroke (HCC) 2025    Dizziness 2025    TIA (transient ischemic attack) 2024    Major depressive disorder, recurrent, mild 2023    Major depressive disorder, recurrent, moderate 2023    Major depressive disorder, recurrent, unspecified 2023    AMS (altered mental status) 2023    Morbid obesity with BMI of 40.0-44.9, adult 2015    Bipolar disorder (HCC) 01/15/2015    Urinary, incontinence, stress female 01/15/2015       Past Medical History:   Diagnosis Date    Anxiety     Bipolar 1 disorder (HCC)     Depression     Dyspareunia in female 2015    Elevated transaminase level 11/10/2017    Hemorrhoids 01/15/2015    History of tubal ligation 2015    HLD (hyperlipidemia) 2022    Hypertension     Irritable bowel syndrome     Lacunar stroke (HCC)     Menopause 2016    Obesity     Obesity (BMI 30-39.9) 2015    Overactive bladder     Prurigo nodularis     Stress incontinence     TIA (transient ischemic attack) 4/3/2024    Urinary incontinence     Uterine fibroid 2015    Yeast vaginitis 2020     Past Surgical History:   Procedure Laterality Date    COLONOSCOPY  2010    polyp removed    COLONOSCOPY  2018    EYE SURGERY Left     Lazy eye    NEUROVASCULAR PROCEDURE N/A 3/14/2025    ANGIOGRAM

## 2025-03-18 NOTE — CONSULTS
Urology Attending Consult Note      Reason for Consultation: Retention    History: 63yo F who underwent angiogram  3/14/25 for eval of MCA aneurysm who developed acute L MCA syndrome during procedure. She has expresive aphasia which has been improving. She was unable to void post-op and stafford was placed. Per notes this was difficult and required numerous nurses for assistance. Stafford has been draining well, urine clear. We were consulted for recs.     Family History, Social History, Review of Systems:  Reviewed and agreed to as per chart    Vitals:  BP (!) 168/93   Pulse 84   Temp 97.9 °F (36.6 °C) (Oral)   Resp 16   Ht 1.626 m (5' 4\")   Wt 107.9 kg (237 lb 14 oz)   LMP 01/10/2015 (Exact Date)   SpO2 94%   BMI 40.83 kg/m²   Temp  Av.7 °F (37.1 °C)  Min: 97.9 °F (36.6 °C)  Max: 99.7 °F (37.6 °C)    Intake/Output Summary (Last 24 hours) at 3/18/2025 0951  Last data filed at 3/18/2025 0557  Gross per 24 hour   Intake 1220 ml   Output 795 ml   Net 425 ml         Physical:  Well developed, well nourished in no acute distress  Mood indicates no abnormalities. Pt doesn’t appear depressed  Orientated to time and place  Neck is supple, trachea is midline  Respiratory effort is normal        Labs:  WBC:    Lab Results   Component Value Date/Time    WBC 9.8 2025 05:09 AM     Hemoglobin/Hematocrit:    Lab Results   Component Value Date/Time    HGB 12.6 2025 05:09 AM    HCT 38.2 2025 05:09 AM     BMP:    Lab Results   Component Value Date/Time     2025 05:09 AM    K 4.2 2025 05:09 AM    K 3.8 03/15/2025 04:37 AM     2025 05:09 AM    CO2 24 2025 05:09 AM    BUN 20 2025 05:09 AM    CREATININE 0.8 2025 05:09 AM    CALCIUM 8.9 2025 05:09 AM    GFRAA >60 2022 12:39 PM    LABGLOM 83 2025 05:09 AM    LABGLOM 83 2024 02:10 PM     PT/INR:    Lab Results   Component Value Date/Time    PROTIME 13.7 2025 11:32 PM    INR 1.03

## 2025-03-18 NOTE — PROGRESS NOTES
Pt c/o of pain to stafford catheter requesting that it be removed. Per urology PA's note keep stafford  This RN reached out to urology

## 2025-03-18 NOTE — PROGRESS NOTES
Patient is alert to self and place. Disoriented to time and situation. No acute euro changes. Patient still continues to have expressive aphasia. NIH remains a 10. Stafford catheter removed this shift per patient's request and unable to tolerate it anymore. Patient able to void once on own after stafford removal. Denied pain or burning with urination. Denies pain. Ambulating x1 walker and gait belt. Plan of care to continue

## 2025-03-18 NOTE — PROGRESS NOTES
History  Lives With: Spouse, Son  Type of Home: House  Home Layout: Multi-level, Laundry in basement, Able to Live on Main level with bedroom/bathroom (sleeps on couch in living room)  Home Access: Ramped entrance  Bathroom Shower/Tub: Tub/Shower unit  Bathroom Toilet: Standard  Bathroom Equipment: Grab bars in shower, Shower chair  Home Equipment: Cane, Walker - Rolling, Wheelchair - Manual  Has the patient had two or more falls in the past year or any fall with injury in the past year?: No  Prior Level of Assist for ADLs: Independent  Prior Level of Assist for Homemaking:  ([facility], pt cooks and does laundry)  Prior Level of Assist for Ambulation: Independent community ambulator, with or without device  Prior Level of Assist for Transfers: Independent  Active : Yes  Additional Comments: 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   [facility] and son is in a mental health facility.   Daughter lives in [place].    Objective             Safety Devices  Type of Devices: Call light within reach;Chair alarm in place;Nurse notified;Left in chair    Balance  Sitting: High guard  Standing: With support    Toilet Transfers  Toilet - Technique: Ambulating  Equipment Used: Standard toilet  Toilet Transfer: Minimal assistance    AROM: Grossly decreased, non-functional (shoulder flex to ~95 degrees)  Strength: Grossly decreased, non-functional (RUE= 3-4/5 grossly. increased wekness noted distally)  Coordination: Grossly decreased, non-functional (RUE deficits noted, not formally assessed this date)  Sensation: Impaired (per pt report: decreased sensation in RUE)    ADL  Toileting: Maximum assistance  Toileting Skilled Clinical Factors: simulated w/ assist for clothing mgmt at hips    Functional Mobility: Dependent/Total;Minimal assistance  Functional Mobility Skilled Clinical Factors: pt demo functional mobility to bathroom w/ HHA and Min Ax2. Pt also demo fucnitonal mobility from  bathroom usign RW w/ Min Ax1             Transfers  Sit to stand: Minimal assistance  Stand to sit: Minimal assistance  Transfer Comments: bed to chair w/ Min Ax2 and HHA    Vision  Vision: Impaired  Vision Exceptions: Wears glasses at all times  Hearing  Hearing: Within functional limits    Cognition  Overall Cognitive Status: Exceptions  Arousal/Alertness: Delayed responses to stimuli  Following Commands: Follows one step commands with repetition;Follows one step commands with increased time  Attention Span: Attends with cues to redirect  Memory: Decreased recall of recent events;Decreased long term memory;Decreased short term memory  Safety Judgement: Decreased awareness of need for assistance  Problem Solving: Assistance required to correct errors made;Assistance required to identify errors made;Decreased awareness of errors  Insights: Decreased awareness of deficits  Initiation: Requires cues for some  Sequencing: Requires cues for some  Orientation  Overall Orientation Status: Within Functional Limits (except month \"june\")    Perception  Perseveration: Perseverates during conversation                 Education Given To: Patient  Education Provided: Role of Therapy;Plan of Care;Orientation;ADL Adaptive Strategies;Transfer Training  Education Method: Demonstration;Verbal  Barriers to Learning: Cognition  Education Outcome: Verbalized understanding;Continued education needed                   AM-PAC - ADL  AM-PAC Daily Activity - Inpatient   How much help is needed for putting on and taking off regular lower body clothing?: A Lot  How much help is needed for bathing (which includes washing, rinsing, drying)?: A Lot  How much help is needed for toileting (which includes using toilet, bedpan, or urinal)?: A Lot  How much help is needed for putting on and taking off regular upper body clothing?: A Lot  How much help is needed for taking care of personal grooming?: A Little  How much help for eating meals?: A

## 2025-03-18 NOTE — PROGRESS NOTES
Patient seen and examined  Increased Losartan to 25 mg bid  Drop Atarax to 10 mg tid prn  Add cetrizine 10 mg OD  Discussed with neuro cc team, will give 30 mg prednisone and if no CI then will place on prednisone taper tomorrow  Formal detailed consult note to follow  Please call with ?    Gabbie Arana MD

## 2025-03-18 NOTE — PROGRESS NOTES
Update 3/21/2025: precert approved- need to discuss with patient about where she wants to go.       **3/20/2025: Notified by [employer] that they are the Secondary insurance. Contacted registration and they confirmed that [facility] should be listed as the primary. Prior auth started on 3/20/2025 with [facility] with an auth # [id number].      The [facility] - Acute Rehab Unit   After review, this patient is felt to be:       [x]  Dr. Liu states this patient is appropriate for rehab.     []  Not appropriate for Acute Inpatient Rehab    []  Referral received and ARU reviewing patient      Precert initiated 3/19/2025 for ARU admission. Pt in agreement per  and CL's conversation with pt this AM. Ref#: [id number]   Will notify CM/SW with further updates. Thank you for the referral.      Radha SERNA OTR/L  Clinical Liaison- [facility]   (P): [phone]  (F): [phone]

## 2025-03-18 NOTE — PLAN OF CARE
Problem: Safety - Adult  Goal: Free from fall injury  3/18/2025 1001 by Tawnya Sosa, RN  Outcome: Progressing  All fall precautions in place. Bed locked and in lowest position with alarm on. Overbed table and personal belonings within reach. Call light within reach and patient instructed to use call light for assistance. Non-skid socks on.      Problem: Neurosensory - Adult  Goal: Achieves stable or improved neurological status  3/18/2025 1001 by Tawnya Sosa RN  Outcome: Progressing  Note: NIH remains a 10      Problem: Skin/Tissue Integrity - Adult  Goal: Skin integrity remains intact  Description: 1.  Monitor for areas of redness and/or skin breakdown  2.  Assess vascular access sites hourly  3.  Every 4-6 hours minimum:  Change oxygen saturation probe site  4.  Every 4-6 hours:  If on nasal continuous positive airway pressure, respiratory therapy assess nares and determine need for appliance change or resting period  3/18/2025 1001 by Tawnya Sosa RN  Outcome: Progressing  Pt has rash and skin condition. Pharmaceutical cream applied to rash all over.      Problem: Skin/Tissue Integrity - Adult  Goal: Skin integrity remains intact  Description: 1.  Monitor for areas of redness and/or skin breakdown  2.  Assess vascular access sites hourly  3.  Every 4-6 hours minimum:  Change oxygen saturation probe site  4.  Every 4-6 hours:  If on nasal continuous positive airway pressure, respiratory therapy assess nares and determine need for appliance change or resting period  3/18/2025 1001 by Tawnya Sosa, RN  Outcome: Progressing  Perales care completed and cleansed with soap and water.

## 2025-03-19 ENCOUNTER — APPOINTMENT (OUTPATIENT)
Age: 65
DRG: 062 | End: 2025-03-19
Attending: STUDENT IN AN ORGANIZED HEALTH CARE EDUCATION/TRAINING PROGRAM
Payer: MEDICARE

## 2025-03-19 LAB
B2 GLYCOPROT1 IGG SERPL IA-ACNC: <10 SGU
B2 GLYCOPROT1 IGM SERPL IA-ACNC: <10 SMU
CARDIOLIPIN IGG SER IA-ACNC: <10 GPL
CARDIOLIPIN IGM SER IA-ACNC: <10 MPL

## 2025-03-19 PROCEDURE — 97535 SELF CARE MNGMENT TRAINING: CPT

## 2025-03-19 PROCEDURE — 6360000002 HC RX W HCPCS: Performed by: NURSE PRACTITIONER

## 2025-03-19 PROCEDURE — 92507 TX SP LANG VOICE COMM INDIV: CPT

## 2025-03-19 PROCEDURE — 97530 THERAPEUTIC ACTIVITIES: CPT

## 2025-03-19 PROCEDURE — 6370000000 HC RX 637 (ALT 250 FOR IP): Performed by: NURSE PRACTITIONER

## 2025-03-19 PROCEDURE — 2060000000 HC ICU INTERMEDIATE R&B

## 2025-03-19 PROCEDURE — 97110 THERAPEUTIC EXERCISES: CPT

## 2025-03-19 PROCEDURE — 6370000000 HC RX 637 (ALT 250 FOR IP): Performed by: INTERNAL MEDICINE

## 2025-03-19 PROCEDURE — 6370000000 HC RX 637 (ALT 250 FOR IP): Performed by: NEUROLOGICAL SURGERY

## 2025-03-19 PROCEDURE — 6360000002 HC RX W HCPCS

## 2025-03-19 RX ORDER — AMLODIPINE BESYLATE 5 MG/1
5 TABLET ORAL DAILY
Status: DISCONTINUED | OUTPATIENT
Start: 2025-03-19 | End: 2025-03-21 | Stop reason: HOSPADM

## 2025-03-19 RX ORDER — MECOBALAMIN 5000 MCG
5 TABLET,DISINTEGRATING ORAL
Status: DISCONTINUED | OUTPATIENT
Start: 2025-03-19 | End: 2025-03-21 | Stop reason: HOSPADM

## 2025-03-19 RX ORDER — PREDNISONE 20 MG/1
20 TABLET ORAL DAILY
Status: DISCONTINUED | OUTPATIENT
Start: 2025-03-19 | End: 2025-03-20

## 2025-03-19 RX ADMIN — MICONAZOLE NITRATE: 20 CREAM TOPICAL at 09:29

## 2025-03-19 RX ADMIN — TRIAMCINOLONE ACETONIDE: 1 OINTMENT TOPICAL at 09:29

## 2025-03-19 RX ADMIN — LOSARTAN POTASSIUM 25 MG: 25 TABLET, FILM COATED ORAL at 09:27

## 2025-03-19 RX ADMIN — ENOXAPARIN SODIUM 30 MG: 100 INJECTION SUBCUTANEOUS at 09:27

## 2025-03-19 RX ADMIN — CLOBETASOL PROPIONATE CREAM USP, 0.05%: 0.5 CREAM TOPICAL at 09:29

## 2025-03-19 RX ADMIN — POLYETHYLENE GLYCOL 3350 17 G: 17 POWDER, FOR SOLUTION ORAL at 09:27

## 2025-03-19 RX ADMIN — MICONAZOLE NITRATE: 20 CREAM TOPICAL at 19:49

## 2025-03-19 RX ADMIN — ONDANSETRON 4 MG: 2 INJECTION INTRAMUSCULAR; INTRAVENOUS at 19:41

## 2025-03-19 RX ADMIN — ARIPIPRAZOLE 20 MG: 5 TABLET ORAL at 19:40

## 2025-03-19 RX ADMIN — Medication 5 MG: at 19:41

## 2025-03-19 RX ADMIN — PREDNISONE 20 MG: 20 TABLET ORAL at 16:07

## 2025-03-19 RX ADMIN — ENOXAPARIN SODIUM 30 MG: 100 INJECTION SUBCUTANEOUS at 19:41

## 2025-03-19 RX ADMIN — CLOBETASOL PROPIONATE CREAM USP, 0.05%: 0.5 CREAM TOPICAL at 19:48

## 2025-03-19 RX ADMIN — CETIRIZINE HYDROCHLORIDE 10 MG: 10 TABLET, FILM COATED ORAL at 09:27

## 2025-03-19 RX ADMIN — LOSARTAN POTASSIUM 25 MG: 25 TABLET, FILM COATED ORAL at 19:40

## 2025-03-19 RX ADMIN — TRIAMCINOLONE ACETONIDE: 1 OINTMENT TOPICAL at 19:48

## 2025-03-19 RX ADMIN — HYDROXYZINE HYDROCHLORIDE 10 MG: 10 TABLET ORAL at 13:14

## 2025-03-19 RX ADMIN — AMLODIPINE BESYLATE 5 MG: 5 TABLET ORAL at 10:25

## 2025-03-19 RX ADMIN — ASPIRIN 81 MG: 81 TABLET, CHEWABLE ORAL at 09:27

## 2025-03-19 RX ADMIN — HYDROXYZINE HYDROCHLORIDE 10 MG: 10 TABLET ORAL at 22:31

## 2025-03-19 RX ADMIN — ATORVASTATIN CALCIUM 40 MG: 40 TABLET, FILM COATED ORAL at 19:40

## 2025-03-19 NOTE — PROGRESS NOTES
Occupational Therapy/Physical Therapy  Attempt    Attempted to see pt this AM for OT/PT treatment session. RN reporting pt heading off floor for ECHO. Will re-attempt later this date as schedule permits.       Cristy Mckeon, OTD, OTR/L [id number]  Jacqueline Montes, PT DPT

## 2025-03-19 NOTE — PROGRESS NOTES
NEUROSURGERY PROGRESS NOTE    3/19/2025 10:19 AM                               Terri Shankar                      LOS: 5 days               POD# 5 s/p Procedure(s) (LRB):  ANGIOGRAM CEREBRAL (12158) (N/A)    Subjective:  No acute events overnight. Patient has no specific complaints this am.         Physical Exam:  Patient seen and examined    Vitals:    03/19/25 0815   BP: (!) 159/92   Pulse: (!) 107   Resp: 16   Temp: 97.5 °F (36.4 °C)   SpO2: 96%     GCS:  4 - Opens eyes on own  5 - Alert and oriented  6 - Follows simple motor commands    General: Well developed. Alert and cooperative in no acute distress.     HENT: atraumatic, neck supple  Eyes: Optic discs: Not tested  Pulmonary: unlabored respiratory effort  Cardiovascular:  Warm well perfused. No peripheral edema  Gastrointestinal: abdomen soft, NT, ND    Neurological:  Mental Status: Awake, alert, oriented x 4, speech clear and appropriate  Attention: Intact  Language: Mild expressive aphasia   Sensation: Intact to all extremities to light touch.  Coordination: Intact    Cranial Nerves:  II: Visual acuity not tested, denies new visual changes / diplopia  III, IV, VI: PERRL, 3 mm bilaterally, EOMI, no nystagmus noted  V: Facial sensation intact bilaterally to touch  VII: Face symmetric  VIII: Hearing intact bilaterally to spoken voice  IX: Palate movement equal bilaterally  XI: Shoulder shrug equal bilaterally  XII: Tongue midline    Musculoskeletal:   Gait: Not tested   Assist devices: None   Tone: Normal  Motor strength:    Right  Left    Right  Left    Deltoid  4 4  Hip Flex  4 4   Biceps  4 4  Knee Extensors  4 4   Triceps  4 4  Knee Flexors  4 4   Wrist Ext  4 4  Ankle Dorsiflex.  4 5   Wrist Flex  4 4  Ankle Plantarflex.  4 5   Handgrip  4 4  Ext Van Longus  4 5   Thumb Ext  4 4         Radiological Findings:  CT HEAD WO CONTRAST  Result Date: 3/14/2025  No acute intracranial abnormality. Critical code stroke resolved discussed with the patient's  nurse at 4:32 p.m...      CTA HEAD NECK W CONTRAST  Result Date: 3/14/2025  No hemodynamically significant stenosis in the major arteries of the head and neck. Variant anatomy as described.     CTA ABDOMEN PELVIS W CONTRAST  Result Date: 3/14/2025  1. Right inguinal hematoma closely associated with distal right external iliac artery and right common femoral artery with evidence of small 8 mm distal right common femoral artery pseudoaneurysm.  2. No CT evidence of retroperitoneal hematoma.  3. Incidental cholelithiasis.      CT head without contrast  Result Date: 3/15/2025  No acute intracranial process.  MRI BRAIN WO CONTRAST  Result Date: 3/16/2025  1. Numerous bilateral small acute infarcts suggesting central embolic phenomenon. Infarcts are most numerous in the left MCA distribution. Involvement of the lorraine and of the bilateral thalami as described. Largest region of infarct in the posterior left temporal region.  2. No intracranial hemorrhage identified.     MRI BRAIN WO CONTRAST  Result Date: 3/16/2025  1. Numerous bilateral small acute infarcts suggesting central embolic phenomenon. Infarcts are most numerous in the left MCA distribution. Involvement of the lorraine and of the bilateral thalami as described. Largest region of infarct in the posterior left temporal region.  2. No intracranial hemorrhage identified.    VASCULAR DUPLEX GROIN CHECK  Result date: 3/17/2025  No evidence of right groin femoral pseudoaneurysm or arteriovenous fistula.     Labs:  Recent Labs     03/18/25  0509   WBC 9.8   HGB 12.6   HCT 38.2          Recent Labs     03/18/25  0509      K 4.2      CO2 24   BUN 20   CREATININE 0.8   GLUCOSE 123*   CALCIUM 8.9   PHOS 4.2   MG 2.09       No results for input(s): \"PROTIME\", \"INR\", \"APTT\" in the last 72 hours.    Patient Active Problem List    Diagnosis Date Noted    Stroke, acute, thrombotic (HCC) 03/14/2025    Weakness on left side of face 12/16/2022    Slurred speech

## 2025-03-19 NOTE — CARE COORDINATION
CM spoke with patient at bedside.  We discussed her discharge options.  Patient's son Usama is currently at  and will be discharging to [facility].   [facility] and [facility] are working on discharging him to [facility] as well.  CM discussed SNF verses inpatient rehab and the differences.  Patient has decided to go with [facility] in hopes of rehab back to baseline so she can discharge home.  JESSICA discussed with Karoline JACKSON from [facility] who is in direct contact with patients spouse Moises.  Karoline is going to put Moises on the phone with patient today to confirm.  JESSICA spoke with [facility] in ARU, who states they have accepted and will start pre-cert today.      Augusta Youngblood, RN, BSN,    Ortho/Neuro   [phone]

## 2025-03-19 NOTE — PROGRESS NOTES
Speech Language Pathology  Facility/Department:[facility] ICU  Speech Treatment Note  Name: Terri Shankar  : 1960  MRN: [id number]                                                       Patient Diagnosis(es):   Patient Active Problem List    Diagnosis Date Noted    Stroke, acute, thrombotic (HCC) 2025    Weakness on left side of face 2022    Slurred speech 2022    HLD (hyperlipidemia) 2022    Elevated blood pressure reading without diagnosis of hypertension 2022    Stroke, lacunar (HCC) 2022    Cerebrovascular accident (CVA) due to embolism of left middle cerebral artery (HCC) 2025    Ischemic stroke (HCC) 2025    Dizziness 2025    TIA (transient ischemic attack) 2024    Major depressive disorder, recurrent, mild 2023    Major depressive disorder, recurrent, moderate 2023    Major depressive disorder, recurrent, unspecified 2023    AMS (altered mental status) 2023    Morbid obesity with BMI of 40.0-44.9, adult 2015    Bipolar disorder (HCC) 01/15/2015    Urinary, incontinence, stress female 01/15/2015       Past Medical History:   Diagnosis Date    Anxiety     Bipolar 1 disorder (HCC)     Depression     Dyspareunia in female 2015    Elevated transaminase level 11/10/2017    Hemorrhoids 01/15/2015    History of tubal ligation 2015    HLD (hyperlipidemia) 2022    Hypertension     Irritable bowel syndrome     Lacunar stroke (HCC)     Menopause 2016    Obesity     Obesity (BMI 30-39.9) 2015    Overactive bladder     Prurigo nodularis     Stress incontinence     TIA (transient ischemic attack) 4/3/2024    Urinary incontinence     Uterine fibroid 2015    Yeast vaginitis 2020     Past Surgical History:   Procedure Laterality Date    COLONOSCOPY  2010    polyp removed    COLONOSCOPY      EYE SURGERY Left     Lazy eye    NEUROVASCULAR PROCEDURE N/A 3/14/2025    ANGIOGRAM CEREBRAL  93% accuracy independently, phonemic paraphasia present. Comparing: completed with 100% accuracy independently. Contrasting: completed with 29% accuracy independently, increased to 100% accuracy given mod cues. Pt benefited from semantic cues, phonemic cues, and phrase completion. Pt with perseverations present. Single word responses present, required min cues to increase utterance length. Naming given category and initial letter constraint: completed with 20% accuracy independently, increased to 80% accuracy given mod to MAX cues. Pt with difficulty with grapheme to phonemic pairing. Cont goal      Goal 3: Patient will participate in ongoing assessment of cognitive-linguistic skills, with additional goals to be established as appropriate.  3/17-pt was able to read aloud short phrases with 70% accuracy. Con't goal  3/18-pt became increasingly lethargic/fatigued as the session progressed. Pt was oriented to month and day of the week. Pt aware that sister stated she would visit on Wed which is tomorrow. Con't goal       Additional note: during session, pt reporting difficulty with using RUE. RN present in room and performed neuro check and determined no new deficits as pt with drifting present in RUE previously.     Education  Educated to rationale for tx session and skills targeted. Provided education for continued speech therapy.       Total treatment time: 25 minutes speech-language tx    Plan: Continue per POC- to address speech goals   Recommended Diet: Regular diet with thin liquids   Medication administration: PO as tolerated     Strategies:   Small bites/sips   Upright during intake     Discharge Recommendation: Ongoing ST services warranted at next level of care  Discussed with Tawnya PEPPER  Needs met prior to leaving room, call light within reach    Beatriz Mercer MA, CCC-SLP  [id number]  Speech-Language Pathologist  Pg. #[phone]        This document will serve as a dc summary if pt dc prior to next visit

## 2025-03-19 NOTE — PLAN OF CARE
Problem: Safety - Adult  Goal: Free from fall injury  3/18/2025 2135 by Aretha Ortiz, RN  Outcome: Progressing  Note: Fall precautions in place. Bed alarm on, bed in lowest position, call light within reach, non slip socks, hourly rounding.      Problem: Neurosensory - Adult  Goal: Achieves stable or improved neurological status  3/18/2025 2135 by Aretha Ortiz, RN  Outcome: Progressing     Problem: Skin/Tissue Integrity  Goal: Skin integrity remains intact  Description: 1.  Monitor for areas of redness and/or skin breakdown  2.  Assess vascular access sites hourly  3.  Every 4-6 hours minimum:  Change oxygen saturation probe site  4.  Every 4-6 hours:  If on nasal continuous positive airway pressure, respiratory therapy assess nares and determine need for appliance change or resting period  3/18/2025 2135 by Aretha Ortiz, RN  Outcome: Progressing     Problem: ABCDS Injury Assessment  Goal: Absence of physical injury  Outcome: Progressing     Problem: Skin/Tissue Integrity - Adult  Goal: Skin integrity remains intact  Description: 1.  Monitor for areas of redness and/or skin breakdown  2.  Assess vascular access sites hourly  3.  Every 4-6 hours minimum:  Change oxygen saturation probe site  4.  Every 4-6 hours:  If on nasal continuous positive airway pressure, respiratory therapy assess nares and determine need for appliance change or resting period  3/18/2025 2135 by Aretha Ortiz, RN  Outcome: Progressing

## 2025-03-19 NOTE — PROGRESS NOTES
V2.0    [facility] Progress Note      Name:  Terri Shankar /Age/Sex: 1960  (64 y.o. female)   MRN & CSN:  [id number] & [id number] Encounter Date/Time: 3/19/2025 3:13 PM EDT   Location:  5527/5527-01 PCP: Marcia Abdi APRN - CNP     Attending:Rob Norris MD       Hospital Day: 6    Assessment and Recommendations   Acute left MCA syndrome during conventional angiogram, LMCA Occlusion: - s/p thrombectomy  MRI with bilateral stroke embolic orign  Aphasia is improving but still significant, overall prognosis seems to be favorable  TTE still pending  Continue asa & SQ heparin  Hypercoagulability testing so far negative but   High-intensity statin  Q4H Neurologic Exams & Vitals; NIHSS  Tele once we have hypoallergenic stickers  SBP goal less than 180 mmHg  Long term cardiac monitor after echo  Follow up in stroke clinic short term     - Groin checks per post procedure guidelines, Duplex ruled out pseudoanerysm     Good secondary prevention of stroke measures; Daily antiplatelet agent; SBP < 140 AND DBP < 90, LDL < 100, ideally < 70 and BG control.     Hypertension  - continue Losartan to 25 mg bid  - will add amlodipine if still elevated     Anxiety/BPD  - continue prn atarax low dose, and ability home dose  - Treat underlying skin condition this is leading to her anxiety     Allergic contact dermatitis and flare of prurigo nodularis  She is on dupixent every other week last dose 03/10   and intermittently on prednisone  Tele stickers appear to be causing allergic reaction - agree with NS NP that shouid replace with hypoallergenic stickers  Drop Atarax to 10 mg tid prn  Add cetrizine 10 mg OD  Will start prednisone taper with 20 mg today      Diet ADULT DIET; Regular   DVT Prophylaxis [x] Lovenox, []  Heparin, [] SCDs, [] Ambulation,  [] Eliquis, [] Xarelto  [] Coumadin   Code Status Full Code   Disposition From: Home, final disposition per neurosurgery team.  ARU evaluating patient   Surrogate Decision  300 mg Rectal Daily      Infusions:   PRN Meds: labetalol, 10 mg, Q4H PRN  oxyCODONE, 5 mg, Q6H PRN  acetaminophen, 650 mg, Q6H PRN  hydrOXYzine HCl, 10 mg, TID PRN  ondansetron, 4 mg, Q6H PRN  polyethylene glycol, 17 g, Daily PRN  albuterol, 2.5 mg, Q6H PRN  sulfur hexafluoride microspheres, 2 mL, ONCE PRN        Labs and Imaging   Vascular duplex pseudoaneurysm check groin right  Result Date: 3/17/2025  No evidence of right groin femoral pseudoaneurysm or arteriovenous fistula.     MRI BRAIN WO CONTRAST  Result Date: 3/16/2025  EXAM: MRI BRAIN WO CONTRAST INDICATION:L M1 occlusion s/p IA thrombolysis stroke. COMPARISON: 3/14/2025. 1/21/2025 MRI of the brain. TECHNIQUE: Multiplanar, multisequence MR imaging of the head obtained without IV contrast. IV contrast: None. FINDINGS: SINUSES AND OSSEOUS STRUCTURES: The mastoid air cells and middle ears are clear. The paranasal sinuses are clear. Marrow signal is unremarkable. ORBITS: Unremarkable MIDLINE STRUCTURES: The sella turcica and pituitary gland are normal. Craniovertebral alignment and cerebellar tonsils are normal. VENTRICLES: Normal size and configuration for patient age. Cisternal spaces are intact. INTRACRANIAL HEMORRHAGE: None. BRAIN PARENCHYMA: Multiple diffusion signal abnormalities compatible with acute infarcts. Small infarct in the left side of the lorraine image 9. Multiple adjacent infarcts in the superior medial left thalamus. Tiny infarcts in the left basal ganglia. Small infarct in the right superior thalamus image 15. Moderate size diffusion signal abnormality in the posterior left temporal lobe. Numerous small infarcts in the left occipital lobe and in the posterior left frontal lobe and in the superior left parietal convexity. Small infarct identified along the right insula image 13. Small subcortical infarcts in the superior posterior right frontal lobe and small infarction along the superior right frontal convexity axial image 22. No significant

## 2025-03-19 NOTE — CONSULTS
Consult Note  Physical Medicine and Rehabilitation    Patient: Terri Shankar  [id number]  Date: 3/19/2025      Chief Complaint: Need for angiogram     History of Present Illness/Hospital Course:  This is a 65-year-old female with history of lightheadedness and and spells of dizziness which she described as not feeling room spinning but active she is feeling \" woozy\".  She was found to have right-sided MCA aneurysm and was admitted for an elective diagnostic angiogram.  She was reportedly asymptomatic prior to the procedure.  During the procedure her M2 was found to be occluded tPA was administered and TICI 3 reperfusion was achieved.  On the same day she reportedly had less movement in her right upper extremity CT had and CTA was WNL.  Her cath site was noted to have a small pseudoaneurysm and was seen by vascular surgery.  Patient's MRI showed numerous bilateral infarcts.  Patient reports being completely independent prior to admission she was being seen right-sided weakness more in her arms and her leg.    Prior Level of Function:  Independent for mobility, ADLs, and IADLs    Current Level of Function:  Needs moderate level assistance to ambulate    Pertinent Social History:  Support: children   Home set-up: house    Past Medical History:   Diagnosis Date    Anxiety     Bipolar 1 disorder (HCC)     Depression     Dyspareunia in female 05/16/2015    Elevated transaminase level 11/10/2017    Hemorrhoids 01/15/2015    History of tubal ligation 05/16/2015    HLD (hyperlipidemia) 12/14/2022    Hypertension     Irritable bowel syndrome     Lacunar stroke (HCC)     Menopause 09/05/2016    Obesity     Obesity (BMI 30-39.9) 03/11/2015    Overactive bladder     Prurigo nodularis     Stress incontinence     TIA (transient ischemic attack) 4/3/2024    Urinary incontinence     Uterine fibroid 05/16/2015    Yeast vaginitis 08/30/2020       Past Surgical History:   Procedure Laterality Date    COLONOSCOPY  2010    polyp removed  hospital room?: A Lot  How much help is needed climbing 3-5 steps with a railing?: A Lot  AM-PAC Inpatient Mobility Raw Score : 16  AM-PAC Inpatient T-Scale Score : 40.78  Mobility Inpatient CMS 0-100% Score: 54.16  Mobility Inpatient CMS G-Code Modifier : CK    OT:   AM-PAC Daily Activity - Inpatient   How much help is needed for putting on and taking off regular lower body clothing?: A Lot  How much help is needed for bathing (which includes washing, rinsing, drying)?: A Lot  How much help is needed for toileting (which includes using toilet, bedpan, or urinal)?: A Lot  How much help is needed for putting on and taking off regular upper body clothing?: A Lot  How much help is needed for taking care of personal grooming?: A Little  How much help for eating meals?: A Little  AM-[facility] Inpatient Daily Activity Raw Score: 14  AM-PAC Inpatient ADL T-Scale Score : 33.39  ADL Inpatient CMS 0-100% Score: 59.67  ADL Inpatient CMS G-Code Modifier : CK    Assessment:  1.  Acute left MCA occlusion s/p thrombectomy  2.  Multiple bilateral embolic stroke  3.  Hypertension  4.  Anxiety/BPD    Impairments- Decreased functional mobility, Decreased ADLs    Recommendations:  Start insurance precertification today        Patient with new functional deficits and ongoing medical complexity. Demonstrates ability to tolerate 3 hours therapy/day.  She is a good candidate for acute inpatient rehab when medically appropriate.    Thank you for this consult. Please contact me with any questions or concerns.     Kim Tello MD PGY-3    3/19/2025  10:48 AM    This patient has been seen, examined, and discussed with the resident. I was part of the key critical services provided to the patient. I agree with the residents documentation. This note may have been altered to reflect my own examination findings, impression, and recommendations.     Alexys Liu D.O. M.P.H  PM&R  3/19/2025  11:26 AM

## 2025-03-19 NOTE — PLAN OF CARE
Problem: Chronic Conditions and Co-morbidities  Goal: Patient's chronic conditions and co-morbidity symptoms are monitored and maintained or improved  Outcome: Progressing     Problem: Discharge Planning  Goal: Discharge to home or other facility with appropriate resources  Outcome: Progressing     Problem: Safety - Adult  Goal: Free from fall injury  Outcome: Progressing     Problem: Neurosensory - Adult  Goal: Achieves stable or improved neurological status  Outcome: Progressing     Problem: Skin/Tissue Integrity  Goal: Skin integrity remains intact  Description: 1.  Monitor for areas of redness and/or skin breakdown  2.  Assess vascular access sites hourly  3.  Every 4-6 hours minimum:  Change oxygen saturation probe site  4.  Every 4-6 hours:  If on nasal continuous positive airway pressure, respiratory therapy assess nares and determine need for appliance change or resting period  Outcome: Progressing     Problem: Pain  Goal: Verbalizes/displays adequate comfort level or baseline comfort level  Outcome: Progressing     Problem: Confusion  Goal: Confusion, delirium, dementia, or psychosis is improved or at baseline  Description: INTERVENTIONS:  1. Assess for possible contributors to thought disturbance, including medications, impaired vision or hearing, underlying metabolic abnormalities, dehydration, psychiatric diagnoses, and notify attending LIP  2. [place] high risk fall precautions, as indicated  3. Provide frequent short contacts to provide reality reorientation, refocusing and direction  4. Decrease environmental stimuli, including noise as appropriate  5. Monitor and intervene to maintain adequate nutrition, hydration, elimination, sleep and activity  6. If unable to ensure safety without constant attention obtain sitter and review sitter guidelines with assigned personnel  7. Initiate Psychosocial CNS and Spiritual Care consult, as indicated  Outcome: Progressing     Problem: Skin/Tissue Integrity -

## 2025-03-19 NOTE — PROGRESS NOTES
Patient very impulsive and restless throughout shift, constantly up and down with alarm sounding. Bed/chair alarms utilized today. A&O X4, patient complained of soreness to right nare, physician aware and addressed earlier on shift, Prednisone taper ordered. Patient urinating frequently. Red/rash noted to abdomen.

## 2025-03-19 NOTE — PROGRESS NOTES
Occupational Therapy  Facility/Department: [facility] ORTHO/NEURO  Occupational Therapy Treatment Note    Name: Terri Shankar  : 1960  MRN: [id number]  Date of Service: 3/19/2025    Discharge Recommendations:  IP Rehab  OT Equipment Recommendations  Equipment Needed: No       Patient Diagnosis(es): The primary encounter diagnosis was Cerebrovascular accident (CVA) due to embolism of left middle cerebral artery (HCC). Diagnoses of Cerebral aneurysm, nonruptured and Pseudoaneurysm were also pertinent to this visit.  Past Medical History:  has a past medical history of Anxiety, Bipolar 1 disorder (HCC), Depression, Dyspareunia in female, Elevated transaminase level, Hemorrhoids, History of tubal ligation, HLD (hyperlipidemia), Hypertension, Irritable bowel syndrome, Lacunar stroke (HCC), Menopause, Obesity, Obesity (BMI 30-39.9), Overactive bladder, Prurigo nodularis, Stress incontinence, TIA (transient ischemic attack), Urinary incontinence, Uterine fibroid, and Yeast vaginitis.  Past Surgical History:  has a past surgical history that includes Eye surgery (Left); Tonsillectomy; Tubal ligation (); Colonoscopy (); Colonoscopy (); and neurovascular procedure (N/A, 2025).    Treatment Diagnosis: impaired ADLs and functional mobility/transfers      Assessment  Assessment: Pt demonstrated increased indep with functional transfers with use of rolling walker.  She continues to have R UE weakness and impaired coordination, which impede her ADL status.  Pt needs A with LE dressing, and functional transfers/mobility, requiring cues for safety and hand placement.  Pt is motivated to increase use of R UE and overall functional status.  Recommend ARU at discharge.  Treatment Diagnosis: impaired ADLs and functional mobility/transfers  Prognosis: Good  Activity Tolerance  Activity Tolerance: Patient Tolerated treatment well     Plan  Occupational Therapy Plan  Times Per Week: 5-7  Current Treatment

## 2025-03-19 NOTE — PROGRESS NOTES
Pt A&Ox2; disoriented to time and situation. VSS on RA. No c/o pain thus far. No acute neuro changes this shift. NIH remains 10 at this time; pt intermittently follows commands. Ambulation x1 walker GB. Voiding well via BRP. Tolerating PO intake. Denies needs at this time. Fall precautions in place, call light within reach.

## 2025-03-19 NOTE — PROGRESS NOTES
Urology Attending Progress Note      Subjective: Resting this AM. Her stafford had to be removed yesterday due to discomfort.     Vitals:  BP (!) 159/92   Pulse (!) 107   Temp 97.5 °F (36.4 °C) (Oral)   Resp 16   Ht 1.626 m (5' 4\")   Wt 108 kg (238 lb 1.6 oz)   LMP 01/10/2015 (Exact Date)   SpO2 96%   BMI 40.87 kg/m²   Temp  Av.1 °F (36.7 °C)  Min: 97.2 °F (36.2 °C)  Max: 98.8 °F (37.1 °C)    Intake/Output Summary (Last 24 hours) at 3/19/2025 1000  Last data filed at 3/19/2025 0605  Gross per 24 hour   Intake 920 ml   Output --   Net 920 ml       Exam: Appears comfortable laying in bed    Labs:  WBC:    Lab Results   Component Value Date/Time    WBC 9.8 2025 05:09 AM     Hemoglobin/Hematocrit:    Lab Results   Component Value Date/Time    HGB 12.6 2025 05:09 AM    HCT 38.2 2025 05:09 AM     BMP:    Lab Results   Component Value Date/Time     2025 05:09 AM    K 4.2 2025 05:09 AM    K 3.8 03/15/2025 04:37 AM     2025 05:09 AM    CO2 24 2025 05:09 AM    BUN 20 2025 05:09 AM    CREATININE 0.8 2025 05:09 AM    CALCIUM 8.9 2025 05:09 AM    GFRAA >60 2022 12:39 PM    LABGLOM 83 2025 05:09 AM    LABGLOM 83 2024 02:10 PM     PT/INR:    Lab Results   Component Value Date/Time    PROTIME 13.7 2025 11:32 PM    INR 1.03 2025 11:32 PM     PTT:    Lab Results   Component Value Date/Time    APTT 28.6 2025 11:32 PM   [APTT        Impression/Plan:  65yo F with post procedure urinary retention and difficulty with stafford insertion.     -Stafford removed yesterday  -According to chart she has voided numerous times  -Can check PVR if there is concern for continued retention  -We will see PRN for now. Please call with any questions      ELLIOT Long

## 2025-03-20 ENCOUNTER — APPOINTMENT (OUTPATIENT)
Age: 65
DRG: 062 | End: 2025-03-20
Attending: STUDENT IN AN ORGANIZED HEALTH CARE EDUCATION/TRAINING PROGRAM
Payer: MEDICARE

## 2025-03-20 LAB
ECHO AO ASC DIAM: 3.7 CM
ECHO AO ASCENDING AORTA INDEX: 1.76 CM/M2
ECHO AO ROOT DIAM: 3.5 CM
ECHO AO ROOT INDEX: 1.67 CM/M2
ECHO AV AREA PEAK VELOCITY: 2.7 CM2
ECHO AV AREA/BSA PEAK VELOCITY: 1.3 CM2/M2
ECHO AV PEAK GRADIENT: 5 MMHG
ECHO AV PEAK VELOCITY: 1.1 M/S
ECHO AV VELOCITY RATIO: 0.82
ECHO BSA: 2.2 M2
ECHO LA AREA 2C: 18.6 CM2
ECHO LA AREA 4C: 18.6 CM2
ECHO LA MAJOR AXIS: 5.2 CM
ECHO LA MINOR AXIS: 4.9 CM
ECHO LA VOL BP: 55 ML (ref 22–52)
ECHO LA VOL MOD A2C: 58 ML (ref 22–52)
ECHO LA VOL MOD A4C: 50 ML (ref 22–52)
ECHO LA VOL/BSA BIPLANE: 26 ML/M2 (ref 16–34)
ECHO LA VOLUME INDEX MOD A2C: 28 ML/M2 (ref 16–34)
ECHO LA VOLUME INDEX MOD A4C: 24 ML/M2 (ref 16–34)
ECHO LV E' LATERAL VELOCITY: 8.61 CM/S
ECHO LV E' SEPTAL VELOCITY: 5.57 CM/S
ECHO LV EDV A2C: 131 ML
ECHO LV EDV A4C: 119 ML
ECHO LV EDV INDEX A4C: 57 ML/M2
ECHO LV EDV NDEX A2C: 62 ML/M2
ECHO LV EF PHYSICIAN: 58 %
ECHO LV EJECTION FRACTION A2C: 63 %
ECHO LV EJECTION FRACTION A4C: 51 %
ECHO LV EJECTION FRACTION BIPLANE: 58 % (ref 55–100)
ECHO LV ESV A2C: 49 ML
ECHO LV ESV A4C: 59 ML
ECHO LV ESV INDEX A2C: 23 ML/M2
ECHO LV ESV INDEX A4C: 28 ML/M2
ECHO LV FRACTIONAL SHORTENING: 28 % (ref 28–44)
ECHO LV INTERNAL DIMENSION DIASTOLE INDEX: 2.19 CM/M2
ECHO LV INTERNAL DIMENSION DIASTOLIC: 4.6 CM (ref 3.9–5.3)
ECHO LV INTERNAL DIMENSION SYSTOLIC INDEX: 1.57 CM/M2
ECHO LV INTERNAL DIMENSION SYSTOLIC: 3.3 CM
ECHO LV IVSD: 1.1 CM (ref 0.6–0.9)
ECHO LV MASS 2D: 181.2 G (ref 67–162)
ECHO LV MASS INDEX 2D: 86.3 G/M2 (ref 43–95)
ECHO LV POSTERIOR WALL DIASTOLIC: 1.1 CM (ref 0.6–0.9)
ECHO LV RELATIVE WALL THICKNESS RATIO: 0.48
ECHO LVOT AREA: 3.5 CM2
ECHO LVOT DIAM: 2.1 CM
ECHO LVOT MEAN GRADIENT: 2 MMHG
ECHO LVOT PEAK GRADIENT: 3 MMHG
ECHO LVOT PEAK VELOCITY: 0.9 M/S
ECHO LVOT STROKE VOLUME INDEX: 32 ML/M2
ECHO LVOT SV: 67.2 ML
ECHO LVOT VTI: 19.4 CM
ECHO MV A VELOCITY: 0.61 M/S
ECHO MV E DECELERATION TIME (DT): 240 MS
ECHO MV E VELOCITY: 0.52 M/S
ECHO MV E/A RATIO: 0.85
ECHO MV E/E' LATERAL: 6.04
ECHO MV E/E' RATIO (AVERAGED): 7.69
ECHO MV E/E' SEPTAL: 9.34
ECHO PULMONARY ARTERY END DIASTOLIC PRESSURE: 2 MMHG
ECHO PV MAX VELOCITY: 0.8 M/S
ECHO PV PEAK GRADIENT: 3 MMHG
ECHO PV REGURGITANT MAX VELOCITY: 0.6 M/S
ECHO RA AREA 4C: 17.7 CM2
ECHO RA END SYSTOLIC VOLUME APICAL 4 CHAMBER INDEX BSA: 23 ML/M2
ECHO RA VOLUME: 48 ML
ECHO RV BASAL DIMENSION: 4 CM
ECHO RV FREE WALL PEAK S': 12.5 CM/S
ECHO RV MID DIMENSION: 2.6 CM
ECHO RV TAPSE: 2 CM (ref 1.7–?)

## 2025-03-20 PROCEDURE — 97530 THERAPEUTIC ACTIVITIES: CPT

## 2025-03-20 PROCEDURE — 99024 POSTOP FOLLOW-UP VISIT: CPT | Performed by: NURSE PRACTITIONER

## 2025-03-20 PROCEDURE — 6360000002 HC RX W HCPCS: Performed by: NURSE PRACTITIONER

## 2025-03-20 PROCEDURE — 97535 SELF CARE MNGMENT TRAINING: CPT

## 2025-03-20 PROCEDURE — 6370000000 HC RX 637 (ALT 250 FOR IP): Performed by: NURSE PRACTITIONER

## 2025-03-20 PROCEDURE — 6360000002 HC RX W HCPCS

## 2025-03-20 PROCEDURE — 97110 THERAPEUTIC EXERCISES: CPT

## 2025-03-20 PROCEDURE — 6370000000 HC RX 637 (ALT 250 FOR IP): Performed by: NEUROLOGICAL SURGERY

## 2025-03-20 PROCEDURE — 97129 THER IVNTJ 1ST 15 MIN: CPT

## 2025-03-20 PROCEDURE — 2060000000 HC ICU INTERMEDIATE R&B

## 2025-03-20 PROCEDURE — 93306 TTE W/DOPPLER COMPLETE: CPT | Performed by: INTERNAL MEDICINE

## 2025-03-20 PROCEDURE — APPNB30 APP NON BILLABLE TIME 0-30 MINS: Performed by: NURSE PRACTITIONER

## 2025-03-20 PROCEDURE — 6370000000 HC RX 637 (ALT 250 FOR IP): Performed by: INTERNAL MEDICINE

## 2025-03-20 PROCEDURE — 92507 TX SP LANG VOICE COMM INDIV: CPT

## 2025-03-20 PROCEDURE — 93306 TTE W/DOPPLER COMPLETE: CPT

## 2025-03-20 RX ORDER — PREDNISONE 10 MG/1
10 TABLET ORAL DAILY
Status: DISCONTINUED | OUTPATIENT
Start: 2025-03-24 | End: 2025-03-21 | Stop reason: HOSPADM

## 2025-03-20 RX ORDER — PREDNISONE 20 MG/1
20 TABLET ORAL DAILY
Status: COMPLETED | OUTPATIENT
Start: 2025-03-20 | End: 2025-03-21

## 2025-03-20 RX ORDER — PREDNISONE 5 MG/1
5 TABLET ORAL DAILY
Status: DISCONTINUED | OUTPATIENT
Start: 2025-03-26 | End: 2025-03-21 | Stop reason: HOSPADM

## 2025-03-20 RX ADMIN — ENOXAPARIN SODIUM 30 MG: 100 INJECTION SUBCUTANEOUS at 19:44

## 2025-03-20 RX ADMIN — CETIRIZINE HYDROCHLORIDE 10 MG: 10 TABLET, FILM COATED ORAL at 08:50

## 2025-03-20 RX ADMIN — PREDNISONE 20 MG: 20 TABLET ORAL at 08:50

## 2025-03-20 RX ADMIN — AMLODIPINE BESYLATE 5 MG: 5 TABLET ORAL at 08:50

## 2025-03-20 RX ADMIN — ASPIRIN 81 MG: 81 TABLET, CHEWABLE ORAL at 08:50

## 2025-03-20 RX ADMIN — TRIAMCINOLONE ACETONIDE: 1 OINTMENT TOPICAL at 09:57

## 2025-03-20 RX ADMIN — LOSARTAN POTASSIUM 25 MG: 25 TABLET, FILM COATED ORAL at 08:50

## 2025-03-20 RX ADMIN — POLYETHYLENE GLYCOL 3350 17 G: 17 POWDER, FOR SOLUTION ORAL at 08:49

## 2025-03-20 RX ADMIN — CLOBETASOL PROPIONATE CREAM USP, 0.05%: 0.5 CREAM TOPICAL at 08:50

## 2025-03-20 RX ADMIN — HYDROXYZINE HYDROCHLORIDE 10 MG: 10 TABLET ORAL at 19:44

## 2025-03-20 RX ADMIN — ARIPIPRAZOLE 20 MG: 5 TABLET ORAL at 19:44

## 2025-03-20 RX ADMIN — Medication 5 MG: at 19:43

## 2025-03-20 RX ADMIN — MICONAZOLE NITRATE: 20 CREAM TOPICAL at 08:52

## 2025-03-20 RX ADMIN — ONDANSETRON 4 MG: 2 INJECTION INTRAMUSCULAR; INTRAVENOUS at 12:43

## 2025-03-20 RX ADMIN — TRIAMCINOLONE ACETONIDE: 1 OINTMENT TOPICAL at 19:44

## 2025-03-20 RX ADMIN — ENOXAPARIN SODIUM 30 MG: 100 INJECTION SUBCUTANEOUS at 08:49

## 2025-03-20 RX ADMIN — LOSARTAN POTASSIUM 25 MG: 25 TABLET, FILM COATED ORAL at 19:44

## 2025-03-20 RX ADMIN — CLOBETASOL PROPIONATE CREAM USP, 0.05%: 0.5 CREAM TOPICAL at 19:44

## 2025-03-20 RX ADMIN — ATORVASTATIN CALCIUM 40 MG: 40 TABLET, FILM COATED ORAL at 19:43

## 2025-03-20 RX ADMIN — MICONAZOLE NITRATE: 20 CREAM TOPICAL at 19:45

## 2025-03-20 RX ADMIN — SALINE NASAL SPRAY 1 SPRAY: 1.5 SOLUTION NASAL at 11:37

## 2025-03-20 NOTE — PROGRESS NOTES
NEUROSURGERY PROGRESS NOTE    3/20/2025 11:46 AM                               Terri Shankar                      LOS: 6 days               POD# 6 s/p Procedure(s) (LRB):  ANGIOGRAM CEREBRAL (36976) (N/A)    Subjective:  No acute events overnight. Patient sitting up on the edge of the bed upon entering the room.    Physical Exam:  Patient seen and examined    Vitals:    03/20/25 1138   BP: 139/89   Pulse: 83   Resp: 16   Temp: 98.3 °F (36.8 °C)   SpO2: 93%     GCS:  4 - Opens eyes on own  5 - Alert and oriented  6 - Follows simple motor commands    General: Well developed. Alert and cooperative in no acute distress.     HENT: atraumatic, neck supple  Eyes: Optic discs: Not tested  Pulmonary: unlabored respiratory effort  Cardiovascular:  Warm well perfused. No peripheral edema  Gastrointestinal: abdomen soft, NT, ND    Neurological:  Mental Status: Awake, alert, oriented x 4, speech clear and appropriate  Attention: Intact  Language: Mild expressive aphasia   Sensation: Intact to all extremities to light touch.  Coordination: Intact    Cranial Nerves:  II: Visual acuity not tested, denies new visual changes / diplopia  III, IV, VI: PERRL, 3 mm bilaterally, EOMI, no nystagmus noted  V: Facial sensation intact bilaterally to touch  VII: Face symmetric  VIII: Hearing intact bilaterally to spoken voice  IX: Palate movement equal bilaterally  XI: Shoulder shrug equal bilaterally  XII: Tongue midline    Musculoskeletal:   Gait: Not tested   Assist devices: None   Tone: Normal  Motor strength:    Right  Left    Right  Left    Deltoid  4+ 5  Hip Flex  4+ 5   Biceps  4+ 5  Knee Extensors  4+ 5   Triceps  4+ 5  Knee Flexors  4+ 5   Wrist Ext  4+ 5  Ankle Dorsiflex.  4+ 5   Wrist Flex  4+ 5  Ankle Plantarflex.  4+ 5   Handgrip  4+ 5  Ext [place]  4+ 5   Thumb Ext  4+ 5         Radiological Findings:  CT HEAD WO CONTRAST  Result Date: 3/14/2025  No acute intracranial abnormality. Critical code stroke resolved discussed

## 2025-03-20 NOTE — PROGRESS NOTES
Current NIHSS 6    Nursing Core Measures for Stroke:   [x]   Education template documentation (STROKE/TIA). Select only risk factors that are applicable to patient when selecting risk factors.  [x]   Care Plan template documentation (Physiologic Instability - Neurosensory). Selecting this will add care plan rows to the flowsheet under the Neuro section of Head to Toe.  [x]   Verified Swallow Screen completed prior to PO intake of food, drink, medications.          Please verify correct medication route prior to administration for intubated patients, patients who can not swallow or have alternative routes of intake (NG, OG, VT), etc  [x]   VTE Prophylaxis: SCDs ordered/addressed; SCDs: N/A Lovenox           (As a reminder, ASA, Plavix, and TPA/TNK are not VTE prophylaxis.)    Reviewed the Following Education with Patient and/or Family:   - Personalized risk factors for patient, along with changes, modifications that will help prevent stroke.  - Signs and Symptoms of Stroke: (Facial droop, weakness/numbness especially on one side, speech difficulty, sudden confusion, sudden loss of vision, sudden severe headache, sudden loss of balance or having difficulty walking, syncope, or seizure)  - How to activate EMS (911)   - Importance of Follow Up Appointments at Discharge   - Importance of Compliance with Medications Prescribed at Discharge  - Available community resources and stroke advocacy groups if needed    Patient and/or family member: verbalized understanding.     Stroke Education booklet given to patient/family (or verified, if given already), which reviews above information. yes         Electronically signed by Janie Arreola RN on 3/20/2025 at 12:24 PM

## 2025-03-20 NOTE — PROGRESS NOTES
Patient A&O x4, VSS, RA. X 1 GB/walker, stand by assist, tolerates well. NIH 6. Tolerating PO. Voiding via BRP. All safety precautions in place, call light/items in reach.

## 2025-03-20 NOTE — PROGRESS NOTES
Physical Therapy  No Treatment    Patient just returned to bed after ambulating multiple times in bland this afternoon with RN staff.  Will follow up per PT plan of care.    Tamiko Arizmendi, PT #[id number]

## 2025-03-20 NOTE — PROGRESS NOTES
Current NIHSS 7    Nursing Core Measures for Stroke:   [x]   Education template documentation (STROKE/TIA). Select only risk factors that are applicable to patient when selecting risk factors.  [x]   Care Plan template documentation (Physiologic Instability - Neurosensory). Selecting this will add care plan rows to the flowsheet under the Neuro section of Head to Toe.  [x]   Verified Swallow Screen completed prior to PO intake of food, drink, medications.          Please verify correct medication route prior to administration for intubated patients, patients who can not swallow or have alternative routes of intake (NG, OG, IA), etc  [x]   VTE Prophylaxis: SCDs ordered/addressed; SCDs: N/A Lovenox           (As a reminder, ASA, Plavix, and TPA/TNK are not VTE prophylaxis.)    Reviewed the Following Education with Patient and/or Family:   - Personalized risk factors for patient, along with changes, modifications that will help prevent stroke.  - Signs and Symptoms of Stroke: (Facial droop, weakness/numbness especially on one side, speech difficulty, sudden confusion, sudden loss of vision, sudden severe headache, sudden loss of balance or having difficulty walking, syncope, or seizure)  - How to activate EMS (911)   - Importance of Follow Up Appointments at Discharge   - Importance of Compliance with Medications Prescribed at Discharge  - Available community resources and stroke advocacy groups if needed    Patient and/or family member: verbalized understanding.     Stroke Education booklet given to patient/family (or verified, if given already), which reviews above information. N/A         Electronically signed by Zully Zamudio RN on 3/20/2025 at 1:15 AM

## 2025-03-20 NOTE — PLAN OF CARE
Problem: Chronic Conditions and Co-morbidities  Goal: Patient's chronic conditions and co-morbidity symptoms are monitored and maintained or improved  3/20/2025 0937 by Janie Arreola RN  Outcome: Progressing   Reinforcement of disease process and treatment plan recommended for chronic conditions and co-morbidities.      Problem: Discharge Planning  Goal: Discharge to home or other facility with appropriate resources  3/20/2025 0937 by Janie Arreola RN  Outcome: Progressing  Patient actively participates in ADL's and decision making regarding plan of care.     Problem: Safety - Adult  Goal: Free from fall injury  3/20/2025 0937 by Janie Arreola RN  Outcome: Progressing  No falls/injuries this shift, bed in lowest position, brakes on, bed alarm on, call light in reach, side rails up x2.     Problem: Neurosensory - Adult  Goal: Achieves stable or improved neurological status  3/20/2025 0937 by Janie Arreola RN  Outcome: Progressing     Problem: Skin/Tissue Integrity - Adult  Goal: Skin integrity remains intact  Description: 1.  Monitor for areas of redness and/or skin breakdown  2.  Assess vascular access sites hourly  3.  Every 4-6 hours minimum:  Change oxygen saturation probe site  4.  Every 4-6 hours:  If on nasal continuous positive airway pressure, respiratory therapy assess nares and determine need for appliance change or resting period  3/20/2025 0937 by Janie Arreola RN  Outcome: Progressing  No new skin breakdown noted, no new signs/symptoms of infection, continue to monitor labwork including WBC, medications administered per physician orders.     Problem: Infection - Adult  Goal: Absence of infection at discharge  3/20/2025 0937 by Janie Arreola RN  Outcome: Progressing     Problem: Skin/Tissue Integrity  Goal: Skin integrity remains intact  Description: 1.  Monitor for areas of redness and/or skin breakdown  2.  Assess vascular access sites hourly  3.  Every 4-6 hours minimum:  Change oxygen saturation probe site  4.   Every 4-6 hours:  If on nasal continuous positive airway pressure, respiratory therapy assess nares and determine need for appliance change or resting period  3/20/2025 0937 by Janie Arreola, RN  Outcome: Progressing     Problem: Pain  Goal: Verbalizes/displays adequate comfort level or baseline comfort level  3/20/2025 0937 by Janie Arreola, RN  Outcome: Progressing     Problem: ABCDS Injury Assessment  Goal: Absence of physical injury  3/20/2025 0937 by Janie Arreola, RN  Outcome: Progressing

## 2025-03-20 NOTE — PROGRESS NOTES
Speech Language Pathology  Facility/Department:[address]  Speech Treatment Note  Name: Terri Shankar  : 1960  MRN: [id number]                                                       Patient Diagnosis(es):   Patient Active Problem List    Diagnosis Date Noted    Stroke, acute, thrombotic (HCC) 2025    Weakness on left side of face 2022    Slurred speech 2022    HLD (hyperlipidemia) 2022    Elevated blood pressure reading without diagnosis of hypertension 2022    Stroke, lacunar (HCC) 2022    Cerebrovascular accident (CVA) due to embolism of left middle cerebral artery (HCC) 2025    Ischemic stroke (HCC) 2025    Dizziness 2025    TIA (transient ischemic attack) 2024    Major depressive disorder, recurrent, mild 2023    Major depressive disorder, recurrent, moderate 2023    Major depressive disorder, recurrent, unspecified 2023    AMS (altered mental status) 2023    Morbid obesity with BMI of 40.0-44.9, adult 2015    Bipolar disorder (HCC) 01/15/2015    Urinary, incontinence, stress female 01/15/2015       Past Medical History:   Diagnosis Date    Anxiety     Bipolar 1 disorder ([facility])     Depression     Dyspareunia in female 2015    Elevated transaminase level 11/10/2017    Hemorrhoids 01/15/2015    History of tubal ligation 2015    HLD (hyperlipidemia) 2022    Hypertension     Irritable bowel syndrome     Lacunar stroke (HCC)     Menopause 2016    Obesity     Obesity (BMI 30-39.9) 2015    Overactive bladder     Prurigo nodularis     Stress incontinence     TIA (transient ischemic attack) 2024    Urinary incontinence     Uterine fibroid 2015    Yeast vaginitis 2020     Past Surgical History:   Procedure Laterality Date    COLONOSCOPY  2010    polyp removed    COLONOSCOPY      EYE SURGERY Left     Lazy eye    NEUROVASCULAR PROCEDURE N/A 2025    ANGIOGRAM CEREBRAL  education for recommendation for discussing with MD re: driving and OT 's re-evaluation prior to resuming driving again. Pt states comprehension, requires ongoing education.        Total treatment time: 10 minutes speech tx, 15 minutes cognitive tx    Plan: Continue per POC- to address speech goals   Recommended Diet: Regular diet with thin liquids   Medication administration: PO as tolerated     Strategies:   Small bites/sips   Upright during intake     Discharge Recommendation: Ongoing ST services warranted at next level of care  Discussed with RNJanie  Needs met prior to leaving room, call light within reach    Beatriz Mercer MA, CCC-SLP  [id number]  Speech-Language Pathologist  Pg. #[phone]        This document will serve as a dc summary if pt dc prior to next visit

## 2025-03-20 NOTE — PROGRESS NOTES
Progress Note  Physical Medicine and Rehabilitation    Patient: Terri Shankar  [id number]  Date: 3/20/2025      Chief Complaint: Need for angiogram     History of Present Illness/Hospital Course:  This is a 65-year-old female with history of lightheadedness and and spells of dizziness which she described as not feeling room spinning but active she is feeling \" woozy\".  She was found to have right-sided MCA aneurysm and was admitted for an elective diagnostic angiogram.  She was reportedly asymptomatic prior to the procedure.  During the procedure her M2 was found to be occluded tPA was administered and TICI 3 reperfusion was achieved.  On the same day she reportedly had less movement in her right upper extremity CT had and CTA was WNL.  Her cath site was noted to have a small pseudoaneurysm and was seen by vascular surgery.  Patient's MRI showed numerous bilateral infarcts.  Patient reports being completely independent prior to admission she was being seen right-sided weakness more in her arms and her leg.      Interval history: Patient seen in her room this morning.  No new complaints overnight.  She is stating she is feeling much better today.  She would like to go to a skilled nursing facility where her  and son are currently both residing.  Patient is now refusing acute rehab.    Prior Level of Function:  Independent for mobility, ADLs, and IADLs    Current Level of Function:  Needs moderate level assistance to ambulate    Pertinent Social History:  Support: children   Home set-up: house    Past Medical History:   Diagnosis Date    Anxiety     Bipolar 1 disorder (HCC)     Depression     Dyspareunia in female 05/16/2015    Elevated transaminase level 11/10/2017    Hemorrhoids 01/15/2015    History of tubal ligation 05/16/2015    HLD (hyperlipidemia) 12/14/2022    Hypertension     Irritable bowel syndrome     Lacunar stroke (HCC)     Menopause 09/05/2016    Obesity     Obesity (BMI 30-39.9) 03/11/2015

## 2025-03-20 NOTE — PROGRESS NOTES
V2.0    [facility] [place] Note      Name:  Terri Shankar /Age/Sex: 1960  (64 y.o. female)   MRN & CSN:  [id number] & [id number] Encounter Date/Time: 3/20/2025 3:13 PM EDT   Location:  5527/5527-01 PCP: Marcia Abdi APRN - CNP     Attending:Rob Norris MD       Hospital Day: 7    Assessment and Recommendations   Acute left MCA syndrome during conventional angiogram, LMCA Occlusion: - s/p thrombectomy  MRI with bilateral stroke embolic orign  Aphasia is improving, overall prognosis seems to be favorable  TTE with EF 55 - 60%, Left ventricle size is normal, Grade I diastolic dysfunction with normal LAP. No thrombus present. Agitated saline study was negative with and without provocation.  Continue asa & SQ heparin  Hypercoagulability testing so far negative   High-intensity statin  Q4H Neurologic Exams & Vitals; NIHSS  Tele once we have hypoallergenic stickers  SBP goal less than 180 mmHg  Requested Cardiology navigator to get 30 days monitor  Follow up in stroke clinic short term     - Groin checks per post procedure guidelines, Duplex ruled out pseudoanerysm     Good secondary prevention of stroke measures; Daily antiplatelet agent; SBP < 140 AND DBP < 90, LDL < 100, ideally < 70 and BG control.     Hypertension  - continue Losartan to 25 mg bid and amlodipine 5 mg OD  Eventually (in days to weeks) SBP < 140 AND DBP < 90      Anxiety/BPD  - continue prn atarax low dose, and ability home dose  - Treat underlying skin condition this is leading to her anxiety     Allergic contact dermatitis and flare of prurigo nodularis  She is on dupixent every other week last dose 03/10   and intermittently on prednisone  Tele stickers appear to be causing allergic reaction - agree with NS NP that shouid replace with hypoallergenic stickers  Atarax to 10 mg tid prn  Cetrizine 10 mg OD  Prednisone taper on dc, will need outpatient follow up with derm      Diet ADULT DIET; Regular   DVT Prophylaxis [x] Lovenox, []     predniSONE  20 mg Oral Daily    Followed by    [START ON 3/22/2025] predniSONE  15 mg Oral Daily    Followed by    [START ON 3/24/2025] predniSONE  10 mg Oral Daily    Followed by    [START ON 3/26/2025] predniSONE  5 mg Oral Daily    amLODIPine  5 mg Oral Daily    enoxaparin  30 mg SubCUTAneous BID    cetirizine  10 mg Oral Daily    losartan  25 mg Oral BID    polyethylene glycol  17 g Oral Daily    ARIPiprazole  20 mg Oral Nightly    atorvastatin  40 mg Oral Nightly    triamcinolone   Topical BID    clobetasol   Topical BID    miconazole   Topical BID    aspirin  81 mg Oral Daily    Or    aspirin  300 mg Rectal Daily      Infusions:   PRN Meds: melatonin, 5 mg, Once PRN  labetalol, 10 mg, Q4H PRN  oxyCODONE, 5 mg, Q6H PRN  acetaminophen, 650 mg, Q6H PRN  hydrOXYzine HCl, 10 mg, TID PRN  ondansetron, 4 mg, Q6H PRN  polyethylene glycol, 17 g, Daily PRN  albuterol, 2.5 mg, Q6H PRN  sulfur hexafluoride microspheres, 2 mL, ONCE PRN        Labs and Imaging   Vascular duplex pseudoaneurysm check groin right  Result Date: 3/17/2025  No evidence of right groin femoral pseudoaneurysm or arteriovenous fistula.     MRI BRAIN WO CONTRAST  Result Date: 3/16/2025  EXAM: MRI BRAIN WO CONTRAST INDICATION:L M1 occlusion s/p IA thrombolysis stroke. COMPARISON: 3/14/2025. 1/21/2025 MRI of the brain. TECHNIQUE: Multiplanar, multisequence MR imaging of the head obtained without IV contrast. IV contrast: None. FINDINGS: SINUSES AND OSSEOUS STRUCTURES: The mastoid air cells and middle ears are clear. The paranasal sinuses are clear. Marrow signal is unremarkable. ORBITS: Unremarkable MIDLINE STRUCTURES: The sella turcica and pituitary gland are normal. Craniovertebral alignment and cerebellar tonsils are normal. VENTRICLES: Normal size and configuration for patient age. Cisternal spaces are intact. INTRACRANIAL HEMORRHAGE: None. BRAIN PARENCHYMA: Multiple diffusion signal abnormalities compatible with acute infarcts. Small infarct in

## 2025-03-20 NOTE — PROGRESS NOTES
Occupational Therapy  Facility/Department: [facility] 5T ORTHO/NEURO  Daily Treatment Note  NAME: Terri Shankar  : 1960  MRN: [id number]    Date of Service: 3/20/2025    Discharge Recommendations:  IP Rehab  OT Equipment Recommendations  Equipment Needed: No      Patient Diagnosis(es): The primary encounter diagnosis was Cerebrovascular accident (CVA) due to embolism of left middle cerebral artery (HCC). Diagnoses of Cerebral aneurysm, nonruptured and Pseudoaneurysm were also pertinent to this visit.  Past Medical History:  has a past medical history of Anxiety, Bipolar 1 disorder (HCC), Depression, Dyspareunia in female, Elevated transaminase level, Hemorrhoids, History of tubal ligation, HLD (hyperlipidemia), Hypertension, Irritable bowel syndrome, Lacunar stroke (HCC), Menopause, Obesity, Obesity (BMI 30-39.9), Overactive bladder, Prurigo nodularis, Stress incontinence, TIA (transient ischemic attack), Urinary incontinence, Uterine fibroid, and Yeast vaginitis.  Past Surgical History:  has a past surgical history that includes Eye surgery (Left); Tonsillectomy; Tubal ligation (); Colonoscopy (); Colonoscopy (); and neurovascular procedure (N/A, 2025).      Assessment       Assessment: Pt tolerated session well with good motivation to work with therapy. SBA to CGA for transfers and funct mob with RW and for static standing level ADLs. Does continue to demo weakness and decreased coordination in RUE, especially with FM control. Recommend ongoing OT to maximize safety and independence with ADLs, funct mob and transfers. Cont with POC      Activity Tolerance: Patient tolerated treatment well  Discharge Recommendations: IP Rehab  Equipment Needed: No     Plan  Occupational Therapy Plan  Times Per Week: 5-7    Restrictions  Position Activity Restriction  Other Position/Activity Restrictions: no activity restrictions noted    Subjective  Subjective  Subjective: Pt in chair upon arrival. Agreeable to

## 2025-03-20 NOTE — PLAN OF CARE
Problem: Chronic Conditions and Co-morbidities  Goal: Patient's chronic conditions and co-morbidity symptoms are monitored and maintained or improved  3/19/2025 2147 by Zully Zamudio RN  Outcome: Progressing     Problem: Discharge Planning  Goal: Discharge to home or other facility with appropriate resources  3/19/2025 2147 by Zully Zamudio RN  Outcome: Progressing  Flowsheets (Taken 3/19/2025 1950)  Discharge to home or other facility with appropriate resources:   Identify barriers to discharge with patient and caregiver   Arrange for needed discharge resources and transportation as appropriate   Identify discharge learning needs (meds, wound care, etc)     Problem: Safety - Adult  Goal: Free from fall injury  3/19/2025 2147 by Zully Zamudio RN  Outcome: Progressing     Problem: Neurosensory - Adult  Goal: Achieves stable or improved neurological status  3/19/2025 2147 by Zully Zamudio RN  Outcome: Progressing     Problem: Neurosensory - Adult  Goal: Achieves maximal functionality and self care  Outcome: Progressing     Problem: Skin/Tissue Integrity - Adult  Goal: Skin integrity remains intact  Description: 1.  Monitor for areas of redness and/or skin breakdown  2.  Assess vascular access sites hourly  3.  Every 4-6 hours minimum:  Change oxygen saturation probe site  4.  Every 4-6 hours:  If on nasal continuous positive airway pressure, respiratory therapy assess nares and determine need for appliance change or resting period  3/19/2025 2147 by Zully Zamudio RN  Outcome: Progressing     Problem: Infection - Adult  Goal: Absence of infection at discharge  Outcome: Progressing     Problem: Skin/Tissue Integrity  Goal: Skin integrity remains intact  Description: 1.  Monitor for areas of redness and/or skin breakdown  2.  Assess vascular access sites hourly  3.  Every 4-6 hours minimum:  Change oxygen saturation probe site  4.  Every 4-6 hours:  If on nasal continuous positive airway pressure,

## 2025-03-20 NOTE — CARE COORDINATION
CM spoke with patient at bedside.  She is planning to discharge to [facility].  CM spoke with Ishmael in ARU.  Patient's pre-cert pending at this time.    1:54 PM  Patient called CM to bedside.  She is hoping that she is doing well enough to return home.  She has had [facility] in the past and her sister will be able to stay with her for a couple of weeks.  CM advised another therapy session to evaluate safety.      Augusta Youngblood RN, BSN,    Ortho/Neuro   [phone]

## 2025-03-21 ENCOUNTER — TELEPHONE (OUTPATIENT)
Dept: CARDIOLOGY CLINIC | Age: 65
End: 2025-03-21

## 2025-03-21 VITALS
TEMPERATURE: 97.7 F | RESPIRATION RATE: 16 BRPM | HEIGHT: 64 IN | DIASTOLIC BLOOD PRESSURE: 79 MMHG | HEART RATE: 90 BPM | BODY MASS INDEX: 40.69 KG/M2 | SYSTOLIC BLOOD PRESSURE: 138 MMHG | WEIGHT: 238.32 LBS | OXYGEN SATURATION: 94 %

## 2025-03-21 DIAGNOSIS — R55 PRE-SYNCOPE: Primary | ICD-10-CM

## 2025-03-21 LAB
EKG ATRIAL RATE: 76 BPM
EKG DIAGNOSIS: NORMAL
EKG P AXIS: 17 DEGREES
EKG P-R INTERVAL: 170 MS
EKG Q-T INTERVAL: 398 MS
EKG QRS DURATION: 86 MS
EKG QTC CALCULATION (BAZETT): 447 MS
EKG R AXIS: 14 DEGREES
EKG T AXIS: 11 DEGREES
EKG VENTRICULAR RATE: 76 BPM
F5 P.R506Q BLD/T QL: NEGATIVE
MTHFR C.1298A>C GENO BLD/T: NEGATIVE
MTHFR C.677C>T GENO BLD/T: NORMAL
MTHFR GENE MUT ANL BLD/T: NORMAL
SPECIMEN SOURCE: NORMAL
SPECIMEN SOURCE: NORMAL

## 2025-03-21 PROCEDURE — 99024 POSTOP FOLLOW-UP VISIT: CPT | Performed by: NURSE PRACTITIONER

## 2025-03-21 PROCEDURE — 6370000000 HC RX 637 (ALT 250 FOR IP): Performed by: INTERNAL MEDICINE

## 2025-03-21 PROCEDURE — 93005 ELECTROCARDIOGRAM TRACING: CPT | Performed by: INTERNAL MEDICINE

## 2025-03-21 PROCEDURE — 97116 GAIT TRAINING THERAPY: CPT

## 2025-03-21 PROCEDURE — 6360000002 HC RX W HCPCS: Performed by: NURSE PRACTITIONER

## 2025-03-21 PROCEDURE — 97110 THERAPEUTIC EXERCISES: CPT

## 2025-03-21 PROCEDURE — 97535 SELF CARE MNGMENT TRAINING: CPT

## 2025-03-21 PROCEDURE — 97530 THERAPEUTIC ACTIVITIES: CPT

## 2025-03-21 PROCEDURE — APPNB45 APP NON BILLABLE 31-45 MINUTES: Performed by: NURSE PRACTITIONER

## 2025-03-21 PROCEDURE — 6370000000 HC RX 637 (ALT 250 FOR IP): Performed by: NEUROLOGICAL SURGERY

## 2025-03-21 PROCEDURE — 6370000000 HC RX 637 (ALT 250 FOR IP): Performed by: NURSE PRACTITIONER

## 2025-03-21 PROCEDURE — 93010 ELECTROCARDIOGRAM REPORT: CPT | Performed by: INTERNAL MEDICINE

## 2025-03-21 RX ORDER — LOSARTAN POTASSIUM 25 MG/1
25 TABLET ORAL 2 TIMES DAILY
Qty: 90 TABLET | Refills: 1 | Status: SHIPPED | OUTPATIENT
Start: 2025-03-21

## 2025-03-21 RX ORDER — AMLODIPINE BESYLATE 5 MG/1
5 TABLET ORAL DAILY
Qty: 30 TABLET | Refills: 3 | Status: SHIPPED | OUTPATIENT
Start: 2025-03-22

## 2025-03-21 RX ORDER — HYDROXYZINE HYDROCHLORIDE 10 MG/1
10 TABLET, FILM COATED ORAL 3 TIMES DAILY PRN
Qty: 30 TABLET | Refills: 0 | Status: SHIPPED | OUTPATIENT
Start: 2025-03-21 | End: 2025-03-31

## 2025-03-21 RX ORDER — PREDNISONE 5 MG/1
TABLET ORAL
Qty: 12 TABLET | Refills: 0 | Status: SHIPPED | OUTPATIENT
Start: 2025-03-22 | End: 2025-03-31

## 2025-03-21 RX ORDER — CETIRIZINE HYDROCHLORIDE 10 MG/1
10 TABLET ORAL DAILY
COMMUNITY
Start: 2025-03-22

## 2025-03-21 RX ADMIN — ASPIRIN 81 MG: 81 TABLET, CHEWABLE ORAL at 08:53

## 2025-03-21 RX ADMIN — POLYETHYLENE GLYCOL 3350 17 G: 17 POWDER, FOR SOLUTION ORAL at 08:50

## 2025-03-21 RX ADMIN — CETIRIZINE HYDROCHLORIDE 10 MG: 10 TABLET, FILM COATED ORAL at 08:52

## 2025-03-21 RX ADMIN — HYDROXYZINE HYDROCHLORIDE 10 MG: 10 TABLET ORAL at 08:51

## 2025-03-21 RX ADMIN — SALINE NASAL SPRAY 1 SPRAY: 1.5 SOLUTION NASAL at 08:52

## 2025-03-21 RX ADMIN — PREDNISONE 20 MG: 20 TABLET ORAL at 08:51

## 2025-03-21 RX ADMIN — TRIAMCINOLONE ACETONIDE: 1 OINTMENT TOPICAL at 08:54

## 2025-03-21 RX ADMIN — MICONAZOLE NITRATE: 20 CREAM TOPICAL at 08:54

## 2025-03-21 RX ADMIN — CLOBETASOL PROPIONATE CREAM USP, 0.05%: 0.5 CREAM TOPICAL at 08:54

## 2025-03-21 RX ADMIN — LOSARTAN POTASSIUM 25 MG: 25 TABLET, FILM COATED ORAL at 08:52

## 2025-03-21 RX ADMIN — AMLODIPINE BESYLATE 5 MG: 5 TABLET ORAL at 08:52

## 2025-03-21 RX ADMIN — ENOXAPARIN SODIUM 30 MG: 100 INJECTION SUBCUTANEOUS at 08:53

## 2025-03-21 NOTE — PLAN OF CARE
Problem: Chronic Conditions and Co-morbidities  Goal: Patient's chronic conditions and co-morbidity symptoms are monitored and maintained or improved  3/20/2025 2139 by Zully Zamudio RN  Outcome: Progressing     Problem: Discharge Planning  Goal: Discharge to home or other facility with appropriate resources  3/20/2025 2139 by Zully Zamudio RN  Outcome: Progressing     Problem: Safety - Adult  Goal: Free from fall injury  3/20/2025 2139 by Zully Zamudio RN  Outcome: Progressing     Problem: Neurosensory - Adult  Goal: Achieves stable or improved neurological status  3/20/2025 2139 by Zully Zamudio RN  Outcome: Progressing     Problem: Neurosensory - Adult  Goal: Achieves maximal functionality and self care  3/20/2025 2139 by Zully Zamudio RN  Outcome: Progressing     Problem: Skin/Tissue Integrity - Adult  Goal: Skin integrity remains intact  Description: 1.  Monitor for areas of redness and/or skin breakdown  2.  Assess vascular access sites hourly  3.  Every 4-6 hours minimum:  Change oxygen saturation probe site  4.  Every 4-6 hours:  If on nasal continuous positive airway pressure, respiratory therapy assess nares and determine need for appliance change or resting period  3/20/2025 2139 by Zully Zamudio RN  Outcome: Progressing     Problem: Infection - Adult  Goal: Absence of infection at discharge  3/20/2025 2139 by Zully Zamudio RN  Outcome: Progressing     Problem: Infection - Adult  Goal: Absence of infection during hospitalization  3/20/2025 2139 by Zully Zamudio RN  Outcome: Progressing     Problem: Skin/Tissue Integrity  Goal: Skin integrity remains intact  Description: 1.  Monitor for areas of redness and/or skin breakdown  2.  Assess vascular access sites hourly  3.  Every 4-6 hours minimum:  Change oxygen saturation probe site  4.  Every 4-6 hours:  If on nasal continuous positive airway pressure, respiratory therapy assess nares and determine need for appliance change  or resting period  3/20/2025 2139 by Zully Zamudio, RN  Outcome: Progressing     Problem: Pain  Goal: Verbalizes/displays adequate comfort level or baseline comfort level  3/20/2025 2139 by Zully Zamudio RN  Outcome: Progressing     Problem: ABCDS Injury Assessment  Goal: Absence of physical injury  3/20/2025 2139 by Zully Zamudio, RN  Outcome: Progressing

## 2025-03-21 NOTE — CARE COORDINATION
Case Management Assessment            Discharge Note                    Date / Time of Note: 3/21/2025 2:04 PM                  Discharge Note Completed by: Augusta Youngblood RN    Patient Name: Terri Shankar   YOB: 1960  Diagnosis: Cerebral aneurysm, nonruptured [I67.1]  Stroke, acute, thrombotic (HCC) [I63.9]  Stroke due to embolism of left middle cerebral artery (HCC) [I63.412]   Date / Time: 3/14/2025  9:04 AM    Current PCP: Marcia Abdi APRN - CNP  Clinic patient: No    Hospitalization in the last 30 days: No       Advance Directives:  Code Status: Full Code  [place] DNR form completed and on chart: Not Indicated    Financial:  Payor: [facility] MEDICARE / Plan: UNITEDHE[employer]CARE DUAL COMPLETE / Product Type: *No Product type* /      Pharmacy:    [facility] [id number] [place], [place] - [address] [phone] - F [phone] [address] [facility] [facility] [address] [address]  Phone: [phone] Fax: [phone]      Assistance purchasing medications?: Potential Assistance Purchasing Medications: No  Assistance provided by Case Management: None at this time    Does patient want to participate in local refill/ meds to beds program?: Yes    Meds To Beds General Rules:  1. Can ONLY be done Monday- Friday between 8:30am-5pm  2. Prescription(s) must be in pharmacy by 3pm to be filled same day  3.Copy of patient's insurance/ prescription drug card and patient face sheet must be sent along with the prescription(s)  4. Cost of Rx cannot be added to hospital bill. If financial assistance is needed, please contact unit  or ;  or  CANNOT provide pharmacy voucher for patients co-pays  5. Patients can then  the prescription on their way out of the hospital at discharge, or pharmacy can deliver to the bedside if staff is available. (payment due at time of pick-up or delivery - cash, check, or card accepted)     Able to afford home medications/   Patient will need a lyft home.    COVID Result:    Lab Results   Component Value Date/Time    COVID19 NOT DETECTED 11/11/2024 09:02 PM       The Plan for Transition of Care is related to the following treatment goals of Cerebral aneurysm, nonruptured [I67.1]  Stroke, acute, thrombotic (HCC) [I63.9]  Stroke due to embolism of left middle cerebral artery (HCC) [I63.412]    The Patient and/or patient representative Terri and her family were provided with a choice of provider and agrees with the discharge plan Yes    Freedom of choice list was provided with basic dialogue that supports the patient's individualized plan of care/goals and shares the quality data associated with the providers. Yes    Care Transitions patient: Yes    Augusta Youngblood RN  [facility]  Case Management Department  Ph: [phone]  Fax: [phone]

## 2025-03-21 NOTE — PROGRESS NOTES
Physical Therapy  Daily Treatment Note    Discharge Recommendation:  Inpatient Rehab  Other: If pt goes home, will need 24 hour supervision/assist and Home PT  Equipment Needs:  Rolling walker if D/Reece home    Assessment:  Pt with good endurance for activity. Pt reports significant improvement with R UE/LE strength and is planning to go home. Demonstrates decreased insight, decreased safety awareness, decreased balance and impulsivity. Frequently needs cues for safety and often needs to be redirected to task on hand. Despite higher AM-PAC scores, pt would benefit from continued IP PT at D/C to maximize function and independence. If pt goes home, will need 24 hour supervision/assist for safety, use of rolling walker for ambulation and continued home PT.     At baseline this patient was Independent with functional mobility & ADL's. The current hospitalization has resulted in the patient now being limited with all aspects of mobility, negatively impacting the patient's functional independence, ability to safely access their home environment, and ability to complete valued daily tasks and life roles. Terri Shankar is motivated for recovery and demonstrates the ability to tolerate inpatient rehabilitation 3 hours/day, 5 days/week for optimal return to functional independence, quality of life, and decreased caregiver burden.    HOME HEALTH CARE: LEVEL 3 SAFETY (If D/Reece home)  - Initial home health evaluation to occur within 24-48 hours, in patient home   - Therapy evaluations in home within 24-48 hours of discharge; including DME and home safety   - Frontload therapy 5 days, then 3x a week   - Therapy to evaluate if patient has Home Health Aide needs for personal care   -  evaluation within 24-48 hours, includes evaluation of resources and insurance to determine [place], [place], LTC, and Medicaid options     Chart Reviewed: Yes     Other Position/Activity Restrictions: no activity restrictions noted   Additional

## 2025-03-21 NOTE — PROGRESS NOTES
Patient is alert and orient X4, VSS, patient is walkig with gait belt walker,patient worked with PT/OT this morning, patient is up to the chair, this RN did remove all LDA's patient had heart monitor placed by cardio this morning, patient has all belongings and a RN will  her medications from the pharmacy, patient will have all standard fall precautions in place, until time of discharge.  Call light and tray table are in reach.

## 2025-03-21 NOTE — PLAN OF CARE
Problem: Chronic Conditions and Co-morbidities  Goal: Patient's chronic conditions and co-morbidity symptoms are monitored and maintained or improved  Outcome: Progressing     Problem: Discharge Planning  Goal: Discharge to home or other facility with appropriate resources  Outcome: Progressing     Problem: Safety - Adult  Goal: Free from fall injury  Outcome: Progressing     Problem: Neurosensory - Adult  Goal: Achieves stable or improved neurological status  Outcome: Progressing  Flowsheets (Taken 3/21/2025 0800)  Achieves stable or improved neurological status: Assess for and report changes in neurological status

## 2025-03-21 NOTE — DISCHARGE INSTRUCTIONS
STROKE FOLLOW UP CLINIC     You will be seen by Neurology Nurse Practitioner and Medical Resident during this visit.      Clinic address:   [address]  Phone number for scheduling appt:   [phone]  Hours:   8:00 a.m. to 4:30 p.m.  Monday - Friday          My Clinic appointment is:      Date:_4/15/25____ Time:_10:00 AM____        Please notify the Clinic if you need to cancel your appointment.      30 DAY OUTPATIENT MONITOR      Patient instructions for CAM monitor.  You will need to wear a monitor for a total of 4 weeks. Remove & return the monitor on 04/04/2025 .  You have a scheduled appointment at 1030.  They will place another monitor on you that you will need to wear for an additional 2 weeks.  Remove this monitor on 04/18/2025* and return to office.    Return date: First monitor:    04/04/2025 1030                        Second monitor: 04/18/2025    Return to:         [address]       [place] [address]       [phone]         Make sure to save the box provided as you will return your monitor in this.  After removing monitor stick it to the templete in the box and place both it and the log/diary in the box          If your monitor comes off but has been in place at least 7 days or more you can return to office.                Avoid excessive sweating to maximize wear time.         You are able to shower after 24 hours, however have the majority of water hitting your back and not directly on the monitor.  Do not submerge in bath.         Remember if experience any symptoms while wearing monitor push the button and record in booklet.  _______________________________    Secondary Stroke Prevention Goals:      Blood Pressure:  Goal - BP < 140/90  Take your medication as prescribed.   Take your blood pressure at home regularly (at least once a day for the first few weeks). Write the numbers down so your primary care provider can  adjust medications as needed.      Cholesterol:   Continue to take your cholesterol medication to help prevent a stroke.     Smoking Cessation   If you are a current smoker the goal is completely quitting.      Diabetes Mellitus:   Goal - HbA1c < 7%  Visit this web page for more information on managing diabetes:   https://www.heart.org/en/health-topics/diabetes     Alcohol Consumption:  Men - two or fewer drinks per day  Women - one or fewer drinks per day     Physical Activity:  Goal - at least 30 minutes of moderate intensity physical exercise 1-3 times per week  Okay to start at any level and work your way up to goal. Walking even 5-10 minutes a day is better than no walking. Starting with small goals and working your way up is the best way to be successful.   Visit this web page for more information on moving more and living healthy:   https://www.heart.org/en/healthy-living     Diet:  Low-fat, low-sodium, and Mediterranean or Dietary Approaches to Stop Hypertension (DASH) or Diabetic Diet when applicable.   Visit this web page for more information on ways to improve your diet:   https://www.heart.org/en/healthy-living/healthy-eating/eat-smart/nutrition-basics     Obesity:  Goal BMI 18.5-25 kg/m2    [facility] Stroke Program Survey  The [facility] [place] values your feedback related to your recent hospital visit and admission. We strive to improve our Neuroscience program to promote better outcomes and recoveries for all our patients.  The anonymous survey below consists of a few questions that are related to your stay and around your Stroke diagnosis, treatment, and recovery. It is anonymous and has only a few questions.  The estimated length of time needed to complete this survey is 3 minutes or less. Thank you for completing this survey!

## 2025-03-21 NOTE — PROGRESS NOTES
Current NIHSS 6    Nursing Core Measures for Stroke:   [x]   Education template documentation (STROKE/TIA). Select only risk factors that are applicable to patient when selecting risk factors.  [x]   Care Plan template documentation (Physiologic Instability - Neurosensory). Selecting this will add care plan rows to the flowsheet under the Neuro section of Head to Toe.  [x]   Verified Swallow Screen completed prior to PO intake of food, drink, medications.          Please verify correct medication route prior to administration for intubated patients, patients who can not swallow or have alternative routes of intake (NG, OG, [place]), etc  [x]   VTE Prophylaxis: SCDs ordered/addressed; SCDs: N/A Lovenox           (As a reminder, ASA, Plavix, and TPA/TNK are not VTE prophylaxis.)    Reviewed the Following Education with Patient and/or Family:   - Personalized risk factors for patient, along with changes, modifications that will help prevent stroke.  - Signs and Symptoms of Stroke: (Facial droop, weakness/numbness especially on one side, speech difficulty, sudden confusion, sudden loss of vision, sudden severe headache, sudden loss of balance or having difficulty walking, syncope, or seizure)  - How to activate EMS (911)   - Importance of Follow Up Appointments at Discharge   - Importance of Compliance with Medications Prescribed at Discharge  - Available community resources and stroke advocacy groups if needed    Patient and/or family member: verbalized understanding.     Stroke Education booklet given to patient/family (or verified, if given already), which reviews above information. N/A         Electronically signed by Zully Zamudio RN on 3/20/2025 at 9:43 PM

## 2025-03-21 NOTE — TELEPHONE ENCOUNTER
Pt called office demanding removal of the cam she was wearing. I tried to explain to her I can't come to her home but id walk her through it. This did not work as I explained to rub the wipe over edge and It will start to work its way off.     Pt was very upset.     After some back and forth and I attempted to transfer to practice manager. Pt hung up. No answer for call back

## 2025-03-21 NOTE — DISCHARGE SUMMARY
Discharge Summary    Date of Admission: 3/14/2025  9:04 AM  Date of Discharge: 3/21/2025  Admission Diagnosis: Stroke, acute, thrombotic (HCC)  Discharge Diagnosis: Same   Condition on Discharge: good  Attending for Admission: Rob Norris MD  Procedures: Procedure(s) (LRB):  ANGIOGRAM CEREBRAL (09311) (N/A)  Consults: IP CONSULT TO CRITICAL CARE  IP CONSULT TO NEUROCRITICAL CARE  IP CONSULT TO SOCIAL WORK  IP CONSULT TO VASCULAR SURGERY  IP CONSULT TO UROLOGY  IP CONSULT TO HOSPITALIST  IP CONSULT TO PHYSICAL MEDICINE REHAB  IP CONSULT TO HOME CARE NEEDS    Reason for Admission:  Terri Shankar is a 64 y.o. 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 She underwent the procedure listed above on 3/14/2025.     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 Patient then underwent an intraarterial thrombolysis of the transient occlusion of the left middle cerebral artery inferior M2 division with complete, TICI 3 reperfusion.     After procedure, patient noted to have bleeding, ecchymosis, and swelling at groin site. Pressure held with resolution of drainage. CTA demonstrating right inguinal hematoma associated with distal right external iliac artery and R CFA with evidence of small 8 mm distal R CFA pseudoaneurysm. Vascular surgery was consulted to evaluate. Vascular US Duplex Pseudoaneurysm Check was done 2 days later, and no evidence of right groin femoral pseudoaneurysm or arteriovenous fistula.    MRI Brain showed numerous bilateral small acute infarcts suggesting central embolic phenomenon. Infarcts are most numerous in the left MCA distribution. Involvement of the lorraine and of the bilateral thalami as described. Largest region of  3/21/2025 2:12 PM  [phone]

## 2025-03-21 NOTE — PROGRESS NOTES
Pt is A&O X4. VS taken and recorded. Spo2 maintained at RA. Pt denies pain. Is on oral feed and tolerating well. Pt voiding via BRP X1 SBA with GB and walker. No BM this shift. All fall precautions in place, call light within reach, free from fall injury. Plan of care ongoing.

## 2025-03-21 NOTE — NURSE NAVIGATOR
2 week CAM monitor #  [id number]- applied per order Sammie NAVARRO  .  Instructions given and questions answered.  Bedside nurse aware.    Follow up appt made

## 2025-03-21 NOTE — PROGRESS NOTES
Occupational Therapy  Occupational Therapy  Daily Treatment Note  Patient Name: Terri Shankar  MRN: [id number]    Chart Reviewed: Yes       Other Position/Activity Restrictions: no activity restrictions noted     Additional Pertinent Hx: Pt is a 64 y.o. female adm 3/14 cerebral aneurysm.   Pt underwent conventional angiogram for evaluation of right MCA aneurysm, developed acute left MCA syndrome during procedure, found to have left M1 occlusion. She received IA tPA with TICI3 reperfusion.  Head CT: neg.   CTA abd: Right inguinal hematoma closely associated with distal right external iliac artery and right common femoral artery with evidence of small 8 mm distal right common femoral artery pseudoaneurysm.   MRI Brain: Numerous bilateral small acute infarcts suggesting central embolic phenomenon. Infarcts are most numerous in the left MCA distribution. Involvement of the lorraine and of the bilateral thalami as described. Largest region of infarct in the posterior left temporal region.   PMH:Anxiety, Bipolar 1 disorder (HCC), Depression, Dyspareunia in female, Elevated transaminase level, Hemorrhoids, History of tubal ligation, HLD (hyperlipidemia), Hypertension, Irritable bowel syndrome, Lacunar stroke (HCC), Menopause, Obesity, Obesity (BMI 30-39.9), Overactive bladder, Prurigo nodularis, Stress incontinence, TIA (transient ischemic attack), Urinary incontinence, Uterine fibroid, and Yeast vaginitis.        Diagnosis: Cerebral aneurysm  Treatment Diagnosis: impaired ADLs and functional mobility/transfers    Subjective:     Pain:     Social/Functional History  Lives With: Spouse, Son  Type of Home: House  Home Layout: Multi-level, Laundry in basement, Able to Live on Main level with bedroom/bathroom (sleeps on couch in living room)  Home Access: Ramped entrance  Bathroom Shower/Tub: Tub/Shower unit  Bathroom Toilet: Standard  Bathroom Equipment: Grab bars in shower, Shower chair  Home Equipment: Cane, Walker - Rolling,

## 2025-03-21 NOTE — PROGRESS NOTES
Pt called RN around 0643 and stated that she is having sharp chest pain. VS stable. Pt kept on tele which showed normal HR and rhythm 84, NSR. Informed to Hospitalist Zion Zapien.

## 2025-03-21 NOTE — NURSE NAVIGATOR
Hospital follow up appointment scheduled:      Stroke Follow Up Clinic with Neurology Nurse Practitioner and Medical Resident      Appointment Date / Time: 4/15/2025 at 1000 AM      Appointment information and directions to clinic added to patients AVS.     Nurse eSignature: Electronically signed by Beatriz Bishop RN on 3/21/25 at 1:53 PM EDT - Neuroscience Navigator

## 2025-03-24 ENCOUNTER — CARE COORDINATION (OUTPATIENT)
Dept: CASE MANAGEMENT | Age: 65
End: 2025-03-24

## 2025-03-24 DIAGNOSIS — I67.1 CEREBRAL ANEURYSM: Primary | ICD-10-CM

## 2025-03-24 NOTE — CARE COORDINATION
Care Transitions Note    Initial Call - Call within 2 business days of discharge: Yes    Patient Current Location:  Home: [address]  [place] [facility] [address]    Care Transition Nurse contacted the patient by telephone to perform post hospital discharge assessment, verified name and  as identifiers.  Provided introduction to self, and explanation of the Care Transition Nurse role.    Patient: Terri Shankar    Patient : 1960   MRN: [id number]     Reason for Admission: CVA, Cerebral Angiogram  Discharge Date: 3/21/25    RURS: Readmission Risk Score: 15.9      Last Discharge Facility       Date Complaint Diagnosis Description Type Department Provider    3/14/25  Cerebrovascular accident (CVA) due to embolism of left middle cerebral artery (HCC) ... Admission (Discharged) [facility] Norris, Rob Garrett MD            Was this an external facility discharge? No    Additional needs identified to be addressed with provider   High priority: FYI- Patient states she is unable to wear CAM Monitor, due to irritation on skin. States she will send back to Cardiologist office.  Declined needing assistance to schedule HFU with your office, states she will call herself to scheduled, leaving on 2025, to move out of town for about a month or 2.             Method of communication with provider: chart routing.    Patients top risk factors for readmission: functional physical ability, ineffective coping, and medical condition-CVA, Cerebral Angiogram    Interventions to address risk factors:   Education: patient instructed to continue to monitor for any worsening pain, any headaches, blurred vision, difficulty with swallowing or speech, reporting to MD immediately.  Review of patient management of conditions/medications: make sure to rest as needed, taking all medications as prescribed, follow up with MD's as prescribed, continue to monitor BP's and HR closely.  Home Health: [facility], confirmed referral was received.

## 2025-03-26 ENCOUNTER — TELEPHONE (OUTPATIENT)
Dept: FAMILY MEDICINE CLINIC | Age: 65
End: 2025-03-26

## 2025-03-26 NOTE — TELEPHONE ENCOUNTER
Patient contacted  the office req a refill on  Semaglutide-Weight Management (WEGOVY) 0.25 MG/0.5ML SOAJ SC injection   Last visit 1/24/25  No future  Maddy blevins

## 2025-03-26 NOTE — TELEPHONE ENCOUNTER
Contacted pt to verify if she started this medication. Pt states that she never started medication but would like to start it. Pt was wondering if this medication would be safe for her to take since she recently had a stroke. Pt was scheduled for a hospital f/u to discuss concerns.

## 2025-03-27 ENCOUNTER — TELEPHONE (OUTPATIENT)
Dept: FAMILY MEDICINE CLINIC | Age: 65
End: 2025-03-27

## 2025-03-27 NOTE — TELEPHONE ENCOUNTER
Pt called in this morning @ 7:23 am stating that she has a appointment tomorrow for a HFU but would like and would like to be seen today by EG or anyone. I did inform her that there was nothing available  for today. Pt then proceed to ask  rudely if TL, or any NP had something I informed her that there was nothing at all open. Pt then told some one else  to \"get on the phone Im not arguing with this Bitch!\" The other lady got on the phone I explained that there was nothing open and I would not be talk to like that I was very calm and polite. Pt was still going off in the background I told them that I was ending the call.

## 2025-03-28 ENCOUNTER — TELEPHONE (OUTPATIENT)
Dept: FAMILY MEDICINE CLINIC | Age: 65
End: 2025-03-28

## 2025-03-28 NOTE — TELEPHONE ENCOUNTER
ECC called, stated pt called and stated that she would like to let EG know that she will no longer be seeing her as a pt. And that she will be looking for another provider. I asked if pt gave a reason, ECC stated that pt was in the office this morning and people were mean to her.    Pt came in over an hour early for her appt and wanted to be seen. Pt was advised that she was early and that EG has a full schedule. Pt stated she has an appt at [facility] at 11:30. Pt waited a few minutes then came to the desk and stated she wasn't feeing well and needed to be seen because of her stroke but is leaving. She was asked if she was leaving because of her appt or if she just wanted to be seen earlier. She said she just wants EG to hurry up. Pt was advised that we have a full schedule and there is no way to make anything go any faster. Pt said she was leaving because her sister I her ride and she is tired of waiting. Pt rescheduled for Monday.

## 2025-03-28 NOTE — TELEPHONE ENCOUNTER
Pt sister called in and wanted to schedule Pt for sometime in May for a follow up appt. EG was already notified about Pt stating she was going to find a new pcp. April was informed about EG being notified. April then proceeded to state the Pt doesn't know what she is talking about and or doing because of her strokes. I informed April that a message would be put back to see if EG will still see Pt or not. April was real persistent on the Pt still seeing EG. Please advise. April would like for a call back.

## 2025-03-31 NOTE — TELEPHONE ENCOUNTER
She can schedule however I will not tolerate anymore mistreating of the staff. If she is belligerent to the staff again she will need to seek care elsewhere.

## 2025-04-01 ENCOUNTER — TELEMEDICINE (OUTPATIENT)
Dept: FAMILY MEDICINE CLINIC | Age: 65
End: 2025-04-01
Payer: COMMERCIAL

## 2025-04-01 DIAGNOSIS — R53.1 RIGHT SIDED WEAKNESS: ICD-10-CM

## 2025-04-01 DIAGNOSIS — Z09 HOSPITAL DISCHARGE FOLLOW-UP: Primary | ICD-10-CM

## 2025-04-01 DIAGNOSIS — I63.9 STROKE, ACUTE, THROMBOTIC (HCC): ICD-10-CM

## 2025-04-01 PROCEDURE — 1111F DSCHRG MED/CURRENT MED MERGE: CPT | Performed by: NURSE PRACTITIONER

## 2025-04-01 PROCEDURE — 98966 PH1 ASSMT&MGMT NQHP 5-10: CPT | Performed by: NURSE PRACTITIONER

## 2025-04-01 RX ORDER — PREDNISONE 10 MG/1
10 TABLET ORAL DAILY
COMMUNITY
Start: 2025-03-09

## 2025-04-01 RX ORDER — HYDROXYZINE HYDROCHLORIDE 10 MG/1
10 TABLET, FILM COATED ORAL 3 TIMES DAILY PRN
Qty: 30 TABLET | Refills: 0 | Status: SHIPPED | OUTPATIENT
Start: 2025-04-01 | End: 2025-04-11

## 2025-04-01 RX ORDER — ARIPIPRAZOLE 20 MG/1
20 TABLET ORAL NIGHTLY
Qty: 90 TABLET | Refills: 1 | Status: SHIPPED | OUTPATIENT
Start: 2025-04-01

## 2025-04-01 NOTE — PROGRESS NOTES
Terri Shankar is a 64 y.o. female evaluated via telephone on 4/1/2025 for Follow-Up from Hospital (Requesting prednisone- referral for PT)  Documentation:  I communicated with the patient and/or health care decision maker about hospital follow up.   Details of this discussion including any medical advice provided:     Hospital follow up- confirmed stroke. It affected her speech and the right side of her body.  Patient states that they offered to do inpatient therapy but patient opted to go home and have outpatient therapy.  She states she is still having trouble with her right hand and is unable to write with the right hand.  She feels weakness on her right arm and leg.  She states she is currently wearing a heart monitor and will be following up with cardiology to see if this is possibly heart rhythm irregularity causing the blood clots to the brain.  She is currently taking medications as prescribed from the hospital.    She was asking for prednisone for her ongoing rash that she follows with dermatology for this and it was recommended that she continue to follow-up with dermatology for that prescription.    Terri was seen today for follow-up from hospital.    Diagnoses and all orders for this visit:    Hospital discharge follow-up  -     SD DISCHARGE MEDS RECONCILED W/ CURRENT OUTPATIENT MED LIST    Stroke, acute, thrombotic (HCC)  Referral given for speech therapy and physical therapy.  She will continue with current medications and follow-up with neurology and cardiology.  -     Sayra Ramos, SLP - Speech and Language Pathology -[facility]    Right sided weakness  Referral given for physical therapy  -     Sayra Ramos, SLP - Speech and Language Pathology -[facility]           Total Time: minutes: 5-10 minutes    Terri Shankar was evaluated through a synchronous (real-time) audio

## 2025-04-04 ENCOUNTER — TELEPHONE (OUTPATIENT)
Dept: CARDIOLOGY CLINIC | Age: 65
End: 2025-04-04

## 2025-04-04 NOTE — TELEPHONE ENCOUNTER
Pt came in at 1:40 today for her 3:00 CAM monitor placement  appointment. Pt wanted to be seen earlier even offered money to be moved ahead of other patients. Pt was told that we would get her in as soon as we could but we had to see our other pt's 1st that had arrived on time and had been waiting to be seen. At  2:18 I went out to call pt, but pt had already left she stated to our front office staff she had to leave to get to her son and that she would put back on an old monitor.

## 2025-04-07 ENCOUNTER — TELEPHONE (OUTPATIENT)
Dept: FAMILY MEDICINE CLINIC | Age: 65
End: 2025-04-07

## 2025-04-07 ENCOUNTER — TELEPHONE (OUTPATIENT)
Dept: CARDIOLOGY CLINIC | Age: 65
End: 2025-04-07

## 2025-04-07 NOTE — TELEPHONE ENCOUNTER
Pt states that a referral was sent to [facility] from [facility]. Contacted [facility] and they do not have a referral for pt.  Appt scheduled with PCP

## 2025-04-07 NOTE — TELEPHONE ENCOUNTER
Patient called over the weekend and left a message requesting to have second monitor mailed to her due to time constraints of being able to come into actual appointment.  She states she will mail back the 1st monitor.  We will process monitor to be mailed to patient at her request.

## 2025-04-07 NOTE — TELEPHONE ENCOUNTER
Due to insurance she will need another appointment. We discussed therapy but not the needs to home therapy at her last appointment

## 2025-04-07 NOTE — TELEPHONE ENCOUNTER
----- Message from Anthony GARY sent at 4/7/2025  8:48 AM EDT -----  Regarding: ECC Referral Request  ECC Referral Request    Reason for referral request: Specialty Provider    Specialist/Lab/Test patient is requesting (if known): Physical and Speak Therapist     Specialist Phone Number (if applicable):  [phone] - [facility]    Additional Information: Patient wanted to have the therapy in her home.   --------------------------------------------------------------------------------------------------------------------------    Relationship to Patient: Self     Call Back Information: OK to leave message on voicemail  Preferred Call Back Number: [phone]

## 2025-04-07 NOTE — TELEPHONE ENCOUNTER
Air removed fromVasc band in  2 mL increments until all air removed. No bleeding or hematoma noted.  Pressure dressing  applied, radial pulse palpable.     Patient up to bathroom.  Upon returning, she stated that she felt very tired and short of breath.      Will continue to monitor closely.   Patient has now been registered in [place] and revised cupid encounter.

## 2025-04-08 DIAGNOSIS — J06.9 UPPER RESPIRATORY TRACT INFECTION, UNSPECIFIED TYPE: ICD-10-CM

## 2025-04-08 DIAGNOSIS — B37.2 YEAST DERMATITIS: ICD-10-CM

## 2025-04-08 RX ORDER — AMOXICILLIN 250 MG/5ML
POWDER, FOR SUSPENSION ORAL
Qty: 150 ML | Refills: 0 | OUTPATIENT
Start: 2025-04-08 | End: 2025-04-18

## 2025-04-08 RX ORDER — NYSTATIN 100000 U/G
CREAM TOPICAL
Qty: 30 G | Refills: 0 | Status: SHIPPED | OUTPATIENT
Start: 2025-04-08 | End: 2025-04-09

## 2025-04-08 NOTE — TELEPHONE ENCOUNTER
Refill sent in of nystatin but the amoxicillin was not refilled. She has an allergy listed to augmentin and I will not prescribe the antibiotic with out an appointment. She can try the nystatin cream first and if no improvement then come in for an appointment

## 2025-04-08 NOTE — TELEPHONE ENCOUNTER
Pt requesting refills for her breast?   Last Office Visit  -  4/1/2025  Next Office Visit  -  4/14/2025    Last Filled  -  10/15/2024  Last UDS -    Contract -

## 2025-04-09 ENCOUNTER — HOSPITAL ENCOUNTER (EMERGENCY)
Age: 65
Discharge: HOME | End: 2025-04-09
Attending: EMERGENCY MEDICINE
Payer: MEDICARE

## 2025-04-09 ENCOUNTER — TELEPHONE (OUTPATIENT)
Dept: FAMILY MEDICINE CLINIC | Age: 65
End: 2025-04-09

## 2025-04-09 ENCOUNTER — APPOINTMENT (OUTPATIENT)
Dept: GENERAL RADIOLOGY | Age: 65
End: 2025-04-09
Payer: MEDICARE

## 2025-04-09 VITALS
BODY MASS INDEX: 36.88 KG/M2 | WEIGHT: 216 LBS | RESPIRATION RATE: 16 BRPM | DIASTOLIC BLOOD PRESSURE: 76 MMHG | HEIGHT: 64 IN | OXYGEN SATURATION: 94 % | TEMPERATURE: 97.7 F | SYSTOLIC BLOOD PRESSURE: 133 MMHG | HEART RATE: 74 BPM

## 2025-04-09 DIAGNOSIS — R07.9 CHEST PAIN, UNSPECIFIED TYPE: Primary | ICD-10-CM

## 2025-04-09 DIAGNOSIS — B37.2 YEAST DERMATITIS: ICD-10-CM

## 2025-04-09 LAB
ANION GAP SERPL CALCULATED.3IONS-SCNC: 12 MMOL/L (ref 3–16)
BASOPHILS # BLD: 0.1 K/UL (ref 0–0.2)
BASOPHILS NFR BLD: 1 %
BUN SERPL-MCNC: 20 MG/DL (ref 7–20)
CALCIUM SERPL-MCNC: 9.1 MG/DL (ref 8.3–10.6)
CHLORIDE SERPL-SCNC: 105 MMOL/L (ref 99–110)
CO2 SERPL-SCNC: 23 MMOL/L (ref 21–32)
CREAT SERPL-MCNC: 0.9 MG/DL (ref 0.6–1.2)
DEPRECATED RDW RBC AUTO: 14.5 % (ref 12.4–15.4)
EKG ATRIAL RATE: 88 BPM
EKG DIAGNOSIS: NORMAL
EKG P AXIS: -4 DEGREES
EKG P-R INTERVAL: 160 MS
EKG Q-T INTERVAL: 376 MS
EKG QRS DURATION: 82 MS
EKG QTC CALCULATION (BAZETT): 454 MS
EKG R AXIS: 28 DEGREES
EKG T AXIS: 18 DEGREES
EKG VENTRICULAR RATE: 88 BPM
EOSINOPHIL # BLD: 0.2 K/UL (ref 0–0.6)
EOSINOPHIL NFR BLD: 1.5 %
GFR SERPLBLD CREATININE-BSD FMLA CKD-EPI: 72 ML/MIN/{1.73_M2}
GLUCOSE SERPL-MCNC: 105 MG/DL (ref 70–99)
HCT VFR BLD AUTO: 36.9 % (ref 36–48)
HGB BLD-MCNC: 12.4 G/DL (ref 12–16)
LYMPHOCYTES # BLD: 2.4 K/UL (ref 1–5.1)
LYMPHOCYTES NFR BLD: 21.2 %
MCH RBC QN AUTO: 28.3 PG (ref 26–34)
MCHC RBC AUTO-ENTMCNC: 33.5 G/DL (ref 31–36)
MCV RBC AUTO: 84.5 FL (ref 80–100)
MONOCYTES # BLD: 0.8 K/UL (ref 0–1.3)
MONOCYTES NFR BLD: 7 %
NEUTROPHILS # BLD: 7.9 K/UL (ref 1.7–7.7)
NEUTROPHILS NFR BLD: 69.3 %
PLATELET # BLD AUTO: 229 K/UL (ref 135–450)
PMV BLD AUTO: 7.4 FL (ref 5–10.5)
POTASSIUM SERPL-SCNC: 3.7 MMOL/L (ref 3.5–5.1)
RBC # BLD AUTO: 4.37 M/UL (ref 4–5.2)
SODIUM SERPL-SCNC: 140 MMOL/L (ref 136–145)
TROPONIN, HIGH SENSITIVITY: 10 NG/L (ref 0–14)
TROPONIN, HIGH SENSITIVITY: 11 NG/L (ref 0–14)
WBC # BLD AUTO: 11.3 K/UL (ref 4–11)

## 2025-04-09 PROCEDURE — 96374 THER/PROPH/DIAG INJ IV PUSH: CPT

## 2025-04-09 PROCEDURE — 80048 BASIC METABOLIC PNL TOTAL CA: CPT

## 2025-04-09 PROCEDURE — 71045 X-RAY EXAM CHEST 1 VIEW: CPT

## 2025-04-09 PROCEDURE — 84484 ASSAY OF TROPONIN QUANT: CPT

## 2025-04-09 PROCEDURE — 6360000002 HC RX W HCPCS: Performed by: EMERGENCY MEDICINE

## 2025-04-09 PROCEDURE — 99285 EMERGENCY DEPT VISIT HI MDM: CPT

## 2025-04-09 PROCEDURE — 85025 COMPLETE CBC W/AUTO DIFF WBC: CPT

## 2025-04-09 PROCEDURE — 93005 ELECTROCARDIOGRAM TRACING: CPT | Performed by: EMERGENCY MEDICINE

## 2025-04-09 PROCEDURE — 6370000000 HC RX 637 (ALT 250 FOR IP): Performed by: EMERGENCY MEDICINE

## 2025-04-09 RX ORDER — HYDROXYZINE PAMOATE 25 MG/1
50 CAPSULE ORAL ONCE
Status: DISCONTINUED | OUTPATIENT
Start: 2025-04-09 | End: 2025-04-09

## 2025-04-09 RX ORDER — NYSTATIN 100000 U/G
CREAM TOPICAL
Qty: 30 G | Refills: 2 | Status: SHIPPED | OUTPATIENT
Start: 2025-04-09

## 2025-04-09 RX ORDER — KETOROLAC TROMETHAMINE 30 MG/ML
15 INJECTION, SOLUTION INTRAMUSCULAR; INTRAVENOUS ONCE
Status: COMPLETED | OUTPATIENT
Start: 2025-04-09 | End: 2025-04-09

## 2025-04-09 RX ORDER — HYDROXYZINE PAMOATE 25 MG/1
25 CAPSULE ORAL ONCE
Status: COMPLETED | OUTPATIENT
Start: 2025-04-09 | End: 2025-04-09

## 2025-04-09 RX ORDER — HYDROXYZINE PAMOATE 25 MG/1
100 CAPSULE ORAL ONCE
Status: DISCONTINUED | OUTPATIENT
Start: 2025-04-09 | End: 2025-04-09

## 2025-04-09 RX ADMIN — KETOROLAC TROMETHAMINE 15 MG: 30 INJECTION, SOLUTION INTRAMUSCULAR at 03:51

## 2025-04-09 RX ADMIN — HYDROXYZINE PAMOATE 25 MG: 25 CAPSULE ORAL at 04:27

## 2025-04-09 ASSESSMENT — PAIN DESCRIPTION - DESCRIPTORS: DESCRIPTORS: SHARP

## 2025-04-09 ASSESSMENT — PAIN - FUNCTIONAL ASSESSMENT
PAIN_FUNCTIONAL_ASSESSMENT: NONE - DENIES PAIN
PAIN_FUNCTIONAL_ASSESSMENT: 0-10

## 2025-04-09 ASSESSMENT — PAIN DESCRIPTION - LOCATION
LOCATION: BREAST;RIB CAGE
LOCATION: CHEST

## 2025-04-09 ASSESSMENT — PAIN DESCRIPTION - ORIENTATION
ORIENTATION: LEFT
ORIENTATION: LEFT;RIGHT

## 2025-04-09 ASSESSMENT — PAIN SCALES - GENERAL
PAINLEVEL_OUTOF10: 8
PAINLEVEL_OUTOF10: 9

## 2025-04-09 ASSESSMENT — ENCOUNTER SYMPTOMS
RESPIRATORY NEGATIVE: 1
EYES NEGATIVE: 1
GASTROINTESTINAL NEGATIVE: 1

## 2025-04-09 NOTE — TELEPHONE ENCOUNTER
Pt called, went to the hospital with head pain this morning (04/09/25) and they gave her Tramadol while there. She says it did help, and she requested they give her a script for it but they advised her to call EG for that. Please advise.     Please use [facility] on [address]     Pt is scheduled for VV with EG on 04/14/25

## 2025-04-09 NOTE — ED NOTES
Patient reviewed and discharged by MD. IV cannula removed, after visit summary given and instructed. Patient left in no signs of distress     Adam Moreno RN  04/09/25 [id number]

## 2025-04-09 NOTE — ED PROVIDER NOTES
conducted with a chaperone present.   Constitutional:       General: She is not in acute distress.  HENT:      Head: Normocephalic and atraumatic.      Mouth/Throat:      Mouth: Mucous membranes are moist.      Pharynx: No oropharyngeal exudate.   Eyes:      General: No scleral icterus.     Extraocular Movements: Extraocular movements intact.      Conjunctiva/sclera: Conjunctivae normal.      Pupils: Pupils are equal, round, and reactive to light.   Cardiovascular:      Rate and Rhythm: Normal rate and regular rhythm.      Heart sounds: Normal heart sounds.   Pulmonary:      Effort: Pulmonary effort is normal.      Breath sounds: Normal breath sounds. No wheezing, rhonchi or rales.      Comments: Tender to palpation to the left anterior chest wall with no crepitus or step-offs noted.  Chest:      Chest wall: Tenderness present.   Abdominal:      General: Bowel sounds are normal.      Palpations: Abdomen is soft.      Tenderness: There is no abdominal tenderness. There is no guarding or rebound.   Musculoskeletal:         General: No swelling. Normal range of motion.      Cervical back: Normal range of motion and neck supple.   Skin:     General: Skin is warm and dry.      Comments: Fungal rash noted under each breast with no evidence of secondary cellulitis.   Neurological:      General: No focal deficit present.      Mental Status: She is alert and oriented to person, place, and time.      Cranial Nerves: No cranial nerve deficit.      Motor: No weakness.      Coordination: Coordination normal.                    Jatin Bishop MD  04/09/25 [id number]

## 2025-04-09 NOTE — DISCHARGE INSTRUCTIONS
Your testing in the emergency department is reassuring.  Your chest pain is most likely due to the irritation of your skin under the breasts.  This appears to be due to a fungal infection of the skin.  Take nystatin twice daily until skin is healed.  Follow-up with your primary care provider

## 2025-04-10 NOTE — TELEPHONE ENCOUNTER
Pt notified and would like to discuss this at upcoming appt. Pt is wondering what she can take OTC in the meantime?

## 2025-04-10 NOTE — TELEPHONE ENCOUNTER
Pt notified   Xeljanz Counseling: I discussed with the patient the risks of Xeljanz therapy including increased risk of infection, liver issues, headache, diarrhea, or cold symptoms. Live vaccines should be avoided. They were instructed to call if they have any problems.

## 2025-04-14 ENCOUNTER — TELEMEDICINE (OUTPATIENT)
Dept: FAMILY MEDICINE CLINIC | Age: 65
End: 2025-04-14
Payer: MEDICARE

## 2025-04-14 ENCOUNTER — TELEPHONE (OUTPATIENT)
Dept: FAMILY MEDICINE CLINIC | Age: 65
End: 2025-04-14

## 2025-04-14 DIAGNOSIS — E66.01 CLASS 2 SEVERE OBESITY DUE TO EXCESS CALORIES WITH SERIOUS COMORBIDITY AND BODY MASS INDEX (BMI) OF 37.0 TO 37.9 IN ADULT: ICD-10-CM

## 2025-04-14 DIAGNOSIS — E66.812 CLASS 2 SEVERE OBESITY DUE TO EXCESS CALORIES WITH SERIOUS COMORBIDITY AND BODY MASS INDEX (BMI) OF 37.0 TO 37.9 IN ADULT: ICD-10-CM

## 2025-04-14 DIAGNOSIS — I63.9 STROKE, ACUTE, THROMBOTIC (HCC): ICD-10-CM

## 2025-04-14 DIAGNOSIS — I63.81 STROKE, LACUNAR (HCC): Primary | ICD-10-CM

## 2025-04-14 PROCEDURE — 1111F DSCHRG MED/CURRENT MED MERGE: CPT | Performed by: NURSE PRACTITIONER

## 2025-04-14 PROCEDURE — G8427 DOCREV CUR MEDS BY ELIG CLIN: HCPCS | Performed by: NURSE PRACTITIONER

## 2025-04-14 PROCEDURE — 99213 OFFICE O/P EST LOW 20 MIN: CPT | Performed by: NURSE PRACTITIONER

## 2025-04-14 PROCEDURE — 3017F COLORECTAL CA SCREEN DOC REV: CPT | Performed by: NURSE PRACTITIONER

## 2025-04-14 RX ORDER — TRIAMCINOLONE ACETONIDE 0.25 MG/G
OINTMENT TOPICAL
Qty: 80 G | Refills: 0 | Status: SHIPPED | OUTPATIENT
Start: 2025-04-14

## 2025-04-14 RX ORDER — MELATONIN 5 MG
5 TABLET,CHEWABLE ORAL NIGHTLY
Qty: 30 TABLET | Refills: 3 | Status: SHIPPED | OUTPATIENT
Start: 2025-04-14

## 2025-04-14 RX ORDER — AMLODIPINE BESYLATE 5 MG/1
5 TABLET ORAL DAILY
Qty: 30 TABLET | Refills: 3 | Status: SHIPPED | OUTPATIENT
Start: 2025-04-14

## 2025-04-14 ASSESSMENT — ENCOUNTER SYMPTOMS
RESPIRATORY NEGATIVE: 1
GASTROINTESTINAL NEGATIVE: 1

## 2025-04-14 NOTE — PROGRESS NOTES
Terri Shankar (:  1960) is a Established patient, presenting virtually for evaluation of the following:    Assessment & Plan   Below is the assessment and plan developed based on review of pertinent history, physical exam, labs, studies, and medications.  Assessment & Plan  Stroke, lacunar (HCC)    Would benefit from speech and physical therapy.          Stroke, acute, thrombotic (HCC)    Would benefit from speech and physical therapy.          Class 2 severe obesity due to excess calories with serious comorbidity and body mass index (BMI) of 37.0 to 37.9 in adult    Okay to increase to 0.5 mg weekly and follow up in 1 month.                    Subjective   HPI  Patient presents today virtually with ongoing concerns with her speech and weakness. She suffered a stroke and would like to complete the therapy at home. She states transportation is difficult for her. 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 She states she would like to use [facility].   She also wants to follow up on her weight loss. She denies any side effects on the wegovy and would like to move up to the next dosage.     Review of Systems   Constitutional: Negative.    HENT: Negative.     Respiratory: Negative.     Cardiovascular: Negative.    Gastrointestinal: Negative.    Musculoskeletal: Negative.    Skin: Negative.    Neurological: Negative.    Psychiatric/Behavioral: Negative.            Objective   Patient-Reported Vitals  Patient-Reported Weight: 227.5 lbs  Patient-Reported Height: 5'4\"       Physical Exam  [INSTRUCTIONS:  \"[x]\" Indicates a positive item  \"[]\" Indicates a negative item  -- DELETE ALL ITEMS NOT EXAMINED]    Constitutional: [x] Appears well-developed and well-nourished [x] No apparent distress      [] Abnormal -     Mental status: [x] Alert and awake  [x] Oriented to person/place/time [x] Able to follow commands    [] Abnormal -     Eyes:   EOM    [x]  Normal    [] Abnormal -   Sclera  [x]  Normal    []

## 2025-04-17 ENCOUNTER — APPOINTMENT (OUTPATIENT)
Dept: GENERAL RADIOLOGY | Age: 65
End: 2025-04-17
Payer: MEDICARE

## 2025-04-17 ENCOUNTER — HOSPITAL ENCOUNTER (EMERGENCY)
Age: 65
Discharge: HOME | End: 2025-04-17
Attending: EMERGENCY MEDICINE
Payer: MEDICARE

## 2025-04-17 VITALS
DIASTOLIC BLOOD PRESSURE: 58 MMHG | SYSTOLIC BLOOD PRESSURE: 123 MMHG | TEMPERATURE: 97.2 F | BODY MASS INDEX: 36.88 KG/M2 | HEIGHT: 64 IN | HEART RATE: 76 BPM | WEIGHT: 216 LBS | OXYGEN SATURATION: 97 % | RESPIRATION RATE: 17 BRPM

## 2025-04-17 DIAGNOSIS — R07.9 CHEST PAIN, UNSPECIFIED TYPE: Primary | ICD-10-CM

## 2025-04-17 LAB
ANION GAP SERPL CALCULATED.3IONS-SCNC: 9 MMOL/L (ref 3–16)
BASOPHILS # BLD: 0.1 K/UL (ref 0–0.2)
BASOPHILS NFR BLD: 1.2 %
BUN SERPL-MCNC: 14 MG/DL (ref 7–20)
CALCIUM SERPL-MCNC: 9 MG/DL (ref 8.3–10.6)
CHLORIDE SERPL-SCNC: 104 MMOL/L (ref 99–110)
CO2 SERPL-SCNC: 25 MMOL/L (ref 21–32)
CREAT SERPL-MCNC: 1 MG/DL (ref 0.6–1.2)
DEPRECATED RDW RBC AUTO: 14.5 % (ref 12.4–15.4)
EKG ATRIAL RATE: 76 BPM
EKG DIAGNOSIS: NORMAL
EKG P AXIS: 90 DEGREES
EKG P-R INTERVAL: 166 MS
EKG Q-T INTERVAL: 382 MS
EKG QRS DURATION: 84 MS
EKG QTC CALCULATION (BAZETT): 429 MS
EKG R AXIS: 1 DEGREES
EKG T AXIS: 18 DEGREES
EKG VENTRICULAR RATE: 76 BPM
EOSINOPHIL # BLD: 0.3 K/UL (ref 0–0.6)
EOSINOPHIL NFR BLD: 4.4 %
GFR SERPLBLD CREATININE-BSD FMLA CKD-EPI: 63 ML/MIN/{1.73_M2}
GLUCOSE SERPL-MCNC: 86 MG/DL (ref 70–99)
HCT VFR BLD AUTO: 37 % (ref 36–48)
HGB BLD-MCNC: 12.3 G/DL (ref 12–16)
LYMPHOCYTES # BLD: 2.3 K/UL (ref 1–5.1)
LYMPHOCYTES NFR BLD: 31.8 %
MCH RBC QN AUTO: 28.8 PG (ref 26–34)
MCHC RBC AUTO-ENTMCNC: 33.3 G/DL (ref 31–36)
MCV RBC AUTO: 86.5 FL (ref 80–100)
MONOCYTES # BLD: 0.6 K/UL (ref 0–1.3)
MONOCYTES NFR BLD: 7.9 %
NEUTROPHILS # BLD: 4 K/UL (ref 1.7–7.7)
NEUTROPHILS NFR BLD: 54.7 %
NT-PROBNP SERPL-MCNC: <36 PG/ML (ref 0–124)
PLATELET # BLD AUTO: 209 K/UL (ref 135–450)
PMV BLD AUTO: 7 FL (ref 5–10.5)
POTASSIUM SERPL-SCNC: NORMAL MMOL/L (ref 3.5–5.1)
RBC # BLD AUTO: 4.28 M/UL (ref 4–5.2)
SODIUM SERPL-SCNC: 138 MMOL/L (ref 136–145)
TROPONIN, HIGH SENSITIVITY: 11 NG/L (ref 0–14)
TROPONIN, HIGH SENSITIVITY: 9 NG/L (ref 0–14)
WBC # BLD AUTO: 7.3 K/UL (ref 4–11)

## 2025-04-17 PROCEDURE — 84484 ASSAY OF TROPONIN QUANT: CPT

## 2025-04-17 PROCEDURE — 83880 ASSAY OF NATRIURETIC PEPTIDE: CPT

## 2025-04-17 PROCEDURE — 93005 ELECTROCARDIOGRAM TRACING: CPT

## 2025-04-17 PROCEDURE — 99285 EMERGENCY DEPT VISIT HI MDM: CPT

## 2025-04-17 PROCEDURE — 6370000000 HC RX 637 (ALT 250 FOR IP)

## 2025-04-17 PROCEDURE — 36415 COLL VENOUS BLD VENIPUNCTURE: CPT

## 2025-04-17 PROCEDURE — 85025 COMPLETE CBC W/AUTO DIFF WBC: CPT

## 2025-04-17 PROCEDURE — 71046 X-RAY EXAM CHEST 2 VIEWS: CPT

## 2025-04-17 PROCEDURE — 80048 BASIC METABOLIC PNL TOTAL CA: CPT

## 2025-04-17 RX ORDER — ACETAMINOPHEN 500 MG
1000 TABLET ORAL
Status: COMPLETED | OUTPATIENT
Start: 2025-04-17 | End: 2025-04-17

## 2025-04-17 RX ADMIN — ACETAMINOPHEN 1000 MG: 500 TABLET ORAL at 03:58

## 2025-04-17 ASSESSMENT — PAIN DESCRIPTION - LOCATION: LOCATION: CHEST

## 2025-04-17 ASSESSMENT — PAIN SCALES - GENERAL: PAINLEVEL_OUTOF10: 10

## 2025-04-17 ASSESSMENT — PAIN - FUNCTIONAL ASSESSMENT: PAIN_FUNCTIONAL_ASSESSMENT: 0-10

## 2025-04-17 NOTE — ED PROVIDER NOTES
ED Attending Attestation Note     Date of evaluation: 4/17/2025    This patient was seen by the resident.  I have seen and examined the patient, agree with the workup, evaluation, management and diagnosis. The care plan has been discussed.  I have reviewed the ECG and concur with the resident's interpretation.      Briefly, Terri Shankar is a 64 y.o. female with a PMH inclusive of HTN, HLD, anxiety with recent intracranial vascular procedure who presents for evaluation of HA ongoing since her procedure. No worsening. Also has CP, now resolved. Had ED for this last week.    Notable exam findings include nontoxic in appearance. No meningismus, ambulatory    Assessment/ Medical Decision Making:     No acute change in HA. Has follow up with NSGY. CP resolved. ED workup reassuring low suspicion for any acutely dangerous cause of her symptoms based on history, exam findings, reassuring workup.  Has cardiology follow-up tomorrow to exchange her cardiac monitor.  Counseled to discuss her chest pain.       Rabia Arguelles MD  04/17/25 [id number]

## 2025-04-17 NOTE — DISCHARGE INSTRUCTIONS
Please return to the emergency department for chest pain, difficulty breathing, passing out, or any other concerns.

## 2025-04-17 NOTE — ED PROVIDER NOTES
THE [facility]  EMERGENCY DEPARTMENT ENCOUNTER          EM RESIDENT NOTE       Date of evaluation: 4/17/2025    Chief Complaint     Chest Pain      History of Present Illness     Terri Shankar is a 64 y.o. female who presents with history of hypertension, hyperlipidemia, anxiety and recent stroke (patient had a diagnostic angio to evaluate for right MCA aneurysm when she was incidentally found to have a left M2 occlusion and received IA tPA; MRI following the procedure demonstrated numerous bilateral small acute infarct).  Patient states that she has had a headache since this procedure on 3/14.  Patient states the headache has not been worse however it is not improved.  Patient also endorses mild chest pain earlier this evening around 5 PM which has now resolved.  Patient states she has a Holter monitor already scheduled with her primary care physician as well as cardiology.  Patient also endorses a rash for the past 2 weeks, she has been trying nystatin cream and nystatin powder at home however noticed the rash is not improved.  Pain on chart review, patient was seen last week on 4/9 for chest pain in the emergency department.  Labs, EKG were reassuring and patient was discharged home.     MEDICAL DECISION MAKING / ASSESSMENT / PLAN     INITIAL VITALS: BP: (!) 138/123, Temp: 97.2 °F (36.2 °C), Pulse: 79, Respirations: 20, SpO2: 98 %    Terri Shankar is a 64 y.o. female, well-appearing on exam presenting to emergency department chest pain, rash, headache.  Patient with normal neuroexam, patient's headache chronic and not worsened since procedure 1 month ago, low suspicion for ICH or any acute intracranial pathology.  Patient's chest pain resolved prior to arrival.  Troponin 9, then 11.  BNP less than 36.  Patient already with cardiology appointment tomorrow for Holter monitor.  BMP unremarkable, no gross electrolyte abnormalities, CBC with no leukocytosis, no anemia.   EKG reassuring, nonischemic.  Chest  once a week    SODIUM CHLORIDE (OCEAN, BABY AYR) 0.65 % NASAL SPRAY    1 spray by Nasal route every 2 hours as needed for Congestion (dry nares)    TRIAMCINOLONE (KENALOG) 0.025 % OINTMENT    Apply topically 2 times daily to bite area until rash resolved       Allergies     She is allergic to bactrim [sulfamethoxazole-trimethoprim], augmentin [amoxicillin-pot clavulanate], diphenhydramine, and macrobid [nitrofurantoin].    Physical Exam     INITIAL VITALS: BP: (!) 138/123, Temp: 97.2 °F (36.2 °C), Pulse: 79, Respirations: 20, SpO2: 98 %   Physical Exam  Constitutional:       Appearance: Normal appearance.   HENT:      Head: Normocephalic.      Right Ear: External ear normal.      Left Ear: External ear normal.      Nose: Nose normal.      Mouth/Throat:      Mouth: Mucous membranes are moist.   Eyes:      Extraocular Movements: Extraocular movements intact.   Cardiovascular:      Rate and Rhythm: Normal rate and regular rhythm.   Pulmonary:      Effort: Pulmonary effort is normal. No respiratory distress.      Breath sounds: No wheezing.   Abdominal:      General: There is no distension.      Palpations: Abdomen is soft.      Tenderness: There is no abdominal tenderness.      Comments: Intertrigo to folds   Musculoskeletal:         General: Normal range of motion.      Cervical back: Normal range of motion.   Skin:     General: Skin is warm and dry.   Neurological:      General: No focal deficit present.      Mental Status: She is alert.                Merlene Baldwin MD  Resident  04/17/25 [id number]

## 2025-04-18 ENCOUNTER — CARE COORDINATION (OUTPATIENT)
Dept: CARE COORDINATION | Age: 65
End: 2025-04-18

## 2025-04-18 NOTE — CARE COORDINATION
Ambulatory Care Coordination Note     4/18/2025 10:43 AM     Patient outreach attempt by this ACM today to offer care management services. ACM was unable to reach the patient by telephone today;   left voice message requesting a return phone call to this ACM.     ACM: Tsering Webster, RN     Care Summary Note: E/D Visit at [facility] on 4/17/25 for Chest Pain. PMH: TIA.    CC Program identified at this time, ACM added to tx team.      PCP/Specialist follow up:   Future Appointments         Provider Specialty Dept Phone    4/23/2025 10:30 AM [place] [facility] [phone]            Follow Up:   Plan for next ACM outreach in approximately 1 week to complete:  - outreach attempt to offer care management services.       Tsering ARNOLD, RN  Respecting Choices® Advanced Steps ACP Facilitator  Ambulatory Care Manager  [facility]  [phone]

## 2025-04-26 DIAGNOSIS — L28.1 PRURIGO NODULARIS: ICD-10-CM

## 2025-04-27 RX ORDER — LIDOCAINE 50 MG/G
PATCH TOPICAL
Qty: 10 PATCH | Refills: 3 | Status: SHIPPED | OUTPATIENT
Start: 2025-04-27

## 2025-04-27 RX ORDER — TRIAMCINOLONE ACETONIDE 0.25 MG/G
OINTMENT TOPICAL
Qty: 80 G | Refills: 3 | Status: SHIPPED | OUTPATIENT
Start: 2025-04-27

## 2025-05-01 ENCOUNTER — TELEPHONE (OUTPATIENT)
Dept: FAMILY MEDICINE CLINIC | Age: 65
End: 2025-05-01

## 2025-05-05 ENCOUNTER — CARE COORDINATION (OUTPATIENT)
Dept: CARE COORDINATION | Age: 65
End: 2025-05-05

## 2025-05-05 NOTE — CARE COORDINATION
Ambulatory Care Coordination Note     5/5/2025 3:00 PM     Patient outreach attempt by this ACM today to offer care management services. ACM was unable to reach the patient by telephone today;   left voice message requesting a return phone call to this ACM. UTR, [id number]     ACM: Tsering Webster, RN     Care Summary Note:     PCP/Specialist follow up:  E/D Visit at [facility] on 4/17/25 for Chest Pain. PMH: TIA.        Follow Up:   Plan for next ACM outreach in approximately 1 week to complete:  - outreach attempt to offer care management services.       Tsering ARNOLD, RN  Respecting Choices® Advanced Steps ACP Facilitator  Ambulatory Care Manager  [facility]  [phone]

## 2025-05-12 ENCOUNTER — TELEPHONE (OUTPATIENT)
Dept: FAMILY MEDICINE CLINIC | Age: 65
End: 2025-05-12

## 2025-05-12 ENCOUNTER — CARE COORDINATION (OUTPATIENT)
Dept: CARE COORDINATION | Age: 65
End: 2025-05-12

## 2025-05-12 RX ORDER — ATORVASTATIN CALCIUM 40 MG/1
40 TABLET, FILM COATED ORAL NIGHTLY
Qty: 30 TABLET | Refills: 5 | Status: SHIPPED | OUTPATIENT
Start: 2025-05-12

## 2025-05-12 NOTE — TELEPHONE ENCOUNTER
Please refax the order for home care to [facility].  The order was placed on April 14.  Regarding the Wegovy it is a common symptom to have nausea and stomach cramps.  If she feels like it is severe we can cut back on the dose.  I can also send in Zofran as needed for nausea.

## 2025-05-12 NOTE — TELEPHONE ENCOUNTER
Patient states since staring Wegovy she has nausea and  stomach cramps. Almost went to the ER. She is requesting advise       Patient also states she had not been contacted by home health for speech and PT.

## 2025-05-12 NOTE — CARE COORDINATION
Ambulatory Care Coordination Note     5/12/2025 1:03 PM     patient outreach attempt by this ACM today to offer care management services. ACM was unable to reach the patient by telephone today;   left voice message requesting a return phone call to this ACM. UTR X3.     Patient closed (unable to reach patient) from the High Risk Care Management program on 5/12/2025.  No further Ambulatory Care Manager follow up scheduled.     Tsering ARNOLD, RN  Respecting Choices® Advanced Steps ACP Facilitator  Ambulatory Care Manager  [facility]  [phone]

## 2025-05-12 NOTE — TELEPHONE ENCOUNTER
Last Office Visit  -  4/14/25  Next Office Visit  -  n/a    Last Filled  -    Last UDS -  Contract -

## 2025-05-13 NOTE — TELEPHONE ENCOUNTER
Pt called back - gave pt message regarding the wegovy faxed order.She wanted me to inform provider that she will just go back to exercising, swimming, walking and eating right. She doesn't like the symptoms she was getting from medication.

## 2025-05-13 NOTE — TELEPHONE ENCOUNTER
Left message for patient to return call to office regarding wegovy.   Faxed order to [facility] to day.

## 2025-05-15 ENCOUNTER — TELEPHONE (OUTPATIENT)
Dept: FAMILY MEDICINE CLINIC | Age: 65
End: 2025-05-15

## 2025-05-15 NOTE — TELEPHONE ENCOUNTER
Pt called, says her left eye is red and itching and burning. She thinks it may be allergies. She tried Visine and it hasn't worked. She would like to know if EG can recommend anything OTC to help with her symptoms. She says she is on a tight budget and would like something on the cheaper side. Please advise.

## 2025-05-15 NOTE — TELEPHONE ENCOUNTER
I called and spoke with Terri. I explained to her that she needs to be seen for this issues so that the proper medication canget sent in. She did not want to schedule and stated that she will see how the day goes. I let her know that if it gets worse to call us so that we can get her schedule or she can go to a Urgent care. She sated that she would do that

## 2025-07-10 ENCOUNTER — TELEPHONE (OUTPATIENT)
Dept: FAMILY MEDICINE CLINIC | Age: 65
End: 2025-07-10

## 2025-07-10 NOTE — TELEPHONE ENCOUNTER
Patient contacted the office states she was seen for an eye infection and was placed on eye drops. The infection has returned in her L eye so she is req more drops, any questions [phone]  Maddy blevins

## 2025-07-11 NOTE — TELEPHONE ENCOUNTER
Notified patient that provider is requesting that she schedule an appointment for eye to evaluated. Patient stated that her eye has improved and declined to schedule at this time.

## 2025-07-12 ENCOUNTER — HOSPITAL ENCOUNTER (EMERGENCY)
Age: 65
Discharge: HOME | End: 2025-07-12
Payer: COMMERCIAL

## 2025-07-12 ENCOUNTER — APPOINTMENT (OUTPATIENT)
Dept: GENERAL RADIOLOGY | Age: 65
End: 2025-07-12
Payer: COMMERCIAL

## 2025-07-12 ENCOUNTER — APPOINTMENT (OUTPATIENT)
Dept: CT IMAGING | Age: 65
End: 2025-07-12
Payer: COMMERCIAL

## 2025-07-12 VITALS
SYSTOLIC BLOOD PRESSURE: 174 MMHG | OXYGEN SATURATION: 99 % | TEMPERATURE: 97.8 F | HEIGHT: 64 IN | HEART RATE: 76 BPM | RESPIRATION RATE: 19 BRPM | WEIGHT: 225.4 LBS | DIASTOLIC BLOOD PRESSURE: 94 MMHG | BODY MASS INDEX: 38.48 KG/M2

## 2025-07-12 DIAGNOSIS — S20.221A CONTUSION OF UPPER BACK, RIGHT, INITIAL ENCOUNTER: Primary | ICD-10-CM

## 2025-07-12 PROCEDURE — 72128 CT CHEST SPINE W/O DYE: CPT

## 2025-07-12 PROCEDURE — 71046 X-RAY EXAM CHEST 2 VIEWS: CPT

## 2025-07-12 PROCEDURE — 6370000000 HC RX 637 (ALT 250 FOR IP)

## 2025-07-12 PROCEDURE — 93005 ELECTROCARDIOGRAM TRACING: CPT

## 2025-07-12 PROCEDURE — 99284 EMERGENCY DEPT VISIT MOD MDM: CPT

## 2025-07-12 RX ORDER — LIDOCAINE 4 G/G
1 PATCH TOPICAL 2 TIMES DAILY PRN
Qty: 30 PATCH | Refills: 0 | Status: SHIPPED | OUTPATIENT
Start: 2025-07-12 | End: 2025-08-11

## 2025-07-12 RX ORDER — NAPROXEN 250 MG/1
250 TABLET ORAL 3 TIMES DAILY PRN
Qty: 30 TABLET | Refills: 1 | Status: SHIPPED | OUTPATIENT
Start: 2025-07-12

## 2025-07-12 RX ORDER — ACETAMINOPHEN 500 MG
1000 TABLET ORAL ONCE
Status: COMPLETED | OUTPATIENT
Start: 2025-07-12 | End: 2025-07-12

## 2025-07-12 RX ORDER — LIDOCAINE 4 G/G
1 PATCH TOPICAL DAILY
Status: DISCONTINUED | OUTPATIENT
Start: 2025-07-12 | End: 2025-07-12 | Stop reason: HOSPADM

## 2025-07-12 RX ORDER — NAPROXEN 250 MG/1
500 TABLET ORAL
Status: COMPLETED | OUTPATIENT
Start: 2025-07-12 | End: 2025-07-12

## 2025-07-12 RX ORDER — ACETAMINOPHEN 500 MG
1000 TABLET ORAL EVERY 8 HOURS PRN
Qty: 30 TABLET | Refills: 1 | Status: SHIPPED | OUTPATIENT
Start: 2025-07-12

## 2025-07-12 RX ADMIN — ACETAMINOPHEN 1000 MG: 500 TABLET ORAL at 12:25

## 2025-07-12 RX ADMIN — NAPROXEN 500 MG: 250 TABLET ORAL at 12:25

## 2025-07-12 ASSESSMENT — PAIN DESCRIPTION - LOCATION
LOCATION: BACK
LOCATION: BACK

## 2025-07-12 ASSESSMENT — PAIN DESCRIPTION - ORIENTATION: ORIENTATION: UPPER

## 2025-07-12 ASSESSMENT — PAIN SCALES - GENERAL
PAINLEVEL_OUTOF10: 10
PAINLEVEL_OUTOF10: 10

## 2025-07-12 ASSESSMENT — PAIN DESCRIPTION - DESCRIPTORS: DESCRIPTORS: SHARP

## 2025-07-12 ASSESSMENT — PAIN - FUNCTIONAL ASSESSMENT
PAIN_FUNCTIONAL_ASSESSMENT: ACTIVITIES ARE NOT PREVENTED
PAIN_FUNCTIONAL_ASSESSMENT: 0-10
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
## 2025-07-13 LAB
EKG ATRIAL RATE: 83 BPM
EKG DIAGNOSIS: NORMAL
EKG P AXIS: 53 DEGREES
EKG P-R INTERVAL: 154 MS
EKG Q-T INTERVAL: 384 MS
EKG QRS DURATION: 76 MS
EKG QTC CALCULATION (BAZETT): 451 MS
EKG R AXIS: 29 DEGREES
EKG T AXIS: 57 DEGREES
EKG VENTRICULAR RATE: 83 BPM

## 2025-07-21 NOTE — TELEPHONE ENCOUNTER
Last Office Visit  -  4/14/2025  Next Office Visit  -  9/12/2025    Last Filled  -  4/1/2025  Last UDS -    Contract -

## 2025-07-21 NOTE — TELEPHONE ENCOUNTER
Patient is requesting a rx for   sodium chloride (OCEAN, BABY AYR) 0.65 % nasal spray     [facility] [id number] - [facility], [place] - [address]     Last seen 4/14/2025   Next visit   Future Appointments   Date Time Provider Department Center   9/12/2025  9:40 AM Marcia Abdi APRN - CNP [facility] DEP

## (undated) DEVICE — PAD, DEFIB, ADULT, RADIOTRANS, PHYSIO: Brand: MEDLINE

## (undated) DEVICE — TWIST & GO, HPCT, COEX, 60''(ANGIO - ART, HPCT, TWIST & GO, CO-EX, 60")(60766876): Brand: MEDRAD® TWIST & GO STERILE HIGH PRESSURE CONNECTOR TUBING, 150CM (60IN)

## (undated) DEVICE — MICROCATHETER: Brand: HEADWAY ADVANCED SOFT

## (undated) DEVICE — Device

## (undated) DEVICE — ANGIO-SEAL VIP VASCULAR CLOSURE DEVICE: Brand: ANGIO-SEAL

## (undated) DEVICE — RADIFOCUS GLIDEWIRE: Brand: GLIDEWIRE

## (undated) DEVICE — GUIDEWIRES: Brand: TRAXCESS EX GUIDEWIRE

## (undated) DEVICE — CATHETER DIAG 5FR L130CM DIA0.04IN WIRE COMPATIBILITY

## (undated) DEVICE — ZOOM 035 158 CM RC: Brand: ZOOM™ REPERFUSION CATHETER

## (undated) DEVICE — CATHETER GUID STR 038 8 FRX90 CM 6 FRX125 CM SIM BMX 96 SEL

## (undated) DEVICE — RADIFOCUS GLIDECATH: Brand: GLIDECATH

## (undated) DEVICE — ZOOM 071 137 CM RC: Brand: ZOOM™ REPERFUSION CATHETER

## (undated) DEVICE — CATH LAB PACK: Brand: MEDLINE INDUSTRIES, INC.

## (undated) DEVICE — PINNACLE R/O II INTRODUCER SHEATH WITH RADIOPAQUE MARKER: Brand: PINNACLE